# Patient Record
Sex: MALE | Race: BLACK OR AFRICAN AMERICAN | NOT HISPANIC OR LATINO | Employment: OTHER | URBAN - METROPOLITAN AREA
[De-identification: names, ages, dates, MRNs, and addresses within clinical notes are randomized per-mention and may not be internally consistent; named-entity substitution may affect disease eponyms.]

---

## 2017-02-09 DIAGNOSIS — E11.9 TYPE 2 DIABETES MELLITUS WITHOUT COMPLICATION: ICD-10-CM

## 2017-04-20 ENCOUNTER — HOSPITAL ENCOUNTER (INPATIENT)
Facility: HOSPITAL | Age: 82
LOS: 3 days | Discharge: HOME | DRG: 395 | End: 2017-04-23
Attending: EMERGENCY MEDICINE | Admitting: SURGERY
Payer: MEDICARE

## 2017-04-20 DIAGNOSIS — E86.0 DEHYDRATION: ICD-10-CM

## 2017-04-20 DIAGNOSIS — K56.609 SMALL BOWEL OBSTRUCTION: Primary | ICD-10-CM

## 2017-04-20 DIAGNOSIS — K42.0 UMBILICAL HERNIA WITH OBSTRUCTION: ICD-10-CM

## 2017-04-20 LAB
ABO + RH BLD: NORMAL
ALBUMIN SERPL BCP-MCNC: 4.4 G/DL
ALP SERPL-CCNC: 112 U/L
ALT SERPL W/O P-5'-P-CCNC: 23 U/L
ANION GAP SERPL CALC-SCNC: 15 MMOL/L
AST SERPL-CCNC: 21 U/L
BASOPHILS # BLD AUTO: 0.02 K/UL
BASOPHILS NFR BLD: 0.2 %
BILIRUB SERPL-MCNC: 1.3 MG/DL
BLD GP AB SCN CELLS X3 SERPL QL: NORMAL
BUN SERPL-MCNC: 31 MG/DL
CALCIUM SERPL-MCNC: 10.9 MG/DL
CHLORIDE SERPL-SCNC: 103 MMOL/L
CO2 SERPL-SCNC: 21 MMOL/L
CREAT SERPL-MCNC: 1.9 MG/DL
DIFFERENTIAL METHOD: ABNORMAL
EOSINOPHIL # BLD AUTO: 0 K/UL
EOSINOPHIL NFR BLD: 0 %
ERYTHROCYTE [DISTWIDTH] IN BLOOD BY AUTOMATED COUNT: 14.9 %
EST. GFR  (AFRICAN AMERICAN): 37 ML/MIN/1.73 M^2
EST. GFR  (NON AFRICAN AMERICAN): 32 ML/MIN/1.73 M^2
GLUCOSE SERPL-MCNC: 301 MG/DL
HCT VFR BLD AUTO: 44.4 %
HGB BLD-MCNC: 14.9 G/DL
INR PPP: 1
LIPASE SERPL-CCNC: 25 U/L
LYMPHOCYTES # BLD AUTO: 0.6 K/UL
LYMPHOCYTES NFR BLD: 6.1 %
MCH RBC QN AUTO: 28.4 PG
MCHC RBC AUTO-ENTMCNC: 33.6 %
MCV RBC AUTO: 85 FL
MONOCYTES # BLD AUTO: 0.9 K/UL
MONOCYTES NFR BLD: 8.5 %
NEUTROPHILS # BLD AUTO: 8.9 K/UL
NEUTROPHILS NFR BLD: 84.8 %
PLATELET # BLD AUTO: 232 K/UL
PMV BLD AUTO: 11.2 FL
POTASSIUM SERPL-SCNC: 5.2 MMOL/L
PROT SERPL-MCNC: 9.4 G/DL
PROTHROMBIN TIME: 10.7 SEC
RBC # BLD AUTO: 5.25 M/UL
SODIUM SERPL-SCNC: 139 MMOL/L
WBC # BLD AUTO: 10.44 K/UL

## 2017-04-20 PROCEDURE — 96374 THER/PROPH/DIAG INJ IV PUSH: CPT

## 2017-04-20 PROCEDURE — 12000002 HC ACUTE/MED SURGE SEMI-PRIVATE ROOM

## 2017-04-20 PROCEDURE — 85025 COMPLETE CBC W/AUTO DIFF WBC: CPT

## 2017-04-20 PROCEDURE — 36410 VNPNXR 3YR/> PHY/QHP DX/THER: CPT

## 2017-04-20 PROCEDURE — 99285 EMERGENCY DEPT VISIT HI MDM: CPT | Mod: 25

## 2017-04-20 PROCEDURE — 83690 ASSAY OF LIPASE: CPT

## 2017-04-20 PROCEDURE — 85610 PROTHROMBIN TIME: CPT

## 2017-04-20 PROCEDURE — 86900 BLOOD TYPING SEROLOGIC ABO: CPT

## 2017-04-20 PROCEDURE — 25000003 PHARM REV CODE 250: Performed by: EMERGENCY MEDICINE

## 2017-04-20 PROCEDURE — 96361 HYDRATE IV INFUSION ADD-ON: CPT

## 2017-04-20 PROCEDURE — 63600175 PHARM REV CODE 636 W HCPCS: Performed by: EMERGENCY MEDICINE

## 2017-04-20 PROCEDURE — 86901 BLOOD TYPING SEROLOGIC RH(D): CPT

## 2017-04-20 PROCEDURE — 80053 COMPREHEN METABOLIC PANEL: CPT

## 2017-04-20 RX ORDER — ONDANSETRON 2 MG/ML
4 INJECTION INTRAMUSCULAR; INTRAVENOUS
Status: COMPLETED | OUTPATIENT
Start: 2017-04-20 | End: 2017-04-20

## 2017-04-20 RX ORDER — SODIUM CHLORIDE 9 MG/ML
500 INJECTION, SOLUTION INTRAVENOUS
Status: COMPLETED | OUTPATIENT
Start: 2017-04-20 | End: 2017-04-21

## 2017-04-20 RX ADMIN — SODIUM CHLORIDE, SODIUM LACTATE, POTASSIUM CHLORIDE, AND CALCIUM CHLORIDE 1000 ML: .6; .31; .03; .02 INJECTION, SOLUTION INTRAVENOUS at 06:04

## 2017-04-20 RX ADMIN — ONDANSETRON 4 MG: 2 INJECTION INTRAMUSCULAR; INTRAVENOUS at 06:04

## 2017-04-20 RX ADMIN — SODIUM CHLORIDE 500 ML: 0.9 INJECTION, SOLUTION INTRAVENOUS at 10:04

## 2017-04-20 NOTE — IP AVS SNAPSHOT
[place] [address] [place] [address]  Phone: [phone]           Patient Discharge Instructions   Our goal is to set you up for success. This packet includes information on your condition, medications, and your home care.  It will help you care for yourself to prevent having to return to the hospital.     Please ask your nurse if you have any questions.      There are many details to remember when preparing to leave the hospital. Here is what you will need to do:    1. Take your medicine. If you are prescribed medications, review your Medication List on the following pages. You may have new medications to  at the pharmacy and others that you'll need to stop taking. Review the instructions for how and when to take your medications. Talk with your doctor or nurses if you are unsure of what to do.     2. Go to your follow-up appointments. Specific follow-up information is listed in the following pages. Your may be contacted by a nurse or clinical provider about future appointments. Be sure we have all of the phone numbers to reach you. Please contact your provider's office if you are unable to make an appointment.     3. Watch for warning signs. Your doctor or nurse will give you detailed warning signs to watch for and when to call for assistance. These instructions may also include educational information about your condition. If you experience any of warning signs to your health, call your doctor.           [facility]  Unless otherwise directed by your provider, please   contact [facility], our nurse care line   that is available for 24/7 assistance.     [phone] (toll-free)     Registered nurses in the [facility]   provide: appointment scheduling, clinical advisement, health education, and other advisory services.                  ** Verify the list of medication(s) below is accurate and up to date. Carry this with you in case of emergency.  If your medications have changed, please notify your healthcare provider.             Medication List      CONTINUE taking these medications        Additional Info                      allopurinol 300 MG tablet   Commonly known as:  ZYLOPRIM   Quantity:  90 tablet   Refills:  3   Dose:  300 mg    Last time this was given:  300 mg on 4/23/2017  8:34 AM   Instructions:  Take 1 tablet (300 mg total) by mouth once daily.     Begin Date    AM    Noon    PM    Bedtime       benazepril 40 MG tablet   Commonly known as:  LOTENSIN   Quantity:  90 tablet   Refills:  3   Dose:  40 mg    Last time this was given:  40 mg on 4/23/2017  8:34 AM   Instructions:  Take 1 tablet (40 mg total) by mouth once daily.     Begin Date    AM    Noon    PM    Bedtime       blood sugar diagnostic Strp   Commonly known as:  ONETOUCH ULTRA TEST   Quantity:  200 each   Refills:  3   Dose:  1 each    Instructions:  1 each by Misc.(Non-Drug; Combo Route) route once daily.     Begin Date    AM    Noon    PM    Bedtime       blood-glucose meter kit   Commonly known as:  ONETOUCH ULTRAMINI   Quantity:  1 each   Refills:  0    Instructions:  To test twice daily. Use as instructed     Begin Date    AM    Noon    PM    Bedtime       cholestyramine 4 gram packet   Commonly known as:  QUESTRAN   Quantity:  270 packet   Refills:  3   Dose:  4 g    Instructions:  Take 1 packet (4 g total) by mouth 3 (three) times daily with meals.     Begin Date    AM    Noon    PM    Bedtime       colchicine 0.6 mg tablet   Commonly known as:  COLCRYS   Quantity:  30 tablet   Refills:  1   Dose:  0.6 mg    Instructions:  Take 1 tablet (0.6 mg total) by mouth daily as needed.     Begin Date    AM    Noon    PM    Bedtime       duloxetine 30 MG capsule   Commonly known as:  CYMBALTA   Quantity:  60 capsule   Refills:  2   Dose:  30 mg    Instructions:  Take 1 capsule (30 mg total) by mouth 2 (two) times daily.     Begin Date    AM    Noon    PM    Bedtime       ferrous  gluconate 324 MG tablet   Commonly known as:  FERGON   Refills:  0   Dose:  324 mg    Instructions:  Take 1 tablet (324 mg total) by mouth daily with breakfast.     Begin Date    AM    Noon    PM    Bedtime       gabapentin 300 MG capsule   Commonly known as:  NEURONTIN   Quantity:  90 capsule   Refills:  2   Dose:  300 mg    Instructions:  Take 1 capsule (300 mg total) by mouth 3 (three) times daily.     Begin Date    AM    Noon    PM    Bedtime       indomethacin 50 MG capsule   Commonly known as:  INDOCIN   Quantity:  90 capsule   Refills:  0   Dose:  50 mg    Instructions:  Take 1 capsule (50 mg total) by mouth 3 (three) times daily as needed. Take for Gout attacks     Begin Date    AM    Noon    PM    Bedtime       loratadine 10 mg tablet   Commonly known as:  CLARITIN   Refills:  0   Dose:  10 mg    Instructions:  Take 10 mg by mouth once daily.     Begin Date    AM    Noon    PM    Bedtime       mirtazapine 15 MG tablet   Commonly known as:  REMERON   Quantity:  90 tablet   Refills:  2   Dose:  15 mg    Instructions:  Take 1 tablet (15 mg total) by mouth nightly.     Begin Date    AM    Noon    PM    Bedtime       multivitamin capsule   Refills:  0   Dose:  1 capsule    Instructions:  Take 1 capsule by mouth once daily.     Begin Date    AM    Noon    PM    Bedtime       oxycodone-acetaminophen 5-325 mg per tablet   Commonly known as:  PERCOCET   Quantity:  90 tablet   Refills:  0   Dose:  1 tablet    Instructions:  Take 1 tablet by mouth 3 (three) times daily.     Begin Date    AM    Noon    PM    Bedtime       penicillin v potassium 500 MG tablet   Commonly known as:  VEETID   Quantity:  40 tablet   Refills:  0   Dose:  500 mg    Instructions:  Take 1 tablet (500 mg total) by mouth 4 (four) times daily.     Begin Date    AM    Noon    PM    Bedtime       potassium, sodium phosphates 280-160-250 mg Pwpk   Commonly known as:  PHOS-NAK   Quantity:  120 packet   Refills:  6   Dose:  1 packet    Instructions:   Take 1 packet by mouth 4 (four) times daily with meals and nightly.     Begin Date    AM    Noon    PM    Bedtime       prochlorperazine 10 MG tablet   Commonly known as:  COMPAZINE   Quantity:  30 tablet   Refills:  1   Dose:  10 mg    Instructions:  Take 1 tablet (10 mg total) by mouth every 6 (six) hours as needed.     Begin Date    AM    Noon    PM    Bedtime       sodium bicarbonate 650 MG tablet   Quantity:  100 tablet   Refills:  1   Dose:  650 mg    Instructions:  Take 1 tablet (650 mg total) by mouth 2 (two) times daily.     Begin Date    AM    Noon    PM    Bedtime       tadalafil 10 MG tablet   Commonly known as:  CIALIS   Quantity:  10 tablet   Refills:  3   Dose:  10 mg    Instructions:  Take 1 tablet (10 mg total) by mouth daily as needed for Erectile Dysfunction.     Begin Date    AM    Noon    PM    Bedtime                  Please bring to all follow up appointments:    1. A copy of your discharge instructions.  2. All medicines you are currently taking in their original bottles.  3. Identification and insurance card.    Please arrive 15 minutes ahead of scheduled appointment time.    Please call 24 hours in advance if you must reschedule your appointment and/or time.        Your Scheduled Appointments     May 04, 2017 10:20 AM CDT   Established Patient - Oncology with Vimal Pillai MD   [facility] ([facility])    [address] [place] [address], [address]  [facility] [address]   [phone]            May 05, 2017  9:00 AM CDT   Hospital Follow Up with Marcos Sellers MD   [facility] ([facility])    [address]  [facility] [address]   [phone]            Jun 16, 2017  9:30 AM CDT   Non-Fasting Lab with LAB, APPOINTMENT [place] [facility] ([facility])    [address] [facility] [address]   [phone]            Jun 16, 2017 10:00 AM CDT   Ct Abd W Contrast with [facility] CT2 LIMIT 500 LBS   [facility] ([facility])    [address] [place] [address]   [phone]            Jun 19, 2017 10:00 AM CDT   Established Patient Visit with MD Keegan Jose - [facility] [place] ([facility] )    [address]  [place] [address]   [phone]              Follow-up Information     Follow up with Marcos Sellers MD. Go on 5/5/2017.    Specialty:  Internal Medicine    Why:  For Appointment on Friday 5/5/2017 @ 9AM    Contact information:    [address]  [place] [address]  [phone]          Follow up with [place] Finch MD. Schedule an appointment as soon as possible for a visit in 3 weeks.    Specialty:  General Surgery    Why:  As needed    Contact information:    [address] [place] [id number] [phone]          Discharge References/Attachments     BOWEL OBSTRUCTION, SMALL (ENGLISH)        Primary Diagnosis     Your primary diagnosis was:  Small Bowel Obstruction      Admission Information     Date & Time Provider Department CSN    4/20/2017  5:35 PM Eugene Porras III, MD [facility] [id number]      Care Providers     Provider Role Specialty Primary office phone    Eugene Porras III, MD Attending Provider Surgery [phone]    Vimal Pillai MD Consulting Physician  Internal Medicine [phone]      Your Vitals Were     BP Pulse Temp Resp Height Weight    163/85 75 98 °F (36.7 °C) (Oral) 19 6' 2" (1.88 m) 91.2 kg (201 lb)    SpO2 BMI             96% 25.81 kg/m2         Recent Lab Values        5/2/2014 8/25/2014 11/26/2014 3/2/2015 2/16/2016 6/16/2016 7/6/2016 4/21/2017     10:53 AM  8:45 AM  9:50 AM  8:45 AM 11:44 AM 12:01 PM  4:07 PM  5:42 AM    A1C 7.5 (H) 7.6 (H) 6.6 (H) 6.2 4.8 5.3 5.4 6.4 (H)    Comment for A1C at  4:07 PM on 7/6/2016:  According to ADA guidelines, hemoglobin A1C <7.0% represents  optimal control in non-pregnant diabetic patients.  Different  metrics may apply to specific " populations.   Standards of Medical Care in Diabetes - 2016.  For the purpose of screening for the presence of diabetes:  <5.7%     Consistent with the absence of diabetes  5.7-6.4%  Consistent with increasing risk for diabetes   (prediabetes)  >or=6.5%  Consistent with diabetes  Currently no consensus exists for use of hemoglobin A1C  for diagnosis of diabetes for children.      Comment for A1C at  5:42 AM on 4/21/2017:  According to ADA guidelines, hemoglobin A1C <7.0% represents  optimal control in non-pregnant diabetic patients.  Different  metrics may apply to specific populations.   Standards of Medical Care in Diabetes - 2016.  For the purpose of screening for the presence of diabetes:  <5.7%     Consistent with the absence of diabetes  5.7-6.4%  Consistent with increasing risk for diabetes   (prediabetes)  >or=6.5%  Consistent with diabetes  Currently no consensus exists for use of hemoglobin A1C  for diagnosis of diabetes for children.        Allergies as of 4/23/2017        Reactions    Shellfish Containing Products     Causes gout to flare up      Advance Directives     An advance directive is a document which, in the event you are no longer able to make decisions for yourself, tells your healthcare team what kind of treatment you do or do not want to receive, or who you would like to make those decisions for you.  If you do not currently have an advance directive, [facility] encourages you to create one.  For more information call:  [phone]), [phone] ([phone]),  or log on to [email or web address].        Language Assistance Services     ATTENTION: Language assistance services are available, free of charge. Please call [phone].      ATENCIÓN: Si habla español, tiene a khan disposición servicios gratuitos de asistencia lingüística. Llame al [phone].     CHÚ Ý: N?u b?n nói Ti?ng Vi?t, có các d?ch v? h? tr? ngôn ng? mi?n phí dành cho b?n. G?i s? [phone].         Chronic Kindey Disease Education             Diabetes Discharge Instructions                                   [facility] Sign[facility]Up     Activating your [facility] account is as easy as 1-2-3!     1) Visit [email or web address], select Sign Up Now, enter this activation code and your date of birth, then select Next.  Activation code not generated  Current Patient Portal Status: Account disabled      2) Create a username and password to use when you visit [facility] in the future and select a security question in case you lose your password and select Next.    3) Enter your e-mail address and click Sign Up!    Additional Information  If you have questions, please e-mail [email or web address] or call [phone] to talk to our [facility] staff. Remember, [facility] is NOT to be used for urgent needs. For medical emergencies, dial 911.          [facility] complies with applicable Federal civil rights laws and does not discriminate on the basis of race, color, national origin, age, disability, or sex.

## 2017-04-20 NOTE — ED NOTES
Pt vomiting brown liquid vomit into emesis bag.  Pt is also releasing brown liquid into his colonoscopy bag.

## 2017-04-20 NOTE — ED TRIAGE NOTES
Pt presents to the ed with c/o abdominal pain and emesis that started this morning. Pt report mulitple episodes of emesis all day. Family describes the emesis as dark brown. Skin warm and dry, respirations even and unlabored. Pt has colostomy due to colon cancer. Report nothing unordinary about stool in bag. Family at bedside.

## 2017-04-21 LAB
ANION GAP SERPL CALC-SCNC: 7 MMOL/L
BACTERIA #/AREA URNS HPF: ABNORMAL /HPF
BASOPHILS # BLD AUTO: 0.01 K/UL
BASOPHILS NFR BLD: 0.1 %
BILIRUB UR QL STRIP: NEGATIVE
BUN SERPL-MCNC: 41 MG/DL
CALCIUM SERPL-MCNC: 9.5 MG/DL
CHLORIDE SERPL-SCNC: 111 MMOL/L
CLARITY UR: CLEAR
CO2 SERPL-SCNC: 24 MMOL/L
COLOR UR: YELLOW
CREAT SERPL-MCNC: 1.6 MG/DL
DIFFERENTIAL METHOD: ABNORMAL
EOSINOPHIL # BLD AUTO: 0 K/UL
EOSINOPHIL NFR BLD: 0.2 %
ERYTHROCYTE [DISTWIDTH] IN BLOOD BY AUTOMATED COUNT: 14.6 %
EST. GFR  (AFRICAN AMERICAN): 46 ML/MIN/1.73 M^2
EST. GFR  (NON AFRICAN AMERICAN): 40 ML/MIN/1.73 M^2
GLUCOSE SERPL-MCNC: 154 MG/DL
GLUCOSE UR QL STRIP: NEGATIVE
HCT VFR BLD AUTO: 35 %
HGB BLD-MCNC: 11.8 G/DL
HGB UR QL STRIP: NEGATIVE
HYALINE CASTS #/AREA URNS LPF: 2 /LPF
KETONES UR QL STRIP: NEGATIVE
LEUKOCYTE ESTERASE UR QL STRIP: NEGATIVE
LYMPHOCYTES # BLD AUTO: 1.2 K/UL
LYMPHOCYTES NFR BLD: 14.1 %
MCH RBC QN AUTO: 28.8 PG
MCHC RBC AUTO-ENTMCNC: 33.7 %
MCV RBC AUTO: 85 FL
MICROSCOPIC COMMENT: ABNORMAL
MONOCYTES # BLD AUTO: 1.4 K/UL
MONOCYTES NFR BLD: 16.2 %
NEUTROPHILS # BLD AUTO: 6.1 K/UL
NEUTROPHILS NFR BLD: 69.2 %
NITRITE UR QL STRIP: NEGATIVE
PH UR STRIP: 5 [PH] (ref 5–8)
PLATELET # BLD AUTO: 213 K/UL
PMV BLD AUTO: 11.9 FL
POCT GLUCOSE: 108 MG/DL (ref 70–110)
POCT GLUCOSE: 125 MG/DL (ref 70–110)
POCT GLUCOSE: 137 MG/DL (ref 70–110)
POCT GLUCOSE: 189 MG/DL (ref 70–110)
POTASSIUM SERPL-SCNC: 4.5 MMOL/L
PROT UR QL STRIP: ABNORMAL
RBC # BLD AUTO: 4.1 M/UL
RBC #/AREA URNS HPF: 1 /HPF (ref 0–4)
SODIUM SERPL-SCNC: 142 MMOL/L
SP GR UR STRIP: 1.02 (ref 1–1.03)
URN SPEC COLLECT METH UR: ABNORMAL
UROBILINOGEN UR STRIP-ACNC: NEGATIVE EU/DL
WBC # BLD AUTO: 8.78 K/UL
WBC #/AREA URNS HPF: 1 /HPF (ref 0–5)

## 2017-04-21 PROCEDURE — 81000 URINALYSIS NONAUTO W/SCOPE: CPT

## 2017-04-21 PROCEDURE — 80048 BASIC METABOLIC PNL TOTAL CA: CPT

## 2017-04-21 PROCEDURE — C9113 INJ PANTOPRAZOLE SODIUM, VIA: HCPCS | Performed by: EMERGENCY MEDICINE

## 2017-04-21 PROCEDURE — 82962 GLUCOSE BLOOD TEST: CPT

## 2017-04-21 PROCEDURE — 63600175 PHARM REV CODE 636 W HCPCS: Performed by: EMERGENCY MEDICINE

## 2017-04-21 PROCEDURE — 83036 HEMOGLOBIN GLYCOSYLATED A1C: CPT

## 2017-04-21 PROCEDURE — 85025 COMPLETE CBC W/AUTO DIFF WBC: CPT

## 2017-04-21 PROCEDURE — 21400001 HC TELEMETRY ROOM

## 2017-04-21 PROCEDURE — 25000003 PHARM REV CODE 250: Performed by: EMERGENCY MEDICINE

## 2017-04-21 RX ORDER — METOCLOPRAMIDE HYDROCHLORIDE 5 MG/ML
5 INJECTION INTRAMUSCULAR; INTRAVENOUS EVERY 6 HOURS PRN
Status: DISCONTINUED | OUTPATIENT
Start: 2017-04-21 | End: 2017-04-21

## 2017-04-21 RX ORDER — GLUCAGON 1 MG
1 KIT INJECTION
Status: DISCONTINUED | OUTPATIENT
Start: 2017-04-21 | End: 2017-04-23 | Stop reason: HOSPADM

## 2017-04-21 RX ORDER — ALLOPURINOL 100 MG/1
300 TABLET ORAL DAILY
Status: DISCONTINUED | OUTPATIENT
Start: 2017-04-21 | End: 2017-04-23 | Stop reason: HOSPADM

## 2017-04-21 RX ORDER — ENOXAPARIN SODIUM 100 MG/ML
40 INJECTION SUBCUTANEOUS EVERY 24 HOURS
Status: DISCONTINUED | OUTPATIENT
Start: 2017-04-21 | End: 2017-04-23 | Stop reason: HOSPADM

## 2017-04-21 RX ORDER — SODIUM CHLORIDE 9 MG/ML
INJECTION, SOLUTION INTRAVENOUS CONTINUOUS
Status: DISCONTINUED | OUTPATIENT
Start: 2017-04-21 | End: 2017-04-23 | Stop reason: HOSPADM

## 2017-04-21 RX ORDER — MORPHINE SULFATE 10 MG/ML
2 INJECTION INTRAMUSCULAR; INTRAVENOUS; SUBCUTANEOUS EVERY 4 HOURS PRN
Status: DISCONTINUED | OUTPATIENT
Start: 2017-04-21 | End: 2017-04-23 | Stop reason: HOSPADM

## 2017-04-21 RX ORDER — MORPHINE SULFATE 10 MG/ML
4 INJECTION INTRAMUSCULAR; INTRAVENOUS; SUBCUTANEOUS EVERY 4 HOURS PRN
Status: DISCONTINUED | OUTPATIENT
Start: 2017-04-21 | End: 2017-04-23 | Stop reason: HOSPADM

## 2017-04-21 RX ORDER — PANTOPRAZOLE SODIUM 40 MG/10ML
40 INJECTION, POWDER, LYOPHILIZED, FOR SOLUTION INTRAVENOUS DAILY
Status: DISCONTINUED | OUTPATIENT
Start: 2017-04-21 | End: 2017-04-23 | Stop reason: HOSPADM

## 2017-04-21 RX ORDER — BENAZEPRIL HYDROCHLORIDE 40 MG/1
40 TABLET ORAL DAILY
Status: DISCONTINUED | OUTPATIENT
Start: 2017-04-21 | End: 2017-04-23 | Stop reason: HOSPADM

## 2017-04-21 RX ORDER — ACETAMINOPHEN 325 MG/1
650 TABLET ORAL EVERY 6 HOURS PRN
Status: DISCONTINUED | OUTPATIENT
Start: 2017-04-21 | End: 2017-04-23 | Stop reason: HOSPADM

## 2017-04-21 RX ORDER — INSULIN ASPART 100 [IU]/ML
0-5 INJECTION, SOLUTION INTRAVENOUS; SUBCUTANEOUS EVERY 6 HOURS PRN
Status: DISCONTINUED | OUTPATIENT
Start: 2017-04-21 | End: 2017-04-23 | Stop reason: HOSPADM

## 2017-04-21 RX ORDER — ONDANSETRON 2 MG/ML
4 INJECTION INTRAMUSCULAR; INTRAVENOUS EVERY 12 HOURS PRN
Status: DISCONTINUED | OUTPATIENT
Start: 2017-04-21 | End: 2017-04-23 | Stop reason: HOSPADM

## 2017-04-21 RX ADMIN — SODIUM CHLORIDE: 0.9 INJECTION, SOLUTION INTRAVENOUS at 07:04

## 2017-04-21 RX ADMIN — BENAZEPRIL HYDROCHLORIDE 40 MG: 40 TABLET, COATED ORAL at 08:04

## 2017-04-21 RX ADMIN — ENOXAPARIN SODIUM 40 MG: 100 INJECTION SUBCUTANEOUS at 05:04

## 2017-04-21 RX ADMIN — PANTOPRAZOLE SODIUM 40 MG: 40 INJECTION, POWDER, FOR SOLUTION INTRAVENOUS at 08:04

## 2017-04-21 RX ADMIN — ALLOPURINOL 300 MG: 100 TABLET ORAL at 08:04

## 2017-04-21 RX ADMIN — SODIUM CHLORIDE: 0.9 INJECTION, SOLUTION INTRAVENOUS at 05:04

## 2017-04-21 NOTE — ED NOTES
Hourly rounding has been done on this patient. Fall precautions maintained. Side rails up x2, bed low and locked. Pt updated on plan of care and current status. Pt pain is assessed and is 0/10. Pt skin warm and dry. Respirations even and unlabored. Toileting has been offered. Call bell within reach for patient needs.

## 2017-04-21 NOTE — PLAN OF CARE
Problem: Patient Care Overview  Goal: Plan of Care Review  Outcome: Ongoing (interventions implemented as appropriate)  No falls this shift bed kept in low position call light and phone remain within reach bed alarm remain active. Patient able to reposition self in bed without assist. No evidence of skin breakdown noted.      4/20/17 CT of abdomen: Acute low-grade or early high-grade small bowel obstruction with incarceration and transition point in the ventral abdominal wall periumbilical hernia.  Patient admitted to general surgery   On admit 4/20/17  per patient he vomit a multiple large amounts   Today 4/21/17 no nausea or vomit noted this shift. Stomach soft and slightly distended. Patient states he feels much better today.   Plan to keep NPO today and allow bowel to rest.

## 2017-04-21 NOTE — PROGRESS NOTES
gen surg  H*P dictated [id number]  sbo prob from ventral hernia/incarcerated--easily reduced in ER last night--pt much better today- hold off on starting po intake until appetite better, consult his pcp/oncologist for med manegment

## 2017-04-21 NOTE — CONSULTS
[facility]  Adult Nutrition  Consult Note    SUMMARY     Recommendations    Recommendation/Intervention: 1) s/p bowel rest, advance diet to a 2000 calorie Diabetic, GI soft/light diet 2) Communicated with dietary staff on Colostomy Nutrition Therapy 3) Monitor po intake  Goals: 1) Patient will meet >=85% of EEN 2) Patient will tolerate oral diet  Nutrition Goal Status: new  Communication of RD Recs:  (physician's sticky note)    Continuum of Care Plan     D/C Planning:  Patient will discharge home on a 2000 calorie Diabetic Diet with oral supplements.     Reason for Assessment    Reason for Assessment: identified at risk by screening criteria, nurse/nurse practitioner consult  Diagnosis:  (SBO, umbilical hernia w/o obstruction, dehydration)  Relevent Medical History: DM, Cancer, HTN, Gout   General Information Comments: Patient lost weight secondary to radiation but gained it back after he stopped the treatment. Currently NPO due to altered GI system. He denies nausea, abdominal pain. States he eats 3 meals a day and drinks oral supplements at home.      Nutrition Prescription Ordered    Current Diet Order: NPO    Evaluation of Received Nutrients/Fluid Intake      Energy Calories Required: not meeting needs    Protein Required: not meeting needs    IV Fluid (mL): 2400    Fluid Required: not meeting needs (Net I/O -400)       Nutrition Risk Screen     Nutrition Risk Screen: unintentional loss of 10 lbs or more in the past 2 mos    Nutrition/Diet History    Patient Reported Diet/Restrictions/Preferences: general     Food Preferences: No cultural, Evangelical or ethnic food preferences.     Supplemental Drinks or Food Habits: Ensure  Factors Affecting Nutritional Intake: altered gastrointestinal function, NPO    Labs/Tests/Procedures/Meds       Pertinent Labs Reviewed: reviewed  BMP  Lab Results   Component Value Date     04/21/2017    K 4.5 04/21/2017     (H) 04/21/2017    CO2 24  04/21/2017    BUN 41 (H) 04/21/2017    CREATININE 1.6 (H) 04/21/2017    CALCIUM 9.5 04/21/2017    ANIONGAP 7 (L) 04/21/2017    ESTGFRAFRICA 46 (A) 04/21/2017    EGFRNONAA 40 (A) 04/21/2017     Lab Results   Component Value Date    CALCIUM 9.5 04/21/2017    PHOS 3.1 07/06/2016     Lab Results   Component Value Date    ALBUMIN 4.4 04/20/2017       Recent Labs  Lab 04/21/17  1135   POCTGLUCOSE 125*     Pertinent Medications Reviewed: reviewed  Scheduled Meds:   allopurinol  300 mg Oral Daily    benazepril  40 mg Oral Daily    enoxaparin  40 mg Subcutaneous Daily    pantoprazole  40 mg Intravenous Daily     Continuous Infusions:   sodium chloride 0.9% 100 mL/hr at 04/21/17 0700     PRN Meds:.acetaminophen, dextrose 50%, glucagon (human recombinant), insulin aspart, morphine, morphine, ondansetron    Physical Findings    Overall Physical Appearance: loss of muscle mass  Tubes:  (-)  Oral/Mouth Cavity: tooth/teeth missing  Skin: intact, other (see comments) (+ colostomy)    Anthropometrics    Height (inches): 74.02 in  Weight (kg): 99.8 kg  Ideal Body Weight (IBW), Male: 190.12 lb  % Ideal Body Weight, Male (lb): 115.73 lb  BMI (kg/m2): 28.24  BMI Grade: 25 - 29.9 - overweight    Weight Loss: unintentional      Estimated/Assessed Needs    Weight Used For Calorie Calculations: 99.8 kg (220 lb 0.3 oz)   Height (cm): 188 cm  Energy Need Method: [place]-St Jeor (1773 - 2127.6)  RMR ([place]-St. Jeor Equation): 1778.48  Weight Used For Protein Calculations: 99.8 kg (220 lb 0.3 oz)  Protein Requirements: 75 - 100 g  Fluid Need Method: RDA Method (or per MD)    Nutrition Diagnosis  Nutrition Problem:  Inadequate energy intake  Etiology:  Altered GI system  Signs/Symptoms:  NPO  Status:  new    Monitor and Evaluation    Food and Nutrient Intake: energy intake, food and beverage intake  Food and Nutrient Adminstration: diet order    Anthropometric Measurements: weight, weight change, body mass index  Biochemical Data, Medical  Tests and Procedures: electrolyte and renal panel, gastrointestinal profile, glucose/endocrine profile, inflammatory profile  Nutrition-Focused Physical Findings: overall appearance, head and eyes    Nutrition Risk    Level of Risk: other (see comments) (f/u 2x weekly)    Nutrition Follow-Up    RD Follow-up?: Yes

## 2017-04-21 NOTE — CONSULTS
[facility]  Hematology/Oncology  Consult Note    Patient Name: Cory Gamez  MRN: [id number]  Admission Date: 4/20/2017  Hospital Length of Stay: 1 days  Code Status: Full Code   Attending Provider: Cory Basilio III,*  Consulting Provider: Vimal Bean MD  Primary Care Physician: Marcos Sellers MD  Principal Problem:<principal problem not specified>    Inpatient consult to Hematology/Oncology - Community  Consult performed by: VIMAL BEAN  Consult ordered by: CORY BASILIO III        Subjective:     HPI:     Mr. Gamez is a pleasant 83 YO gentleman well known to me with Stage IV Adeno CA of colon, s/p colectomy with colostomy who underwent systemic chemotherapy (see bellow for regimen). Last PET CT in Aug 2017- GLADYS for  metastatic lesions except hypermetabolic lesions to kidnidiane. He was in his usual state of health until yesterday. He began to have periumbilical pain and protrusion through abdominal wall. He presented to [facility] ED and found to have have acute low-grade or early high-grade small bowel obstruction with incarceration and transition point in the ventral abdominal wall periumbilical hernia.    Denies n/v/diarrhea.       Oncology Treatment Plan:   S/p 1/12 FOLFOX + 11/12 of FOLFOX + Avastin       Medications:  Continuous Infusions:   sodium chloride 0.9%       Scheduled Meds:   allopurinol  300 mg Oral Daily    benazepril  40 mg Oral Daily    enoxaparin  40 mg Subcutaneous Daily    pantoprazole  40 mg Intravenous Daily     PRN Meds:acetaminophen, dextrose 50%, glucagon (human recombinant), insulin aspart, morphine, morphine, ondansetron     Review of patient's allergies indicates:   Allergen Reactions    Shellfish containing products      Causes gout to flare up        Past Medical History:   Diagnosis Date    Arthritis     Cancer     Diabetes mellitus     Elevated PSA     Gout, unspecified     Hypertension      Past Surgical History:   Procedure  Laterality Date    CATARACT EXTRACTION      ou    EYE SURGERY      bilateral cataracts    removal of small bowel  Apr. 2015    Colostomy     Family History     Problem Relation (Age of Onset)    Diabetes Mother    Hypertension Mother    No Known Problems Father, Sister, Brother, Maternal Aunt, Maternal Uncle, Paternal Aunt, Paternal Uncle, Maternal Grandmother, Maternal Grandfather, Paternal Grandmother, Paternal Grandfather        Social History Main Topics    Smoking status: Never Smoker    Smokeless tobacco: Never Used      Comment: smoked short while as a teen    Alcohol use No    Drug use: No    Sexual activity: Not on file       Review of Systems     Constitutional: Denies fever or chills  Eyes: no visual changes or eye pain   ENT: Denies ear pain, nasal congestion, tooth pain, sore throat, or odynophagia.  Respiratory: No cough, SOB, exertional dyspnea, or hemoptysis   Cardiovascular: no chest pain or palpitations   Gastrointestinal: see HPI  Hematologic/Lymphatic: No lymphadenopathy, significant fatigue, fever, or night sweats. No mucocutaneous bleeding   Musculoskeletal: Ankle edema  Neurological: Walks with straight cane assistance   Skin: No rashes or lesions  Neuro: chronic neuropathy    Psych: Denies any anxiety, depression, or insomnia        Objective:     Vital Signs (Most Recent):  Temp: 98 °F (36.7 °C) (04/21/17 0400)  Pulse: 72 (04/21/17 0400)  Resp: 18 (04/21/17 0400)  BP: (!) 164/81 (04/21/17 0400)  SpO2: 98 % (04/21/17 0040) Vital Signs (24h Range):  Temp:  [98 °F (36.7 °C)-98.2 °F (36.8 °C)] 98 °F (36.7 °C)  Pulse:  [] 72  Resp:  [18] 18  SpO2:  [96 %-98 %] 98 %  BP: (106-164)/(65-89) 164/81     Weight: 99.8 kg (220 lb)  Body mass index is 28.25 kg/(m^2).  Body surface area is 2.28 meters squared.    No intake or output data in the 24 hours ending 04/21/17 0702    Physical Exam     General: NAD, resting in bed   Head: normocephalic, atraumatic   Eyes: conjunctivae pale, anicteric  sclera.   Throat: No erythema or post nasal discharge   Neck: supple, no LAD   Lungs: decreased BS at the bases   Heart: S1, S2, RRR  Abdomen: Tenderness to palpation . Colostomy bag to LLQ-soft stool  Extremities: 1+ pitting edema of BLE  Skin: turgor normal, no erythema or rashes.   MS: move all extremities    Neuro: sensory neuropathy of hands and feet   Psy: pleasant, affect is congruent to mood     Significant Labs:   CBC:   Recent Labs  Lab 04/20/17 1813 04/21/17  0542   WBC 10.44 8.78   HGB 14.9 11.8*   HCT 44.4 35.0*    213   , CMP:   Recent Labs  Lab 04/20/17 1813 04/21/17  0542    142   K 5.2* 4.5    111*   CO2 21* 24   * 154*   BUN 31* 41*   CREATININE 1.9* 1.6*   CALCIUM 10.9* 9.5   PROT 9.4*  --    ALBUMIN 4.4  --    BILITOT 1.3*  --    ALKPHOS 112  --    AST 21  --    ALT 23  --    ANIONGAP 15 7*   EGFRNONAA 32* 40*   , Tumor Markers: No results for input(s): PSA, CEA, , AFPTM, AW1026,  in the last 48 hours.    Invalid input(s): ALGTM, Uric Acid No results for input(s): URICACID in the last 48 hours. and Urine Studies:   Recent Labs  Lab 04/21/17  0900   COLORU Yellow   APPEARANCEUA Clear   PHUR 5.0   SPECGRAV 1.020   PROTEINUA 1+*   GLUCUA Negative   KETONESU Negative   BILIRUBINUA Negative   OCCULTUA Negative   NITRITE Negative   UROBILINOGEN Negative   LEUKOCYTESUR Negative   RBCUA 1   WBCUA 1   BACTERIA Moderate*   HYALINECASTS 2*       Diagnostic Results:  CT reviewed     Current Facility-Administered Medications   Medication Dose Route Frequency Provider Last Rate Last Dose    0.9%  NaCl infusion   Intravenous Continuous Pradeep Sebastian III,  mL/hr at 04/21/17 1744      acetaminophen tablet 650 mg  650 mg Oral Q6H PRN Pradeep Sebastian III, MD        allopurinol tablet 300 mg  300 mg Oral Daily Pradeep Sebastian III, MD   300 mg at 04/21/17 0851    benazepril tablet 40 mg  40 mg Oral Daily Pradeep Sebastian III, MD   40 mg at 04/21/17 0869    dextrose 50%  injection 12.5 g  12.5 g Intravenous PRN Pradeep Sebastian III, MD        enoxaparin injection 40 mg  40 mg Subcutaneous Daily Pradeep Sebastian III, MD   40 mg at 04/21/17 1745    glucagon (human recombinant) injection 1 mg  1 mg Intramuscular PRN Pradeep Sebastian III, MD        insulin aspart pen 0-5 Units  0-5 Units Subcutaneous Q6H PRN Pradeep Sebastian III, MD        morphine injection 2 mg  2 mg Intravenous Q4H PRN Pradeep Sebastian III, MD        morphine injection 4 mg  4 mg Intravenous Q4H PRN Pradeep Sebastian III, MD        ondansetron injection 4 mg  4 mg Intravenous Q12H PRN Pradeep Sebastian III, MD        pantoprazole injection 40 mg  40 mg Intravenous Daily Pradeep Sebastian III, MD   40 mg at 04/21/17 0851     Facility-Administered Medications Ordered in Other Encounters   Medication Dose Route Frequency Provider Last Rate Last Dose    ondansetron HCl (PF) 4 mg/2 mL injection    PRN James Ryan CRNA   4 mg at 06/29/15 0856         Assessment/Plan:     Active Diagnoses:    Diagnosis Date Noted POA    Small bowel obstruction [K56.69] 04/20/2017 Yes      Problems Resolved During this Admission:    Diagnosis Date Noted Date Resolved POA     Mr. Gamez, 83 YO gentleman with hx of Stage IV Colon CA who completed above chemo > 1 yr ago with GLADYS on last PET CT except renal masses with SBO    1. Stage IV Colon CA: doing well so far   - last CEA 4.2   - clinically stable    2. SBO/encarceraton of hernia:  Doing better    - surgery team f/u    3. Renal masses: seen urologist in the past   - close surveillance at this point as pt may not be a candidate for any surgical intervention      4. DM II: continue insulin      Thank you for your consult. I will follow-up with patient. Please contact us if you have any additional questions.    Vimal Pillai MD  Hematology/Oncology  [facility]

## 2017-04-21 NOTE — PLAN OF CARE
Problem: Patient Care Overview  Goal: Plan of Care Review  Outcome: Ongoing (interventions implemented as appropriate)  Pt has been educated on fall risk and the importance of turning. Pt has not made big adjustments but has changed positions. Will continue to monitor.

## 2017-04-21 NOTE — PROGRESS NOTES
"TN scheduled follow up appt with Chayito at Dr. Sellers's office [phone] for Friday 5/5/2017 @ 9AM.    Information added to "follow up" tab  "

## 2017-04-21 NOTE — ED NOTES
Hourly rounding has been done on this patient. Fall precautions maintained. Side rails up x2, bed low and locked. Pt updated on plan of care and current status. Pt pain is assessed and is 0/10. Pt skin warm and dry. Respirations even and unlabored. Toileting has been offered. Call bell within reach for patient needs.   r

## 2017-04-21 NOTE — NURSING
Pt admitted for SBO, he denies pain or needs. He has remained free from falls. Will continue to monitor.

## 2017-04-21 NOTE — PLAN OF CARE
Problem: Patient Care Overview  Goal: Plan of Care Review  04/21/2017     Recommendations     Recommendation/Intervention: 1) s/p bowel rest, advance diet to a 2000 calorie Diabetic, GI soft/light diet 2) Communicated with dietary staff on Colostomy Nutrition Therapy 3) Monitor po intake  Goals: 1) Patient will meet >=85% of EEN 2) Patient will tolerate oral diet  Nutrition Goal Status: new  Communication of RD Recs:  (physician's sticky note)     Mable Wiley, MPH, RD, LDN

## 2017-04-21 NOTE — PLAN OF CARE
04/21/17 1335   Discharge Assessment   Assessment Type Discharge Planning Assessment   Confirmed/corrected address and phone number on facesheet? Yes   Assessment information obtained from? Patient   Expected Length of Stay (days) 2   Communicated expected length of stay with patient/caregiver yes   Type of Healthcare Directive Received (No healthcare directive or POA)   Prior to hospitilization cognitive status: Alert/Oriented   Prior to hospitalization functional status: Independent   Current cognitive status: Alert/Oriented   Current Functional Status: Independent   Arrived From admitted as an inpatient   Lives With child(amina), adult  (Nicol Gamez, Daughter)   Able to Return to Prior Arrangements yes   Is patient able to care for self after discharge? Yes   How many people do you have in your home that can help with your care after discharge? 1   Who are your caregiver(s) and their phone number(s)? Daughter, Nicol Gamez, [phone]   Patient's perception of discharge disposition admitted as an inpatient   Readmission Within The Last 30 Days no previous admission in last 30 days   Patient currently being followed by outpatient case management? No   Patient currently receives home health services? No   Does the patient currently use HME? No   Patient currently receives private duty nursing? No   Patient currently receives any other outside agency services? No   Equipment Currently Used at Home cane, straight;walker, rolling;wheelchair   Do you have any problems affording any of your prescribed medications? No   Is the patient taking medications as prescribed? yes   Do you have any financial concerns preventing you from receiving the healthcare you need? No   Does the patient have transportation to healthcare appointments? Yes   Transportation Available car;family or friend will provide   On Dialysis? No   Does the patient receive services at the [facility]? No   Are there any open cases? No   Discharge  Plan A Home with family   Discharge Plan B Home with family;Home Health   Patient/Family In Agreement With Plan yes   TN explained duties of Case Management to patient. Contact information added to white board, TN explained process to contact TN for any additional questions or concerns. Blue folder given to patient. She was informed to leave folder at bedside and we will add any needed paperwork to folder during hospital stay.       [id number] [place] - [address]  [address] [place] [address]  Phone: [phone] Fax: [phone]

## 2017-04-21 NOTE — H&P
"HISTORY OF PRESENT ILLNESS:  This is an 82-year-old male who in 2015 underwent   open sigmoid colectomy with colostomy for a perforated T4 N1 adenocarcinoma of   the colon.  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  Yesterday, he developed a tender bulge on his anterior abdominal   wall as well as the development of crampy abdominal pain, nausea and vomiting.    In the Emergency Room, he says the emergency room physician "pushed it back in."    Since then, his symptoms have dramatically improved, his nausea and vomiting   has resolved.  He does not have abdominal pain.  He still does not have much of   an appetite.  He has continued to have output from his colostomy.  He denies any   fever or chills.  He has never had symptoms like this in the past.    PAST MEDICAL HISTORY:  Diabetes, colon cancer, gout, hypertension.    PAST SURGICAL HISTORY:  As above.  Cataract surgery.    ALLERGIES:  SHELLFISH.    SOCIAL HISTORY:  He does not smoke or consume alcoholic beverages.    PHYSICAL EXAMINATION:  VITAL SIGNS:  Blood pressure 164/81, respiratory rate 18, pulse 72, temperature   98.  GENERAL:  Alert and oriented x4.  HEENT:  No cervical or supraclavicular masses.  LUNGS:  Clear to auscultation bilaterally.  CARDIOVASCULAR:  Regular rate and rhythm.  AXILLA:  No masses bilaterally.  GENITOURINARY:  No inguinal hernia bilaterally.  ABDOMEN:  Soft, positive bowel sounds, nontender, nondistended, easily reducible   ventral hernia, but does not re-prolapse when reduced, ostomy pink with brown   stool in the bag.    LABORATORY DATA:  White blood cell count 8, hemoglobin 11, hematocrit 35,   platelets count 213.  His creatinine from yesterday is 1.9.  A CT scan of the   abdomen and pelvis revealed a small bowel obstruction with incarceration and   transition point in the ventral abdominal wall hernia - I have reviewed the   films.    ASSESSMENT AND PLAN:  Ventral abdominal wall hernia, which " probably was   incarcerated and causing a small bowel obstruction.  This hernia has reduced and   the symptoms are resolving, would hold off on proceeding with repair at this   point, after presenting with a perforated cancer, the development of   carcinomatosis is likely at some point and this could make operation   dangerous/difficult and he may have limited life expectancy, also with his age   and comorbidities, he is at elevated risk of repair, so we will hold off on   proceeding with elective repair at this point, hold off on starting oral intake,   but if his appetite improved during the course of the day today, we will pursue   that, we will also consult his primary care physician/oncologist for medical   management.      MARK/  dd: 04/21/2017 06:58:27 (CDT)  td: 04/21/2017 08:18:47 (CDT)  Doc ID   #[id number]  Job ID #[id number]    CC:

## 2017-04-22 LAB
ANION GAP SERPL CALC-SCNC: 9 MMOL/L
BASOPHILS # BLD AUTO: 0.04 K/UL
BASOPHILS NFR BLD: 0.5 %
BUN SERPL-MCNC: 34 MG/DL
CALCIUM SERPL-MCNC: 9 MG/DL
CHLORIDE SERPL-SCNC: 117 MMOL/L
CO2 SERPL-SCNC: 20 MMOL/L
CREAT SERPL-MCNC: 1.3 MG/DL
DIFFERENTIAL METHOD: ABNORMAL
EOSINOPHIL # BLD AUTO: 0.2 K/UL
EOSINOPHIL NFR BLD: 2.2 %
ERYTHROCYTE [DISTWIDTH] IN BLOOD BY AUTOMATED COUNT: 15 %
EST. GFR  (AFRICAN AMERICAN): 59 ML/MIN/1.73 M^2
EST. GFR  (NON AFRICAN AMERICAN): 51 ML/MIN/1.73 M^2
ESTIMATED AVG GLUCOSE: 137 MG/DL
GIANT PLATELETS BLD QL SMEAR: PRESENT
GLUCOSE SERPL-MCNC: 93 MG/DL
HBA1C MFR BLD HPLC: 6.4 %
HCT VFR BLD AUTO: 36.6 %
HGB BLD-MCNC: 12 G/DL
LYMPHOCYTES # BLD AUTO: 1.8 K/UL
LYMPHOCYTES NFR BLD: 22.6 %
MCH RBC QN AUTO: 28.6 PG
MCHC RBC AUTO-ENTMCNC: 32.8 %
MCV RBC AUTO: 87 FL
MONOCYTES # BLD AUTO: 1 K/UL
MONOCYTES NFR BLD: 13.4 %
NEUTROPHILS # BLD AUTO: 4.8 K/UL
NEUTROPHILS NFR BLD: 62.7 %
PLATELET # BLD AUTO: 174 K/UL
PLATELET BLD QL SMEAR: ABNORMAL
PMV BLD AUTO: 12 FL
POCT GLUCOSE: 126 MG/DL (ref 70–110)
POCT GLUCOSE: 127 MG/DL (ref 70–110)
POCT GLUCOSE: 128 MG/DL (ref 70–110)
POCT GLUCOSE: 156 MG/DL (ref 70–110)
POCT GLUCOSE: 64 MG/DL (ref 70–110)
POCT GLUCOSE: 81 MG/DL (ref 70–110)
POCT GLUCOSE: 94 MG/DL (ref 70–110)
POTASSIUM SERPL-SCNC: 5.2 MMOL/L
RBC # BLD AUTO: 4.19 M/UL
SODIUM SERPL-SCNC: 146 MMOL/L
WBC # BLD AUTO: 7.79 K/UL

## 2017-04-22 PROCEDURE — 85025 COMPLETE CBC W/AUTO DIFF WBC: CPT

## 2017-04-22 PROCEDURE — 36415 COLL VENOUS BLD VENIPUNCTURE: CPT

## 2017-04-22 PROCEDURE — C9113 INJ PANTOPRAZOLE SODIUM, VIA: HCPCS | Performed by: EMERGENCY MEDICINE

## 2017-04-22 PROCEDURE — 80048 BASIC METABOLIC PNL TOTAL CA: CPT

## 2017-04-22 PROCEDURE — 25000003 PHARM REV CODE 250: Performed by: EMERGENCY MEDICINE

## 2017-04-22 PROCEDURE — 21400001 HC TELEMETRY ROOM

## 2017-04-22 PROCEDURE — 63600175 PHARM REV CODE 636 W HCPCS: Performed by: EMERGENCY MEDICINE

## 2017-04-22 RX ADMIN — PANTOPRAZOLE SODIUM 40 MG: 40 INJECTION, POWDER, FOR SOLUTION INTRAVENOUS at 12:04

## 2017-04-22 RX ADMIN — SODIUM CHLORIDE: 0.9 INJECTION, SOLUTION INTRAVENOUS at 04:04

## 2017-04-22 RX ADMIN — ALLOPURINOL 300 MG: 100 TABLET ORAL at 10:04

## 2017-04-22 RX ADMIN — DEXTROSE MONOHYDRATE 12.5 G: 25 INJECTION, SOLUTION INTRAVENOUS at 12:04

## 2017-04-22 RX ADMIN — ENOXAPARIN SODIUM 40 MG: 100 INJECTION SUBCUTANEOUS at 04:04

## 2017-04-22 RX ADMIN — BENAZEPRIL HYDROCHLORIDE 40 MG: 40 TABLET, COATED ORAL at 09:04

## 2017-04-22 NOTE — PLAN OF CARE
Problem: Patient Care Overview  Goal: Plan of Care Review  Outcome: Ongoing (interventions implemented as appropriate)    04/22/17 0610   Coping/Psychosocial   Plan Of Care Reviewed With patient         Problem: Fall Risk (Adult)  Goal: Absence of Falls  Patient will demonstrate the desired outcomes by discharge/transition of care.   Outcome: Ongoing (interventions implemented as appropriate)    04/22/17 0610   Fall Risk (Adult)   Absence of Falls making progress toward outcome         Problem: Pressure Ulcer Risk (Rui Scale) (Adult,Obstetrics,Pediatric)  Goal: Skin Integrity  Patient will demonstrate the desired outcomes by discharge/transition of care.   Outcome: Ongoing (interventions implemented as appropriate)    04/22/17 0610   Pressure Ulcer Risk (Rui Scale) (Adult,Obstetrics,Pediatric)   Skin Integrity making progress toward outcome

## 2017-04-22 NOTE — NURSING
Report given.  In bed watching TV.  IV site intact with IVF infusing without difficulty.   Colostomy bag remains intact. Call light wihtin reach.  Bed alarm on.  NAD noted.  Denies pain.

## 2017-04-22 NOTE — PROGRESS NOTES
Progress Note    Admit Date: 4/20/2017   LOS: 2 days     SUBJECTIVE:       No abd pain.  Hungry.  +gas/stool from colostomy     OBJECTIVE:       Vital Signs Range (Last 24H):  Temp:  [97.5 °F (36.4 °C)-98.5 °F (36.9 °C)]   Pulse:  [56-79]   Resp:  [16-19]   BP: (138-169)/(65-81)   SpO2:  [95 %-99 %]     I & O (Last 24H):  Intake/Output Summary (Last 24 hours) at 04/22/17 0814  Last data filed at 04/22/17 0600   Gross per 24 hour   Intake             2360 ml   Output             1425 ml   Net              935 ml         Physical Exam:  NAD  Abdomen: soft, non-tender, non-distended.  Ostomy w/ gas in appliance    Laboratory:  CBC:   Recent Labs  Lab 04/22/17  0551   WBC 7.79   RBC 4.19*   HGB 12.0*   HCT 36.6*      MCV 87   MCH 28.6   MCHC 32.8     BMP:   Recent Labs  Lab 04/22/17  0551   GLU 93   *   K 5.2*   *   CO2 20*   BUN 34*   CREATININE 1.3   CALCIUM 9.0           ASSESSMENT/PLAN:     pSBO vs reduced  Incisional hernia  - clinically much improved  - liquids then ADAT  - SLIV when lexi PO    Possible DC tomorrow if lexi diet w/out issues  Appreciate IM assistance

## 2017-04-22 NOTE — PLAN OF CARE
Problem: Patient Care Overview  Goal: Plan of Care Review  Outcome: Ongoing (interventions implemented as appropriate)    04/22/17 0605   Coping/Psychosocial   Plan Of Care Reviewed With patient         04/22/17 0605   Coping/Psychosocial   Plan Of Care Reviewed With patient         Problem: Fall Risk (Adult)  Goal: Absence of Falls  Patient will demonstrate the desired outcomes by discharge/transition of care.   Outcome: Ongoing (interventions implemented as appropriate)    04/22/17 0605   Fall Risk (Adult)   Absence of Falls making progress toward outcome         Problem: Pressure Ulcer Risk (Rui Scale) (Adult,Obstetrics,Pediatric)  Goal: Skin Integrity  Patient will demonstrate the desired outcomes by discharge/transition of care.   Outcome: Ongoing (interventions implemented as appropriate)    04/22/17 0605   Pressure Ulcer Risk (Rui Scale) (Adult,Obstetrics,Pediatric)   Skin Integrity making progress toward outcome         Problem: Bowel Obstruction (Adult)  Goal: Signs and Symptoms of Listed Potential Problems Will be Absent, Minimized or Managed (Bowel Obstruction)  Signs and symptoms of listed potential problems will be absent, minimized or managed by discharge/transition of care (reference Bowel Obstruction (Adult) CPG).   Outcome: Ongoing (interventions implemented as appropriate)    04/22/17 0605   Bowel Obstruction   Problems Assessed (Bowel Obstruction) electrolyte/acid-base imbalance   Problems Present (Bowel Obstruction) electrolyte/acid-base imbalance

## 2017-04-23 VITALS
HEART RATE: 75 BPM | OXYGEN SATURATION: 96 % | WEIGHT: 201 LBS | BODY MASS INDEX: 25.8 KG/M2 | HEIGHT: 74 IN | TEMPERATURE: 98 F | RESPIRATION RATE: 19 BRPM | DIASTOLIC BLOOD PRESSURE: 85 MMHG | SYSTOLIC BLOOD PRESSURE: 163 MMHG

## 2017-04-23 LAB
ANION GAP SERPL CALC-SCNC: 4 MMOL/L
BASOPHILS # BLD AUTO: 0.02 K/UL
BASOPHILS NFR BLD: 0.4 %
BUN SERPL-MCNC: 25 MG/DL
CALCIUM SERPL-MCNC: 8.8 MG/DL
CHLORIDE SERPL-SCNC: 116 MMOL/L
CO2 SERPL-SCNC: 23 MMOL/L
CREAT SERPL-MCNC: 1.1 MG/DL
DIFFERENTIAL METHOD: ABNORMAL
EOSINOPHIL # BLD AUTO: 0.1 K/UL
EOSINOPHIL NFR BLD: 2 %
ERYTHROCYTE [DISTWIDTH] IN BLOOD BY AUTOMATED COUNT: 14.6 %
EST. GFR  (AFRICAN AMERICAN): >60 ML/MIN/1.73 M^2
EST. GFR  (NON AFRICAN AMERICAN): >60 ML/MIN/1.73 M^2
GLUCOSE SERPL-MCNC: 110 MG/DL
HCT VFR BLD AUTO: 34 %
HGB BLD-MCNC: 10.9 G/DL
LYMPHOCYTES # BLD AUTO: 1.4 K/UL
LYMPHOCYTES NFR BLD: 27.6 %
MCH RBC QN AUTO: 28.3 PG
MCHC RBC AUTO-ENTMCNC: 32.1 %
MCV RBC AUTO: 88 FL
MONOCYTES # BLD AUTO: 0.5 K/UL
MONOCYTES NFR BLD: 10.1 %
NEUTROPHILS # BLD AUTO: 3 K/UL
NEUTROPHILS NFR BLD: 59.5 %
PLATELET # BLD AUTO: 182 K/UL
PMV BLD AUTO: 11.4 FL
POCT GLUCOSE: 118 MG/DL (ref 70–110)
POCT GLUCOSE: 123 MG/DL (ref 70–110)
POTASSIUM SERPL-SCNC: 4.2 MMOL/L
RBC # BLD AUTO: 3.85 M/UL
SODIUM SERPL-SCNC: 143 MMOL/L
WBC # BLD AUTO: 5.07 K/UL

## 2017-04-23 PROCEDURE — 25000003 PHARM REV CODE 250: Performed by: EMERGENCY MEDICINE

## 2017-04-23 PROCEDURE — 36415 COLL VENOUS BLD VENIPUNCTURE: CPT

## 2017-04-23 PROCEDURE — 85025 COMPLETE CBC W/AUTO DIFF WBC: CPT

## 2017-04-23 PROCEDURE — 80048 BASIC METABOLIC PNL TOTAL CA: CPT

## 2017-04-23 RX ADMIN — ALLOPURINOL 300 MG: 100 TABLET ORAL at 08:04

## 2017-04-23 RX ADMIN — SODIUM CHLORIDE: 0.9 INJECTION, SOLUTION INTRAVENOUS at 02:04

## 2017-04-23 RX ADMIN — BENAZEPRIL HYDROCHLORIDE 40 MG: 40 TABLET, COATED ORAL at 08:04

## 2017-04-23 NOTE — PLAN OF CARE
04/23/17 1419   Final Note   Assessment Type Final Discharge Note   Discharge Disposition Home   Discharge planning education complete? Yes   What phone number can be called within the next 1-3 days to see how you are doing after discharge? ([phone] )   Hospital Follow Up  Appt(s) scheduled? Yes   Discharge plans and expectations educations in teach back method with documentation complete? Yes   Offered [facility] Bedside Delivery? n/a   Discharge[facility]Hospital En[facility] n/a   Referral to Outpatient Case Management complete? n/a   Referral to / orders for Home Health Complete? n/a   30 day supply of medicines given at discharge, if documented non-compliance / non-adherence? n/a   Any social issues identified prior to discharge? No   Did you assess the readiness or willingness of the family or caregiver to support self management of care? Yes

## 2017-04-23 NOTE — PLAN OF CARE
Problem: Patient Care Overview  Goal: Plan of Care Review  Outcome: Ongoing (interventions implemented as appropriate)    04/23/17 0453   Coping/Psychosocial   Plan Of Care Reviewed With patient         Problem: Fall Risk (Adult)  Goal: Absence of Falls  Patient will demonstrate the desired outcomes by discharge/transition of care.   Outcome: Ongoing (interventions implemented as appropriate)    04/23/17 0453   Fall Risk (Adult)   Absence of Falls making progress toward outcome         Problem: Pressure Ulcer Risk (Riu Scale) (Adult,Obstetrics,Pediatric)  Goal: Skin Integrity  Patient will demonstrate the desired outcomes by discharge/transition of care.   Outcome: Ongoing (interventions implemented as appropriate)    04/23/17 0453   Pressure Ulcer Risk (Rui Scale) (Adult,Obstetrics,Pediatric)   Skin Integrity making progress toward outcome         Problem: Bowel Obstruction (Adult)  Goal: Signs and Symptoms of Listed Potential Problems Will be Absent, Minimized or Managed (Bowel Obstruction)  Signs and symptoms of listed potential problems will be absent, minimized or managed by discharge/transition of care (reference Bowel Obstruction (Adult) CPG).   Outcome: Ongoing (interventions implemented as appropriate)    04/23/17 0453   Bowel Obstruction   Problems Assessed (Bowel Obstruction) all   Problems Present (Bowel Obstruction) none

## 2017-04-23 NOTE — DISCHARGE SUMMARY
[facility]  Short Stay  Discharge Summary    Admit Date: 4/20/2017    Discharge Date and Time:  04/23/2017 1:40 PM      Discharge Attending Physician: Eugene Porras III,*     Hospital Course (synopsis of major diagnoses, care, treatment, and services provided during the course of the hospital stay): patient admitted w/ suspected SBO 2/2 incarcerated incisional hernia.  Patient had hernia reduced in ED w/ immediate resolution of his symptoms.  He was admitted and diet advanced with return of bowel function.  He continued to do well w/ regular diet and was having good colostomy function.    Final Diagnoses:    Principal Problem: Small bowel obstruction      Discharged Condition: good    Disposition: Home or Self Care    Follow up/Patient Instructions:  activity as tolerated.  Diet of choice.  Return to ED w/ any increasing pain, n/v or decreased ostomy output.    Medications:  Reconciled Home Medications:   Current Discharge Medication List      CONTINUE these medications which have NOT CHANGED    Details   allopurinol (ZYLOPRIM) 300 MG tablet Take 1 tablet (300 mg total) by mouth once daily.  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  Qty: 90 tablet, Refills: 3    Associated Diagnoses: HTN, goal below 140/80      blood sugar diagnostic (ONE TOUCH ULTRA TEST) Strp 1 each by Misc.(Non-Drug; Combo Route) route once daily.  Qty: 200 each, Refills: 3    Associated Diagnoses: Hypertension associated with diabetes      blood-glucose meter (ONE TOUCH ULTRAMINI) kit To test twice daily. Use as instructed  Qty: 1 each, Refills: 0    Associated Diagnoses: Hypertension associated with diabetes      cholestyramine (QUESTRAN) 4 gram packet Take 1 packet (4 g total) by mouth 3 (three) times daily with meals.  Qty: 270 packet, Refills: 3     Associated Diagnoses: Chemotherapy-induced diarrhea      colchicine (COLCRYS) 0.6 mg tablet Take 1 tablet (0.6 mg total) by mouth daily as needed.  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  Qty: 60 capsule, Refills: 2    Associated Diagnoses: Chemotherapy-induced neuropathy      ferrous gluconate (FERGON) 324 MG tablet Take 1 tablet (324 mg total) by mouth daily with breakfast.      gabapentin (NEURONTIN) 300 MG capsule Take 1 capsule (300 mg total) by mouth 3 (three) times daily.  Qty: 90 capsule, Refills: 2    Associated Diagnoses: Chemotherapy-induced neuropathy      indomethacin (INDOCIN) 50 MG capsule Take 1 capsule (50 mg total) by mouth 3 (three) times daily as needed. Take for Gout attacks  Qty: 90 capsule, Refills: 0      loratadine (CLARITIN) 10 mg tablet Take 10 mg by mouth once daily.      mirtazapine (REMERON) 15 MG tablet Take 1 tablet (15 mg total) by mouth nightly.  Qty: 90 tablet, Refills: 2    Associated Diagnoses: Insomnia      multivitamin capsule Take 1 capsule by mouth once daily.      oxycodone-acetaminophen (PERCOCET) 5-325 mg per tablet Take 1 tablet by mouth 3 (three) times daily.  Qty: 90 tablet, Refills: 0    Associated Diagnoses: Generalized abdominal pain      penicillin v potassium (VEETID) 500 MG tablet Take 1 tablet (500 mg total) by mouth 4 (four) times daily.  Qty: 40 tablet, Refills: 0    Associated Diagnoses: Pain, dental      potassium & sodium phosphates (PHOS-NAK) 280-160-250 mg PwPk Take 1 packet by mouth 4 (four) times daily with meals and nightly.  Qty: 120 packet, Refills: 6    Associated Diagnoses: Colon cancer; Dehydration; Diarrhea      prochlorperazine (COMPAZINE) 10 MG tablet Take 1 tablet (10 mg total) by mouth every 6 (six) hours as needed.  Qty: 30 tablet, Refills: 1    Associated Diagnoses:  Nausea and vomiting, intractability of vomiting not specified, unspecified vomiting type      sodium bicarbonate 650 MG tablet Take 1 tablet (650 mg total) by mouth 2 (two) times daily.  Qty: 100 tablet, Refills: 1    Associated Diagnoses: Carbon dioxide, decreased level      tadalafil (CIALIS) 10 MG tablet Take 1 tablet (10 mg total) by mouth daily as needed for Erectile Dysfunction.  Qty: 10 tablet, Refills: 3    Associated Diagnoses: ED (erectile dysfunction)           No discharge procedures on file.  Follow-up Information     Follow up with Marcos Sellers MD. Go on 5/5/2017.    Specialty:  Internal Medicine    Why:  For Appointment on Friday 5/5/2017 @ 9AM    Contact information:    [address] [facility]  [place] [address] [phone]          Follow up with Luis Finch MD. Schedule an appointment as soon as possible for a visit in 3 weeks.    Specialty:  General Surgery    Why:  As needed    Contact information:    [address] [place] [address]  [place] [address] [phone]

## 2017-04-23 NOTE — NURSING
Saline lock removed cath tip intact no redness or swelling to site observed. Dileep and pt verebalized good understanding of discharge instructions. Awaiting transportation to home.

## 2017-04-24 NOTE — PHYSICIAN QUERY
PT Name: Eugene Gamez  MR #: [id number]  Physician Query Form - Renal Clarification     CDS/: Natalie Cortez RN CDI           Contact information: [email or web address]    This form is a permanent document in the medical record.     QueryDate: April 24, 2017    By submitting this query, we are merely seeking further clarification of documentation. Please utilize your independent clinical judgment when addressing the question(s) below.    The Medical record contains the following:   Indicator Supporting Clinical Findings Location in Medical Record    Kidney (Renal) Insufficiency      Kidney (Renal) Failure / Injury      Nephrotoxic Agents     X BUN/Creatinine GFR BUN 31   ->    25  CR   1.9  ->    1.1  GFR  37  ->     >60 Labs 4/20, and 4/23    Urine: Casts         Eosinophils     X Dehydration Diagnosis of Umbilical hernia with obstruction and dehydration were also pertinent ED MD 4/20   X Nausea/Vomiting Nausea and Vomiting, intractability of vomiting Discharge Summary 4/23    Dialysis/CRRT     X Treatment: NS 100cc/hr continuous MAR 4/20-4/23    Other:          Provider, please specify the diagnosis or diagnoses associated with above clinical findings.    [ ] Acute Kidney Failure/Injury with Acute Cortical Necrosis  [ ] Acute Kidney Failure/Injury with Medullary Necrosis  [ ] Acute Kidney Failure/Injury with Tubular Necrosis  [ ] Other Acute Kidney Failure/Injury (please specify): ____________  [ ] Unspecified Acute Kidney Failure/Injury  [ ] Acute Renal Insufficiency  [ ] Other (please specify): _______________________________  [x ] Clinically Undetermined    Please document in your progress notes daily for the duration of treatment, until resolved, and include in your discharge summary.

## 2017-05-04 ENCOUNTER — TELEPHONE (OUTPATIENT)
Dept: FAMILY MEDICINE | Facility: CLINIC | Age: 82
End: 2017-05-04

## 2017-06-20 ENCOUNTER — TELEPHONE (OUTPATIENT)
Dept: UROLOGY | Facility: CLINIC | Age: 82
End: 2017-06-20

## 2017-06-20 NOTE — PROGRESS NOTES
Called pt's daughter and rescheduled his appts per daughter's request.  All questions answered.  Verbalized understanding .  Appointments mailed.

## 2017-06-20 NOTE — TELEPHONE ENCOUNTER
----- Message from Eduar Collier sent at 6/20/2017  3:06 PM CDT -----  Contact: pt daughter   Pt is requesting orders be reinstated back in system for blood work and CT Scan.     Pt can be reached at [phone].

## 2017-06-27 ENCOUNTER — HOSPITAL ENCOUNTER (OUTPATIENT)
Dept: RADIOLOGY | Facility: HOSPITAL | Age: 82
Discharge: HOME | End: 2017-06-27
Attending: UROLOGY
Payer: MEDICARE

## 2017-06-27 DIAGNOSIS — N28.89 BILATERAL RENAL MASSES: ICD-10-CM

## 2017-06-27 PROCEDURE — 25500020 PHARM REV CODE 255: Performed by: UROLOGY

## 2017-06-27 PROCEDURE — 74170 CT ABD WO CNTRST FLWD CNTRST: CPT | Mod: 26,,, | Performed by: RADIOLOGY

## 2017-06-27 PROCEDURE — 74170 CT ABD WO CNTRST FLWD CNTRST: CPT | Mod: TC

## 2017-06-27 RX ADMIN — IOHEXOL 100 ML: 350 INJECTION, SOLUTION INTRAVENOUS at 08:06

## 2017-07-10 ENCOUNTER — OFFICE VISIT (OUTPATIENT)
Dept: UROLOGY | Facility: CLINIC | Age: 82
End: 2017-07-10
Payer: MEDICARE

## 2017-07-10 VITALS
DIASTOLIC BLOOD PRESSURE: 61 MMHG | BODY MASS INDEX: 27.34 KG/M2 | RESPIRATION RATE: 15 BRPM | HEART RATE: 87 BPM | HEIGHT: 74 IN | WEIGHT: 213 LBS | SYSTOLIC BLOOD PRESSURE: 123 MMHG

## 2017-07-10 DIAGNOSIS — N28.89 BILATERAL RENAL MASSES: Primary | ICD-10-CM

## 2017-07-10 DIAGNOSIS — N18.30 CKD (CHRONIC KIDNEY DISEASE) STAGE 3, GFR 30-59 ML/MIN: ICD-10-CM

## 2017-07-10 PROCEDURE — 99214 OFFICE O/P EST MOD 30 MIN: CPT | Mod: S$PBB,,, | Performed by: UROLOGY

## 2017-07-10 PROCEDURE — 1159F MED LIST DOCD IN RCRD: CPT | Mod: ,,, | Performed by: UROLOGY

## 2017-07-10 PROCEDURE — 1126F AMNT PAIN NOTED NONE PRSNT: CPT | Mod: ,,, | Performed by: UROLOGY

## 2017-07-10 PROCEDURE — 99999 PR PBB SHADOW E&M-EST. PATIENT-LVL III: CPT | Mod: PBBFAC,,, | Performed by: UROLOGY

## 2017-07-10 PROCEDURE — 1157F ADVNC CARE PLAN IN RCRD: CPT | Mod: ,,, | Performed by: UROLOGY

## 2017-07-10 PROCEDURE — 99213 OFFICE O/P EST LOW 20 MIN: CPT | Mod: PBBFAC | Performed by: UROLOGY

## 2017-07-10 NOTE — PATIENT INSTRUCTIONS
What Is Kidney (Renal) Cancer?    Cancer occurs when cells in the body begin changing and multiplying out of control. These cells can form lumps of tissue called tumors. Cancer that starts in a kidney is called kidney or renal cancer.  Understanding the kidneys  The kidneys are bean-shaped organs about the size of a bar of soap. They are found in the low back area, one on each side of the spine. The kidneys help keep the body alive by filtering waste and excess fluid from the blood. The kidneys send this liquid and waste (urine) to the bladder through the ureters. Urine then leaves the body through the urethra.  When kidney cancer forms  Kidney cancer forms when cells in the kidney change and multiply abnormally. The cancer can interfere with the working of the kidneys. Kidney cancer may spread beyond the kidneys to other parts of the body. This spread is called metastasis. The more cancer spreads, the harder it is to treat.  Treatment choices for kidney cancer  You and your healthcare provider will discuss a treatment plan that's best for your needs. Treatment choices may include the following.  Surgery  Surgery is the most common treatment for kidney cancer. Your healthcare provider may advise surgery to treat your kidney cancer if any of these apply to you:  · You are healthy enough to have surgery. Your healthcare provider will only advise surgery if he or she expects you to be able to recover from it.  · The tumor is small. In this case, your healthcare provider may do a partial nephrectomy. This surgery allows you to keep some kidney function. Only the part of the kidney that contains the tumor is taken out. In some cases, your provider may advise this surgery if the tumor is larger but you have cancer in both kidneys or if you have only one kidney. The benefit is that you keep part of the kidney. The risk is that there is a chance some cancer cells will be left behind.  · The tumor is larger, but is only in  your kidney. Your healthcare provider will advise the type of surgery you need based on the size of the tumor and where it is. Your provider may advise a simple nephrectomy. This is surgery to take out the entire kidney. Or your provider may advise a radical nephrectomy. This is surgery to take out the whole kidney and the adrenal gland. The adrenal gland is attached to the top of the kidney. Much of the nearby fatty tissue is also taken out. Nearby lymph nodes will also likely be removed. That's because cancer may travel to the nodes first. Taking out the lymph nodes may help prevent the spread of cancer to other parts of your body. And looking at these lymph nodes helps your provider figure out the stage of the cancer. This is important in deciding whether you need other treatments after surgery.  · You have kidney cancer that has spread to only one other area. Your healthcare provider may suggest a radical nephrectomy and removal of nearby lymph nodes. The tumor or tumors in other parts of the body will also be removed. Even in cases where surgery wont cure the cancer, surgery can sometimes help ease symptoms such as pressure, pain, or bleeding.  · You have symptoms. You may have pain, pressure, or bleeding from tumors that have spread. Your healthcare provider may suggest surgery to remove the tumors. This is done to ease symptoms. Because it doesn't cure the cancer, it is called palliative therapy.  Biologic therapy (immunotherapy)  This treatment strengthens your immune system to help fight cancer. It uses medicines that work like the chemicals that your bodys immune system makes. They help your immune system fight the cancer.  Chemotherapy  Chemotherapy uses strong medicines to kill cancer cells. Regular chemotherapy medicines don't usually work well against kidney cancer. Chemotherapy has mostly been replaced by biologic therapy in kidney cancer treatment. But chemotherapy medicines are still sometimes  used in rare cases where biologic therapy has not worked.  Radiation therapy  Radiation therapy uses directed rays of energy to kill cancer cells. Radiation therapy doesn't work as well as other methods for treating kidney cancer. Still, it may be used to treat someone who's not healthy enough to have surgery, or a person with kidney cancer that's not responding to other treatments. It may also be used to treat kidney cancer that has spread to the brain or bones. It may be used to treat pain caused by cancer that has spread to the bone (bone metastasis). It can also help stabilize a bone that has been weakened by metastasis and may be at risk for breaking. In cases like this, you will get the radiation treatment, but also medicines to help increase bone density and further protect against fractures.  Targeted therapies  These therapies use medicines to focus on or prevent changes in the cell. The changes may be either in the cell's molecules or genes. These medicines keep the cells from growing out of control.  Date Last Reviewed: 3/28/2015  © 2054-4405 [employer]. [address] [place] [address], [place] [address]. All rights reserved. This information is not intended as a substitute for professional medical care. Always follow your healthcare professional's instructions.

## 2017-07-10 NOTE — PROGRESS NOTES
Subjective:       Patient ID: Eugene Gamez is a 82 y.o. male.    Chief Complaint: bilateral  renal masses (CT scan results)      HPI: Eugene Gamez is a 82 y.o. highly co-morbid Black or  male who returns today in follow-up for bilateral renal masses on active surveillance.    The patient has been being treated for stage IV colon cancer and is free of disease at this point. He has a colostomy. Per the patient, he is now cancer-free, and they are considering reversing his colostomy. He was recently admitted in April 2017 for a small bowel obstruction that was managed conservatively.    The patient's bilateral renal masses were discovered radiographically in 2015, and the patient underwent a percutaneous renal mass biopsy of the left-sided lesion. 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 The patient has not had these tumors treated, and multiple radiographic studies have since shown size stability of the masses.    The patient continues to deny gross hematuria and/or constitutional symptoms attributable to his renal masses. The patient denies a personal and family history of kidney cancer.    The patient returns today for a routine follow-up visit to review recent interval imaging.    Review of patient's allergies indicates:   Allergen Reactions    Shellfish containing products      Causes gout to flare up       Current Outpatient Prescriptions   Medication Sig Dispense Refill    allopurinol (ZYLOPRIM) 300 MG tablet Take 1 tablet (300 mg total) by mouth once daily. 90 tablet 3    benazepril (LOTENSIN) 40 MG tablet Take 1 tablet (40 mg total) by mouth once daily. 90 tablet 3    blood sugar diagnostic (ONE TOUCH ULTRA TEST) Strp 1 each by Misc.(Non-Drug; Combo Route) route once daily. 200 each 3    ferrous gluconate (FERGON) 324 MG tablet Take 1 tablet (324 mg total) by mouth daily with breakfast.      indomethacin (INDOCIN) 50 MG capsule Take 1 capsule  (50 mg total) by mouth 3 (three) times daily as needed. Take for Gout attacks 90 capsule 0    loratadine (CLARITIN) 10 mg tablet Take 10 mg by mouth once daily.      penicillin v potassium (VEETID) 500 MG tablet Take 1 tablet (500 mg total) by mouth 4 (four) times daily. 40 tablet 0    potassium & sodium phosphates (PHOS-NAK) 280-160-250 mg PwPk Take 1 packet by mouth 4 (four) times daily with meals and nightly. 120 packet 6    blood-glucose meter (ONE TOUCH ULTRAMINI) kit To test twice daily. Use as instructed 1 each 0    cholestyramine (QUESTRAN) 4 gram packet Take 1 packet (4 g total) by mouth 3 (three) times daily with meals. 270 packet 3    colchicine (COLCRYS) 0.6 mg tablet Take 1 tablet (0.6 mg total) by mouth daily as needed. 30 tablet 1    duloxetine (CYMBALTA) 30 MG capsule Take 1 capsule (30 mg total) by mouth 2 (two) times daily. 60 capsule 2    gabapentin (NEURONTIN) 300 MG capsule Take 1 capsule (300 mg total) by mouth 3 (three) times daily. 90 capsule 2    mirtazapine (REMERON) 15 MG tablet TAKE 1 TABLET (15 MG TOTAL) BY MOUTH NIGHTLY.  2    multivitamin capsule Take 1 capsule by mouth once daily.      prochlorperazine (COMPAZINE) 10 MG tablet Take 1 tablet (10 mg total) by mouth every 6 (six) hours as needed. 360 tablet 0    sodium bicarbonate 650 MG tablet Take 1 tablet (650 mg total) by mouth 2 (two) times daily. 100 tablet 1    tadalafil (CIALIS) 10 MG tablet Take 1 tablet (10 mg total) by mouth daily as needed for Erectile Dysfunction. 10 tablet 3     No current facility-administered medications for this visit.      Facility-Administered Medications Ordered in Other Visits   Medication Dose Route Frequency Provider Last Rate Last Dose    ondansetron HCl (PF) 4 mg/2 mL injection    PRN James Ryan CRNA   4 mg at 06/29/15 0856       Past Medical History:   Diagnosis Date    Arthritis     Cancer     Diabetes mellitus     Elevated PSA     Gout, unspecified     Hypertension         Past Surgical History:   Procedure Laterality Date    CATARACT EXTRACTION      ou    EYE SURGERY      bilateral cataracts    removal of small bowel  Apr. 2015    Colostomy       Family History   Problem Relation Age of Onset    Hypertension Mother     Diabetes Mother     No Known Problems Father     No Known Problems Sister     No Known Problems Brother     No Known Problems Maternal Aunt     No Known Problems Maternal Uncle     No Known Problems Paternal Aunt     No Known Problems Paternal Uncle     No Known Problems Maternal Grandmother     No Known Problems Maternal Grandfather     No Known Problems Paternal Grandmother     No Known Problems Paternal Grandfather     Amblyopia Neg Hx     Blindness Neg Hx     Cancer Neg Hx     Cataracts Neg Hx     Glaucoma Neg Hx     Macular degeneration Neg Hx     Retinal detachment Neg Hx     Strabismus Neg Hx     Stroke Neg Hx     Thyroid disease Neg Hx        Review of Systems    Review of Systems   Constitutional: Negative for fever, chills, activity change, appetite change and unexpected weight change.   HENT: Negative for congestion, nosebleeds, sneezing, sore throat and trouble swallowing.    Eyes: Negative for pain, discharge, redness and visual disturbance.   Respiratory: Negative for cough, choking, chest tightness and shortness of breath.    Cardiovascular: Negative for chest pain, palpitations and leg swelling.   Gastrointestinal: Negative for nausea, vomiting, abdominal pain, diarrhea, blood in stool, abdominal distention and anal bleeding.  Genitourinary: As documented per HPI   Endocrine: Negative for cold intolerance, heat intolerance, polydipsia, polyphagia and polyuria.   Musculoskeletal: Negative for myalgias, gait problem, neck pain and neck stiffness.   Skin: Negative for color change, pallor, rash and wound.   Allergic/Immunologic: Negative for immunocompromised state.   Neurological: Negative for seizures, facial asymmetry,  speech difficulty, weakness and light-headedness.   Hematological: Negative for adenopathy. Does not bruise/bleed easily.   Psychiatric/Behavioral: Negative for hallucinations, behavioral problems, self-injury and agitation. The patient is not hyperactive.      All other systems were reviewed and were negative.    Objective:     Vitals:    07/10/17 1508   BP: 123/61   Pulse: 87   Resp: 15     Physical Exam   Vitals reviewed.  Constitutional: He is oriented to person, place, and time. He appears well-developed and well-nourished. No distress.   HENT:   Head: Normocephalic and atraumatic.   Right Ear: External ear normal.   Left Ear: External ear normal.   Nose: Nose normal.   Eyes: EOM are normal. Pupils are equal, round, and reactive to light. Right eye exhibits no discharge. Left eye exhibits no discharge.   Neck: Normal range of motion. Neck supple. No tracheal deviation present. No thyroid enlargement or tenderness.  Cardiovascular: Regular rhythm and intact distal pulses. No signs of peripheral edema.   Pulmonary/Chest: Effort normal and breath sounds normal. No stridor. No respiratory distress. He has no wheezes.   Abdominal: Soft. Bowel sounds are normal. He exhibits no distension. There is no tenderness. Hernia confirmed negative in the right inguinal area and confirmed negative in the left inguinal area. No hepatic or splenic enlargement or tenderness. Colostomy in place.  Genitourinary: Penis normal. Right testis shows no mass, no swelling and no tenderness. Right testis is descended. Left testis shows no mass, no swelling and no tenderness. Left testis is descended. Circumcised. No phimosis or hypospadias.   BRANDON: deferred  Musculoskeletal: Normal range of motion. He exhibits no edema.   Neurological: He is alert and oriented to person, place, and time. He exhibits normal muscle tone. Coordination normal.   Skin: Skin is warm. No rash noted.   Lymphatic: No palpable lymph nodes.  Psychiatric: He has a  normal mood and affect. His behavior is normal. Judgment and thought content normal.     Lab Results   Component Value Date    PSA 17.9 (H) 11/26/2014    PSA 9.0 (H) 09/16/2011    PSA 8.3 (H) 04/28/2010     Lab Results   Component Value Date    CREATININE 1.9 (H) 06/27/2017     Lab Results   Component Value Date    EGFRNONAA 32 (A) 06/27/2017     Lab Results   Component Value Date    ESTGFRAFRICA 37 (A) 06/27/2017     I personally reviewed all the patient's imaging studies.    CT abdomen with contrast (4/2015): There is a 2.4-cm mass within the inferior pole of the right kidney which appears solid concerning for renal cell carcinoma.  There is a   2.2-cm mass within the superior pole of the left kidney concerning for renal cell carcinoma.    Renal ultrasound (7/2016): In the right kidney, there is a 2.8 cm solid mass seen within the inferior pole correlated with the mass seen on previous CT. In the left kidney, there is a 2.1 cm mass seen in the superior pole.    PET scan (8/2016): No hypermetabolic renal lesions above background are noted but renal carcinoma is commonly false negative on PET CT fusion.    CT scan abdomen without and with contrast (12/12/2016): There is no interval detrimental change in the appearance of the abdomen compared to 5/4/15. There are bilateral renal solid masses concerning for renal cell carcinoma, not significantly. Prior partial colectomy with left lower abdominal stoma. Hypoattenuating nonenhancing lesions of the liver, unchanged from the prior study, no new lesions identified.    CT scan without and with contrast (6/27/17): There is a 2.9 cm right renal mass in the anterior mid body of the right kidney abutting Gerota fascia. No renal vein involvement identified. Left renal mass measuring 1.8 cm. Renal masses, unchanged from the 12/12/16 exam.    Assessment:       1. Bilateral renal masses    2. CKD (chronic kidney disease) stage 3, GFR 30-59 ml/min        Plan:     Eugene was seen  today for bilateral  renal masses.    Diagnoses and all orders for this visit:    Bilateral renal masses  -     CT Abdomen With Without Contrast; Future  -     Creatinine, serum; Future    CKD (chronic kidney disease) stage 3, GFR 30-59 ml/min  -     CT Abdomen With Without Contrast; Future  -     Creatinine, serum; Future    The patient is doing well on active surveillance, and his repeat imaging shows stable masses. He has no symptoms related to his renal masses.    I spent a long time discussing again with the patient and his family the nature and natural history of renal masses. >80% of enhancing renal masses represent a renal cell carcinoma, and surgical excision of these masses is the primary and preferred treatment strategy. Fortunately, for small renal masses (< 4 cm), surgical excision is curative with no need for further treatments. When amenable, a nephron-sparing procedure is optimal followed by a minimally-invasive approach. These treatment principles result in outstanding oncologic outcomes for patients with T1 renal masses (> 90%). I did mention to the patient that although partial nephrectomies do maximize long-term renal function, they are associated with a slightly increased risk of parvez-procedural complications (bleeding and urinary fistulas). However, these short-term complications are far outweighed by the benefits of renal preservation. We also spent some time discussing the concept of active surveillance of renal masses, including its risks and benefits. In general, active surveillance is usually recommended for older and/or more co-morbid patients who have a higher surgical risk. Fortunately, there is no long-term data to suggest that the risk of metastasis with a mass less than 4 cm while on active surveillance approaches 0%.     At this point, given the patient's history of oncocytoma and size stability, I think continued active surveillance is warranted. As such, I will see him back in 9  months with a repeat CT scan.    I answered all his and his family's questions.    At the conclusion of our visit, the patient and his daughter were agreeable to continuing with active surveillance.     I encouraged him or any of his family members to call or email me with questions and/or concerns.    I spent 25 minutes with the patient of which more than half was spent in coordinating the patient's care as well as in direct consultation with the patient in regards to our treatment and plan.

## 2017-09-08 DIAGNOSIS — M1A.9XX0 CHRONIC GOUT WITHOUT TOPHUS, UNSPECIFIED CAUSE, UNSPECIFIED SITE: ICD-10-CM

## 2017-09-08 RX ORDER — ALLOPURINOL 300 MG/1
TABLET ORAL
Qty: 90 TABLET | Refills: 3 | Status: SHIPPED | OUTPATIENT
Start: 2017-09-08 | End: 2018-10-01 | Stop reason: SDUPTHER

## 2017-10-17 PROBLEM — Z23 FLU VACCINE NEED: Status: ACTIVE | Noted: 2017-10-17

## 2017-10-17 PROBLEM — Z93.3 COLOSTOMY IN PLACE: Status: ACTIVE | Noted: 2017-10-17

## 2017-10-17 PROBLEM — Z43.3 COLOSTOMY CARE: Status: ACTIVE | Noted: 2017-10-17

## 2017-10-17 PROBLEM — N28.89 BILATERAL RENAL MASSES: Status: ACTIVE | Noted: 2017-10-17

## 2017-11-30 PROBLEM — N28.89 RENAL MASS: Status: ACTIVE | Noted: 2017-11-30

## 2018-01-25 ENCOUNTER — OFFICE VISIT (OUTPATIENT)
Dept: FAMILY MEDICINE | Facility: CLINIC | Age: 83
End: 2018-01-25
Payer: MEDICARE

## 2018-01-25 VITALS
HEIGHT: 74 IN | TEMPERATURE: 98 F | OXYGEN SATURATION: 99 % | WEIGHT: 218.5 LBS | RESPIRATION RATE: 16 BRPM | SYSTOLIC BLOOD PRESSURE: 122 MMHG | BODY MASS INDEX: 28.04 KG/M2 | DIASTOLIC BLOOD PRESSURE: 64 MMHG | HEART RATE: 81 BPM

## 2018-01-25 DIAGNOSIS — Z93.3 COLOSTOMY IN PLACE: ICD-10-CM

## 2018-01-25 DIAGNOSIS — K52.1 CHEMOTHERAPY-INDUCED DIARRHEA: ICD-10-CM

## 2018-01-25 DIAGNOSIS — T45.1X5A CHEMOTHERAPY-INDUCED DIARRHEA: ICD-10-CM

## 2018-01-25 DIAGNOSIS — M1A.9XX0 CHRONIC GOUT WITHOUT TOPHUS, UNSPECIFIED CAUSE, UNSPECIFIED SITE: Chronic | ICD-10-CM

## 2018-01-25 DIAGNOSIS — E63.9 INADEQUATE ORAL NUTRITIONAL INTAKE: ICD-10-CM

## 2018-01-25 DIAGNOSIS — E11.9 CONTROLLED TYPE 2 DIABETES MELLITUS WITHOUT COMPLICATION, WITHOUT LONG-TERM CURRENT USE OF INSULIN: Primary | Chronic | ICD-10-CM

## 2018-01-25 DIAGNOSIS — Z43.3 COLOSTOMY CARE: ICD-10-CM

## 2018-01-25 DIAGNOSIS — C18.9 STAGE IV CARCINOMA OF COLON: ICD-10-CM

## 2018-01-25 PROBLEM — K56.609 SMALL BOWEL OBSTRUCTION: Status: RESOLVED | Noted: 2017-04-20 | Resolved: 2018-01-25

## 2018-01-25 PROCEDURE — 99215 OFFICE O/P EST HI 40 MIN: CPT | Mod: S$PBB,,, | Performed by: FAMILY MEDICINE

## 2018-01-25 PROCEDURE — 99999 PR PBB SHADOW E&M-EST. PATIENT-LVL IV: CPT | Mod: PBBFAC,,, | Performed by: FAMILY MEDICINE

## 2018-01-25 PROCEDURE — 99214 OFFICE O/P EST MOD 30 MIN: CPT | Mod: PBBFAC,PN | Performed by: FAMILY MEDICINE

## 2018-01-25 RX ORDER — FERROUS SULFATE 324(65)MG
325 TABLET, DELAYED RELEASE (ENTERIC COATED) ORAL DAILY
Status: ON HOLD | COMMUNITY
End: 2022-02-27 | Stop reason: HOSPADM

## 2018-01-25 NOTE — PROGRESS NOTES
"Routine Office Visit    Patient Name: Eugene Gamez    : 1935  MRN: [id number]    Subjective:  Eugene is a 83 y.o. male who presents today for:    1. Type 2 DM  Patient presenting today for follow up of his diabetes and to get diabetic shoes.  He has had type 2 DM for several years and was on oral medications until they started to effect his kidney and liver while undergoing treatment for colon cancer.  He has not had any hypoglycemic episodes.  He checks his blood sugars from time to time.  He does not stick with a diabetic diet "all the time'.  There has been no changes in urinary frequency.  He has no numbness or tingling.       2.  History of colon cancer requiring chemo  Patient has a colostomy not after having total colectomy for colon CA.  He is followed by Dr. Pillai for management.  He states that he has chronic diarrhea secondary to the chemo he had, but that it is better controlled now.  He changes colostomy bags as directed and denies seeing in blood in stool    3.  Poor nutrition  Patient has been diagnosed with inadequate protein intake several times.  He states that he eats what he wants.  He denies missing meals.  He denies any recent noticeable weight loss.      4.  Gout  Patient suffers with gout.  He has been taking allopurinol as directed.  He states that the medication does help, but he does still get occasional flares.  No current joint pain.      Past Medical History  Past Medical History:   Diagnosis Date    Arthritis     Cancer     Diabetes mellitus     Elevated PSA     Gout, unspecified     Hypertension        Past Surgical History  Past Surgical History:   Procedure Laterality Date    CATARACT EXTRACTION      ou    EYE SURGERY      bilateral cataracts    removal of small bowel  2015    Colostomy       Family History  Family History   Problem Relation Age of Onset    Hypertension Mother     Diabetes Mother     No Known Problems Father     No Known Problems Sister "     No Known Problems Brother     No Known Problems Maternal Aunt     No Known Problems Maternal Uncle     No Known Problems Paternal Aunt     No Known Problems Paternal Uncle     No Known Problems Maternal Grandmother     No Known Problems Maternal Grandfather     No Known Problems Paternal Grandmother     No Known Problems Paternal Grandfather     Amblyopia Neg Hx     Blindness Neg Hx     Cancer Neg Hx     Cataracts Neg Hx     Glaucoma Neg Hx     Macular degeneration Neg Hx     Retinal detachment Neg Hx     Strabismus Neg Hx     Stroke Neg Hx     Thyroid disease Neg Hx        Social History  Social History     Social History    Marital status:      Spouse name: N/A    Number of children: N/A    Years of education: N/A     Occupational History    Not on file.     Social History Main Topics    Smoking status: Never Smoker    Smokeless tobacco: Never Used      Comment: smoked short while as a teen    Alcohol use No    Drug use: No    Sexual activity: Not on file     Other Topics Concern    Not on file     Social History Narrative    No narrative on file       Current Medications  Current Outpatient Prescriptions on File Prior to Visit   Medication Sig Dispense Refill    allopurinol (ZYLOPRIM) 300 MG tablet TAKE 1 TABLET BY MOUTH ONCE DAILY 90 tablet 3    benazepril (LOTENSIN) 40 MG tablet Take 1 tablet (40 mg total) by mouth once daily. 90 tablet 3    blood sugar diagnostic (ONE TOUCH ULTRA TEST) Strp 1 each by Misc.(Non-Drug; Combo Route) route once daily. 200 each 3    blood-glucose meter (ONE TOUCH ULTRAMINI) kit To test twice daily. Use as instructed 1 each 0    mirtazapine (REMERON) 15 MG tablet TAKE 1 TABLET (15 MG TOTAL) BY MOUTH NIGHTLY. 90 tablet 1    multivitamin capsule Take 1 capsule by mouth once daily.      prochlorperazine (COMPAZINE) 10 MG tablet Take 1 tablet (10 mg total) by mouth every 6 (six) hours as needed. 360 tablet 0    cholestyramine (QUESTRAN) 4  gram packet Take 1 packet (4 g total) by mouth 3 (three) times daily with meals. 270 packet 3    colchicine (COLCRYS) 0.6 mg tablet Take 1 tablet (0.6 mg total) by mouth daily as needed. 30 tablet 1    duloxetine (CYMBALTA) 30 MG capsule Take 1 capsule (30 mg total) by mouth 2 (two) times daily. 60 capsule 2    ferrous gluconate (FERGON) 324 MG tablet Take 1 tablet (324 mg total) by mouth daily with breakfast.      gabapentin (NEURONTIN) 300 MG capsule Take 1 capsule (300 mg total) by mouth 3 (three) times daily. 90 capsule 2    indomethacin (INDOCIN) 50 MG capsule Take 1 capsule (50 mg total) by mouth 3 (three) times daily as needed. Take for Gout attacks 90 capsule 0    loratadine (CLARITIN) 10 mg tablet Take 10 mg by mouth once daily.      ondansetron (ZOFRAN) 8 MG tablet TAKE 1 TABLET (8 MG TOTAL) BY MOUTH EVERY 8 (EIGHT) HOURS AS NEEDED FOR NAUSEA. 90 tablet 0    penicillin v potassium (VEETID) 500 MG tablet Take 1 tablet (500 mg total) by mouth 4 (four) times daily. 40 tablet 0    potassium & sodium phosphates (PHOS-NAK) 280-160-250 mg PwPk Take 1 packet by mouth 4 (four) times daily with meals and nightly. 120 packet 6    sodium bicarbonate 650 MG tablet Take 1 tablet (650 mg total) by mouth 2 (two) times daily. 100 tablet 1    tadalafil (CIALIS) 10 MG tablet Take 1 tablet (10 mg total) by mouth daily as needed for Erectile Dysfunction. 10 tablet 3     Current Facility-Administered Medications on File Prior to Visit   Medication Dose Route Frequency Provider Last Rate Last Dose    ondansetron HCl (PF) 4 mg/2 mL injection    PRN James Ryan CRNA   4 mg at 06/29/15 0856       Allergies   Review of patient's allergies indicates:   Allergen Reactions    Shellfish containing products      Causes gout to flare up       Review of Systems (Pertinent positives)  Review of Systems   Constitutional: Negative.    HENT: Negative.    Eyes: Negative.    Respiratory: Negative.    Cardiovascular: Negative.   "  Gastrointestinal: Positive for diarrhea.   Genitourinary: Negative.    Musculoskeletal: Positive for joint pain.   Skin: Negative.    Neurological: Negative.          /64 (BP Location: Right arm, Patient Position: Sitting, BP Method: Medium (Manual))   Pulse 81   Temp 98.3 °F (36.8 °C) (Oral)   Resp 16   Ht 6' 2" (1.88 m)   Wt 99.1 kg (218 lb 7.6 oz)   SpO2 99%   BMI 28.05 kg/m²     GENERAL APPEARANCE: in no apparent distress and well developed and well nourished  HEENT: PERRL, EOMI, Sclera clear, anicteric, Oropharynx clear, no lesions, Neck supple with midline trachea  NECK: normal, supple, no adenopathy, thyroid normal in size  RESPIRATORY: appears well, vitals normal, no respiratory distress, acyanotic, normal RR, chest clear, no wheezing, crepitations, rhonchi, normal symmetric air entry  HEART: regular rate and rhythm, S1, S2 normal, no murmur, click, rub or gallop.    ABDOMEN: abdomen is soft without tenderness, no masses, no hernias, no organomegaly, no rebound, no guarding. Suprapubic tenderness absent. No CVA tenderness.  NEUROLOGIC: normal without focal findings, CN II-XII are intact.    Extremities: warm/well perfused.  No abnormal hair patterns.  No clubbing, cyanosis; +1edema bilaterally.  no muscular tenderness noted, full range of motion without pain.  no joint tenderness, deformity or swelling noted  SKIN: no rashes, no wounds, no other lesions  PSYCH: Alert, oriented x 3, thought content appropriate, speech normal, pleasant and cooperative, good eye contact, well groomed, recall good, concentration/judgement good and apparently average intelligence.    Assessment/Plan:  Eugene Gamez is a 83 y.o. male who presents today for :    Eugene was seen today for [facility] care.    Diagnoses and all orders for this visit:    Controlled type 2 diabetes mellitus without complication, without long-term current use of insulin  -     DIABETIC SHOES FOR HOME USE  -     Comprehensive metabolic " panel; Future  -     Lipid panel; Future  -     Hemoglobin A1c; Future  -     Ambulatory referral to Optometry  -     Microalbumin/creatinine urine ratio; Future    Inadequate oral nutritional intake  -     PREALBUMIN; Future    Chemotherapy-induced diarrhea    Colostomy care    Colostomy in place    Chronic gout without tophus, unspecified cause, unspecified site  -     Uric acid; Future    Stage IV carcinoma of colon      1.  Labs to be done when he is fasting  2.  Ordered diabetic shoes  3.  Need to check prealbumin to make sure he is increasing protein intake  4.  Follow up with Dr. Pillai for management of colon cancer  5.  Will check uric acid level.  Patient told to stop allopurinol when he has flares as it can worsen the flare  6.  Follow up 3 months or sooner if needed    Larry Monae MD     Patient/Chart(s)

## 2018-01-26 ENCOUNTER — TELEPHONE (OUTPATIENT)
Dept: OPTOMETRY | Facility: CLINIC | Age: 83
End: 2018-01-26

## 2018-01-30 ENCOUNTER — LAB VISIT (OUTPATIENT)
Dept: LAB | Facility: HOSPITAL | Age: 83
End: 2018-01-30
Attending: FAMILY MEDICINE
Payer: MEDICARE

## 2018-01-30 DIAGNOSIS — E11.9 CONTROLLED TYPE 2 DIABETES MELLITUS WITHOUT COMPLICATION, WITHOUT LONG-TERM CURRENT USE OF INSULIN: Chronic | ICD-10-CM

## 2018-01-30 DIAGNOSIS — M1A.9XX0 CHRONIC GOUT WITHOUT TOPHUS, UNSPECIFIED CAUSE, UNSPECIFIED SITE: Chronic | ICD-10-CM

## 2018-01-30 DIAGNOSIS — E63.9 INADEQUATE ORAL NUTRITIONAL INTAKE: ICD-10-CM

## 2018-01-30 PROBLEM — D49.0 COLORECTAL NEOPLASM: Status: ACTIVE | Noted: 2018-01-30

## 2018-01-30 LAB
ALBUMIN SERPL BCP-MCNC: 4.2 G/DL
ALP SERPL-CCNC: 140 U/L
ALT SERPL W/O P-5'-P-CCNC: 26 U/L
ANION GAP SERPL CALC-SCNC: 5 MMOL/L
AST SERPL-CCNC: 25 U/L
BILIRUB SERPL-MCNC: 0.5 MG/DL
BUN SERPL-MCNC: 32 MG/DL
CALCIUM SERPL-MCNC: 9.9 MG/DL
CHLORIDE SERPL-SCNC: 117 MMOL/L
CHOLEST SERPL-MCNC: 145 MG/DL
CHOLEST/HDLC SERPL: 3.4 {RATIO}
CO2 SERPL-SCNC: 16 MMOL/L
CREAT SERPL-MCNC: 1.9 MG/DL
EST. GFR  (AFRICAN AMERICAN): 37 ML/MIN/1.73 M^2
EST. GFR  (NON AFRICAN AMERICAN): 32 ML/MIN/1.73 M^2
GLUCOSE SERPL-MCNC: 148 MG/DL
HDLC SERPL-MCNC: 43 MG/DL
HDLC SERPL: 29.7 %
LDLC SERPL CALC-MCNC: 77 MG/DL
NONHDLC SERPL-MCNC: 102 MG/DL
POTASSIUM SERPL-SCNC: 6.2 MMOL/L
PREALB SERPL-MCNC: 36 MG/DL
PROT SERPL-MCNC: 8 G/DL
SODIUM SERPL-SCNC: 138 MMOL/L
TRIGL SERPL-MCNC: 125 MG/DL
URATE SERPL-MCNC: 3.6 MG/DL

## 2018-01-30 PROCEDURE — 80053 COMPREHEN METABOLIC PANEL: CPT

## 2018-01-30 PROCEDURE — 84550 ASSAY OF BLOOD/URIC ACID: CPT

## 2018-01-30 PROCEDURE — 83036 HEMOGLOBIN GLYCOSYLATED A1C: CPT

## 2018-01-30 PROCEDURE — 84134 ASSAY OF PREALBUMIN: CPT

## 2018-01-30 PROCEDURE — 36415 COLL VENOUS BLD VENIPUNCTURE: CPT | Mod: PN

## 2018-01-30 PROCEDURE — 80061 LIPID PANEL: CPT

## 2018-01-31 LAB
ESTIMATED AVG GLUCOSE: 189 MG/DL
HBA1C MFR BLD HPLC: 8.2 %

## 2018-02-21 ENCOUNTER — TELEPHONE (OUTPATIENT)
Dept: FAMILY MEDICINE | Facility: CLINIC | Age: 83
End: 2018-02-21

## 2018-02-21 NOTE — TELEPHONE ENCOUNTER
----- Message from Larry Monae MD sent at 2/13/2018  7:16 PM CST -----  Please have patient schedule an appointment to come see me to discuss his results.    Thanks,  Dr. Monae

## 2018-02-21 NOTE — TELEPHONE ENCOUNTER
LMOVM on cell phone  re: scheduling appt to discuss labs and unable to reach on home number. Phone rings for a long time then is busy.

## 2018-03-08 NOTE — TELEPHONE ENCOUNTER
Attempted to call pt. Re:needing to schedule appt with Dr. Monae to go over lab results. LM on his home number and on [employer]'s cell phone.

## 2018-03-19 ENCOUNTER — TELEPHONE (OUTPATIENT)
Dept: FAMILY MEDICINE | Facility: CLINIC | Age: 83
End: 2018-03-19

## 2018-03-19 NOTE — TELEPHONE ENCOUNTER
----- Message from Myrna Medina sent at 3/19/2018 11:03 AM CDT -----  Contact: Chel - Wife   Patient's wife says he received your call regarding his results. Please call at [phone].

## 2018-03-21 DIAGNOSIS — N18.30 CKD (CHRONIC KIDNEY DISEASE) STAGE 3, GFR 30-59 ML/MIN: ICD-10-CM

## 2018-03-21 DIAGNOSIS — N28.89 BILATERAL RENAL MASSES: ICD-10-CM

## 2018-03-26 ENCOUNTER — OFFICE VISIT (OUTPATIENT)
Dept: FAMILY MEDICINE | Facility: CLINIC | Age: 83
End: 2018-03-26
Payer: MEDICARE

## 2018-03-26 VITALS
OXYGEN SATURATION: 98 % | DIASTOLIC BLOOD PRESSURE: 66 MMHG | WEIGHT: 216.25 LBS | SYSTOLIC BLOOD PRESSURE: 140 MMHG | HEIGHT: 74 IN | BODY MASS INDEX: 27.75 KG/M2 | HEART RATE: 63 BPM | TEMPERATURE: 98 F

## 2018-03-26 DIAGNOSIS — R73.9 HYPERGLYCEMIA: ICD-10-CM

## 2018-03-26 DIAGNOSIS — E11.22 CONTROLLED TYPE 2 DIABETES MELLITUS WITH STAGE 3 CHRONIC KIDNEY DISEASE, WITHOUT LONG-TERM CURRENT USE OF INSULIN: Chronic | ICD-10-CM

## 2018-03-26 DIAGNOSIS — D50.9 MICROCYTIC ANEMIA: Chronic | ICD-10-CM

## 2018-03-26 DIAGNOSIS — R00.2 PALPITATIONS: Primary | ICD-10-CM

## 2018-03-26 DIAGNOSIS — N18.30 CONTROLLED TYPE 2 DIABETES MELLITUS WITH STAGE 3 CHRONIC KIDNEY DISEASE, WITHOUT LONG-TERM CURRENT USE OF INSULIN: Chronic | ICD-10-CM

## 2018-03-26 DIAGNOSIS — H61.23 BILATERAL HEARING LOSS DUE TO CERUMEN IMPACTION: ICD-10-CM

## 2018-03-26 DIAGNOSIS — C78.7 LIVER METASTASES: ICD-10-CM

## 2018-03-26 PROCEDURE — 99999 PR PBB SHADOW E&M-EST. PATIENT-LVL III: CPT | Mod: PBBFAC,,, | Performed by: FAMILY MEDICINE

## 2018-03-26 PROCEDURE — 99214 OFFICE O/P EST MOD 30 MIN: CPT | Mod: S$PBB,,, | Performed by: FAMILY MEDICINE

## 2018-03-26 PROCEDURE — 93010 ELECTROCARDIOGRAM REPORT: CPT | Mod: ,,, | Performed by: INTERNAL MEDICINE

## 2018-03-26 PROCEDURE — 93005 ELECTROCARDIOGRAM TRACING: CPT | Mod: PBBFAC,PN | Performed by: FAMILY MEDICINE

## 2018-03-26 PROCEDURE — 99213 OFFICE O/P EST LOW 20 MIN: CPT | Mod: PBBFAC,PN | Performed by: FAMILY MEDICINE

## 2018-03-26 NOTE — PROGRESS NOTES
Routine Office Visit    Patient Name: Eugene Gamez    : 1935  MRN: [id number]    Subjective:  Eugene is a 83 y.o. male who presents today for:    1. Follow up  Patient presenting today for follow up after having labs done in January showing elevated glucose and potassium. Patient has been doing well off medicaitons until recently for his diabetes.  He states that he has been eating more sweats lately as well.  He does not drink a lot of water.  He states otherwise he has been doing well.  He denies any paresthesias, chest pain, shortness of breath, dizziness, or weakness.  He is requesting an order for diabetic shoes stating he has never had them before.  He reports to have seen Dr. Jones in the past for podiatry.    Past Medical History  Past Medical History:   Diagnosis Date    Arthritis     Cancer     Diabetes mellitus     Elevated PSA     Gout, unspecified     Hypertension        Past Surgical History  Past Surgical History:   Procedure Laterality Date    CATARACT EXTRACTION      ou    EYE SURGERY      bilateral cataracts    removal of small bowel  2015    Colostomy       Family History  Family History   Problem Relation Age of Onset    Hypertension Mother     Diabetes Mother     No Known Problems Father     No Known Problems Sister     No Known Problems Brother     No Known Problems Maternal Aunt     No Known Problems Maternal Uncle     No Known Problems Paternal Aunt     No Known Problems Paternal Uncle     No Known Problems Maternal Grandmother     No Known Problems Maternal Grandfather     No Known Problems Paternal Grandmother     No Known Problems Paternal Grandfather     Amblyopia Neg Hx     Blindness Neg Hx     Cancer Neg Hx     Cataracts Neg Hx     Glaucoma Neg Hx     Macular degeneration Neg Hx     Retinal detachment Neg Hx     Strabismus Neg Hx     Stroke Neg Hx     Thyroid disease Neg Hx        Social History  Social History     Social History    Marital  status:      Spouse name: N/A    Number of children: N/A    Years of education: N/A     Occupational History    Not on file.     Social History Main Topics    Smoking status: Never Smoker    Smokeless tobacco: Never Used      Comment: smoked short while as a teen    Alcohol use No    Drug use: No    Sexual activity: Not on file     Other Topics Concern    Not on file     Social History Narrative    No narrative on file       Current Medications  Current Outpatient Prescriptions on File Prior to Visit   Medication Sig Dispense Refill    allopurinol (ZYLOPRIM) 300 MG tablet TAKE 1 TABLET BY MOUTH ONCE DAILY 90 tablet 3    benazepril (LOTENSIN) 40 MG tablet Take 1 tablet (40 mg total) by mouth once daily. 90 tablet 3    blood sugar diagnostic (ONE TOUCH ULTRA TEST) Strp 1 each by Misc.(Non-Drug; Combo Route) route once daily. 200 each 3    cholestyramine (QUESTRAN) 4 gram packet Take 1 packet (4 g total) by mouth 3 (three) times daily with meals. 270 packet 3    ferrous gluconate (FERGON) 324 MG tablet Take 1 tablet (324 mg total) by mouth daily with breakfast.      ferrous sulfate 324 mg (65 mg iron) TbEC Take 325 mg by mouth once daily.      mirtazapine (REMERON) 15 MG tablet TAKE 1 TABLET (15 MG TOTAL) BY MOUTH NIGHTLY. 90 tablet 1    prochlorperazine (COMPAZINE) 10 MG tablet Take 1 tablet (10 mg total) by mouth every 6 (six) hours as needed. 360 tablet 0    blood-glucose meter (ONE TOUCH ULTRAMINI) kit To test twice daily. Use as instructed 1 each 0    colchicine (COLCRYS) 0.6 mg tablet Take 1 tablet (0.6 mg total) by mouth daily as needed. 30 tablet 1    duloxetine (CYMBALTA) 30 MG capsule Take 1 capsule (30 mg total) by mouth 2 (two) times daily. 60 capsule 2    gabapentin (NEURONTIN) 300 MG capsule Take 1 capsule (300 mg total) by mouth 3 (three) times daily. 90 capsule 2    indomethacin (INDOCIN) 50 MG capsule Take 1 capsule (50 mg total) by mouth 3 (three) times daily as needed.  "Take for Gout attacks 90 capsule 0    loratadine (CLARITIN) 10 mg tablet Take 10 mg by mouth once daily.      multivitamin capsule Take 1 capsule by mouth once daily.      ondansetron (ZOFRAN) 8 MG tablet TAKE 1 TABLET (8 MG TOTAL) BY MOUTH EVERY 8 (EIGHT) HOURS AS NEEDED FOR NAUSEA. 90 tablet 0    penicillin v potassium (VEETID) 500 MG tablet Take 1 tablet (500 mg total) by mouth 4 (four) times daily. 40 tablet 0    potassium & sodium phosphates (PHOS-NAK) 280-160-250 mg PwPk Take 1 packet by mouth 4 (four) times daily with meals and nightly. 120 packet 6    sodium bicarbonate 650 MG tablet Take 1 tablet (650 mg total) by mouth 2 (two) times daily. 100 tablet 1    tadalafil (CIALIS) 10 MG tablet Take 1 tablet (10 mg total) by mouth daily as needed for Erectile Dysfunction. 10 tablet 3     Current Facility-Administered Medications on File Prior to Visit   Medication Dose Route Frequency Provider Last Rate Last Dose    ondansetron HCl (PF) 4 mg/2 mL injection    PRN James Ryan CRNA   4 mg at 06/29/15 0856       Allergies   Review of patient's allergies indicates:   Allergen Reactions    Shellfish containing products      Causes gout to flare up       Review of Systems (Pertinent positives)  Review of Systems   Constitutional: Negative.    HENT: Negative.    Eyes: Negative.    Respiratory: Negative.    Cardiovascular: Negative.    Gastrointestinal: Negative.    Genitourinary: Negative.    Musculoskeletal: Negative.    Skin: Negative.    Neurological: Negative.          BP (!) 140/66 (BP Location: Left arm, Patient Position: Sitting, BP Method: Medium (Manual))   Pulse 63   Temp 98.1 °F (36.7 °C) (Oral)   Ht 6' 2" (1.88 m)   Wt 98.1 kg (216 lb 4.3 oz)   SpO2 98%   BMI 27.77 kg/m²     GENERAL APPEARANCE: in no apparent distress and well developed and well nourished  HEENT: PERRL, EOMI, Sclera clear, anicteric, Oropharynx clear, no lesions, Neck supple with midline trachea; bilateral cerumen impaction " that could not all be retrieved otic curette   NECK: normal, supple, no adenopathy, thyroid normal in size  RESPIRATORY: appears well, vitals normal, no respiratory distress, acyanotic, normal RR, chest clear, no wheezing, crepitations, rhonchi, normal symmetric air entry  HEART: regular rate with regular irregluar rhythm, S1, S2 normal, no murmur, click, rub or gallop.    ABDOMEN: abdomen is soft without tenderness, no masses, no hernias, no organomegaly, no rebound, no guarding. Suprapubic tenderness absent. No CVA tenderness.  SKIN: no rashes, no wounds, no other lesions  PSYCH: Alert, oriented x 3, thought content appropriate, speech normal, pleasant and cooperative, good eye contact, well groomed    Assessment/Plan:  Eugene Gamez is a 83 y.o. male who presents today for :    Eugene was seen today for results.    Diagnoses and all orders for this visit:    Palpitations  -     EKG 12-lead    Hyperglycemia  -     Comprehensive metabolic panel; Future    Controlled type 2 diabetes mellitus with stage 3 chronic kidney disease, without long-term current use of insulin  -     DIABETIC SHOES FOR HOME USE    Bilateral hearing loss due to cerumen impaction  -     Ear wax removal    Microcytic anemia    Liver metastases      1.  EKG shows normal sinus rhythm with PVC's  2.  Order for diabetic shoes given  3.  Labs to be done today  4.  Follow up 3 months or sooner if needed  5.  Follow up with oncology for stage 4 colon cancer    Larry Monae MD

## 2018-03-27 ENCOUNTER — TELEPHONE (OUTPATIENT)
Dept: FAMILY MEDICINE | Facility: CLINIC | Age: 83
End: 2018-03-27

## 2018-03-27 NOTE — TELEPHONE ENCOUNTER
----- Message from Janel Allison sent at 3/27/2018  4:47 PM CDT -----  Contact: Kendra /[place] [phone]  Calling to get clinical notes for PT

## 2018-04-12 DIAGNOSIS — E11.9 DIABETES MELLITUS WITHOUT COMPLICATION: ICD-10-CM

## 2018-06-27 ENCOUNTER — OFFICE VISIT (OUTPATIENT)
Dept: FAMILY MEDICINE | Facility: CLINIC | Age: 83
End: 2018-06-27
Payer: MEDICARE

## 2018-06-27 ENCOUNTER — CLINICAL SUPPORT (OUTPATIENT)
Dept: OPHTHALMOLOGY | Facility: CLINIC | Age: 83
End: 2018-06-27
Attending: FAMILY MEDICINE
Payer: MEDICARE

## 2018-06-27 ENCOUNTER — LAB VISIT (OUTPATIENT)
Dept: LAB | Facility: HOSPITAL | Age: 83
End: 2018-06-27
Attending: FAMILY MEDICINE
Payer: MEDICARE

## 2018-06-27 VITALS
BODY MASS INDEX: 27.1 KG/M2 | SYSTOLIC BLOOD PRESSURE: 96 MMHG | TEMPERATURE: 99 F | RESPIRATION RATE: 12 BRPM | DIASTOLIC BLOOD PRESSURE: 50 MMHG | HEIGHT: 74 IN | HEART RATE: 62 BPM | OXYGEN SATURATION: 98 % | WEIGHT: 211.19 LBS

## 2018-06-27 DIAGNOSIS — I10 BENIGN ESSENTIAL HTN: ICD-10-CM

## 2018-06-27 DIAGNOSIS — H25.13 NUCLEAR SCLEROTIC CATARACT OF BOTH EYES: Primary | ICD-10-CM

## 2018-06-27 DIAGNOSIS — N18.30 CONTROLLED TYPE 2 DIABETES MELLITUS WITH STAGE 3 CHRONIC KIDNEY DISEASE, WITHOUT LONG-TERM CURRENT USE OF INSULIN: Chronic | ICD-10-CM

## 2018-06-27 DIAGNOSIS — N18.30 CONTROLLED TYPE 2 DIABETES MELLITUS WITH STAGE 3 CHRONIC KIDNEY DISEASE, WITHOUT LONG-TERM CURRENT USE OF INSULIN: Primary | Chronic | ICD-10-CM

## 2018-06-27 DIAGNOSIS — I95.9 HYPOTENSION, UNSPECIFIED HYPOTENSION TYPE: ICD-10-CM

## 2018-06-27 DIAGNOSIS — E11.22 CONTROLLED TYPE 2 DIABETES MELLITUS WITH STAGE 3 CHRONIC KIDNEY DISEASE, WITHOUT LONG-TERM CURRENT USE OF INSULIN: Primary | Chronic | ICD-10-CM

## 2018-06-27 DIAGNOSIS — E11.22 CONTROLLED TYPE 2 DIABETES MELLITUS WITH STAGE 3 CHRONIC KIDNEY DISEASE, WITHOUT LONG-TERM CURRENT USE OF INSULIN: Chronic | ICD-10-CM

## 2018-06-27 LAB
ALBUMIN SERPL BCP-MCNC: 3.9 G/DL
ALP SERPL-CCNC: 140 U/L
ALT SERPL W/O P-5'-P-CCNC: 21 U/L
ANION GAP SERPL CALC-SCNC: 7 MMOL/L
AST SERPL-CCNC: 25 U/L
BASOPHILS # BLD AUTO: 0.03 K/UL
BASOPHILS NFR BLD: 0.4 %
BILIRUB SERPL-MCNC: 0.5 MG/DL
BUN SERPL-MCNC: 37 MG/DL
CALCIUM SERPL-MCNC: 9.4 MG/DL
CHLORIDE SERPL-SCNC: 120 MMOL/L
CO2 SERPL-SCNC: 12 MMOL/L
CREAT SERPL-MCNC: 2.1 MG/DL
DIFFERENTIAL METHOD: ABNORMAL
EOSINOPHIL # BLD AUTO: 0.1 K/UL
EOSINOPHIL NFR BLD: 1.2 %
ERYTHROCYTE [DISTWIDTH] IN BLOOD BY AUTOMATED COUNT: 15.1 %
EST. GFR  (AFRICAN AMERICAN): 32.9 ML/MIN/1.73 M^2
EST. GFR  (NON AFRICAN AMERICAN): 28.5 ML/MIN/1.73 M^2
GLUCOSE SERPL-MCNC: 146 MG/DL
HCT VFR BLD AUTO: 35.9 %
HGB BLD-MCNC: 11.1 G/DL
IMM GRANULOCYTES # BLD AUTO: 0.04 K/UL
IMM GRANULOCYTES NFR BLD AUTO: 0.5 %
LYMPHOCYTES # BLD AUTO: 1.4 K/UL
LYMPHOCYTES NFR BLD: 18.6 %
MCH RBC QN AUTO: 29.1 PG
MCHC RBC AUTO-ENTMCNC: 30.9 G/DL
MCV RBC AUTO: 94 FL
MONOCYTES # BLD AUTO: 0.6 K/UL
MONOCYTES NFR BLD: 7.8 %
NEUTROPHILS # BLD AUTO: 5.5 K/UL
NEUTROPHILS NFR BLD: 71.5 %
NRBC BLD-RTO: 0 /100 WBC
PLATELET # BLD AUTO: 206 K/UL
PMV BLD AUTO: 11.7 FL
POTASSIUM SERPL-SCNC: 6 MMOL/L
PROT SERPL-MCNC: 7.6 G/DL
RBC # BLD AUTO: 3.81 M/UL
SODIUM SERPL-SCNC: 139 MMOL/L
WBC # BLD AUTO: 7.73 K/UL

## 2018-06-27 PROCEDURE — 92250 FUNDUS PHOTOGRAPHY W/I&R: CPT | Mod: 50,PBBFAC,PO

## 2018-06-27 PROCEDURE — 80053 COMPREHEN METABOLIC PANEL: CPT

## 2018-06-27 PROCEDURE — 36415 COLL VENOUS BLD VENIPUNCTURE: CPT | Mod: PO

## 2018-06-27 PROCEDURE — 99214 OFFICE O/P EST MOD 30 MIN: CPT | Mod: S$PBB,,, | Performed by: FAMILY MEDICINE

## 2018-06-27 PROCEDURE — 92250 FUNDUS PHOTOGRAPHY W/I&R: CPT | Mod: 26,S$PBB,, | Performed by: OPHTHALMOLOGY

## 2018-06-27 PROCEDURE — G0009 ADMIN PNEUMOCOCCAL VACCINE: HCPCS | Mod: PBBFAC,PN

## 2018-06-27 PROCEDURE — 85025 COMPLETE CBC W/AUTO DIFF WBC: CPT

## 2018-06-27 PROCEDURE — 99213 OFFICE O/P EST LOW 20 MIN: CPT | Mod: PBBFAC,PN,25 | Performed by: FAMILY MEDICINE

## 2018-06-27 PROCEDURE — 99999 PR PBB SHADOW E&M-EST. PATIENT-LVL III: CPT | Mod: PBBFAC,,, | Performed by: FAMILY MEDICINE

## 2018-06-27 NOTE — PROGRESS NOTES
HPI     Eugene Gamez here for diabetic eye exam with non-dilated fundus   photos.    Small pupils: yes  Pt cooperative: yes      Last edited by Desi Salvador on 6/27/2018 11:29 AM. (History)            Assessment /Plan     For exam results, see Encounter Report.    Controlled type 2 diabetes mellitus with stage 3 chronic kidney disease, without long-term current use of insulin  -     Diabetic Eye Screening Photo      Please see Dr. Blount's progress notes for interpretation.

## 2018-06-27 NOTE — PROGRESS NOTES
Routine Office Visit    Patient Name: Eugene Gamez    : 1935  MRN: [id number]    Subjective:  Eugene is a 83 y.o. male who presents today for:    1. Diabetes follow up  Patient presenting today for follow up of his diabetes.  He states that he has been doing well and taking all medications.  He states that he has not had any neuropathy. There has been no chest pain, shortness of breath, or dizziness.  Today.  His blood pressure is low, but he states he feels fine.  He denies any rectal bleeding as he has a history of colon cancer.  He denies taking more medication than prescribed    Past Medical History  Past Medical History:   Diagnosis Date    Arthritis     Cancer     Diabetes mellitus     Elevated PSA     Gout, unspecified     Hypertension        Past Surgical History  Past Surgical History:   Procedure Laterality Date    CATARACT EXTRACTION      ou    EYE SURGERY      bilateral cataracts    removal of small bowel  2015    Colostomy       Family History  Family History   Problem Relation Age of Onset    Hypertension Mother     Diabetes Mother     No Known Problems Father     No Known Problems Sister     No Known Problems Brother     No Known Problems Maternal Aunt     No Known Problems Maternal Uncle     No Known Problems Paternal Aunt     No Known Problems Paternal Uncle     No Known Problems Maternal Grandmother     No Known Problems Maternal Grandfather     No Known Problems Paternal Grandmother     No Known Problems Paternal Grandfather     Amblyopia Neg Hx     Blindness Neg Hx     Cancer Neg Hx     Cataracts Neg Hx     Glaucoma Neg Hx     Macular degeneration Neg Hx     Retinal detachment Neg Hx     Strabismus Neg Hx     Stroke Neg Hx     Thyroid disease Neg Hx        Social History  Social History     Social History    Marital status:      Spouse name: N/A    Number of children: N/A    Years of education: N/A     Occupational History    Not on file.      Social History Main Topics    Smoking status: Never Smoker    Smokeless tobacco: Never Used      Comment: smoked short while as a teen    Alcohol use No    Drug use: No    Sexual activity: Not on file     Other Topics Concern    Not on file     Social History Narrative    No narrative on file       Current Medications  Current Outpatient Prescriptions on File Prior to Visit   Medication Sig Dispense Refill    allopurinol (ZYLOPRIM) 300 MG tablet TAKE 1 TABLET BY MOUTH ONCE DAILY 90 tablet 3    benazepril (LOTENSIN) 40 MG tablet Take 1 tablet (40 mg total) by mouth once daily. 90 tablet 3    blood sugar diagnostic (ONE TOUCH ULTRA TEST) Strp 1 each by Misc.(Non-Drug; Combo Route) route once daily. 200 each 3    colchicine (COLCRYS) 0.6 mg tablet Take 1 tablet (0.6 mg total) by mouth daily as needed. 30 tablet 1    ferrous gluconate (FERGON) 324 MG tablet Take 1 tablet (324 mg total) by mouth daily with breakfast.      ferrous sulfate 324 mg (65 mg iron) TbEC Take 325 mg by mouth once daily.      indomethacin (INDOCIN) 50 MG capsule Take 1 capsule (50 mg total) by mouth 3 (three) times daily as needed. Take for Gout attacks 90 capsule 0    loratadine (CLARITIN) 10 mg tablet Take 10 mg by mouth once daily.      mirtazapine (REMERON) 15 MG tablet TAKE 1 TABLET (15 MG TOTAL) BY MOUTH NIGHTLY. 90 tablet 1    multivitamin capsule Take 1 capsule by mouth once daily.      blood-glucose meter (ONE TOUCH ULTRAMINI) kit To test twice daily. Use as instructed 1 each 0    cholestyramine (QUESTRAN) 4 gram packet Take 1 packet (4 g total) by mouth 3 (three) times daily with meals. 270 packet 3    duloxetine (CYMBALTA) 30 MG capsule Take 1 capsule (30 mg total) by mouth 2 (two) times daily. 60 capsule 2    gabapentin (NEURONTIN) 300 MG capsule Take 1 capsule (300 mg total) by mouth 3 (three) times daily. 90 capsule 2    ondansetron (ZOFRAN) 8 MG tablet TAKE 1 TABLET (8 MG TOTAL) BY MOUTH EVERY 8 (EIGHT)  "HOURS AS NEEDED FOR NAUSEA. 90 tablet 0    penicillin v potassium (VEETID) 500 MG tablet Take 1 tablet (500 mg total) by mouth 4 (four) times daily. 40 tablet 0    potassium & sodium phosphates (PHOS-NAK) 280-160-250 mg PwPk Take 1 packet by mouth 4 (four) times daily with meals and nightly. 120 packet 6    prochlorperazine (COMPAZINE) 10 MG tablet TAKE 1 TABLET (10 MG TOTAL) BY MOUTH EVERY 6 (SIX) HOURS AS NEEDED. 360 tablet 0    sodium bicarbonate 650 MG tablet Take 1 tablet (650 mg total) by mouth 2 (two) times daily. 100 tablet 1    tadalafil (CIALIS) 10 MG tablet Take 1 tablet (10 mg total) by mouth daily as needed for Erectile Dysfunction. 10 tablet 3     Current Facility-Administered Medications on File Prior to Visit   Medication Dose Route Frequency Provider Last Rate Last Dose    ondansetron HCl (PF) 4 mg/2 mL injection    PRN James Ryan CRNA   4 mg at 06/29/15 0856       Allergies   Review of patient's allergies indicates:   Allergen Reactions    Shellfish containing products      Causes gout to flare up       Review of Systems (Pertinent positives)  Review of Systems   Constitutional: Negative.    HENT: Negative.    Eyes: Negative.    Respiratory: Negative.    Cardiovascular: Negative.    Gastrointestinal: Negative.    Genitourinary: Negative.    Musculoskeletal: Negative.    Skin: Negative.    Neurological: Negative.          BP (!) 96/50 (BP Location: Right arm, Patient Position: Sitting, BP Method: Medium (Manual))   Pulse 62   Temp 98.5 °F (36.9 °C) (Oral)   Resp 12   Ht 6' 2" (1.88 m)   Wt 95.8 kg (211 lb 3.2 oz)   SpO2 98%   BMI 27.12 kg/m²     GENERAL APPEARANCE: in no apparent distress and well developed and well nourished  HEENT: PERRL, EOMI, Sclera clear, anicteric, Oropharynx clear, no lesions, Neck supple with midline trachea  NECK: normal, supple, no adenopathy, thyroid normal in size  RESPIRATORY: appears well, vitals normal, no respiratory distress, acyanotic, normal RR, " chest clear, no wheezing, crepitations, rhonchi, normal symmetric air entry  HEART: regular rate and rhythm, S1, S2 normal, no murmur, click, rub or gallop.    ABDOMEN: abdomen is soft without tenderness, no masses, no hernias, no organomegaly, no rebound, no guarding. Suprapubic tenderness absent. No CVA tenderness.  NEUROLOGIC: normal without focal findings, CN II-XII are intact.   SKIN: no rashes, no wounds, no other lesions  PSYCH: Alert, oriented x 3, thought content appropriate, speech normal, pleasant and cooperative, good eye contact, well groomed, recall good, concentration/judgement good and apparently average intelligence    Protective Sensation (w/ 10 gram monofilament):  Right: Intact  Left: Intact    Visual Inspection:  Normal -  Bilateral    Pedal Pulses:   Right: Diminshed  Left: Diminshed    Posterior tibialis:   Right:Diminshed  Left: Diminshed        Assessment/Plan:  Eugene Gamez is a 83 y.o. male who presents today for :    Eugene was seen today for follow-up.    Diagnoses and all orders for this visit:    Controlled type 2 diabetes mellitus with stage 3 chronic kidney disease, without long-term current use of insulin  -     (In Office Administered) Pneumococcal Polysaccharide Vaccine (23 Valent) (SQ/IM)  -     Foot Exam Performed  -     Diabetic Eye Screening Photo; Future    Benign essential HTN    Hypotension, unspecified hypotension type  -     CBC auto differential; Future  -     Comprehensive metabolic panel; Future      1.  Labs to be done today  2.  Eye cam exam today  3.  He is to go to the ed for any dizziness, chest pain, shortness of breath, or LOC  4.  Follow up 3 months or sooner if needed    Larry Monae MD

## 2018-06-27 NOTE — Clinical Note
HPI    Eugene Gamez here for diabetic eye exam with non-dilated fundus  photos.  Small pupils: yes Pt cooperative: yes    Last edited by Desi Salvador on 6/27/2018 11:29 AM. (History)

## 2018-06-29 ENCOUNTER — TELEPHONE (OUTPATIENT)
Dept: OPTOMETRY | Facility: CLINIC | Age: 83
End: 2018-06-29

## 2018-06-29 PROBLEM — H25.13 NUCLEAR SCLEROTIC CATARACT OF BOTH EYES: Status: ACTIVE | Noted: 2018-06-29

## 2018-06-29 NOTE — TELEPHONE ENCOUNTER
----- Message from Yulia Mello sent at 6/29/2018  1:23 PM CDT -----      ----- Message -----  From: Romaine Blount MD  Sent: 6/29/2018  12:17 PM  To: Wolfgang Cash Staff    Cataracts with blur

## 2018-07-01 NOTE — PROGRESS NOTES
Please let patient know that he is still anemic and his kidney function is still lower than it was 6 months ago.  It hasn't changed much in the past 6 months, so I want to see him back in 4 weeks to recheck.      Thanks,  Dr. Monae

## 2018-07-10 ENCOUNTER — TELEPHONE (OUTPATIENT)
Dept: FAMILY MEDICINE | Facility: CLINIC | Age: 83
End: 2018-07-10

## 2018-07-10 NOTE — TELEPHONE ENCOUNTER
Called both home and cell and left message on cell. Home phone number is disconnected. Called pt regarding labs results and to schedule f/u appt soon within the next 3 weeks.

## 2018-11-09 PROBLEM — R63.0 ANOREXIA: Status: ACTIVE | Noted: 2018-11-09

## 2018-11-09 PROBLEM — I10 HTN, GOAL BELOW 140/80: Status: ACTIVE | Noted: 2018-11-09

## 2018-11-09 PROBLEM — D50.0 ANEMIA, BLOOD LOSS: Status: ACTIVE | Noted: 2018-11-09

## 2019-02-12 PROBLEM — D63.8 ANEMIA OF CHRONIC DISEASE: Status: ACTIVE | Noted: 2019-02-12

## 2019-02-12 PROBLEM — R97.0 ELEVATED CEA: Status: ACTIVE | Noted: 2019-02-12

## 2019-03-14 ENCOUNTER — HOSPITAL ENCOUNTER (OUTPATIENT)
Dept: RADIOLOGY | Facility: HOSPITAL | Age: 84
Discharge: HOME | End: 2019-03-14
Attending: INTERNAL MEDICINE
Payer: MEDICARE

## 2019-03-14 DIAGNOSIS — C78.7 LIVER METASTASES: ICD-10-CM

## 2019-03-14 PROCEDURE — 78815 NM PET CT ROUTINE: ICD-10-PCS | Mod: 26,PS,, | Performed by: RADIOLOGY

## 2019-03-14 PROCEDURE — 78815 PET IMAGE W/CT SKULL-THIGH: CPT | Mod: TC,PS

## 2019-03-14 PROCEDURE — 78815 PET IMAGE W/CT SKULL-THIGH: CPT | Mod: 26,PS,, | Performed by: RADIOLOGY

## 2019-03-14 PROCEDURE — A9552 F18 FDG: HCPCS

## 2019-05-08 ENCOUNTER — OFFICE VISIT (OUTPATIENT)
Dept: FAMILY MEDICINE | Facility: CLINIC | Age: 84
End: 2019-05-08
Payer: MEDICARE

## 2019-05-08 VITALS
BODY MASS INDEX: 24.89 KG/M2 | TEMPERATURE: 98 F | DIASTOLIC BLOOD PRESSURE: 56 MMHG | OXYGEN SATURATION: 97 % | RESPIRATION RATE: 16 BRPM | SYSTOLIC BLOOD PRESSURE: 124 MMHG | WEIGHT: 200.19 LBS | HEIGHT: 75 IN | HEART RATE: 60 BPM

## 2019-05-08 DIAGNOSIS — R22.9 LOCALIZED SOFT TISSUE SWELLING: Primary | ICD-10-CM

## 2019-05-08 PROCEDURE — 99214 OFFICE O/P EST MOD 30 MIN: CPT | Mod: S$PBB,,, | Performed by: NURSE PRACTITIONER

## 2019-05-08 PROCEDURE — 99214 PR OFFICE/OUTPT VISIT, EST, LEVL IV, 30-39 MIN: ICD-10-PCS | Mod: S$PBB,,, | Performed by: NURSE PRACTITIONER

## 2019-05-08 PROCEDURE — 99214 OFFICE O/P EST MOD 30 MIN: CPT | Mod: PBBFAC,PN | Performed by: NURSE PRACTITIONER

## 2019-05-08 PROCEDURE — 99999 PR PBB SHADOW E&M-EST. PATIENT-LVL IV: ICD-10-PCS | Mod: PBBFAC,,, | Performed by: NURSE PRACTITIONER

## 2019-05-08 PROCEDURE — 99999 PR PBB SHADOW E&M-EST. PATIENT-LVL IV: CPT | Mod: PBBFAC,,, | Performed by: NURSE PRACTITIONER

## 2019-05-08 NOTE — PROGRESS NOTES
Routine Office Visit    Patient Name: Eugene Gamez    : 1935  MRN: [id number]    Chief Complaint:  Right ankle swelling    Subjective:  Eugene is a 84 y.o. male who presents today for:    1.  Right ankle swelling - patient reports to clinic today for right ankle swelling. He states that he bumped his right ankle some time ago and has always had a knot on his right ankle, but in the past 2 days the knot has become more swollen.  He states that the knot is sore but is not tender to palpation.  He denies any numbness or tingling to the ankle.  Denies any decreased range of motion to the ankle.  Denies any fevers or chills.  He states that he did wake up with some discharge on his sheets near his ankle this morning, but he denies seeing any overt discharge coming from the knot.  He has been using muscle salve cream to the swelling which has not helped very much.  He is still able to ambulate normally without any pain during ambulation.  Denies any new weakness to the ankle.    Past Medical History  Past Medical History:   Diagnosis Date    Arthritis     Cancer     Diabetes mellitus     Elevated PSA     Gout, unspecified     Hypertension     Nuclear sclerotic cataract of both eyes 2018       Past Surgical History  Past Surgical History:   Procedure Laterality Date    CATARACT EXTRACTION      ou    COLECTOMY-PARTIAL N/A 2015    [facility]    COLOSTOMY N/A 2015    [facility]    EXPLORATORY-LAPAROTOMY AND EXCISION RIGHT LOBE LIVER LESION N/A 2015    [facility]    EYE SURGERY      bilateral cataracts    [id number] Left 2015    [facility]    removal of small bowel  2015    Colostomy       Family History  Family History   Problem Relation Age of Onset    Hypertension Mother     Diabetes Mother     No Known Problems Father     No  Known Problems Sister     No Known Problems Brother     No Known Problems Maternal Aunt     No Known Problems Maternal Uncle     No Known Problems Paternal Aunt     No Known Problems Paternal Uncle     No Known Problems Maternal Grandmother     No Known Problems Maternal Grandfather     No Known Problems Paternal Grandmother     No Known Problems Paternal Grandfather     Amblyopia Neg Hx     Blindness Neg Hx     Cancer Neg Hx     Cataracts Neg Hx     Glaucoma Neg Hx     Macular degeneration Neg Hx     Retinal detachment Neg Hx     Strabismus Neg Hx     Stroke Neg Hx     Thyroid disease Neg Hx        Social History  Social History     Socioeconomic History    Marital status:      Spouse name: Not on file    Number of children: Not on file    Years of education: Not on file    Highest education level: Not on file   Occupational History    Not on file   Social Needs    Financial resource strain: Not on file    Food insecurity:     Worry: Not on file     Inability: Not on file    Transportation needs:     Medical: Not on file     Non-medical: Not on file   Tobacco Use    Smoking status: Never Smoker    Smokeless tobacco: Never Used    Tobacco comment: smoked short while as a teen   Substance and Sexual Activity    Alcohol use: No    Drug use: No    Sexual activity: Not on file   Lifestyle    Physical activity:     Days per week: Not on file     Minutes per session: Not on file    Stress: Not on file   Relationships    Social connections:     Talks on phone: Not on file     Gets together: Not on file     Attends [employer] service: Not on file     Active member of club or organization: Not on file     Attends meetings of clubs or organizations: Not on file     Relationship status: Not on file   Other Topics Concern    Not on file   Social History Narrative    Not on file       Current Medications  Current Outpatient Medications on File Prior to Visit   Medication Sig Dispense  Refill    allopurinol (ZYLOPRIM) 300 MG tablet Take 1 tablet (300 mg total) by mouth once daily. 90 tablet 3    benazepril (LOTENSIN) 40 MG tablet Take 1 tablet (40 mg total) by mouth once daily. 90 tablet 3    blood sugar diagnostic (ONE TOUCH ULTRA TEST) Strp 1 each by Misc.(Non-Drug; Combo Route) route once daily. 200 each 3    cholestyramine (QUESTRAN) 4 gram packet Take 1 packet (4 g total) by mouth 3 (three) times daily with meals. 270 packet 3    colchicine (COLCRYS) 0.6 mg tablet Take 1 tablet (0.6 mg total) by mouth daily as needed. 90 tablet 3    DULoxetine (CYMBALTA) 30 MG capsule Take 1 capsule (30 mg total) by mouth 2 (two) times daily. 180 capsule 3    ferrous gluconate (FERGON) 324 MG tablet Take 1 tablet (324 mg total) by mouth daily with breakfast.      ferrous sulfate 324 mg (65 mg iron) TbEC Take 325 mg by mouth once daily.      gabapentin (NEURONTIN) 300 MG capsule Take 1 capsule (300 mg total) by mouth 3 (three) times daily. 270 capsule 3    indomethacin (INDOCIN) 50 MG capsule Take 1 capsule (50 mg total) by mouth 3 (three) times daily as needed. Take for Gout attacks 90 capsule 0    mirtazapine (REMERON) 15 MG tablet TAKE 1 TABLET (15 MG TOTAL) BY MOUTH NIGHTLY. 90 tablet 1    prochlorperazine (COMPAZINE) 10 MG tablet TAKE 1 TABLET (10 MG TOTAL) BY MOUTH EVERY 6 (SIX) HOURS AS NEEDED. 360 tablet 0    sodium bicarbonate 650 MG tablet Take 1 tablet (650 mg total) by mouth 2 (two) times daily. 100 tablet 1    blood-glucose meter (ONE TOUCH ULTRAMINI) kit To test twice daily. Use as instructed 1 each 0    loratadine (CLARITIN) 10 mg tablet Take 10 mg by mouth once daily.      multivitamin capsule Take 1 capsule by mouth once daily.      ondansetron (ZOFRAN) 8 MG tablet Take 1 tablet (8 mg total) by mouth every 8 (eight) hours as needed for Nausea. 90 tablet 0    penicillin v potassium (VEETID) 500 MG tablet Take 1 tablet (500 mg total) by mouth 4 (four) times daily. 40 tablet 0  "   tadalafil (CIALIS) 10 MG tablet Take 1 tablet (10 mg total) by mouth daily as needed for Erectile Dysfunction. 10 tablet 3     Current Facility-Administered Medications on File Prior to Visit   Medication Dose Route Frequency Provider Last Rate Last Dose    ondansetron HCl (PF) 4 mg/2 mL injection    PRN James Ryan CRNA   4 mg at 06/29/15 0856       Allergies   Review of patient's allergies indicates:   Allergen Reactions    Shellfish containing products      Causes gout to flare up       Review of Systems (Pertinent positives)  Review of Systems   Constitutional: Negative for chills and fever.   HENT: Negative.    Eyes: Negative.    Respiratory: Negative.    Cardiovascular: Negative.    Gastrointestinal: Negative.    Genitourinary: Negative.    Musculoskeletal:        "knot on right ankle"   Skin: Negative.    Neurological: Negative.    Endo/Heme/Allergies: Negative.    Psychiatric/Behavioral: Negative.        BP (!) 124/56 (BP Location: Left arm, Patient Position: Sitting, BP Method: Small (Automatic))   Pulse 60   Temp 98.3 °F (36.8 °C) (Oral)   Resp 16   Ht 6' 3" (1.905 m)   Wt 90.8 kg (200 lb 2.8 oz)   SpO2 97%   BMI 25.02 kg/m²     Physical Exam   Constitutional: He is oriented to person, place, and time. He appears well-developed and well-nourished. No distress.   Eyes: Pupils are equal, round, and reactive to light. Conjunctivae and EOM are normal.   Neck: Normal range of motion. Neck supple.   Cardiovascular: Normal rate, regular rhythm, normal heart sounds and intact distal pulses. Exam reveals no gallop and no friction rub.   No murmur heard.  Pulmonary/Chest: Effort normal and breath sounds normal. No stridor. No respiratory distress. He has no wheezes. He has no rales. He exhibits no tenderness.   Abdominal: Soft. Bowel sounds are normal. He exhibits no distension and no mass. There is no tenderness. There is no guarding.   Musculoskeletal: Normal range of motion.        Right ankle: " Normal. He exhibits normal range of motion and no swelling.        Feet:    Neurological: He is alert and oriented to person, place, and time.   Skin: Skin is warm. Capillary refill takes less than 2 seconds. He is not diaphoretic.        Assessment/Plan:  Eugene Gamez is a 84 y.o. male who presents today for :    Eugene was seen today for ankle injury.    Diagnoses and all orders for this visit:    Localized soft tissue swelling  -     Cancel: Ambulatory referral to Orthopedics  -     Ambulatory referral to Orthopedics     Possible cyst or bursitis.  No evidence of infection on exam no tenderness to palpation no fluctuance no erythema.  Patient's ankle was wrapped with an Ace wrap in this clinic visit.  He was instructed to ice the swelling, and keep the swelling wrapped daily.  Will refer to Orthopedics for further evaluation.  Patient was instructed that in the meantime if he develops any fevers or chills, purulent discharge from the swelling, worsening redness, or overt pain from the swelling the needs to go to the emergency room immediately.  Avoid NSAIDs for swelling. Patient may take Tylenol for the symptoms if they become bothersome.  Patient verbalized understanding of these instructions.        This office note has been dictated.  This dictation has been generated using M-Modal Fluency Direct dictation; some phonetic errors may occur.   My collaborating physician is Dr. Lencho Stacy.

## 2019-06-05 ENCOUNTER — TELEPHONE (OUTPATIENT)
Dept: FAMILY MEDICINE | Facility: CLINIC | Age: 84
End: 2019-06-05

## 2019-06-05 NOTE — LETTER
June 5, 2019    Eugene Gamez  [address]  [place] [address]             [place] [id number] [facility]  [place] [address]  Phone: [phone]  Fax: [phone] Dear Mr. Gamez:    Aylin we were unable to contact you to schedule your Orthopedic appointment. Please give the referral department a call at [phone].        If you have any questions or concerns, please don't hesitate to call.    Sincerely,        Radha Pina MA

## 2019-10-06 ENCOUNTER — HOSPITAL ENCOUNTER (INPATIENT)
Facility: HOSPITAL | Age: 84
LOS: 4 days | Discharge: HOME HEALTH | DRG: 641 | End: 2019-10-10
Attending: EMERGENCY MEDICINE | Admitting: EMERGENCY MEDICINE
Payer: MEDICARE

## 2019-10-06 DIAGNOSIS — E87.20 METABOLIC ACIDOSIS: ICD-10-CM

## 2019-10-06 DIAGNOSIS — R26.81 UNSTEADY GAIT: Primary | ICD-10-CM

## 2019-10-06 DIAGNOSIS — E87.5 HYPERKALEMIA: ICD-10-CM

## 2019-10-06 DIAGNOSIS — R55 SYNCOPE, UNSPECIFIED SYNCOPE TYPE: ICD-10-CM

## 2019-10-06 DIAGNOSIS — R53.1 WEAKNESS: ICD-10-CM

## 2019-10-06 DIAGNOSIS — R11.2 NAUSEA AND VOMITING, INTRACTABILITY OF VOMITING NOT SPECIFIED, UNSPECIFIED VOMITING TYPE: ICD-10-CM

## 2019-10-06 LAB
ALBUMIN SERPL BCP-MCNC: 4.2 G/DL (ref 3.5–5.2)
ALP SERPL-CCNC: 84 U/L (ref 55–135)
ALT SERPL W/O P-5'-P-CCNC: 21 U/L (ref 10–44)
ANION GAP SERPL CALC-SCNC: 7 MMOL/L (ref 8–16)
ANION GAP SERPL CALC-SCNC: 8 MMOL/L (ref 8–16)
AST SERPL-CCNC: 20 U/L (ref 10–40)
B-OH-BUTYR BLD STRIP-SCNC: 0.1 MMOL/L (ref 0–0.5)
BACTERIA #/AREA URNS HPF: NORMAL /HPF
BASOPHILS # BLD AUTO: 0.01 K/UL (ref 0–0.2)
BASOPHILS NFR BLD: 0.1 % (ref 0–1.9)
BILIRUB SERPL-MCNC: 0.4 MG/DL (ref 0.1–1)
BILIRUB UR QL STRIP: NEGATIVE
BUN SERPL-MCNC: 33 MG/DL (ref 8–23)
BUN SERPL-MCNC: 33 MG/DL (ref 8–23)
CALCIUM SERPL-MCNC: 10 MG/DL (ref 8.7–10.5)
CALCIUM SERPL-MCNC: 9.7 MG/DL (ref 8.7–10.5)
CHLORIDE SERPL-SCNC: 116 MMOL/L (ref 95–110)
CHLORIDE SERPL-SCNC: 118 MMOL/L (ref 95–110)
CHLORIDE UR-SCNC: 51 MMOL/L (ref 25–200)
CLARITY UR: CLEAR
CO2 SERPL-SCNC: 14 MMOL/L (ref 23–29)
CO2 SERPL-SCNC: 17 MMOL/L (ref 23–29)
COLOR UR: YELLOW
CREAT SERPL-MCNC: 1.7 MG/DL (ref 0.5–1.4)
CREAT SERPL-MCNC: 1.9 MG/DL (ref 0.5–1.4)
DIFFERENTIAL METHOD: ABNORMAL
EOSINOPHIL # BLD AUTO: 0 K/UL (ref 0–0.5)
EOSINOPHIL NFR BLD: 0.4 % (ref 0–8)
ERYTHROCYTE [DISTWIDTH] IN BLOOD BY AUTOMATED COUNT: 14.9 % (ref 11.5–14.5)
EST. GFR  (AFRICAN AMERICAN): 37 ML/MIN/1.73 M^2
EST. GFR  (AFRICAN AMERICAN): 42 ML/MIN/1.73 M^2
EST. GFR  (NON AFRICAN AMERICAN): 32 ML/MIN/1.73 M^2
EST. GFR  (NON AFRICAN AMERICAN): 36 ML/MIN/1.73 M^2
GLUCOSE SERPL-MCNC: 104 MG/DL (ref 70–110)
GLUCOSE SERPL-MCNC: 146 MG/DL (ref 70–110)
GLUCOSE UR QL STRIP: NEGATIVE
HCT VFR BLD AUTO: 37.2 % (ref 40–54)
HGB BLD-MCNC: 11.6 G/DL (ref 14–18)
HGB UR QL STRIP: NEGATIVE
KETONES UR QL STRIP: NEGATIVE
LACTATE SERPL-SCNC: 2.4 MMOL/L (ref 0.5–2.2)
LDH SERPL L TO P-CCNC: 340 U/L (ref 110–260)
LEUKOCYTE ESTERASE UR QL STRIP: ABNORMAL
LYMPHOCYTES # BLD AUTO: 1 K/UL (ref 1–4.8)
LYMPHOCYTES NFR BLD: 12 % (ref 18–48)
MCH RBC QN AUTO: 28.9 PG (ref 27–31)
MCHC RBC AUTO-ENTMCNC: 31.2 G/DL (ref 32–36)
MCV RBC AUTO: 93 FL (ref 82–98)
MICROSCOPIC COMMENT: NORMAL
MONOCYTES # BLD AUTO: 0.6 K/UL (ref 0.3–1)
MONOCYTES NFR BLD: 6.8 % (ref 4–15)
NEUTROPHILS # BLD AUTO: 6.7 K/UL (ref 1.8–7.7)
NEUTROPHILS NFR BLD: 80.7 % (ref 38–73)
NITRITE UR QL STRIP: NEGATIVE
PH UR STRIP: 5 [PH] (ref 5–8)
PLATELET # BLD AUTO: 213 K/UL (ref 150–350)
PMV BLD AUTO: 10.1 FL (ref 9.2–12.9)
POCT GLUCOSE: 132 MG/DL (ref 70–110)
POCT GLUCOSE: 134 MG/DL (ref 70–110)
POCT GLUCOSE: 208 MG/DL (ref 70–110)
POTASSIUM SERPL-SCNC: 5.8 MMOL/L (ref 3.5–5.1)
POTASSIUM SERPL-SCNC: 6.6 MMOL/L (ref 3.5–5.1)
POTASSIUM UR-SCNC: 38 MMOL/L (ref 15–95)
PROT SERPL-MCNC: 7.6 G/DL (ref 6–8.4)
PROT UR QL STRIP: NEGATIVE
RBC # BLD AUTO: 4.01 M/UL (ref 4.6–6.2)
SODIUM SERPL-SCNC: 138 MMOL/L (ref 136–145)
SODIUM SERPL-SCNC: 142 MMOL/L (ref 136–145)
SODIUM UR-SCNC: 51 MMOL/L (ref 20–250)
SP GR UR STRIP: 1.01 (ref 1–1.03)
SQUAMOUS #/AREA URNS HPF: 2 /HPF
TROPONIN I SERPL DL<=0.01 NG/ML-MCNC: 0.01 NG/ML (ref 0–0.03)
TSH SERPL DL<=0.005 MIU/L-ACNC: 2.21 UIU/ML (ref 0.4–4)
URATE SERPL-MCNC: 3.1 MG/DL (ref 3.4–7)
URN SPEC COLLECT METH UR: ABNORMAL
UROBILINOGEN UR STRIP-ACNC: NEGATIVE EU/DL
WBC # BLD AUTO: 8.33 K/UL (ref 3.9–12.7)
WBC #/AREA URNS HPF: 5 /HPF (ref 0–5)

## 2019-10-06 PROCEDURE — 25000003 PHARM REV CODE 250: Performed by: EMERGENCY MEDICINE

## 2019-10-06 PROCEDURE — 82962 GLUCOSE BLOOD TEST: CPT

## 2019-10-06 PROCEDURE — 93010 EKG 12-LEAD: ICD-10-PCS | Mod: ,,, | Performed by: INTERNAL MEDICINE

## 2019-10-06 PROCEDURE — 63600175 PHARM REV CODE 636 W HCPCS: Performed by: EMERGENCY MEDICINE

## 2019-10-06 PROCEDURE — 84550 ASSAY OF BLOOD/URIC ACID: CPT

## 2019-10-06 PROCEDURE — 84300 ASSAY OF URINE SODIUM: CPT

## 2019-10-06 PROCEDURE — 93010 ELECTROCARDIOGRAM REPORT: CPT | Mod: ,,, | Performed by: INTERNAL MEDICINE

## 2019-10-06 PROCEDURE — 93005 ELECTROCARDIOGRAM TRACING: CPT

## 2019-10-06 PROCEDURE — 84153 ASSAY OF PSA TOTAL: CPT

## 2019-10-06 PROCEDURE — 80048 BASIC METABOLIC PNL TOTAL CA: CPT

## 2019-10-06 PROCEDURE — 21400001 HC TELEMETRY ROOM

## 2019-10-06 PROCEDURE — 82378 CARCINOEMBRYONIC ANTIGEN: CPT

## 2019-10-06 PROCEDURE — 84484 ASSAY OF TROPONIN QUANT: CPT

## 2019-10-06 PROCEDURE — 83605 ASSAY OF LACTIC ACID: CPT

## 2019-10-06 PROCEDURE — 82436 ASSAY OF URINE CHLORIDE: CPT

## 2019-10-06 PROCEDURE — 81000 URINALYSIS NONAUTO W/SCOPE: CPT

## 2019-10-06 PROCEDURE — 84133 ASSAY OF URINE POTASSIUM: CPT

## 2019-10-06 PROCEDURE — 84443 ASSAY THYROID STIM HORMONE: CPT

## 2019-10-06 PROCEDURE — 80053 COMPREHEN METABOLIC PANEL: CPT

## 2019-10-06 PROCEDURE — 85025 COMPLETE CBC W/AUTO DIFF WBC: CPT

## 2019-10-06 PROCEDURE — 96360 HYDRATION IV INFUSION INIT: CPT

## 2019-10-06 PROCEDURE — 36415 COLL VENOUS BLD VENIPUNCTURE: CPT

## 2019-10-06 PROCEDURE — 83036 HEMOGLOBIN GLYCOSYLATED A1C: CPT

## 2019-10-06 PROCEDURE — 82306 VITAMIN D 25 HYDROXY: CPT

## 2019-10-06 PROCEDURE — 94761 N-INVAS EAR/PLS OXIMETRY MLT: CPT

## 2019-10-06 PROCEDURE — 83615 LACTATE (LD) (LDH) ENZYME: CPT

## 2019-10-06 PROCEDURE — 63600175 PHARM REV CODE 636 W HCPCS: Performed by: INTERNAL MEDICINE

## 2019-10-06 PROCEDURE — 82010 KETONE BODYS QUAN: CPT

## 2019-10-06 PROCEDURE — 99291 CRITICAL CARE FIRST HOUR: CPT | Mod: 25

## 2019-10-06 PROCEDURE — 25000003 PHARM REV CODE 250: Performed by: INTERNAL MEDICINE

## 2019-10-06 RX ORDER — GLUCAGON 1 MG
1 KIT INJECTION
Status: DISCONTINUED | OUTPATIENT
Start: 2019-10-06 | End: 2019-10-10 | Stop reason: HOSPADM

## 2019-10-06 RX ORDER — ENOXAPARIN SODIUM 100 MG/ML
30 INJECTION SUBCUTANEOUS EVERY 24 HOURS
Status: DISCONTINUED | OUTPATIENT
Start: 2019-10-06 | End: 2019-10-10 | Stop reason: HOSPADM

## 2019-10-06 RX ORDER — IBUPROFEN 200 MG
24 TABLET ORAL
Status: DISCONTINUED | OUTPATIENT
Start: 2019-10-06 | End: 2019-10-10 | Stop reason: HOSPADM

## 2019-10-06 RX ORDER — GABAPENTIN 100 MG/1
100 CAPSULE ORAL 3 TIMES DAILY
Status: DISCONTINUED | OUTPATIENT
Start: 2019-10-06 | End: 2019-10-10 | Stop reason: HOSPADM

## 2019-10-06 RX ORDER — SODIUM BICARBONATE 325 MG/1
650 TABLET ORAL 2 TIMES DAILY
Status: DISCONTINUED | OUTPATIENT
Start: 2019-10-06 | End: 2019-10-07

## 2019-10-06 RX ORDER — IBUPROFEN 200 MG
16 TABLET ORAL
Status: DISCONTINUED | OUTPATIENT
Start: 2019-10-06 | End: 2019-10-10 | Stop reason: HOSPADM

## 2019-10-06 RX ORDER — SODIUM CHLORIDE, SODIUM LACTATE, POTASSIUM CHLORIDE, CALCIUM CHLORIDE 600; 310; 30; 20 MG/100ML; MG/100ML; MG/100ML; MG/100ML
1000 INJECTION, SOLUTION INTRAVENOUS CONTINUOUS
Status: DISCONTINUED | OUTPATIENT
Start: 2019-10-06 | End: 2019-10-06

## 2019-10-06 RX ORDER — MIRTAZAPINE 15 MG/1
15 TABLET, FILM COATED ORAL NIGHTLY
Status: DISCONTINUED | OUTPATIENT
Start: 2019-10-06 | End: 2019-10-10 | Stop reason: HOSPADM

## 2019-10-06 RX ORDER — ONDANSETRON 2 MG/ML
4 INJECTION INTRAMUSCULAR; INTRAVENOUS EVERY 6 HOURS PRN
Status: DISCONTINUED | OUTPATIENT
Start: 2019-10-06 | End: 2019-10-10 | Stop reason: HOSPADM

## 2019-10-06 RX ORDER — FERROUS GLUCONATE 324(37.5)
324 TABLET ORAL
Status: DISCONTINUED | OUTPATIENT
Start: 2019-10-07 | End: 2019-10-10 | Stop reason: HOSPADM

## 2019-10-06 RX ORDER — DEXTROSE 50 % IN WATER (D50W) INTRAVENOUS SYRINGE
25
Status: COMPLETED | OUTPATIENT
Start: 2019-10-06 | End: 2019-10-06

## 2019-10-06 RX ORDER — GABAPENTIN 300 MG/1
300 CAPSULE ORAL 3 TIMES DAILY
Status: DISCONTINUED | OUTPATIENT
Start: 2019-10-06 | End: 2019-10-06

## 2019-10-06 RX ORDER — SODIUM CHLORIDE 9 MG/ML
INJECTION, SOLUTION INTRAVENOUS CONTINUOUS
Status: DISCONTINUED | OUTPATIENT
Start: 2019-10-06 | End: 2019-10-09

## 2019-10-06 RX ORDER — AMOXICILLIN 250 MG
1 CAPSULE ORAL 2 TIMES DAILY
Status: DISCONTINUED | OUTPATIENT
Start: 2019-10-06 | End: 2019-10-10 | Stop reason: HOSPADM

## 2019-10-06 RX ORDER — INSULIN ASPART 100 [IU]/ML
0-5 INJECTION, SOLUTION INTRAVENOUS; SUBCUTANEOUS
Status: DISCONTINUED | OUTPATIENT
Start: 2019-10-06 | End: 2019-10-10 | Stop reason: HOSPADM

## 2019-10-06 RX ORDER — ACETAMINOPHEN 325 MG/1
650 TABLET ORAL EVERY 6 HOURS PRN
Status: DISCONTINUED | OUTPATIENT
Start: 2019-10-06 | End: 2019-10-10 | Stop reason: HOSPADM

## 2019-10-06 RX ORDER — INDOMETHACIN 25 MG/1
50 CAPSULE ORAL
Status: COMPLETED | OUTPATIENT
Start: 2019-10-06 | End: 2019-10-06

## 2019-10-06 RX ORDER — DULOXETIN HYDROCHLORIDE 30 MG/1
30 CAPSULE, DELAYED RELEASE ORAL 2 TIMES DAILY
Status: DISCONTINUED | OUTPATIENT
Start: 2019-10-06 | End: 2019-10-10 | Stop reason: HOSPADM

## 2019-10-06 RX ORDER — ACETAMINOPHEN 325 MG/1
650 TABLET ORAL EVERY 6 HOURS PRN
Status: DISCONTINUED | OUTPATIENT
Start: 2019-10-06 | End: 2019-10-06

## 2019-10-06 RX ORDER — SODIUM CHLORIDE 0.9 % (FLUSH) 0.9 %
10 SYRINGE (ML) INJECTION
Status: DISCONTINUED | OUTPATIENT
Start: 2019-10-06 | End: 2019-10-10 | Stop reason: HOSPADM

## 2019-10-06 RX ADMIN — SODIUM BICARBONATE 50 MEQ: 84 INJECTION, SOLUTION INTRAVENOUS at 04:10

## 2019-10-06 RX ADMIN — SODIUM CHLORIDE, SODIUM LACTATE, POTASSIUM CHLORIDE, AND CALCIUM CHLORIDE 1000 ML: .6; .31; .03; .02 INJECTION, SOLUTION INTRAVENOUS at 05:10

## 2019-10-06 RX ADMIN — ENOXAPARIN SODIUM 30 MG: 100 INJECTION SUBCUTANEOUS at 10:10

## 2019-10-06 RX ADMIN — SODIUM CHLORIDE, SODIUM LACTATE, POTASSIUM CHLORIDE, AND CALCIUM CHLORIDE 1000 ML: .6; .31; .03; .02 INJECTION, SOLUTION INTRAVENOUS at 06:10

## 2019-10-06 RX ADMIN — SODIUM CHLORIDE 1000 ML: 0.9 INJECTION, SOLUTION INTRAVENOUS at 03:10

## 2019-10-06 RX ADMIN — GABAPENTIN 100 MG: 100 CAPSULE ORAL at 10:10

## 2019-10-06 RX ADMIN — Medication 25 G: at 04:10

## 2019-10-06 RX ADMIN — MIRTAZAPINE 15 MG: 15 TABLET, FILM COATED ORAL at 10:10

## 2019-10-06 RX ADMIN — DULOXETINE 30 MG: 30 CAPSULE, DELAYED RELEASE ORAL at 10:10

## 2019-10-06 RX ADMIN — SODIUM POLYSTYRENE SULFONATE 30 G: 15 SUSPENSION ORAL; RECTAL at 04:10

## 2019-10-06 RX ADMIN — SODIUM CHLORIDE: 0.9 INJECTION, SOLUTION INTRAVENOUS at 10:10

## 2019-10-06 RX ADMIN — SODIUM BICARBONATE 650 MG: 325 TABLET ORAL at 10:10

## 2019-10-06 RX ADMIN — INSULIN HUMAN 6 UNITS: 100 INJECTION, SOLUTION PARENTERAL at 04:10

## 2019-10-06 NOTE — H&P
[facility] Hospital Medicine  History & Physical    Patient Name: Eugene Gamez  MRN: [id number]  Admission Date: 10/6/2019  Attending Physician: Ladarius Bey MD  Primary Care Provider: Larry Monae MD    Patient information was obtained from patient, relative(s), past medical records and ER records.     Subjective:     Principal Problem:Hyperkalemia    Chief Complaint:   Chief Complaint   Patient presents with    Fatigue     Pt reports feeling weak for the past three days.         HPI: 85 yo male with hx of stage IV colon cancer (s/p chemo and surgery now with colostomy bag, followed by Dr. Vimal Pillai), CKD stage 3, HTN, DM type 2,  presenting to ED with progressively worsening generalized weakness for the past several days with associated PEREZ and dizziness. Family noticed significant fatigue today prompted ED visit. Family or patient denies any fevers, chills, syncope, LOC, trauma, fall, cp, cough, abdominal pan, N/V, diarrhea, constipation, URI, dysuria, bleeds, rash etc. No recent travel or sick contacts. He is hard of hearing.    In the ED patient was HDS and afebrile. Complaining of generalized weakness and dyspnea. Saturating well on RA. Labs significant for K 6.6, bicab 14, AG 8, Albumin 4, LA 2.2, Cr 1.9 (baseline around 1.5). Trop wnl. EKG fairly unremarkable. CXR wnl. Nephrology and oncology consulted.  requested for admission for hyperkalemia.    Past Medical History:   Diagnosis Date    Arthritis     Cancer     Diabetes mellitus     Elevated PSA     Gout, unspecified     Hypertension     Nuclear sclerotic cataract of both eyes 6/29/2018       Past Surgical History:   Procedure Laterality Date    CATARACT EXTRACTION      ou    EYE SURGERY      bilateral cataracts    removal of small bowel  Apr. 2015    Colostomy       Review of patient's allergies indicates:   Allergen Reactions    Shellfish containing products      Causes gout to flare up       Current  Facility-Administered Medications on File Prior to Encounter   Medication    ondansetron HCl (PF) 4 mg/2 mL injection     Current Outpatient Medications on File Prior to Encounter   Medication Sig    allopurinol (ZYLOPRIM) 300 MG tablet Take 1 tablet (300 mg total) by mouth once daily.    benazepril (LOTENSIN) 40 MG tablet Take 1 tablet (40 mg total) by mouth once daily.    blood sugar diagnostic (ONE TOUCH ULTRA TEST) Strp 1 each by Misc.(Non-Drug; Combo Route) route once daily.    cholestyramine (QUESTRAN) 4 gram packet Take 1 packet (4 g total) by mouth 3 (three) times daily with meals.    colchicine (COLCRYS) 0.6 mg tablet Take 1 tablet (0.6 mg total) by mouth daily as needed.    DULoxetine (CYMBALTA) 30 MG capsule Take 1 capsule (30 mg total) by mouth 2 (two) times daily.    ferrous gluconate (FERGON) 324 MG tablet Take 1 tablet (324 mg total) by mouth daily with breakfast.    ferrous sulfate 324 mg (65 mg iron) TbEC Take 325 mg by mouth once daily.    gabapentin (NEURONTIN) 300 MG capsule Take 1 capsule (300 mg total) by mouth 3 (three) times daily.    indomethacin (INDOCIN) 50 MG capsule Take 1 capsule (50 mg total) by mouth 3 (three) times daily as needed. Take for Gout attacks    loratadine (CLARITIN) 10 mg tablet Take 10 mg by mouth once daily.    mirtazapine (REMERON) 15 MG tablet TAKE 1 TABLET (15 MG TOTAL) BY MOUTH NIGHTLY.    multivitamin capsule Take 1 capsule by mouth once daily.    penicillin v potassium (VEETID) 500 MG tablet Take 1 tablet (500 mg total) by mouth 4 (four) times daily.    prochlorperazine (COMPAZINE) 10 MG tablet TAKE 1 TABLET (10 MG TOTAL) BY MOUTH EVERY 6 (SIX) HOURS AS NEEDED.    blood-glucose meter (ONE TOUCH ULTRAMINI) kit To test twice daily. Use as instructed    sodium bicarbonate 650 MG tablet Take 1 tablet (650 mg total) by mouth 2 (two) times daily.    tadalafil (CIALIS) 10 MG tablet Take 1 tablet (10 mg total) by mouth daily as needed for Erectile  Dysfunction.     Family History     Problem Relation (Age of Onset)    Diabetes Mother    Hypertension Mother    No Known Problems Father, Sister, Brother, Maternal Aunt, Maternal Uncle, Paternal Aunt, Paternal Uncle, Maternal Grandmother, Maternal Grandfather, Paternal Grandmother, Paternal Grandfather        Tobacco Use    Smoking status: Never Smoker    Smokeless tobacco: Never Used    Tobacco comment: smoked short while as a teen   Substance and Sexual Activity    Alcohol use: No    Drug use: No    Sexual activity: Not on file     Review of Systems   Constitutional: Positive for activity change and fatigue. Negative for chills, diaphoresis and fever.   HENT: Negative.    Eyes: Negative.    Respiratory: Positive for shortness of breath.    Cardiovascular: Negative.    Gastrointestinal: Negative.    Endocrine: Negative.    Genitourinary: Negative.    Musculoskeletal: Negative.    Skin: Negative.    Neurological: Positive for dizziness, weakness (global) and light-headedness.   Hematological: Negative.    Psychiatric/Behavioral: Negative.      Objective:     Vital Signs (Most Recent):  Temp: 98 °F (36.7 °C) (10/06/19 1732)  Pulse: 84 (10/06/19 1732)  Resp: (!) 26 (10/06/19 1732)  BP: (!) 167/79 (10/06/19 1732)  SpO2: 100 % (10/06/19 1732) Vital Signs (24h Range):  Temp:  [98 °F (36.7 °C)-98.1 °F (36.7 °C)] 98 °F (36.7 °C)  Pulse:  [59-84] 84  Resp:  [15-26] 26  SpO2:  [97 %-100 %] 100 %  BP: (101-167)/(56-79) 167/79     Weight: 88.2 kg (194 lb 7.1 oz)  Body mass index is 24.97 kg/m².    Physical Exam   Constitutional: He is oriented to person, place, and time. He appears well-developed and well-nourished. No distress.   HENT:   Head: Normocephalic and atraumatic.   Mouth/Throat: No oropharyngeal exudate.   [place]   Eyes: Pupils are equal, round, and reactive to light. Conjunctivae and EOM are normal. No scleral icterus.   Neck: Normal range of motion. Neck supple. No JVD present.   Cardiovascular: Normal rate  and regular rhythm.   Pulmonary/Chest: Effort normal and breath sounds normal.   Abdominal: Soft. Bowel sounds are normal.   Colostomy bag with solid stool   Musculoskeletal: Normal range of motion. He exhibits no edema, tenderness or deformity.   Neurological: He is alert and oriented to person, place, and time.   Skin: Skin is warm and dry. Capillary refill takes less than 2 seconds. No rash noted. He is not diaphoretic. No erythema. No pallor.   Psychiatric: He has a normal mood and affect. His behavior is normal. Judgment and thought content normal.   Nursing note and vitals reviewed.        CRANIAL NERVES     CN III, IV, VI   Pupils are equal, round, and reactive to light.  Extraocular motions are normal.        Significant Labs: All pertinent labs within the past 24 hours have been reviewed.    Significant Imaging: I have reviewed and interpreted all pertinent imaging results/findings within the past 24 hours.    Assessment/Plan:     * Hyperkalemia  Presenting with generalized weakness found to have potassium of 6.6. EKG wo any ischemic changes or peaked T waves. Denies any potassium supplement use at home or trauma. Labs also noting NAGMA. ?RTA type 4. Nephrology consulted. Treated in the ED with insulin, dextrose, fluids, bicarb and kayexalate. Repeat BMP tn.    CKD (chronic kidney disease) stage 3, GFR 30-59 ml/min  Noted. Mild renal insufficieny noted with hyperkalemia. Consult nephrology. Fluids. Avoid nephrotoxic agents.    Metabolic acidosis, normal anion gap (NAG)  AG 8 with bicarb of 14. Albumin wnl. NAGMA. ?RTA. Will get urine gap. Bicarb tabs.    Stage IV carcinoma of colon  Hx of stage IV colon cancer s/p chemo surgery now with colostomy bag.  Consult oncology    Type 2 diabetes mellitus, controlled  Noted. Hold home meds. SSI and accuchecks with hypoglycemia protocol.      VTE Risk Mitigation (From admission, onward)         Ordered     enoxaparin injection 30 mg  Daily      10/06/19 1811     IP  VTE HIGH RISK PATIENT  Once      10/06/19 1811     Place ARI hose  Until discontinued      10/06/19 1811                   Ladarius Bey MD  Department of Hospital Medicine   [facility]

## 2019-10-06 NOTE — SUBJECTIVE & OBJECTIVE
Past Medical History:   Diagnosis Date    Arthritis     Cancer     Diabetes mellitus     Elevated PSA     Gout, unspecified     Hypertension     Nuclear sclerotic cataract of both eyes 6/29/2018       Past Surgical History:   Procedure Laterality Date    CATARACT EXTRACTION      ou    EYE SURGERY      bilateral cataracts    removal of small bowel  Apr. 2015    Colostomy       Review of patient's allergies indicates:   Allergen Reactions    Shellfish containing products      Causes gout to flare up       Current Facility-Administered Medications on File Prior to Encounter   Medication    ondansetron HCl (PF) 4 mg/2 mL injection     Current Outpatient Medications on File Prior to Encounter   Medication Sig    allopurinol (ZYLOPRIM) 300 MG tablet Take 1 tablet (300 mg total) by mouth once daily.    benazepril (LOTENSIN) 40 MG tablet Take 1 tablet (40 mg total) by mouth once daily.    blood sugar diagnostic (ONE TOUCH ULTRA TEST) Strp 1 each by Misc.(Non-Drug; Combo Route) route once daily.    cholestyramine (QUESTRAN) 4 gram packet Take 1 packet (4 g total) by mouth 3 (three) times daily with meals.    colchicine (COLCRYS) 0.6 mg tablet Take 1 tablet (0.6 mg total) by mouth daily as needed.    DULoxetine (CYMBALTA) 30 MG capsule Take 1 capsule (30 mg total) by mouth 2 (two) times daily.    ferrous gluconate (FERGON) 324 MG tablet Take 1 tablet (324 mg total) by mouth daily with breakfast.    ferrous sulfate 324 mg (65 mg iron) TbEC Take 325 mg by mouth once daily.    gabapentin (NEURONTIN) 300 MG capsule Take 1 capsule (300 mg total) by mouth 3 (three) times daily.    indomethacin (INDOCIN) 50 MG capsule Take 1 capsule (50 mg total) by mouth 3 (three) times daily as needed. Take for Gout attacks    loratadine (CLARITIN) 10 mg tablet Take 10 mg by mouth once daily.    mirtazapine (REMERON) 15 MG tablet TAKE 1 TABLET (15 MG TOTAL) BY MOUTH NIGHTLY.    multivitamin capsule Take 1 capsule by mouth  once daily.    penicillin v potassium (VEETID) 500 MG tablet Take 1 tablet (500 mg total) by mouth 4 (four) times daily.    prochlorperazine (COMPAZINE) 10 MG tablet TAKE 1 TABLET (10 MG TOTAL) BY MOUTH EVERY 6 (SIX) HOURS AS NEEDED.    blood-glucose meter (ONE TOUCH ULTRAMINI) kit To test twice daily. Use as instructed    sodium bicarbonate 650 MG tablet Take 1 tablet (650 mg total) by mouth 2 (two) times daily.    tadalafil (CIALIS) 10 MG tablet Take 1 tablet (10 mg total) by mouth daily as needed for Erectile Dysfunction.     Family History     Problem Relation (Age of Onset)    Diabetes Mother    Hypertension Mother    No Known Problems Father, Sister, Brother, Maternal Aunt, Maternal Uncle, Paternal Aunt, Paternal Uncle, Maternal Grandmother, Maternal Grandfather, Paternal Grandmother, Paternal Grandfather        Tobacco Use    Smoking status: Never Smoker    Smokeless tobacco: Never Used    Tobacco comment: smoked short while as a teen   Substance and Sexual Activity    Alcohol use: No    Drug use: No    Sexual activity: Not on file     Review of Systems   Constitutional: Positive for activity change and fatigue. Negative for chills, diaphoresis and fever.   HENT: Negative.    Eyes: Negative.    Respiratory: Positive for shortness of breath.    Cardiovascular: Negative.    Gastrointestinal: Negative.    Endocrine: Negative.    Genitourinary: Negative.    Musculoskeletal: Negative.    Skin: Negative.    Neurological: Positive for dizziness, weakness (global) and light-headedness.   Hematological: Negative.    Psychiatric/Behavioral: Negative.      Objective:     Vital Signs (Most Recent):  Temp: 98 °F (36.7 °C) (10/06/19 1732)  Pulse: 84 (10/06/19 1732)  Resp: (!) 26 (10/06/19 1732)  BP: (!) 167/79 (10/06/19 1732)  SpO2: 100 % (10/06/19 1732) Vital Signs (24h Range):  Temp:  [98 °F (36.7 °C)-98.1 °F (36.7 °C)] 98 °F (36.7 °C)  Pulse:  [59-84] 84  Resp:  [15-26] 26  SpO2:  [97 %-100 %] 100 %  BP:  (101-167)/(56-79) 167/79     Weight: 88.2 kg (194 lb 7.1 oz)  Body mass index is 24.97 kg/m².    Physical Exam   Constitutional: He is oriented to person, place, and time. He appears well-developed and well-nourished. No distress.   HENT:   Head: Normocephalic and atraumatic.   Mouth/Throat: No oropharyngeal exudate.   [place]   Eyes: Pupils are equal, round, and reactive to light. Conjunctivae and EOM are normal. No scleral icterus.   Neck: Normal range of motion. Neck supple. No JVD present.   Cardiovascular: Normal rate and regular rhythm.   Pulmonary/Chest: Effort normal and breath sounds normal.   Abdominal: Soft. Bowel sounds are normal.   Colostomy bag with solid stool   Musculoskeletal: Normal range of motion. He exhibits no edema, tenderness or deformity.   Neurological: He is alert and oriented to person, place, and time.   Skin: Skin is warm and dry. Capillary refill takes less than 2 seconds. No rash noted. He is not diaphoretic. No erythema. No pallor.   Psychiatric: He has a normal mood and affect. His behavior is normal. Judgment and thought content normal.   Nursing note and vitals reviewed.        CRANIAL NERVES     CN III, IV, VI   Pupils are equal, round, and reactive to light.  Extraocular motions are normal.        Significant Labs: All pertinent labs within the past 24 hours have been reviewed.    Significant Imaging: I have reviewed and interpreted all pertinent imaging results/findings within the past 24 hours.

## 2019-10-06 NOTE — NURSING
Received patient from ER to room via stretcher. Patient accompanied by transport and daughter Nicol. Pt stood and took a step to the hospital bed, standby assist. Evaluated general patient appearance and condition. Admit assessment initiated. Tele monitoring initiated. IVF initiated to R hand IV, intact. Pt. No c/o pain. Dinner tray delivered to room.  No apparent distress noted at this time.

## 2019-10-06 NOTE — ASSESSMENT & PLAN NOTE
Noted. Mild renal insufficieny noted with hyperkalemia. Consult nephrology. Fluids. Avoid nephrotoxic agents.

## 2019-10-06 NOTE — HPI
85 yo male with hx of stage IV colon cancer (s/p chemo and surgery now with colostomy bag, followed by Dr. Vimal Pillai), CKD stage 3, HTN, DM type 2,  presenting to ED with progressively worsening generalized weakness for the past several days with associated PEREZ and dizziness. Family noticed significant fatigue today prompted ED visit. Family or patient denies any fevers, chills, syncope, LOC, trauma, fall, cp, cough, abdominal pan, N/V, diarrhea, constipation, URI, dysuria, bleeds, rash etc. No recent travel or sick contacts. He is hard of hearing.    In the ED patient was HDS and afebrile. Complaining of generalized weakness and dyspnea. Saturating well on RA. Labs significant for K 6.6, bicab 14, AG 8, Albumin 4, LA 2.2, Cr 1.9 (baseline around 1.5). Trop wnl. EKG fairly unremarkable. CXR wnl. Nephrology and oncology consulted.  requested for admission for hyperkalemia.

## 2019-10-06 NOTE — ASSESSMENT & PLAN NOTE
Presenting with generalized weakness found to have potassium of 6.6. EKG wo any ischemic changes or peaked T waves. Denies any potassium supplement use at home or trauma. Labs also noting NAGMA. ?RTA type 4. Nephrology consulted. Treated in the ED with insulin, dextrose, fluids, bicarb and kayexalate. Repeat BMP tn.

## 2019-10-06 NOTE — ED PROVIDER NOTES
Encounter Date: 10/6/2019    SCRIBE #1 NOTE: I, Poornima Decker, am scribing for, and in the presence of,  Romaine Andersen Jr., MD. I have scribed the following portions of the note - Other sections scribed: HPI,ROS.       History     Chief Complaint   Patient presents with    Fatigue     Pt reports feeling weak for the past three days.      CC: Fatigue    HPI: This is a 84 y.o.male patient, with a PMHx of DM, HTN, and Arthritis, presenting to the ED, via EMS, with a complaint of fatigue, that began x4 days ago and worsened today. Patient also reports pain to his intrascapular muscles. Patient reports associated generalized body weakness, dizziness, and shortness of breath. Per family member, patient was unreponsive for a few minutes when his name was called. No recent fall or injury. Patient denies any fever, chest pain, abdominal pain, rash, headaches, congestion, nausea, vomiting, diarrhea, joint swelling, or any other associated symptoms. Denies simialar symptoms in the past. No prior Tx. Allergic to Shellfish. No new use of medications.      The history is provided by the patient, the EMS personnel and a relative.     Review of patient's allergies indicates:   Allergen Reactions    Shellfish containing products      Causes gout to flare up     Past Medical History:   Diagnosis Date    Arthritis     Cancer     Diabetes mellitus     Elevated PSA     Gout, unspecified     Hypertension     Nuclear sclerotic cataract of both eyes 6/29/2018     Past Surgical History:   Procedure Laterality Date    CATARACT EXTRACTION      ou    EYE SURGERY      bilateral cataracts    removal of small bowel  Apr. 2015    Colostomy     Family History   Problem Relation Age of Onset    Hypertension Mother     Diabetes Mother     No Known Problems Father     No Known Problems Sister     No Known Problems Brother     No Known Problems Maternal Aunt     No Known Problems Maternal Uncle     No Known Problems Paternal  Aunt     No Known Problems Paternal Uncle     No Known Problems Maternal Grandmother     No Known Problems Maternal Grandfather     No Known Problems Paternal Grandmother     No Known Problems Paternal Grandfather     Amblyopia Neg Hx     Blindness Neg Hx     Cancer Neg Hx     Cataracts Neg Hx     Glaucoma Neg Hx     Macular degeneration Neg Hx     Retinal detachment Neg Hx     Strabismus Neg Hx     Stroke Neg Hx     Thyroid disease Neg Hx      Social History     Tobacco Use    Smoking status: Never Smoker    Smokeless tobacco: Never Used    Tobacco comment: smoked short while as a teen   Substance Use Topics    Alcohol use: No    Drug use: No     Review of Systems   Constitutional: Positive for fatigue. Negative for chills and fever.   HENT: Negative for congestion, ear pain, rhinorrhea and sore throat.    Eyes: Negative for pain and visual disturbance.   Respiratory: Positive for shortness of breath. Negative for cough.    Cardiovascular: Negative for chest pain.   Gastrointestinal: Negative for abdominal pain, diarrhea, nausea and vomiting.   Genitourinary: Negative for dysuria.   Musculoskeletal: Positive for arthralgias and myalgias. Negative for back pain, joint swelling and neck pain.   Skin: Negative for rash.   Neurological: Positive for dizziness and weakness (generalized body weakness). Negative for headaches.       Physical Exam     Initial Vitals [10/06/19 1410]   BP Pulse Resp Temp SpO2   127/63 80 15 98.1 °F (36.7 °C) 97 %      MAP       --         Physical Exam    Nursing note and vitals reviewed.  Constitutional: He appears well-developed and well-nourished. He is not diaphoretic. No distress.   HENT:   Head: Normocephalic and atraumatic.   Right Ear: External ear normal.   Left Ear: External ear normal.   Nose: Nose normal.   Mouth/Throat: Oropharynx is clear and moist.   Eyes: Conjunctivae and EOM are normal. Pupils are equal, round, and reactive to light. Right eye exhibits no  discharge. Left eye exhibits no discharge. No scleral icterus.   Neck: Normal range of motion. Neck supple. No JVD present.   Cardiovascular: Normal rate, regular rhythm, normal heart sounds and intact distal pulses. Exam reveals no gallop and no friction rub.    No murmur heard.  Pulmonary/Chest: Breath sounds normal. No stridor. No respiratory distress. He has no wheezes. He has no rhonchi. He has no rales. He exhibits no tenderness.   Abdominal: Soft. Bowel sounds are normal. He exhibits no distension and no mass. There is no tenderness. There is no rebound and no guarding.   Colostomy bag in place.  Brown stool in the bag.   Musculoskeletal: Normal range of motion. He exhibits no edema or tenderness.   Neurological: He is alert and oriented to person, place, and time. He has normal strength. No cranial nerve deficit or sensory deficit.   Skin: Skin is warm and dry. No rash noted. No erythema. No pallor.   Psychiatric: He has a normal mood and affect. His behavior is normal. Judgment and thought content normal.         ED Course   Critical Care  Date/Time: 10/6/2019 4:57 PM  Performed by: Romaine Andersen Jr., MD  Authorized by: Romaine Andersen Jr., MD   Direct patient critical care time: 10 minutes  Additional history critical care time: 10 minutes  Ordering / reviewing critical care time: 5 minutes  Documentation critical care time: 5 minutes  Consulting other physicians critical care time: 5 minutes  Total critical care time (exclusive of procedural time) : 35 minutes  Critical care was necessary to treat or prevent imminent or life-threatening deterioration of the following conditions: metabolic crisis.  Critical care was time spent personally by me on the following activities: discussions with consultants, development of treatment plan with patient or surrogate, interpretation of cardiac output measurements, examination of patient, evaluation of patient's response to treatment, obtaining history from  patient or surrogate, ordering and performing treatments and interventions, ordering and review of radiographic studies, ordering and review of laboratory studies, pulse oximetry, re-evaluation of patient's condition and review of old charts.        Labs Reviewed   CBC W/ AUTO DIFFERENTIAL - Abnormal; Notable for the following components:       Result Value    RBC 4.01 (*)     Hemoglobin 11.6 (*)     Hematocrit 37.2 (*)     Mean Corpuscular Hemoglobin Conc 31.2 (*)     RDW 14.9 (*)     Gran% 80.7 (*)     Lymph% 12.0 (*)     All other components within normal limits   COMPREHENSIVE METABOLIC PANEL - Abnormal; Notable for the following components:    Potassium 6.6 (*)     Chloride 116 (*)     CO2 14 (*)     Glucose 146 (*)     BUN, Bld 33 (*)     Creatinine 1.9 (*)     eGFR if  37 (*)     eGFR if non  32 (*)     All other components within normal limits    Narrative:      Potassium  critical result(s) called and verbal readback obtained   from Marleny Arroyo. , 10/06/2019 16:11   URINALYSIS, REFLEX TO URINE CULTURE - Abnormal; Notable for the following components:    Leukocytes, UA Trace (*)     All other components within normal limits    Narrative:     Preferred Collection Type->Urine, Clean Catch   LACTIC ACID, PLASMA - Abnormal; Notable for the following components:    Lactate (Lactic Acid) 2.4 (*)     All other components within normal limits   POCT GLUCOSE - Abnormal; Notable for the following components:    POCT Glucose 134 (*)     All other components within normal limits   TROPONIN I   BETA - HYDROXYBUTYRATE, SERUM   URINALYSIS MICROSCOPIC    Narrative:     Preferred Collection Type->Urine, Clean Catch   POCT GLUCOSE MONITORING CONTINUOUS     EKG Readings: (Independently Interpreted)   Initial Reading: No STEMI. Ectopy: No Ectopy.   EKG reviewed and interpreted by me shows off sinus rhythm at a rate of 74.  P.r., QRS, QT intervals within normal limits. There is normal axis.   There is normal R-wave progression.  There are no ST segment or T-wave ischemic findings.         Imaging Results          X-Ray Chest AP Portable (Final result)  Result time 10/06/19 15:16:20    Final result by Juan Alberto Mack MD (10/06/19 15:16:20)                 Impression:      1. No acute cardiopulmonary process, hypoventilatory exam.      Electronically signed by: Juan Alberto Mack MD  Date:    10/06/2019  Time:    15:16             Narrative:    EXAMINATION:  XR CHEST AP PORTABLE    CLINICAL HISTORY:  Weakness    TECHNIQUE:  Single frontal view of the chest was performed.    COMPARISON:  06/29/2015    FINDINGS:  Left chest wall port catheter tip projects over the mid SVC.  There is stable elevation of the left hemidiaphragm the cardiomediastinal silhouette is within normal limits.  There is no pleural effusion.  The trachea is midline.  The lungs are symmetrically expanded bilaterally without evidence of acute parenchymal process. No large focal consolidation seen.  There is no pneumothorax.  The osseous structures are remarkable for degenerative changes..                                 Medical Decision Making:   ED Management:  This is the emergent evaluation of a 84-year-old male presents emergency department for evaluation of generalized fatigue for the past 4 days along with intermittent pain between his scapulae.  Differential diagnosis at the time of initial evaluation included, but was not limited to:  Dehydration, sepsis, pneumonia, urinary tract infection, metabolic derangement.  This patient was hypotensive upon arrival.  This is resolved with fluids.  The patient does not appear to have an infection.  He does however have metabolic acidosis with hyperkalemia.  He has had this before.  Unclear cause.  May be related to renal tubular acidosis given non-anion gap metabolic acidosis.   I will give this patient potassium shifting medications.  I discussed the case with the hospitalist also advised  maintenance fluids at 125 mL/hr in addition to 1 dose of 30 g of Kayexalate which I have ordered.  I have discussed this with the patient and family member at the bedside.  The patient will be admitted to the hospital.  He is stable at the time of admission.  I will consult Nephrology as part of the admission.  I have also placed a consult to Dr. Pillai who had been following this patient for stage IV colon cancer.  The patient states he is not getting chemotherapy at this time.  I have discontinued all medications that could cause hyperkalemia.                      Clinical Impression:       ICD-10-CM ICD-9-CM   1. Hyperkalemia E87.5 276.7   2. Weakness R53.1 780.79   3. Syncope, unspecified syncope type R55 780.2   4. Metabolic acidosis E87.2 276.2          Scribe attestation: I, Romaine Andersen MD, personally performed the services described in this documentation. All medical record entries made by the scribe were at my direction and in my presence.  I have reviewed the chart and agree that the record reflects my personal performance and is accurate and complete.                        Romaine Andersen Jr., MD  10/06/19 1657       Romaine Andersen Jr., MD  10/06/19 1736

## 2019-10-07 LAB
25(OH)D3+25(OH)D2 SERPL-MCNC: 11 NG/ML (ref 30–96)
ALBUMIN SERPL BCP-MCNC: 3.5 G/DL (ref 3.5–5.2)
ALP SERPL-CCNC: 69 U/L (ref 55–135)
ALT SERPL W/O P-5'-P-CCNC: 17 U/L (ref 10–44)
ANION GAP SERPL CALC-SCNC: 6 MMOL/L (ref 8–16)
AST SERPL-CCNC: 19 U/L (ref 10–40)
BASOPHILS # BLD AUTO: 0.02 K/UL (ref 0–0.2)
BASOPHILS NFR BLD: 0.3 % (ref 0–1.9)
BILIRUB SERPL-MCNC: 0.4 MG/DL (ref 0.1–1)
BUN SERPL-MCNC: 30 MG/DL (ref 8–23)
CALCIUM SERPL-MCNC: 9.5 MG/DL (ref 8.7–10.5)
CEA SERPL-MCNC: 6.9 NG/ML (ref 0–5)
CHLORIDE SERPL-SCNC: 119 MMOL/L (ref 95–110)
CO2 SERPL-SCNC: 19 MMOL/L (ref 23–29)
COMPLEXED PSA SERPL-MCNC: 35.2 NG/ML (ref 0–4)
CREAT SERPL-MCNC: 1.7 MG/DL (ref 0.5–1.4)
DIFFERENTIAL METHOD: ABNORMAL
EOSINOPHIL # BLD AUTO: 0.1 K/UL (ref 0–0.5)
EOSINOPHIL NFR BLD: 1.7 % (ref 0–8)
ERYTHROCYTE [DISTWIDTH] IN BLOOD BY AUTOMATED COUNT: 14.8 % (ref 11.5–14.5)
EST. GFR  (AFRICAN AMERICAN): 42 ML/MIN/1.73 M^2
EST. GFR  (NON AFRICAN AMERICAN): 36 ML/MIN/1.73 M^2
ESTIMATED AVG GLUCOSE: 134 MG/DL (ref 68–131)
GLUCOSE SERPL-MCNC: 109 MG/DL (ref 70–110)
HBA1C MFR BLD HPLC: 6.3 % (ref 4–5.6)
HCT VFR BLD AUTO: 31.2 % (ref 40–54)
HGB BLD-MCNC: 10.1 G/DL (ref 14–18)
LYMPHOCYTES # BLD AUTO: 1.9 K/UL (ref 1–4.8)
LYMPHOCYTES NFR BLD: 26.8 % (ref 18–48)
MAGNESIUM SERPL-MCNC: 1.7 MG/DL (ref 1.6–2.6)
MCH RBC QN AUTO: 29.4 PG (ref 27–31)
MCHC RBC AUTO-ENTMCNC: 32.4 G/DL (ref 32–36)
MCV RBC AUTO: 91 FL (ref 82–98)
MONOCYTES # BLD AUTO: 0.5 K/UL (ref 0.3–1)
MONOCYTES NFR BLD: 7.2 % (ref 4–15)
NEUTROPHILS # BLD AUTO: 4.6 K/UL (ref 1.8–7.7)
NEUTROPHILS NFR BLD: 64.3 % (ref 38–73)
PHOSPHATE SERPL-MCNC: 3.7 MG/DL (ref 2.7–4.5)
PLATELET # BLD AUTO: 177 K/UL (ref 150–350)
PMV BLD AUTO: 9.8 FL (ref 9.2–12.9)
POCT GLUCOSE: 121 MG/DL (ref 70–110)
POCT GLUCOSE: 83 MG/DL (ref 70–110)
POTASSIUM SERPL-SCNC: 5.2 MMOL/L (ref 3.5–5.1)
PROT SERPL-MCNC: 6.2 G/DL (ref 6–8.4)
RBC # BLD AUTO: 3.44 M/UL (ref 4.6–6.2)
SODIUM SERPL-SCNC: 144 MMOL/L (ref 136–145)
WBC # BLD AUTO: 7.19 K/UL (ref 3.9–12.7)

## 2019-10-07 PROCEDURE — 97165 OT EVAL LOW COMPLEX 30 MIN: CPT

## 2019-10-07 PROCEDURE — 97535 SELF CARE MNGMENT TRAINING: CPT

## 2019-10-07 PROCEDURE — 63600175 PHARM REV CODE 636 W HCPCS: Performed by: EMERGENCY MEDICINE

## 2019-10-07 PROCEDURE — 85025 COMPLETE CBC W/AUTO DIFF WBC: CPT

## 2019-10-07 PROCEDURE — 83735 ASSAY OF MAGNESIUM: CPT

## 2019-10-07 PROCEDURE — 21400001 HC TELEMETRY ROOM

## 2019-10-07 PROCEDURE — 36415 COLL VENOUS BLD VENIPUNCTURE: CPT

## 2019-10-07 PROCEDURE — 63600175 PHARM REV CODE 636 W HCPCS: Performed by: INTERNAL MEDICINE

## 2019-10-07 PROCEDURE — 84100 ASSAY OF PHOSPHORUS: CPT

## 2019-10-07 PROCEDURE — 25000003 PHARM REV CODE 250: Performed by: EMERGENCY MEDICINE

## 2019-10-07 PROCEDURE — 25000003 PHARM REV CODE 250: Performed by: INTERNAL MEDICINE

## 2019-10-07 PROCEDURE — 97161 PT EVAL LOW COMPLEX 20 MIN: CPT

## 2019-10-07 PROCEDURE — 80053 COMPREHEN METABOLIC PANEL: CPT

## 2019-10-07 RX ORDER — HYDRALAZINE HYDROCHLORIDE 20 MG/ML
10 INJECTION INTRAMUSCULAR; INTRAVENOUS EVERY 8 HOURS PRN
Status: DISCONTINUED | OUTPATIENT
Start: 2019-10-07 | End: 2019-10-10 | Stop reason: HOSPADM

## 2019-10-07 RX ORDER — SODIUM BICARBONATE 325 MG/1
650 TABLET ORAL 3 TIMES DAILY
Status: DISCONTINUED | OUTPATIENT
Start: 2019-10-07 | End: 2019-10-10 | Stop reason: HOSPADM

## 2019-10-07 RX ORDER — CLONIDINE HYDROCHLORIDE 0.1 MG/1
0.1 TABLET ORAL 3 TIMES DAILY PRN
Status: DISCONTINUED | OUTPATIENT
Start: 2019-10-07 | End: 2019-10-08

## 2019-10-07 RX ORDER — AMLODIPINE BESYLATE 5 MG/1
10 TABLET ORAL DAILY
Status: DISCONTINUED | OUTPATIENT
Start: 2019-10-07 | End: 2019-10-10 | Stop reason: HOSPADM

## 2019-10-07 RX ADMIN — SENNOSIDES, DOCUSATE SODIUM 1 TABLET: 50; 8.6 TABLET, FILM COATED ORAL at 08:10

## 2019-10-07 RX ADMIN — GABAPENTIN 100 MG: 100 CAPSULE ORAL at 08:10

## 2019-10-07 RX ADMIN — DULOXETINE 30 MG: 30 CAPSULE, DELAYED RELEASE ORAL at 08:10

## 2019-10-07 RX ADMIN — GABAPENTIN 100 MG: 100 CAPSULE ORAL at 04:10

## 2019-10-07 RX ADMIN — FERROUS GLUCONATE TAB 324 MG (37.5 MG ELEMENTAL IRON) 324 MG: 324 (37.5 FE) TAB at 08:10

## 2019-10-07 RX ADMIN — MIRTAZAPINE 15 MG: 15 TABLET, FILM COATED ORAL at 08:10

## 2019-10-07 RX ADMIN — AMLODIPINE BESYLATE 10 MG: 5 TABLET ORAL at 08:10

## 2019-10-07 RX ADMIN — SODIUM CHLORIDE: 0.9 INJECTION, SOLUTION INTRAVENOUS at 05:10

## 2019-10-07 RX ADMIN — THERA TABS 1 TABLET: TAB at 08:10

## 2019-10-07 RX ADMIN — ENOXAPARIN SODIUM 30 MG: 100 INJECTION SUBCUTANEOUS at 04:10

## 2019-10-07 RX ADMIN — SODIUM BICARBONATE 650 MG: 325 TABLET ORAL at 08:10

## 2019-10-07 RX ADMIN — SODIUM BICARBONATE 650 MG: 325 TABLET ORAL at 04:10

## 2019-10-07 NOTE — SUBJECTIVE & OBJECTIVE
Interval History: Feels better. No complaints.     Review of Systems   Constitutional: Positive for activity change and fatigue. Negative for chills, diaphoresis and fever.   HENT: Negative.    Eyes: Negative.    Respiratory: Positive for shortness of breath.    Cardiovascular: Negative.    Gastrointestinal: Negative.    Endocrine: Negative.    Genitourinary: Negative.    Musculoskeletal: Negative.    Skin: Negative.    Neurological: Positive for dizziness, weakness (global) and light-headedness.   Hematological: Negative.    Psychiatric/Behavioral: Negative.      Objective:     Vital Signs (Most Recent):  Temp: 97.7 °F (36.5 °C) (10/07/19 1142)  Pulse: 69 (10/07/19 1142)  Resp: 16 (10/07/19 1142)  BP: 138/70 (10/07/19 1142)  SpO2: 100 % (10/07/19 1142) Vital Signs (24h Range):  Temp:  [97.6 °F (36.4 °C)-98.9 °F (37.2 °C)] 97.7 °F (36.5 °C)  Pulse:  [59-84] 69  Resp:  [15-26] 16  SpO2:  [96 %-100 %] 100 %  BP: (101-174)/(56-85) 138/70     Weight: 88.2 kg (194 lb 7.1 oz)  Body mass index is 24.97 kg/m².    Physical Exam   Constitutional: He is oriented to person, place, and time. He appears well-developed and well-nourished. No distress.   HENT:   Head: Normocephalic and atraumatic.   Mouth/Throat: No oropharyngeal exudate.   Crow   Eyes: Pupils are equal, round, and reactive to light. Conjunctivae and EOM are normal. No scleral icterus.   Neck: Normal range of motion. Neck supple. No JVD present.   Cardiovascular: Normal rate and regular rhythm.   Pulmonary/Chest: Effort normal and breath sounds normal.   Abdominal: Soft. Bowel sounds are normal.   Colostomy bag with solid stool   Musculoskeletal: Normal range of motion. He exhibits no edema, tenderness or deformity.   Neurological: He is alert and oriented to person, place, and time.   Skin: Skin is warm and dry. Capillary refill takes less than 2 seconds. No rash noted. He is not diaphoretic. No erythema. No pallor.   Psychiatric: He has a normal mood and affect.  His behavior is normal. Judgment and thought content normal.   Nursing note and vitals reviewed.       Significant Labs: All pertinent labs within the past 24 hours have been reviewed.    Significant Imaging: I have reviewed and interpreted all pertinent imaging results/findings within the past 24 hours.

## 2019-10-07 NOTE — ASSESSMENT & PLAN NOTE
Hx of stage IV colon cancer s/p chemo surgery now with colostomy bag.  No recurrence as per recent PET, but will get CEA  OP oncology FU

## 2019-10-07 NOTE — PLAN OF CARE
Problem: Physical Therapy Goal  Goal: Physical Therapy Goal  Description  Goals to be met by: 10/21/19     Patient will increase functional independence with mobility by performin. Supine to sit with Modified [place]  2. Sit to supine with Modified [place]  3. Sit to stand transfer with Stand-by Assistance  4. Gait  x 300 feet with Modified [place] using Rolling Walker.   5. Lower extremity exercise program x15 reps per handout, with independence     Outcome: Ongoing, Progressing     PT eval completed today. Pt would benefit from skilled PT services to improve safety with transfers, increase ambulation tolerance and decrease fall risk

## 2019-10-07 NOTE — PLAN OF CARE
"   10/07/19 1552   Discharge Assessment   Assessment Type Discharge Planning Assessment   Confirmed/corrected address and phone number on facesheet? Yes   Assessment information obtained from? Patient   Expected Length of Stay (days) 3   Communicated expected length of stay with patient/caregiver yes   Prior to hospitilization cognitive status: Alert/Oriented   Prior to hospitalization functional status: Assistive Equipment;Needs Assistance   Current cognitive status: Alert/Oriented   Current Functional Status: Assistive Equipment;Needs Assistance   Lives With child(amina), adult   Able to Return to Prior Arrangements yes   Is patient able to care for self after discharge? No   Who are your caregiver(s) and their phone number(s)? Nicol Gamez (daughter) [phone]   Patient's perception of discharge disposition home or selfcare   Readmission Within the Last 30 Days no previous admission in last 30 days   Patient currently being followed by outpatient case management? No   Patient currently receives any other outside agency services? No   Equipment Currently Used at Home cane, straight   Do you have any problems affording any of your prescribed medications? No   Is the patient taking medications as prescribed? yes   Does the patient have transportation home? Yes   Transportation Anticipated family or friend will provide   Does the patient receive services at the [facility]? No   Discharge Plan A Home with family   Discharge Plan B Home Health   DME Needed Upon Discharge  none   Patient/Family in Agreement with Plan yes   To patient's room to discuss patient managing his  care at home.      TN Role Explained.  Patient identified by using 2 identifiers:  Name and date of birth    Patient stated that suzanne Camarena  WILL HELP AT HOME WITH his RECOVERY.       TN reviewed with patient contents of "[employer]".      TN name and contact info placed on the communication board    Preferred Pharmacy:  [id number] - [place] - [address]  [address] [place] [address]  Phone: [phone] Fax: [phone]      Preferred appointment time:mornings

## 2019-10-07 NOTE — PT/OT/SLP EVAL
Physical Therapy Evaluation    Patient Name:  Eugene Gamez   MRN:  [id number]    Recommendations:     Discharge Recommendations:  home health PT(w/ 24 hr supervision )   Discharge Equipment Recommendations: tub bench, walker, rolling   Barriers to discharge: None    Assessment:     Eugene Gamez is a 84 y.o. male admitted with a medical diagnosis of Hyperkalemia.  He presents with the following impairments/functional limitations:  weakness, impaired endurance, visual deficits, impaired balance, gait instability, impaired functional mobilty, impaired self care skills, decreased lower extremity function, decreased ROM, impaired joint extensibility. Due to impairments, at increased risk for falls with transfers and ambulation.    Rehab Prognosis: Good; patient would benefit from acute skilled PT services to address these deficits and reach maximum level of function.    Recent Surgery: * No surgery found *      Plan:     During this hospitalization, patient to be seen 5 x/week to address the identified rehab impairments via gait training, therapeutic activities, therapeutic exercises and progress toward the following goals:    · Plan of Care Expires:  10/21/19    Subjective     Chief Complaint: fatigue  Patient/Family Comments/goals: Pt would like to regain strength to improve safety w/ transfers and amb  Pain/Comfort:  · Pain Rating 1: 0/10    Patients cultural, spiritual, Advent conflicts given the current situation: no    Living Environment:  Pt lives w/ his daughter in [facility] w/ ramp entry, no HR's. Pt has a tub shower. Pt uses bedside commode to sit while washing up  Prior to admission, patients level of function was amb w/ straight cane, independent w/ ADLs. Pt uses w/c for community ambulation/ MD appts. Pt and family deny any falls within the past 6 months..  Equipment used at home: cane, straight, bedside commode.  DME owned (not currently used): none.  Upon discharge, patient will have assistance from  daughter, reports he will have supervision/support when she is at work during the day.    Objective:     Communicated with nsg prior to session.  Patient found up in chair with telemetry, peripheral IV, colostomy  upon PT entry to room.    General Precautions: Standard, fall, diabetic, hearing impaired   Orthopedic Precautions:N/A   Braces: N/A     Exams:  · Cognitive Exam:  Patient is oriented to Person, Place and Time  · Postural Exam:  Patient presented with the following abnormalities:    · -       Rounded shoulders  · -       Forward head  · RLE ROM: Deficits: knee flexion restricted to 70 deg  · RLE Strength: WFL  · LLE ROM: Deficits: knee flexion restricted to 70 deg  · LLE Strength: WFL    Functional Mobility:  · Transfers:     · Sit to Stand:  contact guard assistance with rolling walker, pt demonstrates difficulty initiating transfer d/t lack of knee flexion, however with UE use on arms of chair is able to push himself up high enough to perform transfer  · Gait: Pt amb 50 ft w/ RW and supervision. Pt demonstrates forward flexed gait, dec stride legnth and narrow ELO  · Balance: Good + in sitting, Fair in standing      Therapeutic Activities and Exercises:   Pt instructed on ankle pumps, SLR and LAQs to be performed while in chair    AM-PAC 6 CLICK MOBILITY  Total Score:21     Patient left up in chair with all lines intact, call button in reach, nsg notified and family present.    GOALS:   Multidisciplinary Problems     Physical Therapy Goals        Problem: Physical Therapy Goal    Goal Priority Disciplines Outcome Goal Variances Interventions   Physical Therapy Goal     PT, PT/OT Ongoing, Progressing     Description:  Goals to be met by: 10/21/19     Patient will increase functional independence with mobility by performin. Supine to sit with Modified [place]  2. Sit to supine with Modified [place]  3. Sit to stand transfer with Stand-by Assistance  4. Gait  x 300 feet with Modified  [place] using Rolling Walker.   5. Lower extremity exercise program x15 reps per handout, with independence                      History:     Past Medical History:   Diagnosis Date    Arthritis     Cancer     Diabetes mellitus     Elevated PSA     Gout, unspecified     Hypertension     Nuclear sclerotic cataract of both eyes 6/29/2018       Past Surgical History:   Procedure Laterality Date    CATARACT EXTRACTION      ou    EYE SURGERY      bilateral cataracts    removal of small bowel  Apr. 2015    Colostomy       Time Tracking:     PT Received On: 10/21/19  PT Start Time: 1030     PT Stop Time: 1045  PT Total Time (min): 15 min     Billable Minutes: Evaluation 15      Juan Alberto Cárdenas, PT  10/07/2019

## 2019-10-07 NOTE — ASSESSMENT & PLAN NOTE
Presenting with generalized weakness found to have potassium of 6.6. EKG wo any ischemic changes or peaked T waves. Denies any potassium supplement use at home or trauma. Labs also noting NAGMA. ?RTA type 4. Nephrology consulted. Treated in the ED with insulin, dextrose, fluids, bicarb and kayexalate. Repeat K 5.2. Continue fluids at lower rate for now.

## 2019-10-07 NOTE — HPI
Pt is an 85 yo M with gout, DMII, HTN, CKDIII metastatic colon cancer previously treated by Dr. Vimal Pillai one cycle of FOLFOX followed by 11 cycles of FOLFOX and Avastin completed in *** who presents to the hospital with complaint of weakness, PEREZ and dizziness over several days found to have VINNY and hyperkalemia.  Oncology consulted given prior history of colon cancer.  Currently ***.

## 2019-10-07 NOTE — ASSESSMENT & PLAN NOTE
Presenting with generalized weakness found to have potassium of 6.6. EKG wo any ischemic changes or peaked T waves. Denies any potassium supplement use at home or trauma. Labs also noting NAGMA. ?RTA type 4. Nephrology consulted. Treated in the ED with insulin, dextrose, fluids, bicarb and kayexalate. Repeat K 5.2. Continue fluids at lower rate for now.  Still has hyperkalemia,started on kayexalate.

## 2019-10-07 NOTE — CONSULTS
REASON FOR CONSULTATION:  Renal failure, hyperkalemia, metabolic acidosis.    HISTORY OF PRESENT ILLNESS:  The patient is an 84-year-old -American man   with past medical history significant for hypertension, diabetes type 2, gout,   colon cancer, status post chemotherapy followed with surgery with colostomy   tube, who presented to the hospital with complaint of having worsening   generalized weakness for the past several days and associated with dyspnea on   exertion and dizziness and the patient was brought into the Emergency Room and   the patient was admitted to the hospital for hyperkalemia and renal failure and   metabolic acidosis and we were consulted to see the patient for evaluation.  At   this time, the patient reported feeling better, still feeling weak.  Denies any   chest pain, shortness of breath, fever, chills, nausea or vomiting.    PAST MEDICAL HISTORY:  As above.    MEDICATIONS:  The patient is currently on amlodipine 10 mg p.o. daily,   duloxetine 30 mg p.o. b.i.d., Lovenox 30 mg subQ q. 24 hours, ferrous gluconate   324 mg p.o. daily, Neurontin 100 mg p.o. t.i.d., mirtazapine 50 mg p.o. at   bedtime, multiple vitamin 1 p.o. daily, and sodium bicarbonate 650 mg p.o.   b.i.d.    FAMILY HISTORY:  Noncontributory.    SOCIAL HISTORY:  The patient denied any recent history of tobacco use, alcohol   abuse or IV drug use.    PHYSICAL EXAMINATION:  GENERAL:  The patient is awake, alert and in no apparent distress.  VITAL SIGNS:  Temperature 98.2 with a blood pressure 174/83 with a pulse of 65,   respirations at 16.  HEENT:  Pupils equally round, reactive to light.  EOMI.  Oral mucosa is somewhat   dry.  HEART:  Regular rhythm and rate.  LUNGS:  Clear to auscultation and percussion bilaterally.  ABDOMEN:  Soft, positive bowel sounds, nondistended, nontender.  EXTREMITIES:  Without any edema.    LABORATORY DATA:  WBC is 7.19, hemoglobin 10.1, hematocrit 31.2 and platelet   count is 177.  Sodium  144, potassium 5.2, chloride 119, CO2 19, BUN 30,   creatinine 1.7 and glucose 109.    IMPRESSION:  1.  Acute kidney injury, most likely prerenal azotemia secondary to poor oral   intake and possibility of GI fluid loss.  2.  Hyperkalemia, the patient was on ACE inhibitor at home and may be also   related to renal failure.  3.  Metabolic acidosis, most likely secondary to GI loss.  4.  Hypertension.  5.  Diabetes type 2.  6.  Colon cancer, status post chemo and resection with colostomy.  7.  Anemia of chronic disease.    DISCUSSION AND RECOMMENDATION:  At this time, continue the patient with IV   fluids.  The patient's metabolic acidosis will better with bicarbonate   supplement.  We will increase sodium bicarbonate to 650 mg p.o. t.i.d., hyperkalemia is also improving with fluid and correction of metabolic acidosis.  Continue the   patient at current management and we will watch the patient's renal function   closely.  Thank you for courtesy of the consultation and allowing me to   participate in the patient's care.      YADIEL/IN  dd: 10/07/2019 09:31:32 (CDT)  td: 10/07/2019 16:46:02 (CDT)  Doc ID   #[id number]  Job ID #[id number]    CC:

## 2019-10-07 NOTE — CONSULTS
Dictation #1  MRN:[id number]  CSN:[id number]     Consult dictated # [id number]    Up Sodium bicarbonate to 650mg po tid  Continue IVF  Watch renal function and electrolytes closely  Thanks

## 2019-10-07 NOTE — PROGRESS NOTES
Helped patient change leaking colostomy bag after 2 large bowel movements. No other significant events this shift. Gave report to LAZ Chinchilla.

## 2019-10-07 NOTE — PROGRESS NOTES
[facility] Hospital Medicine  Progress Note    Patient Name: Eugene Gamez  MRN: [id number]  Patient Class: IP- Inpatient   Admission Date: 10/6/2019  Length of Stay: 1 days  Attending Physician: Ladarius Bey MD  Primary Care Provider: Larry Monae MD      Subjective:     Principal Problem:Hyperkalemia    HPI:  83 yo male with hx of stage IV colon cancer (s/p chemo and surgery now with colostomy bag, followed by Dr. Vimal Pillai), CKD stage 3, HTN, DM type 2,  presenting to ED with progressively worsening generalized weakness for the past several days with associated PEREZ and dizziness. Family noticed significant fatigue today prompted ED visit. Family or patient denies any fevers, chills, syncope, LOC, trauma, fall, cp, cough, abdominal pan, N/V, diarrhea, constipation, URI, dysuria, bleeds, rash etc. No recent travel or sick contacts. He is hard of hearing.    In the ED patient was HDS and afebrile. Complaining of generalized weakness and dyspnea. Saturating well on RA. Labs significant for K 6.6, bicab 14, AG 8, Albumin 4, LA 2.2, Cr 1.9 (baseline around 1.5). Trop wnl. EKG fairly unremarkable. CXR wnl. Nephrology and oncology consulted.  requested for admission for hyperkalemia.    Overview/Hospital Course:  Presenting with generalized weakness found to have potassium of 6.6. EKG wo any ischemic changes or peaked T waves. Denies any potassium supplement use at home or trauma. Labs also noting NAGMA and mild renal insufficiency. ?RTA type 4. Nephrology consulted. Treated in the ED with insulin, dextrose, fluids, bicarb and kayexalate. Urine studies consistent with RTA. Repeat K 5.2. Started sodium bicarb tabs. PT OT rec HH. Pending oncology. Possible d/c tomorrow if stable with HH with PT OT. Spoke to patient's oncologist, and patient doesn't seem to have any cancer recurrence on last scan. Patient to see his oncology OP.    Interval History: Feels better. No complaints.     Review of  Systems   Constitutional: Positive for activity change and fatigue. Negative for chills, diaphoresis and fever.   HENT: Negative.    Eyes: Negative.    Respiratory: Positive for shortness of breath.    Cardiovascular: Negative.    Gastrointestinal: Negative.    Endocrine: Negative.    Genitourinary: Negative.    Musculoskeletal: Negative.    Skin: Negative.    Neurological: Positive for dizziness, weakness (global) and light-headedness.   Hematological: Negative.    Psychiatric/Behavioral: Negative.      Objective:     Vital Signs (Most Recent):  Temp: 97.7 °F (36.5 °C) (10/07/19 1142)  Pulse: 69 (10/07/19 1142)  Resp: 16 (10/07/19 1142)  BP: 138/70 (10/07/19 1142)  SpO2: 100 % (10/07/19 1142) Vital Signs (24h Range):  Temp:  [97.6 °F (36.4 °C)-98.9 °F (37.2 °C)] 97.7 °F (36.5 °C)  Pulse:  [59-84] 69  Resp:  [15-26] 16  SpO2:  [96 %-100 %] 100 %  BP: (101-174)/(56-85) 138/70     Weight: 88.2 kg (194 lb 7.1 oz)  Body mass index is 24.97 kg/m².    Physical Exam   Constitutional: He is oriented to person, place, and time. He appears well-developed and well-nourished. No distress.   HENT:   Head: Normocephalic and atraumatic.   Mouth/Throat: No oropharyngeal exudate.   [place]   Eyes: Pupils are equal, round, and reactive to light. Conjunctivae and EOM are normal. No scleral icterus.   Neck: Normal range of motion. Neck supple. No JVD present.   Cardiovascular: Normal rate and regular rhythm.   Pulmonary/Chest: Effort normal and breath sounds normal.   Abdominal: Soft. Bowel sounds are normal.   Colostomy bag with solid stool   Musculoskeletal: Normal range of motion. He exhibits no edema, tenderness or deformity.   Neurological: He is alert and oriented to person, place, and time.   Skin: Skin is warm and dry. Capillary refill takes less than 2 seconds. No rash noted. He is not diaphoretic. No erythema. No pallor.   Psychiatric: He has a normal mood and affect. His behavior is normal. Judgment and thought content normal.    Nursing note and vitals reviewed.       Significant Labs: All pertinent labs within the past 24 hours have been reviewed.    Significant Imaging: I have reviewed and interpreted all pertinent imaging results/findings within the past 24 hours.      Assessment/Plan:      * Hyperkalemia  Presenting with generalized weakness found to have potassium of 6.6. EKG wo any ischemic changes or peaked T waves. Denies any potassium supplement use at home or trauma. Labs also noting NAGMA. ?RTA type 4. Nephrology consulted. Treated in the ED with insulin, dextrose, fluids, bicarb and kayexalate. Repeat K 5.2. Continue fluids at lower rate for now.    CKD (chronic kidney disease) stage 3, GFR 30-59 ml/min  Noted. Mild renal insufficieny noted with hyperkalemia. Consult nephrology. Fluids. Avoid nephrotoxic agents.    Metabolic acidosis, normal anion gap (NAG)  AG 8 with bicarb of 14. Albumin wnl. NAGMA. ?RTA. Urine gap + consistent with RTA. Bicarb tabs.    Stage IV carcinoma of colon  Hx of stage IV colon cancer s/p chemo surgery now with colostomy bag.  No recurrence as per recent PET, but will get CEA  OP oncology FU    Type 2 diabetes mellitus, controlled  Noted. Hold home meds. SSI and accuchecks with hypoglycemia protocol.      VTE Risk Mitigation (From admission, onward)         Ordered     enoxaparin injection 30 mg  Daily      10/06/19 1811     IP VTE HIGH RISK PATIENT  Once      10/06/19 1811     Place ARI hose  Until discontinued      10/06/19 1811                  Ladarius Bey MD  Department of Hospital Medicine   [facility]

## 2019-10-07 NOTE — PT/OT/SLP EVAL
Occupational Therapy   Evaluation and Treatment    Name: Eugene Gamez  MRN: [id number]  Admitting Diagnosis:  Hyperkalemia      Recommendations:     Discharge Recommendations: home health OT(with 24 hr sup/assist)  Discharge Equipment Recommendations:  tub bench, walker, rolling  Barriers to discharge:  None    Assessment:     Eugene Gamez is a 84 y.o. male with a medical diagnosis of Hyperkalemia.  He presents with decreased functional activity tolerance and safety awareness, impacting independence with ADLs and all aspects of functional mobility. Performance deficits affecting function: weakness, impaired functional mobilty, decreased safety awareness, gait instability, impaired endurance, impaired balance, impaired self care skills, decreased lower extremity function.      Rehab Prognosis: Good; patient would benefit from acute skilled OT services to address these deficits and reach maximum level of function.       Plan:     Patient to be seen 5 x/week to address the above listed problems via self-care/home management, therapeutic activities, therapeutic exercises  · Plan of Care Expires: 10/21/19  · Plan of Care Reviewed with: patient, daughter, other (see comments)(dakota)    Subjective     Chief Complaint: needing to use the restroom  Patient/Family Comments/goals: pt's daughter's goal is for pt to go home     Occupational Profile:  Living Environment: Pt lives with his daughter in a  home with a ramp at entry. Bathroom set-up: tub/shower combo.   Previous level of function: Pt was independent with ADLs and MOD I with ambulation, using a straight cane. Pt and family report 0 falls within the last 6 months. Pt will use a wheelchair for MD appts/long distances.   Roles and Routines: going to [facility], still driving   Equipment Used at Home:  cane, straight, bedside commode  Assistance upon Discharge: daughter (works), but she reports that other family is nearby to helpdakota     Pain/Comfort:  Pain Rating  1: 0/10    Patients cultural, spiritual, Gnosticism conflicts given the current situation: no    Objective:     Communicated with: nurseRenée, prior to session.  Patient found HOB elevated with bed alarm, telemetry, peripheral IV, colostomy upon OT entry to room.    General Precautions: Standard, fall, hearing impaired, diabetic   Orthopedic Precautions:N/A   Braces: N/A     Occupational Performance:    Bed Mobility:    · Patient completed Rolling/Turning to Right with stand by assistance  · Patient completed Scooting/Bridging with stand by assistance  · Patient completed Supine to Sit with stand by assistance and HOB elevated    Functional Mobility/Transfers:  · Patient completed Sit <> Stand Transfer with moderate assistance  with  rolling walker   · Patient completed Toilet Transfer Step Transfer technique (to  front of commode) with contact guard assistance with  rolling walker  · Functional Mobility: Pt ambulated from the bed>stand at the commode> recliner chair with forward flexed posture and CGA with cueing for safe use of RW.     Activities of Daily Living:  · Grooming: pt able to open toothpaste box and cap with set-up     · Upper Body Dressing: minimum assistance to help estevan back hospital gown (d/t IV)  · Lower Body Dressing: total assistance with socks (pt's dtr reports that he has a stool at home to help him complete this independently)  · Toileting: contact guard assistance standing at the commode to urinate (colostomy)     Cognitive/Visual Perceptual:  Cognitive/Psychosocial Skills:     -       Oriented to: Person, Place; prompting required, but able to report time correctly   -       Follows Commands/attention:Follows multistep  commands  -       Communication: clear/fluent  -       Memory: Poor immediate recall  -       Safety awareness/insight to disability: impaired   -       Mood/Affect/Coping skills/emotional control: Cooperative and Pleasant  Visual/Perceptual:      -Intact  acuity  and R/L discrimination      Physical Exam:  Balance:    -       seated: SUP; standing: CGA with RW  Postural examination/scapula alignment:    -       Rounded shoulders  -       Forward head  Skin integrity: Visible skin intact  Edema:  no BUE edema noted  Sensation:    -       Intact  light/touch BUE  Dominant hand:    -       Left  Upper Extremity Range of Motion:     -       Right Upper Extremity: WFL  -       Left Upper Extremity: WFL  Upper Extremity Strength:    -       Right Upper Extremity: WFL  -       Left Upper Extremity: WFL   Strength:    -       Right Upper Extremity: WFL  -       Left Upper Extremity: WFL  Fine Motor Coordination:    -       Intact  Left hand, manipulation of objects and Right hand, manipulation of objects  Gross motor coordination:   Slightly impaired 2* weakness     AMPAC 6 Click ADL:  AMPAC Total Score: 21    Treatment & Education:  Pt and family educated on OT role/POC.   Importance of OOB activity with staff assistance.  Importance of sitting up in the chair during the day, especially for meals   Safety during functional t/f and mobility while using RW   White board updated   Multiple self-care tasks/functional mobility completed- assistance level noted above   All questions/concerns answered within OT scope of practice     Education:    Patient left up in chair with all lines intact, call button in reach, nurseRenée, notified, PCTTina notified about changing of linens while pt is up in the chair, and daughter and fiancee present    GOALS:   Multidisciplinary Problems     Occupational Therapy Goals        Problem: Occupational Therapy Goal    Goal Priority Disciplines Outcome Interventions   Occupational Therapy Goal     OT, PT/OT Ongoing, Progressing    Description:  Goals to be met by: 10/21/19     Patient will increase functional independence with ADLs by performing:    LE Dressing with Minimal Assistance.  Grooming while standing at sink with Stand-by  Assistance.  Toileting from bedside commode with Stand-by Assistance for hygiene and clothing management.   Supine to sit with Supervision.  Step transfer with Stand-by Assistance  Toilet transfer to bedside commode with Stand-by Assistance.  Upper extremity exercise program x10 reps per handout, with independence.                      History:     Past Medical History:   Diagnosis Date    Arthritis     Cancer     Diabetes mellitus     Elevated PSA     Gout, unspecified     Hypertension     Nuclear sclerotic cataract of both eyes 6/29/2018         Past Surgical History:   Procedure Laterality Date    CATARACT EXTRACTION      ou    EYE SURGERY      bilateral cataracts    removal of small bowel  Apr. 2015    Colostomy       Time Tracking:     OT Date of Treatment: 10/07/19  OT Start Time: 0950  OT Stop Time: 1016  OT Total Time (min): 26 min    Billable Minutes:Evaluation 15 min  Self Care/Home Management 11 min   Total Time 26 min    Isaura Linder OT  10/7/2019

## 2019-10-07 NOTE — NURSING
Pt aaox4, able to move from recliner to bed with 1 person assist. Denies pain. Colostomy in place, small amount of stool emptied by patient. Voids per urinal. Has good appetite, no ssi given. Iv fluids infusing. Bed alarm on

## 2019-10-07 NOTE — HOSPITAL COURSE
Presenting with generalized weakness found to have potassium of 6.6. EKG wo any ischemic changes or peaked T waves. Denies any potassium supplement use at home or trauma. Labs also noting NAGMA and mild renal insufficiency. ?RTA type 4. Nephrology consulted. Treated in the ED with insulin, dextrose, fluids, bicarb and kayexalate. Urine studies consistent with RTA. Repeat K 5.2. Started sodium bicarb tabs. PT OT rec HH..has been  Spoken to patient's oncologist, and patient doesn't seem to have any cancer recurrence on last scan. Patient to see his oncology OP.  Still had hyperkalemia,started on kayexalate.his hyperkalemia and ARF  is resolved,patient has been discharged home with HH.and follow up with PCP as out patient.

## 2019-10-08 PROBLEM — N17.9 ACUTE RENAL FAILURE SUPERIMPOSED ON STAGE 3 CHRONIC KIDNEY DISEASE: Status: ACTIVE | Noted: 2019-10-08

## 2019-10-08 PROBLEM — N18.30 ACUTE RENAL FAILURE SUPERIMPOSED ON STAGE 3 CHRONIC KIDNEY DISEASE: Status: ACTIVE | Noted: 2019-10-08

## 2019-10-08 LAB
ALBUMIN SERPL BCP-MCNC: 3.3 G/DL (ref 3.5–5.2)
ALP SERPL-CCNC: 68 U/L (ref 55–135)
ALT SERPL W/O P-5'-P-CCNC: 22 U/L (ref 10–44)
ANION GAP SERPL CALC-SCNC: 6 MMOL/L (ref 8–16)
AST SERPL-CCNC: 20 U/L (ref 10–40)
BILIRUB SERPL-MCNC: 0.4 MG/DL (ref 0.1–1)
BUN SERPL-MCNC: 24 MG/DL (ref 8–23)
CALCIUM SERPL-MCNC: 9.2 MG/DL (ref 8.7–10.5)
CHLORIDE SERPL-SCNC: 118 MMOL/L (ref 95–110)
CO2 SERPL-SCNC: 19 MMOL/L (ref 23–29)
CREAT SERPL-MCNC: 1.2 MG/DL (ref 0.5–1.4)
EST. GFR  (AFRICAN AMERICAN): >60 ML/MIN/1.73 M^2
EST. GFR  (NON AFRICAN AMERICAN): 56 ML/MIN/1.73 M^2
GLUCOSE SERPL-MCNC: 90 MG/DL (ref 70–110)
POCT GLUCOSE: 125 MG/DL (ref 70–110)
POCT GLUCOSE: 140 MG/DL (ref 70–110)
POCT GLUCOSE: 155 MG/DL (ref 70–110)
POCT GLUCOSE: 80 MG/DL (ref 70–110)
POTASSIUM SERPL-SCNC: 5.3 MMOL/L (ref 3.5–5.1)
PROT SERPL-MCNC: 6.2 G/DL (ref 6–8.4)
SODIUM SERPL-SCNC: 143 MMOL/L (ref 136–145)

## 2019-10-08 PROCEDURE — 25000003 PHARM REV CODE 250: Performed by: HOSPITALIST

## 2019-10-08 PROCEDURE — 63600175 PHARM REV CODE 636 W HCPCS: Performed by: INTERNAL MEDICINE

## 2019-10-08 PROCEDURE — 25000003 PHARM REV CODE 250: Performed by: EMERGENCY MEDICINE

## 2019-10-08 PROCEDURE — 21400001 HC TELEMETRY ROOM

## 2019-10-08 PROCEDURE — 94761 N-INVAS EAR/PLS OXIMETRY MLT: CPT

## 2019-10-08 PROCEDURE — 25000003 PHARM REV CODE 250: Performed by: INTERNAL MEDICINE

## 2019-10-08 PROCEDURE — 36415 COLL VENOUS BLD VENIPUNCTURE: CPT

## 2019-10-08 PROCEDURE — 63600175 PHARM REV CODE 636 W HCPCS: Performed by: EMERGENCY MEDICINE

## 2019-10-08 PROCEDURE — 97530 THERAPEUTIC ACTIVITIES: CPT

## 2019-10-08 PROCEDURE — 80053 COMPREHEN METABOLIC PANEL: CPT

## 2019-10-08 PROCEDURE — 97535 SELF CARE MNGMENT TRAINING: CPT

## 2019-10-08 PROCEDURE — 97110 THERAPEUTIC EXERCISES: CPT

## 2019-10-08 PROCEDURE — 63600175 PHARM REV CODE 636 W HCPCS: Performed by: HOSPITALIST

## 2019-10-08 RX ORDER — CLONIDINE HYDROCHLORIDE 0.1 MG/1
0.1 TABLET ORAL EVERY 4 HOURS PRN
Status: DISCONTINUED | OUTPATIENT
Start: 2019-10-08 | End: 2019-10-10 | Stop reason: HOSPADM

## 2019-10-08 RX ORDER — HYDRALAZINE HYDROCHLORIDE 25 MG/1
25 TABLET, FILM COATED ORAL EVERY 8 HOURS
Status: DISCONTINUED | OUTPATIENT
Start: 2019-10-08 | End: 2019-10-09

## 2019-10-08 RX ADMIN — SODIUM CHLORIDE: 0.9 INJECTION, SOLUTION INTRAVENOUS at 04:10

## 2019-10-08 RX ADMIN — HYDRALAZINE HYDROCHLORIDE 25 MG: 25 TABLET ORAL at 09:10

## 2019-10-08 RX ADMIN — GABAPENTIN 100 MG: 100 CAPSULE ORAL at 04:10

## 2019-10-08 RX ADMIN — SODIUM BICARBONATE 650 MG: 325 TABLET ORAL at 09:10

## 2019-10-08 RX ADMIN — THERA TABS 1 TABLET: TAB at 09:10

## 2019-10-08 RX ADMIN — ENOXAPARIN SODIUM 30 MG: 100 INJECTION SUBCUTANEOUS at 05:10

## 2019-10-08 RX ADMIN — CLONIDINE HYDROCHLORIDE 0.1 MG: 0.1 TABLET ORAL at 06:10

## 2019-10-08 RX ADMIN — AMLODIPINE BESYLATE 10 MG: 5 TABLET ORAL at 09:10

## 2019-10-08 RX ADMIN — DULOXETINE 30 MG: 30 CAPSULE, DELAYED RELEASE ORAL at 09:10

## 2019-10-08 RX ADMIN — SODIUM CHLORIDE: 0.9 INJECTION, SOLUTION INTRAVENOUS at 05:10

## 2019-10-08 RX ADMIN — SENNOSIDES, DOCUSATE SODIUM 1 TABLET: 50; 8.6 TABLET, FILM COATED ORAL at 09:10

## 2019-10-08 RX ADMIN — SODIUM BICARBONATE 650 MG: 325 TABLET ORAL at 04:10

## 2019-10-08 RX ADMIN — MIRTAZAPINE 15 MG: 15 TABLET, FILM COATED ORAL at 09:10

## 2019-10-08 RX ADMIN — GABAPENTIN 100 MG: 100 CAPSULE ORAL at 09:10

## 2019-10-08 RX ADMIN — ACETAMINOPHEN 650 MG: 325 TABLET, FILM COATED ORAL at 09:10

## 2019-10-08 NOTE — PROGRESS NOTES
[facility] Hospital Medicine  Progress Note    Patient Name: Eugene Gamez  MRN: [id number]  Patient Class: IP- Inpatient   Admission Date: 10/6/2019  Length of Stay: 2 days  Attending Physician: Candelario Pedraza MD  Primary Care Provider: Larry Monae MD        Subjective:     Principal Problem:Hyperkalemia        HPI:  85 yo male with hx of stage IV colon cancer (s/p chemo and surgery now with colostomy bag, followed by Dr. Vimal Pillai), CKD stage 3, HTN, DM type 2,  presenting to ED with progressively worsening generalized weakness for the past several days with associated PEREZ and dizziness. Family noticed significant fatigue today prompted ED visit. Family or patient denies any fevers, chills, syncope, LOC, trauma, fall, cp, cough, abdominal pan, N/V, diarrhea, constipation, URI, dysuria, bleeds, rash etc. No recent travel or sick contacts. He is hard of hearing.    In the ED patient was HDS and afebrile. Complaining of generalized weakness and dyspnea. Saturating well on RA. Labs significant for K 6.6, bicab 14, AG 8, Albumin 4, LA 2.2, Cr 1.9 (baseline around 1.5). Trop wnl. EKG fairly unremarkable. CXR wnl. Nephrology and oncology consulted.  requested for admission for hyperkalemia.    Overview/Hospital Course:  Presenting with generalized weakness found to have potassium of 6.6. EKG wo any ischemic changes or peaked T waves. Denies any potassium supplement use at home or trauma. Labs also noting NAGMA and mild renal insufficiency. ?RTA type 4. Nephrology consulted. Treated in the ED with insulin, dextrose, fluids, bicarb and kayexalate. Urine studies consistent with RTA. Repeat K 5.2. Started sodium bicarb tabs. PT OT rec HH. Pending oncology. Possible d/c tomorrow if stable with HH with PT OT.has been  Spoken to patient's oncologist, and patient doesn't seem to have any cancer recurrence on last scan. Patient to see his oncology OP.  Still has hyperkalemia,started on  kayexalate.    Interval History: Feels better. No complaints.     Review of Systems   Constitutional: Positive for activity change and fatigue. Negative for chills, diaphoresis and fever.   HENT: Negative.    Eyes: Negative.    Respiratory: Negative for shortness of breath.    Cardiovascular: Negative.    Gastrointestinal: Negative.    Endocrine: Negative.    Genitourinary: Negative.    Musculoskeletal: Negative.    Skin: Negative.    Neurological: Positive for weakness (global). Negative for dizziness and light-headedness.   Hematological: Negative.    Psychiatric/Behavioral: Negative.      Objective:     Vital Signs (Most Recent):  Temp: 98 °F (36.7 °C) (10/08/19 0712)  Pulse: 65 (10/08/19 0712)  Resp: 16 (10/08/19 0712)  BP: (!) 165/90 (10/08/19 0712)  SpO2: 100 % (10/08/19 0712) Vital Signs (24h Range):  Temp:  [97.5 °F (36.4 °C)-98.2 °F (36.8 °C)] 98 °F (36.7 °C)  Pulse:  [65-88] 65  Resp:  [16-18] 16  SpO2:  [98 %-100 %] 100 %  BP: (138-174)/(68-90) 165/90     Weight: 88.2 kg (194 lb 7.1 oz)  Body mass index is 24.97 kg/m².    Physical Exam   Constitutional: He is oriented to person, place, and time. He appears well-developed and well-nourished. No distress.   HENT:   Head: Normocephalic and atraumatic.   Mouth/Throat: No oropharyngeal exudate.   [place]   Eyes: Pupils are equal, round, and reactive to light. Conjunctivae and EOM are normal. No scleral icterus.   Neck: Normal range of motion. Neck supple. No JVD present.   Cardiovascular: Normal rate and regular rhythm.   Pulmonary/Chest: Effort normal and breath sounds normal.   Abdominal: Soft. Bowel sounds are normal.   Colostomy bag with solid stool   Musculoskeletal: Normal range of motion. He exhibits no edema, tenderness or deformity.   Neurological: He is alert and oriented to person, place, and time.   Skin: Skin is warm and dry. Capillary refill takes less than 2 seconds. No rash noted. He is not diaphoretic. No erythema. No pallor.   Psychiatric: He has  a normal mood and affect. His behavior is normal. Judgment and thought content normal.   Nursing note and vitals reviewed.       Significant Labs: All pertinent labs within the past 24 hours have been reviewed.    Significant Imaging: I have reviewed and interpreted all pertinent imaging results/findings within the past 24 hours.      Assessment/Plan:      * Hyperkalemia  Presenting with generalized weakness found to have potassium of 6.6. EKG wo any ischemic changes or peaked T waves. Denies any potassium supplement use at home or trauma. Labs also noting NAGMA. ?RTA type 4. Nephrology consulted. Treated in the ED with insulin, dextrose, fluids, bicarb and kayexalate. Repeat K 5.2. Continue fluids at lower rate for now.  Still has hyperkalemia,started on kayexalate.    Acute renal failure superimposed on stage 3 chronic kidney disease  Improved with IVF,will check bladder scan,      Metabolic acidosis, normal anion gap (NAG)  AG 8 with bicarb of 14. Albumin wnl. NAGMA. ?RTA. Urine gap + consistent with RTA. Bicarb tabs.    CKD (chronic kidney disease) stage 3, GFR 30-59 ml/min  Noted. Mild renal insufficieny noted with hyperkalemia. Consult nephrology. Fluids. Avoid nephrotoxic agents.    Stage IV carcinoma of colon  Hx of stage IV colon cancer s/p chemo surgery now with colostomy bag.  No recurrence as per recent PET, but will get CEA  OP oncology FU    Type 2 diabetes mellitus, controlled  Noted. Hold home meds. SSI and accuchecks with hypoglycemia protocol.      VTE Risk Mitigation (From admission, onward)         Ordered     enoxaparin injection 30 mg  Daily      10/06/19 1811     IP VTE HIGH RISK PATIENT  Once      10/06/19 1811     Place ARI hose  Until discontinued      10/06/19 1811                      Candelario Pedraza MD  Department of Hospital Medicine   [facility]

## 2019-10-08 NOTE — PT/OT/SLP PROGRESS
Occupational Therapy   Treatment    Name: Eugene Gamez  MRN: [id number]  Admitting Diagnosis:  Hyperkalemia       Recommendations:     Discharge Recommendations: home health OT  Discharge Equipment Recommendations:  tub bench, walker, rolling  Barriers to discharge:  None    Assessment:     Eugene Gamez is a 84 y.o. male with a medical diagnosis of Hyperkalemia.  He presents with good motivation to participate with therapy session. Performance deficits affecting function are weakness, impaired functional mobilty, decreased safety awareness, gait instability, impaired endurance, impaired balance, decreased upper extremity function, impaired self care skills, decreased lower extremity function, decreased ROM.     Rehab Prognosis:  Good; patient would benefit from acute skilled OT services to address these deficits and reach maximum level of function.       Plan:     Patient to be seen 5 x/week to address the above listed problems via self-care/home management, therapeutic activities, therapeutic exercises  · Plan of Care Expires: 10/21/19  · Plan of Care Reviewed with: patient, daughter    Subjective     Chief complaint: ready to go home; wanted ice chips (refilled at end of session)   Patient's comments/goals: agreeable to participate/sit up in the chair after session     Pain/Comfort:  · Pain Rating 1: 0/10    Objective:     Communicated with: nurseRodri, prior to session.  Patient found HOB elevated with peripheral IV, telemetry, colostomy upon OT entry to room.    General Precautions: Standard, fall, diabetic, hearing impaired   Orthopedic Precautions:N/A   Braces: N/A     Occupational Performance:     Bed Mobility:    · Patient completed Scooting/Bridging with supervision  · Patient completed Supine to Sit with supervision and HOB elevated     Functional Mobility/Transfers:  · Patient completed Sit <> Stand Transfer with minimum assistance  with  rolling walker   · Patient completed Bed <> Chair Transfer  using Step Transfer technique with contact guard assistance with rolling walker  · Functional Mobility: Pt stood at the sink to complete ADLs then ambulated to the chair with CGA and verbal cueing to correct body mechanics within RW.     Activities of Daily Living:  · Grooming: contact guard assistance standing at the sink to brush teeth and wash face. Set-up while seated in the chair to put on deoderant  · Upper Body Dressing: set-up to [place] back hospital gown    · Lower Body Dressing: total assistance with socks (pt has a step stool and homemade AD at home to [place] socks independently)       [facility] 6 Click ADL: 21    Treatment & Education:  · Pt re-educated on OT role/POC.   · Importance of OOB activity with staff assistance.  · Importance of sitting up in the chair during the day and complete ankle pumps   · Safety during functional t/f and mobility   · Handout: energy conservation techniques/home safety. OT reviewed this with pt and his daughter with no questions  · Handout with red theraband: OT demo and pt then completed 1x10:   · Shoulder horizontal ab/dduction  · Elbow flexion  · Elbow extension  · Shoulder horizontal abduction with flexion   · Pt unable to complete external rotation d/t arthritic pain   · Pt edu to complete these exercises: 10-15 reps, 2x/day   · White board updated   · Multiple self-care tasks/functional mobility completed- assistance level noted above   · All questions/concerns answered within OT scope of practice       Patient left up in chair with all lines intact, call button in reach, nurse, Rodri, and PCT Tina, notified and daughter  presentEducation:      GOALS:   Multidisciplinary Problems     Occupational Therapy Goals        Problem: Occupational Therapy Goal    Goal Priority Disciplines Outcome Interventions   Occupational Therapy Goal     OT, PT/OT Ongoing, Progressing    Description:  Goals to be met by: 10/21/19     Patient will increase functional independence with ADLs by  performing:    LE Dressing with Minimal Assistance.  Grooming while standing at sink with Stand-by Assistance.  Toileting from bedside commode with Stand-by Assistance for hygiene and clothing management.   Supine to sit with Supervision.  Step transfer with Stand-by Assistance  Toilet transfer to bedside commode with Stand-by Assistance.  Upper extremity exercise program x10 reps per handout, with independence.                      Time Tracking:     OT Date of Treatment: 10/08/19  OT Start Time: 1004  OT Stop Time: 1033  OT Total Time (min): 29 min    Billable Minutes:Self Care/Home Management 15 min  Therapeutic Exercise 14 min  Total Time 29 min    Isaura Linder OT  10/8/2019

## 2019-10-08 NOTE — PLAN OF CARE
Problem: Occupational Therapy Goal  Goal: Occupational Therapy Goal  Description  Goals to be met by: 10/21/19     Patient will increase functional independence with ADLs by performing:    LE Dressing with Minimal Assistance.  Grooming while standing at sink with Stand-by Assistance.  Toileting from bedside commode with Stand-by Assistance for hygiene and clothing management.   Supine to sit with Supervision.  Step transfer with Stand-by Assistance  Toilet transfer to bedside commode with Stand-by Assistance.  Upper extremity exercise program x10 reps per handout, with independence.     Outcome: Ongoing, Progressing    Pt will continue to benefit from acute OT in order to increase safety awareness and independence with ADLs and all aspects of functional mobility. OT rec. HHOT with 24 hour family supervision at d/c to regain PLOF and reduce risk of falls.

## 2019-10-08 NOTE — PT/OT/SLP PROGRESS
Physical Therapy Treatment    Patient Name:  Eugene Gamez   MRN:  [id number]    Recommendations:     Discharge Recommendations:  home health PT   Discharge Equipment Recommendations: tub bench, walker, rolling   Barriers to discharge: None    Assessment:     Eugene Gamez is a 84 y.o. male admitted with a medical diagnosis of Hyperkalemia.  He presents with the following impairments/functional limitations:  weakness, impaired balance, impaired self care skills, decreased safety awareness, impaired endurance, gait instability, impaired functional mobilty, decreased lower extremity function .    Rehab Prognosis: Good; patient would benefit from acute skilled PT services to address these deficits and reach maximum level of function.    Recent Surgery: * No surgery found *      Plan:     During this hospitalization, patient to be seen 5 x/week to address the identified rehab impairments via gait training, therapeutic activities, therapeutic exercises and progress toward the following goals:    · Plan of Care Expires:  10/21/19    Subjective     Chief Complaint: stiffness  Patient/Family Comments/goals: to go home  Pain/Comfort:  · Pain Rating 1: 0/10      Objective:     Communicated with nsg prior to session.  Patient found up in chair with telemetry, peripheral IV, colostomy upon PT entry to room.     General Precautions: Standard, hearing impaired, diabetic, fall   Orthopedic Precautions:N/A   Braces: N/A     Functional Mobility:  · Transfers:     · Sit to Stand:  minimum assistance and 2/2 increased chair height with SPC  · Gait: 200' with SPC and supervision  · Balance: Fair+      AM-PAC 6 CLICK MOBILITY  Turning over in bed (including adjusting bedclothes, sheets and blankets)?: 4  Sitting down on and standing up from a chair with arms (e.g., wheelchair, bedside commode, etc.): 3  Moving from lying on back to sitting on the side of the bed?: 4  Moving to and from a bed to a chair (including a wheelchair)?:  3  Need to walk in hospital room?: 4  Climbing 3-5 steps with a railing?: 3  Basic Mobility Total Score: 21       Therapeutic Activities and Exercises:   Pt stood at commode to urinate with supervsion approx 4m.  Pt Mod I with toileting and hand hygiene at sink.  Pt performed B FAQ's and AP's while up in chair x 10 reps    Patient left up in chair with all lines intact, call button in reach, nsg notified and family present..    GOALS:   Multidisciplinary Problems     Physical Therapy Goals        Problem: Physical Therapy Goal    Goal Priority Disciplines Outcome Goal Variances Interventions   Physical Therapy Goal     PT, PT/OT Ongoing, Progressing     Description:  Goals to be met by: 10/21/19     Patient will increase functional independence with mobility by performin. Supine to sit with Modified [place]  2. Sit to supine with Modified [place]  3. Sit to stand transfer with Stand-by Assistance  4. Gait  x 300 feet with Modified [place] using Rolling Walker.   5. Lower extremity exercise program x15 reps per handout, with independence                      Time Tracking:     PT Received On: 10/08/19  PT Start Time: 1037     PT Stop Time: 1100  PT Total Time (min): 23 min     Billable Minutes: Therapeutic Activity 15 and Therapeutic Exercise 8    Treatment Type: Treatment  PT/PTA: PT     PTA Visit Number: 0     Lennie Meredith, CHERYL  10/08/2019

## 2019-10-08 NOTE — PLAN OF CARE
Problem: Fall Injury Risk  Goal: Absence of Fall and Fall-Related Injury  Outcome: Ongoing, Progressing     Problem: Adult Inpatient Plan of Care  Goal: Plan of Care Review  Outcome: Ongoing, Progressing  Goal: Patient-Specific Goal (Individualization)  Outcome: Ongoing, Progressing  Goal: Absence of Hospital-Acquired Illness or Injury  Outcome: Ongoing, Progressing  Goal: Optimal Comfort and Wellbeing  Outcome: Ongoing, Progressing  Goal: Readiness for Transition of Care  Outcome: Ongoing, Progressing  Goal: Rounds/Family Conference  Outcome: Ongoing, Progressing     Problem: Diabetes Comorbidity  Goal: Blood Glucose Level Within Desired Range  Outcome: Ongoing, Progressing

## 2019-10-08 NOTE — PROGRESS NOTES
Eugene Gamez is a 84 y.o. male patient.    Follow for VINNY, metabolic acidosis, hyperkalemia    Scheduled Meds:   amLODIPine  10 mg Oral Daily    DULoxetine  30 mg Oral BID    enoxaparin  30 mg Subcutaneous Daily    ferrous gluconate  324 mg Oral Daily with breakfast    gabapentin  100 mg Oral TID    hydrALAZINE  25 mg Oral Q8H    mirtazapine  15 mg Oral QHS    multivitamin  1 tablet Oral Daily    senna-docusate 8.6-50 mg  1 tablet Oral BID    sodium bicarbonate  650 mg Oral TID    sodium polystyrene  30 g Oral Q4H       Review of patient's allergies indicates:   Allergen Reactions    Shellfish containing products      Causes gout to flare up         Vital Signs Range (Last 24H):  Temp:  [97.5 °F (36.4 °C)-98.2 °F (36.8 °C)]   Pulse:  [65-88]   Resp:  [16-18]   BP: (138-174)/(68-90)   SpO2:  [98 %-100 %]     I & O (Last 24H):    Intake/Output Summary (Last 24 hours) at 10/8/2019 1035  Last data filed at 10/8/2019 0600  Gross per 24 hour   Intake 360 ml   Output 2750 ml   Net -2390 ml           Physical Exam:  General appearance: well developed, well nourished, no distress  Lungs:  clear to auscultation bilaterally and normal respiratory effort  Heart: regular rate and rhythm  Abdomen: soft, non-tender non-distented; bowel sounds normal; no masses,  no organomegaly  Extremities: no cyanosis or edema, or clubbing    Laboratory:  CBC:   Recent Labs   Lab 10/07/19  0451   WBC 7.19   RBC 3.44*   HGB 10.1*   HCT 31.2*      MCV 91   MCH 29.4   MCHC 32.4     CMP:   Recent Labs   Lab 10/08/19  0533   GLU 90   CALCIUM 9.2   ALBUMIN 3.3*   PROT 6.2      K 5.3*   CO2 19*   *   BUN 24*   CREATININE 1.2   ALKPHOS 68   ALT 22   AST 20   BILITOT 0.4     Imp/Plan    VINNY - improving  Hyperkalemia - improving  Metabolic acidosis - stable, continue supplement  HTN  DM type 2  Colon CA s/p chemo/surgery  Anemia of chronic disease    Continue present Rx  Renal status stable   We'll follow prn    Thanks        Yusef Keller  10/8/2019

## 2019-10-08 NOTE — PLAN OF CARE
Problem: Physical Therapy Goal  Goal: Physical Therapy Goal  Description  Goals to be met by: 10/21/19     Patient will increase functional independence with mobility by performin. Supine to sit with Modified [place]  2. Sit to supine with Modified [place]  3. Sit to stand transfer with Stand-by Assistance  4. Gait  x 300 feet with Modified [place] using Rolling Walker.   5. Lower extremity exercise program x15 reps per handout, with independence     Outcome: Ongoing, Progressing   Pt progressing, continue with POC, HHPT and RW recommended.

## 2019-10-08 NOTE — NURSING
Report given to Rodri NESBITT. Pt resting comfortably. No acute distress noted. Safety precautions maintained.     Chart check completed.

## 2019-10-08 NOTE — SUBJECTIVE & OBJECTIVE
Interval History: Feels better. No complaints.     Review of Systems   Constitutional: Positive for activity change and fatigue. Negative for chills, diaphoresis and fever.   HENT: Negative.    Eyes: Negative.    Respiratory: Negative for shortness of breath.    Cardiovascular: Negative.    Gastrointestinal: Negative.    Endocrine: Negative.    Genitourinary: Negative.    Musculoskeletal: Negative.    Skin: Negative.    Neurological: Positive for weakness (global). Negative for dizziness and light-headedness.   Hematological: Negative.    Psychiatric/Behavioral: Negative.      Objective:     Vital Signs (Most Recent):  Temp: 98 °F (36.7 °C) (10/08/19 0712)  Pulse: 65 (10/08/19 0712)  Resp: 16 (10/08/19 0712)  BP: (!) 165/90 (10/08/19 0712)  SpO2: 100 % (10/08/19 0712) Vital Signs (24h Range):  Temp:  [97.5 °F (36.4 °C)-98.2 °F (36.8 °C)] 98 °F (36.7 °C)  Pulse:  [65-88] 65  Resp:  [16-18] 16  SpO2:  [98 %-100 %] 100 %  BP: (138-174)/(68-90) 165/90     Weight: 88.2 kg (194 lb 7.1 oz)  Body mass index is 24.97 kg/m².    Physical Exam   Constitutional: He is oriented to person, place, and time. He appears well-developed and well-nourished. No distress.   HENT:   Head: Normocephalic and atraumatic.   Mouth/Throat: No oropharyngeal exudate.   [place]   Eyes: Pupils are equal, round, and reactive to light. Conjunctivae and EOM are normal. No scleral icterus.   Neck: Normal range of motion. Neck supple. No JVD present.   Cardiovascular: Normal rate and regular rhythm.   Pulmonary/Chest: Effort normal and breath sounds normal.   Abdominal: Soft. Bowel sounds are normal.   Colostomy bag with solid stool   Musculoskeletal: Normal range of motion. He exhibits no edema, tenderness or deformity.   Neurological: He is alert and oriented to person, place, and time.   Skin: Skin is warm and dry. Capillary refill takes less than 2 seconds. No rash noted. He is not diaphoretic. No erythema. No pallor.   Psychiatric: He has a normal mood  and affect. His behavior is normal. Judgment and thought content normal.   Nursing note and vitals reviewed.       Significant Labs: All pertinent labs within the past 24 hours have been reviewed.    Significant Imaging: I have reviewed and interpreted all pertinent imaging results/findings within the past 24 hours.

## 2019-10-09 LAB
ALBUMIN SERPL BCP-MCNC: 3.6 G/DL (ref 3.5–5.2)
ALP SERPL-CCNC: 84 U/L (ref 55–135)
ALT SERPL W/O P-5'-P-CCNC: 19 U/L (ref 10–44)
ANION GAP SERPL CALC-SCNC: 11 MMOL/L (ref 8–16)
ANION GAP SERPL CALC-SCNC: 7 MMOL/L (ref 8–16)
AST SERPL-CCNC: 22 U/L (ref 10–40)
BILIRUB SERPL-MCNC: 0.5 MG/DL (ref 0.1–1)
BUN SERPL-MCNC: 19 MG/DL (ref 8–23)
BUN SERPL-MCNC: 22 MG/DL (ref 8–23)
CALCIUM SERPL-MCNC: 9.6 MG/DL (ref 8.7–10.5)
CALCIUM SERPL-MCNC: 9.7 MG/DL (ref 8.7–10.5)
CHLORIDE SERPL-SCNC: 112 MMOL/L (ref 95–110)
CHLORIDE SERPL-SCNC: 115 MMOL/L (ref 95–110)
CO2 SERPL-SCNC: 17 MMOL/L (ref 23–29)
CO2 SERPL-SCNC: 17 MMOL/L (ref 23–29)
CREAT SERPL-MCNC: 1.3 MG/DL (ref 0.5–1.4)
CREAT SERPL-MCNC: 1.3 MG/DL (ref 0.5–1.4)
EST. GFR  (AFRICAN AMERICAN): 58 ML/MIN/1.73 M^2
EST. GFR  (AFRICAN AMERICAN): 58 ML/MIN/1.73 M^2
EST. GFR  (NON AFRICAN AMERICAN): 50 ML/MIN/1.73 M^2
EST. GFR  (NON AFRICAN AMERICAN): 50 ML/MIN/1.73 M^2
GLUCOSE SERPL-MCNC: 154 MG/DL (ref 70–110)
GLUCOSE SERPL-MCNC: 175 MG/DL (ref 70–110)
POCT GLUCOSE: 143 MG/DL (ref 70–110)
POCT GLUCOSE: 152 MG/DL (ref 70–110)
POCT GLUCOSE: 153 MG/DL (ref 70–110)
POCT GLUCOSE: 171 MG/DL (ref 70–110)
POCT GLUCOSE: 183 MG/DL (ref 70–110)
POTASSIUM SERPL-SCNC: 5.4 MMOL/L (ref 3.5–5.1)
POTASSIUM SERPL-SCNC: 5.6 MMOL/L (ref 3.5–5.1)
PROT SERPL-MCNC: 7 G/DL (ref 6–8.4)
SODIUM SERPL-SCNC: 139 MMOL/L (ref 136–145)
SODIUM SERPL-SCNC: 140 MMOL/L (ref 136–145)

## 2019-10-09 PROCEDURE — 94640 AIRWAY INHALATION TREATMENT: CPT

## 2019-10-09 PROCEDURE — 80048 BASIC METABOLIC PNL TOTAL CA: CPT

## 2019-10-09 PROCEDURE — 25000003 PHARM REV CODE 250: Performed by: INTERNAL MEDICINE

## 2019-10-09 PROCEDURE — 21400001 HC TELEMETRY ROOM

## 2019-10-09 PROCEDURE — 80053 COMPREHEN METABOLIC PANEL: CPT

## 2019-10-09 PROCEDURE — 94761 N-INVAS EAR/PLS OXIMETRY MLT: CPT

## 2019-10-09 PROCEDURE — 36415 COLL VENOUS BLD VENIPUNCTURE: CPT

## 2019-10-09 PROCEDURE — 63600175 PHARM REV CODE 636 W HCPCS: Performed by: HOSPITALIST

## 2019-10-09 PROCEDURE — 63600175 PHARM REV CODE 636 W HCPCS: Performed by: INTERNAL MEDICINE

## 2019-10-09 PROCEDURE — 25000003 PHARM REV CODE 250: Performed by: HOSPITALIST

## 2019-10-09 PROCEDURE — 25000003 PHARM REV CODE 250: Performed by: EMERGENCY MEDICINE

## 2019-10-09 PROCEDURE — 63600175 PHARM REV CODE 636 W HCPCS: Performed by: EMERGENCY MEDICINE

## 2019-10-09 PROCEDURE — 25000242 PHARM REV CODE 250 ALT 637 W/ HCPCS: Performed by: HOSPITALIST

## 2019-10-09 RX ORDER — ALBUTEROL SULFATE 2.5 MG/.5ML
10 SOLUTION RESPIRATORY (INHALATION) ONCE
Status: COMPLETED | OUTPATIENT
Start: 2019-10-09 | End: 2019-10-09

## 2019-10-09 RX ORDER — DEXTROSE MONOHYDRATE 50 MG/ML
INJECTION, SOLUTION INTRAVENOUS CONTINUOUS
Status: DISCONTINUED | OUTPATIENT
Start: 2019-10-09 | End: 2019-10-10 | Stop reason: HOSPADM

## 2019-10-09 RX ORDER — HYDRALAZINE HYDROCHLORIDE 25 MG/1
50 TABLET, FILM COATED ORAL EVERY 8 HOURS
Status: DISCONTINUED | OUTPATIENT
Start: 2019-10-09 | End: 2019-10-10 | Stop reason: HOSPADM

## 2019-10-09 RX ADMIN — ONDANSETRON 4 MG: 2 INJECTION INTRAMUSCULAR; INTRAVENOUS at 12:10

## 2019-10-09 RX ADMIN — SODIUM BICARBONATE 650 MG: 325 TABLET ORAL at 04:10

## 2019-10-09 RX ADMIN — SODIUM BICARBONATE 650 MG: 325 TABLET ORAL at 08:10

## 2019-10-09 RX ADMIN — GABAPENTIN 100 MG: 100 CAPSULE ORAL at 08:10

## 2019-10-09 RX ADMIN — DEXTROSE: 5 SOLUTION INTRAVENOUS at 09:10

## 2019-10-09 RX ADMIN — THERA TABS 1 TABLET: TAB at 08:10

## 2019-10-09 RX ADMIN — HYDRALAZINE HYDROCHLORIDE 25 MG: 25 TABLET ORAL at 05:10

## 2019-10-09 RX ADMIN — SODIUM POLYSTYRENE SULFONATE 30 G: 15 SUSPENSION ORAL; RECTAL at 09:10

## 2019-10-09 RX ADMIN — SODIUM BICARBONATE 650 MG: 325 TABLET ORAL at 09:10

## 2019-10-09 RX ADMIN — GABAPENTIN 100 MG: 100 CAPSULE ORAL at 09:10

## 2019-10-09 RX ADMIN — SENNOSIDES, DOCUSATE SODIUM 1 TABLET: 50; 8.6 TABLET, FILM COATED ORAL at 08:10

## 2019-10-09 RX ADMIN — GABAPENTIN 100 MG: 100 CAPSULE ORAL at 04:10

## 2019-10-09 RX ADMIN — HYDRALAZINE HYDROCHLORIDE 50 MG: 25 TABLET ORAL at 09:10

## 2019-10-09 RX ADMIN — ALBUTEROL SULFATE 10 MG: 2.5 SOLUTION RESPIRATORY (INHALATION) at 04:10

## 2019-10-09 RX ADMIN — MIRTAZAPINE 15 MG: 15 TABLET, FILM COATED ORAL at 09:10

## 2019-10-09 RX ADMIN — AMLODIPINE BESYLATE 10 MG: 5 TABLET ORAL at 08:10

## 2019-10-09 RX ADMIN — ALBUTEROL SULFATE 10 MG: 2.5 SOLUTION RESPIRATORY (INHALATION) at 06:10

## 2019-10-09 RX ADMIN — SODIUM CHLORIDE: 0.9 INJECTION, SOLUTION INTRAVENOUS at 04:10

## 2019-10-09 RX ADMIN — SODIUM POLYSTYRENE SULFONATE 30 G: 15 SUSPENSION ORAL; RECTAL at 01:10

## 2019-10-09 RX ADMIN — SENNOSIDES, DOCUSATE SODIUM 1 TABLET: 50; 8.6 TABLET, FILM COATED ORAL at 09:10

## 2019-10-09 RX ADMIN — HYDRALAZINE HYDROCHLORIDE 50 MG: 25 TABLET ORAL at 01:10

## 2019-10-09 RX ADMIN — DULOXETINE 30 MG: 30 CAPSULE, DELAYED RELEASE ORAL at 08:10

## 2019-10-09 RX ADMIN — DULOXETINE 30 MG: 30 CAPSULE, DELAYED RELEASE ORAL at 09:10

## 2019-10-09 RX ADMIN — SODIUM POLYSTYRENE SULFONATE 30 G: 15 SUSPENSION ORAL; RECTAL at 06:10

## 2019-10-09 RX ADMIN — ENOXAPARIN SODIUM 30 MG: 100 INJECTION SUBCUTANEOUS at 06:10

## 2019-10-09 NOTE — PT/OT/SLP PROGRESS
Occupational Therapy      Patient Name:  Eugene Gamez   MRN:  [id number]    Patient not seen today secondary to Patient ill (Pt with N/V today and not feeling well). Will follow-up as able.    CAROLYN Kwon  10/9/2019

## 2019-10-09 NOTE — PROGRESS NOTES
[facility] Hospital Medicine  Progress Note    Patient Name: Eugene Gamez  MRN: [id number]  Patient Class: IP- Inpatient   Admission Date: 10/6/2019  Length of Stay: 3 days  Attending Physician: Candelario Pedraza MD  Primary Care Provider: Larry Monae MD        Subjective:     Principal Problem:Hyperkalemia        HPI:  85 yo male with hx of stage IV colon cancer (s/p chemo and surgery now with colostomy bag, followed by Dr. Vimal Pillai), CKD stage 3, HTN, DM type 2,  presenting to ED with progressively worsening generalized weakness for the past several days with associated PEREZ and dizziness. Family noticed significant fatigue today prompted ED visit. Family or patient denies any fevers, chills, syncope, LOC, trauma, fall, cp, cough, abdominal pan, N/V, diarrhea, constipation, URI, dysuria, bleeds, rash etc. No recent travel or sick contacts. He is hard of hearing.    In the ED patient was HDS and afebrile. Complaining of generalized weakness and dyspnea. Saturating well on RA. Labs significant for K 6.6, bicab 14, AG 8, Albumin 4, LA 2.2, Cr 1.9 (baseline around 1.5). Trop wnl. EKG fairly unremarkable. CXR wnl. Nephrology and oncology consulted.  requested for admission for hyperkalemia.    Overview/Hospital Course:  Presenting with generalized weakness found to have potassium of 6.6. EKG wo any ischemic changes or peaked T waves. Denies any potassium supplement use at home or trauma. Labs also noting NAGMA and mild renal insufficiency. ?RTA type 4. Nephrology consulted. Treated in the ED with insulin, dextrose, fluids, bicarb and kayexalate. Urine studies consistent with RTA. Repeat K 5.2. Started sodium bicarb tabs. PT OT rec HH. Pending oncology. Possible d/c tomorrow if stable with HH with PT OT.has been  Spoken to patient's oncologist, and patient doesn't seem to have any cancer recurrence on last scan. Patient to see his oncology OP.  Still has hyperkalemia,started on  kayexalate.    Interval History: Feels better. No complaints.     Review of Systems   Constitutional: Positive for activity change and fatigue. Negative for chills, diaphoresis and fever.   HENT: Negative.    Eyes: Negative.    Respiratory: Negative for shortness of breath.    Cardiovascular: Negative.    Gastrointestinal: Negative.    Endocrine: Negative.    Genitourinary: Negative.    Musculoskeletal: Negative.    Skin: Negative.    Neurological: Positive for weakness (global). Negative for dizziness and light-headedness.   Hematological: Negative.    Psychiatric/Behavioral: Negative.      Objective:     Vital Signs (Most Recent):  Temp: 98.4 °F (36.9 °C) (10/09/19 0717)  Pulse: 101 (10/09/19 0717)  Resp: 18 (10/09/19 0717)  BP: (!) 178/99 (10/09/19 0717)  SpO2: 100 % (10/09/19 0717) Vital Signs (24h Range):  Temp:  [97.9 °F (36.6 °C)-98.4 °F (36.9 °C)] 98.4 °F (36.9 °C)  Pulse:  [] 101  Resp:  [16-18] 18  SpO2:  [95 %-100 %] 100 %  BP: (121-178)/(65-99) 178/99     Weight: 91.1 kg (200 lb 13.4 oz)  Body mass index is 25.79 kg/m².    Physical Exam   Constitutional: He is oriented to person, place, and time. He appears well-developed and well-nourished. No distress.   HENT:   Head: Normocephalic and atraumatic.   Mouth/Throat: No oropharyngeal exudate.   [place]   Eyes: Pupils are equal, round, and reactive to light. Conjunctivae and EOM are normal. No scleral icterus.   Neck: Normal range of motion. Neck supple. No JVD present.   Cardiovascular: Normal rate and regular rhythm.   Pulmonary/Chest: Effort normal and breath sounds normal.   Abdominal: Soft. Bowel sounds are normal.   Colostomy bag with solid stool   Musculoskeletal: Normal range of motion. He exhibits no edema, tenderness or deformity.   Neurological: He is alert and oriented to person, place, and time.   Skin: Skin is warm and dry. Capillary refill takes less than 2 seconds. No rash noted. He is not diaphoretic. No erythema. No pallor.    Psychiatric: He has a normal mood and affect. His behavior is normal. Judgment and thought content normal.   Nursing note and vitals reviewed.       Significant Labs: All pertinent labs within the past 24 hours have been reviewed.    Significant Imaging: I have reviewed and interpreted all pertinent imaging results/findings within the past 24 hours.      Assessment/Plan:      * Hyperkalemia  Presenting with generalized weakness found to have potassium of 6.6. EKG wo any ischemic changes or peaked T waves. Denies any potassium supplement use at home or trauma. Labs also noting NAGMA. ?RTA type 4. Nephrology consulted. Treated in the ED with insulin, dextrose, fluids, bicarb and kayexalate. Repeat K 5.2. Continue fluids at lower rate for now.  Still has hyperkalemia,started on kayexalate.    Acute renal failure superimposed on stage 3 chronic kidney disease  Improved with IVF,will check bladder scan,      Metabolic acidosis, normal anion gap (NAG)  AG 8 with bicarb of 14. Albumin wnl. NAGMA. ?RTA. Urine gap + consistent with RTA. Bicarb tabs.    CKD (chronic kidney disease) stage 3, GFR 30-59 ml/min  Noted. Mild renal insufficieny noted with hyperkalemia. Consult nephrology. Fluids. Avoid nephrotoxic agents.    Stage IV carcinoma of colon  Hx of stage IV colon cancer s/p chemo surgery now with colostomy bag.  No recurrence as per recent PET, but will get CEA  OP oncology FU    DM (diabetes mellitus) type II controlled with renal manifestation  Noted. Hold home meds. SSI and accuchecks with hypoglycemia protocol.      VTE Risk Mitigation (From admission, onward)         Ordered     enoxaparin injection 30 mg  Daily      10/06/19 1811     IP VTE HIGH RISK PATIENT  Once      10/06/19 1811     Place ARI hose  Until discontinued      10/06/19 1811                      Candelario Pedraza MD  Department of Hospital Medicine   [facility]

## 2019-10-09 NOTE — PT/OT/SLP PROGRESS
Physical Therapy      Patient Name:  Eugene Gamez   MRN:  [id number]    Patient not seen today secondary to Pt declined 2/2 nausea and vomiting. Will follow-up 10/10/2019.    Lennie Meredith, PT

## 2019-10-09 NOTE — NURSING
Patient didn't received kaexylate on 10/8 because the order stipulated not to give if K+ is <5.4. The patient's K+ was 5.3 therefore it was not given.

## 2019-10-09 NOTE — SUBJECTIVE & OBJECTIVE
Interval History: Feels better. No complaints.     Review of Systems   Constitutional: Positive for activity change and fatigue. Negative for chills, diaphoresis and fever.   HENT: Negative.    Eyes: Negative.    Respiratory: Negative for shortness of breath.    Cardiovascular: Negative.    Gastrointestinal: Negative.    Endocrine: Negative.    Genitourinary: Negative.    Musculoskeletal: Negative.    Skin: Negative.    Neurological: Positive for weakness (global). Negative for dizziness and light-headedness.   Hematological: Negative.    Psychiatric/Behavioral: Negative.      Objective:     Vital Signs (Most Recent):  Temp: 98.4 °F (36.9 °C) (10/09/19 0717)  Pulse: 101 (10/09/19 0717)  Resp: 18 (10/09/19 0717)  BP: (!) 178/99 (10/09/19 0717)  SpO2: 100 % (10/09/19 0717) Vital Signs (24h Range):  Temp:  [97.9 °F (36.6 °C)-98.4 °F (36.9 °C)] 98.4 °F (36.9 °C)  Pulse:  [] 101  Resp:  [16-18] 18  SpO2:  [95 %-100 %] 100 %  BP: (121-178)/(65-99) 178/99     Weight: 91.1 kg (200 lb 13.4 oz)  Body mass index is 25.79 kg/m².    Physical Exam   Constitutional: He is oriented to person, place, and time. He appears well-developed and well-nourished. No distress.   HENT:   Head: Normocephalic and atraumatic.   Mouth/Throat: No oropharyngeal exudate.   [place]   Eyes: Pupils are equal, round, and reactive to light. Conjunctivae and EOM are normal. No scleral icterus.   Neck: Normal range of motion. Neck supple. No JVD present.   Cardiovascular: Normal rate and regular rhythm.   Pulmonary/Chest: Effort normal and breath sounds normal.   Abdominal: Soft. Bowel sounds are normal.   Colostomy bag with solid stool   Musculoskeletal: Normal range of motion. He exhibits no edema, tenderness or deformity.   Neurological: He is alert and oriented to person, place, and time.   Skin: Skin is warm and dry. Capillary refill takes less than 2 seconds. No rash noted. He is not diaphoretic. No erythema. No pallor.   Psychiatric: He has a  normal mood and affect. His behavior is normal. Judgment and thought content normal.   Nursing note and vitals reviewed.       Significant Labs: All pertinent labs within the past 24 hours have been reviewed.    Significant Imaging: I have reviewed and interpreted all pertinent imaging results/findings within the past 24 hours.

## 2019-10-10 VITALS
SYSTOLIC BLOOD PRESSURE: 143 MMHG | WEIGHT: 188.06 LBS | BODY MASS INDEX: 24.13 KG/M2 | HEIGHT: 74 IN | OXYGEN SATURATION: 97 % | HEART RATE: 109 BPM | DIASTOLIC BLOOD PRESSURE: 85 MMHG | RESPIRATION RATE: 18 BRPM | TEMPERATURE: 98 F

## 2019-10-10 LAB
ALBUMIN SERPL BCP-MCNC: 3.8 G/DL (ref 3.5–5.2)
ALP SERPL-CCNC: 78 U/L (ref 55–135)
ALT SERPL W/O P-5'-P-CCNC: 22 U/L (ref 10–44)
ANION GAP SERPL CALC-SCNC: 10 MMOL/L (ref 8–16)
AST SERPL-CCNC: 22 U/L (ref 10–40)
BILIRUB SERPL-MCNC: 0.6 MG/DL (ref 0.1–1)
BUN SERPL-MCNC: 22 MG/DL (ref 8–23)
CALCIUM SERPL-MCNC: 10 MG/DL (ref 8.7–10.5)
CHLORIDE SERPL-SCNC: 108 MMOL/L (ref 95–110)
CO2 SERPL-SCNC: 23 MMOL/L (ref 23–29)
CREAT SERPL-MCNC: 1.4 MG/DL (ref 0.5–1.4)
EST. GFR  (AFRICAN AMERICAN): 53 ML/MIN/1.73 M^2
EST. GFR  (NON AFRICAN AMERICAN): 46 ML/MIN/1.73 M^2
GLUCOSE SERPL-MCNC: 194 MG/DL (ref 70–110)
POCT GLUCOSE: 202 MG/DL (ref 70–110)
POTASSIUM SERPL-SCNC: 4.9 MMOL/L (ref 3.5–5.1)
PROT SERPL-MCNC: 7.2 G/DL (ref 6–8.4)
SODIUM SERPL-SCNC: 141 MMOL/L (ref 136–145)

## 2019-10-10 PROCEDURE — 25000003 PHARM REV CODE 250: Performed by: EMERGENCY MEDICINE

## 2019-10-10 PROCEDURE — 36415 COLL VENOUS BLD VENIPUNCTURE: CPT

## 2019-10-10 PROCEDURE — 25000003 PHARM REV CODE 250: Performed by: HOSPITALIST

## 2019-10-10 PROCEDURE — 25000003 PHARM REV CODE 250: Performed by: INTERNAL MEDICINE

## 2019-10-10 PROCEDURE — 63600175 PHARM REV CODE 636 W HCPCS: Performed by: INTERNAL MEDICINE

## 2019-10-10 PROCEDURE — 80053 COMPREHEN METABOLIC PANEL: CPT

## 2019-10-10 PROCEDURE — 97535 SELF CARE MNGMENT TRAINING: CPT

## 2019-10-10 PROCEDURE — 97530 THERAPEUTIC ACTIVITIES: CPT

## 2019-10-10 RX ORDER — AMLODIPINE BESYLATE 10 MG/1
10 TABLET ORAL DAILY
Qty: 30 TABLET | Refills: 0 | Status: SHIPPED | OUTPATIENT
Start: 2019-10-10 | End: 2022-03-14 | Stop reason: SDUPTHER

## 2019-10-10 RX ORDER — METOPROLOL TARTRATE 50 MG/1
50 TABLET ORAL 2 TIMES DAILY
Status: DISCONTINUED | OUTPATIENT
Start: 2019-10-10 | End: 2019-10-10 | Stop reason: HOSPADM

## 2019-10-10 RX ORDER — HYDRALAZINE HYDROCHLORIDE 100 MG/1
100 TABLET, FILM COATED ORAL EVERY 8 HOURS
Qty: 90 TABLET | Refills: 0 | Status: ON HOLD | OUTPATIENT
Start: 2019-10-10 | End: 2022-02-27

## 2019-10-10 RX ORDER — METOPROLOL TARTRATE 50 MG/1
50 TABLET ORAL 2 TIMES DAILY
Qty: 60 TABLET | Refills: 0 | Status: SHIPPED | OUTPATIENT
Start: 2019-10-10 | End: 2022-02-27

## 2019-10-10 RX ORDER — PROCHLORPERAZINE MALEATE 10 MG
10 TABLET ORAL EVERY 6 HOURS PRN
Qty: 20 TABLET | Refills: 0 | Status: SHIPPED | OUTPATIENT
Start: 2019-10-10 | End: 2019-10-15

## 2019-10-10 RX ADMIN — AMLODIPINE BESYLATE 10 MG: 5 TABLET ORAL at 08:10

## 2019-10-10 RX ADMIN — INSULIN ASPART 2 UNITS: 100 INJECTION, SOLUTION INTRAVENOUS; SUBCUTANEOUS at 09:10

## 2019-10-10 RX ADMIN — FERROUS GLUCONATE TAB 324 MG (37.5 MG ELEMENTAL IRON) 324 MG: 324 (37.5 FE) TAB at 08:10

## 2019-10-10 RX ADMIN — HYDRALAZINE HYDROCHLORIDE 50 MG: 25 TABLET ORAL at 05:10

## 2019-10-10 RX ADMIN — GABAPENTIN 100 MG: 100 CAPSULE ORAL at 08:10

## 2019-10-10 RX ADMIN — METOPROLOL TARTRATE 50 MG: 50 TABLET ORAL at 11:10

## 2019-10-10 RX ADMIN — DULOXETINE 30 MG: 30 CAPSULE, DELAYED RELEASE ORAL at 08:10

## 2019-10-10 RX ADMIN — SENNOSIDES, DOCUSATE SODIUM 1 TABLET: 50; 8.6 TABLET, FILM COATED ORAL at 08:10

## 2019-10-10 RX ADMIN — THERA TABS 1 TABLET: TAB at 08:10

## 2019-10-10 RX ADMIN — SODIUM BICARBONATE 650 MG: 325 TABLET ORAL at 08:10

## 2019-10-10 NOTE — PLAN OF CARE
10/10/19 0921   Final Note   Assessment Type Final Discharge Note   Anticipated Discharge Disposition Home-Health   What phone number can be called within the next 1-3 days to see how you are doing after discharge? [phone]   Hospital Follow Up  Appt(s) scheduled? Yes   Discharge plans and expectations educations in teach back method with documentation complete? Yes   Right Care Referral Info   Post Acute Recommendation Home-care   Referral Type Home Health   Facility Name [facility]   Pt's  RW brought to his room with instructions.

## 2019-10-10 NOTE — PT/OT/SLP PROGRESS
"Physical Therapy Treatment    Patient Name:  Eugene Gamez   MRN:  [id number]    Recommendations:     Discharge Recommendations:  home health PT   Discharge Equipment Recommendations: tub bench, walker, rolling   Barriers to discharge: None    Assessment:     Eugene Gamez is a 84 y.o. male admitted with a medical diagnosis of Hyperkalemia.  He presents with the following impairments/functional limitations: weakness, impaired balance, impaired self care skills, decreased safety awareness, impaired endurance, gait instability, impaired functional mobilty, decreased lower extremity function .    Rehab Prognosis: Good; patient would benefit from acute skilled PT services to address these deficits and reach maximum level of function.    Recent Surgery: * No surgery found *      Plan:     During this hospitalization, patient to be seen 5 x/week to address the identified rehab impairments via gait training, therapeutic activities, therapeutic exercises and progress toward the following goals:    · Plan of Care Expires:  10/21/19    Subjective     Chief Complaint: none   Patient/Family Comments/goals: " I am going home today "   Pain/Comfort:  · Pain Rating Post-Intervention 1: 0/10      Objective:     Communicated with nurse prior to session.  Patient found up in chair well dressed with   telemetry upon PT entry to room.     General Precautions: Standard, hearing impaired, diabetic, fall   Orthopedic Precautions:N/A   Braces:   N/A       AM-PAC 6 CLICK MOBILITY  Turning over in bed (including adjusting bedclothes, sheets and blankets)?: 4  Sitting down on and standing up from a chair with arms (e.g., wheelchair, bedside commode, etc.): 3  Moving from lying on back to sitting on the side of the bed?: 4  Moving to and from a bed to a chair (including a wheelchair)?: 4  Need to walk in hospital room?: 3  Climbing 3-5 steps with a railing?: 3  Basic Mobility Total Score: 21       Therapeutic Activities and Exercises:   Pt " performed seated BLE 10 reps : AP, LAQ,   Hip abd/add with pillow , Hip flexion. V/T's cues for technique and sequence. Pt tolerated well.   Educated pt on safety awareness with all OOB mobility , transfer and gait training.   BLE HEP given with instructions and demonstrations (pt   verbalized and demonstrated good understanding). Encouraged pt to perform BLE ex's per handout throughout the day.     Patient left up in chair with all lines intact, call button in reach and nurse notified..    GOALS:   Multidisciplinary Problems     Physical Therapy Goals        Problem: Physical Therapy Goal    Goal Priority Disciplines Outcome Goal Variances Interventions   Physical Therapy Goal     PT, PT/OT Ongoing, Progressing     Description:  Goals to be met by: 10/21/19     Patient will increase functional independence with mobility by performin. Supine to sit with Modified [place]  2. Sit to supine with Modified [place]  3. Sit to stand transfer with Stand-by Assistance  4. Gait  x 300 feet with Modified [place] using Rolling Walker.   5. Lower extremity exercise program x15 reps per handout, with independence                      Time Tracking:     PT Received On: 10/10/19  PT Start Time: 1000     PT Stop Time: 1010  PT Total Time (min): 10 min     Billable Minutes: Therapeutic Activity 10             PTA Visit Number: 0     Tram T Olivia, PTA  10/10/2019

## 2019-10-10 NOTE — PLAN OF CARE
"   10/10/19 0918   Post-Acute Status   Post-Acute Authorization Home Health/Hospice   Home Health/Hospice Status Set-up Complete   EDUCATION: Pt. provided with educational information on HTN.  Information reviewed and placed in :My Healthcare Packet" to be brought home for him to use as resource after discharge.  Information included:  signs and symptoms to look for and call the doctor if experiencing, and symptoms that may indicate a medical emergency: CALL 911.      All questions answered.  Teach back method used.    Patient stated, " Bad headache, weakness in my leg arms on one side of my face call the Dr. ".  Pt's nurse Thao RANDOLPH needs are met and is awaiting walker to be delivered to bedside.        "

## 2019-10-10 NOTE — PLAN OF CARE
10/10/19 1100   Post-Acute Status   Post-Acute Authorization Other   Discharge Delays (!) Patient and Family Barriers  (Pt's awaiting family for transportation)

## 2019-10-10 NOTE — PROGRESS NOTES
Bedside report given to oncoming nurse CECI Osuna noted. Pt lying in bed, Respirations even and unlabored. Safety maintained, Call light within reach.     24 hr chart check completed.

## 2019-10-10 NOTE — PLAN OF CARE
Problem: Occupational Therapy Goal  Goal: Occupational Therapy Goal  Description  Goals to be met by: 10/21/19     Patient will increase functional independence with ADLs by performing:    LE Dressing with Minimal Assistance.  Grooming while standing at sink with Stand-by Assistance.  Toileting from bedside commode with Stand-by Assistance for hygiene and clothing management.   Supine to sit with Supervision. (goal MET 10/10/19)  Step transfer with Stand-by Assistance  Toilet transfer to bedside commode with Stand-by Assistance.  Upper extremity exercise program x10 reps per handout, with independence.      Outcome: Ongoing, Progressing  Pt will continue to benefit from acute OT in order to increase safety awareness and independence with ADLs and all aspects of functional mobility. OT rec. HHOT with 24 hour supervision at d/c to regain PLOF and reduce risk of falls.     At end of session, pt's HR increased to 130s-160s during lower body dressing. Session was ended and pt was reclined back. Nurse, Thao, present.

## 2019-10-10 NOTE — PLAN OF CARE
Problem: Adult Inpatient Plan of Care  Goal: Plan of Care Review  Outcome: Ongoing, Progressing  Flowsheets (Taken 10/10/2019 1027)  Plan of Care Reviewed With: patient; daughter     Problem: Adult Inpatient Plan of Care  Goal: Plan of Care Review  Outcome: Ongoing, Progressing  Flowsheets (Taken 10/10/2019 1027)  Plan of Care Reviewed With: patient; daughter     Problem: Fall Injury Risk  Goal: Absence of Fall and Fall-Related Injury  Outcome: Met  Intervention: Identify and Manage Contributors to Fall Injury Risk  Flowsheets (Taken 10/10/2019 1027)  Self-Care Promotion: independence encouraged; BADL personal objects within reach; meal setup provided; safe use of adaptive equipment encouraged  Medication Review/Management: medications reviewed; high risk medications identified     Problem: Fall Injury Risk  Goal: Absence of Fall and Fall-Related Injury  Outcome: Met  Intervention: Identify and Manage Contributors to Fall Injury Risk  Flowsheets (Taken 10/10/2019 1027)  Self-Care Promotion: independence encouraged; BADL personal objects within reach; meal setup provided; safe use of adaptive equipment encouraged  Medication Review/Management: medications reviewed; high risk medications identified     Problem: Fall Injury Risk  Goal: Absence of Fall and Fall-Related Injury  Intervention: Identify and Manage Contributors to Fall Injury Risk  Flowsheets (Taken 10/10/2019 1027)  Self-Care Promotion: independence encouraged; BADL personal objects within reach; meal setup provided; safe use of adaptive equipment encouraged  Medication Review/Management: medications reviewed; high risk medications identified

## 2019-10-10 NOTE — PROGRESS NOTES
[facility] CASE MANAGEMENT                  WRITTEN DISCHARGE INFORMATION      APPOINTMENTS AND RESOURCES TO HELP YOU MANAGE YOUR CARE AT HOME BASED ON YOUR PREFERENCES:  (If an appointment is not scheduled for you when you leave the hospital, call your doctor to schedule a follow up visit within a week)  Follow-up Information     Larry Monae MD On 10/21/2019.    Specialties:  Family Medicine, Wound Care  Why:  Hospital follow up  @ 100pm  Contact information:  [address] [place] [address]  [phone]             [facility].    Specialties:  DME Provider, Home Health Services  Why:  Nurse to call  Contact information:  [address]  [address]  [place] [address]  [phone]                       Healthy Living Instructions to HELP MANAGE YOUR CARE AT HOME:  Things You are responsible for:  1.    Getting your prescriptions filled   2.    Taking your medications as directed, DO NOT MISS ANY DOSES!  3.    Following the diet and exercise recommended by your doctor  4.    Going to your follow-up doctor appointment. This is important because it allows the doctor to monitor your progress and determine if any changes need to made to your treatment plan.  5. If you have any questions about MANAGING YOUR CARE AT HOME Call the Nurse Care Line for 24/7 Assistance [phone]       Please answer any calls you may receive from [facility]. We want to continue to support you as you manage your healthcare needs. [facility] is happy to have the opportunity to serve you.      Thank you for choosing [facility] for your healthcare needs!  Your [facility] Case Management Team,

## 2019-10-10 NOTE — NURSING
In preparation for discharge, removal of patient's saline lock and heart monitor per policy. Discharge instructions giving to patient. No distress noted.

## 2019-10-10 NOTE — PLAN OF CARE
[facility] HOME HEALTH ORDERS  FACE TO FACE ENCOUNTER    Patient Name: Eugene Gamez  YOB: 1935    PCP: Larry Monae MD   PCP Address: [address]  PCP Phone Number: [phone]  PCP Fax: [phone]    Encounter Date: 10/10/2019    Admit to Home Health    Diagnoses:  Active Hospital Problems    Diagnosis  POA    *Hyperkalemia [E87.5]  Yes    Acute renal failure superimposed on stage 3 chronic kidney disease [N17.9, N18.3]  Yes    Metabolic acidosis, normal anion gap (NAG) [E87.2]  Yes    CKD (chronic kidney disease) stage 3, GFR 30-59 ml/min [N18.3]  Yes    Stage IV carcinoma of colon [C18.9]  Yes    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       Future Appointments   Date Time Provider Department Center   10/21/2019  1:00 PM Larry Monae MD [facility]     Follow-up Information     Larry Monae MD On 10/21/2019.    Specialties:  Family Medicine, Wound Care  Why:  Hospital follow up  @ 100pm  Contact information:  [address] [phone]             Larry Monae MD In 1 week.    Specialties:  Family Medicine, Wound Care  Contact information:  [address] [id number] [phone]                     I have seen and examined this patient face to face today. My clinical findings that support the need for the home health skilled services and home bound status are the following:  Weakness/numbness causing balance and gait disturbance due to Weakness/Debility making it taxing to leave home.    Allergies:  Review of patient's allergies indicates:   Allergen Reactions    Shellfish containing products      Causes gout to flare up       Diet: diabetic diet: 2000 calorie and renal diet    Activities: activity as tolerated    Nursing:   SN to complete comprehensive assessment including  routine vital signs. Instruct on disease process and s/s of complications to report to MD. Review/verify medication list sent home with the patient at time of discharge  and instruct patient/caregiver as needed. Frequency may be adjusted depending on start of care date.    Notify MD if SBP > 160 or < 90; DBP > 90 or < 50; HR > 120 or < 50; Temp > 101; Other:         CONSULTS:    Physical Therapy to evaluate and treat. Evaluate for home safety and equipment needs; Establish/upgrade home exercise program. Perform / instruct on therapeutic exercises, gait training, transfer training, and Range of Motion.  Occupational Therapy to evaluate and treat. Evaluate home environment for safety and equipment needs. Perform/Instruct on transfers, ADL training, ROM, and therapeutic exercises.    MISCELLANEOUS CARE:  Routine Skin for Bedridden Patients: Instruct patient/caregiver to apply moisture barrier cream to all skin folds and wet areas in perineal area daily and after baths and all bowel movements.    WOUND CARE ORDERS  n/a    Medications: Review discharge medications with patient and family and provide education.      I certify that this patient is confined to his home and needs intermittent skilled nursing care, physical therapy and occupational therapy.

## 2019-10-10 NOTE — PT/OT/SLP PROGRESS
"Occupational Therapy   Treatment    Name: Eugene Gamez  MRN: [id number]  Admitting Diagnosis:  Hyperkalemia       Recommendations:     Discharge Recommendations: home health OT(with 24 hr sup/assist)  Discharge Equipment Recommendations:  tub bench, walker, rolling  Barriers to discharge:  None    Assessment:     Eugene Gamez is a 84 y.o. male with a medical diagnosis of Hyperkalemia.   Performance deficits affecting function are weakness, impaired functional mobilty, decreased safety awareness, impaired endurance, gait instability, impaired fine motor, impaired balance, decreased upper extremity function, impaired self care skills, decreased lower extremity function, impaired cardiopulmonary response to activity.     While donning pants seated and using str cane as a dressing stick, pt's HR increased to 130s bpm. Pt rested from activity and after time, pt stood to estevan pants with assistance and HR increased to 160 bpm. Pt then sat down and session ended. OT followed up with Thao and HR decreased back to baseline with rest. Pt did not report of any CP, SOB, or dizziness- just c/o stomach feeling a little "woozy." NurseThao, notified/present.     Rehab Prognosis:  Good; patient would benefit from acute skilled OT services to address these deficits and reach maximum level of function.       Plan:     Patient to be seen 5 x/week to address the above listed problems via self-care/home management, therapeutic activities, therapeutic exercises  · Plan of Care Expires: 10/21/19  · Plan of Care Reviewed with: patient, daughter    Subjective     Chief complaint: ready to go home   Patient's comments/goals: stomach felt "woozy," but not nauseous     Pain/Comfort:  · Pain Rating 1: 0/10    Objective:     Communicated with: nurseThao, prior to session.  Patient found up in chair with telemetry, peripheral IV, colostomy(Avasys) upon OT entry to room.    General Precautions: Standard, fall, diabetic, hearing impaired "   Orthopedic Precautions:N/A   Braces: N/A     Occupational Performance:     Bed Mobility:    · Patient completed Scooting/Bridging with supervision  · Patient completed Supine to Sit with supervision and HOB elevated     Functional Mobility/Transfers:  · Patient completed Sit <> Stand Transfer with contact guard assistance  with  rolling walker   · Patient completed Bed <> Chair Transfer using Step Transfer technique with contact guard assistance and minimum assistance with rolling walker  · Functional Mobility: Pt ambulated from bed>sink to complete grooming standing with CGA for balance/safety. Pt then stood 2 times from the chair to estevan briefs and pants, requiring MIN A for balance.     Activities of Daily Living:  · Feeding:  independence with pouring water into cup   · Grooming: contact guard assistance standing at the sink to brush teeth and wash face   · Upper Body Dressing: minimum assistance to help buttons on personal shirt (has assist with this at baseline); pt able to estevan shirt with set-up   · Lower Body Dressing: moderate assistance to help estevan pants (pt required increased assistance because his stomach hurt when bending down to thread B/L LE through pant legs);  pt able to estevan briefs using str cane as a dressing stick with MIN A to finish donning them while standing; total assist to estevan socks (pt has stool at home with homemade dressing device to increase independence)      [facility] 6 Click ADL: 19    Treatment & Education:  Pt re-educated on OT role/POC.   Importance of OOB activity with staff assistance.  Re-edu on in-room safety and use of call bell   Safety during functional t/f and mobility   Edu on safety with stand>sit transfer with safe positioning of RW and hand placement   Edu on safety/set-up for lower body dressing at home; importance of completing this functional activity seated   White board updated   Multiple self-care tasks/functional mobility completed- assistance level noted above    All questions/concerns answered within OT scope of practice       Patient left up in chair with all lines intact, call button in reach, nurse, Thao, notified and door open with lap tray positioned in front of pt Education:      GOALS:   Multidisciplinary Problems     Occupational Therapy Goals        Problem: Occupational Therapy Goal    Goal Priority Disciplines Outcome Interventions   Occupational Therapy Goal     OT, PT/OT Ongoing, Progressing    Description:  Goals to be met by: 10/21/19     Patient will increase functional independence with ADLs by performing:    LE Dressing with Minimal Assistance.  Grooming while standing at sink with Stand-by Assistance.  Toileting from bedside commode with Stand-by Assistance for hygiene and clothing management.   Supine to sit with Supervision. (goal MET 10/10/19)  Step transfer with Stand-by Assistance  Toilet transfer to bedside commode with Stand-by Assistance.  Upper extremity exercise program x10 reps per handout, with independence.                       Time Tracking:     OT Date of Treatment: 10/10/19  OT Start Time: 0842  OT Stop Time: 0930  OT Total Time (min): 48 min    Billable Minutes:Self Care/Home Management 40 min   Total Time 48 min (8 min taken out d/t case management with pt)     Isaura Linder OT  10/10/2019

## 2019-10-10 NOTE — PLAN OF CARE
Problem: Physical Therapy Goal  Goal: Physical Therapy Goal  Description  Goals to be met by: 10/21/19     Patient will increase functional independence with mobility by performin. Supine to sit with Modified [place]  2. Sit to supine with Modified [place]  3. Sit to stand transfer with Stand-by Assistance  4. Gait  x 300 feet with Modified [place] using Rolling Walker.   5. Lower extremity exercise program x15 reps per handout, with independence     Outcome: Ongoing, Progressing   BLE HEP given with instruction and demonstration. Pt demonstrated and verbalized understanding.

## 2019-10-10 NOTE — DISCHARGE SUMMARY
[facility] Hospital Medicine  Discharge Summary      Patient Name: Eugene Gamez  MRN: [id number]  Admission Date: 10/6/2019  Hospital Length of Stay: 4 days  Discharge Date and Time:  10/10/2019 11:19 AM  Attending Physician: Candelario Pedraza MD   Discharging Provider: Candelario Pedraza MD  Primary Care Provider: Larry Monae MD      HPI:   85 yo male with hx of stage IV colon cancer (s/p chemo and surgery now with colostomy bag, followed by Dr. Vimal Pillai), CKD stage 3, HTN, DM type 2,  presenting to ED with progressively worsening generalized weakness for the past several days with associated PEREZ and dizziness. Family noticed significant fatigue today prompted ED visit. Family or patient denies any fevers, chills, syncope, LOC, trauma, fall, cp, cough, abdominal pan, N/V, diarrhea, constipation, URI, dysuria, bleeds, rash etc. No recent travel or sick contacts. He is hard of hearing.    In the ED patient was HDS and afebrile. Complaining of generalized weakness and dyspnea. Saturating well on RA. Labs significant for K 6.6, bicab 14, AG 8, Albumin 4, LA 2.2, Cr 1.9 (baseline around 1.5). Trop wnl. EKG fairly unremarkable. CXR wnl. Nephrology and oncology consulted.  requested for admission for hyperkalemia.    * No surgery found *      Hospital Course:   Presenting with generalized weakness found to have potassium of 6.6. EKG wo any ischemic changes or peaked T waves. Denies any potassium supplement use at home or trauma. Labs also noting NAGMA and mild renal insufficiency. ?RTA type 4. Nephrology consulted. Treated in the ED with insulin, dextrose, fluids, bicarb and kayexalate. Urine studies consistent with RTA. Repeat K 5.2. Started sodium bicarb tabs. PT OT rec HH..has been  Spoken to patient's oncologist, and patient doesn't seem to have any cancer recurrence on last scan. Patient to see his oncology OP.  Still had hyperkalemia,started on kayexalate.his hyperkalemia  "and ARF  is resolved,patient has been discharged home with .and follow up with PCP as out patient.     Consults:   Consults (From admission, onward)        Status Ordering Provider     Inpatient consult to Nephrology  Once     Provider:  Hemalatha Hays MD    Completed BILL MARCELO JR          No new Assessment & Plan notes have been filed under this hospital service since the last note was generated.  Service: Hospital Medicine    Final Active Diagnoses:    Diagnosis Date Noted POA    PRINCIPAL PROBLEM:  Hyperkalemia [E87.5] 10/06/2019 Yes    Acute renal failure superimposed on stage 3 chronic kidney disease [N17.9, N18.3] 10/08/2019 Yes    Metabolic acidosis, normal anion gap (NAG) [E87.2] 10/06/2019 Yes    CKD (chronic kidney disease) stage 3, GFR 30-59 ml/min [N18.3] 07/06/2016 Yes    Stage IV carcinoma of colon [C18.9] 11/20/2015 Yes    DM (diabetes mellitus) type II controlled with renal manifestation [E11.29]  Yes      Problems Resolved During this Admission:       Discharged Condition: stable    Disposition: [place]-Health Care [facility]    Follow Up:  Follow-up Information     Larry Monae MD On 10/21/2019.    Specialties:  Family Medicine, Wound Care  Why:  Hospital follow up  @ 100pm  Contact information:  [address] [facility] [id number] [phone]             [facility].    Specialties:  DME Provider, Home Health Services  Why:  Nurse to call  Contact information:  [address] [facility] [address]  [facility] [id number] [phone]                 Patient Instructions:      WALKER FOR HOME USE     Order Specific Question Answer Comments   Type of Walker: Adult (5'4"-6'6")    With wheels? Yes    Height: 6' 2" (1.88 m)    Weight: 85.3 kg (188 lb 0.8 oz)    Length of need (1-99 months): 99    Does patient have medical equipment at home? cane, straight    Please check all that apply: Patient's condition impairs ambulation.    Please check all that apply: Patient is unable to safely " "ambulate without equipment.      TRANSFER TUB BENCH FOR HOME USE     Order Specific Question Answer Comments   Type of Transfer Tub Bench: Unpadded    Height: 6' 2" (1.88 m)    Weight: 85.3 kg (188 lb 0.8 oz)    Does patient have medical equipment at home? cane, straight    Length of need (1-99 months): 99      Activity as tolerated       Significant Diagnostic Studies: Labs:   BMP:   Recent Labs   Lab 10/09/19  0531 10/09/19  1717 10/10/19  0610   * 175* 194*    140 141   K 5.6* 5.4* 4.9   * 112* 108   CO2 17* 17* 23   BUN 22 19 22   CREATININE 1.3 1.3 1.4   CALCIUM 9.6 9.7 10.0   , CMP   Recent Labs   Lab 10/09/19  0531 10/09/19  1717 10/10/19  0610    140 141   K 5.6* 5.4* 4.9   * 112* 108   CO2 17* 17* 23   * 175* 194*   BUN 22 19 22   CREATININE 1.3 1.3 1.4   CALCIUM 9.6 9.7 10.0   PROT 7.0  --  7.2   ALBUMIN 3.6  --  3.8   BILITOT 0.5  --  0.6   ALKPHOS 84  --  78   AST 22  --  22   ALT 19  --  22   ANIONGAP 7* 11 10   ESTGFRAFRICA 58* 58* 53*   EGFRNONAA 50* 50* 46*    and CBC No results for input(s): WBC, HGB, HCT, PLT in the last 48 hours.  Radiology: X-Ray: CXR: X-Ray Chest 1 View (CXR): No results found for this visit on 10/06/19. and X-Ray Chest PA and Lateral (CXR): No results found for this visit on 10/06/19.    Pending Diagnostic Studies:     None         Medications:  Reconciled Home Medications:      Medication List      START taking these medications    amLODIPine 10 MG tablet  Commonly known as:  NORVASC  Take 1 tablet (10 mg total) by mouth once daily.     hydrALAZINE 100 MG tablet  Commonly known as:  APRESOLINE  Take 1 tablet (100 mg total) by mouth every 8 (eight) hours.     metoprolol tartrate 50 MG tablet  Commonly known as:  LOPRESSOR  Take 1 tablet (50 mg total) by mouth 2 (two) times daily.        CONTINUE taking these medications    allopurinol 300 MG tablet  Commonly known as:  ZYLOPRIM  Take 1 tablet (300 mg total) by mouth once daily.     blood " sugar diagnostic Strp  Commonly known as:  ONETOUCH ULTRA TEST  1 each by Misc.(Non-Drug; Combo Route) route once daily.     blood-glucose meter kit  Commonly known as:  ONETOUCH ULTRAMINI  To test twice daily. Use as instructed     cholestyramine 4 gram packet  Commonly known as:  QUESTRAN  Take 1 packet (4 g total) by mouth 3 (three) times daily with meals.     colchicine 0.6 mg tablet  Commonly known as:  COLCRYS  Take 1 tablet (0.6 mg total) by mouth daily as needed.     DULoxetine 30 MG capsule  Commonly known as:  CYMBALTA  Take 1 capsule (30 mg total) by mouth 2 (two) times daily.     ferrous sulfate 324 mg (65 mg iron) Tbec  Take 325 mg by mouth once daily.     gabapentin 300 MG capsule  Commonly known as:  NEURONTIN  Take 1 capsule (300 mg total) by mouth 3 (three) times daily.     loratadine 10 mg tablet  Commonly known as:  CLARITIN  Take 10 mg by mouth once daily.     mirtazapine 15 MG tablet  Commonly known as:  REMERON  TAKE 1 TABLET (15 MG TOTAL) BY MOUTH NIGHTLY.     multivitamin capsule  Take 1 capsule by mouth once daily.     prochlorperazine 10 MG tablet  Commonly known as:  COMPAZINE  Take 1 tablet (10 mg total) by mouth every 6 (six) hours as needed.     sodium bicarbonate 650 MG tablet  Take 1 tablet (650 mg total) by mouth 2 (two) times daily.     tadalafil 10 MG tablet  Commonly known as:  CIALIS  Take 1 tablet (10 mg total) by mouth daily as needed for Erectile Dysfunction.        STOP taking these medications    benazepril 40 MG tablet  Commonly known as:  LOTENSIN     ferrous gluconate 324 MG tablet  Commonly known as:  FERGON     indomethacin 50 MG capsule  Commonly known as:  INDOCIN     penicillin v potassium 500 MG tablet  Commonly known as:  VEETID            Indwelling Lines/Drains at time of discharge:   Lines/Drains/Airways     Drain                 Colostomy 04/23/15 LLQ 1631 days                Time spent on the discharge of patient: over 30  minutes  Patient was seen and  examined on the date of discharge and determined to be suitable for discharge.         Candelario Pedraza MD  Department of Hospital Medicine  [facility]

## 2019-10-11 NOTE — PT/OT/SLP DISCHARGE
Occupational Therapy Discharge Summary    Eugene Gamez  MRN: [id number]   Principal Problem: Hyperkalemia      Patient Discharged from acute Occupational Therapy on 10/10/19.  Please refer to prior OT notes for functional status.    Assessment:      Patient appropriate for care in another setting.    Objective:     GOALS:   Multidisciplinary Problems     Occupational Therapy Goals        Problem: Occupational Therapy Goal    Goal Priority Disciplines Outcome Interventions   Occupational Therapy Goal     OT, PT/OT Ongoing, Progressing    Description:  Goals to be met by: 10/21/19     Patient will increase functional independence with ADLs by performing:    LE Dressing with Minimal Assistance.  Grooming while standing at sink with Stand-by Assistance.  Toileting from bedside commode with Stand-by Assistance for hygiene and clothing management.   Supine to sit with Supervision. (goal MET 10/10/19)  Step transfer with Stand-by Assistance  Toilet transfer to bedside commode with Stand-by Assistance.  Upper extremity exercise program x10 reps per handout, with independence.                       Reasons for Discontinuation of Therapy Services  Transfer to alternate level of care.      Plan:     Patient Discharged to: Home with Home Health Service    Isaura Linder OT  10/11/2019

## 2019-10-14 ENCOUNTER — PATIENT OUTREACH (OUTPATIENT)
Dept: ADMINISTRATIVE | Facility: CLINIC | Age: 84
End: 2019-10-14

## 2019-10-14 DIAGNOSIS — E87.5 HYPERKALEMIA, DIMINISHED RENAL EXCRETION: Primary | ICD-10-CM

## 2019-10-14 DIAGNOSIS — E08.29 DIABETES DUE TO UNDRL CONDITION W OTH DIABETIC KIDNEY COMP: ICD-10-CM

## 2019-10-14 DIAGNOSIS — E11.22 CKD STAGE 1 DUE TO TYPE 2 DIABETES MELLITUS: ICD-10-CM

## 2019-10-14 DIAGNOSIS — N18.1 CKD STAGE 1 DUE TO TYPE 2 DIABETES MELLITUS: ICD-10-CM

## 2019-10-14 NOTE — PROGRESS NOTES
C3 nurse attempted to contact patient. No answer. The following message was left for the patient to return the call:  Good morning,  I am a nurse calling on behalf of [facility] from the Care Coordination Center.  This is a Transitional Care Call for Eugene Gamez. When you have a moment please contact us at [phone] or [phone] Monday through Friday, between the hours of 8 am to 4 pm. We look forward to speaking with you. On behalf of [facility] have a nice day.    The patient has a scheduled HOSFU appointment with Larry Monae MD on 10/21 @ 1300. Message sent to Physician staff.

## 2019-10-14 NOTE — PATIENT INSTRUCTIONS
"  Discharge Instructions for Hyperkalemia  You have been diagnosed with hyperkalemia (a high level of potassium in the blood). Potassium is important to the function of the nerve and muscle cells, including the cells of the heart. But a high level of potassium in the blood can cause  serious problems such as abnormal heart rhythms and even heart attack.  Diet changes  · Eat less of these potassium-rich foods:  ¨ Bananas (avoid bananas completely)  ¨ Apricots, fresh or dried  ¨ Oranges and orange juice  ¨ Grapefruit juice  ¨ Tomatoes, tomato sauce, and tomato juice  ¨ Spinach  ¨ Green, leafy vegetables, including salad greens, kale, broccoli, chard, and collards  ¨ Melons (all kinds)  ¨ Peas  ¨ Beans  ¨ Potatoes  ¨ Sweet potatoes  ¨ Avocados and guacamole  ¨ Vegetable juice (homemade or store-bought) and vegetable juice cocktail  ¨ Fruit juices  ¨ Nuts, including pistachios, almonds, peanuts, hazelnuts, [place], cashew, mixed  ¨ "Lite" or reduced sodium salt  Other home care  · Tell your healthcare provider about all prescription and over-the-counter medicines you are taking. Certain medicines can increase potassium levels.  · Take all medicines exactly as directed.  · Have your potassium levels checked regularly.  · Keep all follow-up appointments. Your healthcare provider needs to monitor your condition closely.  · Learn to take your own pulse. If your pulse is less than 60 beats per minute or irregular, call your provider.  Follow-up  Make a follow-up appointment as directed by our staff.     When to Call Your healthcare provider  Call your provider right away if you have any of the following:  · Chest pain (call 911)  · Fainting (call 911)  · Shortness of breath (call 911 if severe)  · Slow, irregular heartbeat  · Fatigue  · Dizziness  · Lightheadedness  · Confusion   Date Last Reviewed: 6/19/2015  © 2935-1042 [employer]. [address] [place] [address], [place] [address]. All rights reserved. This " information is not intended as a substitute for professional medical care. Always follow your healthcare professional's instructions.

## 2019-10-14 NOTE — PT/OT/SLP DISCHARGE
Physical Therapy Discharge Summary    Name: Eugene Gamez  MRN: [id number]   Principal Problem: Hyperkalemia     Patient Discharged from acute Physical Therapy on 10/10/2019.  Please refer to prior PT noted date on 10/10/2019 for functional status.     Assessment:     Patient appropriate for care in another setting.    Objective:     GOALS:   Multidisciplinary Problems     Physical Therapy Goals        Problem: Physical Therapy Goal    Goal Priority Disciplines Outcome Goal Variances Interventions   Physical Therapy Goal     PT, PT/OT Ongoing, Progressing     Description:  Goals to be met by: 10/21/19     Patient will increase functional independence with mobility by performin. Supine to sit with Modified [place]  2. Sit to supine with Modified [place]  3. Sit to stand transfer with Stand-by Assistance  4. Gait  x 300 feet with Modified [place] using Rolling Walker.   5. Lower extremity exercise program x15 reps per handout, with independence                      Reasons for Discontinuation of Therapy Services  Transfer to alternate level of care.      Plan:     Patient Discharged to: Home with Home Health Service.    Lennie Meredith, PT  10/14/2019

## 2019-10-14 NOTE — TELEPHONE ENCOUNTER
C3 nurse attempted to contact patient. No answer. The following message was left for the patient to return the call:  Good afternoon, my name is Debo and I am a registered nurse calling on behalf of [facility] [employer] from the Care Coordination Center.  This is a Transitional Care call for Eugene Gamez.  When you have a moment please contact us at [phone] between 8 and 4, Monday through Friday. If you have questions or issues, a nurse is available 24 hours every day at our ON CALL # thats [phone]. On behalf of [facility], thank you, and have a nice day.    The patient has a scheduled HOSFU appointment with Larry Monae MD on 10/21/2019 @ 1300 hrs.

## 2019-10-16 ENCOUNTER — CARE AT HOME (OUTPATIENT)
Dept: HOME HEALTH SERVICES | Facility: CLINIC | Age: 84
End: 2019-10-16
Payer: MEDICARE

## 2019-10-16 VITALS
RESPIRATION RATE: 18 BRPM | OXYGEN SATURATION: 93 % | WEIGHT: 188 LBS | HEART RATE: 88 BPM | TEMPERATURE: 98 F | SYSTOLIC BLOOD PRESSURE: 118 MMHG | DIASTOLIC BLOOD PRESSURE: 62 MMHG | BODY MASS INDEX: 24.14 KG/M2

## 2019-10-16 DIAGNOSIS — N18.30 CKD (CHRONIC KIDNEY DISEASE) STAGE 3, GFR 30-59 ML/MIN: ICD-10-CM

## 2019-10-16 DIAGNOSIS — E11.59 HYPERTENSION ASSOCIATED WITH DIABETES: Chronic | ICD-10-CM

## 2019-10-16 DIAGNOSIS — R53.81 DEBILITY: ICD-10-CM

## 2019-10-16 DIAGNOSIS — I15.2 HYPERTENSION ASSOCIATED WITH DIABETES: Chronic | ICD-10-CM

## 2019-10-16 DIAGNOSIS — E87.5 HYPERKALEMIA: Primary | ICD-10-CM

## 2019-10-16 PROCEDURE — 99495 TCM SERVICES (MODERATE COMPLEXITY): ICD-10-PCS | Mod: S$GLB,,, | Performed by: NURSE PRACTITIONER

## 2019-10-16 PROCEDURE — 99495 TRANSJ CARE MGMT MOD F2F 14D: CPT | Mod: S$GLB,,, | Performed by: NURSE PRACTITIONER

## 2019-10-16 NOTE — PATIENT INSTRUCTIONS
"  Discharge Instructions for Hyperkalemia  You have been diagnosed with hyperkalemia (a high level of potassium in the blood). Potassium is important to the function of the nerve and muscle cells, including the cells of the heart. But a high level of potassium in the blood can cause  serious problems such as abnormal heart rhythms and even heart attack.  Diet changes  · Eat less of these potassium-rich foods:  ¨ Bananas (avoid bananas completely)  ¨ Apricots, fresh or dried  ¨ Oranges and orange juice  ¨ Grapefruit juice  ¨ Tomatoes, tomato sauce, and tomato juice  ¨ Spinach  ¨ Green, leafy vegetables, including salad greens, kale, broccoli, chard, and collards  ¨ Melons (all kinds)  ¨ Peas  ¨ Beans  ¨ Potatoes  ¨ Sweet potatoes  ¨ Avocados and guacamole  ¨ Vegetable juice (homemade or store-bought) and vegetable juice cocktail  ¨ Fruit juices  ¨ Nuts, including pistachios, almonds, peanuts, hazelnuts, [place], cashew, mixed  ¨ "Lite" or reduced sodium salt  Other home care  · Tell your healthcare provider about all prescription and over-the-counter medicines you are taking. Certain medicines can increase potassium levels.  · Take all medicines exactly as directed.  · Have your potassium levels checked regularly.  · Keep all follow-up appointments. Your healthcare provider needs to monitor your condition closely.  · Learn to take your own pulse. If your pulse is less than 60 beats per minute or irregular, call your provider.  Follow-up  Make a follow-up appointment as directed by our staff.     When to Call Your healthcare provider  Call your provider right away if you have any of the following:  · Chest pain (call 911)  · Fainting (call 911)  · Shortness of breath (call 911 if severe)  · Slow, irregular heartbeat  · Fatigue  · Dizziness  · Lightheadedness  · Confusion   Date Last Reviewed: 6/19/2015  © 2820-5907 [employer]. [address] [place] [address], [place] [address]. All rights reserved. This " information is not intended as a substitute for professional medical care. Always follow your healthcare professional's instructions.

## 2019-10-16 NOTE — PROGRESS NOTES
[facility]  Transition of Care Home Visit    Visit Date: 10/16/2019  Encounter Provider: Ruba Fulton NP  PCP:  Larry Monae MD    PRESENTING HISTORY      Patient ID: Eugene Gamez is a 84 y.o. male.    Consult Requested By:  No ref. provider found  Reason for Consult:  Hospital Follow Up    Eugene is being seen at home due to transportation issues    Chief Complaint: No chief complaint on file.      History of Present Illness: Mr. Eugene Gamez is a 84 y.o. male who was recently admitted to the hospital.    Admit: 10/6/2019  Discharge: 10/10/2019  Hospital Course:   Presenting with generalized weakness found to have potassium of 6.6. EKG wo any ischemic changes or peaked T waves. Denies any potassium supplement use at home or trauma. Labs also noting NAGMA and mild renal insufficiency. ?RTA type 4. Nephrology consulted. Treated in the ED with insulin, dextrose, fluids, bicarb and kayexalate. Urine studies consistent with RTA. Repeat K 5.2. Started sodium bicarb tabs. PT OT rec HH..has been  Spoken to patient's oncologist, and patient doesn't seem to have any cancer recurrence on last scan. Patient to see his oncology OP.  Still had hyperkalemia,started on kayexalate.his hyperkalemia and ARF  is resolved,patient has been discharged home with HH.and follow up with PCP as out patient.   ___________________________________________________________________    Today:    HPI:  Today, he is found lying in his bed, he denies any SOB, chest pain or pain today. Reports therapy has already come today and he is resting now. He reports compliance with all his medications, report his daughter fills his pill box    Review of Systems   Constitutional: Negative.    HENT: Negative.    Eyes: Negative.    Respiratory: Negative for shortness of breath.    Cardiovascular: Negative.    Gastrointestinal: Negative.    Endocrine: Negative.    Genitourinary: Negative.    Musculoskeletal: Negative.    Skin: Negative.     Allergic/Immunologic: Negative.    Neurological: Positive for weakness.   Psychiatric/Behavioral: Negative.        Assessments:  · Environmental: cluttered, adequate lighting and temperature  · Functional Status: independent  · Safety: single story  · Nutritional: adequate  · Home Health/DME/Supplies: [facility]    PAST HISTORY:     Past Medical History:   Diagnosis Date    Arthritis     Cancer     Diabetes mellitus     Elevated PSA     Gout, unspecified     Hypertension     Nuclear sclerotic cataract of both eyes 6/29/2018       Past Surgical History:   Procedure Laterality Date    CATARACT EXTRACTION      ou    EYE SURGERY      bilateral cataracts    removal of small bowel  Apr. 2015    Colostomy       Family History   Problem Relation Age of Onset    Hypertension Mother     Diabetes Mother     No Known Problems Father     No Known Problems Sister     No Known Problems Brother     No Known Problems Maternal Aunt     No Known Problems Maternal Uncle     No Known Problems Paternal Aunt     No Known Problems Paternal Uncle     No Known Problems Maternal Grandmother     No Known Problems Maternal Grandfather     No Known Problems Paternal Grandmother     No Known Problems Paternal Grandfather     Amblyopia Neg Hx     Blindness Neg Hx     Cancer Neg Hx     Cataracts Neg Hx     Glaucoma Neg Hx     Macular degeneration Neg Hx     Retinal detachment Neg Hx     Strabismus Neg Hx     Stroke Neg Hx     Thyroid disease Neg Hx        Social History     Socioeconomic History    Marital status:      Spouse name: Not on file    Number of children: Not on file    Years of education: Not on file    Highest education level: Not on file   Occupational History    Not on file   Social Needs    Financial resource strain: Not on file    Food insecurity:     Worry: Not on file     Inability: Not on file    Transportation needs:     Medical: Not on file     Non-medical: Not on file    Tobacco Use    Smoking status: Never Smoker    Smokeless tobacco: Never Used    Tobacco comment: smoked short while as a teen   Substance and Sexual Activity    Alcohol use: No    Drug use: No    Sexual activity: Not on file   Lifestyle    Physical activity:     Days per week: Not on file     Minutes per session: Not on file    Stress: Not on file   Relationships    Social connections:     Talks on phone: Not on file     Gets together: Not on file     Attends Nondenominational service: Not on file     Active member of club or organization: Not on file     Attends meetings of clubs or organizations: Not on file     Relationship status: Not on file   Other Topics Concern    Not on file   Social History Narrative    Not on file       MEDICATIONS & ALLERGIES:     Current Outpatient Medications on File Prior to Visit   Medication Sig Dispense Refill    allopurinol (ZYLOPRIM) 300 MG tablet Take 1 tablet (300 mg total) by mouth once daily. 90 tablet 3    amLODIPine (NORVASC) 10 MG tablet Take 1 tablet (10 mg total) by mouth once daily. 30 tablet 0    cholestyramine (QUESTRAN) 4 gram packet Take 1 packet (4 g total) by mouth 3 (three) times daily with meals. 270 packet 3    DULoxetine (CYMBALTA) 30 MG capsule Take 1 capsule (30 mg total) by mouth 2 (two) times daily. 180 capsule 3    ferrous sulfate 324 mg (65 mg iron) TbEC Take 325 mg by mouth once daily.      gabapentin (NEURONTIN) 300 MG capsule Take 1 capsule (300 mg total) by mouth 3 (three) times daily. 270 capsule 3    hydrALAZINE (APRESOLINE) 100 MG tablet Take 1 tablet (100 mg total) by mouth every 8 (eight) hours. 90 tablet 0    loratadine (CLARITIN) 10 mg tablet Take 10 mg by mouth once daily.      metoprolol tartrate (LOPRESSOR) 50 MG tablet Take 1 tablet (50 mg total) by mouth 2 (two) times daily. 60 tablet 0    multivitamin capsule Take 1 capsule by mouth once daily.      blood sugar diagnostic (ONE TOUCH ULTRA TEST) Strp 1 each by  Misc.(Non-Drug; Combo Route) route once daily. 200 each 3    blood-glucose meter (ONE TOUCH ULTRAMINI) kit To test twice daily. Use as instructed 1 each 0    colchicine (COLCRYS) 0.6 mg tablet Take 1 tablet (0.6 mg total) by mouth daily as needed. 90 tablet 3    mirtazapine (REMERON) 15 MG tablet TAKE 1 TABLET (15 MG TOTAL) BY MOUTH NIGHTLY. 90 tablet 1    [] prochlorperazine (COMPAZINE) 10 MG tablet Take 1 tablet (10 mg total) by mouth every 6 (six) hours as needed. 20 tablet 0    sodium bicarbonate 650 MG tablet Take 1 tablet (650 mg total) by mouth 2 (two) times daily. 100 tablet 1    tadalafil (CIALIS) 10 MG tablet Take 1 tablet (10 mg total) by mouth daily as needed for Erectile Dysfunction. 10 tablet 3     Current Facility-Administered Medications on File Prior to Visit   Medication Dose Route Frequency Provider Last Rate Last Dose    ondansetron HCl (PF) 4 mg/2 mL injection    PRN James Ryan CRNA   4 mg at 06/29/15 0856        Review of patient's allergies indicates:   Allergen Reactions    Shellfish containing products      Causes gout to flare up       OBJECTIVE:     Vital Signs:  Vitals:    10/16/19 1450   BP: 118/62   Pulse: 88   Resp: 18   Temp: 97.8 °F (36.6 °C)     Body mass index is 24.14 kg/m².     Physical Exam:  Physical Exam   Constitutional: He appears well-developed and well-nourished. No distress.   HENT:   Head: Normocephalic and atraumatic.   Eyes: Pupils are equal, round, and reactive to light. EOM are normal.   Neck: Normal range of motion. Neck supple.   Cardiovascular: Normal rate, regular rhythm and normal heart sounds.   Pulmonary/Chest: Effort normal and breath sounds normal.   Abdominal: Soft. Bowel sounds are normal.   Musculoskeletal: Normal range of motion. He exhibits no edema.   Skin: Skin is warm and dry.   Psychiatric: He has a normal mood and affect. His behavior is normal. Judgment and thought content normal.       Laboratory  Lab Results   Component Value  Date    WBC 7.19 10/07/2019    HGB 10.1 (L) 10/07/2019    HCT 31.2 (L) 10/07/2019    MCV 91 10/07/2019     10/07/2019     Lab Results   Component Value Date    INR 1.0 04/20/2017    INR 1.1 06/26/2015    INR 1.2 04/13/2015     Lab Results   Component Value Date    HGBA1C 6.3 (H) 10/06/2019     No results for input(s): POCTGLUCOSE in the last 72 hours.        TRANSITION OF CARE:     [facility] Contact Note: 10/14/2019    Family and/or Caretaker present at visit?  No  Diagnostic tests reviewed/disposition: No diagnosic tests pending after this hospitalization.  Disease/illness education: Hyperkalemia,   Home health/community services discussion/referrals: Patient has home health established at [facility].   Establishment or re-establishment of referral orders for community resources: No other necessary community resources.   Discussion with other health care providers: No discussion with other health care providers necessary.     Transition of Care Visit:     I have reviewed and updated the history and problem list.  I have reconciled the medication list.  I have discussed the hospitalization and current medical issues, prognosis and plans with the patient/family.  I  spent more than 50% of time discussing the care with the patient/family.  Total Face-to-Face Encounter: 60 minutes.    Medications Reconciliation:   I have reconciled the patient's home medications and discharge medications with the patient/family. I have updated all changes.  Refer to After-Visit Medication List.    ASSESSMENT & PLAN:     HIGH RISK CONDITION(S):      Diagnoses and all orders for this visit:    Hyperkalemia  K was 6.6 on admit, improved to 4.9 by discharge  Related to VINNY  Improved  Continue to monitor, repeat BMP in 1 month    CKD (chronic kidney disease) stage 3, GFR 30-59 ml/min  Stable, GFR of 50 at discharge  Continue to monitor    Hypertension associated with diabetes  Stable, B/P WNL today  Pt has complied with medications  changes at discharge  Continue Norvasc, Hydralazine and Metoprolol  Continue to monitor    Debility  Related to multiple comorbities and illnesses  PT/OT in place for strengthening  Activity as tolerated  Continue to monitor      Were controlled substances prescribed?  No    Instructions for the patient:    Scheduled Follow-up :  Future Appointments   Date Time Provider Department Center   10/16/2019  3:30 PM Ruba Fulton NP [id number] [place]   10/21/2019  1:00 PM Larry Monae MD [facility]       After Visit Medication List :     Medication List           Accurate as of October 16, 2019  3:01 PM. If you have any questions, ask your nurse or doctor.               CONTINUE taking these medications    allopurinol 300 MG tablet  Commonly known as:  ZYLOPRIM  Take 1 tablet (300 mg total) by mouth once daily.     amLODIPine 10 MG tablet  Commonly known as:  NORVASC  Take 1 tablet (10 mg total) by mouth once daily.     blood sugar diagnostic Strp  Commonly known as:  ONETOUCH ULTRA TEST  1 each by Misc.(Non-Drug; Combo Route) route once daily.     blood-glucose meter kit  Commonly known as:  ONETOUCH ULTRAMINI  To test twice daily. Use as instructed     cholestyramine 4 gram packet  Commonly known as:  QUESTRAN  Take 1 packet (4 g total) by mouth 3 (three) times daily with meals.     colchicine 0.6 mg tablet  Commonly known as:  COLCRYS  Take 1 tablet (0.6 mg total) by mouth daily as needed.     DULoxetine 30 MG capsule  Commonly known as:  CYMBALTA  Take 1 capsule (30 mg total) by mouth 2 (two) times daily.     ferrous sulfate 324 mg (65 mg iron) Tbec     gabapentin 300 MG capsule  Commonly known as:  NEURONTIN  Take 1 capsule (300 mg total) by mouth 3 (three) times daily.     hydrALAZINE 100 MG tablet  Commonly known as:  APRESOLINE  Take 1 tablet (100 mg total) by mouth every 8 (eight) hours.     loratadine 10 mg tablet  Commonly known as:  CLARITIN     metoprolol tartrate 50 MG tablet  Commonly  known as:  LOPRESSOR  Take 1 tablet (50 mg total) by mouth 2 (two) times daily.     mirtazapine 15 MG tablet  Commonly known as:  REMERON  TAKE 1 TABLET (15 MG TOTAL) BY MOUTH NIGHTLY.     multivitamin capsule     sodium bicarbonate 650 MG tablet  Take 1 tablet (650 mg total) by mouth 2 (two) times daily.     tadalafil 10 MG tablet  Commonly known as:  CIALIS  Take 1 tablet (10 mg total) by mouth daily as needed for Erectile Dysfunction.            Signature:  Ruba Fulton NP    Patient consent obtained prior to treatment

## 2019-10-21 ENCOUNTER — OFFICE VISIT (OUTPATIENT)
Dept: FAMILY MEDICINE | Facility: CLINIC | Age: 84
End: 2019-10-21
Payer: MEDICARE

## 2019-10-21 ENCOUNTER — TELEPHONE (OUTPATIENT)
Dept: HOME HEALTH SERVICES | Facility: HOSPITAL | Age: 84
End: 2019-10-21

## 2019-10-21 VITALS
SYSTOLIC BLOOD PRESSURE: 110 MMHG | DIASTOLIC BLOOD PRESSURE: 69 MMHG | RESPIRATION RATE: 17 BRPM | WEIGHT: 189.81 LBS | BODY MASS INDEX: 24.36 KG/M2 | HEIGHT: 74 IN | TEMPERATURE: 99 F | OXYGEN SATURATION: 98 % | HEART RATE: 108 BPM

## 2019-10-21 DIAGNOSIS — F51.01 PRIMARY INSOMNIA: ICD-10-CM

## 2019-10-21 DIAGNOSIS — E87.5 HYPERKALEMIA: Primary | ICD-10-CM

## 2019-10-21 PROCEDURE — 99495 TRANSJ CARE MGMT MOD F2F 14D: CPT | Mod: S$PBB,,, | Performed by: FAMILY MEDICINE

## 2019-10-21 PROCEDURE — 99495 TRANSJ CARE MGMT MOD F2F 14D: CPT | Mod: PBBFAC,PN | Performed by: FAMILY MEDICINE

## 2019-10-21 PROCEDURE — 99999 PR PBB SHADOW E&M-EST. PATIENT-LVL III: CPT | Mod: PBBFAC,,, | Performed by: FAMILY MEDICINE

## 2019-10-21 PROCEDURE — 99495 TCM SERVICES (MODERATE COMPLEXITY): ICD-10-PCS | Mod: S$PBB,,, | Performed by: FAMILY MEDICINE

## 2019-10-21 PROCEDURE — 99999 PR PBB SHADOW E&M-EST. PATIENT-LVL III: ICD-10-PCS | Mod: PBBFAC,,, | Performed by: FAMILY MEDICINE

## 2019-10-21 PROCEDURE — 99213 OFFICE O/P EST LOW 20 MIN: CPT | Mod: PBBFAC,PN | Performed by: FAMILY MEDICINE

## 2019-10-21 RX ORDER — FERROUS GLUCONATE 324(38)MG
324 TABLET ORAL
COMMUNITY
Start: 2018-11-09 | End: 2024-02-21 | Stop reason: SDUPTHER

## 2019-10-21 RX ORDER — QUETIAPINE FUMARATE 50 MG/1
50 TABLET, FILM COATED ORAL NIGHTLY
Qty: 30 TABLET | Refills: 0 | Status: SHIPPED | OUTPATIENT
Start: 2019-10-21 | End: 2022-02-27

## 2019-10-21 RX ORDER — INDOMETHACIN 50 MG/1
50 CAPSULE ORAL
COMMUNITY
Start: 2018-10-02 | End: 2019-10-21

## 2019-10-21 RX ORDER — BENAZEPRIL HYDROCHLORIDE 40 MG/1
40 TABLET ORAL
COMMUNITY
Start: 2018-11-09 | End: 2019-10-21

## 2019-10-21 NOTE — PROGRESS NOTES
Transitional Care Note  Subjective:       Patient ID: Eugene Gamez is a 84 y.o. male.  Chief Complaint: Hospital Follow Up    Family and/or Caretaker present at visit?  Yes.  Diagnostic tests reviewed/disposition: No diagnosic tests pending after this hospitalization.  Disease/illness education: medication education  Home health/community services discussion/referrals: Patient does not have home health established from hospital visit.  They do not need home health.  If needed, we will set up home health for the patient.   Establishment or re-establishment of referral orders for community resources: No other necessary community resources.   Discussion with other health care providers: No discussion with other health care providers necessary.   Patient presenting after admission for hyperkalemia and fatigue.  His wife, daughter, and granddaughter are with him today and states that he has not been sleeping well since getting home and has hallucinations.  He has just now started getting his appetite back.  Patient has not been drinking much water.  He has been complaining of dizziness when he stands up and gets fatigued easily.  He has a history of stage 4 colon cancer that is now in remission.  Last PET scan done in March 2019 with no acute findings.  He has not been taking his blood pressure medications for 2 days and his blood pressure is normal today.  There is no active chest pain or shortness of breath.  He is alert and oriented today.      Review of Systems   Constitutional: Positive for activity change, appetite change and fatigue. Negative for chills, diaphoresis and fever.   HENT: Negative for congestion, ear discharge and sore throat.    Eyes: Negative.    Respiratory: Negative.    Cardiovascular: Negative.    Gastrointestinal: Negative for abdominal pain, constipation and diarrhea.   Genitourinary: Negative.    Neurological: Positive for dizziness. Negative for seizures, facial asymmetry,  light-headedness, numbness and headaches.   Psychiatric/Behavioral: Positive for confusion and hallucinations.       Objective:      Physical Exam   Constitutional: He is oriented to person, place, and time. He appears well-developed and well-nourished.   HENT:   Head: Normocephalic and atraumatic.   Eyes: Pupils are equal, round, and reactive to light. Conjunctivae and EOM are normal.   Neck: Normal range of motion. Neck supple.   Cardiovascular: Normal rate, regular rhythm and normal heart sounds.   Pulmonary/Chest: Effort normal and breath sounds normal.   Abdominal: Soft. He exhibits no distension. There is no tenderness.   Musculoskeletal: Normal range of motion.   Neurological: He is alert and oriented to person, place, and time.   Skin: Skin is dry.   Psychiatric: He has a normal mood and affect. His behavior is normal.       Assessment:       1. Hyperkalemia    2. Primary insomnia        Plan:           1.  CMP to be done today  2.  Filled out [facility] paperwork for his daughter to have time to take him to appointments  3.  Stop Mirtazapine and stat Seroquel for insomnia  4.  Hold blood pressure medications as he is likely getting too many and his blood pressure is normal after not taking anything for 48 hours  5.  Patients family to get blood pressure monitor for home use and notify me if blood pressure goes up  6.  Follow up with hem/onc given patients hallucinations although this could be from lack of sleeep

## 2019-10-23 ENCOUNTER — TELEPHONE (OUTPATIENT)
Dept: FAMILY MEDICINE | Facility: CLINIC | Age: 84
End: 2019-10-23

## 2019-10-25 NOTE — TELEPHONE ENCOUNTER
I spoke to the pt daughter and she reports that they have tried to get the pt to go to the ER for IV fluids and he is refusing and states he will drink water at home. Family advised that it is important that the pt go. They verbalize understanding.

## 2019-10-26 ENCOUNTER — HOSPITAL ENCOUNTER (EMERGENCY)
Facility: HOSPITAL | Age: 84
Discharge: HOME | End: 2019-10-26
Attending: EMERGENCY MEDICINE
Payer: MEDICARE

## 2019-10-26 VITALS
HEART RATE: 77 BPM | BODY MASS INDEX: 27.98 KG/M2 | SYSTOLIC BLOOD PRESSURE: 130 MMHG | WEIGHT: 218 LBS | OXYGEN SATURATION: 100 % | HEIGHT: 74 IN | RESPIRATION RATE: 16 BRPM | DIASTOLIC BLOOD PRESSURE: 66 MMHG | TEMPERATURE: 99 F

## 2019-10-26 DIAGNOSIS — E86.0 DEHYDRATION: Primary | ICD-10-CM

## 2019-10-26 DIAGNOSIS — N17.9 AKI (ACUTE KIDNEY INJURY): ICD-10-CM

## 2019-10-26 LAB
ALBUMIN SERPL BCP-MCNC: 3.7 G/DL (ref 3.5–5.2)
ALP SERPL-CCNC: 76 U/L (ref 55–135)
ALT SERPL W/O P-5'-P-CCNC: 18 U/L (ref 10–44)
ANION GAP SERPL CALC-SCNC: 9 MMOL/L (ref 8–16)
AST SERPL-CCNC: 17 U/L (ref 10–40)
BILIRUB SERPL-MCNC: 0.5 MG/DL (ref 0.1–1)
BUN SERPL-MCNC: 18 MG/DL (ref 8–23)
CALCIUM SERPL-MCNC: 9.6 MG/DL (ref 8.7–10.5)
CHLORIDE SERPL-SCNC: 114 MMOL/L (ref 95–110)
CO2 SERPL-SCNC: 15 MMOL/L (ref 23–29)
CREAT SERPL-MCNC: 1.8 MG/DL (ref 0.5–1.4)
EST. GFR  (AFRICAN AMERICAN): 39 ML/MIN/1.73 M^2
EST. GFR  (NON AFRICAN AMERICAN): 34 ML/MIN/1.73 M^2
GLUCOSE SERPL-MCNC: 176 MG/DL (ref 70–110)
POTASSIUM SERPL-SCNC: 4 MMOL/L (ref 3.5–5.1)
PROT SERPL-MCNC: 7 G/DL (ref 6–8.4)
SODIUM SERPL-SCNC: 138 MMOL/L (ref 136–145)

## 2019-10-26 PROCEDURE — 63600175 PHARM REV CODE 636 W HCPCS: Performed by: EMERGENCY MEDICINE

## 2019-10-26 PROCEDURE — 80053 COMPREHEN METABOLIC PANEL: CPT

## 2019-10-26 PROCEDURE — 96360 HYDRATION IV INFUSION INIT: CPT

## 2019-10-26 PROCEDURE — 99284 EMERGENCY DEPT VISIT MOD MDM: CPT | Mod: 25

## 2019-10-26 RX ORDER — SODIUM BICARBONATE 650 MG/1
650 TABLET ORAL 3 TIMES DAILY
Qty: 21 TABLET | Refills: 0 | Status: SHIPPED | OUTPATIENT
Start: 2019-10-26 | End: 2022-02-27

## 2019-10-26 RX ADMIN — SODIUM CHLORIDE 1000 ML: 0.9 INJECTION, SOLUTION INTRAVENOUS at 12:10

## 2019-10-26 NOTE — ED NOTES
85y/o M to ED for abnormal labs. PCP told pt to go to the ed for abnormal labs and get IV fluids. Pt report increasing off and on dizziness ceferino with movement.   Patient identifiers verified and correct for Eugene BEAR Franco.    LOC: The patient is awake, alert and aware of environment with an appropriate affect, the patient is oriented x 3 and speaking appropriately.  APPEARANCE: Patient resting comfortably and in no acute distress, patient is clean and well groomed, patient's clothing is properly fastened.  SKIN: The skin is warm and dry, color consistent with ethnicity, patient has normal skin turgor and moist mucus membranes, skin intact, no breakdown or bruising noted.  MUSCULOSKELETAL: Patient moving all extremities spontaneously, no obvious swelling or deformities noted.  RESPIRATORY: Airway is open and patent, respirations are spontaneous, patient has a normal effort and rate, no accessory muscle use noted, bilateral breath sounds clear.  CARDIAC: Patient has a normal rate and regular rhythm, no periphreal edema noted, capillary refill < 3 seconds.  ABDOMEN: Soft and non tender to palpation, no distention noted, normoactive bowel sounds present in all four quadrants.  NEUROLOGIC:  eyes open spontaneously, behavior appropriate to situation, follows commands, facial expression symmetrical, bilateral hand grasp equal and even, purposeful motor response noted  Safety measures in place. Plan of care discussed.

## 2019-10-26 NOTE — ED TRIAGE NOTES
"Pt arrived to the ED via POV from home with c/o abnormal lab. Pt states "my doctor told me to come to the hospital because I'm dehydrated". On admit to ED bed, pt AAOx3, NAD noted, VSS. Pt placed on cardiac monitor, pulse ox and BP cuff.  "

## 2019-10-26 NOTE — ED PROVIDER NOTES
Encounter Date: 10/26/2019    SCRIBE #1 NOTE: I, Tito Rodriguez, am scribing for, and in the presence of,  Getachew Welsh MD. I have scribed the following portions of the note - Other sections scribed: HPI, ROS, PE.       History     Chief Complaint   Patient presents with    Abnormal Lab     Pt sent to ED for IV fluids from PCP. pt had CMP drawn and was told lab was abnormal and to report to ED for IV fluids. Pt reports dizziness. Denies CP and SOB      84 y.o M with a hx of Cancer, DM, CKD and HTN presents to the ED c/o abnormal labs. The pt did have a CMP drawn 10/21 which showed decreased renal function The pt was called by his PCP 10/23 and instructed to come to the ED for IV fluids. Additionally, the pt c/o associated light-headedness when standing and dark urine. He has attempted to increase his fluid intake since 10/23. He denies fever, chest pain, SOB, abdominal pain, nausea, emesis, diarrhea and dysuria. No prior tx.    The history is provided by the patient. No  was used.     Review of patient's allergies indicates:   Allergen Reactions    Shellfish containing products      Causes gout to flare up     Past Medical History:   Diagnosis Date    Arthritis     Cancer     Diabetes mellitus     Elevated PSA     Gout, unspecified     Hypertension     Nuclear sclerotic cataract of both eyes 6/29/2018     Past Surgical History:   Procedure Laterality Date    CATARACT EXTRACTION      ou    EYE SURGERY      bilateral cataracts    removal of small bowel  Apr. 2015    Colostomy     Family History   Problem Relation Age of Onset    Hypertension Mother     Diabetes Mother     No Known Problems Father     No Known Problems Sister     No Known Problems Brother     No Known Problems Maternal Aunt     No Known Problems Maternal Uncle     No Known Problems Paternal Aunt     No Known Problems Paternal Uncle     No Known Problems Maternal Grandmother     No Known Problems Maternal  Grandfather     No Known Problems Paternal Grandmother     No Known Problems Paternal Grandfather     Amblyopia Neg Hx     Blindness Neg Hx     Cancer Neg Hx     Cataracts Neg Hx     Glaucoma Neg Hx     Macular degeneration Neg Hx     Retinal detachment Neg Hx     Strabismus Neg Hx     Stroke Neg Hx     Thyroid disease Neg Hx      Social History     Tobacco Use    Smoking status: Never Smoker    Smokeless tobacco: Never Used    Tobacco comment: smoked short while as a teen   Substance Use Topics    Alcohol use: No    Drug use: No     Review of Systems   Constitutional: Negative for chills and fever.   HENT: Negative for congestion and sore throat.    Eyes: Negative for visual disturbance.   Respiratory: Negative for cough and shortness of breath.    Cardiovascular: Negative for chest pain.   Gastrointestinal: Negative for abdominal pain, diarrhea, nausea and vomiting.   Genitourinary: Negative for dysuria.        (+) dark urine   Skin: Negative for rash.   Allergic/Immunologic: Negative for immunocompromised state.   Neurological: Positive for light-headedness. Negative for headaches.       Physical Exam     Initial Vitals [10/26/19 1215]   BP Pulse Resp Temp SpO2   120/82 94 18 98.5 °F (36.9 °C) 99 %      MAP       --         Physical Exam    Nursing note and vitals reviewed.  Constitutional: He is not diaphoretic. No distress.   HENT:   Head: Normocephalic and atraumatic.   Mouth/Throat: Mucous membranes are dry. No oropharyngeal exudate.   Eyes: Conjunctivae and EOM are normal. Pupils are equal, round, and reactive to light. No scleral icterus.   Neck: Normal range of motion. Neck supple. No JVD present.   Cardiovascular: Normal rate, regular rhythm and intact distal pulses.   Pulmonary/Chest: Breath sounds normal. No stridor. No respiratory distress.   Abdominal: Soft. Bowel sounds are normal. He exhibits no distension. There is no tenderness.   Ostomy is viable with stool and air in the bag    Musculoskeletal: Normal range of motion. He exhibits edema. He exhibits no tenderness.   Symmetrical lower extremity edema   Neurological: He is alert. He has normal strength. No cranial nerve deficit or sensory deficit. GCS score is 15. GCS eye subscore is 4. GCS verbal subscore is 5. GCS motor subscore is 6.   Skin: Skin is warm and dry. No rash noted.   Psychiatric: He has a normal mood and affect.         ED Course   Procedures  Labs Reviewed   COMPREHENSIVE METABOLIC PANEL - Abnormal; Notable for the following components:       Result Value    Chloride 114 (*)     CO2 15 (*)     Glucose 176 (*)     Creatinine 1.8 (*)     eGFR if  39 (*)     eGFR if non  34 (*)     All other components within normal limits          Imaging Results    None          Medical Decision Making:   History:   Old Medical Records: I decided to obtain old medical records.  Old Records Summarized: records from previous admission(s).       <> Summary of Records: Recent admission for hyperkalemia.  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  Outpatient labs showed decreased renal function and CO2 patient is afebrile and in no acute distress.  He is not toxic appearing.  He is not hypotensive, tachypneic, tachycardic or hypoxic.  He has no complaints consistent with an infectious source.  His abdominal exam is benign any has no abdominal complaint.  Will obtain CMP, orthostatics and give IV hydration.  Differential Diagnosis:   My differential diagnosis includes, but is not limited to:  Dehydration, chronic renal failure, electrolyte abnormality, decreased p.o intake and medication side effect, low clinical suspicion of infectious process given lack of fever or or findings consistent with infectious source, low clinical suspicion of ischemic  colitis given lack of abdominal symptoms, benign abdominal exam.    Clinical Tests:   Lab Tests: Ordered and Reviewed  ED Management:  Patient has orthostatic changes on vital signs.  Labs with some improvement in renal function and CO2 compared to prior.    Consult: I have spoken with Dr. Keller from the Nephrology service regarding the patient's presentation and study results.  At this time, the recommendation is that patient's acidosis is likely related to GI losses from his ostomy.  Patient should be taking sodium bicarbonate tablets 650 t.i.d..  Patient does not require inpatient admission for management.  Can follow up with primary physician and nephrologist.    On reassessment patient reports feeling well. He has remained hemodynamically stable emergency department.  He is fit for discharge to follow up with his primary physician as well as his nephrologist.  Patient and daughter and girlfriend at the bedside counseled on supportive care, appropriate medication usage, concerning symptoms for which to return to ER and the importance of follow up. Understanding and agreement with treatment plan was expressed.   This chart was completed using dictation software, as a result there may be some transcription errors.             Scribe Attestation:   Scribe #1: I performed the above scribed service and the documentation accurately describes the services I performed. I attest to the accuracy of the note.    I, Getachew Welsh , personally performed the services described in this documentation. All medical record entries made by the scribe were at my direction and in my presence. I have reviewed the chart and agree that the record reflects my personal performance and is accurate and complete.            Clinical Impression:       ICD-10-CM ICD-9-CM   1. Dehydration E86.0 276.51   2. VINNY (acute kidney injury) N17.9 584.9         Disposition:   Disposition: Discharged  Condition: Stable                        Getachew COELLO  MD Blaise  10/26/19 9191

## 2019-10-29 ENCOUNTER — OFFICE VISIT (OUTPATIENT)
Dept: FAMILY MEDICINE | Facility: CLINIC | Age: 84
End: 2019-10-29
Payer: MEDICARE

## 2019-10-29 VITALS
HEART RATE: 78 BPM | WEIGHT: 196 LBS | TEMPERATURE: 99 F | DIASTOLIC BLOOD PRESSURE: 74 MMHG | BODY MASS INDEX: 25.15 KG/M2 | SYSTOLIC BLOOD PRESSURE: 112 MMHG | OXYGEN SATURATION: 97 % | HEIGHT: 74 IN | RESPIRATION RATE: 16 BRPM

## 2019-10-29 DIAGNOSIS — I10 HTN, GOAL BELOW 140/80: ICD-10-CM

## 2019-10-29 DIAGNOSIS — N18.30 STAGE 3 CHRONIC KIDNEY DISEASE: ICD-10-CM

## 2019-10-29 DIAGNOSIS — Z09 HOSPITAL DISCHARGE FOLLOW-UP: Primary | ICD-10-CM

## 2019-10-29 PROCEDURE — 99214 OFFICE O/P EST MOD 30 MIN: CPT | Mod: PBBFAC,PN | Performed by: NURSE PRACTITIONER

## 2019-10-29 PROCEDURE — 99999 PR PBB SHADOW E&M-EST. PATIENT-LVL IV: CPT | Mod: PBBFAC,,, | Performed by: NURSE PRACTITIONER

## 2019-10-29 PROCEDURE — 99999 PR PBB SHADOW E&M-EST. PATIENT-LVL IV: ICD-10-PCS | Mod: PBBFAC,,, | Performed by: NURSE PRACTITIONER

## 2019-10-29 PROCEDURE — 99214 OFFICE O/P EST MOD 30 MIN: CPT | Mod: S$PBB,,, | Performed by: NURSE PRACTITIONER

## 2019-10-29 PROCEDURE — 99214 PR OFFICE/OUTPT VISIT, EST, LEVL IV, 30-39 MIN: ICD-10-PCS | Mod: S$PBB,,, | Performed by: NURSE PRACTITIONER

## 2019-10-29 NOTE — PROGRESS NOTES
Routine Office Visit    Patient Name: Eugene Gamez    : 1935  MRN: [id number]    Chief Complaint:  ER follow-up    Subjective:  Eugene is a 84 y.o. male who presents today for:    1. ER follow-up - patient presents today for follow-up from the emergency room.  He feels well since being seen last week.  Denies any continuing dizziness, fatigue, lightheadedness, or other neurological symptoms.  Denies any blurry vision, chest pain, wheezing, palpitations, or shortness of breath.  He has been taking his blood pressure medicines for the last week including amlodipine, hydralazine, and metoprolol and he reports no problems with these medications in the last 2-3 days.  He states that he has also been drinking plenty of water.  Denies any continued urinary complaints denies dysuria, dark colored urine, hematuria, flank pain, nausea, vomiting, or diarrhea.  Denies any abdominal pain.  He has not been checking his blood pressures at home.  He has no acute symptoms today.  He has also been taking the sodium bicarbonate as prescribed by Nephrology while he was in the hospital.    Past Medical History  Past Medical History:   Diagnosis Date    Arthritis     Cancer     Diabetes mellitus     Elevated PSA     Gout, unspecified     Hypertension     Nuclear sclerotic cataract of both eyes 2018       Past Surgical History  Past Surgical History:   Procedure Laterality Date    CATARACT EXTRACTION      ou    EYE SURGERY      bilateral cataracts    removal of small bowel  2015    Colostomy       Family History  Family History   Problem Relation Age of Onset    Hypertension Mother     Diabetes Mother     No Known Problems Father     No Known Problems Sister     No Known Problems Brother     No Known Problems Maternal Aunt     No Known Problems Maternal Uncle     No Known Problems Paternal Aunt     No Known Problems Paternal Uncle     No Known Problems Maternal Grandmother     No Known Problems  Maternal Grandfather     No Known Problems Paternal Grandmother     No Known Problems Paternal Grandfather     Amblyopia Neg Hx     Blindness Neg Hx     Cancer Neg Hx     Cataracts Neg Hx     Glaucoma Neg Hx     Macular degeneration Neg Hx     Retinal detachment Neg Hx     Strabismus Neg Hx     Stroke Neg Hx     Thyroid disease Neg Hx        Social History  Social History     Socioeconomic History    Marital status:      Spouse name: Not on file    Number of children: Not on file    Years of education: Not on file    Highest education level: Not on file   Occupational History    Not on file   Social Needs    Financial resource strain: Not on file    Food insecurity:     Worry: Not on file     Inability: Not on file    Transportation needs:     Medical: Not on file     Non-medical: Not on file   Tobacco Use    Smoking status: Never Smoker    Smokeless tobacco: Never Used    Tobacco comment: smoked short while as a teen   Substance and Sexual Activity    Alcohol use: No    Drug use: No    Sexual activity: Not on file   Lifestyle    Physical activity:     Days per week: Not on file     Minutes per session: Not on file    Stress: Not on file   Relationships    Social connections:     Talks on phone: Not on file     Gets together: Not on file     Attends [employer] service: Not on file     Active member of club or organization: Not on file     Attends meetings of clubs or organizations: Not on file     Relationship status: Not on file   Other Topics Concern    Not on file   Social History Narrative    Not on file       Current Medications  Current Outpatient Medications on File Prior to Visit   Medication Sig Dispense Refill    amLODIPine (NORVASC) 10 MG tablet Take 1 tablet (10 mg total) by mouth once daily. 30 tablet 0    ferrous sulfate 324 mg (65 mg iron) TbEC Take 325 mg by mouth once daily.      gabapentin (NEURONTIN) 300 MG capsule Take 1 capsule (300 mg total) by mouth 3  (three) times daily. 270 capsule 3    hydrALAZINE (APRESOLINE) 100 MG tablet Take 1 tablet (100 mg total) by mouth every 8 (eight) hours. 90 tablet 0    metoprolol tartrate (LOPRESSOR) 50 MG tablet Take 1 tablet (50 mg total) by mouth 2 (two) times daily. 60 tablet 0    sodium bicarbonate 650 MG tablet Take 1 tablet (650 mg total) by mouth 3 (three) times daily. 21 tablet 0    allopurinol (ZYLOPRIM) 300 MG tablet Take 1 tablet (300 mg total) by mouth once daily. 90 tablet 3    blood sugar diagnostic (ONE TOUCH ULTRA TEST) Strp 1 each by Misc.(Non-Drug; Combo Route) route once daily. 200 each 3    blood-glucose meter (ONE TOUCH ULTRAMINI) kit To test twice daily. Use as instructed 1 each 0    cholestyramine (QUESTRAN) 4 gram packet Take 1 packet (4 g total) by mouth 3 (three) times daily with meals. 270 packet 3    colchicine (COLCRYS) 0.6 mg tablet Take 1 tablet (0.6 mg total) by mouth daily as needed. 90 tablet 3    DULoxetine (CYMBALTA) 30 MG capsule Take 1 capsule (30 mg total) by mouth 2 (two) times daily. 180 capsule 3    ferrous gluconate (FERGON) 324 MG tablet Take 324 mg by mouth.      FLUZONE HIGH-DOSE 2019-20, PF, 180 mcg/0.5 mL Syrg       loratadine (CLARITIN) 10 mg tablet Take 10 mg by mouth once daily.      multivitamin capsule Take 1 capsule by mouth once daily.      QUEtiapine (SEROQUEL) 50 MG tablet Take 1 tablet (50 mg total) by mouth every evening. 30 tablet 0     Current Facility-Administered Medications on File Prior to Visit   Medication Dose Route Frequency Provider Last Rate Last Dose    ondansetron HCl (PF) 4 mg/2 mL injection    PRN James Ryan CRNA   4 mg at 06/29/15 0856       Allergies   Review of patient's allergies indicates:   Allergen Reactions    Shellfish containing products      Causes gout to flare up       Review of Systems (Pertinent positives)  Review of Systems   Constitutional: Negative for chills, diaphoresis, fever, malaise/fatigue and weight loss.  "  HENT: Negative.    Eyes: Negative for blurred vision, double vision, photophobia, pain, discharge and redness.   Respiratory: Negative for cough, hemoptysis, sputum production, shortness of breath and wheezing.    Cardiovascular: Negative for chest pain, palpitations, orthopnea, claudication, leg swelling and PND.   Gastrointestinal: Negative.  Negative for abdominal pain, blood in stool, constipation, diarrhea, heartburn, melena, nausea and vomiting.   Genitourinary: Negative for dysuria, flank pain, frequency, hematuria and urgency.   Musculoskeletal: Negative for back pain, falls, joint pain, myalgias and neck pain.   Skin: Negative.    Neurological: Negative for dizziness, tingling, tremors, sensory change, speech change, focal weakness, seizures, loss of consciousness, weakness and headaches.   Endo/Heme/Allergies: Negative.    Psychiatric/Behavioral: Negative.        /74 (BP Location: Right arm, Patient Position: Sitting, BP Method: Small (Manual))   Pulse 78   Temp 98.6 °F (37 °C) (Oral)   Resp 16   Ht 6' 2" (1.88 m)   Wt 88.9 kg (195 lb 15.8 oz)   SpO2 97%   BMI 25.16 kg/m²     Physical Exam   Constitutional: He is oriented to person, place, and time. He appears well-developed and well-nourished. He is cooperative.  Non-toxic appearance. He does not have a sickly appearance. He does not appear ill. No distress.   Patient resting comfortably on exam table; conversing appropriately no acute distress   Eyes: Pupils are equal, round, and reactive to light. Conjunctivae and EOM are normal.   Neck: Normal range of motion. Neck supple. No JVD present.   Cardiovascular: Normal rate, regular rhythm, normal heart sounds and intact distal pulses. Exam reveals no gallop and no friction rub.   No murmur heard.  Clinically euvolemic no JVD no lower extremity swelling   Pulmonary/Chest: Effort normal and breath sounds normal. No stridor. No respiratory distress. He has no wheezes. He has no rales. He " exhibits no tenderness.   Abdominal: Soft. Bowel sounds are normal. He exhibits no shifting dullness, no distension, no pulsatile liver, no fluid wave, no abdominal bruit, no ascites, no pulsatile midline mass and no mass. There is no hepatosplenomegaly. There is no tenderness. There is no rigidity, no rebound, no guarding, no CVA tenderness, no tenderness at McBurney's point and negative Jacobo's sign. No hernia.   Visible ostomy with stool and air   Musculoskeletal: Normal range of motion.   Lymphadenopathy:     He has no cervical adenopathy.   Neurological: He is alert and oriented to person, place, and time. No sensory deficit. He exhibits normal muscle tone.   Skin: Skin is warm and dry. Capillary refill takes less than 2 seconds. No rash noted. He is not diaphoretic. No erythema. No pallor.   Psychiatric: He has a normal mood and affect. His behavior is normal. Judgment and thought content normal.   Vitals reviewed.       Assessment/Plan:  Eugene Gamez is a 84 y.o. male who presents today for :    Eugene was seen today for hospital follow up.    Diagnoses and all orders for this visit:    Hospital discharge follow-up    Stage 3 chronic kidney disease  -     Ambulatory referral to Nephrology  -     Comprehensive metabolic panel; Future    HTN, goal below 140/80     Blood pressure is within normal limits today.  Other vital signs within normal limits as well.  Patient denies any acute complaints today.  He is eating and hydrating appropriately.  No need to hold blood pressure medication any further as he has been taking his blood pressure medication and his blood pressure is within normal limits today.  Denies any orthostatic dizziness or orthostatic symptoms.  I encouraged the patient to start taking his blood pressures at home to monitor.  If he begins to get below 100 or become dizzy or fatigued or lightheaded then he needs to stop his blood pressure medications and call the clinic.  Encouraged patient to  continue drinking plenty of water throughout the day and staying hydrated.  Given that patient is taking sodium bicarbonate, will refer him to Nephrology for management of his stage III CKD.  Will recheck CMP in 1 week to evaluate kidney function.  Patient is to call the clinic or follow up in the meantime for any acute concerns, signs, or symptoms.  Patient verbalized understanding of instructions.        This office note has been dictated.  This dictation has been generated using M-Modal Fluency Direct dictation; some phonetic errors may occur.   My collaborating physician is Dr. Lencho Stacy.

## 2019-11-04 ENCOUNTER — TELEPHONE (OUTPATIENT)
Dept: FAMILY MEDICINE | Facility: CLINIC | Age: 84
End: 2019-11-04

## 2019-11-04 ENCOUNTER — LAB VISIT (OUTPATIENT)
Dept: LAB | Facility: HOSPITAL | Age: 84
End: 2019-11-04
Payer: MEDICARE

## 2019-11-04 DIAGNOSIS — C18.9 MALIGNANT NEOPLASM OF COLON, UNSPECIFIED PART OF COLON: ICD-10-CM

## 2019-11-04 DIAGNOSIS — R53.81 DEBILITY: Primary | ICD-10-CM

## 2019-11-04 DIAGNOSIS — N18.30 STAGE 3 CHRONIC KIDNEY DISEASE: ICD-10-CM

## 2019-11-04 LAB
ALBUMIN SERPL BCP-MCNC: 3.5 G/DL (ref 3.5–5.2)
ALP SERPL-CCNC: 88 U/L (ref 55–135)
ALT SERPL W/O P-5'-P-CCNC: 10 U/L (ref 10–44)
ANION GAP SERPL CALC-SCNC: 5 MMOL/L (ref 8–16)
AST SERPL-CCNC: 12 U/L (ref 10–40)
BILIRUB SERPL-MCNC: 0.5 MG/DL (ref 0.1–1)
BUN SERPL-MCNC: 16 MG/DL (ref 8–23)
CALCIUM SERPL-MCNC: 9.3 MG/DL (ref 8.7–10.5)
CHLORIDE SERPL-SCNC: 115 MMOL/L (ref 95–110)
CO2 SERPL-SCNC: 22 MMOL/L (ref 23–29)
CREAT SERPL-MCNC: 1.6 MG/DL (ref 0.5–1.4)
EST. GFR  (AFRICAN AMERICAN): 45 ML/MIN/1.73 M^2
EST. GFR  (NON AFRICAN AMERICAN): 39 ML/MIN/1.73 M^2
GLUCOSE SERPL-MCNC: 133 MG/DL (ref 70–110)
POTASSIUM SERPL-SCNC: 4.5 MMOL/L (ref 3.5–5.1)
PROT SERPL-MCNC: 6.7 G/DL (ref 6–8.4)
SODIUM SERPL-SCNC: 142 MMOL/L (ref 136–145)

## 2019-11-04 PROCEDURE — 36415 COLL VENOUS BLD VENIPUNCTURE: CPT | Mod: PN

## 2019-11-04 PROCEDURE — 80053 COMPREHEN METABOLIC PANEL: CPT

## 2019-11-06 ENCOUNTER — TELEPHONE (OUTPATIENT)
Dept: FAMILY MEDICINE | Facility: CLINIC | Age: 84
End: 2019-11-06

## 2019-11-06 NOTE — TELEPHONE ENCOUNTER
----- Message from Elvin Lopez NP sent at 11/4/2019  1:03 PM CST -----  Patient is a kidney function is stable please notify.  Thank you

## 2019-11-07 ENCOUNTER — EXTERNAL HOME HEALTH (OUTPATIENT)
Dept: HOME HEALTH SERVICES | Facility: HOSPITAL | Age: 84
End: 2019-11-07
Payer: MEDICARE

## 2019-11-07 PROCEDURE — G0180 PR HOME HEALTH MD CERTIFICATION: ICD-10-PCS | Mod: ,,, | Performed by: HOSPITALIST

## 2019-11-07 PROCEDURE — G0180 MD CERTIFICATION HHA PATIENT: HCPCS | Mod: ,,, | Performed by: HOSPITALIST

## 2019-11-22 ENCOUNTER — TELEPHONE (OUTPATIENT)
Dept: FAMILY MEDICINE | Facility: CLINIC | Age: 84
End: 2019-11-22

## 2019-11-22 NOTE — LETTER
November 22, 2019    Eugene Gamez  [address]  [place] [address]             [facility]  [address]  [place] [address]  Phone: [phone]  Fax: [phone] Dear Eduarda Franco:    Aylin we were unable to contact you to schedule your Nephrology appointment. Please give the referral department a call at [phone].        If you have any questions or concerns, please don't hesitate to call.    Sincerely,        Radha Pina MA

## 2020-01-02 DIAGNOSIS — Z85.038 HISTORY OF COLON CANCER: Primary | ICD-10-CM

## 2021-09-13 ENCOUNTER — TELEPHONE (OUTPATIENT)
Dept: FAMILY MEDICINE | Facility: CLINIC | Age: 86
End: 2021-09-13

## 2021-09-13 ENCOUNTER — PATIENT OUTREACH (OUTPATIENT)
Dept: ADMINISTRATIVE | Facility: HOSPITAL | Age: 86
End: 2021-09-13

## 2021-09-20 DIAGNOSIS — Z71.89 COMPLEX CARE COORDINATION: ICD-10-CM

## 2022-01-13 ENCOUNTER — OFFICE VISIT (OUTPATIENT)
Dept: FAMILY MEDICINE | Facility: CLINIC | Age: 87
End: 2022-01-13
Payer: MEDICARE

## 2022-01-13 VITALS
TEMPERATURE: 99 F | HEART RATE: 72 BPM | WEIGHT: 202.19 LBS | SYSTOLIC BLOOD PRESSURE: 112 MMHG | BODY MASS INDEX: 25.95 KG/M2 | RESPIRATION RATE: 19 BRPM | OXYGEN SATURATION: 98 % | DIASTOLIC BLOOD PRESSURE: 64 MMHG | HEIGHT: 74 IN

## 2022-01-13 DIAGNOSIS — H91.93 BILATERAL HEARING LOSS, UNSPECIFIED HEARING LOSS TYPE: ICD-10-CM

## 2022-01-13 DIAGNOSIS — H61.23 BILATERAL IMPACTED CERUMEN: Primary | ICD-10-CM

## 2022-01-13 DIAGNOSIS — E11.22 TYPE 2 DIABETES MELLITUS WITH CHRONIC KIDNEY DISEASE, WITHOUT LONG-TERM CURRENT USE OF INSULIN, UNSPECIFIED CKD STAGE: ICD-10-CM

## 2022-01-13 PROCEDURE — 99213 OFFICE O/P EST LOW 20 MIN: CPT | Mod: S$PBB,,, | Performed by: NURSE PRACTITIONER

## 2022-01-13 PROCEDURE — 99999 PR PBB SHADOW E&M-EST. PATIENT-LVL V: CPT | Mod: PBBFAC,,, | Performed by: NURSE PRACTITIONER

## 2022-01-13 PROCEDURE — 99213 PR OFFICE/OUTPT VISIT, EST, LEVL III, 20-29 MIN: ICD-10-PCS | Mod: S$PBB,,, | Performed by: NURSE PRACTITIONER

## 2022-01-13 PROCEDURE — 99999 PR PBB SHADOW E&M-EST. PATIENT-LVL V: ICD-10-PCS | Mod: PBBFAC,,, | Performed by: NURSE PRACTITIONER

## 2022-01-13 PROCEDURE — 99215 OFFICE O/P EST HI 40 MIN: CPT | Mod: PBBFAC,PN | Performed by: NURSE PRACTITIONER

## 2022-01-13 NOTE — PROGRESS NOTES
Routine Office Visit    Patient Name: Eugene Gamez    : 1935  MRN: [id number]    Chief Complaint:  Ear flush    Subjective:  Eugene is a 86 y.o. male who presents today for:    1. Ear flush - patient who is known to me reports today for evaluation.  He would like to get his ears flushed because he has been dealing with ear fullness and hearing loss for some time now.  He would also like to be sent to an ear doctor to evaluated for hearing loss.  He has been using an over-the-counter earwax removal solution (the name of which he does not know) to help with his ears with this is not helped.  Denies any other ENT symptoms.  No shortness of breath, chest pain, or other concerns.  A history of diabetes, hypertension, and CK D. he did see his kidney doctor recently.  He reports that he takes 6 medications but is unsure of the names of his medications.    Past Medical History  Past Medical History:   Diagnosis Date    Arthritis     Cancer     Diabetes mellitus     Elevated PSA     Gout, unspecified     Hypertension     Nuclear sclerotic cataract of both eyes 2018       Past Surgical History  Past Surgical History:   Procedure Laterality Date    CATARACT EXTRACTION      ou    EYE SURGERY      bilateral cataracts    removal of small bowel  Apr.     Colostomy       Family History  Family History   Problem Relation Age of Onset    Hypertension Mother     Diabetes Mother     No Known Problems Father     No Known Problems Sister     No Known Problems Brother     No Known Problems Maternal Aunt     No Known Problems Maternal Uncle     No Known Problems Paternal Aunt     No Known Problems Paternal Uncle     No Known Problems Maternal Grandmother     No Known Problems Maternal Grandfather     No Known Problems Paternal Grandmother     No Known Problems Paternal Grandfather     Amblyopia Neg Hx     Blindness Neg Hx     Cancer Neg Hx     Cataracts Neg Hx     Glaucoma Neg Hx     Macular  degeneration Neg Hx     Retinal detachment Neg Hx     Strabismus Neg Hx     Stroke Neg Hx     Thyroid disease Neg Hx        Social History  Social History     Socioeconomic History    Marital status:    Tobacco Use    Smoking status: Never Smoker    Smokeless tobacco: Never Used    Tobacco comment: smoked short while as a teen   Substance and Sexual Activity    Alcohol use: No    Drug use: No       Current Medications  Current Outpatient Medications on File Prior to Visit   Medication Sig Dispense Refill    allopurinol (ZYLOPRIM) 300 MG tablet Take 1 tablet (300 mg total) by mouth once daily. 90 tablet 3    amLODIPine (NORVASC) 10 MG tablet Take 1 tablet (10 mg total) by mouth once daily. 30 tablet 0    blood sugar diagnostic (ONE TOUCH ULTRA TEST) Strp 1 each by Misc.(Non-Drug; Combo Route) route once daily. 200 each 3    blood-glucose meter (ONE TOUCH ULTRAMINI) kit To test twice daily. Use as instructed 1 each 0    cholestyramine (QUESTRAN) 4 gram packet Take 1 packet (4 g total) by mouth 3 (three) times daily with meals. 270 packet 3    colchicine (COLCRYS) 0.6 mg tablet Take 1 tablet (0.6 mg total) by mouth daily as needed. 90 tablet 3    DULoxetine (CYMBALTA) 30 MG capsule Take 1 capsule (30 mg total) by mouth 2 (two) times daily. 180 capsule 3    ferrous gluconate (FERGON) 324 MG tablet Take 324 mg by mouth.      ferrous sulfate 324 mg (65 mg iron) TbEC Take 325 mg by mouth once daily.      FLUZONE HIGH-DOSE 2019-20, PF, 180 mcg/0.5 mL Syrg       gabapentin (NEURONTIN) 300 MG capsule Take 1 capsule (300 mg total) by mouth 3 (three) times daily. 270 capsule 3    hydrALAZINE (APRESOLINE) 100 MG tablet Take 1 tablet (100 mg total) by mouth every 8 (eight) hours. 90 tablet 0    loratadine (CLARITIN) 10 mg tablet Take 10 mg by mouth once daily.      metoprolol tartrate (LOPRESSOR) 50 MG tablet Take 1 tablet (50 mg total) by mouth 2 (two) times daily. 60 tablet 0    multivitamin  "capsule Take 1 capsule by mouth once daily.      QUEtiapine (SEROQUEL) 50 MG tablet Take 1 tablet (50 mg total) by mouth every evening. 30 tablet 0    sodium bicarbonate 650 MG tablet Take 1 tablet (650 mg total) by mouth 3 (three) times daily. 21 tablet 0     Current Facility-Administered Medications on File Prior to Visit   Medication Dose Route Frequency Provider Last Rate Last Admin    ondansetron HCl (PF) 4 mg/2 mL injection    PRN James yRan CRNA   4 mg at 06/29/15 0856       Allergies   Review of patient's allergies indicates:   Allergen Reactions    Shellfish containing products      Causes gout to flare up       Review of Systems (Pertinent positives)  Review of Systems   Constitutional: Negative for chills, diaphoresis, fever, malaise/fatigue and weight loss.   HENT: Positive for hearing loss. Negative for congestion, ear discharge, ear pain, nosebleeds, sinus pain, sore throat and tinnitus.    Eyes: Negative.    Respiratory: Negative.  Negative for stridor.    Cardiovascular: Negative.    Gastrointestinal: Negative.    Genitourinary: Negative.    Musculoskeletal: Negative.    Skin: Negative.    Neurological: Negative.    Endo/Heme/Allergies: Negative.    Psychiatric/Behavioral: Negative.        /64 (BP Location: Right arm, Patient Position: Sitting, BP Method: Medium (Manual))   Pulse 72   Temp 99.1 °F (37.3 °C) (Oral)   Resp 19   Ht 6' 2" (1.88 m)   Wt 91.7 kg (202 lb 2.6 oz)   SpO2 98%   BMI 25.96 kg/m²     Physical Exam  Vitals reviewed.   Constitutional:       General: He is not in acute distress.     Appearance: Normal appearance. He is not ill-appearing, toxic-appearing or diaphoretic.   HENT:      Head: Normocephalic and atraumatic.      Right Ear: Ear canal and external ear normal. There is impacted cerumen.      Left Ear: Ear canal and external ear normal. There is impacted cerumen.      Ears:      Comments: Impacted cerumen bilaterally obstructing view of tympanic " membranes     Nose: Nose normal.      Right Sinus: No maxillary sinus tenderness or frontal sinus tenderness.      Left Sinus: No maxillary sinus tenderness or frontal sinus tenderness.      Mouth/Throat:      Lips: Pink.      Mouth: Mucous membranes are moist.      Pharynx: Oropharynx is clear. Uvula midline. No oropharyngeal exudate or posterior oropharyngeal erythema.      Tonsils: No tonsillar exudate or tonsillar abscesses. 0 on the right. 0 on the left.   Eyes:      Extraocular Movements: Extraocular movements intact.      Conjunctiva/sclera: Conjunctivae normal.      Pupils: Pupils are equal, round, and reactive to light.   Cardiovascular:      Rate and Rhythm: Normal rate and regular rhythm.      Pulses: Normal pulses.      Heart sounds: Normal heart sounds.   Pulmonary:      Effort: Pulmonary effort is normal.      Breath sounds: Normal breath sounds.   Skin:     General: Skin is warm and dry.      Capillary Refill: Capillary refill takes less than 2 seconds.   Neurological:      General: No focal deficit present.      Mental Status: He is alert and oriented to person, place, and time.          Assessment/Plan:  Eugene Gamez is a 86 y.o. male who presents today for :    Diagnoses and all orders for this visit:    Bilateral impacted cerumen  -     Ambulatory referral/consult to ENT; Future    Bilateral hearing loss, unspecified hearing loss type  -     Ambulatory referral/consult to ENT; Future    Type 2 diabetes mellitus with chronic kidney disease, without long-term current use of insulin, unspecified CKD stage    Attempted to flush patient's years with mixture of warm water and peroxide.  After a few flushes of the left ear patient could not tolerate procedure due to pain.  Examination after flush shows bilateral impacted cerumen without bleeding.  Unable to visualize tympanic membranes.  Will refer to ENT for further evaluation.  Patient has not been seen in this clinic in some time.  Will have  patient return in 1 month for annual visit.  I recommended patient to bring a list of all his medications that he is taking at this time is it next time so we can go over them.  All questions answered.  Patient verbalized understanding of instructions.        This office note has been dictated.  This dictation has been generated using M-Modal Fluency Direct dictation; some phonetic errors may occur.   My collaborating physician is Dr. Lencho Stacy.

## 2022-01-18 ENCOUNTER — HOSPITAL ENCOUNTER (OUTPATIENT)
Facility: HOSPITAL | Age: 87
LOS: 1 days | Discharge: HOME | End: 2022-01-19
Attending: EMERGENCY MEDICINE | Admitting: NEUROLOGICAL SURGERY
Payer: MEDICARE

## 2022-01-18 DIAGNOSIS — S06.5XAA SUBDURAL HEMATOMA: ICD-10-CM

## 2022-01-18 DIAGNOSIS — U07.1 LAB TEST POSITIVE FOR DETECTION OF COVID-19 VIRUS: ICD-10-CM
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
## 2022-01-18 DIAGNOSIS — R07.9 CHEST PAIN: ICD-10-CM

## 2022-01-18 DIAGNOSIS — S06.5XAA SDH (SUBDURAL HEMATOMA): Primary | ICD-10-CM

## 2022-01-18 LAB
ALBUMIN SERPL-MCNC: 3.7 G/DL (ref 3.3–5.5)
ALP SERPL-CCNC: 60 U/L (ref 42–141)
BILIRUB SERPL-MCNC: 0.6 MG/DL (ref 0.2–1.6)
BUN SERPL-MCNC: 23 MG/DL (ref 7–22)
CALCIUM SERPL-MCNC: 9.5 MG/DL (ref 8–10.3)
CHLORIDE SERPL-SCNC: 108 MMOL/L (ref 98–108)
CREAT SERPL-MCNC: 1.7 MG/DL (ref 0.6–1.2)
CTP QC/QA: YES
GLUCOSE SERPL-MCNC: 204 MG/DL (ref 73–118)
POC ALT (SGPT): 22 U/L (ref 10–47)
POC AST (SGOT): 25 U/L (ref 11–38)
POC CARDIAC TROPONIN I: 0 NG/ML
POC PTINR: 1.1 (ref 0.9–1.2)
POC PTWBT: 13.6 SEC (ref 9.7–14.3)
POC TCO2: 25 MMOL/L (ref 18–33)
POTASSIUM BLD-SCNC: 5 MMOL/L (ref 3.6–5.1)
PROTEIN, POC: 7.2 G/DL (ref 6.4–8.1)
SAMPLE: NORMAL
SAMPLE: NORMAL
SARS-COV-2 RDRP RESP QL NAA+PROBE: POSITIVE
SODIUM BLD-SCNC: 141 MMOL/L (ref 128–145)

## 2022-01-18 PROCEDURE — 85610 PROTHROMBIN TIME: CPT | Mod: ER

## 2022-01-18 PROCEDURE — 84484 ASSAY OF TROPONIN QUANT: CPT | Mod: ER

## 2022-01-18 PROCEDURE — 90715 TDAP VACCINE 7 YRS/> IM: CPT | Mod: ER | Performed by: NURSE PRACTITIONER

## 2022-01-18 PROCEDURE — G0378 HOSPITAL OBSERVATION PER HR: HCPCS

## 2022-01-18 PROCEDURE — 25000003 PHARM REV CODE 250: Mod: ER | Performed by: EMERGENCY MEDICINE

## 2022-01-18 PROCEDURE — U0002 COVID-19 LAB TEST NON-CDC: HCPCS | Mod: ER | Performed by: EMERGENCY MEDICINE

## 2022-01-18 PROCEDURE — 25000003 PHARM REV CODE 250: Mod: ER | Performed by: PHYSICIAN ASSISTANT

## 2022-01-18 PROCEDURE — 99291 CRITICAL CARE FIRST HOUR: CPT | Mod: 25

## 2022-01-18 PROCEDURE — 25000003 PHARM REV CODE 250: Performed by: PHYSICIAN ASSISTANT

## 2022-01-18 PROCEDURE — 93005 ELECTROCARDIOGRAM TRACING: CPT | Mod: ER,59

## 2022-01-18 PROCEDURE — 85025 COMPLETE CBC W/AUTO DIFF WBC: CPT | Mod: ER

## 2022-01-18 PROCEDURE — 93010 EKG 12-LEAD: ICD-10-PCS | Mod: ,,, | Performed by: INTERNAL MEDICINE

## 2022-01-18 PROCEDURE — 63600175 PHARM REV CODE 636 W HCPCS: Mod: ER | Performed by: NURSE PRACTITIONER

## 2022-01-18 PROCEDURE — 90471 IMMUNIZATION ADMIN: CPT | Mod: ER | Performed by: NURSE PRACTITIONER

## 2022-01-18 PROCEDURE — 93010 ELECTROCARDIOGRAM REPORT: CPT | Mod: ,,, | Performed by: INTERNAL MEDICINE

## 2022-01-18 PROCEDURE — 80053 COMPREHEN METABOLIC PANEL: CPT | Mod: ER

## 2022-01-18 PROCEDURE — 12013 RPR F/E/E/N/L/M 2.6-5.0 CM: CPT

## 2022-01-18 PROCEDURE — 96365 THER/PROPH/DIAG IV INF INIT: CPT | Mod: 59

## 2022-01-18 PROCEDURE — 96366 THER/PROPH/DIAG IV INF ADDON: CPT | Mod: 59

## 2022-01-18 RX ORDER — IBUPROFEN 200 MG
16 TABLET ORAL
Status: DISCONTINUED | OUTPATIENT
Start: 2022-01-18 | End: 2022-01-19 | Stop reason: HOSPADM

## 2022-01-18 RX ORDER — NICARDIPINE HYDROCHLORIDE 0.2 MG/ML
0-15 INJECTION INTRAVENOUS CONTINUOUS
Status: DISCONTINUED | OUTPATIENT
Start: 2022-01-18 | End: 2022-01-19 | Stop reason: HOSPADM

## 2022-01-18 RX ORDER — LIDOCAINE HYDROCHLORIDE 10 MG/ML
10 INJECTION INFILTRATION; PERINEURAL
Status: COMPLETED | OUTPATIENT
Start: 2022-01-18 | End: 2022-01-18

## 2022-01-18 RX ORDER — ACETAMINOPHEN 325 MG/1
650 TABLET ORAL EVERY 4 HOURS PRN
Status: DISCONTINUED | OUTPATIENT
Start: 2022-01-18 | End: 2022-01-19 | Stop reason: HOSPADM

## 2022-01-18 RX ORDER — GLUCAGON 1 MG
1 KIT INJECTION
Status: DISCONTINUED | OUTPATIENT
Start: 2022-01-18 | End: 2022-01-19 | Stop reason: HOSPADM

## 2022-01-18 RX ORDER — IBUPROFEN 200 MG
24 TABLET ORAL
Status: DISCONTINUED | OUTPATIENT
Start: 2022-01-18 | End: 2022-01-19 | Stop reason: HOSPADM

## 2022-01-18 RX ORDER — ONDANSETRON 2 MG/ML
4 INJECTION INTRAMUSCULAR; INTRAVENOUS EVERY 6 HOURS PRN
Status: DISCONTINUED | OUTPATIENT
Start: 2022-01-18 | End: 2022-01-19 | Stop reason: HOSPADM

## 2022-01-18 RX ADMIN — LIDOCAINE-EPINEPHRINE-TETRACAINE GEL 4-0.05-0.5%: 4-0.05-0.5 GEL at 05:01

## 2022-01-18 RX ADMIN — LIDOCAINE HYDROCHLORIDE 10 ML: 10 INJECTION, SOLUTION INFILTRATION; PERINEURAL at 04:01

## 2022-01-18 RX ADMIN — NICARDIPINE HYDROCHLORIDE 5 MG/HR: 0.2 INJECTION INTRAVENOUS at 07:01

## 2022-01-18 RX ADMIN — TETANUS TOXOID, REDUCED DIPHTHERIA TOXOID AND ACELLULAR PERTUSSIS VACCINE, ADSORBED 0.5 ML: 5; 2.5; 8; 8; 2.5 SUSPENSION INTRAMUSCULAR at 05:01

## 2022-01-18 NOTE — Clinical Note
PEPE GRIMALDO accompanied their father to the emergency department on 1/18/2022. They may return to work on 01/22/2022.  PLEASE ALLOW PEPE GRIMALDO TO QUARANTINE FOR 5 DAYS FOR SYMPTOM MONITORING DUE TO CLOSE COVID + CONTACT.   PEPE GRIMALDO MAY RETURN TO WORK ONCE ASYMPTOMATIC FOR 5 DAYS WITHOUT THE USE OF FEVER REDUCING MEDICATIONS.     If you have any questions or concerns, please don't hesitate to call.      BECKI EATON RN

## 2022-01-18 NOTE — ED PROVIDER NOTES
Encounter Date: 1/18/2022    SCRIBE #1 NOTE: I, Earnest Seth, am scribing for, and in the presence of,  Latoya Hinton DO. I have scribed the following portions of the note - Other sections scribed: HPI, ROS, PE.       History     Chief Complaint   Patient presents with    Fall     Pt reports trip and fall this AM, reports head injury with lac to left eyebrow-denies LOC, pain to left hip and left knee, no anticoag use      Eugene Gamez is a 86 y.o. male with Hx of DM and HTN who presents to the ED for chief complaint of laceration to left eyebrow, left hip pain and left knee pain onset today 9:30 AM s/p trip and fall. Per daughter, patient was going to 's while trying to step on a curb when he fell. He denies syncope. Patient is not on any blood thinners. He does not endorse tobacco, EtOH, or any drug use. No further complaints at this present time.     The history is provided by the patient and a relative. No  was used.     Review of patient's allergies indicates:   Allergen Reactions    Iodine Other (See Comments)     Causes gout to flare up    Shellfish containing products      Causes gout to flare up     Past Medical History:   Diagnosis Date    Arthritis     Cancer     Diabetes mellitus     Elevated PSA     Gout, unspecified     Hypertension     Nuclear sclerotic cataract of both eyes 6/29/2018     Past Surgical History:   Procedure Laterality Date    CATARACT EXTRACTION      ou    EYE SURGERY      bilateral cataracts    removal of small bowel  Apr. 2015    Colostomy     Family History   Problem Relation Age of Onset    Hypertension Mother     Diabetes Mother     No Known Problems Father     No Known Problems Sister     No Known Problems Brother     No Known Problems Maternal Aunt     No Known Problems Maternal Uncle     No Known Problems Paternal Aunt     No Known Problems Paternal Uncle     No Known Problems Maternal Grandmother     No Known Problems  Maternal Grandfather     No Known Problems Paternal Grandmother     No Known Problems Paternal Grandfather     Amblyopia Neg Hx     Blindness Neg Hx     Cancer Neg Hx     Cataracts Neg Hx     Glaucoma Neg Hx     Macular degeneration Neg Hx     Retinal detachment Neg Hx     Strabismus Neg Hx     Stroke Neg Hx     Thyroid disease Neg Hx      Social History     Tobacco Use    Smoking status: Never Smoker    Smokeless tobacco: Never Used    Tobacco comment: smoked short while as a teen   Substance Use Topics    Alcohol use: No    Drug use: No     Review of Systems   Constitutional: Negative for fever.   HENT: Negative for sore throat.    Eyes: Negative for pain.   Respiratory: Negative for shortness of breath.    Cardiovascular: Negative for chest pain.   Gastrointestinal: Negative for abdominal pain.   Genitourinary: Negative for dysuria.   Musculoskeletal: Positive for arthralgias. Negative for back pain.   Skin: Positive for wound. Negative for rash.   Neurological: Negative for headaches.   All other systems reviewed and are negative.      Physical Exam     Initial Vitals [01/18/22 1442]   BP Pulse Resp Temp SpO2   118/67 78 20 98.8 °F (37.1 °C) 98 %      MAP       --         Patient gave consent to have physical exam performed.  Physical Exam    Nursing note and vitals reviewed.  Constitutional: He appears well-developed and well-nourished.   HENT:   Head: Normocephalic and atraumatic.       Right Ear: External ear normal.   Left Ear: External ear normal.   Nose: Nose normal.   Mouth/Throat: Oropharynx is clear and moist.   Eyes: Conjunctivae and EOM are normal. Pupils are equal, round, and reactive to light.   Neck: Neck supple.   Normal range of motion.  Cardiovascular: Normal rate, regular rhythm and normal heart sounds. Exam reveals no gallop and no friction rub.    No murmur heard.  Pulmonary/Chest: Breath sounds normal. No respiratory distress. He has no wheezes. He has no rhonchi. He has no  rales. He exhibits no tenderness.   Abdominal: Abdomen is soft. Bowel sounds are normal. There is no abdominal tenderness.   Colostomy bag in place. There is no rebound and no guarding.   Musculoskeletal:         General: No edema. Normal range of motion.      Cervical back: Normal range of motion and neck supple.      Left hip: Tenderness present.      Left knee: Tenderness present.     Neurological: He is alert and oriented to person, place, and time. No cranial nerve deficit.   Skin: Skin is warm and dry. Capillary refill takes less than 2 seconds. Laceration (4cm) noted. No rash noted.   Star-shaped laceration to left eyebrow.  4 cm laceration   Psychiatric: He has a normal mood and affect. His behavior is normal.         ED Course   Critical Care    Date/Time: 1/18/2022 4:43 PM  Performed by: Latoya Hinton DO  Authorized by: Latoya Hinton DO   Direct patient critical care time: 8 minutes  Additional history critical care time: 8 minutes  Ordering / reviewing critical care time: 8 minutes  Documentation critical care time: 8 minutes  Consulting other physicians critical care time: 8 minutes  Consult with family critical care time: 8 minutes  Total critical care time (exclusive of procedural time) : 48 minutes  Critical care was necessary to treat or prevent imminent or life-threatening deterioration of the following conditions: CNS failure or compromise.  Critical care was time spent personally by me on the following activities: evaluation of patient's response to treatment, examination of patient, obtaining history from patient or surrogate, ordering and performing treatments and interventions, ordering and review of laboratory studies, ordering and review of radiographic studies, pulse oximetry, re-evaluation of patient's condition and review of old charts.    Lac Repair    Date/Time: 1/18/2022 5:14 PM  Performed by: LEONEL Lopez  Authorized by: Latoya Hinton DO     Consent:     Consent obtained:   Verbal    Consent given by:  Patient (and family)    Risks discussed:  Pain, need for additional repair and infection    Alternatives discussed:  No treatment  Universal protocol:     Procedure explained and questions answered to patient or proxy's satisfaction: yes      Patient identity confirmed:  Verbally with patient  Anesthesia:     Anesthesia method:  Local infiltration    Local anesthetic:  Lidocaine 1% w/o epi  Laceration details:     Location:  Face    Face location:  L eyebrow    Length (cm):  4  Pre-procedure details:     Preparation:  Patient was prepped and draped in usual sterile fashion  Exploration:     Wound exploration: wound explored through full range of motion    Treatment:     Area cleansed with:  Betadine and saline    Amount of cleaning:  Standard    Irrigation solution:  Sterile saline    Irrigation method:  Syringe  Skin repair:     Repair method:  Sutures    Suture size:  6-0    Suture material:  Nylon    Number of sutures:  5  Approximation:     Approximation:  Close  Repair type:     Repair type:  Simple  Post-procedure details:     Procedure completion:  Tolerated well, no immediate complications  Comments:      3cm linear laceration with a 1cm connecting flap laceration.  Sutures completed by: JASON Lewis FNP-C      Labs Reviewed   SARS-COV-2 RDRP GENE - Abnormal; Notable for the following components:       Result Value    POC Rapid COVID Positive (*)     All other components within normal limits    Narrative:     This test utilizes isothermal nucleic acid amplification   technology to detect the SARS-CoV-2 RdRp nucleic acid segment.   The analytical sensitivity (limit of detection) is 125 genome   equivalents/mL.   A POSITIVE result implies infection with the SARS-CoV-2 virus;   the patient is presumed to be contagious.     A NEGATIVE result means that SARS-CoV-2 nucleic acids are not   present above the limit of detection. A NEGATIVE result should be   treated as presumptive.  It does not rule out the possibility of   COVID-19 and should not be the sole basis for treatment decisions.   If COVID-19 is strongly suspected based on clinical and exposure   history, re-testing using an alternate molecular assay should be   considered.   This test is only for use under the Food and Drug   Administration s Emergency Use Authorization (EUA).   Commercial kits are provided by [employer].   Performance characteristics of the EUA have been independently   verified by [facility] Department of   Pathology and Laboratory Medicine.   _________________________________________________________________   The authorized Fact Sheet for Healthcare Providers and the authorized Fact   Sheet for Patients of the ID NOW COVID-19 are available on the FDA   website:     https://www.fda.gov/media/648398/download  https://www.fda.gov/media/125459/download       POCT CMP - Abnormal; Notable for the following components:    POC BUN 23 (*)     POC Creatinine 1.7 (*)     POC Glucose 204 (*)     All other components within normal limits   TROPONIN ISTAT   POCT CBC   POCT CMP   POCT PROTIME-INR   POCT TROPONIN   ISTAT PROCEDURE               EKG Readings: (Independently Interpreted)   No STEMI. Rate of 64. Normal Sinus Rhythm. Normal Axis. Normal EKG. QTc normal at 400. When compared to prior EKG dated 10/06/19 rate decreased by 10 bpm.          Imaging Results          X-Ray Pelvis Routine AP (Final result)  Result time 01/18/22 17:38:20   Procedure changed from X-Ray Hips Bilateral 2 View Incl AP Pelvis     Final result by Arina Salinas MD (01/18/22 17:38:20)                 Impression:      As above described.      Electronically signed by: Arina Salinas  Date:    01/18/2022  Time:    17:38             Narrative:    EXAMINATION:  PELVIS ROUTINE AP    CLINICAL HISTORY:  Unspecified fall, initial encounterfall;    TECHNIQUE:  AP view of the pelvi.  No two view hips was submitted  s    COMPARISON:  None.    FINDINGS:  AP view of the pelvis demonstrate no definite acute fracture or dislocation.  There is advanced degenerative change of the right hip including joint space narrowing, sclerosing, subchondral cysts, and osteophyte formation.  If pain is out of proportion to the radiological findings, consider MRI or CT of the pelvis.                               X-Ray Femur Ap/Lat Left (Final result)  Result time 01/18/22 17:21:13    Final result by Arina Salinas MD (01/18/22 17:21:13)                 Impression:      As above described.  Severe degenerative change of the hip.  Consider orthopedic consult when clinically appropriate for possible replacement.      Electronically signed by: Arina Salinas  Date:    01/18/2022  Time:    17:21             Narrative:    EXAMINATION:  TWO VIEWS OF THE LEFT FEMUR    CLINICAL HISTORY:  Unspecified fall, initial encounter    TECHNIQUE:  AP and lateral view of the left femur    COMPARISON:  None    FINDINGS:  Two views of the left femur demonstrate no acute fracture or dislocation.  There is significant narrowing and sclerosis, as well as subchondral cysts and osteophyte formation at the hip joint.  If pain is out of proportion to the radiological findings, consider CT.  The bones are diffusely osteopenic.  Is                               X-Ray Knee 1 or 2 View Left (Final result)  Result time 01/18/22 17:11:01   Procedure changed from X-Ray Knee 3 View Left     Final result by Arina Salinas MD (01/18/22 17:11:01)                 Impression:      No definite acute fracture.  Severe tricompartmental degenerative change.  Trace knee joint effusion.  Consider orthopedic consult for replacement, when clinically appropriate.      Electronically signed by: Arina Salinas  Date:    01/18/2022  Time:    17:11             Narrative:    EXAMINATION:  TWO VIEWS OF THE LEFT KNEE    CLINICAL HISTORY:  Unspecified fall, initial  encounterfall;    TECHNIQUE:  AP and lateral view of the left knee    COMPARISON:  05/06/2009    FINDINGS:  Two views of the left knee demonstrate no acute fracture or dislocation.  Severe tricompartmental degenerative change is seen.  There is significant osteophyte spurring along the distal aspect of the distal foot femoral condyle, as well as tricompartmental degenerative change narrowing.  The bones are osteopenic.  When compared to the previous exam, the patella appears to be higher in position.  This may be due to lack of flexion of the joint on this exam as opposed to the prior exam.  There is a trace knee joint effusion.                               CT Cervical Spine Without Contrast (Final result)  Result time 01/18/22 17:12:21    Final result by Scar Hernandez MD (01/18/22 17:12:21)                 Impression:      No evidence of acute fracture or listhesis of the cervical spine.    Marked osteopenia with diffuse lucencies involving the vertebral bodies.  Follow-up with MRI of the cervical spine be obtained, as clinically warranted.    Additional findings as above.      Electronically signed by: Scar Hernandez MD  Date:    01/18/2022  Time:    17:12             Narrative:    EXAMINATION:  CT CERVICAL SPINE WITHOUT CONTRAST    CLINICAL HISTORY:  Neck trauma (Age >= 65y);    TECHNIQUE:  Low dose axial images, sagittal and coronal reformations were performed though the cervical spine.  Contrast was not administered.    COMPARISON:  PET-CT scan dated 03/14/2019.    FINDINGS:  The visualized portions of the posterior fossa is unremarkable.  There is arthropathy of the craniocervical junction.  The predental space is maintained.  No prevertebral soft tissue swelling is identified.    The vertebral body heights are maintained.  There is marked diffuse osteopenia.  There are significant lucencies involving the vertebral bodies.  There is hypertrophy of the posterior elements.  The C1 ring is intact.    There is  multilevel disc desiccation.  There is variable spinal canal and neural foraminal narrowing.    There is no evidence of lymphadenopathy in the neck.  There are emphysematous changes in the lung apices.  There is no evidence of a pneumothorax or pulmonary contusion.                               X-Ray Chest AP Portable (Final result)  Result time 01/18/22 16:46:57    Final result by Juan Alberto Mack MD (01/18/22 16:46:57)                 Impression:      1. No acute cardiopulmonary process.      Electronically signed by: Juan Alberto Mack MD  Date:    01/18/2022  Time:    16:46             Narrative:    EXAMINATION:  XR CHEST AP PORTABLE    CLINICAL HISTORY:  Chest Pain;    TECHNIQUE:  Single frontal view of the chest was performed.    COMPARISON:  10/06/2019    FINDINGS:  The cardiomediastinal silhouette is not enlarged.  Left chest wall port catheter tip projects over the mid to distal SVC.  There is stable elevation of the left hemidiaphragm..  There is no pleural effusion.  The trachea is midline.  The lungs are symmetrically expanded bilaterally without evidence of acute parenchymal process. No large focal consolidation seen.  There is no pneumothorax.  The osseous structures are remarkable for degenerative changes..                                CT Head Without Contrast (Final result)  Result time 01/18/22 16:01:06    Final result by Scar Hernandez MD (01/18/22 16:01:06)                 Impression:      Small 5 mm extra-axial collection overlying the left temporal convexity, suggestive of a small left-sided acute subdural hematoma.  Short-term follow-up is suggested.    Changes of chronic small vessel ischemic disease and cerebral volume loss.    This report was flagged in Epic as abnormal.      Electronically signed by: Scar Hernandez MD  Date:    01/18/2022  Time:    16:01             Narrative:    EXAMINATION:  CT HEAD WITHOUT CONTRAST    CLINICAL HISTORY:  Facial trauma, blunt;    TECHNIQUE:  Low dose axial  images were obtained through the head.  Coronal and sagittal reformations were also performed. Contrast was not administered.    COMPARISON:  None.    FINDINGS:  The subcutaneous tissues are unremarkable.  The bony calvarium is intact.  The paranasal sinuses unremarkable.  The mastoid air cells are clear.  There are postoperative changes in the globes.    The craniocervical junction is intact.  There is empty appearance of the sella.  There is a trace 5 mm extra-axial collection overlying the left temporal convexity.  No evidence of mass effect is identified.  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  The ventricles and sulci are prominent, consistent with cerebral volume loss.  There are scattered hypodensities within the periventricular and subcortical white matter.  The gray-white differentiation is maintained.  There is no dense vessel sign.                                     Medications   LIDOcaine HCL 10 mg/ml (1%) injection 10 mL (10 mLs Infiltration Given by Other 1/18/22 1636)   Tdap (BOOSTRIX) vaccine injection 0.5 mL (0.5 mLs Intramuscular Given 1/18/22 1703)   LETS (LIDOcaine-TETRAcaine-EPINEPHrine) gel solution ( Topical (Top) Given 1/18/22 1703)     Medical Decision Making:   Independently Interpreted Test(s):   I have ordered and independently interpreted X-rays - see prior notes.  I have ordered and independently interpreted EKG Reading(s) - see prior notes  Clinical Tests:   Lab Tests: Ordered and Reviewed  Radiological Study: Ordered and Reviewed  Medical Tests: Ordered and Reviewed  Chief complaint: laceration to left eyebrow, left hip pain and left knee pain onset today 9:30 AM s/p trip and fall.  Differential diagnosis: intercranial hemorrhage, cardiac arhythmia, hyperglycemia, hip fracture, femur fracture, laceration, abrasion, renal failure    Treatment in the ED: PE, LIDOcaine HCL 10 mg/ml (1%) injection 10 mL   Tdap (BOOSTRIX) vaccine injection 0.5 mL     LETS  (LIDOcaine-TETRAcaine-EPINEPHrine) gel solution     Patient reports feeling better after treatment in the ER.      Discussed treatment, prescriptions, labs, and imaging results.    Patient and family are agreeable to transfer & admission if necessary at [facility].    I contacted Gerald with the [facility] at 1615.  Requesting consultation with neurosurgery for services not available at this facility. Specialist declined to speak to me. Discussed patient's presentation, past medical history, physical exam, labs, radiology results, vital signs, and ED course.  Consultation with DR Sidhu for transfer and admission if necessary.  At this time patient will be transferred via EMS to accepting facility.   Notified transfer center of patient's COVID positive test prior to EMS arrival.  At time of transfer patient is awake alert oriented x4 speaking clearly in full sentences and moving all 4 extremities.          Scribe Attestation:   Scribe #1: I performed the above scribed service and the documentation accurately describes the services I performed. I attest to the accuracy of the note.                I, Dr. Latoya Hinton, personally performed the services described in this documentation. This document was produced by a scribe under my direction and in my presence. All medical record entries made by the scribe were at my direction and in my presence.  I have reviewed the chart and agree that the record reflects my personal performance and is accurate and complete. Latoya Hinton DO.     01/18/2022 5:06 PM    Clinical Impression:   Final diagnoses:  [W19.XXXA] Fall  [S06.5X9A] Subdural hematoma - small left-sided acute subdural hematoma (Primary)  [U07.1] Lab test positive for detection of COVID-19 virus          ED Disposition Condition    Transfer to Another Facility Stable              Latoya Hinton DO  01/18/22 [id number]

## 2022-01-18 NOTE — ED NOTES
Patient still in CT at this time. Report called to LAZ Israel at this time for a stat ED to ED transfer; LAZ Israel notified of pending labs, radiology, and medications. Will call back with any changes to patient status prior to departure.

## 2022-01-18 NOTE — Clinical Note
Is this patient a high probability for COVID-19?: No   Diagnosis: Subdural hematoma [[id number]]   Future Attending Provider: DHRUV HUANG [[id number]]   Admitting Provider:: DHRUV HUANG [[id number]]   Special Needs:: No Special Needs [1]

## 2022-01-18 NOTE — FIRST PROVIDER EVALUATION
Emergency Department TeleTriage Encounter Note      CHIEF COMPLAINT    Chief Complaint   Patient presents with    Fall     Pt reports trip and fall this AM, reports head injury with lac to left eyebrow-denies LOC, pain to left hip and left knee, no anticoag use        VITAL SIGNS   Initial Vitals [01/18/22 1442]   BP Pulse Resp Temp SpO2   118/67 78 20 98.8 °F (37.1 °C) 98 %      MAP       --            ALLERGIES    Review of patient's allergies indicates:   Allergen Reactions    Iodine Other (See Comments)     Causes gout to flare up    Shellfish containing products      Causes gout to flare up       PROVIDER TRIAGE NOTE      86-year-old male presenting with trip and fall today.  Reports hitting his head with laceration to the forehead.  Reports hip and knee pain.    Initial orders will be placed and care will be transferred to an alternate provider when patient is roomed for a full evaluation. Any additional orders and the final disposition will be determined by that provider.      ORDERS  Labs Reviewed - No data to display    ED Orders (720h ago, onward)    Start Ordered     Status Ordering Provider    01/19/22 0600 01/18/22 1532  Wound care routine - Clean Wound  Daily        Comments: Clean Wound    Ordered CORWIN URIOSTEGUI    01/19/22 0600 01/18/22 1532  Wound care routine - Irrigate Wound  Daily        Comments: Irrigate Wound    Ordered CORWIN URIOSTEGUI    01/19/22 0600 01/18/22 1532  Wound care routine - Sterile Gloves to Bedside  Daily        Comments: Provide Sterile Gloves to Bedside    Ordered CORWIN URIOSTEGUI    01/18/22 1545 01/18/22 1532  LIDOcaine HCL 10 mg/ml (1%) injection 10 mL  ED 1 Time         Ordered CORWIN URIOSTEGUI    01/18/22 1533 01/18/22 1532  Provide suture tray to patient bedside  Once         Ordered CORWIN URIOSTEGUI    01/18/22 1533 01/18/22 1532  Change dressing - apply dry sterile dressing  Once        Comments: Apply dry sterile dressing.    Ordered CORWIN URIOSTEGUI     01/18/22 1533 01/18/22 1532  X-Ray Hips Bilateral 2 View Incl AP Pelvis  1 time imaging         Ordered CORWIN URIOSTEGUI    01/18/22 1532 01/18/22 1532  X-Ray Knee 3 View Left  1 time imaging         Ordered CORWIN URIOSTEGUI    01/18/22 1531 01/18/22 1532  CT Head Without Contrast  1 time imaging         Ordered CORWIN URIOSTEGUI            Virtual Visit Note: The provider triage portion of this emergency department evaluation and documentation was performed via [employer], a HIPAA-compliant telemedicine application, in concert with a tele-presenter in the room. A face to face patient evaluation with one of my colleagues will occur once the patient is placed in an emergency department room.      DISCLAIMER: This note was prepared with [employer] voice recognition transcription software. Garbled syntax, mangled pronouns, and other bizarre constructions may be attributed to that software system.

## 2022-01-18 NOTE — ED NOTES
Second iv attempt 4 times by two different nurses; ambulance at bay; patient to be transported with one IV access at this time.

## 2022-01-19 ENCOUNTER — TELEPHONE (OUTPATIENT)
Dept: NEUROSURGERY | Facility: CLINIC | Age: 87
End: 2022-01-19
Payer: MEDICARE

## 2022-01-19 VITALS
RESPIRATION RATE: 20 BRPM | OXYGEN SATURATION: 100 % | HEART RATE: 74 BPM | WEIGHT: 200 LBS | DIASTOLIC BLOOD PRESSURE: 70 MMHG | TEMPERATURE: 99 F | SYSTOLIC BLOOD PRESSURE: 145 MMHG | BODY MASS INDEX: 25.67 KG/M2 | HEIGHT: 74 IN

## 2022-01-19 DIAGNOSIS — S06.5XAA SUBDURAL HEMATOMA: Primary | ICD-10-CM

## 2022-01-19 DIAGNOSIS — U07.1 COVID-19 VIRUS DETECTED: ICD-10-CM

## 2022-01-19 LAB
ANION GAP SERPL CALC-SCNC: 7 MMOL/L (ref 8–16)
APTT BLDCRRT: 25.6 SEC (ref 21–32)
BASOPHILS # BLD AUTO: 0.02 K/UL (ref 0–0.2)
BASOPHILS NFR BLD: 0.3 % (ref 0–1.9)
BUN SERPL-MCNC: 17 MG/DL (ref 8–23)
CALCIUM SERPL-MCNC: 9.5 MG/DL (ref 8.7–10.5)
CHLORIDE SERPL-SCNC: 113 MMOL/L (ref 95–110)
CO2 SERPL-SCNC: 17 MMOL/L (ref 23–29)
CREAT SERPL-MCNC: 1.3 MG/DL (ref 0.5–1.4)
DIFFERENTIAL METHOD: ABNORMAL
EOSINOPHIL # BLD AUTO: 0.1 K/UL (ref 0–0.5)
EOSINOPHIL NFR BLD: 1.3 % (ref 0–8)
ERYTHROCYTE [DISTWIDTH] IN BLOOD BY AUTOMATED COUNT: 12.7 % (ref 11.5–14.5)
EST. GFR  (AFRICAN AMERICAN): 57.5 ML/MIN/1.73 M^2
EST. GFR  (NON AFRICAN AMERICAN): 49.8 ML/MIN/1.73 M^2
GLUCOSE SERPL-MCNC: 174 MG/DL (ref 70–110)
HCT VFR BLD AUTO: 34.7 % (ref 40–54)
HGB BLD-MCNC: 11 G/DL (ref 14–18)
IMM GRANULOCYTES # BLD AUTO: 0.02 K/UL (ref 0–0.04)
IMM GRANULOCYTES NFR BLD AUTO: 0.3 % (ref 0–0.5)
INR PPP: 1 (ref 0.8–1.2)
LYMPHOCYTES # BLD AUTO: 1.7 K/UL (ref 1–4.8)
LYMPHOCYTES NFR BLD: 21.4 % (ref 18–48)
MCH RBC QN AUTO: 28.4 PG (ref 27–31)
MCHC RBC AUTO-ENTMCNC: 31.7 G/DL (ref 32–36)
MCV RBC AUTO: 90 FL (ref 82–98)
MONOCYTES # BLD AUTO: 0.7 K/UL (ref 0.3–1)
MONOCYTES NFR BLD: 9.3 % (ref 4–15)
NEUTROPHILS # BLD AUTO: 5.2 K/UL (ref 1.8–7.7)
NEUTROPHILS NFR BLD: 67.4 % (ref 38–73)
NRBC BLD-RTO: 0 /100 WBC
PLATELET # BLD AUTO: 200 K/UL (ref 150–450)
PMV BLD AUTO: 10.2 FL (ref 9.2–12.9)
POTASSIUM SERPL-SCNC: 4.5 MMOL/L (ref 3.5–5.1)
PROTHROMBIN TIME: 10.7 SEC (ref 9–12.5)
RBC # BLD AUTO: 3.87 M/UL (ref 4.6–6.2)
SODIUM SERPL-SCNC: 137 MMOL/L (ref 136–145)
WBC # BLD AUTO: 7.71 K/UL (ref 3.9–12.7)

## 2022-01-19 PROCEDURE — 25000003 PHARM REV CODE 250: Performed by: PHYSICIAN ASSISTANT

## 2022-01-19 PROCEDURE — G0378 HOSPITAL OBSERVATION PER HR: HCPCS

## 2022-01-19 PROCEDURE — 85730 THROMBOPLASTIN TIME PARTIAL: CPT | Performed by: STUDENT IN AN ORGANIZED HEALTH CARE EDUCATION/TRAINING PROGRAM

## 2022-01-19 PROCEDURE — 85025 COMPLETE CBC W/AUTO DIFF WBC: CPT | Performed by: STUDENT IN AN ORGANIZED HEALTH CARE EDUCATION/TRAINING PROGRAM

## 2022-01-19 PROCEDURE — 99284 PR EMERGENCY DEPT VISIT,LEVEL IV: ICD-10-PCS | Mod: GC,,, | Performed by: NEUROLOGICAL SURGERY

## 2022-01-19 PROCEDURE — 85610 PROTHROMBIN TIME: CPT | Performed by: STUDENT IN AN ORGANIZED HEALTH CARE EDUCATION/TRAINING PROGRAM

## 2022-01-19 PROCEDURE — 80048 BASIC METABOLIC PNL TOTAL CA: CPT | Performed by: STUDENT IN AN ORGANIZED HEALTH CARE EDUCATION/TRAINING PROGRAM

## 2022-01-19 PROCEDURE — 96366 THER/PROPH/DIAG IV INF ADDON: CPT

## 2022-01-19 PROCEDURE — 99284 EMERGENCY DEPT VISIT MOD MDM: CPT | Mod: GC,,, | Performed by: NEUROLOGICAL SURGERY

## 2022-01-19 RX ORDER — HYDRALAZINE HYDROCHLORIDE 20 MG/ML
10 INJECTION INTRAMUSCULAR; INTRAVENOUS EVERY 6 HOURS PRN
Status: DISCONTINUED | OUTPATIENT
Start: 2022-01-19 | End: 2022-01-19

## 2022-01-19 RX ORDER — HYDRALAZINE HYDROCHLORIDE 20 MG/ML
10 INJECTION INTRAMUSCULAR; INTRAVENOUS EVERY 6 HOURS PRN
Status: DISCONTINUED | OUTPATIENT
Start: 2022-01-19 | End: 2022-01-19 | Stop reason: HOSPADM

## 2022-01-19 RX ORDER — HYDRALAZINE HYDROCHLORIDE 25 MG/1
100 TABLET, FILM COATED ORAL EVERY 8 HOURS
Status: DISCONTINUED | OUTPATIENT
Start: 2022-01-19 | End: 2022-01-19 | Stop reason: HOSPADM

## 2022-01-19 RX ORDER — LABETALOL HCL 20 MG/4 ML
10 SYRINGE (ML) INTRAVENOUS EVERY 4 HOURS PRN
Status: DISCONTINUED | OUTPATIENT
Start: 2022-01-19 | End: 2022-01-19 | Stop reason: HOSPADM

## 2022-01-19 RX ORDER — AMLODIPINE BESYLATE 10 MG/1
10 TABLET ORAL DAILY
Status: DISCONTINUED | OUTPATIENT
Start: 2022-01-19 | End: 2022-01-19 | Stop reason: HOSPADM

## 2022-01-19 RX ORDER — LABETALOL HCL 20 MG/4 ML
10 SYRINGE (ML) INTRAVENOUS EVERY 4 HOURS PRN
Status: DISCONTINUED | OUTPATIENT
Start: 2022-01-19 | End: 2022-01-19

## 2022-01-19 RX ADMIN — HYDRALAZINE HYDROCHLORIDE 100 MG: 25 TABLET, FILM COATED ORAL at 01:01

## 2022-01-19 RX ADMIN — AMLODIPINE BESYLATE 10 MG: 10 TABLET ORAL at 08:01

## 2022-01-19 NOTE — SUBJECTIVE & OBJECTIVE
Past Medical History:   Diagnosis Date    Arthritis     Cancer     Diabetes mellitus     Elevated PSA     Gout, unspecified     Hypertension     Nuclear sclerotic cataract of both eyes 6/29/2018       Past Surgical History:   Procedure Laterality Date    CATARACT EXTRACTION      ou    EYE SURGERY      bilateral cataracts    removal of small bowel  Apr. 2015    Colostomy       Social History     Socioeconomic History    Marital status:    Tobacco Use    Smoking status: Never Smoker    Smokeless tobacco: Never Used    Tobacco comment: smoked short while as a teen   Substance and Sexual Activity    Alcohol use: No    Drug use: No       Family History   Problem Relation Age of Onset    Hypertension Mother     Diabetes Mother     No Known Problems Father     No Known Problems Sister     No Known Problems Brother     No Known Problems Maternal Aunt     No Known Problems Maternal Uncle     No Known Problems Paternal Aunt     No Known Problems Paternal Uncle     No Known Problems Maternal Grandmother     No Known Problems Maternal Grandfather     No Known Problems Paternal Grandmother     No Known Problems Paternal Grandfather     Amblyopia Neg Hx     Blindness Neg Hx     Cancer Neg Hx     Cataracts Neg Hx     Glaucoma Neg Hx     Macular degeneration Neg Hx     Retinal detachment Neg Hx     Strabismus Neg Hx     Stroke Neg Hx     Thyroid disease Neg Hx        Review of patient's allergies indicates:   Allergen Reactions    Iodine Other (See Comments)     Causes gout to flare up    Shellfish containing products      Causes gout to flare up         Medications:  Continuous Infusions:   niCARdipine 5 mg/hr (01/18/22 1940)     Scheduled Meds:  PRN Meds:     Review of Systems   All other systems reviewed and are negative.    Objective:     Weight: 90.7 kg (200 lb)  Body mass index is 25.68 kg/m².  Vital Signs (Most Recent):  Temp: 98.2 °F (36.8 °C) (01/18/22 1836)  Pulse: 66  (01/18/22 1912)  Resp: (!) 22 (01/18/22 1836)  BP: (!) 173/79 (01/18/22 1940)  SpO2: 100 % (01/18/22 1912) Vital Signs (24h Range):  Temp:  [98.2 °F (36.8 °C)-98.8 °F (37.1 °C)] 98.2 °F (36.8 °C)  Pulse:  [62-86] 66  Resp:  [19-22] 22  SpO2:  [98 %-100 %] 100 %  BP: (118-174)/(67-79) 173/79                          Colostomy 04/23/15 LLQ (Active)       Physical Exam:    Constitutional: No distress.     Eyes: Pupils are equal, round, and reactive to light. EOM are normal.     Cardiovascular: Normal rate and regular rhythm.     Abdominal: Soft.     Psych/Behavior: He is alert.     E4V5M6  AOx3  PERRL  EOMI  Face Symmetric  Tongue midline  BUE 5/5  BLE 5/5  No drift      Significant Labs:  No results for input(s): GLU, NA, K, CL, CO2, BUN, CREATININE, CALCIUM, MG in the last 48 hours.  No results for input(s): WBC, HGB, HCT, PLT in the last 48 hours.  No results for input(s): LABPT, INR, APTT in the last 48 hours.  Microbiology Results (last 7 days)     ** No results found for the last 168 hours. **        All pertinent labs from the last 24 hours have been reviewed.    Significant Diagnostics:  I have reviewed and interpreted all pertinent imaging results/findings within the past 24 hours.

## 2022-01-19 NOTE — ASSESSMENT & PLAN NOTE
86 M PMHx DM, HTN, presenting after mechanical fall with small, focal 5 mm L temporal SDH. No red flag symptoms and intact on exam.    -- Admit to obs  -- q4 hour vitals/neurochecks  -- SBP < 160  -- Na > 135  -- HOB > 30  -- PRN pain meds  -- PRN zofran  -- rpt CTH 6 hours  -- PPx: SCDs, hold SQH in setting of acute bleed    Discussed with attending Dr. Briones

## 2022-01-19 NOTE — ED NOTES
Patient identifiers verified and correct for Eugene Gamez   C/C: COVID positive, Transferred from [facility] s/p fall, repaired laceration left eyebrow, presenting for evaluation of subdural hematoma   APPEARANCE: awake and alert in NAD.  SKIN: repaired laceration left eyebrow, warm, dry and intact.    MUSCULOSKELETAL: Patient moving all extremities spontaneously, no obvious swelling or deformities noted. Ambulates with assistance.   RESPIRATORY: Denies shortness of breath.Respirations unlabored.   CARDIAC: Denies CP, 2+ distal pulses; no peripheral edema  ABDOMEN: Colostomy bag  : voids spontaneously, denies difficulty  Neurologic: AAO x 4; follows commands equal strength in all extremities; denies numbness/tingling. Denies dizziness

## 2022-01-19 NOTE — TELEPHONE ENCOUNTER
----- Message from Christin Swain MD sent at 1/19/2022  8:53 AM CST -----  Follow up in 2 weeks with MRI brain

## 2022-01-19 NOTE — HOSPITAL COURSE
Patient was admitted for SDH on 1/19/2022 and was monitored closely in the floor setting but did not require operative managment. The patient remained on appropriate DVT prophylaxis during the course of admission. Of note, small dural based lesion on the anterior falx, likely meningioma was found on imaging incidentally. This will be evaluated with imaging on an outpatient basis. At the time of discharge, the patient was tolerating PO intake without N/V, dysphagia, denied bowel or bladder dysfunction, denied new neurological symptoms, and reported pain controlled with current regimen. The patient will follow up in clinic as indicated in discharge instructions. All questions were answered and continued treatment instructions were discussed in detail prior to discharge.

## 2022-01-19 NOTE — ED NOTES
Pt provided with breakfast meal tray. Pt positioned upright in stretcher and assisted with meal tray set up.

## 2022-01-19 NOTE — ED NOTES
Pt presents to the ED via EMS from [place] for a SDH. Per EMS, trip and fall--denies LOC, blood thinners. +COVID.

## 2022-01-19 NOTE — DISCHARGE INSTRUCTIONS
--Patient stable for discharge to home    --Please take prescriptions as detailed in medication list    --All questions/concerns addressed and answered    --Please followup with neurosurgery clinic in 2 weeks with brain MRI; to be arranged by Neurosurgery Clinic     --Please call immediately for any new onset nausea/vomiting/fever/chills, wound breakdown, numbness/tingling/weakness

## 2022-01-19 NOTE — DISCHARGE SUMMARY
[facility] - Emergency Dept  Neurosurgery  Discharge Summary      Patient Name: Eugene Gamez  MRN: [id number]  Admission Date: 1/18/2022  Hospital Length of Stay: 1 days  Discharge Date and Time:  01/19/2022 8:52 AM  Attending Physician: No att. providers found   Discharging Provider: Christin Swain MD  Primary Care Provider: Larry Monae MD    HPI:   86F PMHx DM, HTN, presenting with tiny left SDH after fall. Pt had mechanical fall when stepping onto curb, hit head, no LOC, no weakness, no drowsiness, no nausea/vomiting. CTH with small focal 5 mm SDH over left temporal lobe.     Pt does not take Antiplatelets/anticoagulants      * No surgery found *     Hospital Course: Patient was admitted for SDH on 1/19/2022 and was monitored closely in the floor setting but did not require operative managment. The patient remained on appropriate DVT prophylaxis during the course of admission. Of note, small dural based lesion on the anterior falx, likely meningioma was found on imaging incidentally. This will be evaluated with imaging on an outpatient basis. At the time of discharge, the patient was tolerating PO intake without N/V, dysphagia, denied bowel or bladder dysfunction, denied new neurological symptoms, and reported pain controlled with current regimen. The patient will follow up in clinic as indicated in discharge instructions. All questions were answered and continued treatment instructions were discussed in detail prior to discharge.      Goals of Care Treatment Preferences:  Code Status: Full Code    Living Will: Yes              Consults:   Consults (From admission, onward)        Status Ordering Provider     Inpatient consult to Neurosurgery  Once        Provider:  (Not yet assigned)    Acknowledged JATINDER HECTOR            Pending Diagnostic Studies:     None        Final Active Diagnoses:    Diagnosis Date Noted POA    PRINCIPAL PROBLEM:  SDH (subdural hematoma) [S06.5X9A] 01/18/2022 Unknown      Problems  Resolved During this Admission:      Discharged Condition: good     Disposition: Home or Self Care    Follow Up:   Follow-up Information     Kyle Briones MD In 2 weeks.    Specialty: Neurosurgery  Why: Repeat imaging   Contact information:  [address]  [address]  [place] [address] [phone]                       Patient Instructions:      Diet Adult Regular     Notify your health care provider if you experience any of the following:  temperature >100.4     Notify your health care provider if you experience any of the following:  persistent nausea and vomiting or diarrhea     Notify your health care provider if you experience any of the following:  severe uncontrolled pain     Notify your health care provider if you experience any of the following:  redness, tenderness, or signs of infection (pain, swelling, redness, odor or green/yellow discharge around incision site)     Notify your health care provider if you experience any of the following:  difficulty breathing or increased cough     Notify your health care provider if you experience any of the following:  severe persistent headache     Notify your health care provider if you experience any of the following:  worsening rash     Notify your health care provider if you experience any of the following:  persistent dizziness, light-headedness, or visual disturbances     Notify your health care provider if you experience any of the following:  increased confusion or weakness     Activity as tolerated     Medications:  Reconciled Home Medications:      Medication List      CONTINUE taking these medications    allopurinoL 300 MG tablet  Commonly known as: ZYLOPRIM  Take 1 tablet (300 mg total) by mouth once daily.     amLODIPine 10 MG tablet  Commonly known as: NORVASC  Take 1 tablet (10 mg total) by mouth once daily.     blood sugar diagnostic Strp  Commonly known as: ONETOUCH ULTRA TEST  1 each by Misc.(Non-Drug; Combo Route) route once daily.      blood-glucose meter kit  Commonly known as: ONETOUCH ULTRAMINI  To test twice daily. Use as instructed     cholestyramine 4 gram packet  Commonly known as: QUESTRAN  Take 1 packet (4 g total) by mouth 3 (three) times daily with meals.     colchicine 0.6 mg tablet  Commonly known as: COLCRYS  Take 1 tablet (0.6 mg total) by mouth daily as needed.     DULoxetine 30 MG capsule  Commonly known as: CYMBALTA  Take 1 capsule (30 mg total) by mouth 2 (two) times daily.     ferrous gluconate 324 MG tablet  Commonly known as: FERGON  Take 324 mg by mouth.     ferrous sulfate 324 mg (65 mg iron) Tbec  Take 325 mg by mouth once daily.     FLUZONE HIGH-DOSE 2019-20 (PF) 180 mcg/0.5 mL Syrg  Generic drug: flu vacc [id number](65yr up)PF     gabapentin 300 MG capsule  Commonly known as: NEURONTIN  Take 1 capsule (300 mg total) by mouth 3 (three) times daily.     hydrALAZINE 100 MG tablet  Commonly known as: APRESOLINE  Take 1 tablet (100 mg total) by mouth every 8 (eight) hours.     loratadine 10 mg tablet  Commonly known as: CLARITIN  Take 10 mg by mouth once daily.     metoprolol tartrate 50 MG tablet  Commonly known as: LOPRESSOR  Take 1 tablet (50 mg total) by mouth 2 (two) times daily.     multivitamin capsule  Take 1 capsule by mouth once daily.     QUEtiapine 50 MG tablet  Commonly known as: SEROQUEL  Take 1 tablet (50 mg total) by mouth every evening.     sodium bicarbonate 650 MG tablet  Take 1 tablet (650 mg total) by mouth 3 (three) times daily.            Christin Swain MD  Neurosurgery  [facility] - Emergency Dept

## 2022-01-19 NOTE — ED NOTES
Pressure is now elevated to 188/93. Increased cardene to 7.5 mg/hr. Patient leaving for timed head CT. Will recycle pressure when he returns.

## 2022-01-19 NOTE — ED PROVIDER NOTES
Encounter Date: 1/18/2022       History     Chief Complaint   Patient presents with    Fall     Pt reports trip and fall this AM, reports head injury with lac to left eyebrow-denies LOC, pain to left hip and left knee, no anticoag use      86-year-old male presents to the ED as a transfer for higher level of care and further management of acute subdural hematoma after a mechanical fall with head injury today.  Patient denies headache, neck pain, nausea, vomiting, weakness or any other acute medical complaints.  Not currently on anticoagulation.  Patient was incidentally found to be COVID positive today.        Review of patient's allergies indicates:   Allergen Reactions    Iodine Other (See Comments)     Causes gout to flare up    Shellfish containing products      Causes gout to flare up     Past Medical History:   Diagnosis Date    Arthritis     Cancer     Diabetes mellitus     Elevated PSA     Gout, unspecified     Hypertension     Nuclear sclerotic cataract of both eyes 6/29/2018     Past Surgical History:   Procedure Laterality Date    CATARACT EXTRACTION      ou    EYE SURGERY      bilateral cataracts    removal of small bowel  Apr. 2015    Colostomy     Family History   Problem Relation Age of Onset    Hypertension Mother     Diabetes Mother     No Known Problems Father     No Known Problems Sister     No Known Problems Brother     No Known Problems Maternal Aunt     No Known Problems Maternal Uncle     No Known Problems Paternal Aunt     No Known Problems Paternal Uncle     No Known Problems Maternal Grandmother     No Known Problems Maternal Grandfather     No Known Problems Paternal Grandmother     No Known Problems Paternal Grandfather     Amblyopia Neg Hx     Blindness Neg Hx     Cancer Neg Hx     Cataracts Neg Hx     Glaucoma Neg Hx     Macular degeneration Neg Hx     Retinal detachment Neg Hx     Strabismus Neg Hx     Stroke Neg Hx     Thyroid disease Neg Hx       Social History     Tobacco Use    Smoking status: Never Smoker    Smokeless tobacco: Never Used    Tobacco comment: smoked short while as a teen   Substance Use Topics    Alcohol use: No    Drug use: No     Review of Systems   Constitutional: Negative for fever.   HENT: Negative for sore throat.    Respiratory: Negative for shortness of breath.    Cardiovascular: Negative for chest pain.   Gastrointestinal: Negative for nausea.   Genitourinary: Negative for dysuria.   Musculoskeletal: Negative for back pain.   Skin: Positive for wound. Negative for rash.   Neurological: Negative for weakness and headaches.   Hematological: Does not bruise/bleed easily.       Physical Exam     Initial Vitals [01/18/22 1442]   BP Pulse Resp Temp SpO2   118/67 78 20 98.8 °F (37.1 °C) 98 %      MAP       --         Physical Exam    Nursing note and vitals reviewed.  Constitutional: He appears well-developed and well-nourished.  Non-toxic appearance. He does not appear ill. No distress.   HENT:   Head: Normocephalic. Head is with laceration.   Sutured laceration over left eyebrow   Eyes: Conjunctivae and EOM are normal.   Pupils are round and equal in size.  Minimal pupillary reaction to light.   Neck: Neck supple.   Normal range of motion.  Cardiovascular: Normal rate and regular rhythm. Exam reveals no gallop, no distant heart sounds and no friction rub.    No murmur heard.  Pulmonary/Chest: Effort normal and breath sounds normal. No accessory muscle usage. No tachypnea. No respiratory distress. He has no decreased breath sounds. He has no wheezes. He has no rhonchi. He has no rales.   Abdominal: He exhibits no distension.   Musculoskeletal:      Cervical back: Normal range of motion and neck supple. No spinous process tenderness or muscular tenderness.     Neurological: He is alert. He has normal strength. No sensory deficit.   Speech is clear and fluent.  No facial droop or asymmetry.   Skin: No rash noted.         ED Course    Procedures  Labs Reviewed   SARS-COV-2 RDRP GENE - Abnormal; Notable for the following components:       Result Value    POC Rapid COVID Positive (*)     All other components within normal limits    Narrative:     This test utilizes isothermal nucleic acid amplification   technology to detect the SARS-CoV-2 RdRp nucleic acid segment.   The analytical sensitivity (limit of detection) is 125 genome   equivalents/mL.   A POSITIVE result implies infection with the SARS-CoV-2 virus;   the patient is presumed to be contagious.     A NEGATIVE result means that SARS-CoV-2 nucleic acids are not   present above the limit of detection. A NEGATIVE result should be   treated as presumptive. It does not rule out the possibility of   COVID-19 and should not be the sole basis for treatment decisions.   If COVID-19 is strongly suspected based on clinical and exposure   history, re-testing using an alternate molecular assay should be   considered.   This test is only for use under the Food and Drug   Administration s Emergency Use Authorization (EUA).   Commercial kits are provided by [employer].   Performance characteristics of the EUA have been independently   verified by [facility] Department of   Pathology and Laboratory Medicine.   _________________________________________________________________   The authorized Fact Sheet for Healthcare Providers and the authorized Fact   Sheet for Patients of the ID NOW COVID-19 are available on the FDA   website:     https://www.fda.gov/media/644724/download  https://www.fda.gov/media/993953/download       POCT CMP - Abnormal; Notable for the following components:    POC BUN 23 (*)     POC Creatinine 1.7 (*)     POC Glucose 204 (*)     All other components within normal limits   TROPONIN ISTAT   POCT CBC   POCT CMP   POCT PROTIME-INR   POCT TROPONIN   ISTAT PROCEDURE          Imaging Results          CT Head Without Contrast (Final result)  Result time 01/18/22 22:41:05     Final result by Scar Hernandez MD (01/18/22 22:41:05)                 Impression:      1. Stable small left acute subdural hematoma overlying the left temporal convexity.  2. 1.0 cm extra-axial hyperdensity along the left anterior falx favored to represent meningioma.  3. Generalized cerebral volume loss and chronic microvascular ischemic change.    Electronically signed by resident: Zeus Davis  Date:    01/18/2022  Time:    22:25    Electronically signed by: Scar Hernandez MD  Date:    01/18/2022  Time:    22:41             Narrative:    EXAMINATION:  CT HEAD WITHOUT CONTRAST    CLINICAL HISTORY:  Subdural hemorrhage;    TECHNIQUE:  Low dose axial CT images obtained throughout the head without the use of intravenous contrast.  Axial, sagittal and coronal reconstructions were performed.    COMPARISON:  Same day CT head 15:39    FINDINGS:  Ventricles stable in size without evidence of hydrocephalus.    The brain parenchyma appears unchanged.  Generalized cerebral volume loss.  Patchy areas of hypoattenuation in the supratentorial white matter, nonspecific but likely chronic microvascular ischemic change.  No new parenchymal mass, new hemorrhage, edema or major vascular distribution infarct.    Stable 4 mm trace extra-axial collection overlying the left temporal convexity without significant mass effect.  1.0 cm extra-axial hyperdensity along the left anterior falx favored to represent meningioma (axial series 2, image 18).    No acute displaced calvarial fracture.  Patchy mucosal thickening throughout the paranasal sinuses.                               X-Ray Pelvis Routine AP (Final result)  Result time 01/18/22 17:38:20   Procedure changed from X-Ray Hips Bilateral 2 View Incl AP Pelvis     Final result by Arina Salinas MD (01/18/22 17:38:20)                 Impression:      As above described.      Electronically signed by: Arina Salinas  Date:    01/18/2022  Time:    17:38             Narrative:     EXAMINATION:  PELVIS ROUTINE AP    CLINICAL HISTORY:  Unspecified fall, initial encounterfall;    TECHNIQUE:  AP view of the pelvi.  No two view hips was submitted s    COMPARISON:  None.    FINDINGS:  AP view of the pelvis demonstrate no definite acute fracture or dislocation.  There is advanced degenerative change of the right hip including joint space narrowing, sclerosing, subchondral cysts, and osteophyte formation.  If pain is out of proportion to the radiological findings, consider MRI or CT of the pelvis.                               X-Ray Femur Ap/Lat Left (Final result)  Result time 01/18/22 17:21:13    Final result by Arina Salinas MD (01/18/22 17:21:13)                 Impression:      As above described.  Severe degenerative change of the hip.  Consider orthopedic consult when clinically appropriate for possible replacement.      Electronically signed by: Arina Salinas  Date:    01/18/2022  Time:    17:21             Narrative:    EXAMINATION:  TWO VIEWS OF THE LEFT FEMUR    CLINICAL HISTORY:  Unspecified fall, initial encounter    TECHNIQUE:  AP and lateral view of the left femur    COMPARISON:  None    FINDINGS:  Two views of the left femur demonstrate no acute fracture or dislocation.  There is significant narrowing and sclerosis, as well as subchondral cysts and osteophyte formation at the hip joint.  If pain is out of proportion to the radiological findings, consider CT.  The bones are diffusely osteopenic.  Is                               X-Ray Knee 1 or 2 View Left (Final result)  Result time 01/18/22 17:11:01   Procedure changed from X-Ray Knee 3 View Left     Final result by Arina Salinas MD (01/18/22 17:11:01)                 Impression:      No definite acute fracture.  Severe tricompartmental degenerative change.  Trace knee joint effusion.  Consider orthopedic consult for replacement, when clinically appropriate.      Electronically signed by: Arina  Brad  Date:    01/18/2022  Time:    17:11             Narrative:    EXAMINATION:  TWO VIEWS OF THE LEFT KNEE    CLINICAL HISTORY:  Unspecified fall, initial encounterfall;    TECHNIQUE:  AP and lateral view of the left knee    COMPARISON:  05/06/2009    FINDINGS:  Two views of the left knee demonstrate no acute fracture or dislocation.  Severe tricompartmental degenerative change is seen.  There is significant osteophyte spurring along the distal aspect of the distal foot femoral condyle, as well as tricompartmental degenerative change narrowing.  The bones are osteopenic.  When compared to the previous exam, the patella appears to be higher in position.  This may be due to lack of flexion of the joint on this exam as opposed to the prior exam.  There is a trace knee joint effusion.                               CT Cervical Spine Without Contrast (Final result)  Result time 01/18/22 17:12:21    Final result by Scar Hernandez MD (01/18/22 17:12:21)                 Impression:      No evidence of acute fracture or listhesis of the cervical spine.    Marked osteopenia with diffuse lucencies involving the vertebral bodies.  Follow-up with MRI of the cervical spine be obtained, as clinically warranted.    Additional findings as above.      Electronically signed by: Scar Hernandez MD  Date:    01/18/2022  Time:    17:12             Narrative:    EXAMINATION:  CT CERVICAL SPINE WITHOUT CONTRAST    CLINICAL HISTORY:  Neck trauma (Age >= 65y);    TECHNIQUE:  Low dose axial images, sagittal and coronal reformations were performed though the cervical spine.  Contrast was not administered.    COMPARISON:  PET-CT scan dated 03/14/2019.    FINDINGS:  The visualized portions of the posterior fossa is unremarkable.  There is arthropathy of the craniocervical junction.  The predental space is maintained.  No prevertebral soft tissue swelling is identified.    The vertebral body heights are maintained.  There is marked diffuse  osteopenia.  There are significant lucencies involving the vertebral bodies.  There is hypertrophy of the posterior elements.  The C1 ring is intact.    There is multilevel disc desiccation.  There is variable spinal canal and neural foraminal narrowing.    There is no evidence of lymphadenopathy in the neck.  There are emphysematous changes in the lung apices.  There is no evidence of a pneumothorax or pulmonary contusion.                               X-Ray Chest AP Portable (Final result)  Result time 01/18/22 16:46:57    Final result by Juan Alberto Mack MD (01/18/22 16:46:57)                 Impression:      1. No acute cardiopulmonary process.      Electronically signed by: Juan Alberto Mack MD  Date:    01/18/2022  Time:    16:46             Narrative:    EXAMINATION:  XR CHEST AP PORTABLE    CLINICAL HISTORY:  Chest Pain;    TECHNIQUE:  Single frontal view of the chest was performed.    COMPARISON:  10/06/2019    FINDINGS:  The cardiomediastinal silhouette is not enlarged.  Left chest wall port catheter tip projects over the mid to distal SVC.  There is stable elevation of the left hemidiaphragm..  There is no pleural effusion.  The trachea is midline.  The lungs are symmetrically expanded bilaterally without evidence of acute parenchymal process. No large focal consolidation seen.  There is no pneumothorax.  The osseous structures are remarkable for degenerative changes..                                CT Head Without Contrast (Final result)  Result time 01/18/22 16:01:06    Final result by Scar Hernandez MD (01/18/22 16:01:06)                 Impression:      Small 5 mm extra-axial collection overlying the left temporal convexity, suggestive of a small left-sided acute subdural hematoma.  Short-term follow-up is suggested.    Changes of chronic small vessel ischemic disease and cerebral volume loss.    This report was flagged in Epic as abnormal.      Electronically signed by: Scar Hernandez  MD  Date:    01/18/2022  Time:    16:01             Narrative:    EXAMINATION:  CT HEAD WITHOUT CONTRAST    CLINICAL HISTORY:  Facial trauma, blunt;    TECHNIQUE:  Low dose axial images were obtained through the head.  Coronal and sagittal reformations were also performed. Contrast was not administered.    COMPARISON:  None.    FINDINGS:  The subcutaneous tissues are unremarkable.  The bony calvarium is intact.  The paranasal sinuses unremarkable.  The mastoid air cells are clear.  There are postoperative changes in the globes.    The craniocervical junction is intact.  There is empty appearance of the sella.  There is a trace 5 mm extra-axial collection overlying the left temporal convexity.  No evidence of mass effect is identified.  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  The ventricles and sulci are prominent, consistent with cerebral volume loss.  There are scattered hypodensities within the periventricular and subcortical white matter.  The gray-white differentiation is maintained.  There is no dense vessel sign.                                 Medications   niCARdipine 40 mg/200 mL (0.2 mg/mL) infusion (7.5 mg/hr Intravenous Rate/Dose Change 1/18/22 2200)   glucose chewable tablet 16 g (has no administration in time range)   glucose chewable tablet 16 g (has no administration in time range)   glucose chewable tablet 24 g (has no administration in time range)   dextrose 50% injection 12.5 g (has no administration in time range)   dextrose 50% injection 25 g (has no administration in time range)   glucagon (human recombinant) injection 1 mg (has no administration in time range)   acetaminophen tablet 650 mg (has no administration in time range)   ondansetron injection 4 mg (has no administration in time range)   LIDOcaine HCL 10 mg/ml (1%) injection 10 mL (10 mLs Infiltration Given by Other 1/18/22 1636)   Tdap (BOOSTRIX) vaccine injection 0.5 mL (0.5 mLs Intramuscular Given 1/18/22 1703)   LETS  (LIDOcaine-TETRAcaine-EPINEPHrine) gel solution ( Topical (Top) Given 1/18/22 1703)     Medical Decision Making:   History:   Old Medical Records: I decided to obtain old medical records.  Initial Assessment:   Patient transferred to [facility] for neurosurgery evaluation of acute subdural hematoma.  Patient is neurologically intact.  Awake and responding appropriately.  Differential Diagnosis:   My differential diagnosis includes but is not limited to:  SDH, skull fracture, contusion, concussion  Clinical Tests:   Lab Tests: Ordered  ED Management:  Discussed with neurosurgery.  They recommend serial CT head in the next few hours.  Strict blood pressure control.  Patient will be placed in observation on neurosurgery service.  Patient agreeable to plan.  Other:   I have discussed this case with another health care provider.       <> Summary of the Discussion: Neurosurgery                      Clinical Impression:   Final diagnoses:  [W19.XXXA] Fall  [S06.5X9A] Subdural hematoma - small left-sided acute subdural hematoma (Primary)  [U07.1] Lab test positive for detection of COVID-19 virus          ED Disposition Condition    Observation               Letitia Cao PA-C  01/18/22 4929

## 2022-01-19 NOTE — HPI
86F PMHx DM, HTN, presenting with tiny left SDH after fall. Pt had mechanical fall when stepping onto curb, hit head, no LOC, no weakness, no drowsiness, no nausea/vomiting. CTH with small focal 5 mm SDH over left temporal lobe.     Pt does not take Antiplatelets/anticoagulants

## 2022-01-19 NOTE — ED NOTES
Cardene still running at 5 mL/hr with pressure just under 160 systolic. Will keep at same infusion rate.

## 2022-01-19 NOTE — H&P
[facility] - Emergency Dept  Neurosurgery  History and physical    Subjective:     History of Present Illness: 86F PMHx DM, HTN, presenting with tiny left SDH after fall. Pt had mechanical fall when stepping onto curb, hit head, no LOC, no weakness, no drowsiness, no nausea/vomiting. CTH with small focal 5 mm SDH over left temporal lobe.     Pt does not take Antiplatelets/anticoagulants      Post-Op Info:  * No surgery found *         Past Medical History:   Diagnosis Date    Arthritis     Cancer     Diabetes mellitus     Elevated PSA     Gout, unspecified     Hypertension     Nuclear sclerotic cataract of both eyes 6/29/2018       Past Surgical History:   Procedure Laterality Date    CATARACT EXTRACTION      ou    EYE SURGERY      bilateral cataracts    removal of small bowel  Apr. 2015    Colostomy       Social History     Socioeconomic History    Marital status:    Tobacco Use    Smoking status: Never Smoker    Smokeless tobacco: Never Used    Tobacco comment: smoked short while as a teen   Substance and Sexual Activity    Alcohol use: No    Drug use: No       Family History   Problem Relation Age of Onset    Hypertension Mother     Diabetes Mother     No Known Problems Father     No Known Problems Sister     No Known Problems Brother     No Known Problems Maternal Aunt     No Known Problems Maternal Uncle     No Known Problems Paternal Aunt     No Known Problems Paternal Uncle     No Known Problems Maternal Grandmother     No Known Problems Maternal Grandfather     No Known Problems Paternal Grandmother     No Known Problems Paternal Grandfather     Amblyopia Neg Hx     Blindness Neg Hx     Cancer Neg Hx     Cataracts Neg Hx     Glaucoma Neg Hx     Macular degeneration Neg Hx     Retinal detachment Neg Hx     Strabismus Neg Hx     Stroke Neg Hx     Thyroid disease Neg Hx        Review of patient's allergies indicates:   Allergen Reactions    Iodine Other (See  Comments)     Causes gout to flare up    Shellfish containing products      Causes gout to flare up         Medications:  Continuous Infusions:   niCARdipine 5 mg/hr (01/18/22 1940)     Scheduled Meds:  PRN Meds:     Review of Systems   All other systems reviewed and are negative.    Objective:     Weight: 90.7 kg (200 lb)  Body mass index is 25.68 kg/m².  Vital Signs (Most Recent):  Temp: 98.2 °F (36.8 °C) (01/18/22 1836)  Pulse: 66 (01/18/22 1912)  Resp: (!) 22 (01/18/22 1836)  BP: (!) 173/79 (01/18/22 1940)  SpO2: 100 % (01/18/22 1912) Vital Signs (24h Range):  Temp:  [98.2 °F (36.8 °C)-98.8 °F (37.1 °C)] 98.2 °F (36.8 °C)  Pulse:  [62-86] 66  Resp:  [19-22] 22  SpO2:  [98 %-100 %] 100 %  BP: (118-174)/(67-79) 173/79                          Colostomy 04/23/15 LLQ (Active)       Physical Exam:    Constitutional: No distress.     Eyes: Pupils are equal, round, and reactive to light. EOM are normal.     Cardiovascular: Normal rate and regular rhythm.     Abdominal: Soft.     Psych/Behavior: He is alert.     E4V5M6  AOx3  PERRL  EOMI  Face Symmetric  Tongue midline  BUE 5/5  BLE 5/5  No drift      Significant Labs:  No results for input(s): GLU, NA, K, CL, CO2, BUN, CREATININE, CALCIUM, MG in the last 48 hours.  No results for input(s): WBC, HGB, HCT, PLT in the last 48 hours.  No results for input(s): LABPT, INR, APTT in the last 48 hours.  Microbiology Results (last 7 days)     ** No results found for the last 168 hours. **        All pertinent labs from the last 24 hours have been reviewed.    Significant Diagnostics:  I have reviewed and interpreted all pertinent imaging results/findings within the past 24 hours.    Assessment/Plan:     SDH (subdural hematoma)  86 M PMHx DM, HTN, presenting after mechanical fall with small, focal 5 mm L temporal SDH. No red flag symptoms and intact on exam.    -- Admit to obs  -- q4 hour vitals/neurochecks  -- SBP < 160  -- Na > 135  -- HOB > 30  -- PRN pain meds  -- PRN  zofran  -- rpt CTH 6 hours  -- PPx: SCDs, hold SQH in setting of acute bleed    Discussed with attending Dr. Anselmo Swain MD  Neurosurgery  [facility] - Emergency Dept

## 2022-01-19 NOTE — ED NOTES
Received patient in my room. Cardene running at 5 mg/hr with current BP at 168/77 (parameter is under 160 systolic). Will continue at 5 mg/hr until next BP check, as previous pressure was 140 systolic. Patient tolerating well. All vitals stable. Alert and oriented x4.

## 2022-01-19 NOTE — ED NOTES
Attempted to call pt daughter x2. Unable to get through. Will continue to try. Spoke with neurosugery resident. States he is coming to bedside to speak with pt about discharge.

## 2022-01-19 NOTE — ED NOTES
Bed: 27  Expected date:   Expected time:   Means of arrival:   Comments:  21   complains of pain/discomfort

## 2022-01-20 NOTE — TELEPHONE ENCOUNTER
Dr. Briones,    Do you want MRI brain with and without contrast for this \[facility] [place] [facility]\""?    Thanks,  Esther Blas, [facility], PA-C  Neurosurgery  [facility]  01/19/2022

## 2022-01-22 NOTE — TELEPHONE ENCOUNTER
Please schedule MRI brain with and without contrast in 3-4 weeks with a fu with me on a day Dr. Briones is in clinic.    Thanks,  Esther Blas, [facility], PA-C  Neurosurgery  [facility]  01/21/2022

## 2022-02-08 ENCOUNTER — HOSPITAL ENCOUNTER (EMERGENCY)
Facility: HOSPITAL | Age: 87
Discharge: HOME | End: 2022-02-08
Attending: EMERGENCY MEDICINE
Payer: MEDICARE

## 2022-02-08 VITALS
OXYGEN SATURATION: 99 % | RESPIRATION RATE: 18 BRPM | TEMPERATURE: 99 F | BODY MASS INDEX: 22.72 KG/M2 | HEART RATE: 69 BPM | WEIGHT: 177 LBS | DIASTOLIC BLOOD PRESSURE: 71 MMHG | SYSTOLIC BLOOD PRESSURE: 121 MMHG | HEIGHT: 74 IN

## 2022-02-08 DIAGNOSIS — Z48.02 VISIT FOR SUTURE REMOVAL: Primary | ICD-10-CM

## 2022-02-08 PROCEDURE — 99281 EMR DPT VST MAYX REQ PHY/QHP: CPT | Mod: ER

## 2022-02-08 NOTE — ED PROVIDER NOTES
Encounter Date: 2/8/2022    SCRIBE #1 NOTE: I, Chiara Cannon, am scribing for, and in the presence of,  Airam Ayala NP. I have scribed the following portions of the note - Other sections scribed: HPI; ROS; PE.       History     Chief Complaint   Patient presents with    Suture / Staple Removal     Pt needs stitches removed above left eye.     Eugene Gamez is a 87 y.o. male with Hx of DM and HTN who presents to the ED for chief complaint of suture removal onset today. Patient was seen here on 1/18/22 and had sutures placed to the left eye brow for a laceration. He states that there has been no pain, redness, or drainage to the laceration. Patient denies fever or chills. No further complaints at this present time.    The history is provided by the patient. No  was used.     Review of patient's allergies indicates:   Allergen Reactions    Iodine Other (See Comments)     Causes gout to flare up    Shellfish containing products      Causes gout to flare up     Past Medical History:   Diagnosis Date    Arthritis     Cancer     Diabetes mellitus     Elevated PSA     Gout, unspecified     Hypertension     Nuclear sclerotic cataract of both eyes 6/29/2018     Past Surgical History:   Procedure Laterality Date    CATARACT EXTRACTION      ou    EYE SURGERY      bilateral cataracts    removal of small bowel  Apr. 2015    Colostomy     Family History   Problem Relation Age of Onset    Hypertension Mother     Diabetes Mother     No Known Problems Father     No Known Problems Sister     No Known Problems Brother     No Known Problems Maternal Aunt     No Known Problems Maternal Uncle     No Known Problems Paternal Aunt     No Known Problems Paternal Uncle     No Known Problems Maternal Grandmother     No Known Problems Maternal Grandfather     No Known Problems Paternal Grandmother     No Known Problems Paternal Grandfather     Amblyopia Neg Hx     Blindness Neg Hx     Cancer  Neg Hx     Cataracts Neg Hx     Glaucoma Neg Hx     Macular degeneration Neg Hx     Retinal detachment Neg Hx     Strabismus Neg Hx     Stroke Neg Hx     Thyroid disease Neg Hx      Social History     Tobacco Use    Smoking status: Never Smoker    Smokeless tobacco: Never Used    Tobacco comment: smoked short while as a teen   Substance Use Topics    Alcohol use: No    Drug use: No     Review of Systems   Constitutional: Negative for chills and fever.   HENT: Negative for congestion.    Eyes: Negative for visual disturbance.   Respiratory: Negative for shortness of breath.    Cardiovascular: Negative for chest pain.   Gastrointestinal: Negative for abdominal pain, diarrhea, nausea and vomiting.   Endocrine: Negative for polyuria.   Genitourinary: Negative for dysuria.   Musculoskeletal: Negative for back pain.   Skin: Positive for wound (healing with sutures in place).   Allergic/Immunologic: Negative for immunocompromised state.   Neurological: Negative for headaches.   Hematological: Does not bruise/bleed easily.   Psychiatric/Behavioral: Negative for confusion.   All other systems reviewed and are negative.      Physical Exam     Initial Vitals [02/08/22 1155]   BP Pulse Resp Temp SpO2   121/71 69 18 98.5 °F (36.9 °C) 99 %      MAP       --         Physical Exam    Vitals reviewed.  Constitutional: He appears well-developed and well-nourished. He is not diaphoretic.  Non-toxic appearance. He does not have a sickly appearance. He does not appear ill. No distress.   HENT:   Head: Normocephalic and atraumatic.   Right Ear: External ear normal.   Left Ear: External ear normal.   Well-healing laceration to left eyebrow with sutures intact.  No surrounding erythema or warmth.  No drainage or discharge.   Neck:   Normal range of motion.  Pulmonary/Chest: No respiratory distress.   Musculoskeletal:         General: Normal range of motion.      Cervical back: Normal range of motion.     Neurological: He is  alert and oriented to person, place, and time. GCS eye subscore is 4. GCS verbal subscore is 5. GCS motor subscore is 6.   Skin: Skin is warm, dry and intact. No rash noted. No erythema.   Psychiatric: He has a normal mood and affect. Thought content normal.         ED Course   Suture Removal    Date/Time: 2/8/2022 12:40 PM  Location procedure was performed: [facility] EMERGENCY DEPARTMENT  Performed by: Airam Ayala NP  Authorized by: Pradeep Sweeney MD   Body area: head/neck  Location details: left eyebrow  Wound Appearance: clean and well healed  Sutures Removed: 5  Post-removal: no dressing applied  Patient tolerance: Patient tolerated the procedure well with no immediate complications        Labs Reviewed - No data to display       Imaging Results    None          Medications - No data to display  Medical Decision Making:   History:   Old Medical Records: I decided to obtain old medical records.  ED Management:  87-year-old male presenting to the ED for suture removal.  He has been feeling well.  Wound appears to be well healing.  Sutures removed without complication.  No findings to suggest infection.  Follow-up with PCP.          Scribe Attestation:   Scribe #1: I performed the above scribed service and the documentation accurately describes the services I performed. I attest to the accuracy of the note.               I, ELIECER Ayala, personally performed the services described in this documentation.  All medical record entries made by the scribe were at my direction and in my presence.  I have reviewed the chart and agree that the record reflects my personal performance and is accurate and complete.  Clinical Impression:   Final diagnoses:  [Z48.02] Visit for suture removal (Primary)          ED Disposition Condition    Discharge Stable        ED Prescriptions     None        Follow-up Information     Follow up With Specialties Details Why Contact Info    Larry Monae MD Family Medicine, Wound Care  Schedule an appointment as soon as possible for a visit  For follow-up [address] [place] [address]  [phone]      [facility] ED Emergency Medicine Go to  If symptoms worsen [address]  [facility] [address] [phone]           Airam Ayala NP  02/08/22 7151

## 2022-02-18 ENCOUNTER — OFFICE VISIT (OUTPATIENT)
Dept: FAMILY MEDICINE | Facility: CLINIC | Age: 87
End: 2022-02-18
Payer: MEDICARE

## 2022-02-18 VITALS
HEART RATE: 68 BPM | SYSTOLIC BLOOD PRESSURE: 112 MMHG | BODY MASS INDEX: 25.49 KG/M2 | OXYGEN SATURATION: 99 % | RESPIRATION RATE: 18 BRPM | WEIGHT: 198.63 LBS | HEIGHT: 74 IN | DIASTOLIC BLOOD PRESSURE: 52 MMHG | TEMPERATURE: 98 F

## 2022-02-18 DIAGNOSIS — D32.9 MENINGIOMA: ICD-10-CM

## 2022-02-18 DIAGNOSIS — I15.2 HYPERTENSION ASSOCIATED WITH DIABETES: Primary | Chronic | ICD-10-CM

## 2022-02-18 DIAGNOSIS — Z85.038 HISTORY OF COLON CANCER: ICD-10-CM

## 2022-02-18 DIAGNOSIS — E11.59 HYPERTENSION ASSOCIATED WITH DIABETES: Primary | Chronic | ICD-10-CM

## 2022-02-18 DIAGNOSIS — M1A.9XX0 CHRONIC GOUT WITHOUT TOPHUS, UNSPECIFIED CAUSE, UNSPECIFIED SITE: ICD-10-CM

## 2022-02-18 DIAGNOSIS — S06.5XAA SDH (SUBDURAL HEMATOMA): ICD-10-CM

## 2022-02-18 PROCEDURE — 99214 OFFICE O/P EST MOD 30 MIN: CPT | Mod: S$PBB,,, | Performed by: NURSE PRACTITIONER

## 2022-02-18 PROCEDURE — 99215 OFFICE O/P EST HI 40 MIN: CPT | Mod: PBBFAC,PN | Performed by: NURSE PRACTITIONER

## 2022-02-18 PROCEDURE — 99999 PR PBB SHADOW E&M-EST. PATIENT-LVL V: ICD-10-PCS | Mod: PBBFAC,,, | Performed by: NURSE PRACTITIONER

## 2022-02-18 PROCEDURE — 99999 PR PBB SHADOW E&M-EST. PATIENT-LVL V: CPT | Mod: PBBFAC,,, | Performed by: NURSE PRACTITIONER

## 2022-02-18 PROCEDURE — 99214 PR OFFICE/OUTPT VISIT, EST, LEVL IV, 30-39 MIN: ICD-10-PCS | Mod: S$PBB,,, | Performed by: NURSE PRACTITIONER

## 2022-02-18 RX ORDER — MIRTAZAPINE 15 MG/1
1 TABLET, FILM COATED ORAL
Status: ON HOLD | COMMUNITY
End: 2022-02-27 | Stop reason: HOSPADM

## 2022-02-18 RX ORDER — PROCHLORPERAZINE MALEATE 10 MG
1 TABLET ORAL EVERY 6 HOURS PRN
Status: ON HOLD | COMMUNITY
End: 2022-02-27 | Stop reason: HOSPADM

## 2022-02-18 RX ORDER — TADALAFIL 10 MG/1
1 TABLET ORAL
Status: ON HOLD | COMMUNITY
End: 2022-02-27 | Stop reason: HOSPADM

## 2022-02-18 NOTE — PROGRESS NOTES
02/18/22 1306   Depression Patient Health Questionnaire (PHQ-2)   Over the last two weeks how often have you been bothered by little interest or pleasure in doing things 0   Over the last two weeks how often have you been bothered by feeling down, depressed or hopeless 0   PHQ-2 Total Score 0

## 2022-02-18 NOTE — PROGRESS NOTES
Routine Office Visit    Patient Name: Eugene Gamez    : 1935  MRN: [id number]    Chief Complaint:  Hospital follow-up    Subjective:  Eugene is a 87 y.o. male who presents today for a hospital follow up:    [facility] - Emergency Dept  Neurosurgery  Discharge Summary        Patient Name: Eugene Gamez  MRN: [id number]  Admission Date: 2022  Hospital Length of Stay: 1 days  Discharge Date and Time:  2022 8:52 AM  Attending Physician: No att. providers found   Discharging Provider: Christin Swain MD  Primary Care Provider: Larry Monae MD     HPI:   86F PMHx DM, HTN, presenting with tiny left SDH after fall. Pt had mechanical fall when stepping onto curb, hit head, no LOC, no weakness, no drowsiness, no nausea/vomiting. CTH with small focal 5 mm SDH over left temporal lobe.      Pt does not take Antiplatelets/anticoagulants        * No surgery found *      Hospital Course: Patient was admitted for SDH on 2022 and was monitored closely in the floor setting but did not require operative managment. The patient remained on appropriate DVT prophylaxis during the course of admission. Of note, small dural based lesion on the anterior falx, likely meningioma was found on imaging incidentally. This will be evaluated with imaging on an outpatient basis. At the time of discharge, the patient was tolerating PO intake without N/V, dysphagia, denied bowel or bladder dysfunction, denied new neurological symptoms, and reported pain controlled with current regimen. The patient will follow up in clinic as indicated in discharge instructions. All questions were answered and continued treatment instructions were discussed in detail prior to discharge.     ---------------------------------  Today's visit (2022) - patient reports today for hospital follow-up alone.  Reports he is doing well clinically without dizziness, blurry vision, or lightheadedness.  He still has occasional head pain which she describes  as sharp and happening only for short period since the fall; he states it is not really headache but only pain.  Otherwise he feels good without acute concerns.  History of diabetes, hypertension, renal masses, and colon cancer.  He is unsure which meds he is taking stating that he only takes 6 pills.  He has many more in his chart.    Past Medical History  Past Medical History:   Diagnosis Date    Arthritis     Cancer     Diabetes mellitus     Elevated PSA     Gout, unspecified     Hypertension     Nuclear sclerotic cataract of both eyes 6/29/2018       Past Surgical History  Past Surgical History:   Procedure Laterality Date    CATARACT EXTRACTION      ou    EYE SURGERY      bilateral cataracts    removal of small bowel  Apr. 2015    Colostomy       Family History  Family History   Problem Relation Age of Onset    Hypertension Mother     Diabetes Mother     No Known Problems Father     No Known Problems Sister     No Known Problems Brother     No Known Problems Maternal Aunt     No Known Problems Maternal Uncle     No Known Problems Paternal Aunt     No Known Problems Paternal Uncle     No Known Problems Maternal Grandmother     No Known Problems Maternal Grandfather     No Known Problems Paternal Grandmother     No Known Problems Paternal Grandfather     Amblyopia Neg Hx     Blindness Neg Hx     Cancer Neg Hx     Cataracts Neg Hx     Glaucoma Neg Hx     Macular degeneration Neg Hx     Retinal detachment Neg Hx     Strabismus Neg Hx     Stroke Neg Hx     Thyroid disease Neg Hx        Social History  Social History     Socioeconomic History    Marital status:    Tobacco Use    Smoking status: Never Smoker    Smokeless tobacco: Never Used    Tobacco comment: smoked short while as a teen   Substance and Sexual Activity    Alcohol use: No    Drug use: No       Current Medications  Current Outpatient Medications on File Prior to Visit   Medication Sig Dispense Refill     allopurinol (ZYLOPRIM) 300 MG tablet Take 1 tablet (300 mg total) by mouth once daily. 90 tablet 3    amLODIPine (NORVASC) 10 MG tablet Take 1 tablet (10 mg total) by mouth once daily. 30 tablet 0    blood sugar diagnostic (ONE TOUCH ULTRA TEST) Strp 1 each by Misc.(Non-Drug; Combo Route) route once daily. 200 each 3    blood-glucose meter (ONE TOUCH ULTRAMINI) kit To test twice daily. Use as instructed 1 each 0    cholestyramine (QUESTRAN) 4 gram packet Take 1 packet (4 g total) by mouth 3 (three) times daily with meals. 270 packet 3    colchicine (COLCRYS) 0.6 mg tablet Take 1 tablet (0.6 mg total) by mouth daily as needed. 90 tablet 3    DULoxetine (CYMBALTA) 30 MG capsule Take 1 capsule (30 mg total) by mouth 2 (two) times daily. 180 capsule 3    ferrous gluconate (FERGON) 324 MG tablet Take 324 mg by mouth.      ferrous sulfate 324 mg (65 mg iron) TbEC Take 325 mg by mouth once daily.      FLUZONE HIGH-DOSE 2019-20, PF, 180 mcg/0.5 mL Syrg       gabapentin (NEURONTIN) 300 MG capsule Take 1 capsule (300 mg total) by mouth 3 (three) times daily. 270 capsule 3    hydrALAZINE (APRESOLINE) 100 MG tablet Take 1 tablet (100 mg total) by mouth every 8 (eight) hours. 90 tablet 0    loratadine (CLARITIN) 10 mg tablet Take 10 mg by mouth once daily.      metoprolol tartrate (LOPRESSOR) 50 MG tablet Take 1 tablet (50 mg total) by mouth 2 (two) times daily. 60 tablet 0    mirtazapine (REMERON) 15 MG tablet Take 1 tablet by mouth.      multivitamin capsule Take 1 capsule by mouth once daily.      multivitamin-iron-folic acid Tab Take 1 tablet by mouth.      prochlorperazine (COMPAZINE) 10 MG tablet Take 1 tablet by mouth every 6 (six) hours as needed.      QUEtiapine (SEROQUEL) 50 MG tablet Take 1 tablet (50 mg total) by mouth every evening. 30 tablet 0    sodium bicarbonate 650 MG tablet Take 1 tablet (650 mg total) by mouth 3 (three) times daily. 21 tablet 0    tadalafiL (CIALIS) 10 MG tablet Take 1  "tablet by mouth.       Current Facility-Administered Medications on File Prior to Visit   Medication Dose Route Frequency Provider Last Rate Last Admin    ondansetron HCl (PF) 4 mg/2 mL injection    PRN James Ryan, KELSEY   4 mg at 06/29/15 0856       Allergies   Review of patient's allergies indicates:   Allergen Reactions    Iodine Other (See Comments)     Causes gout to flare up    Shellfish containing products      Causes gout to flare up       Review of Systems (Pertinent positives)  Review of Systems   Constitutional: Negative.  Negative for chills, fever and weight loss.   HENT: Negative.    Eyes: Negative.    Respiratory: Negative.    Cardiovascular: Negative.  Negative for chest pain, palpitations and orthopnea.   Gastrointestinal: Negative.  Negative for abdominal pain, heartburn, nausea and vomiting.   Genitourinary: Negative.    Musculoskeletal: Negative.    Skin: Negative.    Neurological: Positive for headaches. Negative for dizziness, tingling, tremors, sensory change, speech change, focal weakness, seizures, loss of consciousness and weakness.   Endo/Heme/Allergies: Negative.    Psychiatric/Behavioral: Negative.        BP (!) 112/52 (BP Location: Left arm, Patient Position: Sitting, BP Method: Medium (Manual))   Pulse 68   Temp 98.2 °F (36.8 °C) (Oral)   Resp 18   Ht 6' 2" (1.88 m)   Wt 90.1 kg (198 lb 10.2 oz)   SpO2 99%   BMI 25.50 kg/m²     Physical Exam  Vitals reviewed.   Constitutional:       General: He is not in acute distress.     Appearance: Normal appearance. He is not ill-appearing, toxic-appearing or diaphoretic.   HENT:      Head: Normocephalic and atraumatic.   Eyes:      Extraocular Movements: Extraocular movements intact.      Conjunctiva/sclera: Conjunctivae normal.      Pupils: Pupils are equal, round, and reactive to light.   Cardiovascular:      Rate and Rhythm: Normal rate and regular rhythm.      Pulses: Normal pulses.      Heart sounds: Normal heart sounds. "   Pulmonary:      Effort: Pulmonary effort is normal.      Breath sounds: Normal breath sounds.   Musculoskeletal:         General: No swelling or tenderness. Normal range of motion.   Skin:     General: Skin is warm and dry.      Capillary Refill: Capillary refill takes less than 2 seconds.   Neurological:      General: No focal deficit present.      Mental Status: He is alert and oriented to person, place, and time.   Psychiatric:         Mood and Affect: Mood normal.         Behavior: Behavior normal.          Assessment/Plan:  Eugene Gamez is a 87 y.o. male who presents today for :    Diagnoses and all orders for this visit:    Hypertension associated with diabetes  -     Comprehensive Metabolic Panel; Future  -     Hemoglobin A1C; Future  -     Lipid Panel; Future  -     Ambulatory referral/consult to Podiatry; Future  -     MICROALBUMIN / CREATININE RATIO URINE; Future    Will check fasting labs neck week.  Short follow-up with patient discussed meds.  I recommended patient to bring all of his medications at her next appointment so we can go over these.  BP controlled today.  Will check urine to screen for proteinuria    Meningioma  -     Ambulatory referral/consult to Neurosurgery; Future    Doing well clinically status post hospitalization for SDH will get Neurosurgery consult for meningioma    Chronic gout without tophus, unspecified cause, unspecified site  -     Uric Acid; Future    Check uric acid level.  He is unsure if he is taking his allopurinol    SDH (subdural hematoma)    As above.    History of colon cancer  -     Ambulatory referral/consult to Hematology / Oncology; Future    Recommended evaluation with heme/Onc        This office note has been dictated.  This dictation has been generated using M-Modal Fluency Direct dictation; some phonetic errors may occur.   My collaborating physician is Dr. Lencho Stacy.

## 2022-02-21 ENCOUNTER — LAB VISIT (OUTPATIENT)
Dept: LAB | Facility: HOSPITAL | Age: 87
End: 2022-02-21
Attending: NURSE PRACTITIONER
Payer: MEDICARE

## 2022-02-21 DIAGNOSIS — E11.59 HYPERTENSION ASSOCIATED WITH DIABETES: Chronic | ICD-10-CM

## 2022-02-21 DIAGNOSIS — I15.2 HYPERTENSION ASSOCIATED WITH DIABETES: Chronic | ICD-10-CM

## 2022-02-21 DIAGNOSIS — M1A.9XX0 CHRONIC GOUT WITHOUT TOPHUS, UNSPECIFIED CAUSE, UNSPECIFIED SITE: ICD-10-CM

## 2022-02-21 LAB
ALBUMIN SERPL BCP-MCNC: 4.1 G/DL (ref 3.5–5.2)
ALP SERPL-CCNC: 56 U/L (ref 55–135)
ALT SERPL W/O P-5'-P-CCNC: 19 U/L (ref 10–44)
ANION GAP SERPL CALC-SCNC: 7 MMOL/L (ref 8–16)
AST SERPL-CCNC: 17 U/L (ref 10–40)
BILIRUB SERPL-MCNC: 0.7 MG/DL (ref 0.1–1)
BUN SERPL-MCNC: 30 MG/DL (ref 8–23)
CALCIUM SERPL-MCNC: 9.9 MG/DL (ref 8.7–10.5)
CHLORIDE SERPL-SCNC: 113 MMOL/L (ref 95–110)
CHOLEST SERPL-MCNC: 170 MG/DL (ref 120–199)
CHOLEST/HDLC SERPL: 4 {RATIO} (ref 2–5)
CO2 SERPL-SCNC: 18 MMOL/L (ref 23–29)
CREAT SERPL-MCNC: 1.8 MG/DL (ref 0.5–1.4)
EST. GFR  (AFRICAN AMERICAN): 39 ML/MIN/1.73 M^2
EST. GFR  (NON AFRICAN AMERICAN): 33 ML/MIN/1.73 M^2
GLUCOSE SERPL-MCNC: 185 MG/DL (ref 70–110)
HDLC SERPL-MCNC: 43 MG/DL (ref 40–75)
HDLC SERPL: 25.3 % (ref 20–50)
LDLC SERPL CALC-MCNC: 103.6 MG/DL (ref 63–159)
NONHDLC SERPL-MCNC: 127 MG/DL
POTASSIUM SERPL-SCNC: 5.9 MMOL/L (ref 3.5–5.1)
PROT SERPL-MCNC: 7.5 G/DL (ref 6–8.4)
SODIUM SERPL-SCNC: 138 MMOL/L (ref 136–145)
TRIGL SERPL-MCNC: 117 MG/DL (ref 30–150)
URATE SERPL-MCNC: 11.4 MG/DL (ref 3.4–7)

## 2022-02-21 PROCEDURE — 80053 COMPREHEN METABOLIC PANEL: CPT | Performed by: NURSE PRACTITIONER

## 2022-02-21 PROCEDURE — 80061 LIPID PANEL: CPT | Performed by: NURSE PRACTITIONER

## 2022-02-21 PROCEDURE — 83036 HEMOGLOBIN GLYCOSYLATED A1C: CPT | Performed by: NURSE PRACTITIONER

## 2022-02-21 PROCEDURE — 84550 ASSAY OF BLOOD/URIC ACID: CPT | Performed by: NURSE PRACTITIONER

## 2022-02-21 PROCEDURE — 36415 COLL VENOUS BLD VENIPUNCTURE: CPT | Mod: PN | Performed by: NURSE PRACTITIONER

## 2022-02-22 LAB
ESTIMATED AVG GLUCOSE: 177 MG/DL (ref 68–131)
HBA1C MFR BLD: 7.8 % (ref 4–5.6)

## 2022-02-23 ENCOUNTER — TELEPHONE (OUTPATIENT)
Dept: FAMILY MEDICINE | Facility: CLINIC | Age: 87
End: 2022-02-23
Payer: MEDICARE

## 2022-02-23 NOTE — TELEPHONE ENCOUNTER
Patient notified of lab results, verbalized understanding. Instructed to contact office with any questions or concerns.   ----- Message from Elvin Lopez NP sent at 2/23/2022 10:38 AM CST -----  Please call patient about his labs.  His potassium is elevated.  I need him to drink plenty of water to stay hydrated and we will need to recheck his labs when he gets here on Friday.  Please remind him to bring all of his medications with him to the appointment.  We will go over his other labs at the appointment.  Thank you

## 2022-02-25 ENCOUNTER — TELEPHONE (OUTPATIENT)
Dept: FAMILY MEDICINE | Facility: CLINIC | Age: 87
End: 2022-02-25
Payer: MEDICARE

## 2022-02-25 ENCOUNTER — HOSPITAL ENCOUNTER (OUTPATIENT)
Facility: HOSPITAL | Age: 87
Discharge: HOME | End: 2022-02-27
Attending: EMERGENCY MEDICINE | Admitting: HOSPITALIST
Payer: MEDICARE

## 2022-02-25 DIAGNOSIS — R07.9 CHEST PAIN: ICD-10-CM

## 2022-02-25 DIAGNOSIS — E87.5 HYPERKALEMIA: Primary | ICD-10-CM

## 2022-02-25 LAB
ALBUMIN SERPL BCP-MCNC: 4.3 G/DL (ref 3.5–5.2)
ALP SERPL-CCNC: 62 U/L (ref 55–135)
ALT SERPL W/O P-5'-P-CCNC: 19 U/L (ref 10–44)
ANION GAP SERPL CALC-SCNC: 6 MMOL/L (ref 8–16)
ANION GAP SERPL CALC-SCNC: 7 MMOL/L (ref 8–16)
APTT BLDCRRT: 24.6 SEC (ref 21–32)
AST SERPL-CCNC: 24 U/L (ref 10–40)
BASOPHILS # BLD AUTO: 0.04 K/UL (ref 0–0.2)
BASOPHILS NFR BLD: 0.5 % (ref 0–1.9)
BILIRUB SERPL-MCNC: 0.4 MG/DL (ref 0.1–1)
BUN SERPL-MCNC: 22 MG/DL (ref 8–23)
BUN SERPL-MCNC: 25 MG/DL (ref 8–23)
CALCIUM SERPL-MCNC: 10 MG/DL (ref 8.7–10.5)
CALCIUM SERPL-MCNC: 9.2 MG/DL (ref 8.7–10.5)
CHLORIDE SERPL-SCNC: 111 MMOL/L (ref 95–110)
CHLORIDE SERPL-SCNC: 113 MMOL/L (ref 95–110)
CK SERPL-CCNC: 112 U/L (ref 20–200)
CO2 SERPL-SCNC: 16 MMOL/L (ref 23–29)
CO2 SERPL-SCNC: 17 MMOL/L (ref 23–29)
CREAT SERPL-MCNC: 1.5 MG/DL (ref 0.5–1.4)
CREAT SERPL-MCNC: 1.8 MG/DL (ref 0.5–1.4)
CTP QC/QA: YES
DIFFERENTIAL METHOD: ABNORMAL
EOSINOPHIL # BLD AUTO: 0.1 K/UL (ref 0–0.5)
EOSINOPHIL NFR BLD: 1.3 % (ref 0–8)
ERYTHROCYTE [DISTWIDTH] IN BLOOD BY AUTOMATED COUNT: 13.2 % (ref 11.5–14.5)
EST. GFR  (AFRICAN AMERICAN): 39 ML/MIN/1.73 M^2
EST. GFR  (AFRICAN AMERICAN): 48 ML/MIN/1.73 M^2
EST. GFR  (NON AFRICAN AMERICAN): 33 ML/MIN/1.73 M^2
EST. GFR  (NON AFRICAN AMERICAN): 42 ML/MIN/1.73 M^2
GLUCOSE SERPL-MCNC: 150 MG/DL (ref 70–110)
GLUCOSE SERPL-MCNC: 171 MG/DL (ref 70–110)
HCT VFR BLD AUTO: 39.7 % (ref 40–54)
HGB BLD-MCNC: 12.6 G/DL (ref 14–18)
IMM GRANULOCYTES # BLD AUTO: 0.04 K/UL (ref 0–0.04)
IMM GRANULOCYTES NFR BLD AUTO: 0.5 % (ref 0–0.5)
LYMPHOCYTES # BLD AUTO: 1.7 K/UL (ref 1–4.8)
LYMPHOCYTES NFR BLD: 22.2 % (ref 18–48)
MAGNESIUM SERPL-MCNC: 1.8 MG/DL (ref 1.6–2.6)
MCH RBC QN AUTO: 28.4 PG (ref 27–31)
MCHC RBC AUTO-ENTMCNC: 31.7 G/DL (ref 32–36)
MCV RBC AUTO: 89 FL (ref 82–98)
MONOCYTES # BLD AUTO: 0.6 K/UL (ref 0.3–1)
MONOCYTES NFR BLD: 8.2 % (ref 4–15)
NEUTROPHILS # BLD AUTO: 5.2 K/UL (ref 1.8–7.7)
NEUTROPHILS NFR BLD: 67.3 % (ref 38–73)
NRBC BLD-RTO: 0 /100 WBC
PLATELET # BLD AUTO: 233 K/UL (ref 150–450)
PMV BLD AUTO: 10.9 FL (ref 9.2–12.9)
POCT GLUCOSE: 121 MG/DL (ref 70–110)
POCT GLUCOSE: 133 MG/DL (ref 70–110)
POCT GLUCOSE: 183 MG/DL (ref 70–110)
POTASSIUM SERPL-SCNC: 6.4 MMOL/L (ref 3.5–5.1)
POTASSIUM SERPL-SCNC: ABNORMAL MMOL/L (ref 3.5–5.1)
PROT SERPL-MCNC: 8 G/DL (ref 6–8.4)
RBC # BLD AUTO: 4.44 M/UL (ref 4.6–6.2)
SARS-COV-2 RDRP RESP QL NAA+PROBE: NEGATIVE
SODIUM SERPL-SCNC: 133 MMOL/L (ref 136–145)
SODIUM SERPL-SCNC: 137 MMOL/L (ref 136–145)
WBC # BLD AUTO: 7.76 K/UL (ref 3.9–12.7)

## 2022-02-25 PROCEDURE — 93005 ELECTROCARDIOGRAM TRACING: CPT

## 2022-02-25 PROCEDURE — 25000003 PHARM REV CODE 250: Performed by: STUDENT IN AN ORGANIZED HEALTH CARE EDUCATION/TRAINING PROGRAM

## 2022-02-25 PROCEDURE — 80048 BASIC METABOLIC PNL TOTAL CA: CPT | Performed by: PHYSICIAN ASSISTANT

## 2022-02-25 PROCEDURE — 63600175 PHARM REV CODE 636 W HCPCS: Performed by: EMERGENCY MEDICINE

## 2022-02-25 PROCEDURE — 25000003 PHARM REV CODE 250: Performed by: EMERGENCY MEDICINE

## 2022-02-25 PROCEDURE — 93010 ELECTROCARDIOGRAM REPORT: CPT | Mod: ,,, | Performed by: INTERNAL MEDICINE

## 2022-02-25 PROCEDURE — 96375 TX/PRO/DX INJ NEW DRUG ADDON: CPT

## 2022-02-25 PROCEDURE — 93010 EKG 12-LEAD: ICD-10-PCS | Mod: ,,, | Performed by: INTERNAL MEDICINE

## 2022-02-25 PROCEDURE — G0378 HOSPITAL OBSERVATION PER HR: HCPCS

## 2022-02-25 PROCEDURE — 25000242 PHARM REV CODE 250 ALT 637 W/ HCPCS: Performed by: EMERGENCY MEDICINE

## 2022-02-25 PROCEDURE — U0002 COVID-19 LAB TEST NON-CDC: HCPCS | Performed by: EMERGENCY MEDICINE

## 2022-02-25 PROCEDURE — 85730 THROMBOPLASTIN TIME PARTIAL: CPT | Performed by: EMERGENCY MEDICINE

## 2022-02-25 PROCEDURE — 94761 N-INVAS EAR/PLS OXIMETRY MLT: CPT

## 2022-02-25 PROCEDURE — 83735 ASSAY OF MAGNESIUM: CPT | Performed by: EMERGENCY MEDICINE

## 2022-02-25 PROCEDURE — 99285 EMERGENCY DEPT VISIT HI MDM: CPT | Mod: 25

## 2022-02-25 PROCEDURE — 85025 COMPLETE CBC W/AUTO DIFF WBC: CPT | Performed by: EMERGENCY MEDICINE

## 2022-02-25 PROCEDURE — 82550 ASSAY OF CK (CPK): CPT | Performed by: EMERGENCY MEDICINE

## 2022-02-25 PROCEDURE — 96361 HYDRATE IV INFUSION ADD-ON: CPT

## 2022-02-25 PROCEDURE — 94640 AIRWAY INHALATION TREATMENT: CPT

## 2022-02-25 PROCEDURE — 80053 COMPREHEN METABOLIC PANEL: CPT | Performed by: EMERGENCY MEDICINE

## 2022-02-25 PROCEDURE — 96376 TX/PRO/DX INJ SAME DRUG ADON: CPT

## 2022-02-25 PROCEDURE — 82962 GLUCOSE BLOOD TEST: CPT

## 2022-02-25 PROCEDURE — 96365 THER/PROPH/DIAG IV INF INIT: CPT

## 2022-02-25 RX ORDER — INSULIN ASPART 100 [IU]/ML
0-5 INJECTION, SOLUTION INTRAVENOUS; SUBCUTANEOUS EVERY 6 HOURS PRN
Status: DISCONTINUED | OUTPATIENT
Start: 2022-02-26 | End: 2022-02-27 | Stop reason: HOSPADM

## 2022-02-25 RX ORDER — LANOLIN ALCOHOL/MO/W.PET/CERES
800 CREAM (GRAM) TOPICAL
Status: DISCONTINUED | OUTPATIENT
Start: 2022-02-25 | End: 2022-02-26

## 2022-02-25 RX ORDER — INDOMETHACIN 25 MG/1
50 CAPSULE ORAL
Status: COMPLETED | OUTPATIENT
Start: 2022-02-25 | End: 2022-02-25

## 2022-02-25 RX ORDER — AMOXICILLIN 250 MG
1 CAPSULE ORAL DAILY PRN
Status: DISCONTINUED | OUTPATIENT
Start: 2022-02-25 | End: 2022-02-27 | Stop reason: HOSPADM

## 2022-02-25 RX ORDER — SODIUM CHLORIDE 0.9 % (FLUSH) 0.9 %
10 SYRINGE (ML) INJECTION
Status: DISCONTINUED | OUTPATIENT
Start: 2022-02-25 | End: 2022-02-27 | Stop reason: HOSPADM

## 2022-02-25 RX ORDER — SODIUM CHLORIDE 9 MG/ML
INJECTION, SOLUTION INTRAVENOUS CONTINUOUS
Status: DISCONTINUED | OUTPATIENT
Start: 2022-02-26 | End: 2022-02-26

## 2022-02-25 RX ORDER — SODIUM CHLORIDE 0.9 % (FLUSH) 0.9 %
10 SYRINGE (ML) INJECTION EVERY 8 HOURS
Status: DISCONTINUED | OUTPATIENT
Start: 2022-02-25 | End: 2022-02-25

## 2022-02-25 RX ORDER — ALLOPURINOL 100 MG/1
300 TABLET ORAL DAILY
Status: DISCONTINUED | OUTPATIENT
Start: 2022-02-26 | End: 2022-02-27 | Stop reason: HOSPADM

## 2022-02-25 RX ORDER — FERROUS GLUCONATE 324(38)MG
324 TABLET ORAL
Status: DISCONTINUED | OUTPATIENT
Start: 2022-02-26 | End: 2022-02-27 | Stop reason: HOSPADM

## 2022-02-25 RX ORDER — GLUCAGON 1 MG
1 KIT INJECTION
Status: DISCONTINUED | OUTPATIENT
Start: 2022-02-25 | End: 2022-02-27 | Stop reason: HOSPADM

## 2022-02-25 RX ORDER — METOPROLOL TARTRATE 50 MG/1
50 TABLET ORAL 2 TIMES DAILY
Status: DISCONTINUED | OUTPATIENT
Start: 2022-02-26 | End: 2022-02-27 | Stop reason: HOSPADM

## 2022-02-25 RX ORDER — AMLODIPINE BESYLATE 5 MG/1
10 TABLET ORAL DAILY
Status: DISCONTINUED | OUTPATIENT
Start: 2022-02-26 | End: 2022-02-27 | Stop reason: HOSPADM

## 2022-02-25 RX ORDER — IBUPROFEN 200 MG
24 TABLET ORAL
Status: DISCONTINUED | OUTPATIENT
Start: 2022-02-25 | End: 2022-02-27 | Stop reason: HOSPADM

## 2022-02-25 RX ORDER — IBUPROFEN 200 MG
16 TABLET ORAL
Status: DISCONTINUED | OUTPATIENT
Start: 2022-02-25 | End: 2022-02-27 | Stop reason: HOSPADM

## 2022-02-25 RX ORDER — DULOXETIN HYDROCHLORIDE 30 MG/1
30 CAPSULE, DELAYED RELEASE ORAL 2 TIMES DAILY
Status: DISCONTINUED | OUTPATIENT
Start: 2022-02-26 | End: 2022-02-27 | Stop reason: HOSPADM

## 2022-02-25 RX ORDER — TALC
6 POWDER (GRAM) TOPICAL NIGHTLY PRN
Status: DISCONTINUED | OUTPATIENT
Start: 2022-02-25 | End: 2022-02-27 | Stop reason: HOSPADM

## 2022-02-25 RX ORDER — ACETAMINOPHEN 325 MG/1
650 TABLET ORAL EVERY 4 HOURS PRN
Status: DISCONTINUED | OUTPATIENT
Start: 2022-02-25 | End: 2022-02-27 | Stop reason: HOSPADM

## 2022-02-25 RX ORDER — AMOXICILLIN 250 MG
1 CAPSULE ORAL 2 TIMES DAILY
Status: DISCONTINUED | OUTPATIENT
Start: 2022-02-25 | End: 2022-02-25

## 2022-02-25 RX ORDER — NALOXONE HCL 0.4 MG/ML
0.02 VIAL (ML) INJECTION
Status: DISCONTINUED | OUTPATIENT
Start: 2022-02-25 | End: 2022-02-25

## 2022-02-25 RX ORDER — ALBUTEROL SULFATE 2.5 MG/.5ML
15 SOLUTION RESPIRATORY (INHALATION)
Status: COMPLETED | OUTPATIENT
Start: 2022-02-25 | End: 2022-02-25

## 2022-02-25 RX ADMIN — SODIUM CHLORIDE 1000 ML: 0.9 INJECTION, SOLUTION INTRAVENOUS at 06:02

## 2022-02-25 RX ADMIN — INSULIN HUMAN 5 UNITS: 100 INJECTION, SOLUTION PARENTERAL at 07:02

## 2022-02-25 RX ADMIN — SODIUM POLYSTYRENE SULFONATE 15 G: 15 SUSPENSION ORAL; RECTAL at 07:02

## 2022-02-25 RX ADMIN — SODIUM BICARBONATE 50 MEQ: 84 INJECTION, SOLUTION INTRAVENOUS at 07:02

## 2022-02-25 RX ADMIN — CALCIUM GLUCONATE 1 G: 98 INJECTION, SOLUTION INTRAVENOUS at 07:02

## 2022-02-25 RX ADMIN — ALBUTEROL SULFATE 15 MG: 2.5 SOLUTION RESPIRATORY (INHALATION) at 06:02

## 2022-02-25 NOTE — TELEPHONE ENCOUNTER
Patient notified of lab results and advised to report to ER per ANY Lopez NP, states that once some one comes home to bring him he will go.

## 2022-02-25 NOTE — TELEPHONE ENCOUNTER
----- Message from Elvin Lopez NP sent at 2/25/2022  3:13 PM CST -----  Please call patient and let him know his potassium is higher.  This does put him at risk for a rhythm issue in his heart.  He should go to the emergency room for this right away.  Thank you

## 2022-02-25 NOTE — ED PROVIDER NOTES
Encounter Date: 2/25/2022       History     Chief Complaint   Patient presents with    Abnormal Labs     Pt seen out patient and had routine blood work taken. Pt received a call to come to ER due to elevated K+ levels. K+6.0. Pt denies CP, SOB     87-year-old male presenting secondary to abnormal blood work.  Was sent in secondary to hyperkalemia.  Patient states he has no complaints at this time.  No chest pain shortness breath nausea vomiting weakness numbness tingling or any other complaints.  No fevers or chills.  No urinary complaints.  No change in bowel movements.        Review of patient's allergies indicates:   Allergen Reactions    Iodine Other (See Comments)     Causes gout to flare up    Shellfish containing products      Causes gout to flare up     Past Medical History:   Diagnosis Date    Arthritis     Cancer     Diabetes mellitus     Elevated PSA     Gout, unspecified     Hypertension     Nuclear sclerotic cataract of both eyes 6/29/2018     Past Surgical History:   Procedure Laterality Date    CATARACT EXTRACTION      ou    EYE SURGERY      bilateral cataracts    removal of small bowel  Apr. 2015    Colostomy     Family History   Problem Relation Age of Onset    Hypertension Mother     Diabetes Mother     No Known Problems Father     No Known Problems Sister     No Known Problems Brother     No Known Problems Maternal Aunt     No Known Problems Maternal Uncle     No Known Problems Paternal Aunt     No Known Problems Paternal Uncle     No Known Problems Maternal Grandmother     No Known Problems Maternal Grandfather     No Known Problems Paternal Grandmother     No Known Problems Paternal Grandfather     Amblyopia Neg Hx     Blindness Neg Hx     Cancer Neg Hx     Cataracts Neg Hx     Glaucoma Neg Hx     Macular degeneration Neg Hx     Retinal detachment Neg Hx     Strabismus Neg Hx     Stroke Neg Hx     Thyroid disease Neg Hx      Social History     Tobacco Use     Smoking status: Never Smoker    Smokeless tobacco: Never Used    Tobacco comment: smoked short while as a teen   Substance Use Topics    Alcohol use: No    Drug use: No     Review of Systems   Constitutional: Negative for fever.   HENT: Negative for sore throat.    Respiratory: Negative for shortness of breath.    Cardiovascular: Negative for chest pain.   Gastrointestinal: Negative for nausea.   Genitourinary: Negative for dysuria.   Musculoskeletal: Negative for back pain.   Skin: Negative for rash.   Neurological: Negative for weakness.   Hematological: Does not bruise/bleed easily.   All other systems reviewed and are negative.      Physical Exam     Initial Vitals [02/25/22 1658]   BP Pulse Resp Temp SpO2   (!) 142/71 73 18 98.6 °F (37 °C) 99 %      MAP       --         Physical Exam    Nursing note and vitals reviewed.  Constitutional: He appears well-developed and well-nourished.   HENT:   Head: Normocephalic and atraumatic.   Mouth/Throat: Oropharynx is clear and moist.   Eyes: EOM are normal. Pupils are equal, round, and reactive to light.   Neck:   Normal range of motion.  Cardiovascular: Normal rate and regular rhythm.   Pulmonary/Chest: Breath sounds normal. No stridor. No respiratory distress. He has no wheezes.   Abdominal: Abdomen is soft. Bowel sounds are normal. He exhibits no distension. There is no abdominal tenderness.   Musculoskeletal:         General: No tenderness or edema. Normal range of motion.      Cervical back: Normal range of motion.     Neurological: He is alert and oriented to person, place, and time. He has normal strength. GCS score is 15. GCS eye subscore is 4. GCS verbal subscore is 5. GCS motor subscore is 6.   Skin: Skin is warm and dry. Capillary refill takes less than 2 seconds.   Psychiatric: He has a normal mood and affect. Thought content normal.         ED Course   Procedures  Labs Reviewed   COMPREHENSIVE METABOLIC PANEL - Abnormal; Notable for the following  components:       Result Value    Sodium 133 (*)     Potassium 6.4 (*)     Chloride 111 (*)     CO2 16 (*)     Glucose 171 (*)     BUN 25 (*)     Creatinine 1.8 (*)     Anion Gap 6 (*)     eGFR if  39 (*)     eGFR if non  33 (*)     All other components within normal limits    Narrative:        potassium critical result(s) called and verbal readback obtained   from JEREMY SOLIMAN  by RM6 02/25/2022 19:05   CBC W/ AUTO DIFFERENTIAL - Abnormal; Notable for the following components:    RBC 4.44 (*)     Hemoglobin 12.6 (*)     Hematocrit 39.7 (*)     MCHC 31.7 (*)     All other components within normal limits   POCT GLUCOSE - Abnormal; Notable for the following components:    POCT Glucose 183 (*)     All other components within normal limits   POCT GLUCOSE - Abnormal; Notable for the following components:    POCT Glucose 121 (*)     All other components within normal limits   MAGNESIUM   CK   APTT   CBC W/ AUTO DIFFERENTIAL   BASIC METABOLIC PANEL   CK   SARS-COV-2 RDRP GENE   SARS-COV-2 RDRP GENE   POCT GLUCOSE MONITORING CONTINUOUS     EKG Readings: (Independently Interpreted)   EKG done 17 10 showed normal sinus rhythm rate of 70. No ST elevation or major T-wave abnormality.  Normal axis QRS.  Nonspecific EKG.        Imaging Results          X-Ray Chest AP Portable (Final result)  Result time 02/25/22 17:18:08    Final result by Mateusz Hutchinson MD (02/25/22 17:18:08)                 Impression:      No acute abnormality.      Electronically signed by: Mateusz Hutchinson  Date:    02/25/2022  Time:    17:18             Narrative:    EXAMINATION:  XR CHEST AP PORTABLE    CLINICAL HISTORY:  hyperkalemia;    TECHNIQUE:  Single frontal view of the chest was performed.    COMPARISON:  01/18/2022    FINDINGS:  Mild elevation left hemidiaphragm.The lungs are clear, with normal appearance of pulmonary vasculature and no pleural effusion or pneumothorax.    The cardiac silhouette is normal in size. The  hilar and mediastinal contours are unremarkable.    Bones are intact.                                 Medications   dextrose 50% injection 25 g (has no administration in time range)   sodium chloride 0.9% bolus 1,000 mL (1,000 mLs Intravenous New Bag 2/25/22 1857)   CALCIUM GLUCONATE/D5W 1G/100ML (READY TO MIX SYSTEM) 1 g in dextrose 5 % 100 mL IVPB (1 g Intravenous New Bag 2/25/22 1942)   sodium chloride 0.9% flush 10 mL (has no administration in time range)   melatonin tablet 6 mg (has no administration in time range)   senna-docusate 8.6-50 mg per tablet 1 tablet (has no administration in time range)   acetaminophen tablet 650 mg (has no administration in time range)   naloxone 0.4 mg/mL injection 0.02 mg (has no administration in time range)   magnesium oxide tablet 800 mg (has no administration in time range)   magnesium oxide tablet 800 mg (has no administration in time range)   glucose chewable tablet 16 g (has no administration in time range)   glucose chewable tablet 24 g (has no administration in time range)   dextrose 50% injection 12.5 g (has no administration in time range)   dextrose 50% injection 25 g (has no administration in time range)   glucagon (human recombinant) injection 1 mg (has no administration in time range)   albuterol sulfate nebulizer solution 15 mg (15 mg Nebulization Given 2/25/22 1843)   insulin regular injection 5 Units (5 Units Intravenous Given 2/25/22 1902)   sodium bicarbonate solution 50 mEq (50 mEq Intravenous Given 2/25/22 1905)   sodium polystyrene 15 gram/60 mL suspension 15 g (15 g Oral Given 2/25/22 1905)     Medical Decision Making:   Initial Assessment:   87-year-old male presenting today secondary to elevated potassium on outpatient labs.  Getting basic labs on the patient.  No fevers.  No difficulty breathing.  No complaints.  EKG is nonspecific.  Pending labs.  Patient was signed out to Dr. Zacarias maurer pending labs and reassessment disposition.  Ordered insulin,  bicarb IV fluids, sodium polystyrene, and albuterol.  Ulices Hanna    Clinical Tests:   Lab Tests: Ordered and Reviewed  Radiological Study: Reviewed and Ordered  Medical Tests: Ordered and Reviewed                      UPDATE  Patient signed out to me at the change of shift by Dr. Hanna, pending labs. Labs reveal a hyperkalemia with a potassium of 6.4, bicarb 16 Cr 1.8, BUN 25. On reassessment, patient remains asymptomatic. Calcium gluconate added to treatment previously ordered by Dr. Hanna. [facility] and patient placed on Dr. Mace service.     Katherine Ly D.O  EMERGENCY MEDICINE   7:46 PM 02/25/2022      Clinical Impression:   Final diagnoses:  [E87.5] Hyperkalemia  [R07.9] Chest pain          ED Disposition Condition    Observation               Katherine Ly, DO  02/25/22 1950

## 2022-02-26 LAB
ANION GAP SERPL CALC-SCNC: 10 MMOL/L (ref 8–16)
ANION GAP SERPL CALC-SCNC: 5 MMOL/L (ref 8–16)
BUN SERPL-MCNC: 17 MG/DL (ref 8–23)
BUN SERPL-MCNC: 19 MG/DL (ref 8–23)
CALCIUM SERPL-MCNC: 8.8 MG/DL (ref 8.7–10.5)
CALCIUM SERPL-MCNC: 9.3 MG/DL (ref 8.7–10.5)
CHLORIDE SERPL-SCNC: 109 MMOL/L (ref 95–110)
CHLORIDE SERPL-SCNC: 113 MMOL/L (ref 95–110)
CO2 SERPL-SCNC: 18 MMOL/L (ref 23–29)
CO2 SERPL-SCNC: 26 MMOL/L (ref 23–29)
CREAT SERPL-MCNC: 1.3 MG/DL (ref 0.5–1.4)
CREAT SERPL-MCNC: 1.4 MG/DL (ref 0.5–1.4)
EST. GFR  (AFRICAN AMERICAN): 52 ML/MIN/1.73 M^2
EST. GFR  (AFRICAN AMERICAN): 57 ML/MIN/1.73 M^2
EST. GFR  (NON AFRICAN AMERICAN): 45 ML/MIN/1.73 M^2
EST. GFR  (NON AFRICAN AMERICAN): 49 ML/MIN/1.73 M^2
GLUCOSE SERPL-MCNC: 154 MG/DL (ref 70–110)
GLUCOSE SERPL-MCNC: 169 MG/DL (ref 70–110)
PHOSPHATE SERPL-MCNC: 3.4 MG/DL (ref 2.7–4.5)
POCT GLUCOSE: 129 MG/DL (ref 70–110)
POCT GLUCOSE: 152 MG/DL (ref 70–110)
POCT GLUCOSE: 170 MG/DL (ref 70–110)
POCT GLUCOSE: 211 MG/DL (ref 70–110)
POTASSIUM SERPL-SCNC: 5 MMOL/L (ref 3.5–5.1)
POTASSIUM SERPL-SCNC: 5.2 MMOL/L (ref 3.5–5.1)
SODIUM SERPL-SCNC: 140 MMOL/L (ref 136–145)
SODIUM SERPL-SCNC: 141 MMOL/L (ref 136–145)

## 2022-02-26 PROCEDURE — G0378 HOSPITAL OBSERVATION PER HR: HCPCS

## 2022-02-26 PROCEDURE — 36415 COLL VENOUS BLD VENIPUNCTURE: CPT | Performed by: PHYSICIAN ASSISTANT

## 2022-02-26 PROCEDURE — 84100 ASSAY OF PHOSPHORUS: CPT | Performed by: NURSE PRACTITIONER

## 2022-02-26 PROCEDURE — 25000003 PHARM REV CODE 250: Performed by: INTERNAL MEDICINE

## 2022-02-26 PROCEDURE — 25000003 PHARM REV CODE 250: Performed by: NURSE PRACTITIONER

## 2022-02-26 PROCEDURE — 96361 HYDRATE IV INFUSION ADD-ON: CPT | Performed by: STUDENT IN AN ORGANIZED HEALTH CARE EDUCATION/TRAINING PROGRAM

## 2022-02-26 PROCEDURE — 25000003 PHARM REV CODE 250: Performed by: HOSPITALIST

## 2022-02-26 PROCEDURE — 96366 THER/PROPH/DIAG IV INF ADDON: CPT | Performed by: STUDENT IN AN ORGANIZED HEALTH CARE EDUCATION/TRAINING PROGRAM

## 2022-02-26 PROCEDURE — 80048 BASIC METABOLIC PNL TOTAL CA: CPT | Mod: 91 | Performed by: HOSPITALIST

## 2022-02-26 PROCEDURE — 96367 TX/PROPH/DG ADDL SEQ IV INF: CPT | Performed by: STUDENT IN AN ORGANIZED HEALTH CARE EDUCATION/TRAINING PROGRAM

## 2022-02-26 PROCEDURE — 36415 COLL VENOUS BLD VENIPUNCTURE: CPT | Performed by: HOSPITALIST

## 2022-02-26 PROCEDURE — 96372 THER/PROPH/DIAG INJ SC/IM: CPT | Performed by: HOSPITALIST

## 2022-02-26 PROCEDURE — 63600175 PHARM REV CODE 636 W HCPCS: Performed by: HOSPITALIST

## 2022-02-26 PROCEDURE — 80048 BASIC METABOLIC PNL TOTAL CA: CPT | Performed by: PHYSICIAN ASSISTANT

## 2022-02-26 RX ORDER — HEPARIN SODIUM 5000 [USP'U]/ML
5000 INJECTION, SOLUTION INTRAVENOUS; SUBCUTANEOUS EVERY 8 HOURS
Status: DISCONTINUED | OUTPATIENT
Start: 2022-02-26 | End: 2022-02-27 | Stop reason: HOSPADM

## 2022-02-26 RX ORDER — HEPARIN SODIUM 5000 [USP'U]/ML
5000 INJECTION, SOLUTION INTRAVENOUS; SUBCUTANEOUS EVERY 12 HOURS
Status: DISCONTINUED | OUTPATIENT
Start: 2022-02-26 | End: 2022-02-26

## 2022-02-26 RX ADMIN — SODIUM BICARBONATE: 84 INJECTION, SOLUTION INTRAVENOUS at 08:02

## 2022-02-26 RX ADMIN — AMLODIPINE BESYLATE 10 MG: 5 TABLET ORAL at 09:02

## 2022-02-26 RX ADMIN — HEPARIN SODIUM 5000 UNITS: 5000 INJECTION INTRAVENOUS; SUBCUTANEOUS at 04:02

## 2022-02-26 RX ADMIN — SODIUM ZIRCONIUM CYCLOSILICATE 10 G: 10 POWDER, FOR SUSPENSION ORAL at 01:02

## 2022-02-26 RX ADMIN — SODIUM ZIRCONIUM CYCLOSILICATE 10 G: 10 POWDER, FOR SUSPENSION ORAL at 09:02

## 2022-02-26 RX ADMIN — METOPROLOL TARTRATE 50 MG: 50 TABLET, FILM COATED ORAL at 08:02

## 2022-02-26 RX ADMIN — HEPARIN SODIUM 5000 UNITS: 5000 INJECTION INTRAVENOUS; SUBCUTANEOUS at 09:02

## 2022-02-26 RX ADMIN — Medication 324 MG: at 09:02

## 2022-02-26 RX ADMIN — ALLOPURINOL 300 MG: 100 TABLET ORAL at 09:02

## 2022-02-26 RX ADMIN — METOPROLOL TARTRATE 50 MG: 50 TABLET, FILM COATED ORAL at 09:02

## 2022-02-26 RX ADMIN — DULOXETINE 30 MG: 30 CAPSULE, DELAYED RELEASE ORAL at 08:02

## 2022-02-26 RX ADMIN — DULOXETINE 30 MG: 30 CAPSULE, DELAYED RELEASE ORAL at 09:02

## 2022-02-26 RX ADMIN — SODIUM BICARBONATE: 84 INJECTION, SOLUTION INTRAVENOUS at 09:02

## 2022-02-26 RX ADMIN — SODIUM CHLORIDE: 0.9 INJECTION, SOLUTION INTRAVENOUS at 01:02

## 2022-02-26 RX ADMIN — SODIUM ZIRCONIUM CYCLOSILICATE 10 G: 10 POWDER, FOR SUSPENSION ORAL at 04:02

## 2022-02-26 NOTE — ASSESSMENT & PLAN NOTE
Consult nephrology  On admission K+ level 6.0 repeat 6.4  Patient given calicum gluconate, dextrose in the ED   Ordered lokelma Q4H for 3 doses  BMP Q4H   NPO except for meds   IVF

## 2022-02-26 NOTE — ASSESSMENT & PLAN NOTE
Acute on chronic kidney disease  On admission creatinine 1.8    Labs reviewed- Renal function/electrolytes with Estimated Creatinine Clearance: 41.1 mL/min (A) (based on SCr of 1.5 mg/dL (H)). according to latest data. Monitor urine output and serial BMP and adjust therapy as needed. Avoid nephrotoxins and renally dose meds for GFR listed above.   Gentle IVF hydration  Monitor

## 2022-02-26 NOTE — HPI
87-year-old male presenting secondary to abnormal blood work at PCP office for hyperkalemia.  Patient states he has no complaints at this time.  No chest pain shortness breath nausea vomiting weakness numbness tingling or any other complaints.  No fevers or chills.  No urinary complaints.  No change in bowel movements. PMHx of arthritis, cancer, DM, HTN, elevated PSA, gout.

## 2022-02-26 NOTE — PLAN OF CARE
[facility] - [facility]  Initial Discharge Assessment       Primary Care Provider: Larry Monae MD Prefers morning appointments    Admission Diagnosis: Hyperkalemia [E87.5]  Chest pain [R07.9]    Admission Date: 2/25/2022  Expected Discharge Date:     Discharge Barriers Identified: None    Payor: MEDICARE / Plan: MEDICARE PART A & B / Product Type: Government /     Extended Emergency Contact Information  Primary Emergency Contact: Nicol Gamez  Address: [address] [place] [address]           [place] [address] [facility]  Home Phone: [phone]  Mobile Phone: [phone]  Relation: Daughter    Discharge Plan A: Home with family  Discharge Plan B: Other (TBD)      [id number] [place] - [address]  [address]  [place] [address]  Phone: [phone] Fax: [phone]      Initial Assessment (most recent)     Adult Discharge Assessment - 02/26/22 0916        Discharge Assessment    Assessment Type Discharge Planning Assessment     Confirmed/corrected address, phone number and insurance Yes     Confirmed Demographics Correct on Facesheet     Source of Information patient;health record     Does patient/caregiver understand observation status Yes     Reason For Admission Hyperkalemia     Lives With child(amina), adult     Facility Arrived From: Home     Do you expect to return to your current living situation? Yes     Do you have help at home or someone to help you manage your care at home? Yes     Who are your caregiver(s) and their phone number(s)? Nicol-daughter: [phone]     Prior to hospitilization cognitive status: Alert/Oriented     Current cognitive status: Alert/Oriented     Walking or Climbing Stairs Difficulty ambulation difficulty, requires equipment     Mobility Management Cn     Dressing/Bathing Difficulty none     Home Layout Able to live on 1st floor     Equipment Currently Used at Home cane, straight     Readmission within 30 days? No     Patient currently being  followed by outpatient case management? No     Do you currently have service(s) that help you manage your care at home? No     Do you take prescription medications? Yes     Do you have prescription coverage? Yes     Coverage Medicare A,B; [employer]     Do you have any problems affording any of your prescribed medications? No     Is the patient taking medications as prescribed? yes     Who is going to help you get home at discharge? Mauricio     How do you get to doctors appointments? car, drives self;family or friend will provide     Are you on dialysis? No     Do you take coumadin? No     Discharge Plan A Home with family     Discharge Plan B Other   TBD    DME Needed Upon Discharge  other (see comments)   TBD    Discharge Plan discussed with: Patient     Discharge Barriers Identified None        Relationship/Environment    Name(s) of Who Lives With Patient Mauricio             SW Role explained to patient; two patient identifiers recognized; SW contact information placed on Communication board. Discussed patient managing health care at home; determined who would be helping patient at home with recovery: suzanne Camarena will help with recovery at home   .  PCP: Larry Monae MD Prefers morning appointments    Extended Emergency Contact Information  Primary Emergency Contact: Nicol Gamez  Address: [address] [place] [address] [id number] [place] [address]  Home Phone: [phone]  Mobile Phone: [phone]  Relation: Daughter      [id number] [place] - [address] [place] [address]  Phone: [phone] Fax: [phone]    Payor: MEDICARE / Plan: MEDICARE PART A & B / Product Type: Government /

## 2022-02-26 NOTE — CONSULTS
Renal Consult    Date of Admission:  2/25/2022  4:59 PM        Chief Complaint:   Chief Complaint   Patient presents with    Abnormal Labs     Pt seen out patient and had routine blood work taken. Pt received a call to come to ER due to elevated K+ levels. K+6.0. Pt denies CP, SOB       HPI: 88 y/o male with a PMHx. Significant for: arthritis, colon cancer s/p colostomy -chemo, Metabolic acidosis, DM2, HTN, elevated PSA, anemia and gout who presents to [facility] secondary to abnormal blood work (high K+) at PCP office.  Initial Labs.  K+ 6.4, creat. 1.8 and CO2- 17    PMH:  Past Medical History:   Diagnosis Date    Arthritis     Cancer     Diabetes mellitus     Elevated PSA     Gout, unspecified     Hypertension     Nuclear sclerotic cataract of both eyes 6/29/2018       PSH:  Past Surgical History:   Procedure Laterality Date    CATARACT EXTRACTION      ou    EYE SURGERY      bilateral cataracts    removal of small bowel  Apr. 2015    Colostomy       Allergies:  Review of patient's allergies indicates:   Allergen Reactions    Iodine Other (See Comments)     Causes gout to flare up    Shellfish containing products      Causes gout to flare up       Current Facility-Administered Medications on File Prior to Encounter   Medication Dose Route Frequency Provider Last Rate Last Admin    ondansetron HCl (PF) 4 mg/2 mL injection    PRN James Ryan CRNA   4 mg at 06/29/15 0856     Current Outpatient Medications on File Prior to Encounter   Medication Sig Dispense Refill    allopurinol (ZYLOPRIM) 300 MG tablet Take 1 tablet (300 mg total) by mouth once daily. 90 tablet 3    amLODIPine (NORVASC) 10 MG tablet Take 1 tablet (10 mg total) by mouth once daily. 30 tablet 0    blood sugar diagnostic (ONE TOUCH ULTRA TEST) Strp 1 each by Misc.(Non-Drug; Combo Route) route once daily. 200 each 3    blood-glucose meter (ONE TOUCH ULTRAMINI) kit To test twice daily. Use as  instructed 1 each 0    cholestyramine (QUESTRAN) 4 gram packet Take 1 packet (4 g total) by mouth 3 (three) times daily with meals. 270 packet 3    colchicine (COLCRYS) 0.6 mg tablet Take 1 tablet (0.6 mg total) by mouth daily as needed. 90 tablet 3    DULoxetine (CYMBALTA) 30 MG capsule Take 1 capsule (30 mg total) by mouth 2 (two) times daily. 180 capsule 3    ferrous gluconate (FERGON) 324 MG tablet Take 324 mg by mouth.      ferrous sulfate 324 mg (65 mg iron) TbEC Take 325 mg by mouth once daily.      FLUZONE HIGH-DOSE 2019-20, PF, 180 mcg/0.5 mL Syrg       gabapentin (NEURONTIN) 300 MG capsule Take 1 capsule (300 mg total) by mouth 3 (three) times daily. 270 capsule 3    hydrALAZINE (APRESOLINE) 100 MG tablet Take 1 tablet (100 mg total) by mouth every 8 (eight) hours. 90 tablet 0    loratadine (CLARITIN) 10 mg tablet Take 10 mg by mouth once daily.      metoprolol tartrate (LOPRESSOR) 50 MG tablet Take 1 tablet (50 mg total) by mouth 2 (two) times daily. 60 tablet 0    mirtazapine (REMERON) 15 MG tablet Take 1 tablet by mouth.      multivitamin capsule Take 1 capsule by mouth once daily.      multivitamin-iron-folic acid Tab Take 1 tablet by mouth.      prochlorperazine (COMPAZINE) 10 MG tablet Take 1 tablet by mouth every 6 (six) hours as needed.      QUEtiapine (SEROQUEL) 50 MG tablet Take 1 tablet (50 mg total) by mouth every evening. 30 tablet 0    sodium bicarbonate 650 MG tablet Take 1 tablet (650 mg total) by mouth 3 (three) times daily. 21 tablet 0    tadalafiL (CIALIS) 10 MG tablet Take 1 tablet by mouth.         Medications:  Current Facility-Administered Medications   Medication Dose Route Frequency Provider Last Rate Last Admin    0.9%  NaCl infusion   Intravenous Continuous Elena Delarosa  mL/hr at 02/26/22 0112 New Bag at 02/26/22 0112    acetaminophen tablet 650 mg  650 mg Oral Q4H PRN Kyle Patton PA-C        allopurinoL tablet 300 mg  300 mg Oral Daily  Elena Delarosa NP        amLODIPine tablet 10 mg  10 mg Oral Daily Elena Delarosa NP        dextrose 10% bolus 125 mL  12.5 g Intravenous PRN Kyle Showers, RIRI        dextrose 10% bolus 125 mL  12.5 g Intravenous PRN Elena Delarosa NP        dextrose 10% bolus 250 mL  25 g Intravenous PRN Kyle Showers, RIRI        DULoxetine DR capsule 30 mg  30 mg Oral BID Elena Delarosa NP        ferrous gluconate tablet 324 mg  324 mg Oral Daily with breakfast Elena Delarosa NP        glucagon (human recombinant) injection 1 mg  1 mg Intramuscular PRN Kyle Showlorene, RIRI        glucose chewable tablet 16 g  16 g Oral PRN Kyle Showers, RIRI        glucose chewable tablet 24 g  24 g Oral PRN Kyle Showers, RIRI        heparin (porcine) injection 5,000 Units  5,000 Units Subcutaneous Q12H Elena Delarosa NP        insulin aspart U-100 pen 0-5 Units  0-5 Units Subcutaneous Q6H PRN Elena Delarosa NP        magnesium oxide tablet 800 mg  800 mg Oral PRN Kyle Showers, PA-C        magnesium oxide tablet 800 mg  800 mg Oral PRN Kyle Showers, PA-C        melatonin tablet 6 mg  6 mg Oral Nightly PRN Kyle Showers, RIRI        metoprolol tartrate (LOPRESSOR) tablet 50 mg  50 mg Oral BID Elena Delarosa NP        senna-docusate 8.6-50 mg per tablet 1 tablet  1 tablet Oral Daily PRN Kyle Showlorene, PA-C        sodium chloride 0.9% flush 10 mL  10 mL Intravenous PRN Kyle Showers, PA-C        sodium zirconium cyclosilicate packet 10 g  10 g Oral Q4H Elena Delarosa NP   10 g at 02/26/22 0430     Facility-Administered Medications Ordered in Other Encounters   Medication Dose Route Frequency Provider Last Rate Last Admin    ondansetron HCl (PF) 4 mg/2 mL injection    PRN James Ryan CRNA   4 mg at 06/29/15 0856       FamHx:  Family History   Problem Relation Age of Onset    Hypertension Mother     Diabetes Mother     No Known Problems Father     No Known Problems  Sister     No Known Problems Brother     No Known Problems Maternal Aunt     No Known Problems Maternal Uncle     No Known Problems Paternal Aunt     No Known Problems Paternal Uncle     No Known Problems Maternal Grandmother     No Known Problems Maternal Grandfather     No Known Problems Paternal Grandmother     No Known Problems Paternal Grandfather     Amblyopia Neg Hx     Blindness Neg Hx     Cancer Neg Hx     Cataracts Neg Hx     Glaucoma Neg Hx     Macular degeneration Neg Hx     Retinal detachment Neg Hx     Strabismus Neg Hx     Stroke Neg Hx     Thyroid disease Neg Hx        SocHx:  Social History     Socioeconomic History    Marital status:    Tobacco Use    Smoking status: Never Smoker    Smokeless tobacco: Never Used    Tobacco comment: smoked short while as a teen   Substance and Sexual Activity    Alcohol use: No    Drug use: No           Review of Systems: see H&P       Physical Exam:  Vitals:   Vitals:    02/26/22 0448   BP: (!) 159/77   Pulse: (!) 55   Resp: 18   Temp: 97.8 °F (36.6 °C)       No intake/output data recorded.  I/O this shift:  In: -   Out: 950 [Urine:950]    General: Elderly male in no apparent distress.   Neck: supple   Lungs: Unlabored breathing  Heart: RRR  Abdomen: n/a  Ext: n/a  Neurologic: asleep      Laboratories:    Recent Labs   Lab 02/25/22  1903   WBC 7.76   RBC 4.44*   HGB 12.6*   HCT 39.7*      MCV 89   MCH 28.4   MCHC 31.7*       Recent Labs   Lab 02/25/22  1751 02/25/22  2243 02/26/22  0433   CALCIUM 10.0   < > 9.3   PROT 8.0  --   --    *   < > 141   K 6.4*   < > 5.2*   CO2 16*   < > 18*   *   < > 113*   BUN 25*   < > 19   CREATININE 1.8*   < > 1.4   ALKPHOS 62  --   --    ALT 19  --   --    AST 24  --   --    BILITOT 0.4  --   --     < > = values in this interval not displayed.       No results for input(s): COLORU, CLARITYU, SPECGRAV, PHUR, PROTEINUA, GLUCOSEU, BLOODU, WBCU, RBCU, BACTERIA, MUCUS in the last 24  hours.    Invalid input(s):  BILIRUBINCON    Microbiology Results (last 7 days)     ** No results found for the last 168 hours. **            Diagnostic Tests:    CXR:    FINDINGS:   Mild elevation left hemidiaphragm.The lungs are clear, with normal appearance of pulmonary vasculature and no pleural effusion or pneumothorax.     The cardiac silhouette is normal in size. The hilar and mediastinal contours are unremarkable.     Bones are intact.    Impression:       No acute abnormality.       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  - f/u BMP  - Check bladder scan  - Renal US  - Glycemic control and other problems per admitting

## 2022-02-26 NOTE — ED NOTES
Large amount of liquid stool passed through colostomy.  Colostomy bag emptied and clamped back in place.

## 2022-02-26 NOTE — PROGRESS NOTES
Patient seen and evaluated. Placed in observation for hyperkalemia with no EKG changes. He was drinking orange juice at home because he was previously told his K was low. Shifted with improvement in K but still hyperkalemic. Started bicarb gtt and continued sodium zirc. BMP Q12 ordered. Discussed plan of care with patient.      Lashonda Barrera MD  02/26/2022  1:25 PM

## 2022-02-26 NOTE — H&P
[facility] Medicine  History & Physical    Patient Name: Eugene Gamez  MRN: [id number]  Patient Class: OP- Observation  Admission Date: 2/25/2022  Attending Physician: Darion Mace MD   Primary Care Provider: Larry Monae MD         Patient information was obtained from patient and ER records.     Subjective:     Principal Problem:Hyperkalemia    Chief Complaint:   Chief Complaint   Patient presents with    Abnormal Labs     Pt seen out patient and had routine blood work taken. Pt received a call to come to ER due to elevated K+ levels. K+6.0. Pt denies CP, SOB        HPI: 87-year-old male presenting secondary to abnormal blood work at PCP office for hyperkalemia.  Patient states he has no complaints at this time.  No chest pain shortness breath nausea vomiting weakness numbness tingling or any other complaints.  No fevers or chills.  No urinary complaints.  No change in bowel movements. PMHx of arthritis, cancer, DM, HTN, elevated PSA, gout.          Past Medical History:   Diagnosis Date    Arthritis     Cancer     Diabetes mellitus     Elevated PSA     Gout, unspecified     Hypertension     Nuclear sclerotic cataract of both eyes 6/29/2018       Past Surgical History:   Procedure Laterality Date    CATARACT EXTRACTION      ou    EYE SURGERY      bilateral cataracts    removal of small bowel  Apr. 2015    Colostomy       Review of patient's allergies indicates:   Allergen Reactions    Iodine Other (See Comments)     Causes gout to flare up    Shellfish containing products      Causes gout to flare up       Current Facility-Administered Medications on File Prior to Encounter   Medication    ondansetron HCl (PF) 4 mg/2 mL injection     Current Outpatient Medications on File Prior to Encounter   Medication Sig    allopurinol (ZYLOPRIM) 300 MG tablet Take 1 tablet (300 mg total) by mouth once daily.    amLODIPine (NORVASC) 10 MG tablet Take 1 tablet (10 mg total) by mouth  once daily.    blood sugar diagnostic (ONE TOUCH ULTRA TEST) Strp 1 each by Misc.(Non-Drug; Combo Route) route once daily.    blood-glucose meter (ONE TOUCH ULTRAMINI) kit To test twice daily. Use as instructed    cholestyramine (QUESTRAN) 4 gram packet Take 1 packet (4 g total) by mouth 3 (three) times daily with meals.    colchicine (COLCRYS) 0.6 mg tablet Take 1 tablet (0.6 mg total) by mouth daily as needed.    DULoxetine (CYMBALTA) 30 MG capsule Take 1 capsule (30 mg total) by mouth 2 (two) times daily.    ferrous gluconate (FERGON) 324 MG tablet Take 324 mg by mouth.    ferrous sulfate 324 mg (65 mg iron) TbEC Take 325 mg by mouth once daily.    FLUZONE HIGH-DOSE 2019-20, PF, 180 mcg/0.5 mL Syrg     gabapentin (NEURONTIN) 300 MG capsule Take 1 capsule (300 mg total) by mouth 3 (three) times daily.    hydrALAZINE (APRESOLINE) 100 MG tablet Take 1 tablet (100 mg total) by mouth every 8 (eight) hours.    loratadine (CLARITIN) 10 mg tablet Take 10 mg by mouth once daily.    metoprolol tartrate (LOPRESSOR) 50 MG tablet Take 1 tablet (50 mg total) by mouth 2 (two) times daily.    mirtazapine (REMERON) 15 MG tablet Take 1 tablet by mouth.    multivitamin capsule Take 1 capsule by mouth once daily.    multivitamin-iron-folic acid Tab Take 1 tablet by mouth.    prochlorperazine (COMPAZINE) 10 MG tablet Take 1 tablet by mouth every 6 (six) hours as needed.    QUEtiapine (SEROQUEL) 50 MG tablet Take 1 tablet (50 mg total) by mouth every evening.    sodium bicarbonate 650 MG tablet Take 1 tablet (650 mg total) by mouth 3 (three) times daily.    tadalafiL (CIALIS) 10 MG tablet Take 1 tablet by mouth.     Family History       Problem Relation (Age of Onset)    Diabetes Mother    Hypertension Mother    No Known Problems Father, Sister, Brother, Maternal Aunt, Maternal Uncle, Paternal Aunt, Paternal Uncle, Maternal Grandmother, Maternal Grandfather, Paternal Grandmother, Paternal Grandfather           Tobacco Use    Smoking status: Never Smoker    Smokeless tobacco: Never Used    Tobacco comment: smoked short while as a teen   Substance and Sexual Activity    Alcohol use: No    Drug use: No    Sexual activity: Not on file     Review of Systems  Constitutional: Negative for fever.   HENT: Negative for sore throat.    Respiratory: Negative for shortness of breath.    Cardiovascular: Negative for chest pain.   Gastrointestinal: Negative for nausea.   Genitourinary: Negative for dysuria.   Musculoskeletal: Negative for back pain.   Skin: Negative for rash.   Neurological: Negative for weakness.   Hematological: Does not bruise/bleed easily.   All other systems reviewed and are negative.  Objective:     Vital Signs (Most Recent):  Temp: 98.6 °F (37 °C) (02/25/22 2248)  Pulse: 81 (02/25/22 2248)  Resp: 19 (02/25/22 2248)  BP: (!) 146/76 (02/25/22 2248)  SpO2: 100 % (02/25/22 2248)   Vital Signs (24h Range):  Temp:  [97.7 °F (36.5 °C)-98.6 °F (37 °C)] 98.6 °F (37 °C)  Pulse:  [] 81  Resp:  [17-20] 19  SpO2:  [92 %-100 %] 100 %  BP: (139-146)/(71-76) 146/76     Weight: 90.7 kg (200 lb)  Body mass index is 25.68 kg/m².    Physical Exam    Nursing note and vitals reviewed.  Constitutional: He appears well-developed and well-nourished.   HENT:   Head: Normocephalic and atraumatic.   Mouth/Throat: Oropharynx is clear and moist.   Eyes: EOM are normal. Pupils are equal, round, and reactive to light.   Neck:   Normal range of motion.  Cardiovascular: Normal rate and regular rhythm.   Pulmonary/Chest: Breath sounds normal. No stridor. No respiratory distress. He has no wheezes.   Abdominal: Abdomen is soft. Bowel sounds are normal. He exhibits no distension. There is no abdominal tenderness.   Musculoskeletal:         General: No tenderness or edema. Normal range of motion.      Cervical back: Normal range of motion.     Neurological: He is alert and oriented to person, place, and time. He has normal strength.  GCS score is 15. GCS eye subscore is 4. GCS verbal subscore is 5. GCS motor subscore is 6.   Skin: Skin is warm and dry. Capillary refill takes less than 2 seconds.   Psychiatric: He has a normal mood and affect. Thought content normal.            Significant Labs: All pertinent labs within the past 24 hours have been reviewed.    Significant Imaging: I have reviewed all pertinent imaging results/findings within the past 24 hours.    Assessment/Plan:     * Hyperkalemia  Consult nephrology  On admission K+ level 6.0 repeat 6.4  Patient given calicum gluconate, dextrose in the ED   Ordered lokelma Q4H for 3 doses  BMP Q4H   NPO except for meds   IVF    VINNY (acute kidney injury)  Acute on chronic kidney disease  On admission creatinine 1.8    Labs reviewed- Renal function/electrolytes with Estimated Creatinine Clearance: 41.1 mL/min (A) (based on SCr of 1.5 mg/dL (H)). according to latest data. Monitor urine output and serial BMP and adjust therapy as needed. Avoid nephrotoxins and renally dose meds for GFR listed above.   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  Accuchecks Q6H with ss insulin  hypoglycemic protocol  Currently NPO         VTE Risk Mitigation (From admission, onward)         Ordered     heparin (porcine) injection 5,000 Units  Every 12 hours         02/26/22 0003     IP VTE HIGH RISK PATIENT  Once         02/25/22 1950     Place sequential compression device  Until discontinued         02/25/22 1950     Place ARI hose  Until discontinued         02/25/22 1950               As  clarification, on 2/25/26 @2300, patient should be placed in hospital observation services under my care in collaboration with MD Elena Farias NP  Department of Hospital Medicine   [facility] [place] [facility]

## 2022-02-26 NOTE — SUBJECTIVE & OBJECTIVE
Past Medical History:   Diagnosis Date    Arthritis     Cancer     Diabetes mellitus     Elevated PSA     Gout, unspecified     Hypertension     Nuclear sclerotic cataract of both eyes 6/29/2018       Past Surgical History:   Procedure Laterality Date    CATARACT EXTRACTION      ou    EYE SURGERY      bilateral cataracts    removal of small bowel  Apr. 2015    Colostomy       Review of patient's allergies indicates:   Allergen Reactions    Iodine Other (See Comments)     Causes gout to flare up    Shellfish containing products      Causes gout to flare up       Current Facility-Administered Medications on File Prior to Encounter   Medication    ondansetron HCl (PF) 4 mg/2 mL injection     Current Outpatient Medications on File Prior to Encounter   Medication Sig    allopurinol (ZYLOPRIM) 300 MG tablet Take 1 tablet (300 mg total) by mouth once daily.    amLODIPine (NORVASC) 10 MG tablet Take 1 tablet (10 mg total) by mouth once daily.    blood sugar diagnostic (ONE TOUCH ULTRA TEST) Strp 1 each by Misc.(Non-Drug; Combo Route) route once daily.    blood-glucose meter (ONE TOUCH ULTRAMINI) kit To test twice daily. Use as instructed    cholestyramine (QUESTRAN) 4 gram packet Take 1 packet (4 g total) by mouth 3 (three) times daily with meals.    colchicine (COLCRYS) 0.6 mg tablet Take 1 tablet (0.6 mg total) by mouth daily as needed.    DULoxetine (CYMBALTA) 30 MG capsule Take 1 capsule (30 mg total) by mouth 2 (two) times daily.    ferrous gluconate (FERGON) 324 MG tablet Take 324 mg by mouth.    ferrous sulfate 324 mg (65 mg iron) TbEC Take 325 mg by mouth once daily.    FLUZONE HIGH-DOSE 2019-20, PF, 180 mcg/0.5 mL Syrg     gabapentin (NEURONTIN) 300 MG capsule Take 1 capsule (300 mg total) by mouth 3 (three) times daily.    hydrALAZINE (APRESOLINE) 100 MG tablet Take 1 tablet (100 mg total) by mouth every 8 (eight) hours.    loratadine (CLARITIN) 10 mg tablet Take 10 mg by mouth once daily.    metoprolol tartrate  (LOPRESSOR) 50 MG tablet Take 1 tablet (50 mg total) by mouth 2 (two) times daily.    mirtazapine (REMERON) 15 MG tablet Take 1 tablet by mouth.    multivitamin capsule Take 1 capsule by mouth once daily.    multivitamin-iron-folic acid Tab Take 1 tablet by mouth.    prochlorperazine (COMPAZINE) 10 MG tablet Take 1 tablet by mouth every 6 (six) hours as needed.    QUEtiapine (SEROQUEL) 50 MG tablet Take 1 tablet (50 mg total) by mouth every evening.    sodium bicarbonate 650 MG tablet Take 1 tablet (650 mg total) by mouth 3 (three) times daily.    tadalafiL (CIALIS) 10 MG tablet Take 1 tablet by mouth.     Family History       Problem Relation (Age of Onset)    Diabetes Mother    Hypertension Mother    No Known Problems Father, Sister, Brother, Maternal Aunt, Maternal Uncle, Paternal Aunt, Paternal Uncle, Maternal Grandmother, Maternal Grandfather, Paternal Grandmother, Paternal Grandfather          Tobacco Use    Smoking status: Never Smoker    Smokeless tobacco: Never Used    Tobacco comment: smoked short while as a teen   Substance and Sexual Activity    Alcohol use: No    Drug use: No    Sexual activity: Not on file     Review of Systems  Constitutional: Negative for fever.   HENT: Negative for sore throat.    Respiratory: Negative for shortness of breath.    Cardiovascular: Negative for chest pain.   Gastrointestinal: Negative for nausea.   Genitourinary: Negative for dysuria.   Musculoskeletal: Negative for back pain.   Skin: Negative for rash.   Neurological: Negative for weakness.   Hematological: Does not bruise/bleed easily.   All other systems reviewed and are negative.  Objective:     Vital Signs (Most Recent):  Temp: 98.6 °F (37 °C) (02/25/22 2248)  Pulse: 81 (02/25/22 2248)  Resp: 19 (02/25/22 2248)  BP: (!) 146/76 (02/25/22 2248)  SpO2: 100 % (02/25/22 2248)   Vital Signs (24h Range):  Temp:  [97.7 °F (36.5 °C)-98.6 °F (37 °C)] 98.6 °F (37 °C)  Pulse:  [] 81  Resp:  [17-20] 19  SpO2:  [92 %-100  %] 100 %  BP: (139-146)/(71-76) 146/76     Weight: 90.7 kg (200 lb)  Body mass index is 25.68 kg/m².    Physical Exam    Nursing note and vitals reviewed.  Constitutional: He appears well-developed and well-nourished.   HENT:   Head: Normocephalic and atraumatic.   Mouth/Throat: Oropharynx is clear and moist.   Eyes: EOM are normal. Pupils are equal, round, and reactive to light.   Neck:   Normal range of motion.  Cardiovascular: Normal rate and regular rhythm.   Pulmonary/Chest: Breath sounds normal. No stridor. No respiratory distress. He has no wheezes.   Abdominal: Abdomen is soft. Bowel sounds are normal. He exhibits no distension. There is no abdominal tenderness.   Musculoskeletal:         General: No tenderness or edema. Normal range of motion.      Cervical back: Normal range of motion.     Neurological: He is alert and oriented to person, place, and time. He has normal strength. GCS score is 15. GCS eye subscore is 4. GCS verbal subscore is 5. GCS motor subscore is 6.   Skin: Skin is warm and dry. Capillary refill takes less than 2 seconds.   Psychiatric: He has a normal mood and affect. Thought content normal.            Significant Labs: All pertinent labs within the past 24 hours have been reviewed.    Significant Imaging: I have reviewed all pertinent imaging results/findings within the past 24 hours.

## 2022-02-27 VITALS
DIASTOLIC BLOOD PRESSURE: 67 MMHG | HEIGHT: 74 IN | RESPIRATION RATE: 16 BRPM | TEMPERATURE: 99 F | HEART RATE: 71 BPM | OXYGEN SATURATION: 99 % | BODY MASS INDEX: 25.75 KG/M2 | SYSTOLIC BLOOD PRESSURE: 140 MMHG | WEIGHT: 200.63 LBS

## 2022-02-27 LAB
ANION GAP SERPL CALC-SCNC: 8 MMOL/L (ref 8–16)
BUN SERPL-MCNC: 14 MG/DL (ref 8–23)
CALCIUM SERPL-MCNC: 9.2 MG/DL (ref 8.7–10.5)
CHLORIDE SERPL-SCNC: 109 MMOL/L (ref 95–110)
CO2 SERPL-SCNC: 25 MMOL/L (ref 23–29)
CREAT SERPL-MCNC: 1.2 MG/DL (ref 0.5–1.4)
EST. GFR  (AFRICAN AMERICAN): >60 ML/MIN/1.73 M^2
EST. GFR  (NON AFRICAN AMERICAN): 54 ML/MIN/1.73 M^2
GLUCOSE SERPL-MCNC: 137 MG/DL (ref 70–110)
POCT GLUCOSE: 127 MG/DL (ref 70–110)
POCT GLUCOSE: 138 MG/DL (ref 70–110)
POCT GLUCOSE: 149 MG/DL (ref 70–110)
POCT GLUCOSE: 153 MG/DL (ref 70–110)
POTASSIUM SERPL-SCNC: 4.8 MMOL/L (ref 3.5–5.1)
SODIUM SERPL-SCNC: 142 MMOL/L (ref 136–145)

## 2022-02-27 PROCEDURE — 25000003 PHARM REV CODE 250: Performed by: HOSPITALIST

## 2022-02-27 PROCEDURE — 63600175 PHARM REV CODE 636 W HCPCS: Performed by: HOSPITALIST

## 2022-02-27 PROCEDURE — 80048 BASIC METABOLIC PNL TOTAL CA: CPT | Performed by: HOSPITALIST

## 2022-02-27 PROCEDURE — 36415 COLL VENOUS BLD VENIPUNCTURE: CPT | Performed by: HOSPITALIST

## 2022-02-27 PROCEDURE — 96372 THER/PROPH/DIAG INJ SC/IM: CPT | Performed by: HOSPITALIST

## 2022-02-27 PROCEDURE — 25000003 PHARM REV CODE 250: Performed by: NURSE PRACTITIONER

## 2022-02-27 PROCEDURE — 96366 THER/PROPH/DIAG IV INF ADDON: CPT | Performed by: STUDENT IN AN ORGANIZED HEALTH CARE EDUCATION/TRAINING PROGRAM

## 2022-02-27 PROCEDURE — 25000003 PHARM REV CODE 250: Performed by: INTERNAL MEDICINE

## 2022-02-27 PROCEDURE — G0378 HOSPITAL OBSERVATION PER HR: HCPCS

## 2022-02-27 RX ORDER — HYDRALAZINE HYDROCHLORIDE 100 MG/1
100 TABLET, FILM COATED ORAL DAILY
Qty: 30 TABLET | Refills: 0 | Status: SHIPPED | OUTPATIENT
Start: 2022-02-27 | End: 2022-03-14 | Stop reason: SDUPTHER

## 2022-02-27 RX ADMIN — Medication 324 MG: at 09:02

## 2022-02-27 RX ADMIN — SODIUM BICARBONATE: 84 INJECTION, SOLUTION INTRAVENOUS at 05:02

## 2022-02-27 RX ADMIN — METOPROLOL TARTRATE 50 MG: 50 TABLET, FILM COATED ORAL at 08:02

## 2022-02-27 RX ADMIN — AMLODIPINE BESYLATE 10 MG: 5 TABLET ORAL at 08:02

## 2022-02-27 RX ADMIN — ALLOPURINOL 300 MG: 100 TABLET ORAL at 08:02

## 2022-02-27 RX ADMIN — DULOXETINE 30 MG: 30 CAPSULE, DELAYED RELEASE ORAL at 08:02

## 2022-02-27 RX ADMIN — HEPARIN SODIUM 5000 UNITS: 5000 INJECTION INTRAVENOUS; SUBCUTANEOUS at 05:02

## 2022-02-27 NOTE — PROGRESS NOTES
Renal Progress Note    Date of Admission:  2/25/2022  4:59 PM    Length of Stay: 0  Days    Subjective: n/a    Objective:    Current Facility-Administered Medications   Medication    acetaminophen tablet 650 mg    allopurinoL tablet 300 mg    amLODIPine tablet 10 mg    dextrose 10% bolus 125 mL    dextrose 10% bolus 125 mL    dextrose 10% bolus 250 mL    DULoxetine DR capsule 30 mg    ferrous gluconate tablet 324 mg    glucagon (human recombinant) injection 1 mg    glucose chewable tablet 16 g    glucose chewable tablet 24 g    heparin (porcine) injection 5,000 Units    insulin aspart U-100 pen 0-5 Units    melatonin tablet 6 mg    metoprolol tartrate (LOPRESSOR) tablet 50 mg    senna-docusate 8.6-50 mg per tablet 1 tablet    sodium chloride 0.9% flush 10 mL    sodium zirconium cyclosilicate packet 10 g     Facility-Administered Medications Ordered in Other Encounters   Medication    ondansetron HCl (PF) 4 mg/2 mL injection       Vitals:    02/26/22 2052 02/27/22 0001 02/27/22 0551 02/27/22 0746   BP: (!) 148/70 (!) 160/76 (!) 152/74 (!) 152/82   BP Location: Left arm Left arm Left arm Left arm   Patient Position: Lying Lying Lying Lying   Pulse: 64 (!) 58 62 97   Resp: 17 17 17 16   Temp: 98.3 °F (36.8 °C) 98.4 °F (36.9 °C) 98.3 °F (36.8 °C) 98.1 °F (36.7 °C)   TempSrc: Oral Oral Oral Oral   SpO2: 99% 97% 99% 100%   Weight:       Height:           I/O last 3 completed shifts:  In: 360 [P.O.:360]  Out: 4200 [Urine:4000; Stool:200]  I/O this shift:  In: -   Out: 700 [Urine:500; Stool:200]      Physical Exam: n/a      Laboratories:    No results for input(s): WBC, RBC, HGB, HCT, PLT, MCV, MCH, MCHC in the last 24 hours.    Recent Labs   Lab 02/27/22  0759   CALCIUM 9.2      K 4.8   CO2 25      BUN 14   CREATININE 1.2       No results for input(s): COLORU, CLARITYU, SPECGRAV, PHUR, PROTEINUA, GLUCOSEU, BLOODU, WBCU, RBCU, BACTERIA, MUCUS in the last 24  hours.    Invalid input(s):  BILIRUBINCON    Microbiology Results (last 7 days)     ** No results found for the last 168 hours. **            Diagnostic Tests: n/a        Assessment:       88 y/o male admitted with:     - HyperK+ RESOLVED  - NAGMA (chronic) RESOLVED  - VINNY RESOLVED  - CKD likely  - HTN  - DM2  - Gout           Plan:       - STOP IVFs to 0.45 saline + HCO3-  - Stop Na+ zirconium  - f/u BMP  - Discharge planning glycemic control and other problems per admitting      Will sign off the case.

## 2022-02-27 NOTE — PROGRESS NOTES
Patient is ready and clear for discharge from Case Management perspective; informed patients nurse,   and discussed discharge. Reviewed with and provided patient with Written Discharge Instructions.

## 2022-02-27 NOTE — HOSPITAL COURSE
Mr Eugene Gamez was placed in observation for hyperkalemia with no EKG changes. Also with VINNY. Started sodium zirc and bicarb gtt with normalization of K and improvement in Cr. Patient says he was drinking a lot of orange juice at home. Reconciled meds with patient and his daughter. He is not on ACEi, ARB, or spironolactone at home. He is stable for discharge. Follow up with Nephrology.

## 2022-02-27 NOTE — PLAN OF CARE
[facility] - Telemetry  Discharge Final Note    Primary Care Provider: Larry Monae MD    Expected Discharge Date: 2/27/2022    Final Discharge Note (most recent)     Final Note - 02/27/22 1024        Final Note    Assessment Type Final Discharge Note     Anticipated Discharge Disposition Home or Self Care     What phone number can be called within the next 1-3 days to see how you are doing after discharge? --   [phone]    Hospital Resources/Appts/Education Provided Provided education on problems/symptoms using teachback;Post-Acute resouces added to AVS;Appointments scheduled by Navigator/Coordinator;Provided patient/caregiver with written discharge plan information;Appointments scheduled and added to AVS        Post-Acute Status    Post-Acute Authorization Other   Home with family; follow-up    Coverage Medicare     Other Status No Post-Acute Service Needs     Discharge Delays None known at this time                 Important Message from Medicare             Contact Info     Larry Monae MD   Specialty: Family Medicine, Wound Care   Relationship: PCP - General    [place] [id number]   Phone: [phone]       Next Steps: Schedule an appointment as soon as possible for a visit in 1 week(s)    Instructions: Call Dr. Monae's office and schedule a Primary Care Hospital Follow-Up in 1 week.

## 2022-02-27 NOTE — PLAN OF CARE
02/27/22 1023   Post-Acute Status   Post-Acute Authorization Other  (Home with family; follow-up)   Coverage Medicare   Other Status No Post-Acute Service Needs   Hospital Resources/Appts/Education Provided Provided patient/caregiver with written discharge plan information;Appointments scheduled and added to AVS;Appointments scheduled by Navigator/Coordinator;Provided education on problems/symptoms using teachback   Discharge Delays None known at this time   Discharge Plan   Discharge Plan A Home with family   Discharge Plan B Home with family

## 2022-02-27 NOTE — DISCHARGE SUMMARY
[facility] Medicine  Discharge Summary      Patient Name: Eugene Gamez  MRN: [id number]  Patient Class: OP- Observation  Admission Date: 2/25/2022  Hospital Length of Stay: 0 days  Discharge Date and Time:  02/27/2022 10:51 AM  Attending Physician: Lashonda Barrera MD   Discharging Provider: Lashonda Barrera MD  Primary Care Provider: Larry Monae MD      HPI:   87-year-old male presenting secondary to abnormal blood work at PCP office for hyperkalemia.  Patient states he has no complaints at this time.  No chest pain shortness breath nausea vomiting weakness numbness tingling or any other complaints.  No fevers or chills.  No urinary complaints.  No change in bowel movements. PMHx of arthritis, cancer, DM, HTN, elevated PSA, gout.          * No surgery found *      Hospital Course:   Mr Eugene Gamez was placed in observation for hyperkalemia with no EKG changes. Also with VINNY. Started sodium zirc and bicarb gtt with normalization of K and improvement in Cr. Patient says he was drinking a lot of orange juice at home. Reconciled meds with patient and his daughter. He is not on ACEi, ARB, or spironolactone at home. He is stable for discharge. Follow up with Nephrology.        Goals of Care Treatment Preferences:  Code Status: Full Code    Living Will: Yes              Consults:   Consults (From admission, onward)        Status Ordering Provider     Case Management/  Once        Provider:  (Not yet assigned)    Completed DHRUV LIZAMA     Inpatient consult to Nephrology  Once        Provider:  Yusef Keller MD    Acknowledged DHRUV LIZAMA          No new Assessment & Plan notes have been filed under this hospital service since the last note was generated.  Service: Hospital Medicine    Final Active Diagnoses:    Diagnosis Date Noted POA    PRINCIPAL PROBLEM:  Hyperkalemia [E87.5] 10/06/2019 Yes    VINNY (acute kidney injury) [N17.9] 10/08/2019 Yes    Anemia of chronic  disease [D63.8] 02/12/2019 Yes    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] 10/17/2017 Not Applicable    CKD (chronic kidney disease) stage 3, GFR 30-59 ml/min [N18.30] 07/06/2016 Yes    Colon cancer [C18.9] 04/30/2015 Yes    Gout [M10.9] 04/06/2015 Yes     Chronic    Essential hypertension [I10] 02/26/2015 Yes    Elevated PSA [R97.20] 02/26/2015 Yes    DM (diabetes mellitus) type II controlled with renal manifestation [E11.29]  Yes      Problems Resolved During this Admission:       Discharged Condition: good    Disposition: Home or Self Care    Follow Up:   Follow-up Information     Larry Monae MD. Schedule an appointment as soon as possible for a visit in 1 week(s).    Specialties: Family Medicine, Wound Care  Why: Call Dr. Monae's office and schedule a Primary [facility] Follow-Up in 1 week.  Contact information:  [address] [place] [address]  [place] [id number] [phone]                       Patient Instructions:      Diet renal     Notify your health care provider if you experience any of the following:  temperature >100.4     Notify your health care provider if you experience any of the following:  persistent nausea and vomiting or diarrhea     Notify your health care provider if you experience any of the following:  severe uncontrolled pain     Notify your health care provider if you experience any of the following:  redness, tenderness, or signs of infection (pain, swelling, redness, odor or green/yellow discharge around incision site)     Notify your health care provider if you experience any of the following:  difficulty breathing or increased cough     Notify your health care provider if you experience any of the following:  severe persistent headache     Notify your health care provider if you experience any of the following:  worsening rash     Notify your health care provider if you experience any of the following:  persistent dizziness, light-headedness, or visual disturbances     Notify your health care  provider if you experience any of the following:  increased confusion or weakness     Activity as tolerated       Significant Diagnostic Studies: Labs: All labs within the past 24 hours have been reviewed    Pending Diagnostic Studies:     Procedure Component Value Units Date/Time    EKG 12-lead [[id number]]     Order Status: Sent Lab Status: No result          Medications:  Reconciled Home Medications:      Medication List      CHANGE how you take these medications    hydrALAZINE 100 MG tablet  Commonly known as: APRESOLINE  Take 1 tablet (100 mg total) by mouth once daily.  What changed: when to take this        CONTINUE taking these medications    allopurinoL 300 MG tablet  Commonly known as: ZYLOPRIM  Take 1 tablet (300 mg total) by mouth once daily.     amLODIPine 10 MG tablet  Commonly known as: NORVASC  Take 1 tablet (10 mg total) by mouth once daily.     DULoxetine 30 MG capsule  Commonly known as: CYMBALTA  Take 1 capsule (30 mg total) by mouth 2 (two) times daily.     ferrous gluconate 324 MG tablet  Commonly known as: FERGON  Take 324 mg by mouth.     gabapentin 300 MG capsule  Commonly known as: NEURONTIN  Take 1 capsule (300 mg total) by mouth 3 (three) times daily.        STOP taking these medications    blood sugar diagnostic Strp  Commonly known as: ONETOUCH ULTRA TEST     blood-glucose meter kit  Commonly known as: ONETOUCH ULTRAMINI     cholestyramine 4 gram packet  Commonly known as: QUESTRAN     colchicine 0.6 mg tablet  Commonly known as: COLCRYS     ferrous sulfate 324 mg (65 mg iron) Tbec     FLUZONE HIGH-DOSE 2019-20 (PF) 180 mcg/0.5 mL Syrg  Generic drug: flu vacc [id number](65yr up)PF     loratadine 10 mg tablet  Commonly known as: CLARITIN     metoprolol tartrate 50 MG tablet  Commonly known as: LOPRESSOR     mirtazapine 15 MG tablet  Commonly known as: REMERON     multivitamin capsule     multivitamin-iron-folic acid Tab     prochlorperazine 10 MG tablet  Commonly known as: COMPAZINE      QUEtiapine 50 MG tablet  Commonly known as: SEROQUEL     sodium bicarbonate 650 MG tablet     tadalafiL 10 MG tablet  Commonly known as: CIALIS            Indwelling Lines/Drains at time of discharge:   None    Time spent on the discharge of patient: 35 minutes         Lahsonda Barrera MD  Department of Hospital Medicine  [facility]

## 2022-02-27 NOTE — PROGRESS NOTES
[facility]: [facility]  Case Management Services    WRITTEN HEALTHCARE DISCHARGE INFORMATION      Things that YOU are RESPONSIBLE for to Manage Your Care At Home:    WRITTEN DISCHARGE INSTURCTIONS  These are your WRITTEN DISCHARGE INSTRUCTIONS from your hospital stay. Please be sure to know and understand that you are responsible for the following, so that you will be able to better manage your care at home:   1.  all medications that have been called in for you or those that were written on prescription paper and provided to you to get filled.  2. BE SURE TO TAKE YOUR MEDICATION AS PRESCRIBED.  3. Be sure to attend your follow-up appointments that were scheduled for you or to schedule any appointments that you will be scheduling.     If you are unable to make your follow up appointments, please call the number listed and reschedule this appointment.      ________[HELP AT HOME]________    Be sure that you will have someone to help you at home during your recovery.     Experiencing any SIGNS or SYMPTOMS: YOU CAN     Schedule a same day appointment with your Primary Care Doctor or  you can call [facility] On Call Nurse Care Line for 24/7 assistance at [phone]     If you are experience any signs or symptoms that have become severe, Call 911 and come to your nearest Emergency Room.     Thank you for choosing [facility] and allowing us to care for you.     From your care management team:   You should receive a call from [facility] Discharge Department within 48-72 hours to help manage your care after discharge. Please try to make sure that you answer your phone for this important phone call.    Your follow-up appointments have been made according to your preferences. The following are your scheduled appointments or those that you will need to schedule for yourself.    FOLLOW-UPS   Follow-up Information     Larry Monae MD. Schedule an appointment as soon as possible for a visit in 1 week(s).     Specialties: Family Medicine, Wound Care  Why: Call Dr. Monae's office and schedule a Primary Care Hospital Follow-Up in 1 week.  Contact information:  [address] [facility]  [place] [address] [phone]             Yusef Keller MD. Schedule an appointment as soon as possible for a visit.    Specialty: Nephrology  Why: Call and schedule an out-patiet nephrology hospital follow-up in 1 week.  Contact information:  [address] [place] [address] [address]  [place] [address] [phone]

## 2022-02-27 NOTE — PLAN OF CARE
Problem: Adult Inpatient Plan of Care  Goal: Plan of Care Review  Outcome: Ongoing, Progressing  Goal: Patient-Specific Goal (Individualized)  Outcome: Ongoing, Progressing  Goal: Absence of Hospital-Acquired Illness or Injury  Outcome: Ongoing, Progressing  Goal: Optimal Comfort and Wellbeing  Outcome: Ongoing, Progressing  Goal: Readiness for Transition of Care  Outcome: Ongoing, Progressing     Problem: Diabetes Comorbidity  Goal: Blood Glucose Level Within Targeted Range  Outcome: Ongoing, Progressing     Problem: Fluid and Electrolyte Imbalance (Acute Kidney Injury/Impairment)  Goal: Fluid and Electrolyte Balance  Outcome: Ongoing, Progressing     Problem: Oral Intake Inadequate (Acute Kidney Injury/Impairment)  Goal: Optimal Nutrition Intake  Outcome: Ongoing, Progressing     Problem: Renal Function Impairment (Acute Kidney Injury/Impairment)  Goal: Effective Renal Function  Outcome: Ongoing, Progressing     Problem: Impaired Wound Healing  Goal: Optimal Wound Healing  Outcome: Ongoing, Progressing     Problem: Fall Injury Risk  Goal: Absence of Fall and Fall-Related Injury  Outcome: Ongoing, Progressing

## 2022-02-28 ENCOUNTER — TELEPHONE (OUTPATIENT)
Dept: FAMILY MEDICINE | Facility: CLINIC | Age: 87
End: 2022-02-28
Payer: MEDICARE

## 2022-03-11 ENCOUNTER — TELEPHONE (OUTPATIENT)
Dept: FAMILY MEDICINE | Facility: CLINIC | Age: 87
End: 2022-03-11
Payer: MEDICARE

## 2022-03-14 ENCOUNTER — OFFICE VISIT (OUTPATIENT)
Dept: FAMILY MEDICINE | Facility: CLINIC | Age: 87
End: 2022-03-14
Payer: MEDICARE

## 2022-03-14 VITALS
OXYGEN SATURATION: 97 % | SYSTOLIC BLOOD PRESSURE: 138 MMHG | TEMPERATURE: 98 F | DIASTOLIC BLOOD PRESSURE: 70 MMHG | RESPIRATION RATE: 18 BRPM | HEART RATE: 71 BPM | BODY MASS INDEX: 25.88 KG/M2 | WEIGHT: 201.63 LBS | HEIGHT: 74 IN

## 2022-03-14 DIAGNOSIS — I87.8 VENOUS STASIS: ICD-10-CM

## 2022-03-14 DIAGNOSIS — D49.0 COLORECTAL NEOPLASM: ICD-10-CM

## 2022-03-14 DIAGNOSIS — Z23 NEED FOR SHINGLES VACCINE: ICD-10-CM

## 2022-03-14 DIAGNOSIS — C78.7 LIVER METASTASES: ICD-10-CM

## 2022-03-14 DIAGNOSIS — M1A.9XX0 CHRONIC GOUT WITHOUT TOPHUS, UNSPECIFIED CAUSE, UNSPECIFIED SITE: ICD-10-CM

## 2022-03-14 DIAGNOSIS — N18.31 STAGE 3A CHRONIC KIDNEY DISEASE: ICD-10-CM

## 2022-03-14 DIAGNOSIS — N18.30 CONTROLLED TYPE 2 DIABETES MELLITUS WITH STAGE 3 CHRONIC KIDNEY DISEASE, WITHOUT LONG-TERM CURRENT USE OF INSULIN: ICD-10-CM

## 2022-03-14 DIAGNOSIS — T45.1X5A CHEMOTHERAPY-INDUCED NEUROPATHY: ICD-10-CM

## 2022-03-14 DIAGNOSIS — E11.22 CONTROLLED TYPE 2 DIABETES MELLITUS WITH STAGE 3 CHRONIC KIDNEY DISEASE, WITHOUT LONG-TERM CURRENT USE OF INSULIN: ICD-10-CM

## 2022-03-14 DIAGNOSIS — E87.5 HYPERKALEMIA: Primary | ICD-10-CM

## 2022-03-14 DIAGNOSIS — G62.0 CHEMOTHERAPY-INDUCED NEUROPATHY: ICD-10-CM

## 2022-03-14 DIAGNOSIS — I10 ESSENTIAL HYPERTENSION: ICD-10-CM

## 2022-03-14 DIAGNOSIS — C18.9 STAGE IV CARCINOMA OF COLON: ICD-10-CM

## 2022-03-14 PROBLEM — E87.20 METABOLIC ACIDOSIS, NORMAL ANION GAP (NAG): Status: RESOLVED | Noted: 2019-10-06 | Resolved: 2022-03-14

## 2022-03-14 PROBLEM — N17.9 AKI (ACUTE KIDNEY INJURY): Status: RESOLVED | Noted: 2019-10-08 | Resolved: 2022-03-14

## 2022-03-14 PROCEDURE — 99214 OFFICE O/P EST MOD 30 MIN: CPT | Mod: PBBFAC,PO | Performed by: FAMILY MEDICINE

## 2022-03-14 PROCEDURE — 99214 OFFICE O/P EST MOD 30 MIN: CPT | Mod: S$PBB,,, | Performed by: FAMILY MEDICINE

## 2022-03-14 PROCEDURE — 99214 PR OFFICE/OUTPT VISIT, EST, LEVL IV, 30-39 MIN: ICD-10-PCS | Mod: S$PBB,,, | Performed by: FAMILY MEDICINE

## 2022-03-14 PROCEDURE — 99999 PR PBB SHADOW E&M-EST. PATIENT-LVL IV: ICD-10-PCS | Mod: PBBFAC,,, | Performed by: FAMILY MEDICINE

## 2022-03-14 PROCEDURE — 99999 PR PBB SHADOW E&M-EST. PATIENT-LVL IV: CPT | Mod: PBBFAC,,, | Performed by: FAMILY MEDICINE

## 2022-03-14 RX ORDER — DULOXETIN HYDROCHLORIDE 30 MG/1
30 CAPSULE, DELAYED RELEASE ORAL 2 TIMES DAILY
Qty: 180 CAPSULE | Refills: 3 | Status: SHIPPED | OUTPATIENT
Start: 2022-03-14 | End: 2023-03-17

## 2022-03-14 RX ORDER — SITAGLIPTIN 50 MG/1
50 TABLET, FILM COATED ORAL DAILY
COMMUNITY
Start: 2022-02-23 | End: 2022-03-14 | Stop reason: SDUPTHER

## 2022-03-14 RX ORDER — HYDRALAZINE HYDROCHLORIDE 100 MG/1
100 TABLET, FILM COATED ORAL DAILY
Qty: 90 TABLET | Refills: 1 | Status: SHIPPED | OUTPATIENT
Start: 2022-03-14 | End: 2022-09-18

## 2022-03-14 RX ORDER — ALLOPURINOL 300 MG/1
300 TABLET ORAL DAILY
Qty: 90 TABLET | Refills: 3 | Status: SHIPPED | OUTPATIENT
Start: 2022-03-14 | End: 2023-12-15 | Stop reason: SDUPTHER

## 2022-03-14 RX ORDER — GABAPENTIN 300 MG/1
300 CAPSULE ORAL 3 TIMES DAILY
Qty: 270 CAPSULE | Refills: 3 | Status: SHIPPED | OUTPATIENT
Start: 2022-03-14 | End: 2023-05-04

## 2022-03-14 RX ORDER — AMLODIPINE BESYLATE 10 MG/1
10 TABLET ORAL DAILY
Qty: 90 TABLET | Refills: 1 | Status: SHIPPED | OUTPATIENT
Start: 2022-03-14 | End: 2022-09-17

## 2022-03-14 RX ORDER — SITAGLIPTIN 50 MG/1
50 TABLET, FILM COATED ORAL DAILY
Qty: 90 TABLET | Refills: 1 | Status: SHIPPED | OUTPATIENT
Start: 2022-03-14 | End: 2022-09-18

## 2022-03-14 NOTE — PROGRESS NOTES
Routine Office Visit    Patient Name: Eugene Gamez    : 1935  MRN: [id number]    Subjective:  Eugene is a 87 y.o. male who presents today for:    1. Follow up  Patient presenting today for follow up after going tot [facility] for a fall.  He was found to have elevated potassium level.  Patient suffers with HTN, type 2 DM, CKD stage 3, and has a personal history of stage 4 colon cancer.  He states he has been taking all mediations as prescribed.  There has been no chest pain, muscle cramping, shortness of breath, weakness, or numbness.      Past Medical History  Past Medical History:   Diagnosis Date    Arthritis     Cancer     Diabetes mellitus     Elevated PSA     Gout, unspecified     Hypertension     Nuclear sclerotic cataract of both eyes 2018       Past Surgical History  Past Surgical History:   Procedure Laterality Date    CATARACT EXTRACTION      ou    EYE SURGERY      bilateral cataracts    removal of small bowel  2015    Colostomy       Family History  Family History   Problem Relation Age of Onset    Hypertension Mother     Diabetes Mother     No Known Problems Father     No Known Problems Sister     No Known Problems Brother     No Known Problems Maternal Aunt     No Known Problems Maternal Uncle     No Known Problems Paternal Aunt     No Known Problems Paternal Uncle     No Known Problems Maternal Grandmother     No Known Problems Maternal Grandfather     No Known Problems Paternal Grandmother     No Known Problems Paternal Grandfather     Amblyopia Neg Hx     Blindness Neg Hx     Cancer Neg Hx     Cataracts Neg Hx     Glaucoma Neg Hx     Macular degeneration Neg Hx     Retinal detachment Neg Hx     Strabismus Neg Hx     Stroke Neg Hx     Thyroid disease Neg Hx        Social History  Social History     Socioeconomic History    Marital status:    Tobacco Use    Smoking status: Never Smoker    Smokeless tobacco: Never Used    Tobacco comment:  smoked short while as a teen   Substance and Sexual Activity    Alcohol use: No    Drug use: No       Current Medications  Current Outpatient Medications on File Prior to Visit   Medication Sig Dispense Refill    ferrous gluconate (FERGON) 324 MG tablet Take 324 mg by mouth.      [DISCONTINUED] allopurinol (ZYLOPRIM) 300 MG tablet Take 1 tablet (300 mg total) by mouth once daily. 90 tablet 3    [DISCONTINUED] hydrALAZINE (APRESOLINE) 100 MG tablet Take 1 tablet (100 mg total) by mouth once daily. 30 tablet 0    [DISCONTINUED] amLODIPine (NORVASC) 10 MG tablet Take 1 tablet (10 mg total) by mouth once daily. 30 tablet 0    [DISCONTINUED] blood-glucose meter (ONE TOUCH ULTRAMINI) kit To test twice daily. Use as instructed 1 each 0    [DISCONTINUED] cholestyramine (QUESTRAN) 4 gram packet Take 1 packet (4 g total) by mouth 3 (three) times daily with meals. 270 packet 3    [DISCONTINUED] colchicine (COLCRYS) 0.6 mg tablet Take 1 tablet (0.6 mg total) by mouth daily as needed. 90 tablet 3    [DISCONTINUED] DULoxetine (CYMBALTA) 30 MG capsule Take 1 capsule (30 mg total) by mouth 2 (two) times daily. 180 capsule 3    [DISCONTINUED] gabapentin (NEURONTIN) 300 MG capsule Take 1 capsule (300 mg total) by mouth 3 (three) times daily. 270 capsule 3    [DISCONTINUED] JANUVIA 50 mg Tab Take 50 mg by mouth once daily.      [DISCONTINUED] loratadine (CLARITIN) 10 mg tablet Take 10 mg by mouth once daily.      [DISCONTINUED] metoprolol tartrate (LOPRESSOR) 50 MG tablet Take 1 tablet (50 mg total) by mouth 2 (two) times daily. 60 tablet 0    [DISCONTINUED] QUEtiapine (SEROQUEL) 50 MG tablet Take 1 tablet (50 mg total) by mouth every evening. 30 tablet 0    [DISCONTINUED] sodium bicarbonate 650 MG tablet Take 1 tablet (650 mg total) by mouth 3 (three) times daily. 21 tablet 0     Current Facility-Administered Medications on File Prior to Visit   Medication Dose Route Frequency Provider Last Rate Last Admin     "ondansetron HCl (PF) 4 mg/2 mL injection    PRN James Ryan, CRNA   4 mg at 06/29/15 9906       Allergies   Review of patient's allergies indicates:   Allergen Reactions    Iodine Other (See Comments)     Causes gout to flare up    Shellfish containing products      Causes gout to flare up       Review of Systems (Pertinent positives)  Review of Systems   Constitutional: Negative.    HENT: Negative.    Eyes: Negative.    Respiratory: Negative.    Cardiovascular: Negative.    Musculoskeletal: Negative.    Skin: Negative.    Psychiatric/Behavioral: Negative.          /70   Pulse 71   Temp 98.4 °F (36.9 °C) (Oral)   Resp 18   Ht 6' 2" (1.88 m)   Wt 91.4 kg (201 lb 9.8 oz)   SpO2 97%   BMI 25.89 kg/m²     GENERAL APPEARANCE: in no apparent distress and well developed and well nourished  HEENT: PERRL, EOMI, Sclera clear, anicteric, Oropharynx clear, no lesions, Neck supple with midline trachea  NECK: normal, supple, no adenopathy, thyroid normal in size  RESPIRATORY: appears well, vitals normal, no respiratory distress, acyanotic, normal RR, chest clear, no wheezing, crepitations, rhonchi, normal symmetric air entry  HEART: regular rate and rhythm, S1, S2 normal, no murmur, click, rub or gallop.    ABDOMEN: abdomen is soft without tenderness, no masses, no hernias, no organomegaly, no rebound, no guarding. Suprapubic tenderness absent. No CVA tenderness.  Extremities: warm/well perfused.  No abnormal hair patterns.  No clubbing, cyanosis or edema.  no muscular tenderness noted  SKIN: no rashes, no wounds, no other lesions  PSYCH: Alert, oriented x 3, thought content appropriate, speech normal, pleasant and cooperative, good eye contact, well groomed    Assessment/Plan:  Eugene Gamez is a 87 y.o. male who presents today for :    Eugene was seen today for hospital follow up.    Diagnoses and all orders for this visit:    Hyperkalemia  -     Basic Metabolic Panel; Future    Chronic gout without tophus, " unspecified cause, unspecified site  -     allopurinoL (ZYLOPRIM) 300 MG tablet; Take 1 tablet (300 mg total) by mouth once daily.    Chemotherapy-induced neuropathy  -     DULoxetine (CYMBALTA) 30 MG capsule; Take 1 capsule (30 mg total) by mouth 2 (two) times daily.  -     gabapentin (NEURONTIN) 300 MG capsule; Take 1 capsule (300 mg total) by mouth 3 (three) times daily.    Need for shingles vaccine    Essential hypertension  -     hydrALAZINE (APRESOLINE) 100 MG tablet; Take 1 tablet (100 mg total) by mouth once daily.  -     amLODIPine (NORVASC) 10 MG tablet; Take 1 tablet (10 mg total) by mouth once daily.    Venous stasis    Stage 3a chronic kidney disease  -     Basic Metabolic Panel; Future    Controlled type 2 diabetes mellitus with stage 3 chronic kidney disease, without long-term current use of insulin  -     JANUVIA 50 mg Tab; Take 1 tablet (50 mg total) by mouth once daily.    Stage IV carcinoma of colon    Liver metastases    Colorectal neoplasm    Other orders  The following orders have not been finalized:  -     Cancel: varicella-zoster gE-AS01B (PF)(SHINGRIX) 50 mcg/0.5 mL injection      1.  Labs to be done today  2.  Mediations refilled  3.  Follow up with oncology as directed  4.  Go to the ED for any chest pain, weakness, numbness, slurred speech , or severe shortness of breath  5.  Will get shingles vaccine at pharmacy  6.  Call with any concners      Larry Monae MD

## 2022-03-21 ENCOUNTER — LAB VISIT (OUTPATIENT)
Dept: LAB | Facility: HOSPITAL | Age: 87
End: 2022-03-21
Attending: FAMILY MEDICINE
Payer: MEDICARE

## 2022-03-21 DIAGNOSIS — N18.31 STAGE 3A CHRONIC KIDNEY DISEASE: ICD-10-CM

## 2022-03-21 DIAGNOSIS — E87.5 HYPERKALEMIA: ICD-10-CM

## 2022-03-21 LAB
ANION GAP SERPL CALC-SCNC: 9 MMOL/L (ref 8–16)
BUN SERPL-MCNC: 24 MG/DL (ref 8–23)
CALCIUM SERPL-MCNC: 10.1 MG/DL (ref 8.7–10.5)
CHLORIDE SERPL-SCNC: 113 MMOL/L (ref 95–110)
CO2 SERPL-SCNC: 18 MMOL/L (ref 23–29)
CREAT SERPL-MCNC: 1.8 MG/DL (ref 0.5–1.4)
EST. GFR  (AFRICAN AMERICAN): 38.5 ML/MIN/1.73 M^2
EST. GFR  (NON AFRICAN AMERICAN): 33.3 ML/MIN/1.73 M^2
GLUCOSE SERPL-MCNC: 229 MG/DL (ref 70–110)
POTASSIUM SERPL-SCNC: 5.4 MMOL/L (ref 3.5–5.1)
SODIUM SERPL-SCNC: 140 MMOL/L (ref 136–145)

## 2022-03-21 PROCEDURE — 36415 COLL VENOUS BLD VENIPUNCTURE: CPT | Mod: PO | Performed by: FAMILY MEDICINE

## 2022-03-21 PROCEDURE — 80048 BASIC METABOLIC PNL TOTAL CA: CPT | Performed by: FAMILY MEDICINE

## 2022-03-27 DIAGNOSIS — N17.9 AKI (ACUTE KIDNEY INJURY): Primary | ICD-10-CM

## 2022-03-27 NOTE — PROGRESS NOTES
Please verify patient is drinking water.  His potassium was mildly elevated, but is kidney function was down.  I need him to hydrate well and then recheck levels on Wednesday    Dr. Monae

## 2022-03-28 ENCOUNTER — TELEPHONE (OUTPATIENT)
Dept: FAMILY MEDICINE | Facility: CLINIC | Age: 87
End: 2022-03-28
Payer: MEDICARE

## 2022-03-28 NOTE — TELEPHONE ENCOUNTER
----- Message from Larry Monae MD sent at 3/27/2022  4:32 PM CDT -----  Please verify patient is drinking water.  His potassium was mildly elevated, but is kidney function was down.  I need him to hydrate well and then recheck levels on Wednesday    Dr. Monae

## 2022-03-28 NOTE — TELEPHONE ENCOUNTER
Patient notified of lab results and doctors recommendation, lab scheduled. Patient verbalized understanding but also call patient daughter number and left a voicemail to discuss patient results also.

## 2022-03-30 ENCOUNTER — LAB VISIT (OUTPATIENT)
Dept: LAB | Facility: HOSPITAL | Age: 87
End: 2022-03-30
Attending: FAMILY MEDICINE
Payer: MEDICARE

## 2022-03-30 DIAGNOSIS — N17.9 AKI (ACUTE KIDNEY INJURY): ICD-10-CM

## 2022-03-30 LAB
ALBUMIN SERPL BCP-MCNC: 4 G/DL (ref 3.5–5.2)
ANION GAP SERPL CALC-SCNC: 12 MMOL/L (ref 8–16)
BUN SERPL-MCNC: 27 MG/DL (ref 8–23)
CALCIUM SERPL-MCNC: 9.9 MG/DL (ref 8.7–10.5)
CHLORIDE SERPL-SCNC: 111 MMOL/L (ref 95–110)
CO2 SERPL-SCNC: 15 MMOL/L (ref 23–29)
CREAT SERPL-MCNC: 1.9 MG/DL (ref 0.5–1.4)
EST. GFR  (AFRICAN AMERICAN): 36.1 ML/MIN/1.73 M^2
EST. GFR  (NON AFRICAN AMERICAN): 31.2 ML/MIN/1.73 M^2
GLUCOSE SERPL-MCNC: 213 MG/DL (ref 70–110)
PHOSPHATE SERPL-MCNC: 3.5 MG/DL (ref 2.7–4.5)
POTASSIUM SERPL-SCNC: 5.6 MMOL/L (ref 3.5–5.1)
SODIUM SERPL-SCNC: 138 MMOL/L (ref 136–145)

## 2022-03-30 PROCEDURE — 36415 COLL VENOUS BLD VENIPUNCTURE: CPT | Mod: PO | Performed by: FAMILY MEDICINE

## 2022-03-30 PROCEDURE — 80069 RENAL FUNCTION PANEL: CPT | Performed by: FAMILY MEDICINE

## 2022-04-11 ENCOUNTER — TELEPHONE (OUTPATIENT)
Dept: FAMILY MEDICINE | Facility: CLINIC | Age: 87
End: 2022-04-11
Payer: MEDICARE

## 2022-04-11 NOTE — TELEPHONE ENCOUNTER
----- Message from Larry Monae MD sent at 4/10/2022  7:46 PM CDT -----  Please check to see if patient is drinking water again.  His blood work still showed him to be dehydrated again.    Dr. Monae

## 2022-04-11 NOTE — PROGRESS NOTES
Please check to see if patient is drinking water again.  His blood work still showed him to be dehydrated again.    Dr. Monae

## 2022-04-11 NOTE — TELEPHONE ENCOUNTER
Phone pt per doctor advice was given understood vertebrally. vs    Pt is also requesting an order for diab.Shoe

## 2022-05-13 ENCOUNTER — TELEPHONE (OUTPATIENT)
Dept: HEMATOLOGY/ONCOLOGY | Facility: CLINIC | Age: 87
End: 2022-05-13
Payer: MEDICARE

## 2022-05-13 NOTE — TELEPHONE ENCOUNTER
c to pt to inquire as to why he did not show for appt today  He said he had noone to come with him or bring him  Offered to reschedule him he wants to speak with his dgt to see what days she is available  And request nurse call back  Nurse agreed to do as requested

## 2022-05-13 NOTE — TELEPHONE ENCOUNTER
Contacted pt regarding appointment for today, pt did not know of appointment and needs to reschedule.

## 2022-05-16 ENCOUNTER — TELEPHONE (OUTPATIENT)
Dept: HEMATOLOGY/ONCOLOGY | Facility: CLINIC | Age: 87
End: 2022-05-16
Payer: MEDICARE

## 2022-06-10 ENCOUNTER — LAB VISIT (OUTPATIENT)
Dept: LAB | Facility: HOSPITAL | Age: 87
End: 2022-06-10
Attending: STUDENT IN AN ORGANIZED HEALTH CARE EDUCATION/TRAINING PROGRAM
Payer: MEDICARE

## 2022-06-10 ENCOUNTER — OFFICE VISIT (OUTPATIENT)
Dept: HEMATOLOGY/ONCOLOGY | Facility: CLINIC | Age: 87
End: 2022-06-10
Payer: MEDICARE

## 2022-06-10 VITALS
TEMPERATURE: 99 F | SYSTOLIC BLOOD PRESSURE: 102 MMHG | WEIGHT: 196.88 LBS | BODY MASS INDEX: 25.27 KG/M2 | HEIGHT: 74 IN | OXYGEN SATURATION: 99 % | HEART RATE: 74 BPM | DIASTOLIC BLOOD PRESSURE: 55 MMHG

## 2022-06-10 DIAGNOSIS — C18.8 MALIGNANT NEOPLASM OF OVERLAPPING SITES OF COLON: ICD-10-CM

## 2022-06-10 DIAGNOSIS — D49.0 COLORECTAL NEOPLASM: ICD-10-CM

## 2022-06-10 DIAGNOSIS — Z93.3 COLOSTOMY IN PLACE: ICD-10-CM

## 2022-06-10 DIAGNOSIS — C18.7 MALIGNANT NEOPLASM OF SIGMOID COLON: ICD-10-CM

## 2022-06-10 DIAGNOSIS — R97.20 ELEVATED PSA: ICD-10-CM

## 2022-06-10 DIAGNOSIS — N28.89 BILATERAL RENAL MASSES: ICD-10-CM

## 2022-06-10 DIAGNOSIS — R97.0 ELEVATED CEA: ICD-10-CM

## 2022-06-10 DIAGNOSIS — R53.81 DEBILITY: ICD-10-CM

## 2022-06-10 DIAGNOSIS — D49.0 COLORECTAL NEOPLASM: Primary | ICD-10-CM

## 2022-06-10 PROBLEM — R63.0 ANOREXIA: Status: RESOLVED | Noted: 2018-11-09 | Resolved: 2022-06-10

## 2022-06-10 LAB
ALBUMIN SERPL BCP-MCNC: 3.9 G/DL (ref 3.5–5.2)
ALP SERPL-CCNC: 68 U/L (ref 55–135)
ALT SERPL W/O P-5'-P-CCNC: 12 U/L (ref 10–44)
ANION GAP SERPL CALC-SCNC: 9 MMOL/L (ref 8–16)
AST SERPL-CCNC: 15 U/L (ref 10–40)
BASOPHILS # BLD AUTO: 0.04 K/UL (ref 0–0.2)
BASOPHILS NFR BLD: 0.5 % (ref 0–1.9)
BILIRUB SERPL-MCNC: 0.5 MG/DL (ref 0.1–1)
BUN SERPL-MCNC: 33 MG/DL (ref 8–23)
CALCIUM SERPL-MCNC: 10 MG/DL (ref 8.7–10.5)
CEA SERPL-MCNC: 6.3 NG/ML (ref 0–5)
CHLORIDE SERPL-SCNC: 109 MMOL/L (ref 95–110)
CO2 SERPL-SCNC: 21 MMOL/L (ref 23–29)
CREAT SERPL-MCNC: 2.2 MG/DL (ref 0.5–1.4)
DIFFERENTIAL METHOD: ABNORMAL
EOSINOPHIL # BLD AUTO: 0.1 K/UL (ref 0–0.5)
EOSINOPHIL NFR BLD: 1.3 % (ref 0–8)
ERYTHROCYTE [DISTWIDTH] IN BLOOD BY AUTOMATED COUNT: 13.4 % (ref 11.5–14.5)
EST. GFR  (AFRICAN AMERICAN): 30 ML/MIN/1.73 M^2
EST. GFR  (NON AFRICAN AMERICAN): 26 ML/MIN/1.73 M^2
GLUCOSE SERPL-MCNC: 145 MG/DL (ref 70–110)
HCT VFR BLD AUTO: 38.2 % (ref 40–54)
HGB BLD-MCNC: 12.4 G/DL (ref 14–18)
IMM GRANULOCYTES # BLD AUTO: 0.07 K/UL (ref 0–0.04)
IMM GRANULOCYTES NFR BLD AUTO: 0.9 % (ref 0–0.5)
LYMPHOCYTES # BLD AUTO: 1.7 K/UL (ref 1–4.8)
LYMPHOCYTES NFR BLD: 22.2 % (ref 18–48)
MCH RBC QN AUTO: 29 PG (ref 27–31)
MCHC RBC AUTO-ENTMCNC: 32.5 G/DL (ref 32–36)
MCV RBC AUTO: 89 FL (ref 82–98)
MONOCYTES # BLD AUTO: 0.7 K/UL (ref 0.3–1)
MONOCYTES NFR BLD: 9.9 % (ref 4–15)
NEUTROPHILS # BLD AUTO: 4.9 K/UL (ref 1.8–7.7)
NEUTROPHILS NFR BLD: 65.2 % (ref 38–73)
NRBC BLD-RTO: 0 /100 WBC
PLATELET # BLD AUTO: 255 K/UL (ref 150–450)
PMV BLD AUTO: 10.2 FL (ref 9.2–12.9)
POTASSIUM SERPL-SCNC: 4.6 MMOL/L (ref 3.5–5.1)
PROT SERPL-MCNC: 7.5 G/DL (ref 6–8.4)
RBC # BLD AUTO: 4.28 M/UL (ref 4.6–6.2)
SODIUM SERPL-SCNC: 139 MMOL/L (ref 136–145)
WBC # BLD AUTO: 7.49 K/UL (ref 3.9–12.7)

## 2022-06-10 PROCEDURE — 99205 OFFICE O/P NEW HI 60 MIN: CPT | Mod: S$PBB,,, | Performed by: STUDENT IN AN ORGANIZED HEALTH CARE EDUCATION/TRAINING PROGRAM

## 2022-06-10 PROCEDURE — 99497 PR ADVNCD CARE PLAN 30 MIN: ICD-10-PCS | Mod: S$PBB,,, | Performed by: STUDENT IN AN ORGANIZED HEALTH CARE EDUCATION/TRAINING PROGRAM

## 2022-06-10 PROCEDURE — 99214 OFFICE O/P EST MOD 30 MIN: CPT | Mod: PBBFAC | Performed by: STUDENT IN AN ORGANIZED HEALTH CARE EDUCATION/TRAINING PROGRAM

## 2022-06-10 PROCEDURE — 85025 COMPLETE CBC W/AUTO DIFF WBC: CPT | Performed by: STUDENT IN AN ORGANIZED HEALTH CARE EDUCATION/TRAINING PROGRAM

## 2022-06-10 PROCEDURE — 99999 PR PBB SHADOW E&M-EST. PATIENT-LVL IV: ICD-10-PCS | Mod: PBBFAC,,, | Performed by: STUDENT IN AN ORGANIZED HEALTH CARE EDUCATION/TRAINING PROGRAM

## 2022-06-10 PROCEDURE — 99205 PR OFFICE/OUTPT VISIT, NEW, LEVL V, 60-74 MIN: ICD-10-PCS | Mod: S$PBB,,, | Performed by: STUDENT IN AN ORGANIZED HEALTH CARE EDUCATION/TRAINING PROGRAM

## 2022-06-10 PROCEDURE — 99999 PR PBB SHADOW E&M-EST. PATIENT-LVL IV: CPT | Mod: PBBFAC,,, | Performed by: STUDENT IN AN ORGANIZED HEALTH CARE EDUCATION/TRAINING PROGRAM

## 2022-06-10 PROCEDURE — 99497 ADVNCD CARE PLAN 30 MIN: CPT | Mod: PBBFAC | Performed by: STUDENT IN AN ORGANIZED HEALTH CARE EDUCATION/TRAINING PROGRAM

## 2022-06-10 PROCEDURE — 80053 COMPREHEN METABOLIC PANEL: CPT | Performed by: STUDENT IN AN ORGANIZED HEALTH CARE EDUCATION/TRAINING PROGRAM

## 2022-06-10 PROCEDURE — 36415 COLL VENOUS BLD VENIPUNCTURE: CPT | Performed by: STUDENT IN AN ORGANIZED HEALTH CARE EDUCATION/TRAINING PROGRAM

## 2022-06-10 PROCEDURE — 82378 CARCINOEMBRYONIC ANTIGEN: CPT | Performed by: STUDENT IN AN ORGANIZED HEALTH CARE EDUCATION/TRAINING PROGRAM

## 2022-06-10 PROCEDURE — 99497 ADVNCD CARE PLAN 30 MIN: CPT | Mod: S$PBB,,, | Performed by: STUDENT IN AN ORGANIZED HEALTH CARE EDUCATION/TRAINING PROGRAM

## 2022-06-10 NOTE — ASSESSMENT & PLAN NOTE
History of elevated PSA without uptake on prior PET CTs and is not having any urinary symptoms at this time  At his age do not recommend checking PSA

## 2022-06-10 NOTE — ASSESSMENT & PLAN NOTE
Last PET CT 3/14/19 without evidence of recurrent colon cancer.  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  Last CEA 10/6/19 = 6.9.  No new symptoms today concerning for metastatic recurrence.  -will re-check CEA and obtain baseline labs  -discussed advanced care planning and recommended DNR/DNI status for someone his age.  -unless CEA highly concerning for large tumor burden that might have impending consequences on his current QOL, will likely not scan as long as CEA is relatively stable (ok to be mildly elevated).  --in this scenario, no further CEA monitoring or oncology specific follow up needed unless he develops new symptoms.  -follow up as needed.  Certainly if he develops symptomatic metastatic disease at a later date, recommend palliative care consult immediately.  -will order rollator since he did have a mechanical fall with his cane a few months ago and daughter reports him needing to take breaks when she walks with him.

## 2022-06-10 NOTE — PROGRESS NOTES
PATIENT: Eugene Gamez  MRN: [id number]  DATE: 6/10/2022      Diagnosis:   1. Colorectal neoplasm    2. Malignant neoplasm of overlapping sites of colon     3. Malignant neoplasm of sigmoid colon    4. Bilateral renal masses    5. Elevated CEA    6. Colostomy in place    7. Debility    8. Elevated PSA        Chief Complaint: Colon Cancer      Oncologic History:    Oncologic History 1. Colon cancer    Oncologic Treatment 1. Hemicolectomy and end colostomy.    Pathology         Subjective:    Interval History: Mr. Gamez is here for new patient consultation. Former patient of Dr. Pillai.    Overall he is doing well.  Denies fevers, chills, chest pain, shortness of breath, abdominal pain, nausea, vomiting, diarrhea, or constipation.  No new urinary symptoms or new pain.  Had a mechanical fall a few months ago when he tripped while walking with his cane.  No other falls to report.  Appetite is normal. Independent with all ADLs and ambulates with a cane at baseline.  His daughter accompanies him at this visit.    Past Medical History:   Past Medical History:   Diagnosis Date    Arthritis     Cancer     Diabetes mellitus     Elevated PSA     Gout, unspecified     Hypertension     Nuclear sclerotic cataract of both eyes 6/29/2018       Past Surgical HIstory:   Past Surgical History:   Procedure Laterality Date    CATARACT EXTRACTION      ou    EYE SURGERY      bilateral cataracts    removal of small bowel  Apr. 2015    Colostomy       Family History:   Family History   Problem Relation Age of Onset    Hypertension Mother     Diabetes Mother     No Known Problems Father     No Known Problems Sister     No Known Problems Brother     No Known Problems Maternal Aunt     No Known Problems Maternal Uncle     No Known Problems Paternal Aunt     No Known Problems Paternal Uncle     No Known Problems Maternal Grandmother     No Known Problems Maternal Grandfather     No Known Problems Paternal  Grandmother     No Known Problems Paternal Grandfather     Amblyopia Neg Hx     Blindness Neg Hx     Cancer Neg Hx     Cataracts Neg Hx     Glaucoma Neg Hx     Macular degeneration Neg Hx     Retinal detachment Neg Hx     Strabismus Neg Hx     Stroke Neg Hx     Thyroid disease Neg Hx        Social History:  reports that he has never smoked. He has never used smokeless tobacco. He reports that he does not drink alcohol and does not use drugs.    Allergies:  Review of patient's allergies indicates:   Allergen Reactions    Iodine Other (See Comments)     Causes gout to flare up    Shellfish containing products      Causes gout to flare up       Medications:  Current Outpatient Medications   Medication Sig Dispense Refill    allopurinoL (ZYLOPRIM) 300 MG tablet Take 1 tablet (300 mg total) by mouth once daily. 90 tablet 3    amLODIPine (NORVASC) 10 MG tablet Take 1 tablet (10 mg total) by mouth once daily. 90 tablet 1    DULoxetine (CYMBALTA) 30 MG capsule Take 1 capsule (30 mg total) by mouth 2 (two) times daily. 180 capsule 3    ferrous gluconate (FERGON) 324 MG tablet Take 324 mg by mouth.      gabapentin (NEURONTIN) 300 MG capsule Take 1 capsule (300 mg total) by mouth 3 (three) times daily. 270 capsule 3    JANUVIA 50 mg Tab Take 1 tablet (50 mg total) by mouth once daily. 90 tablet 1    hydrALAZINE (APRESOLINE) 100 MG tablet Take 1 tablet (100 mg total) by mouth once daily. 90 tablet 1     No current facility-administered medications for this visit.     Facility-Administered Medications Ordered in Other Visits   Medication Dose Route Frequency Provider Last Rate Last Admin    ondansetron HCl (PF) 4 mg/2 mL injection    PRN James Ryan CRNA   4 mg at 06/29/15 0856       Review of Systems   Constitutional: Negative for activity change, appetite change, chills, diaphoresis, fever and unexpected weight change.   HENT: Negative for nosebleeds and trouble swallowing.    Eyes: Negative for visual  "disturbance.   Respiratory: Positive for shortness of breath. Negative for cough, chest tightness and wheezing.    Cardiovascular: Negative for chest pain and leg swelling.   Gastrointestinal: Negative for abdominal distention, abdominal pain, blood in stool, constipation, diarrhea, nausea and vomiting.   Endocrine: Negative for cold intolerance and heat intolerance.   Genitourinary: Negative for difficulty urinating and dysuria.   Musculoskeletal: Negative for arthralgias and back pain.   Skin: Negative for color change.   Neurological: Positive for weakness. Negative for dizziness, light-headedness, numbness and headaches.   Hematological: Negative for adenopathy. Does not bruise/bleed easily.   Psychiatric/Behavioral: Negative for confusion.       ECOG Performance Status:     ECOG SCORE    2 - Capable of all selfcare but unable to carry out any work activities, active > 50% of hours         Objective:      Vitals:   Vitals:    06/10/22 0950   BP: (!) 102/55   BP Location: Left arm   Patient Position: Sitting   Pulse: 74   Temp: 98.5 °F (36.9 °C)   TempSrc: Oral   SpO2: 99%   Weight: 89.3 kg (196 lb 13.9 oz)   Height: 6' 2" (1.88 m)     BMI: Body mass index is 25.28 kg/m².    Physical Exam  Constitutional:       General: He is not in acute distress.     Appearance: Normal appearance. He is not ill-appearing.   HENT:      Head: Normocephalic and atraumatic.      Mouth/Throat:      Pharynx: No oropharyngeal exudate or posterior oropharyngeal erythema.   Eyes:      General: No scleral icterus.     Extraocular Movements: Extraocular movements intact.      Conjunctiva/sclera: Conjunctivae normal.      Pupils: Pupils are equal, round, and reactive to light.   Cardiovascular:      Rate and Rhythm: Normal rate and regular rhythm.      Heart sounds: No murmur heard.    No friction rub. No gallop.   Pulmonary:      Effort: Pulmonary effort is normal. No respiratory distress.      Breath sounds: No stridor. No wheezing, " rhonchi or rales.   Abdominal:      General: Bowel sounds are normal. There is no distension.      Palpations: Abdomen is soft. There is no mass.      Tenderness: There is no abdominal tenderness. There is no guarding or rebound.      Comments: +colostomy site c/d/i   Musculoskeletal:         General: Normal range of motion.      Cervical back: Normal range of motion and neck supple.      Right lower leg: No edema.      Left lower leg: No edema.   Skin:     General: Skin is warm and dry.   Neurological:      Mental Status: He is alert.      Motor: Weakness present.      Coordination: Coordination abnormal.      Gait: Gait abnormal.         Laboratory Data:   BMP  Lab Results   Component Value Date     03/30/2022    K 5.6 (H) 03/30/2022     (H) 03/30/2022    CO2 15 (L) 03/30/2022    BUN 27 (H) 03/30/2022    CREATININE 1.9 (H) 03/30/2022    CALCIUM 9.9 03/30/2022    ANIONGAP 12 03/30/2022    ESTGFRAFRICA 36.1 (A) 03/30/2022    EGFRNONAA 31.2 (A) 03/30/2022     Lab Results   Component Value Date    WBC 7.76 02/25/2022    HGB 12.6 (L) 02/25/2022    HCT 39.7 (L) 02/25/2022    MCV 89 02/25/2022     02/25/2022           Imaging:     Prior imaging [id number] personally reviewed  Assessment:       1. Colorectal neoplasm    2. Malignant neoplasm of overlapping sites of colon     3. Malignant neoplasm of sigmoid colon    4. Bilateral renal masses    5. Elevated CEA    6. Colostomy in place    7. Debility    8. 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 Personally reviewed scans between 2015 and 2019.  Overall stable  in size.  -do not recommend further imaging unless develops new symptoms  -at his age, less is more.                Oncology    Malignant neoplasm of sigmoid colon    Overview     4/23/15 Perforated sigmoid colon with intra-abdominal abscess and SBO1) Ex lap w/ extensive lysis of adhesions 2) Left hemicolectomy with end colostomy 3) Small bowel resection 4) Wedge biopsy right lobe liver lesion. Final path [id number].  Right liver wedge resection finalized as hemangioma.  Completed 12 cycles of FOLFOX+bevacizumab           Current Assessment & Plan     Last PET CT 3/14/19 without evidence of recurrent colon cancer.  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  Last CEA 10/6/19 = 6.9.  No new symptoms today concerning for metastatic recurrence.  -will re-check CEA and obtain baseline labs  -discussed advanced care planning and recommended DNR/DNI status for someone his age.  -unless CEA highly concerning for large tumor burden that might have impending consequences on his current QOL, will likely not scan as long as CEA is relatively stable (ok to be mildly elevated).  --in this scenario, no further CEA monitoring or oncology specific follow up needed unless he develops new symptoms.  -follow up as needed.  Certainly if he develops symptomatic metastatic disease at a later date, recommend palliative care consult immediately.  -will order rollator since he did have a mechanical fall with his cane a few months ago and daughter reports him needing to take breaks when she walks with him.           Colorectal neoplasm - Primary    Relevant Orders    Comprehensive Metabolic Panel    CBC Auto Differential    CEA    WALKER FOR HOME USE    Elevated CEA       GI    Colostomy in place       Other    Debility      Other Visit Diagnoses     Malignant neoplasm of overlapping sites of colon         Relevant Orders    CEA          Orders Placed This Encounter   Procedures    WALKER FOR HOME USE     Comprehensive Metabolic Panel    CBC Auto Differential    CEA             Tay Vidal MD  Hematology Oncology    Advance Care Planning I initiated the process of advance care planning today and explained the importance of this process to the patient.  Then the patient received detailed information about the importance of designating a Health Care Power of  (HCPOA). he was instructed to communicate with this person about their wishes for future healthcare, should he become sick and lose decision-making capacity. The patient has not previously appointed a HCPOA. After our discussion, the patient has decided to complete a HCPOA and has appointed his daughter and NAME:Nicol  I spent a total time of 10 minutes discussing this issue with the patient.

## 2022-06-10 NOTE — ASSESSMENT & PLAN NOTE
Aysmptomatic. Personally reviewed scans between 2015 and 2019.  Overall stable in size.  -do not recommend further imaging unless develops new symptoms  -at his age, less is more.

## 2022-06-13 ENCOUNTER — DOCUMENTATION ONLY (OUTPATIENT)
Dept: HEMATOLOGY/ONCOLOGY | Facility: CLINIC | Age: 87
End: 2022-06-13
Payer: MEDICARE

## 2022-06-13 NOTE — PROGRESS NOTES
Received referral from the clinic that patient was in need of a walker. Referral sent to [facility].

## 2023-03-12 DIAGNOSIS — M1A.9XX0 CHRONIC GOUT WITHOUT TOPHUS, UNSPECIFIED CAUSE, UNSPECIFIED SITE: ICD-10-CM

## 2023-03-12 DIAGNOSIS — N18.30 CONTROLLED TYPE 2 DIABETES MELLITUS WITH STAGE 3 CHRONIC KIDNEY DISEASE, WITHOUT LONG-TERM CURRENT USE OF INSULIN: ICD-10-CM

## 2023-03-12 DIAGNOSIS — I10 ESSENTIAL HYPERTENSION: ICD-10-CM

## 2023-03-12 DIAGNOSIS — E11.22 CONTROLLED TYPE 2 DIABETES MELLITUS WITH STAGE 3 CHRONIC KIDNEY DISEASE, WITHOUT LONG-TERM CURRENT USE OF INSULIN: ICD-10-CM

## 2023-03-12 RX ORDER — AMLODIPINE BESYLATE 10 MG/1
TABLET ORAL
Qty: 90 TABLET | Refills: 0 | Status: SHIPPED | OUTPATIENT
Start: 2023-03-12 | End: 2023-06-20

## 2023-03-12 NOTE — TELEPHONE ENCOUNTER
Care Due:                  Date            Visit Type   Department     Provider  --------------------------------------------------------------------------------                                             [facility]     MED/ INTERNAL  Last Visit: 03-      FOLLOW UP    MED/ FERMIN Monae  Next Visit: None Scheduled  None         None Found                                                            Last  Test          Frequency    Reason                     Performed    Due Date  --------------------------------------------------------------------------------    Office Visit  12 months..  DULMOO carranza,       03- 03-                             allopurinoL..............    CBC.........  12 months..  allopurinoL..............  06-   06-    CMP.........  12 months..  DULMOO carranza,       06-   06-                             allopurinoL..............    HBA1C.......  6 months...  JANUVIA..................  02- 08-    Uric Acid...  12 months..  allopurinoL..............  02- 02-    [facility] Embedded Care Gaps. Reference number: [id number]. 3/12/2023   3:52:59 AM CDT

## 2023-03-12 NOTE — TELEPHONE ENCOUNTER
Refill Routing Note   Medication(s) are not appropriate for processing by [facility] for the following reason(s):         Medication outside of protocol  Required labs outdated  Required vitals outdated    [facility] action(s):  Defer  Route  Approve   Requires appointment : Yes   Requires labs : Yes         Appointments  past 12m or future 3m with PCP    Date Provider   Last Visit   3/14/2022 Larry Monae MD   Next Visit   Visit date not found Larry Monae MD   ED visits in past 90 days: 0        Note composed:4:20 PM 03/12/2023

## 2023-03-13 RX ORDER — SITAGLIPTIN 50 MG/1
TABLET, FILM COATED ORAL
Qty: 90 TABLET | Refills: 1 | OUTPATIENT
Start: 2023-03-13

## 2023-03-13 RX ORDER — HYDRALAZINE HYDROCHLORIDE 100 MG/1
TABLET, FILM COATED ORAL
Qty: 90 TABLET | Refills: 1 | OUTPATIENT
Start: 2023-03-13

## 2023-03-13 RX ORDER — ALLOPURINOL 300 MG/1
TABLET ORAL
Qty: 90 TABLET | Refills: 3 | OUTPATIENT
Start: 2023-03-13

## 2023-05-04 DIAGNOSIS — T45.1X5A CHEMOTHERAPY-INDUCED NEUROPATHY: ICD-10-CM

## 2023-05-04 DIAGNOSIS — G62.0 CHEMOTHERAPY-INDUCED NEUROPATHY: ICD-10-CM

## 2023-05-04 RX ORDER — GABAPENTIN 300 MG/1
CAPSULE ORAL
Qty: 270 CAPSULE | Refills: 3 | Status: ON HOLD | OUTPATIENT
Start: 2023-05-04 | End: 2023-12-02 | Stop reason: HOSPADM

## 2023-05-04 NOTE — TELEPHONE ENCOUNTER
Attempted to contact pt due to pt is past due for an annual physical. Pt's daughter took the call and scheduled pt and OV for next week.

## 2023-05-04 NOTE — TELEPHONE ENCOUNTER
No care due was identified.  [facility] Embedded Care Due Messages. Reference number: [id number].   5/04/2023 3:24:28 PM CDT

## 2023-05-09 ENCOUNTER — HOSPITAL ENCOUNTER (OUTPATIENT)
Facility: HOSPITAL | Age: 88
Discharge: HOME HEALTH | End: 2023-05-10
Attending: EMERGENCY MEDICINE | Admitting: EMERGENCY MEDICINE
Payer: MEDICARE

## 2023-05-09 DIAGNOSIS — M19.90 ARTHRITIS: ICD-10-CM

## 2023-05-09 DIAGNOSIS — N17.9 AKI (ACUTE KIDNEY INJURY): ICD-10-CM

## 2023-05-09 DIAGNOSIS — R07.9 CHEST PAIN: ICD-10-CM

## 2023-05-09 DIAGNOSIS — R53.81 DEBILITY: ICD-10-CM

## 2023-05-09 DIAGNOSIS — E86.0 DEHYDRATION: ICD-10-CM

## 2023-05-09 DIAGNOSIS — R55 SYNCOPE: Primary | ICD-10-CM

## 2023-05-09 LAB
ALBUMIN SERPL BCP-MCNC: 4.1 G/DL (ref 3.5–5.2)
ALP SERPL-CCNC: 69 U/L (ref 55–135)
ALT SERPL W/O P-5'-P-CCNC: 18 U/L (ref 10–44)
ANION GAP SERPL CALC-SCNC: 11 MMOL/L (ref 8–16)
ANION GAP SERPL CALC-SCNC: 12 MMOL/L (ref 8–16)
AST SERPL-CCNC: 19 U/L (ref 10–40)
BASOPHILS # BLD AUTO: 0.03 K/UL (ref 0–0.2)
BASOPHILS NFR BLD: 0.2 % (ref 0–1.9)
BILIRUB SERPL-MCNC: 0.6 MG/DL (ref 0.1–1)
BILIRUB UR QL STRIP: NEGATIVE
BNP SERPL-MCNC: 10 PG/ML (ref 0–99)
BUN SERPL-MCNC: 38 MG/DL (ref 6–30)
BUN SERPL-MCNC: 39 MG/DL (ref 8–23)
CALCIUM SERPL-MCNC: 10.5 MG/DL (ref 8.7–10.5)
CHLORIDE SERPL-SCNC: 109 MMOL/L (ref 95–110)
CHLORIDE SERPL-SCNC: 112 MMOL/L (ref 95–110)
CLARITY UR: CLEAR
CO2 SERPL-SCNC: 15 MMOL/L (ref 23–29)
COLOR UR: YELLOW
CREAT SERPL-MCNC: 2.7 MG/DL (ref 0.5–1.4)
CREAT SERPL-MCNC: 2.7 MG/DL (ref 0.5–1.4)
DIFFERENTIAL METHOD: ABNORMAL
EOSINOPHIL # BLD AUTO: 0 K/UL (ref 0–0.5)
EOSINOPHIL NFR BLD: 0.1 % (ref 0–8)
ERYTHROCYTE [DISTWIDTH] IN BLOOD BY AUTOMATED COUNT: 14.4 % (ref 11.5–14.5)
EST. GFR  (NO RACE VARIABLE): 22 ML/MIN/1.73 M^2
GLUCOSE SERPL-MCNC: 209 MG/DL (ref 70–110)
GLUCOSE SERPL-MCNC: 211 MG/DL (ref 70–110)
GLUCOSE UR QL STRIP: NEGATIVE
HCT VFR BLD AUTO: 38.9 % (ref 40–54)
HCT VFR BLD CALC: 41 %PCV (ref 36–54)
HGB BLD-MCNC: 12.7 G/DL (ref 14–18)
HGB UR QL STRIP: NEGATIVE
IMM GRANULOCYTES # BLD AUTO: 0.18 K/UL (ref 0–0.04)
IMM GRANULOCYTES NFR BLD AUTO: 1.3 % (ref 0–0.5)
KETONES UR QL STRIP: NEGATIVE
LEUKOCYTE ESTERASE UR QL STRIP: NEGATIVE
LIPASE SERPL-CCNC: 71 U/L (ref 4–60)
LYMPHOCYTES # BLD AUTO: 1.2 K/UL (ref 1–4.8)
LYMPHOCYTES NFR BLD: 9 % (ref 18–48)
MCH RBC QN AUTO: 29.3 PG (ref 27–31)
MCHC RBC AUTO-ENTMCNC: 32.6 G/DL (ref 32–36)
MCV RBC AUTO: 90 FL (ref 82–98)
MONOCYTES # BLD AUTO: 1 K/UL (ref 0.3–1)
MONOCYTES NFR BLD: 7.1 % (ref 4–15)
NEUTROPHILS # BLD AUTO: 11.2 K/UL (ref 1.8–7.7)
NEUTROPHILS NFR BLD: 82.3 % (ref 38–73)
NITRITE UR QL STRIP: NEGATIVE
NRBC BLD-RTO: 0 /100 WBC
PH UR STRIP: 5 [PH] (ref 5–8)
PLATELET # BLD AUTO: 328 K/UL (ref 150–450)
PMV BLD AUTO: 10.4 FL (ref 9.2–12.9)
POC IONIZED CALCIUM: 1.25 MMOL/L (ref 1.06–1.42)
POC TCO2 (MEASURED): 16 MMOL/L (ref 23–29)
POCT GLUCOSE: 142 MG/DL (ref 70–110)
POTASSIUM BLD-SCNC: 5.4 MMOL/L (ref 3.5–5.1)
POTASSIUM SERPL-SCNC: 5.5 MMOL/L (ref 3.5–5.1)
PROT SERPL-MCNC: 7.7 G/DL (ref 6–8.4)
PROT UR QL STRIP: ABNORMAL
RBC # BLD AUTO: 4.33 M/UL (ref 4.6–6.2)
SAMPLE: ABNORMAL
SODIUM BLD-SCNC: 134 MMOL/L (ref 136–145)
SODIUM SERPL-SCNC: 135 MMOL/L (ref 136–145)
SP GR UR STRIP: 1.02 (ref 1–1.03)
TROPONIN I SERPL DL<=0.01 NG/ML-MCNC: 0.01 NG/ML (ref 0–0.03)
TROPONIN I SERPL DL<=0.01 NG/ML-MCNC: 0.03 NG/ML (ref 0–0.03)
TSH SERPL DL<=0.005 MIU/L-ACNC: 2.47 UIU/ML (ref 0.4–4)
URN SPEC COLLECT METH UR: ABNORMAL
UROBILINOGEN UR STRIP-ACNC: NEGATIVE EU/DL
WBC # BLD AUTO: 13.6 K/UL (ref 3.9–12.7)

## 2023-05-09 PROCEDURE — G0378 HOSPITAL OBSERVATION PER HR: HCPCS

## 2023-05-09 PROCEDURE — 85025 COMPLETE CBC W/AUTO DIFF WBC: CPT | Performed by: EMERGENCY MEDICINE

## 2023-05-09 PROCEDURE — 80047 BASIC METABLC PNL IONIZED CA: CPT

## 2023-05-09 PROCEDURE — 82330 ASSAY OF CALCIUM: CPT

## 2023-05-09 PROCEDURE — 84295 ASSAY OF SERUM SODIUM: CPT

## 2023-05-09 PROCEDURE — 93010 ELECTROCARDIOGRAM REPORT: CPT | Mod: ,,, | Performed by: INTERNAL MEDICINE

## 2023-05-09 PROCEDURE — 99900035 HC TECH TIME PER 15 MIN (STAT)

## 2023-05-09 PROCEDURE — 63600175 PHARM REV CODE 636 W HCPCS: Performed by: EMERGENCY MEDICINE

## 2023-05-09 PROCEDURE — 93010 EKG 12-LEAD: ICD-10-PCS | Mod: ,,, | Performed by: INTERNAL MEDICINE

## 2023-05-09 PROCEDURE — 81003 URINALYSIS AUTO W/O SCOPE: CPT | Performed by: EMERGENCY MEDICINE

## 2023-05-09 PROCEDURE — 84484 ASSAY OF TROPONIN QUANT: CPT | Mod: 91 | Performed by: PHYSICIAN ASSISTANT

## 2023-05-09 PROCEDURE — 96361 HYDRATE IV INFUSION ADD-ON: CPT

## 2023-05-09 PROCEDURE — 80053 COMPREHEN METABOLIC PANEL: CPT | Performed by: EMERGENCY MEDICINE

## 2023-05-09 PROCEDURE — 83690 ASSAY OF LIPASE: CPT | Performed by: EMERGENCY MEDICINE

## 2023-05-09 PROCEDURE — 96360 HYDRATION IV INFUSION INIT: CPT

## 2023-05-09 PROCEDURE — 83880 ASSAY OF NATRIURETIC PEPTIDE: CPT | Performed by: EMERGENCY MEDICINE

## 2023-05-09 PROCEDURE — 82565 ASSAY OF CREATININE: CPT | Mod: 91

## 2023-05-09 PROCEDURE — 83036 HEMOGLOBIN GLYCOSYLATED A1C: CPT | Performed by: PHYSICIAN ASSISTANT

## 2023-05-09 PROCEDURE — 85014 HEMATOCRIT: CPT | Mod: 91

## 2023-05-09 PROCEDURE — 99285 EMERGENCY DEPT VISIT HI MDM: CPT | Mod: 25

## 2023-05-09 PROCEDURE — 84443 ASSAY THYROID STIM HORMONE: CPT | Performed by: EMERGENCY MEDICINE

## 2023-05-09 PROCEDURE — 93005 ELECTROCARDIOGRAM TRACING: CPT

## 2023-05-09 PROCEDURE — 84132 ASSAY OF SERUM POTASSIUM: CPT

## 2023-05-09 PROCEDURE — 84484 ASSAY OF TROPONIN QUANT: CPT | Performed by: EMERGENCY MEDICINE

## 2023-05-09 RX ORDER — AMLODIPINE BESYLATE 5 MG/1
10 TABLET ORAL DAILY
Status: DISCONTINUED | OUTPATIENT
Start: 2023-05-10 | End: 2023-05-10 | Stop reason: HOSPADM

## 2023-05-09 RX ORDER — SODIUM CHLORIDE, SODIUM LACTATE, POTASSIUM CHLORIDE, CALCIUM CHLORIDE 600; 310; 30; 20 MG/100ML; MG/100ML; MG/100ML; MG/100ML
INJECTION, SOLUTION INTRAVENOUS CONTINUOUS
Status: DISCONTINUED | OUTPATIENT
Start: 2023-05-10 | End: 2023-05-10

## 2023-05-09 RX ORDER — IBUPROFEN 200 MG
24 TABLET ORAL
Status: DISCONTINUED | OUTPATIENT
Start: 2023-05-09 | End: 2023-05-10 | Stop reason: HOSPADM

## 2023-05-09 RX ORDER — TALC
6 POWDER (GRAM) TOPICAL NIGHTLY PRN
Status: DISCONTINUED | OUTPATIENT
Start: 2023-05-09 | End: 2023-05-10 | Stop reason: HOSPADM

## 2023-05-09 RX ORDER — AMOXICILLIN 250 MG
1 CAPSULE ORAL DAILY PRN
Status: DISCONTINUED | OUTPATIENT
Start: 2023-05-09 | End: 2023-05-10 | Stop reason: HOSPADM

## 2023-05-09 RX ORDER — HYDRALAZINE HYDROCHLORIDE 25 MG/1
100 TABLET, FILM COATED ORAL DAILY
Status: DISCONTINUED | OUTPATIENT
Start: 2023-05-10 | End: 2023-05-10 | Stop reason: HOSPADM

## 2023-05-09 RX ORDER — GABAPENTIN 300 MG/1
300 CAPSULE ORAL 2 TIMES DAILY
Status: DISCONTINUED | OUTPATIENT
Start: 2023-05-10 | End: 2023-05-10 | Stop reason: HOSPADM

## 2023-05-09 RX ORDER — FERROUS GLUCONATE 324(37.5)
324 TABLET ORAL
Status: DISCONTINUED | OUTPATIENT
Start: 2023-05-10 | End: 2023-05-10 | Stop reason: HOSPADM

## 2023-05-09 RX ORDER — IBUPROFEN 200 MG
16 TABLET ORAL
Status: DISCONTINUED | OUTPATIENT
Start: 2023-05-09 | End: 2023-05-10 | Stop reason: HOSPADM

## 2023-05-09 RX ORDER — GLUCAGON 1 MG
1 KIT INJECTION
Status: DISCONTINUED | OUTPATIENT
Start: 2023-05-09 | End: 2023-05-10 | Stop reason: HOSPADM

## 2023-05-09 RX ORDER — ACETAMINOPHEN 325 MG/1
650 TABLET ORAL EVERY 4 HOURS PRN
Status: DISCONTINUED | OUTPATIENT
Start: 2023-05-09 | End: 2023-05-10 | Stop reason: HOSPADM

## 2023-05-09 RX ORDER — INSULIN ASPART 100 [IU]/ML
0-5 INJECTION, SOLUTION INTRAVENOUS; SUBCUTANEOUS
Status: DISCONTINUED | OUTPATIENT
Start: 2023-05-09 | End: 2023-05-10 | Stop reason: HOSPADM

## 2023-05-09 RX ORDER — SODIUM CHLORIDE 0.9 % (FLUSH) 0.9 %
10 SYRINGE (ML) INJECTION EVERY 8 HOURS
Status: DISCONTINUED | OUTPATIENT
Start: 2023-05-09 | End: 2023-05-09

## 2023-05-09 RX ORDER — LANOLIN ALCOHOL/MO/W.PET/CERES
800 CREAM (GRAM) TOPICAL
Status: DISCONTINUED | OUTPATIENT
Start: 2023-05-09 | End: 2023-05-10 | Stop reason: HOSPADM

## 2023-05-09 RX ORDER — NALOXONE HCL 0.4 MG/ML
0.02 VIAL (ML) INJECTION
Status: DISCONTINUED | OUTPATIENT
Start: 2023-05-09 | End: 2023-05-10 | Stop reason: HOSPADM

## 2023-05-09 RX ORDER — SODIUM CHLORIDE 0.9 % (FLUSH) 0.9 %
10 SYRINGE (ML) INJECTION
Status: DISCONTINUED | OUTPATIENT
Start: 2023-05-09 | End: 2023-05-10 | Stop reason: HOSPADM

## 2023-05-09 RX ADMIN — SODIUM CHLORIDE, POTASSIUM CHLORIDE, SODIUM LACTATE AND CALCIUM CHLORIDE 1000 ML: 600; 310; 30; 20 INJECTION, SOLUTION INTRAVENOUS at 04:05

## 2023-05-09 NOTE — ED PROVIDER NOTES
Encounter Date: 5/9/2023       History     Chief Complaint   Patient presents with    Loss of Consciousness     EMS called to 87yo male that was seen at his PCP clinic today and given about 800ml NS for some abnormal labs he received last week. Unknown what those values were. Daughter stated on the way home had a syncope in the car that lasted 20-25 seconds. No trauma. Daughter also reported some diarrhea this past week and a decrease in his physical activity.      HPI    88-year-old male with past medical history diabetes, gout, hypertension presents with syncopal event prior to arrival.  Patient reports last week and have an episode of diarrhea that continues throughout the weekend and states that everyone in his Faith as well as his family members all had a diarrheal illness as well.  He reports doing well as he has a colostomy and states that he had another episode that started again today.  He reports feeling weak earlier which happens from time to time and reportedly passed out around 20-25 seconds while in the car sitting down.  He states he was seen by his doctor earlier today given some IV fluids as he had abnormal labs drawn recently.  He is unsure what these were.  He currently reports feeling well, otherwise denies any additional complaints.    Review of patient's allergies indicates:   Allergen Reactions    Iodine Other (See Comments)     Causes gout to flare up    Shellfish containing products      Causes gout to flare up     Past Medical History:   Diagnosis Date    Arthritis     Cancer     Diabetes mellitus     Elevated PSA     Gout, unspecified     Hypertension     Nuclear sclerotic cataract of both eyes 6/29/2018     Past Surgical History:   Procedure Laterality Date    CATARACT EXTRACTION      ou    EYE SURGERY      bilateral cataracts    removal of small bowel  Apr. 2015    Colostomy     Family History   Problem Relation Age of Onset    Hypertension Mother     Diabetes Mother     No Known  Problems Father     No Known Problems Sister     No Known Problems Brother     No Known Problems Maternal Aunt     No Known Problems Maternal Uncle     No Known Problems Paternal Aunt     No Known Problems Paternal Uncle     No Known Problems Maternal Grandmother     No Known Problems Maternal Grandfather     No Known Problems Paternal Grandmother     No Known Problems Paternal Grandfather     Amblyopia Neg Hx     Blindness Neg Hx     Cancer Neg Hx     Cataracts Neg Hx     Glaucoma Neg Hx     Macular degeneration Neg Hx     Retinal detachment Neg Hx     Strabismus Neg Hx     Stroke Neg Hx     Thyroid disease Neg Hx      Social History     Tobacco Use    Smoking status: Never    Smokeless tobacco: Never    Tobacco comments:     smoked short while as a teen   Substance Use Topics    Alcohol use: No    Drug use: No     Review of Systems   Constitutional: Negative.    HENT: Negative.     Eyes: Negative.    Respiratory: Negative.     Cardiovascular: Negative.    Gastrointestinal:  Positive for diarrhea.   Genitourinary: Negative.    Musculoskeletal: Negative.    Skin: Negative.    Neurological:  Positive for syncope and weakness.     Physical Exam     Initial Vitals [05/09/23 1411]   BP Pulse Resp Temp SpO2   114/67 90 18 97.5 °F (36.4 °C) 100 %      MAP       --         Physical Exam    Nursing note and vitals reviewed.  Constitutional: He appears well-developed and well-nourished. He is not diaphoretic. No distress.   HENT:   Head: Normocephalic and atraumatic.   Nose: Nose normal.   Eyes: EOM are normal. Pupils are equal, round, and reactive to light.   Neck: Neck supple. No tracheal deviation present. No JVD present.   Normal range of motion.  Cardiovascular:  Normal rate, regular rhythm, normal heart sounds and intact distal pulses.           Pulmonary/Chest: Breath sounds normal. No respiratory distress. He has no wheezes. He has no rhonchi. He has no rales.   Abdominal: Abdomen is soft. Bowel sounds are normal. He  exhibits no distension. There is no abdominal tenderness.   Ostomy over left lower quadrant, soft liquid stool present in his ostomy pouch as well as over the rest of his abdomen and pants.  No evidence of blood noted. There is no rebound and no guarding.   Musculoskeletal:         General: No tenderness or edema. Normal range of motion.      Cervical back: Normal range of motion and neck supple.     Neurological: He is alert and oriented to person, place, and time. He has normal strength.   Skin: Skin is warm and dry. Capillary refill takes less than 2 seconds. No rash noted. No erythema.       ED Course   Procedures  Labs Reviewed   CBC W/ AUTO DIFFERENTIAL - Abnormal; Notable for the following components:       Result Value    WBC 13.60 (*)     RBC 4.33 (*)     Hemoglobin 12.7 (*)     Hematocrit 38.9 (*)     Immature Granulocytes 1.3 (*)     Gran # (ANC) 11.2 (*)     Immature Grans (Abs) 0.18 (*)     Gran % 82.3 (*)     Lymph % 9.0 (*)     All other components within normal limits   COMPREHENSIVE METABOLIC PANEL - Abnormal; Notable for the following components:    Sodium 135 (*)     Potassium 5.5 (*)     CO2 15 (*)     Glucose 209 (*)     BUN 39 (*)     Creatinine 2.7 (*)     eGFR 22 (*)     All other components within normal limits   LIPASE - Abnormal; Notable for the following components:    Lipase 71 (*)     All other components within normal limits   URINALYSIS, REFLEX TO URINE CULTURE - Abnormal; Notable for the following components:    Protein, UA Trace (*)     All other components within normal limits    Narrative:     Specimen Source->Urine   ISTAT PROCEDURE - Abnormal; Notable for the following components:    POC Glucose 211 (*)     POC BUN 38 (*)     POC Creatinine 2.7 (*)     POC Sodium 134 (*)     POC Potassium 5.4 (*)     POC Chloride 112 (*)     POC TCO2 (MEASURED) 16 (*)     All other components within normal limits   TSH   TROPONIN I   B-TYPE NATRIURETIC PEPTIDE   ISTAT CHEM8          Imaging  Results               CT Abdomen Pelvis  Without Contrast (Final result)  Result time 05/09/23 18:25:15      Final result by Arina Salinas MD (05/09/23 18:25:15)                   Impression:      Multiple bilateral renal lesions.  Most concerning lesion is seen at the lower pole of the right kidney.  Recommend additional follow-up when clinically appropriate to evaluate for neoplasia.    Two midline ventral abdominopelvic hernias.  Small fat containing umbilical hernia.  No evidence of bowel ischemia.  Left colostomy.    Prostatomegaly.    This report was flagged in Epic as abnormal.      Electronically signed by: Arina Salinas  Date:    05/09/2023  Time:    18:25               Narrative:    EXAMINATION:  CT ABDOMEN PELVIS WITHOUT    CLINICAL HISTORY:  Given 100 mL normal saline for some abnormal lab study received last week.  Syncopal episode in the car that lasted 20-25 seconds.  No trauma.  Some diarrhea and decrease in physical activity    TECHNIQUE:  5 mm unenhanced axial images from the lung bases through the greater trochanters were performed.  Coronal and sagittal reformatted images were provided.    COMPARISON:  01/18/2022    FINDINGS:  Within the limits of a noncontrast examination, the kidneys are small and lobular in appearance.  There are multiple low-attenuation lesions in both kidneys.  The most concerning of these lesions of the kidneys is seen arising from the lower pole of the right kidney measuring approximately 3.3 x 3 x 3.8 cm (series 2 axial image 69 and series 601 sagittal image 97).    On the prior examination, there are multiple indeterminate liver hypodensities described.  These are not appreciated on the current examination possibly due to the degree of artifact.  Spleen, pancreas, and adrenal glands are unremarkable.  The gallbladder contains no calcified gallstones.    There is diastasis of the abdominal rectus musculature.  There are 2 midline ventral abdominopelvic hernias.   The most cephalad contains a knuckle of gas-filled bowel.  Suture material is seen at this level.  The more caudal is seen about the level of the umbilicus containing a loop of nondilated gas-filled bowel.  There is a small fat containing umbilical hernia.  An ostomy is present.    There is no gross abdominal adenopathy or ascites.  Mild atherosclerotic changes are present.    There are no pelvic masses or adenopathy.  There is no free pelvic fluid.  The prostate gland measures up to 5.2 cm in maximal diameter.  Consider correlation with PSA.  There is a small left fat containing inguinal hernia.    At the lung bases, there is mild bibasilar atelectatic change.  There is a thickened sclerotic appearance of the right posterior 9th rib, which is not new.  There is degenerative change of the spine and hips.  There is grade 1 retrolisthesis of L5 on S1 and significant degenerative change at this level.  Bridging osteophytes are seen at the visualized lower thoracic spine.                                       X-Ray Chest AP Portable (Final result)  Result time 05/09/23 16:31:25      Final result by Scar Hernandez MD (05/09/23 16:31:25)                   Impression:      Stable examination.  No acute process.      Electronically signed by: Scar Hernandez MD  Date:    05/09/2023  Time:    16:31               Narrative:    EXAMINATION:  XR CHEST AP PORTABLE    CLINICAL HISTORY:  syncope;    TECHNIQUE:  Single frontal view of the chest was performed.    COMPARISON:  02/25/2022.    FINDINGS:  There is a left-sided subclavian chest port with its tip within the proximal SVC.  The trachea is unremarkable.  The cardiomediastinal silhouette is unchanged.  There is no evidence of free air beneath the hemidiaphragms.  There is elevation of the left hemidiaphragm.  There are no pleural effusions.  There is no evidence of a pneumothorax.  There is no evidence of pneumomediastinum.  No airspace opacity is present.  There are degenerative  changes in the osseous structures.                                       Medications   lactated ringers bolus 1,000 mL (1,000 mLs Intravenous New Bag 5/9/23 1645)     Medical Decision Making:   Clinical Tests:   Lab Tests: Ordered and Reviewed  Radiological Study: Reviewed and Ordered  Medical Tests: Ordered and Reviewed                MDM:    88-year-old male with past medical history diabetes, gout, hypertension presents with syncopal event prior to arrival.  Physical exam is the above.  ED workup notable for  CBC with hemoglobin 12.7, WBC-13.60, creatinine 2.7, BUN 39, potassium 5.5, glucose 209, lipase 71, troponin 0.014, pending TSH/BNP, chest x-ray, CT, EKG pending.  Patient presentation concerning for syncopal event earlier today with mildly elevated leukocytosis and ongoing diarrhea with some evidence of dehydration the lab work as his subtle lab abnormalities could be reactive.  Pending additional evaluation as noted above will disposition appropriately.      Patient signed out to me (Jacques) pending labs, imaging, reassessment, disposition. Patient has been weak and in bed with lots of output from his ostomy site. No bleeding. Ostomy looks good. No fever. Creatinine uptrending. IV fluids. Bp ok and pulse good. No pna or uti. Patient placed in obs for syncope and further workup and management.             Clinical Impression:   Final diagnoses:  [R55] Syncope (Primary)  [E86.0] Dehydration  [N17.9] VINNY (acute kidney injury)        ED Disposition Condition    Observation Stable                Ulices Hanna MD  05/09/23 2021

## 2023-05-09 NOTE — ED TRIAGE NOTES
Patient requested increasing the amount of enzymes he gets with each fill. He notices that he is eating more since being on trikafta and has been using more enzymes and insurance doesn't allow him to fill early.    Reviewed with Zeina (Ivory, pt's primary dietitian is off today) and she ok'd the increase in enzyme prescription.     Sent to local [facility].    Alley Hendricks RN     Pt presents to ED via EMS after syncopal episode. Per EMS, pt was seen today by PCP and had elevated BUN so was given 800mL NS. On the way home, he had syncopal episode with daughter lasting appx 25 minutes. Upon arrival. Pt AAOx4. States he did not pass out, he just was not talking. Pt reports having diarrhea x several days. Has colostomy bag. Upon arrival to ED, pt covered in feces. Denies fever, chills, cough, SOB, NV, or weakness.

## 2023-05-10 ENCOUNTER — TELEPHONE (OUTPATIENT)
Dept: HOME HEALTH SERVICES | Facility: CLINIC | Age: 88
End: 2023-05-10
Payer: MEDICARE

## 2023-05-10 VITALS
BODY MASS INDEX: 22.58 KG/M2 | DIASTOLIC BLOOD PRESSURE: 63 MMHG | WEIGHT: 175.94 LBS | HEART RATE: 80 BPM | HEIGHT: 74 IN | TEMPERATURE: 98 F | SYSTOLIC BLOOD PRESSURE: 104 MMHG | OXYGEN SATURATION: 100 % | RESPIRATION RATE: 24 BRPM

## 2023-05-10 PROBLEM — R55 SYNCOPE: Status: RESOLVED | Noted: 2023-05-09 | Resolved: 2023-05-10

## 2023-05-10 LAB
ALBUMIN SERPL BCP-MCNC: 3.7 G/DL (ref 3.5–5.2)
ALP SERPL-CCNC: 66 U/L (ref 55–135)
ALT SERPL W/O P-5'-P-CCNC: 15 U/L (ref 10–44)
ANION GAP SERPL CALC-SCNC: 6 MMOL/L (ref 8–16)
AST SERPL-CCNC: 17 U/L (ref 10–40)
AV INDEX (PROSTH): 0.83
AV MEAN GRADIENT: 3 MMHG
AV PEAK GRADIENT: 5 MMHG
AV VALVE AREA: 2.99 CM2
AV VELOCITY RATIO: 0.71
BASOPHILS # BLD AUTO: 0.03 K/UL (ref 0–0.2)
BASOPHILS NFR BLD: 0.2 % (ref 0–1.9)
BILIRUB SERPL-MCNC: 0.6 MG/DL (ref 0.1–1)
BSA FOR ECHO PROCEDURE: 2.04 M2
BUN SERPL-MCNC: 33 MG/DL (ref 8–23)
CALCIUM SERPL-MCNC: 10.4 MG/DL (ref 8.7–10.5)
CHLORIDE SERPL-SCNC: 112 MMOL/L (ref 95–110)
CO2 SERPL-SCNC: 18 MMOL/L (ref 23–29)
CREAT SERPL-MCNC: 1.9 MG/DL (ref 0.5–1.4)
CV ECHO LV RWT: 0.74 CM
DIFFERENTIAL METHOD: ABNORMAL
DOP CALC AO PEAK VEL: 1.17 M/S
DOP CALC AO VTI: 18.3 CM
DOP CALC LVOT AREA: 3.6 CM2
DOP CALC LVOT DIAMETER: 2.14 CM
DOP CALC LVOT PEAK VEL: 0.83 M/S
DOP CALC LVOT STROKE VOLUME: 54.64 CM3
DOP CALCLVOT PEAK VEL VTI: 15.2 CM
E WAVE DECELERATION TIME: 164.19 MSEC
E/A RATIO: 0.74
ECHO LV POSTERIOR WALL: 1.21 CM (ref 0.6–1.1)
EJECTION FRACTION: 60 %
EOSINOPHIL # BLD AUTO: 0.1 K/UL (ref 0–0.5)
EOSINOPHIL NFR BLD: 0.4 % (ref 0–8)
ERYTHROCYTE [DISTWIDTH] IN BLOOD BY AUTOMATED COUNT: 14.2 % (ref 11.5–14.5)
EST. GFR  (NO RACE VARIABLE): 34 ML/MIN/1.73 M^2
ESTIMATED AVG GLUCOSE: 146 MG/DL (ref 68–131)
FRACTIONAL SHORTENING: 27 % (ref 28–44)
GLUCOSE SERPL-MCNC: 131 MG/DL (ref 70–110)
HBA1C MFR BLD: 6.7 % (ref 4–5.6)
HCT VFR BLD AUTO: 38.1 % (ref 40–54)
HGB BLD-MCNC: 12.5 G/DL (ref 14–18)
IMM GRANULOCYTES # BLD AUTO: 0.14 K/UL (ref 0–0.04)
IMM GRANULOCYTES NFR BLD AUTO: 1 % (ref 0–0.5)
INTERVENTRICULAR SEPTUM: 1.23 CM (ref 0.6–1.1)
IVRT: 65.65 MSEC
LA MAJOR: 4.72 CM
LEFT ATRIUM SIZE: 4.21 CM
LEFT INTERNAL DIMENSION IN SYSTOLE: 2.38 CM (ref 2.1–4)
LEFT VENTRICLE DIASTOLIC VOLUME INDEX: 20.95 ML/M2
LEFT VENTRICLE DIASTOLIC VOLUME: 43.15 ML
LEFT VENTRICLE MASS INDEX: 61 G/M2
LEFT VENTRICLE SYSTOLIC VOLUME INDEX: 9.6 ML/M2
LEFT VENTRICLE SYSTOLIC VOLUME: 19.73 ML
LEFT VENTRICULAR INTERNAL DIMENSION IN DIASTOLE: 3.27 CM (ref 3.5–6)
LEFT VENTRICULAR MASS: 126.4 G
LVOT MG: 1.31 MMHG
LVOT MV: 0.53 CM/S
LYMPHOCYTES # BLD AUTO: 1.4 K/UL (ref 1–4.8)
LYMPHOCYTES NFR BLD: 10 % (ref 18–48)
MCH RBC QN AUTO: 29.3 PG (ref 27–31)
MCHC RBC AUTO-ENTMCNC: 32.8 G/DL (ref 32–36)
MCV RBC AUTO: 89 FL (ref 82–98)
MONOCYTES # BLD AUTO: 1.2 K/UL (ref 0.3–1)
MONOCYTES NFR BLD: 8.6 % (ref 4–15)
MV PEAK A VEL: 0.85 M/S
MV PEAK E VEL: 0.63 M/S
MV STENOSIS PRESSURE HALF TIME: 47.61 MS
MV VALVE AREA P 1/2 METHOD: 4.62 CM2
NEUTROPHILS # BLD AUTO: 10.8 K/UL (ref 1.8–7.7)
NEUTROPHILS NFR BLD: 79.8 % (ref 38–73)
NRBC BLD-RTO: 0 /100 WBC
PISA TR MAX VEL: 2.55 M/S
PLATELET # BLD AUTO: 282 K/UL (ref 150–450)
PMV BLD AUTO: 10.4 FL (ref 9.2–12.9)
POCT GLUCOSE: 116 MG/DL (ref 70–110)
POCT GLUCOSE: 147 MG/DL (ref 70–110)
POCT GLUCOSE: 166 MG/DL (ref 70–110)
POCT GLUCOSE: 241 MG/DL (ref 70–110)
POTASSIUM SERPL-SCNC: 5.1 MMOL/L (ref 3.5–5.1)
PROT SERPL-MCNC: 7 G/DL (ref 6–8.4)
RA MAJOR: 4.59 CM
RA WIDTH: 3.8 CM
RBC # BLD AUTO: 4.27 M/UL (ref 4.6–6.2)
RIGHT VENTRICULAR END-DIASTOLIC DIMENSION: 3.94 CM
SINUS: 3.73 CM
SODIUM SERPL-SCNC: 136 MMOL/L (ref 136–145)
TR MAX PG: 26 MMHG
TRICUSPID ANNULAR PLANE SYSTOLIC EXCURSION: 1.95 CM
TROPONIN I SERPL DL<=0.01 NG/ML-MCNC: 0.03 NG/ML (ref 0–0.03)
TV PEAK GRADIENT: 0.78 MMHG
WBC # BLD AUTO: 13.56 K/UL (ref 3.9–12.7)

## 2023-05-10 PROCEDURE — 25000003 PHARM REV CODE 250: Performed by: PHYSICIAN ASSISTANT

## 2023-05-10 PROCEDURE — 85025 COMPLETE CBC W/AUTO DIFF WBC: CPT | Performed by: PHYSICIAN ASSISTANT

## 2023-05-10 PROCEDURE — 97161 PT EVAL LOW COMPLEX 20 MIN: CPT

## 2023-05-10 PROCEDURE — 80053 COMPREHEN METABOLIC PANEL: CPT | Performed by: PHYSICIAN ASSISTANT

## 2023-05-10 PROCEDURE — 97165 OT EVAL LOW COMPLEX 30 MIN: CPT

## 2023-05-10 PROCEDURE — 36415 COLL VENOUS BLD VENIPUNCTURE: CPT | Performed by: PHYSICIAN ASSISTANT

## 2023-05-10 PROCEDURE — 96361 HYDRATE IV INFUSION ADD-ON: CPT

## 2023-05-10 PROCEDURE — G0378 HOSPITAL OBSERVATION PER HR: HCPCS

## 2023-05-10 PROCEDURE — 84484 ASSAY OF TROPONIN QUANT: CPT | Performed by: PHYSICIAN ASSISTANT

## 2023-05-10 PROCEDURE — 63600175 PHARM REV CODE 636 W HCPCS: Performed by: PHYSICIAN ASSISTANT

## 2023-05-10 PROCEDURE — 96372 THER/PROPH/DIAG INJ SC/IM: CPT | Performed by: PHYSICIAN ASSISTANT

## 2023-05-10 RX ADMIN — GABAPENTIN 300 MG: 300 CAPSULE ORAL at 08:05

## 2023-05-10 RX ADMIN — Medication 324 MG: at 08:05

## 2023-05-10 RX ADMIN — INSULIN ASPART 2 UNITS: 100 INJECTION, SOLUTION INTRAVENOUS; SUBCUTANEOUS at 12:05

## 2023-05-10 RX ADMIN — SODIUM CHLORIDE, POTASSIUM CHLORIDE, SODIUM LACTATE AND CALCIUM CHLORIDE: 600; 310; 30; 20 INJECTION, SOLUTION INTRAVENOUS at 02:05

## 2023-05-10 NOTE — ASSESSMENT & PLAN NOTE
Cr today of 2.7. baseline Cr 1.8-2.2 potassium of 5.5 today. Suspect caused by volume depletion related to diarrhea which has now resolved  Renal dose medications, avoid nephrotoxic drugs, monitor intake and output  Will continue gentle IVF overnight

## 2023-05-10 NOTE — NURSING
Assisted pt with standing on side of bed to use urinal.   Consult    Patient: Kenneth Bell MRN: [id number]  SSN: [id number]    YOB: 1970  Age: 49 y.o.   Sex: male      Subjective:      Kenneth Bell is a 49 y.o. male who is being seen for anemia, melena  The patient has a history of potential COPD with here before, emergency room due to the anemia, patient had not on chemo for months, had noted the monitor, with dizziness, while he was in emergency room, blood test showed very low hemoglobin, blood transfusion was given,  No blood per rectum, no abdominal pain, no hematemesis,  Had an  EGD last month which showed esophagitis and gastritis,  Past Medical History:   Diagnosis Date    Chronic pain     THROAT, EARS, NECK R/T CANCER    COPD (chronic obstructive pulmonary disease) ([facility] [address]     [facility]    Psychiatric disorder     ANXIETY R/T CANCER    Throat cancer ([facility] [address]     Tracheostomy present ([facility] [address]      Past Surgical History:   Procedure Laterality Date    HX ORTHOPAEDIC      LEFT ARM- \" PUT PLATE IN\"    HX TRACHEOSTOMY  2021    IR INSERT TUNL CVC W PORT OVER 5 YEARS  2021    IR PLACE CVAD FLUORO GUIDE  2021      Family History   Problem Relation Age of Onset    Diabetes Mother     Diabetes Father     Kidney Disease Father     Diabetes Sister     Heart Disease Daughter     Anesth Problems Neg Hx      Social History     Tobacco Use    Smoking status: Former Smoker     Packs/day: 1.50     Years: 30.00     Pack years: 45.00     Quit date: 10/28/2020     Years since quittin.6    Smokeless tobacco: Never Used   Substance Use Topics    Alcohol use: Not Currently      Current Facility-Administered Medications   Medication Dose Route Frequency Provider Last Rate Last Admin    0.9% sodium chloride infusion 250 mL  250 mL IntraVENous PRN Naomi Yoon MD        0.9% sodium chloride infusion 250 mL  250 mL IntraVENous PRN Naomi Yoon MD        [START ON 2022] levothyroxine (SYNTHROID) tablet 75 mcg  75 mcg Oral ACB Allison Elaine MD        pantoprazole (PROTONIX) tablet 40 mg  40 mg Oral BID Allison Elaine MD        ferrous sulfate tablet 325 mg  325 mg Oral BID WITH MEALS Allison Elaine MD         Current Outpatient Medications   Medication Sig Dispense Refill    ferrous sulfate 325 mg (65 mg iron) tablet Take 1 Tablet by mouth two (2) times daily (with meals). 60 Tablet 0    pantoprazole (PROTONIX) 40 mg tablet Take 1 Tablet by mouth two (2) times a day. Indications: bleeding from stomach, esophagus or duodenum 60 Tablet 0    polyethylene glycol (MIRALAX) 17 gram packet Take 1 Packet by mouth daily. (Patient not taking: Reported on 5/20/2022) 30 Packet 0    levothyroxine (SYNTHROID) 75 mcg tablet Take 1 Tablet by mouth Daily (before breakfast). 30 Tablet 0        No Known Allergies    Review of Systems:  Review of Systems   Unable to perform ROS: Medical condition      Had a tracheostomy  Objective:     Vitals:    06/04/22 0747 06/04/22 1000 06/04/22 1025 06/04/22 1055   BP: 119/83 116/81 123/77 121/86   Pulse: 99 (!) 102 (!) 118 (!) 108   Resp: 18 16 18 16   Temp: 98.9 °F (37.2 °C) 98.8 °F (37.1 °C) 98.7 °F (37.1 °C) 98.8 °F (37.1 °C)   SpO2: 100% 98% 97% 98%   Weight: 72.6 kg (160 lb)      Height: 6' (1.829 m)           Physical Exam:  Physical Exam  Constitutional:       Appearance: He is ill-appearing. HENT:      Head: Atraumatic. Mouth/Throat:      Mouth: Mucous membranes are dry. Eyes:      General: No scleral icterus. Neck:      Comments: Tracheostomy  Cardiovascular:      Rate and Rhythm: Tachycardia present. Pulses: Normal pulses. Abdominal:      General: Bowel sounds are normal.   Musculoskeletal:         General: Tenderness present. Skin:     General: Skin is warm and dry. Coloration: Skin is pale.    Psychiatric:         Mood and Affect: Mood normal.          Recent Results (from the past 24 hour(s))   CBC WITH AUTOMATED DIFF    Collection Time: 06/04/22  7:52 AM   Result Value Ref Range    WBC 8.4 4.1 - 11.1 K/uL    RBC 2.58 (L) 4.10 - 5.70 M/uL    HGB 7.7 (L) 12.1 - 17.0 g/dL    HCT 23.6 (L) 36.6 - 50.3 %    MCV 91.5 80.0 - 99.0 FL    MCH 29.8 26.0 - 34.0 PG    MCHC 32.6 30.0 - 36.5 g/dL    RDW 14.8 (H) 11.5 - 14.5 %    PLATELET 187 (H) 234 - 400 K/uL    MPV 9.4 8.9 - 12.9 FL    NRBC 0.0 0.0  WBC    ABSOLUTE NRBC 0.00 0.00 - 0.01 K/uL    NEUTROPHILS 85 (H) 32 - 75 %    LYMPHOCYTES 4 (L) 12 - 49 %    MONOCYTES 10 5 - 13 %    EOSINOPHILS 1 0 - 7 %    BASOPHILS 0 0 - 1 %    IMMATURE GRANULOCYTES 0 0 - 0.5 %    ABS. NEUTROPHILS 7.1 1.8 - 8.0 K/UL    ABS. LYMPHOCYTES 0.3 (L) 0.8 - 3.5 K/UL    ABS. MONOCYTES 0.8 0.0 - 1.0 K/UL    ABS. EOSINOPHILS 0.1 0.0 - 0.4 K/UL    ABS. BASOPHILS 0.0 0.0 - 0.1 K/UL    ABS. IMM. GRANS. 0.0 0.00 - 0.04 K/UL    DF AUTOMATED     METABOLIC PANEL, COMPREHENSIVE    Collection Time: 06/04/22  7:52 AM   Result Value Ref Range    Sodium 135 (L) 136 - 145 mmol/L    Potassium 4.4 3.5 - 5.1 mmol/L    Chloride 101 97 - 108 mmol/L    CO2 27 21 - 32 mmol/L    Anion gap 7 5 - 15 mmol/L    Glucose 100 65 - 100 mg/dL    BUN 25 (H) 6 - 20 mg/dL    Creatinine 1.50 (H) 0.70 - 1.30 mg/dL    BUN/Creatinine ratio 17 12 - 20      GFR est AA >60 >60 ml/min/1.73m2    GFR est non-AA 50 (L) >60 ml/min/1.73m2    Calcium 9.1 8.5 - 10.1 mg/dL    Bilirubin, total 0.2 0.2 - 1.0 mg/dL    AST (SGOT) 16 15 - 37 U/L    ALT (SGPT) 8 (L) 12 - 78 U/L    Alk.  phosphatase 78 45 - 117 U/L    Protein, total 7.0 6.4 - 8.2 g/dL    Albumin 3.0 (L) 3.5 - 5.0 g/dL    Globulin 4.0 2.0 - 4.0 g/dL    A-G Ratio 0.8 (L) 1.1 - 2.2     TROPONIN-HIGH SENSITIVITY    Collection Time: 06/04/22  8:01 AM   Result Value Ref Range    Troponin-High Sensitivity <3 0 - 76 ng/L   TYPE & SCREEN    Collection Time: 06/04/22  8:30 AM   Result Value Ref Range    Crossmatch Expiration 06/07/2022,2359     ABO/Rh(D) O Positive     Antibody screen Negative     Unit number [id number]     Blood component type RC  Unit division 00     Status of unit [address] to transfuse     Crossmatch result Compatible     Unit number [id number]     Blood component type RC LR     Unit division 00     Status of unit Allocated     TRANSFUSION STATUS Ok to transfuse     Crossmatch result Compatible    MAGNESIUM    Collection Time: 06/04/22  9:00 AM   Result Value Ref Range    Magnesium 2.2 1.6 - 2.4 mg/dL   PHOSPHORUS    Collection Time: 06/04/22  9:00 AM   Result Value Ref Range    Phosphorus 3.8 2.6 - 4.7 mg/dL   RETICULOCYTE COUNT    Collection Time: 06/04/22  9:13 AM   Result Value Ref Range    Reticulocyte count 2.2 (H) 0.7 - 2.1 %    Absolute Retic Cnt. 0.0557 0.0260 - 0.0950 M/ul   TSH 3RD GENERATION    Collection Time: 06/04/22  9:13 AM   Result Value Ref Range    TSH 24.60 (H) 0.36 - 3.74 uIU/mL   URINALYSIS W/ RFLX MICROSCOPIC    Collection Time: 06/04/22  9:44 AM   Result Value Ref Range    Color Yellow/Straw      Appearance Clear Clear      Specific gravity 1.014 1.003 - 1.030      pH (UA) 5.0 5.0 - 8.0      Protein Negative Negative mg/dL    Glucose Negative Negative mg/dL    Ketone 5 (A) Negative mg/dL    Bilirubin Negative Negative      Blood Negative Negative      Urobilinogen 0.1 0.1 - 1.0 EU/dL    Nitrites Negative Negative      Leukocyte Esterase Negative Negative      WBC 0-4 0 - 4 /hpf    RBC 0-5 0 - 5 /hpf    Bacteria Negative Negative /hpf        XR CHEST PORT   Final Result   Stable chest.         Assessment:     Hospital Problems  Date Reviewed: 3/9/2022          Codes Class Noted POA    UGIB (upper gastrointestinal bleed) ICD-10-CM: K92.2  ICD-9-CM: 578.9  6/4/2022 Unknown          GI bleeding, melena, nausea, no hematemesis,   anemia,  Had multiple upper endoscopy, no significant etiology finding, last 1 just 1 month ago    Plan:   Blood transfusion as ordered  Iron studies,  Continue PPIs    Monitor hemoglobin  We will repeat EGD next week Monday    Patient may need a small bowel capsule study if egd unremarkable   Signed By: Jadiel Dominguez MD     June 4, 2022         Thank you for allowing me to participate in this patients care  Cc Referring Physician   Tulio Manuel NP

## 2023-05-10 NOTE — SUBJECTIVE & OBJECTIVE
Past Medical History:   Diagnosis Date    Arthritis     Cancer     Diabetes mellitus     Elevated PSA     Gout, unspecified     Hypertension     Nuclear sclerotic cataract of both eyes 6/29/2018       Past Surgical History:   Procedure Laterality Date    CATARACT EXTRACTION      ou    EYE SURGERY      bilateral cataracts    removal of small bowel  Apr. 2015    Colostomy       Review of patient's allergies indicates:   Allergen Reactions    Iodine Other (See Comments)     Causes gout to flare up    Shellfish containing products      Causes gout to flare up       Current Facility-Administered Medications on File Prior to Encounter   Medication    ondansetron HCl (PF) 4 mg/2 mL injection     Current Outpatient Medications on File Prior to Encounter   Medication Sig    allopurinoL (ZYLOPRIM) 300 MG tablet Take 1 tablet (300 mg total) by mouth once daily.    amLODIPine (NORVASC) 10 MG tablet TAKE 1 TABLET BY MOUTH EVERY DAY    DULoxetine (CYMBALTA) 30 MG capsule TAKE 1 CAPSULE BY MOUTH 2 TIMES DAILY.    ferrous gluconate (FERGON) 324 MG tablet Take 324 mg by mouth.    gabapentin (NEURONTIN) 300 MG capsule TAKE 1 CAPSULE BY MOUTH THREE TIMES A DAY    hydrALAZINE (APRESOLINE) 100 MG tablet TAKE 1 TABLET BY MOUTH EVERY DAY    JANUVIA 50 mg Tab TAKE 1 TABLET BY MOUTH EVERY DAY    [DISCONTINUED] blood-glucose meter (ONE TOUCH ULTRAMINI) kit To test twice daily. Use as instructed    [DISCONTINUED] cholestyramine (QUESTRAN) 4 gram packet Take 1 packet (4 g total) by mouth 3 (three) times daily with meals.    [DISCONTINUED] colchicine (COLCRYS) 0.6 mg tablet Take 1 tablet (0.6 mg total) by mouth daily as needed.    [DISCONTINUED] loratadine (CLARITIN) 10 mg tablet Take 10 mg by mouth once daily.    [DISCONTINUED] metoprolol tartrate (LOPRESSOR) 50 MG tablet Take 1 tablet (50 mg total) by mouth 2 (two) times daily.    [DISCONTINUED] QUEtiapine (SEROQUEL) 50 MG tablet Take 1 tablet (50 mg total) by mouth every evening.     [DISCONTINUED] sodium bicarbonate 650 MG tablet Take 1 tablet (650 mg total) by mouth 3 (three) times daily.     Family History       Problem Relation (Age of Onset)    Diabetes Mother    Hypertension Mother    No Known Problems Father, Sister, Brother, Maternal Aunt, Maternal Uncle, Paternal Aunt, Paternal Uncle, Maternal Grandmother, Maternal Grandfather, Paternal Grandmother, Paternal Grandfather          Tobacco Use    Smoking status: Never    Smokeless tobacco: Never    Tobacco comments:     smoked short while as a teen   Substance and Sexual Activity    Alcohol use: No    Drug use: No    Sexual activity: Not on file     Review of Systems   Constitutional:  Negative for chills and fever.   HENT:  Negative for nosebleeds and tinnitus.    Eyes:  Negative for photophobia and visual disturbance.   Respiratory:  Negative for shortness of breath and wheezing.    Cardiovascular:  Negative for chest pain, palpitations and leg swelling.   Gastrointestinal:  Positive for diarrhea. Negative for abdominal distention, nausea and vomiting.   Genitourinary:  Negative for dysuria, flank pain and hematuria.   Musculoskeletal:  Negative for gait problem and joint swelling.   Skin:  Negative for rash and wound.   Neurological:  Positive for syncope. Negative for seizures.   Objective:     Vital Signs (Most Recent):  Temp: 97.5 °F (36.4 °C) (05/09/23 1411)  Pulse: 80 (05/09/23 2003)  Resp: 20 (05/09/23 2003)  BP: (!) 150/73 (05/09/23 2003)  SpO2: 100 % (05/09/23 2003) Vital Signs (24h Range):  Temp:  [97.5 °F (36.4 °C)] 97.5 °F (36.4 °C)  Pulse:  [74-90] 80  Resp:  [18-29] 20  SpO2:  [98 %-100 %] 100 %  BP: (114-168)/(67-87) 150/73     Weight: 72.6 kg (160 lb)  Body mass index is 20.54 kg/m².     Physical Exam  Vitals and nursing note reviewed.   Constitutional:       General: He is not in acute distress.     Appearance: He is well-developed. He is not diaphoretic.   HENT:      Head: Normocephalic and atraumatic.      Right Ear:  External ear normal.      Left Ear: External ear normal.   Eyes:      General:         Right eye: No discharge.         Left eye: No discharge.      Conjunctiva/sclera: Conjunctivae normal.   Neck:      Thyroid: No thyromegaly.   Cardiovascular:      Rate and Rhythm: Normal rate and regular rhythm.      Heart sounds: No murmur heard.  Pulmonary:      Effort: Pulmonary effort is normal. No respiratory distress.      Breath sounds: Normal breath sounds.   Abdominal:      General: Bowel sounds are normal. There is no distension.      Palpations: Abdomen is soft. There is no mass.      Tenderness: There is no abdominal tenderness.      Comments: Ostomy in place without stool output. No surrounding erythema   Musculoskeletal:         General: No deformity.      Cervical back: Normal range of motion and neck supple.      Right lower leg: No edema.      Left lower leg: No edema.   Skin:     General: Skin is warm and dry.   Neurological:      Mental Status: He is alert and oriented to person, place, and time.   Psychiatric:         Mood and Affect: Mood normal.         Behavior: Behavior normal.              Significant Labs: CBC:   Recent Labs   Lab 05/09/23  1441 05/09/23  1444   WBC 13.60*  --    HGB 12.7*  --    HCT 38.9* 41     --      CMP:   Recent Labs   Lab 05/09/23  1441   *   K 5.5*      CO2 15*   *   BUN 39*   CREATININE 2.7*   CALCIUM 10.5   PROT 7.7   ALBUMIN 4.1   BILITOT 0.6   ALKPHOS 69   AST 19   ALT 18   ANIONGAP 11     Cardiac Markers:   Recent Labs   Lab 05/09/23  1441   BNP 10     Troponin:   Recent Labs   Lab 05/09/23  1441   TROPONINI 0.014     TSH:   Recent Labs   Lab 05/09/23  1441   TSH 2.468     Urine Studies:   Recent Labs   Lab 05/09/23  1902   COLORU Yellow   APPEARANCEUA Clear   PHUR 5.0   SPECGRAV 1.020   PROTEINUA Trace*   GLUCUA Negative   KETONESU Negative   BILIRUBINUA Negative   OCCULTUA Negative   NITRITE Negative   UROBILINOGEN Negative   LEUKOCYTESUR Negative        Significant Imaging:   Imaging Results               CT Abdomen Pelvis  Without Contrast (Final result)  Result time 05/09/23 18:25:15      Final result by Arina Salinas MD (05/09/23 18:25:15)                   Impression:      Multiple bilateral renal lesions.  Most concerning lesion is seen at the lower pole of the right kidney.  Recommend additional follow-up when clinically appropriate to evaluate for neoplasia.    Two midline ventral abdominopelvic hernias.  Small fat containing umbilical hernia.  No evidence of bowel ischemia.  Left colostomy.    Prostatomegaly.    This report was flagged in Epic as abnormal.      Electronically signed by: Arina Salinas  Date:    05/09/2023  Time:    18:25               Narrative:    EXAMINATION:  CT ABDOMEN PELVIS WITHOUT    CLINICAL HISTORY:  Given 100 mL normal saline for some abnormal lab study received last week.  Syncopal episode in the car that lasted 20-25 seconds.  No trauma.  Some diarrhea and decrease in physical activity    TECHNIQUE:  5 mm unenhanced axial images from the lung bases through the greater trochanters were performed.  Coronal and sagittal reformatted images were provided.    COMPARISON:  01/18/2022    FINDINGS:  Within the limits of a noncontrast examination, the kidneys are small and lobular in appearance.  There are multiple low-attenuation lesions in both kidneys.  The most concerning of these lesions of the kidneys is seen arising from the lower pole of the right kidney measuring approximately 3.3 x 3 x 3.8 cm (series 2 axial image 69 and series 601 sagittal image 97).    On the prior examination, there are multiple indeterminate liver hypodensities described.  These are not appreciated on the current examination possibly due to the degree of artifact.  Spleen, pancreas, and adrenal glands are unremarkable.  The gallbladder contains no calcified gallstones.    There is diastasis of the abdominal rectus musculature.  There are 2  midline ventral abdominopelvic hernias.  The most cephalad contains a knuckle of gas-filled bowel.  Suture material is seen at this level.  The more caudal is seen about the level of the umbilicus containing a loop of nondilated gas-filled bowel.  There is a small fat containing umbilical hernia.  An ostomy is present.    There is no gross abdominal adenopathy or ascites.  Mild atherosclerotic changes are present.    There are no pelvic masses or adenopathy.  There is no free pelvic fluid.  The prostate gland measures up to 5.2 cm in maximal diameter.  Consider correlation with PSA.  There is a small left fat containing inguinal hernia.    At the lung bases, there is mild bibasilar atelectatic change.  There is a thickened sclerotic appearance of the right posterior 9th rib, which is not new.  There is degenerative change of the spine and hips.  There is grade 1 retrolisthesis of L5 on S1 and significant degenerative change at this level.  Bridging osteophytes are seen at the visualized lower thoracic spine.                                       X-Ray Chest AP Portable (Final result)  Result time 05/09/23 16:31:25      Final result by Scar Hernandez MD (05/09/23 16:31:25)                   Impression:      Stable examination.  No acute process.      Electronically signed by: Scar Hernandez MD  Date:    05/09/2023  Time:    16:31               Narrative:    EXAMINATION:  XR CHEST AP PORTABLE    CLINICAL HISTORY:  syncope;    TECHNIQUE:  Single frontal view of the chest was performed.    COMPARISON:  02/25/2022.    FINDINGS:  There is a left-sided subclavian chest port with its tip within the proximal SVC.  The trachea is unremarkable.  The cardiomediastinal silhouette is unchanged.  There is no evidence of free air beneath the hemidiaphragms.  There is elevation of the left hemidiaphragm.  There are no pleural effusions.  There is no evidence of a pneumothorax.  There is no evidence of pneumomediastinum.  No airspace  opacity is present.  There are degenerative changes in the osseous structures.

## 2023-05-10 NOTE — ASSESSMENT & PLAN NOTE
Continue home iron supplementation. H&H stable and at baseline. No complaints of active bleeding or indication for transfusion at this time.

## 2023-05-10 NOTE — PROGRESS NOTES
Pt sitting up on side of bed working with physical therapy. No distress noted. Will continue to monitor.

## 2023-05-10 NOTE — DISCHARGE INSTRUCTIONS

## 2023-05-10 NOTE — ASSESSMENT & PLAN NOTE
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 Seen today again on CT. Continue outpatient oncology follow up

## 2023-05-10 NOTE — PT/OT/SLP EVAL
"Occupational Therapy   Evaluation    Name: Eugene Gamez  MRN: [id number]  Admitting Diagnosis: Syncope  Recent Surgery: * No surgery found *      Recommendations:     Discharge Recommendations: home health OT (w/ family support/supervision)  Discharge Equipment Recommendations:  walker, rolling, bath bench  Barriers to discharge:  None    Assessment:     Eugene Gamez is a 88 y.o. male with a medical diagnosis of Syncope.   Performance deficits affecting function: weakness, impaired endurance, impaired self care skills, impaired functional mobility, gait instability, impaired balance, decreased upper extremity function, decreased lower extremity function, decreased safety awareness.      Pt very pleasant and willing to participate in tx session this date; pt was able to perform bed mobility w/ SBA to stand and ambulate functional distances in room and unit w/ CGA-SBA and use of RW. Pt w/ mild weakness but tolerated OOB activities well. Pt will continue to benefit from skilled acute OT services to maximize functional capacity for safe performance w/ ADLs and functional mobility.     Rehab Prognosis: Good; patient would benefit from acute skilled OT services to address these deficits and reach maximum level of function.       Plan:     Patient to be seen  (3-5x/wk) to address the above listed problems via self-care/home management, therapeutic activities, therapeutic exercises  Plan of Care Expires: 05/24/23  Plan of Care Reviewed with: patient    Subjective     Chief Complaint: "I am a little weak but way better than I was."   Patient/Family Comments/goals: None stated     Occupational Profile:  Living Environment: Pt lives w/ dtr and grand-dtr in [facility] w/ 0 RICARDO; pt uses a t/s combo without DME. Pt reports he uses RW in the tub for safe tranfers but because of this, it has rusted and can no longer ambulate w/ it.   Previous level of function: Pt was mod I w/ ADLs and functional mobility, using his SPC; driving. "   Roles and Routines: Enjoys going to [employer].   Equipment Used at Home: cane, straight (has an old RW that rusted)  Assistance upon Discharge: Family     Pain/Comfort:  Pain Rating 1: 0/10  Pain Rating Post-Intervention 1: 0/10    Patients cultural, spiritual, Spiritism conflicts given the current situation: no    Objective:     Communicated with: Nurse prior to session.  Patient found HOB elevated with telemetry, bed alarm ([employer]) upon OT entry to room.    General Precautions: Standard, fall  Orthopedic Precautions: N/A  Braces: N/A  Respiratory Status: Room air    Occupational Performance:    Bed Mobility:    Patient completed Scooting hips to EOB with stand by assistance  Patient completed Supine to Sit with stand by assistance    Functional Mobility/Transfers:  Patient completed Sit <> Stand Transfer with stand by assistance and contact guard assistance  with  rolling walker   Patient completed Bed <> Chair Transfer using Step Transfer technique with stand by assistance and contact guard assistance with rolling walker  Functional Mobility: Pt was able to ambulate functional distances in room and unit w/ CGA-SBA and use of RW; pt w/o LOB; pt w/ decreased speed.     Activities of Daily Living:  Upper Body Dressing: minimum assistance for donning gown over back     Cognitive/Visual Perceptual:  Cognitive/Psychosocial Skills:     -       Oriented to: Person, Place, Time (except month), and Situation   -       Follows Commands/attention:Follows two-step commands  -       Communication: clear/fluent  -       Memory: Decreased immediate recall  -       Safety awareness/insight to disability: mildly impaired     Physical Exam:  Balance:    -       good sitting balance; fair + standing balance  Postural examination/scapula alignment:    -       Rounded shoulders  -       Forward head  Skin integrity: Visible skin intact  Edema:  None noted  Sensation:    -       Intact  Upper Extremity Range of Motion:     -        Right Upper Extremity: WFL  -       Left Upper Extremity: WFL  Upper Extremity Strength:    -       Right Upper Extremity: WFL  -       Left Upper Extremity: WFL   Strength:    -       Right Upper Extremity: WFL  -       Left Upper Extremity: WFL    AMPAC 6 Click ADL:  AMPAC Total Score: 19    Treatment & Education:  -Initial eval complete.   -Pt educated on role of OT and POC.   -Pt educated on safe functional mobility and ADL performance.   -Questions and concerns addressed within scope.     Patient left up in chair with all lines intact and call button in reach    GOALS:   Multidisciplinary Problems       Occupational Therapy Goals          Problem: Occupational Therapy    Goal Priority Disciplines Outcome Interventions   Occupational Therapy Goal     OT, PT/OT Ongoing, Progressing    Description: Goals to be met by: 5/24/23     Patient will increase functional independence with ADLs by performing:    LE Dressing with Modified [place].  Grooming while standing at sink with Modified [place].  Toileting from toilet with Modified [place] for hygiene and clothing management.   Step transfer with Modified [place]  Toilet transfer to toilet with Modified [place].  Upper extremity exercise program x15 reps per handout, with independence.                         History:     Past Medical History:   Diagnosis Date    Arthritis     Cancer     Diabetes mellitus     Elevated PSA     Gout, unspecified     Hypertension     Nuclear sclerotic cataract of both eyes 6/29/2018         Past Surgical History:   Procedure Laterality Date    CATARACT EXTRACTION      ou    EYE SURGERY      bilateral cataracts    removal of small bowel  Apr. 2015    Colostomy       Time Tracking:     OT Date of Treatment: 05/10/23  OT Start Time: 1010  OT Stop Time: 1028  OT Total Time (min): 18 min    Billable Minutes:Evaluation 18  Total Time 18    5/10/2023

## 2023-05-10 NOTE — PLAN OF CARE
BRIAN notified by Liyah at [facility] that patient has [facility]. Liyah stated that patient's daughter informed her that patient likes agency. BRIAN informed Liyah that daughter didn't mention it when discussing home health and agreed to [facility]. BRIAN sent referral to [facility].

## 2023-05-10 NOTE — PROGRESS NOTES
Pts daughter Clemente states she just got off of work, is going to get her dads car as he can not get is hers. She will be here about 8pm to pick pt up.

## 2023-05-10 NOTE — PLAN OF CARE
SW attempted to call patient's daughter to discuss the need for home health and get preference. There was no answer. BRIAN sent referral to [facility] for continuity of care. Waiting on acceptance.        05/10/23 1226   Post-Acute Status   Post-Acute Authorization Home Health   Home Health Status Pending post-acute provider review/more information requested   Coverage Medicare   Discharge Delays (!) Post-Acute Set-up   Discharge Plan   Discharge Plan A Home Health   Discharge Plan B Home with family

## 2023-05-10 NOTE — PLAN OF CARE
Problem: Occupational Therapy  Goal: Occupational Therapy Goal  Description: Goals to be met by: 5/24/23     Patient will increase functional independence with ADLs by performing:    LE Dressing with Modified [place].  Grooming while standing at sink with Modified [place].  Toileting from toilet with Modified [place] for hygiene and clothing management.   Step transfer with Modified [place]  Toilet transfer to toilet with Modified [place].  Upper extremity exercise program x15 reps per handout, with independence.    Outcome: Ongoing, Progressing

## 2023-05-10 NOTE — PLAN OF CARE
Received  call from daughter. Patient will need home health. Agreeable to [facility]. Daughter stated that patient does not want a wheelchair. Daughter stated that patient would benefit from rollator. SW informed that patient not eligible because he received RW in 2019. Daughter stated that wheels are rusted. SW stated that she will reach out to [facility] about wheels. SW suggested that daughter purchase rollator. Daughter agreed. SW informed that patient was approved for [facility].

## 2023-05-10 NOTE — PLAN OF CARE
[place] [facility]    HOME HEALTH ORDERS  FACE TO FACE ENCOUNTER    Patient Name: Eugene Gamez  YOB: 1935    PCP: Larry Monae MD   PCP Address: [address] / [place] [address]  PCP Phone Number: [phone]  PCP Fax: [phone]       Encounter Date: 05/10/2023    Admit to Home Health    Diagnoses:  Active Hospital Problems    Diagnosis  POA    Anemia of chronic disease [D63.8]  Yes    Benign essential HTN [I10]  Yes    Bilateral renal masses [N28.89]  Yes     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        Stage 3 chronic kidney disease [N18.30]  Yes     Chronic    Carcinoma in situ of colon [D01.0]  Yes    DM (diabetes mellitus) type II controlled with renal manifestation [E11.29]  Yes      Resolved Hospital Problems    Diagnosis Date Resolved POA    *Syncope [R55] 05/10/2023 Yes       Future Appointments   Date Time Provider Department Center   5/11/2023 10:30 AM Alyson Bose NP [facility]      Follow-up Information       Larry Monae MD. Schedule an appointment as soon as possible for a visit in 2 days.    Specialties: Family Medicine, Wound Care  Contact information:  [address] [place] [id number] [phone]                               I have seen and examined this patient face to face today. My clinical findings that support the need for the home health skilled services and home bound status are the following:  Weakness/numbness causing balance and gait disturbance due to Weakness/Debility and Dehydration making it taxing to leave home.    Allergies:  Review of patient's allergies indicates:   Allergen Reactions    Iodine Other (See Comments)     Causes gout to flare up    Shellfish containing products      Causes gout to flare up       Diet: cardiac diet and diabetic diet: 2000 calorie    Activities: activity as tolerated    Nursing:   SN to complete comprehensive assessment including routine vital signs. Instruct on disease process and s/s of  complications to report to MD. Review/verify medication list sent home with the patient at time of discharge  and instruct patient/caregiver as needed. Frequency may be adjusted depending on start of care date.    Notify MD if SBP > 160 or < 90; DBP > 90 or < 50; HR > 120 or < 50; Temp > 101      CONSULTS:    Physical Therapy to evaluate and treat. Evaluate for home safety and equipment needs; Establish/upgrade home exercise program. Perform / instruct on therapeutic exercises, gait training, transfer training, and Range of Motion.  Occupational Therapy to evaluate and treat. Evaluate home environment for safety and equipment needs. Perform/Instruct on transfers, ADL training, ROM, and therapeutic exercises.      Medications: Review discharge medications with patient and family and provide education.      Current Discharge Medication List        CONTINUE these medications which have NOT CHANGED    Details   allopurinoL (ZYLOPRIM) 300 MG tablet Take 1 tablet (300 mg total) by mouth once daily.  Qty: 90 tablet, Refills: 3    Associated Diagnoses: Chronic gout without tophus, unspecified cause, unspecified site      amLODIPine (NORVASC) 10 MG tablet TAKE 1 TABLET BY MOUTH EVERY DAY  Qty: 90 tablet, Refills: 0    Associated Diagnoses: Essential hypertension      DULoxetine (CYMBALTA) 30 MG capsule TAKE 1 CAPSULE BY MOUTH 2 TIMES DAILY.  Qty: 180 capsule, Refills: 3    Associated Diagnoses: Chemotherapy-induced neuropathy      ferrous gluconate (FERGON) 324 MG tablet Take 324 mg by mouth.      gabapentin (NEURONTIN) 300 MG capsule TAKE 1 CAPSULE BY MOUTH THREE TIMES A DAY  Qty: 270 capsule, Refills: 3    Comments: DX Code Needed  .  Associated Diagnoses: Chemotherapy-induced neuropathy      hydrALAZINE (APRESOLINE) 100 MG tablet TAKE 1 TABLET BY MOUTH EVERY DAY  Qty: 90 tablet, Refills: 1    Associated Diagnoses: Essential hypertension      JANUVIA 50 mg Tab TAKE 1 TABLET BY MOUTH EVERY DAY  Qty: 90 tablet, Refills: 1     Associated Diagnoses: Controlled type 2 diabetes mellitus with stage 3 chronic kidney disease, without long-term current use of insulin             I certify that this patient is confined to his home and needs intermittent skilled nursing care, physical therapy, and occupational therapy.

## 2023-05-10 NOTE — PROGRESS NOTES
[facility] [place]  Nurses Note -- 4 Eyes      5/10/2023       Skin assessed on: Q Shift      [x] No Pressure Injuries Present    [x]Prevention Measures Documented    [] Yes LDA  for Pressure Injury Previously documented     [] Yes New Pressure Injury Discovered   [] LDA for New Pressure Injury Added      Attending RN:  Malathi Trevino RN     Second RN:  Aparna AGUIRRE RN

## 2023-05-10 NOTE — PLAN OF CARE
Problem: Physical Therapy  Goal: Physical Therapy Goal  Description: Goals to be met by: 23     Patient will increase functional independence with mobility by performin. Pt to be supervision with bed mobility.  2. Pt to transfer with min A.  3. Pt to ambulate 50' w/RW CGA/SBA.  4. Pt to be (S) with HEP.    Outcome: Ongoing, Progressing   Initial eval completed, see in chart for details.

## 2023-05-10 NOTE — DISCHARGE SUMMARY
[facility]  Discharge Summary      Patient Name: Eugene Gamez  MRN: [id number]  [facility]: [id number]  Patient Class: OP- Observation  Admission Date: 5/9/2023  Hospital Length of Stay: 0 days  Discharge Date and Time:  05/10/2023 11:37 AM  Attending Physician: Darion Mace MD   Discharging Provider: Darion Mace MD  Primary Care Provider: Larry Monae MD    Primary Care Team: Networked reference to record PCT     HPI:   Eugene Gamez 88 y.o. male with CKD, HTN, DM, history of colon cancer with ostomy in place presents to the hospital with a chief complaint of syncope.  He does not remember the syncopal episode.  He reports he had been feeling poorly for the last couple of days and has had ongoing diarrhea from his ostomy.  His diarrhea is now resolved, but he was seen today by his PCP for dehydration.  He was given IV fluids.  He reports multiple members of his Islam had similar episodes of diarrhea.  He has no complaints currently and feels well.  He denies fever chest pain shortness of breath nausea vomiting abdominal pain melena hematuria hematemesis leg swelling slurred speech numbness or weakness of an extremity.    Discussed with patient daughter who is an indepdent historian.  She reports today after his PCP appointment upon returning home he had a syncopal episode which was witnessed by his family.  He had loss of consciousness for 30 seconds and then awoke.  He did not fall or experienced head trauma.  He says baseline now.    In the ED, potassium of 5.5, Cr of 2.7, hemoglobin of 12.7, troponin negative, BNP negative, TSH within normal limits, chest x-ray without acute process, CT abdomen with multiple bilateral renal lesions and two midline abdominopelvic hernia, no evidence of bowel ischemia.       * No surgery found *      Hospital Course:   88 y.o. male with CKD, HTN, DM, history of colon cancer with ostomy in place presents to the hospital with a chief complaint  "of syncope.  Patient has recently been having significant output per ostomy, that has now resolved.  Patient noted to be dehydrated with ARF and slight orthostatic hypotension.  Syncope secondary to dehydration.  Patient started on IVF's with improvement of symptoms and renal function.  Creat now at baseline.  He has remained afebrile and hemodynamically stable.  PT/OT consulted and recommending HH.  Patient will be discharged home with HH to follow up with PCP.       Goals of Care Treatment Preferences:  Code Status: Full Code    Living Will: Yes              Consults:   Consults (From admission, onward)        Status Ordering Provider     Case Management/  Once        Provider:  (Not yet assigned)    Completed DHRUV LIZAMA new Assessment & Plan notes have been filed under this hospital service since the last note was generated.  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    Follow Up:   Follow-up Information     Larry Monae MD. Schedule an appointment as soon as possible for a visit in 2 days.    Specialties: Family Medicine, Wound Care  Contact information:  [address] [place] [address]  [place] [address] [phone]                       Patient Instructions:      WALKER FOR HOME USE     Order Specific Question Answer Comments   Type of Walker: Adult (5'4"-6'6")    With wheels? " "Yes    Height: 6' 2" (1.88 m)    Weight: 79.8 kg (175 lb 14.8 oz)    Length of need (1-99 months): 99    Does patient have medical equipment at home? cane, straight has an old RW that rusted   Please check all that apply: Patient is unable to safely ambulate without equipment.      TRANSFER TUB BENCH FOR HOME USE     Order Specific Question Answer Comments   Type of Transfer Tub Bench: Unpadded    Height: 6' 2" (1.88 m)    Weight: 79.8 kg (175 lb 14.8 oz)    Does patient have medical equipment at home? cane, straight has an old RW that rusted   Length of need (1-99 months): 99    Patient notified - Not covered by insurance considered a convenience item No    Discussed financial responsibility with responsible party No      Ambulatory referral/consult to [facility]   Standing Status: Future   Referral Priority: Routine Referral Type: Consultation   Referral Reason: Specialty Services Required   Number of Visits Requested: 1     Diet Cardiac     Notify your health care provider if you experience any of the following:  temperature >100.4     Notify your health care provider if you experience any of the following:  persistent nausea and vomiting or diarrhea     Notify your health care provider if you experience any of the following:  severe uncontrolled pain     Notify your health care provider if you experience any of the following:  difficulty breathing or increased cough     Notify your health care provider if you experience any of the following:  persistent dizziness, light-headedness, or visual disturbances     Notify your health care provider if you experience any of the following:  increased confusion or weakness     Activity as tolerated           Pending Diagnostic Studies:     Procedure Component Value Units Date/Time    Echo [[id number]]     Order Status: Sent Lab Status: No result     Hemoglobin A1c if not done in past 3 months [[id number]] Collected: 05/09/23 2105    Order Status: Sent Lab " Status: In process Updated: 05/10/23 1122    Specimen: Blood          Medications:  Reconciled Home Medications:      Medication List      CONTINUE taking these medications    allopurinoL 300 MG tablet  Commonly known as: ZYLOPRIM  Take 1 tablet (300 mg total) by mouth once daily.     amLODIPine 10 MG tablet  Commonly known as: NORVASC  TAKE 1 TABLET BY MOUTH EVERY DAY     DULoxetine 30 MG capsule  Commonly known as: CYMBALTA  TAKE 1 CAPSULE BY MOUTH 2 TIMES DAILY.     ferrous gluconate 324 MG tablet  Commonly known as: FERGON  Take 324 mg by mouth.     gabapentin 300 MG capsule  Commonly known as: NEURONTIN  TAKE 1 CAPSULE BY MOUTH THREE TIMES A DAY     hydrALAZINE 100 MG tablet  Commonly known as: APRESOLINE  TAKE 1 TABLET BY MOUTH EVERY DAY     JANUVIA 50 MG Tab  Generic drug: SITagliptin phosphate  TAKE 1 TABLET BY MOUTH EVERY DAY            Indwelling Lines/Drains at time of discharge:   Lines/Drains/Airways     Drain  Duration                Colostomy 04/23/15 LLQ 2939 days                Time spent on the discharge of patient: >30 minutes         Darion Mace MD  Department of Hospital Medicine  [facility]

## 2023-05-10 NOTE — H&P
[facility]  [facility]  History & Physical    Patient Name: Eugene Gamez  MRN: [id number]  Patient Class: OP- Observation  Admission Date: 5/9/2023  Attending Physician: Lashonda Barrera MD   Primary Care Provider: Larry Monae MD         Patient information was obtained from patient, past medical records and ER records.     Subjective:     Principal Problem:Syncope    Chief Complaint:   Chief Complaint   Patient presents with    Loss of Consciousness     EMS called to 87yo male that was seen at his PCP clinic today and given about 800ml NS for some abnormal labs he received last week. Unknown what those values were. Daughter stated on the way home had a syncope in the car that lasted 20-25 seconds. No trauma. Daughter also reported some diarrhea this past week and a decrease in his physical activity.         HPI: Eugene Gamez 88 y.o. male with CKD, HTN, DM, history of colon cancer with ostomy in place presents to the hospital with a chief complaint of syncope.  He does not remember the syncopal episode.  He reports he had been feeling poorly for the last couple of days and has had ongoing diarrhea from his ostomy.  His diarrhea is now resolved, but he was seen today by his PCP for dehydration.  He was given IV fluids.  He reports multiple members of his Denominational had similar episodes of diarrhea.  He has no complaints currently and feels well.  He denies fever chest pain shortness of breath nausea vomiting abdominal pain melena hematuria hematemesis leg swelling slurred speech numbness or weakness of an extremity.    Discussed with patient daughter who is an indepdent historian.  She reports today after his PCP appointment upon returning home he had a syncopal episode which was witnessed by his family.  He had loss of consciousness for 30 seconds and then awoke.  He did not fall or experienced head trauma.  He says baseline now.    In the ED, potassium of 5.5, Cr of 2.7, hemoglobin of  12.7, troponin negative, BNP negative, TSH within normal limits, chest x-ray without acute process, CT abdomen with multiple bilateral renal lesions and two midline abdominopelvic hernia, no evidence of bowel ischemia.       Past Medical History:   Diagnosis Date    Arthritis     Cancer     Diabetes mellitus     Elevated PSA     Gout, unspecified     Hypertension     Nuclear sclerotic cataract of both eyes 6/29/2018       Past Surgical History:   Procedure Laterality Date    CATARACT EXTRACTION      ou    EYE SURGERY      bilateral cataracts    removal of small bowel  Apr. 2015    Colostomy       Review of patient's allergies indicates:   Allergen Reactions    Iodine Other (See Comments)     Causes gout to flare up    Shellfish containing products      Causes gout to flare up       Current Facility-Administered Medications on File Prior to Encounter   Medication    ondansetron HCl (PF) 4 mg/2 mL injection     Current Outpatient Medications on File Prior to Encounter   Medication Sig    allopurinoL (ZYLOPRIM) 300 MG tablet Take 1 tablet (300 mg total) by mouth once daily.    amLODIPine (NORVASC) 10 MG tablet TAKE 1 TABLET BY MOUTH EVERY DAY    DULoxetine (CYMBALTA) 30 MG capsule TAKE 1 CAPSULE BY MOUTH 2 TIMES DAILY.    ferrous gluconate (FERGON) 324 MG tablet Take 324 mg by mouth.    gabapentin (NEURONTIN) 300 MG capsule TAKE 1 CAPSULE BY MOUTH THREE TIMES A DAY    hydrALAZINE (APRESOLINE) 100 MG tablet TAKE 1 TABLET BY MOUTH EVERY DAY    JANUVIA 50 mg Tab TAKE 1 TABLET BY MOUTH EVERY DAY    [DISCONTINUED] blood-glucose meter (ONE TOUCH ULTRAMINI) kit To test twice daily. Use as instructed    [DISCONTINUED] cholestyramine (QUESTRAN) 4 gram packet Take 1 packet (4 g total) by mouth 3 (three) times daily with meals.    [DISCONTINUED] colchicine (COLCRYS) 0.6 mg tablet Take 1 tablet (0.6 mg total) by mouth daily as needed.    [DISCONTINUED] loratadine (CLARITIN) 10 mg tablet Take 10 mg by mouth  once daily.    [DISCONTINUED] metoprolol tartrate (LOPRESSOR) 50 MG tablet Take 1 tablet (50 mg total) by mouth 2 (two) times daily.    [DISCONTINUED] QUEtiapine (SEROQUEL) 50 MG tablet Take 1 tablet (50 mg total) by mouth every evening.    [DISCONTINUED] sodium bicarbonate 650 MG tablet Take 1 tablet (650 mg total) by mouth 3 (three) times daily.     Family History       Problem Relation (Age of Onset)    Diabetes Mother    Hypertension Mother    No Known Problems Father, Sister, Brother, Maternal Aunt, Maternal Uncle, Paternal Aunt, Paternal Uncle, Maternal Grandmother, Maternal Grandfather, Paternal Grandmother, Paternal Grandfather          Tobacco Use    Smoking status: Never    Smokeless tobacco: Never    Tobacco comments:     smoked short while as a teen   Substance and Sexual Activity    Alcohol use: No    Drug use: No    Sexual activity: Not on file     Review of Systems   Constitutional:  Negative for chills and fever.   HENT:  Negative for nosebleeds and tinnitus.    Eyes:  Negative for photophobia and visual disturbance.   Respiratory:  Negative for shortness of breath and wheezing.    Cardiovascular:  Negative for chest pain, palpitations and leg swelling.   Gastrointestinal:  Positive for diarrhea. Negative for abdominal distention, nausea and vomiting.   Genitourinary:  Negative for dysuria, flank pain and hematuria.   Musculoskeletal:  Negative for gait problem and joint swelling.   Skin:  Negative for rash and wound.   Neurological:  Positive for syncope. Negative for seizures.   Objective:     Vital Signs (Most Recent):  Temp: 97.5 °F (36.4 °C) (05/09/23 1411)  Pulse: 80 (05/09/23 2003)  Resp: 20 (05/09/23 2003)  BP: (!) 150/73 (05/09/23 2003)  SpO2: 100 % (05/09/23 2003) Vital Signs (24h Range):  Temp:  [97.5 °F (36.4 °C)] 97.5 °F (36.4 °C)  Pulse:  [74-90] 80  Resp:  [18-29] 20  SpO2:  [98 %-100 %] 100 %  BP: (114-168)/(67-87) 150/73     Weight: 72.6 kg (160 lb)  Body mass index is 20.54  kg/m².     Physical Exam  Vitals and nursing note reviewed.   Constitutional:       General: He is not in acute distress.     Appearance: He is well-developed. He is not diaphoretic.   HENT:      Head: Normocephalic and atraumatic.      Right Ear: External ear normal.      Left Ear: External ear normal.   Eyes:      General:         Right eye: No discharge.         Left eye: No discharge.      Conjunctiva/sclera: Conjunctivae normal.   Neck:      Thyroid: No thyromegaly.   Cardiovascular:      Rate and Rhythm: Normal rate and regular rhythm.      Heart sounds: No murmur heard.  Pulmonary:      Effort: Pulmonary effort is normal. No respiratory distress.      Breath sounds: Normal breath sounds.   Abdominal:      General: Bowel sounds are normal. There is no distension.      Palpations: Abdomen is soft. There is no mass.      Tenderness: There is no abdominal tenderness.      Comments: Ostomy in place without stool output. No surrounding erythema   Musculoskeletal:         General: No deformity.      Cervical back: Normal range of motion and neck supple.      Right lower leg: No edema.      Left lower leg: No edema.   Skin:     General: Skin is warm and dry.   Neurological:      Mental Status: He is alert and oriented to person, place, and time.   Psychiatric:         Mood and Affect: Mood normal.         Behavior: Behavior normal.              Significant Labs: CBC:   Recent Labs   Lab 05/09/23  1441 05/09/23  1444   WBC 13.60*  --    HGB 12.7*  --    HCT 38.9* 41     --      CMP:   Recent Labs   Lab 05/09/23  1441   *   K 5.5*      CO2 15*   *   BUN 39*   CREATININE 2.7*   CALCIUM 10.5   PROT 7.7   ALBUMIN 4.1   BILITOT 0.6   ALKPHOS 69   AST 19   ALT 18   ANIONGAP 11     Cardiac Markers:   Recent Labs   Lab 05/09/23  1441   BNP 10     Troponin:   Recent Labs   Lab 05/09/23  1441   TROPONINI 0.014     TSH:   Recent Labs   Lab 05/09/23  1441   TSH 2.468     Urine Studies:   Recent Labs    Lab 05/09/23  1902   COLORU Yellow   APPEARANCEUA Clear   PHUR 5.0   SPECGRAV 1.020   PROTEINUA Trace*   GLUCUA Negative   KETONESU Negative   BILIRUBINUA Negative   OCCULTUA Negative   NITRITE Negative   UROBILINOGEN Negative   LEUKOCYTESUR Negative       Significant Imaging:   Imaging Results               CT Abdomen Pelvis  Without Contrast (Final result)  Result time 05/09/23 18:25:15      Final result by Arina Salinas MD (05/09/23 18:25:15)                   Impression:      Multiple bilateral renal lesions.  Most concerning lesion is seen at the lower pole of the right kidney.  Recommend additional follow-up when clinically appropriate to evaluate for neoplasia.    Two midline ventral abdominopelvic hernias.  Small fat containing umbilical hernia.  No evidence of bowel ischemia.  Left colostomy.    Prostatomegaly.    This report was flagged in Epic as abnormal.      Electronically signed by: Arina Salinas  Date:    05/09/2023  Time:    18:25               Narrative:    EXAMINATION:  CT ABDOMEN PELVIS WITHOUT    CLINICAL HISTORY:  Given 100 mL normal saline for some abnormal lab study received last week.  Syncopal episode in the car that lasted 20-25 seconds.  No trauma.  Some diarrhea and decrease in physical activity    TECHNIQUE:  5 mm unenhanced axial images from the lung bases through the greater trochanters were performed.  Coronal and sagittal reformatted images were provided.    COMPARISON:  01/18/2022    FINDINGS:  Within the limits of a noncontrast examination, the kidneys are small and lobular in appearance.  There are multiple low-attenuation lesions in both kidneys.  The most concerning of these lesions of the kidneys is seen arising from the lower pole of the right kidney measuring approximately 3.3 x 3 x 3.8 cm (series 2 axial image 69 and series 601 sagittal image 97).    On the prior examination, there are multiple indeterminate liver hypodensities described.  These are not  appreciated on the current examination possibly due to the degree of artifact.  Spleen, pancreas, and adrenal glands are unremarkable.  The gallbladder contains no calcified gallstones.    There is diastasis of the abdominal rectus musculature.  There are 2 midline ventral abdominopelvic hernias.  The most cephalad contains a knuckle of gas-filled bowel.  Suture material is seen at this level.  The more caudal is seen about the level of the umbilicus containing a loop of nondilated gas-filled bowel.  There is a small fat containing umbilical hernia.  An ostomy is present.    There is no gross abdominal adenopathy or ascites.  Mild atherosclerotic changes are present.    There are no pelvic masses or adenopathy.  There is no free pelvic fluid.  The prostate gland measures up to 5.2 cm in maximal diameter.  Consider correlation with PSA.  There is a small left fat containing inguinal hernia.    At the lung bases, there is mild bibasilar atelectatic change.  There is a thickened sclerotic appearance of the right posterior 9th rib, which is not new.  There is degenerative change of the spine and hips.  There is grade 1 retrolisthesis of L5 on S1 and significant degenerative change at this level.  Bridging osteophytes are seen at the visualized lower thoracic spine.                                       X-Ray Chest AP Portable (Final result)  Result time 05/09/23 16:31:25      Final result by Scar Hernandez MD (05/09/23 16:31:25)                   Impression:      Stable examination.  No acute process.      Electronically signed by: Scar Hernandez MD  Date:    05/09/2023  Time:    16:31               Narrative:    EXAMINATION:  XR CHEST AP PORTABLE    CLINICAL HISTORY:  syncope;    TECHNIQUE:  Single frontal view of the chest was performed.    COMPARISON:  02/25/2022.    FINDINGS:  There is a left-sided subclavian chest port with its tip within the proximal SVC.  The trachea is unremarkable.  The cardiomediastinal silhouette  is unchanged.  There is no evidence of free air beneath the hemidiaphragms.  There is elevation of the left hemidiaphragm.  There are no pleural effusions.  There is no evidence of a pneumothorax.  There is no evidence of pneumomediastinum.  No airspace opacity is present.  There are degenerative changes in the osseous structures.                                        Assessment/Plan:     * Syncope  Presents with a syncopal episode after returning to PCP per patient and family dehydrated during the week due to diarrhea which multiple members of Congregation have had. Diarrhea resolved. Troponin negative EKG without ST elevation, Cr elevated from baseline.   Troponin trend  Telemetry  Echo  Orthostatic BP  Will continue gentle IVF overnight    Anemia of chronic disease  Continue home iron supplementation. H&H stable and at baseline. No complaints of active bleeding or indication for transfusion at this time.     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 Seen today again on CT. Continue outpatient oncology follow up    Acute renal failure superimposed on stage 3 chronic kidney disease  Cr today of 2.7. baseline Cr 1.8-2.2 potassium of 5.5 today. 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  Last A1c reviewed-   Lab Results   Component Value Date    HGBA1C 7.8 (H) 02/21/2022     Most recent fingerstick glucose reviewed- No results for input(s): POCTGLUCOSE in the last 24 hours.  Current correctional scale  Low  Maintain anti-hyperglycemic dose as follows-    Antihyperglycemics (From admission, onward)    Start     Stop Route Frequency Ordered    05/09/23 2130  insulin aspart U-100 pen 0-5 Units         -- SubQ Before meals & nightly PRN 05/09/23 2030        Hold Oral hypoglycemics while patient is in the hospital.      VTE Risk Mitigation (From admission, onward)         Ordered     IP VTE HIGH RISK PATIENT  Once         05/09/23 2035     Place sequential compression device  Until discontinued         05/09/23 2035     Place ARI hose  Until discontinued         05/09/23 2035                   Discussed with ED physician.    VTE: ARI/SCD  Code: Full  Diet: renal/diabetic  Dispo: pending repeat CMP and echo  On 05/09/2023, patient should be placed in hospital observation services under my care in collaboration with Lashonda Barrera MD.      Kyle Patton PA-C  Department of Hospital Medicine  [facility] [place] - Emergency Dept

## 2023-05-10 NOTE — HPI
Eugene Gamez 88 y.o. male with CKD, HTN, DM, history of colon cancer with ostomy in place presents to the hospital with a chief complaint of syncope.  He does not remember the syncopal episode.  He reports he had been feeling poorly for the last couple of days and has had ongoing diarrhea from his ostomy.  His diarrhea is now resolved, but he was seen today by his PCP for dehydration.  He was given IV fluids.  He reports multiple members of his Worship had similar episodes of diarrhea.  He has no complaints currently and feels well.  He denies fever chest pain shortness of breath nausea vomiting abdominal pain melena hematuria hematemesis leg swelling slurred speech numbness or weakness of an extremity.    Discussed with patient daughter who is an indepdent historian.  She reports today after his PCP appointment upon returning home he had a syncopal episode which was witnessed by his family.  He had loss of consciousness for 30 seconds and then awoke.  He did not fall or experienced head trauma.  He says baseline now.    In the ED, potassium of 5.5, Cr of 2.7, hemoglobin of 12.7, troponin negative, BNP negative, TSH within normal limits, chest x-ray without acute process, CT abdomen with multiple bilateral renal lesions and two midline abdominopelvic hernia, no evidence of bowel ischemia.

## 2023-05-10 NOTE — HOSPITAL COURSE
88 y.o. male with CKD, HTN, DM, history of colon cancer with ostomy in place presents to the hospital with a chief complaint of syncope.  Patient has recently been having significant output per ostomy, that has now resolved.  Patient noted to be dehydrated with ARF and slight orthostatic hypotension.  Syncope secondary to dehydration.  Patient started on IVF's with improvement of symptoms and renal function.  Creat now at baseline.  He has remained afebrile and hemodynamically stable.  PT/OT consulted and recommending HH.  Patient will be discharged home with HH to follow up with PCP.

## 2023-05-10 NOTE — PLAN OF CARE
BRIAN notified nurse Malathi that patient is ready for discharge from case management standpoint.        05/10/23 1255   Final Note   Assessment Type Final Discharge Note   Anticipated Discharge Disposition Home-Health   What phone number can be called within the next 1-3 days to see how you are doing after discharge? [phone]   Hospital Resources/Appts/Education Provided Appointments scheduled and added to AVS;Appointments scheduled by Navigator/Coordinator   Post-Acute Status   Post-Acute Authorization Home Health;HME   HME Status Set-up Complete/Auth obtained   Home Health Status Set-up Complete/Auth obtained   Coverage Medicare   Discharge Delays None known at this time

## 2023-05-10 NOTE — PT/OT/SLP EVAL
Physical Therapy Evaluation    Patient Name:  Eugene Gamez   MRN:  [id number]    Recommendations:     Discharge Recommendations: home health PT   Discharge Equipment Recommendations: walker, rolling       Assessment:     Eugene Gamez is a 88 y.o. male admitted with a medical diagnosis of Syncope.  He presents with the following impairments/functional limitations: weakness, impaired endurance, impaired functional mobility, gait instability, pain.    Rehab Prognosis: Good; patient would benefit from acute skilled PT services to address these deficits and reach maximum level of function.    Recent Surgery: * No surgery found *      Plan:     During this hospitalization, patient to be seen 3 x/week to address the identified rehab impairments via gait training, therapeutic activities, therapeutic exercises and progress toward the following goals:    Plan of Care Expires:  05/12/23    Subjective     Chief Complaint: Neck hurts  Patient/Family Comments/goals: To go home  Pain/Comfort:  Pain Rating 1: 4/10  Location 1: neck (posterior)    Patients cultural, spiritual, Hinduism conflicts given the current situation: no    Living Environment:  PTA pt lived with his daughter and granddaughter in a 1 story house with no steps to enter.  Prior to admission, patients level of function was mod I; drives.  Equipment used at home: cane, straight.  DME owned (not currently used): none.  Upon discharge, patient will have assistance from family.    Objective:     Patient found up in chair with  (no attachments)  upon PT entry to room.    General Precautions: Standard, fall  Orthopedic Precautions:N/A   Braces: N/A  Respiratory Status: Room air    Exams:  Cognitive Exam:  Patient is oriented to Person, Place, and Situation  RLE ROM: WFL  RLE Strength: grossly 3+/5  LLE ROM: WFL  LLE Strength: grossly 3+/5    Functional Mobility:  Transfers:     Sit to Stand:  moderate assistance with rolling walker  Gait: 25' w/RW min A  Balance:  Fair static/dynamic standing      AM-PAC 6 CLICK MOBILITY  Total Score:16       Treatment & Education:  Educated on role of PT and POC, ambulated a short distance with RW and min A.  Pt may benefit from wheelchair for community; HHPT for strengthening and progressive gait training with RW.    Patient left  reclined in chair  with call button in reach and nurse notified.    GOALS:   Multidisciplinary Problems       Physical Therapy Goals          Problem: Physical Therapy    Goal Priority Disciplines Outcome Goal Variances Interventions   Physical Therapy Goal     PT, PT/OT Ongoing, Progressing     Description: Goals to be met by: 23     Patient will increase functional independence with mobility by performin. Pt to be supervision with bed mobility.  2. Pt to transfer with min A.  3. Pt to ambulate 50' w/RW CGA/SBA.  4. Pt to be (S) with HEP.                         History:     Past Medical History:   Diagnosis Date    Arthritis     Cancer     Diabetes mellitus     Elevated PSA     Gout, unspecified     Hypertension     Nuclear sclerotic cataract of both eyes 2018       Past Surgical History:   Procedure Laterality Date    CATARACT EXTRACTION      ou    EYE SURGERY      bilateral cataracts    removal of small bowel  Apr.     Colostomy       Time Tracking:     PT Received On: 05/10/23  PT Start Time: 1503     PT Stop Time: 1517  PT Total Time (min): 14 min     Billable Minutes: Evaluation 14      05/10/2023

## 2023-05-10 NOTE — ASSESSMENT & PLAN NOTE
Presents with a syncopal episode after returning to PCP per patient and family dehydrated during the week due to diarrhea which multiple members of Orthodox have had. Diarrhea resolved. Troponin negative EKG without ST elevation, Cr elevated from baseline.   Troponin trend  Telemetry  Echo  Orthostatic BP  Will continue gentle IVF overnight

## 2023-05-10 NOTE — PROGRESS NOTES
Pt sitting up in bed talking on phone. No distress noted. Family will be here around 1830 to pick pt up for d/c.

## 2023-05-10 NOTE — PLAN OF CARE
Case Management Assessment     PCP: Larry Monae  Pharmacy: [facility]    Patient Arrived From: home  Existing Help at Home: Nicol Gamez    Barriers to Discharge: None    Discharge Plan:    A. Home with family   B. Home with family      SW completed initial assessment and discussed discharge planning  with patient at her bedside. Patient's stated that he lives with his daughter Nicol who is his main support. Patient shared that he is in need of a wheelchair, bedside Commode and Rollator. Patient's daughter will provide transportation for him to get hoe when discharge from the hospital.      05/09/23 2152   Discharge Assessment   Assessment Type Discharge Planning Assessment   Confirmed/corrected address, phone number and insurance Yes   Confirmed Demographics Correct on Facesheet   Source of Information patient   When was your last doctors appointment? 05/09/23   Communicated WIL with patient/caregiver Date not available/Unable to determine   Reason For Admission Syncope   People in Home child(amina), adult   Do you expect to return to your current living situation? Yes   Do you have help at home or someone to help you manage your care at home? Yes   Who are your caregiver(s) and their phone number(s)? Nicol Gamez (Daughter)   [phone] (Mobile)   Prior to hospitilization cognitive status: Alert/Oriented   Current cognitive status: Alert/Oriented   Equipment Currently Used at Home cane, straight   Readmission within 30 days? No   Patient currently being followed by outpatient case management? No   Do you currently have service(s) that help you manage your care at home? No   Do you take prescription medications? Yes   Do you have prescription coverage? Yes   Coverage Medicare   Do you have any problems affording any of your prescribed medications? No   Is the patient taking medications as prescribed? yes   Who is going to help you get home at discharge? Nicol Gamez (Daughter)   [phone]  (Mobile)   How do you get to doctors appointments? family or friend will provide   Are you on dialysis? No   Do you take coumadin? No   Discharge Plan A Home with family   Discharge Plan B Home with family   DME Needed Upon Discharge  bedside commode;wheelchair;rollator   Discharge Plan discussed with: Patient   Discharge Barriers Identified None

## 2023-05-10 NOTE — PLAN OF CARE
Patient approved for BSC. [facility] will provide patient with new wheels for RW.        05/10/23 125   Post-Acute Status   Post-Acute Authorization HME   HME Status Set-up Complete/Auth obtained   Discharge Delays None known at this time   Discharge Plan   Discharge Plan A Home Health   Discharge Plan B Home with family

## 2023-05-10 NOTE — ASSESSMENT & PLAN NOTE
Patient's FSGs are controlled on current medication regimen.  Last A1c reviewed-   Lab Results   Component Value Date    HGBA1C 7.8 (H) 02/21/2022     Most recent fingerstick glucose reviewed- No results for input(s): POCTGLUCOSE in the last 24 hours.  Current correctional scale  Low  Maintain anti-hyperglycemic dose as follows-   Antihyperglycemics (From admission, onward)    Start     Stop Route Frequency Ordered    05/09/23 2130  insulin aspart U-100 pen 0-5 Units         -- SubQ Before meals & nightly PRN 05/09/23 2030        Hold Oral hypoglycemics while patient is in the hospital.

## 2023-05-10 NOTE — PT/OT/SLP PROGRESS
Physical Therapy      Patient Name:  Eugene Gamez   MRN:  [id number]    Patient not seen today secondary to Testing/imaging (xray/CT/MRI), Other (Comment) (in cardiology). Will follow-up when available.

## 2023-05-17 ENCOUNTER — PES CALL (OUTPATIENT)
Dept: ADMINISTRATIVE | Facility: CLINIC | Age: 88
End: 2023-05-17
Payer: MEDICARE

## 2023-06-19 DIAGNOSIS — I10 ESSENTIAL HYPERTENSION: ICD-10-CM

## 2023-06-19 NOTE — TELEPHONE ENCOUNTER
Care Due:                  Date            Visit Type   Department     Provider  --------------------------------------------------------------------------------                                             [facility]     MED/ INTERNAL  Last Visit: 03-      FOLLOW UP    MED/ FERMIN Juarez A  Page  Next Visit: None Scheduled  None         None Found                                                            Last  Test          Frequency    Reason                     Performed    Due Date  --------------------------------------------------------------------------------    Office Visit  12 months..  MOO allopurinoL.....  03- 03-    Uric Acid...  12 months..  allopurinoL..............  02- 02-    Health [facility] Embedded Care Due Messages. Reference number: [id number].   6/19/2023 12:40:41 AM CDT

## 2023-06-20 RX ORDER — AMLODIPINE BESYLATE 10 MG/1
TABLET ORAL
Qty: 90 TABLET | Refills: 0 | Status: SHIPPED | OUTPATIENT
Start: 2023-06-20 | End: 2023-12-15 | Stop reason: SDUPTHER

## 2023-07-24 ENCOUNTER — TELEPHONE (OUTPATIENT)
Dept: FAMILY MEDICINE | Facility: CLINIC | Age: 88
End: 2023-07-24
Payer: MEDICARE

## 2023-07-24 NOTE — TELEPHONE ENCOUNTER
----- Message from Stacy Bernal sent at 7/24/2023 11:04 AM CDT -----  Name of Who is Calling: Clemente calling on behalf of CORY GRIMALDO [[id number]]                What is the request in detail: Clemente want to schedule pt an annual visit for this year, next available apt is not until feb.Please call back to further assist.                 Can the clinic reply by [facility]: No                What Number to Call Back if not [facility]: [phone]

## 2023-07-27 ENCOUNTER — TELEPHONE (OUTPATIENT)
Dept: FAMILY MEDICINE | Facility: CLINIC | Age: 88
End: 2023-07-27
Payer: MEDICARE

## 2023-07-27 NOTE — TELEPHONE ENCOUNTER
Spoke with Clemente about [facility] follow up and annual appt with  gave her  02/21/2024 @ 9:40 am and with Gianna Zee on 08/11/23  @ 10:00 am will mail out new appt letter vs

## 2023-08-11 ENCOUNTER — OFFICE VISIT (OUTPATIENT)
Dept: FAMILY MEDICINE | Facility: CLINIC | Age: 88
End: 2023-08-11
Payer: MEDICARE

## 2023-08-11 VITALS
TEMPERATURE: 98 F | WEIGHT: 178.56 LBS | BODY MASS INDEX: 22.91 KG/M2 | DIASTOLIC BLOOD PRESSURE: 70 MMHG | HEART RATE: 99 BPM | OXYGEN SATURATION: 99 % | SYSTOLIC BLOOD PRESSURE: 120 MMHG | HEIGHT: 74 IN

## 2023-08-11 DIAGNOSIS — M54.6 CHRONIC RIGHT-SIDED THORACIC BACK PAIN: ICD-10-CM

## 2023-08-11 DIAGNOSIS — E11.22 CONTROLLED TYPE 2 DIABETES MELLITUS WITH STAGE 3 CHRONIC KIDNEY DISEASE, WITHOUT LONG-TERM CURRENT USE OF INSULIN: ICD-10-CM

## 2023-08-11 DIAGNOSIS — Z93.3 COLOSTOMY IN PLACE: ICD-10-CM

## 2023-08-11 DIAGNOSIS — T45.1X5A CHEMOTHERAPY INDUCED NEUTROPENIA: ICD-10-CM

## 2023-08-11 DIAGNOSIS — G89.29 CHRONIC RIGHT-SIDED THORACIC BACK PAIN: ICD-10-CM

## 2023-08-11 DIAGNOSIS — N18.30 CONTROLLED TYPE 2 DIABETES MELLITUS WITH STAGE 3 CHRONIC KIDNEY DISEASE, WITHOUT LONG-TERM CURRENT USE OF INSULIN: ICD-10-CM

## 2023-08-11 DIAGNOSIS — D70.1 CHEMOTHERAPY INDUCED NEUTROPENIA: ICD-10-CM

## 2023-08-11 DIAGNOSIS — C18.7 MALIGNANT NEOPLASM OF SIGMOID COLON: ICD-10-CM

## 2023-08-11 DIAGNOSIS — T45.1X5A CHEMOTHERAPY-INDUCED NEUROPATHY: ICD-10-CM

## 2023-08-11 DIAGNOSIS — N18.31 STAGE 3A CHRONIC KIDNEY DISEASE: Chronic | ICD-10-CM

## 2023-08-11 DIAGNOSIS — G62.0 CHEMOTHERAPY-INDUCED NEUROPATHY: ICD-10-CM

## 2023-08-11 DIAGNOSIS — I10 ESSENTIAL HYPERTENSION: Primary | ICD-10-CM

## 2023-08-11 DIAGNOSIS — J43.9 PULMONARY EMPHYSEMA, UNSPECIFIED EMPHYSEMA TYPE: ICD-10-CM

## 2023-08-11 PROCEDURE — 99999 PR PBB SHADOW E&M-EST. PATIENT-LVL V: ICD-10-PCS | Mod: PBBFAC,,,

## 2023-08-11 PROCEDURE — 99999 PR PBB SHADOW E&M-EST. PATIENT-LVL V: CPT | Mod: PBBFAC,,,

## 2023-08-11 PROCEDURE — 99214 PR OFFICE/OUTPT VISIT, EST, LEVL IV, 30-39 MIN: ICD-10-PCS | Mod: S$PBB,,,

## 2023-08-11 PROCEDURE — 99215 OFFICE O/P EST HI 40 MIN: CPT | Mod: PBBFAC,PO

## 2023-08-11 PROCEDURE — 99214 OFFICE O/P EST MOD 30 MIN: CPT | Mod: S$PBB,,,

## 2023-08-11 NOTE — PATIENT INSTRUCTIONS
Labs today.     Please contact hem/onc to schedule appt. You last saw Dr. Vidal. Call [phone]    Call to schedule Physical Therapy---[phone]    Referral to optometry placed. They will call to schedule.     For now, stay well hydrated. Keep annual appt with Dr. Monae in Feb.
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
## 2023-08-11 NOTE — ASSESSMENT & PLAN NOTE
Lab Results   Component Value Date    HGBA1C 6.7 (H) 05/09/2023       Januvia 50mg daily    The current medical regimen is effective;  continue present plan and medications.    Labs today

## 2023-08-11 NOTE — ASSESSMENT & PLAN NOTE
Amlodipine 10mg  Hydralazine 100mg daily    The current medical regimen is effective;  continue present plan and medications.

## 2023-08-12 NOTE — ASSESSMENT & PLAN NOTE
No acute concerns. Unable to visualize stoma. Loose, brown stool noted. The current medical regimen is effective;  continue present plan and medications.

## 2023-08-16 ENCOUNTER — TELEPHONE (OUTPATIENT)
Dept: FAMILY MEDICINE | Facility: CLINIC | Age: 88
End: 2023-08-16
Payer: MEDICARE

## 2023-08-16 NOTE — TELEPHONE ENCOUNTER
----- Message from Gianna Zee NP sent at 8/15/2023  5:08 PM CDT -----  Please contact the patient and let them know that their urine results were fine and do not require any change in treatment.

## 2023-08-16 NOTE — TELEPHONE ENCOUNTER
Attempted to contact pt twice. He answered the phone but did not say anything then proceeded to hang up.

## 2023-08-17 NOTE — LETTER
June 29, 2018    Eugene Gamez  [address]  [place] [address]             [facility]  [address]  [place] [address]  Phone: [phone]  Fax: [phone] Dear Mr. Eugene Gamez:    Below are the results from your recent visit:      Your results are abnormal.  Please don't hesitate to call our office if you have any questions or concerns and follow-up in 1-month with the optometrist, Dr. Shreya Goss.      Sincerely,        Desi Salvador MA     
normal/well-groomed/no distress

## 2023-09-02 NOTE — ED PROVIDER NOTES
Encounter Date: 4/20/2017    SCRIBE #1 NOTE: I, Diallo Khan , am scribing for, and in the presence of,  Pradeep Sebastian III, MD . I have scribed the following portions of the note - Other sections scribed: HPI, ROS .       History     Chief Complaint   Patient presents with    Abdominal Pain     States he has abd pain, has a colostomy, and is vomiting that looks black     Review of patient's allergies indicates:   Allergen Reactions    Shellfish containing products      Causes gout to flare up     HPI Comments: CC: Abdominal Pain     HPI: This 82 y.o. Male former smoker with a medical history of Arthritis; Cancer; Diabetes mellitus; Elevated PSA; Gout, unspecified; and Hypertension presents to the ED accompanied by daughters c/o vomiting (black/green/yellow liquid) followed up by abdominal pain around colostomy bag in lower left quadrant. Symptoms are acute, onset, and moderate 5/10. Pt states vomiting aggravates colostomy bag and causes abdominal pain. Pt reports history of colostomy bag for 2 years. Pt reports normal stool and normal amount in colostomy bag. Pt denies any history of colostomy bag related abdominal pain. Pt denies fever, shortness of breath, dysuria, or any other associated symptoms. Pt has no modifying factors. Pt has no prior treatment.         The history is provided by the patient. No  was used.     Past Medical History:   Diagnosis Date    Arthritis     Cancer     Diabetes mellitus     Elevated PSA     Gout, unspecified     Hypertension      Past Surgical History:   Procedure Laterality Date    CATARACT EXTRACTION      ou    EYE SURGERY      bilateral cataracts    removal of small bowel  Apr. 2015    Colostomy     Family History   Problem Relation Age of Onset    Hypertension Mother     Diabetes Mother     No Known Problems Father     No Known Problems Sister     No Known Problems Brother     No Known Problems Maternal Aunt     No Known Problems Maternal  Uncle     No Known Problems Paternal Aunt     No Known Problems Paternal Uncle     No Known Problems Maternal Grandmother     No Known Problems Maternal Grandfather     No Known Problems Paternal Grandmother     No Known Problems Paternal Grandfather     Amblyopia Neg Hx     Blindness Neg Hx     Cancer Neg Hx     Cataracts Neg Hx     Glaucoma Neg Hx     Macular degeneration Neg Hx     Retinal detachment Neg Hx     Strabismus Neg Hx     Stroke Neg Hx     Thyroid disease Neg Hx      Social History   Substance Use Topics    Smoking status: Never Smoker    Smokeless tobacco: Never Used      Comment: smoked short while as a teen    Alcohol use No     Review of Systems   Constitutional: Negative for fever.   HENT: Negative for sore throat.    Eyes: Negative for pain.   Respiratory: Negative for shortness of breath.    Cardiovascular: Negative for chest pain.   Gastrointestinal: Positive for abdominal pain (lower left quadrant), nausea and vomiting.   Genitourinary: Negative for dysuria.   Musculoskeletal: Negative for back pain.   Skin: Negative for rash.   Neurological: Negative for headaches.       Physical Exam   Initial Vitals   BP Pulse Resp Temp SpO2   04/20/17 1702 04/20/17 1702 04/20/17 1702 04/20/17 1702 04/20/17 1702   136/77 120 18 98.2 °F (36.8 °C) 96 %     Physical Exam    Constitutional: He appears well-developed and well-nourished. He is not diaphoretic. He appears distressed (Actively vomiting on exam).   HENT:   Head: Normocephalic and atraumatic.   Nose: Nose normal.   Mouth/Throat: Oropharynx is clear and moist. No oropharyngeal exudate.   Eyes: Conjunctivae and EOM are normal. Pupils are equal, round, and reactive to light. No scleral icterus.   Neck: Normal range of motion. Neck supple. No thyromegaly present. No tracheal deviation present.   Cardiovascular: Normal rate, regular rhythm and normal heart sounds. Exam reveals no gallop and no friction rub.    No murmur  heard.  Pulmonary/Chest: Breath sounds normal. No respiratory distress. He has no wheezes. He has no rhonchi. He has no rales.   Abdominal: Soft. Bowel sounds are normal. He exhibits no distension and no mass. There is tenderness (diffuse, mild). There is no rebound and no guarding.   Colostomy site well-appearing   Musculoskeletal: Normal range of motion. He exhibits no edema or tenderness.   Lymphadenopathy:     He has no cervical adenopathy.   Neurological: He is alert and oriented to person, place, and time. He has normal strength. No cranial nerve deficit or sensory deficit.   Skin: Skin is warm and dry. No rash noted. No erythema. No pallor.   Psychiatric: He has a normal mood and affect. His behavior is normal. Thought content normal.         ED Course   External Jugular IV  Date/Time: 4/20/2017 7:57 PM  Performed by: COLLEEN WOLF III  Authorized by: COLLEEN WOLF III   Location (Ext Jugular): Right.  Area Prepped With: Chlorohexidine.  Catheter Size: 20 ga.  Catheter Type: Jelco.  Number of attempts: 1  Fixation/Dressing: Taped in place and Tegaderm.  Patient tolerance: Patient tolerated the procedure well with no immediate complications        Labs Reviewed   CBC W/ AUTO DIFFERENTIAL - Abnormal; Notable for the following:        Result Value    RDW 14.9 (*)     Gran # 8.9 (*)     Lymph # 0.6 (*)     Gran% 84.8 (*)     Lymph% 6.1 (*)     All other components within normal limits   COMPREHENSIVE METABOLIC PANEL - Abnormal; Notable for the following:     Potassium 5.2 (*)     CO2 21 (*)     Glucose 301 (*)     BUN, Bld 31 (*)     Creatinine 1.9 (*)     Calcium 10.9 (*)     Total Protein 9.4 (*)     Total Bilirubin 1.3 (*)     eGFR if  37 (*)     eGFR if non  32 (*)     All other components within normal limits   LIPASE   PROTIME-INR   URINALYSIS   URINALYSIS   TYPE & SCREEN             Medical Decision Making:   Initial Assessment:   82-year-old male presents for evaluation  of abdominal distention and vomiting that began earlier today.  He reports that vomiting began before abdominal pain.  Patient does have a history of colonic perforation with resulting hemicolectomy and colostomy placement.  Past history of colon cancer that patient reports is now in remission.  On exam the patient is tachycardic, clinically dehydrated, and actively vomiting.  Differential Diagnosis:   Small bowel obstruction, cholecystitis, perforated viscus, pancreatitis, dehydration, electrolyte abnormality, worsening renal function  Independently Interpreted Test(s):   I have ordered and independently interpreted X-rays - see summary below.       <> Summary of X-Ray Reading(s): CT abdomen shows what appears to be mild or early small bowel obstruction arising from an incarcerated umbilical hernia  ED Management:  On exam currently the patient is well-appearing, still mildly tachycardic.  He reports complete resolution of his symptoms.  Repeat abdominal exam performed after CT does reveal an apparent umbilical hernia defect, although without palpable hernia at this time.  It is unclear if this small hernia is merely difficult to palpate, or if it has already reduced spontaneously.  I have discussed this case with Dr. Porras place admission orders for NPO, serial exam, IVF.             Scribe Attestation:   Scribe #1: I performed the above scribed service and the documentation accurately describes the services I performed. I attest to the accuracy of the note.    Attending Attestation:           Physician Attestation for Scribe:  Physician Attestation Statement for Scribe #1: I, Pradeep Sebastian III, MD , reviewed documentation, as scribed by Diallo Khan  in my presence, and it is both accurate and complete.                 ED Course     Clinical Impression:   The primary encounter diagnosis was Small bowel obstruction. Diagnoses of Umbilical hernia with obstruction and Dehydration were also pertinent to this  visit.          Pradeep Sebastian III, MD  04/21/17 4078     negative...

## 2023-10-12 NOTE — DISCHARGE INSTRUCTIONS
Lab tests show some improvement in your kidney function. It is important that you take sodium bicarbonate tablets to compensate from losses from your GI tract.  Drink plenty of fluids to remain hydrated. It is very important that you follow up with your primary physician within 2-3 days for repeat lab testing to monitor your kidney function. You should also schedule follow-up within nephrologist within the next week.  Return to the emergency department for fever, chest pain, new numbness or weakness, uncontrollable nausea or vomiting, palpitations, decreased urination or any new, worsening or concerning symptoms.   Yes - the patient is able to be screened

## 2023-12-01 ENCOUNTER — HOSPITAL ENCOUNTER (OUTPATIENT)
Facility: HOSPITAL | Age: 88
Discharge: HOME | End: 2023-12-02
Attending: EMERGENCY MEDICINE | Admitting: HOSPITALIST
Payer: MEDICARE

## 2023-12-01 DIAGNOSIS — R07.9 CHEST PAIN: ICD-10-CM

## 2023-12-01 DIAGNOSIS — E87.5 HYPERKALEMIA: Primary | ICD-10-CM

## 2023-12-01 LAB
ALBUMIN SERPL BCP-MCNC: 4.1 G/DL (ref 3.5–5.2)
ALP SERPL-CCNC: 66 U/L (ref 55–135)
ALT SERPL W/O P-5'-P-CCNC: 17 U/L (ref 10–44)
ANION GAP SERPL CALC-SCNC: 10 MMOL/L (ref 8–16)
ANION GAP SERPL CALC-SCNC: 11 MMOL/L (ref 8–16)
AST SERPL-CCNC: 18 U/L (ref 10–40)
BASOPHILS # BLD AUTO: 0.05 K/UL (ref 0–0.2)
BASOPHILS NFR BLD: 0.6 % (ref 0–1.9)
BILIRUB SERPL-MCNC: 0.7 MG/DL (ref 0.1–1)
BNP SERPL-MCNC: 29 PG/ML (ref 0–99)
BUN SERPL-MCNC: 21 MG/DL (ref 8–23)
BUN SERPL-MCNC: 21 MG/DL (ref 8–23)
CALCIUM SERPL-MCNC: 9.6 MG/DL (ref 8.7–10.5)
CALCIUM SERPL-MCNC: 9.8 MG/DL (ref 8.7–10.5)
CHLORIDE SERPL-SCNC: 107 MMOL/L (ref 95–110)
CHLORIDE SERPL-SCNC: 109 MMOL/L (ref 95–110)
CO2 SERPL-SCNC: 17 MMOL/L (ref 23–29)
CO2 SERPL-SCNC: 17 MMOL/L (ref 23–29)
CREAT SERPL-MCNC: 1.7 MG/DL (ref 0.5–1.4)
CREAT SERPL-MCNC: 2 MG/DL (ref 0.5–1.4)
DIFFERENTIAL METHOD: ABNORMAL
EOSINOPHIL # BLD AUTO: 0 K/UL (ref 0–0.5)
EOSINOPHIL NFR BLD: 0.2 % (ref 0–8)
ERYTHROCYTE [DISTWIDTH] IN BLOOD BY AUTOMATED COUNT: 13.5 % (ref 11.5–14.5)
EST. GFR  (NO RACE VARIABLE): 32 ML/MIN/1.73 M^2
EST. GFR  (NO RACE VARIABLE): 39 ML/MIN/1.73 M^2
GLUCOSE SERPL-MCNC: 173 MG/DL (ref 70–110)
GLUCOSE SERPL-MCNC: 174 MG/DL (ref 70–110)
HCT VFR BLD AUTO: 39.8 % (ref 40–54)
HGB BLD-MCNC: 12.1 G/DL (ref 14–18)
IMM GRANULOCYTES # BLD AUTO: 0.03 K/UL (ref 0–0.04)
IMM GRANULOCYTES NFR BLD AUTO: 0.4 % (ref 0–0.5)
LYMPHOCYTES # BLD AUTO: 1 K/UL (ref 1–4.8)
LYMPHOCYTES NFR BLD: 12.5 % (ref 18–48)
MCH RBC QN AUTO: 28.5 PG (ref 27–31)
MCHC RBC AUTO-ENTMCNC: 30.4 G/DL (ref 32–36)
MCV RBC AUTO: 94 FL (ref 82–98)
MONOCYTES # BLD AUTO: 0.6 K/UL (ref 0.3–1)
MONOCYTES NFR BLD: 6.8 % (ref 4–15)
NEUTROPHILS # BLD AUTO: 6.4 K/UL (ref 1.8–7.7)
NEUTROPHILS NFR BLD: 79.5 % (ref 38–73)
NRBC BLD-RTO: 0 /100 WBC
PLATELET # BLD AUTO: 253 K/UL (ref 150–450)
PMV BLD AUTO: 10.6 FL (ref 9.2–12.9)
POCT GLUCOSE: 139 MG/DL (ref 70–110)
POCT GLUCOSE: 158 MG/DL (ref 70–110)
POTASSIUM SERPL-SCNC: 5.8 MMOL/L (ref 3.5–5.1)
POTASSIUM SERPL-SCNC: 6.7 MMOL/L (ref 3.5–5.1)
PROT SERPL-MCNC: 7.6 G/DL (ref 6–8.4)
RBC # BLD AUTO: 4.25 M/UL (ref 4.6–6.2)
SODIUM SERPL-SCNC: 134 MMOL/L (ref 136–145)
SODIUM SERPL-SCNC: 137 MMOL/L (ref 136–145)
TROPONIN I SERPL DL<=0.01 NG/ML-MCNC: 0.01 NG/ML (ref 0–0.03)
TROPONIN I SERPL DL<=0.01 NG/ML-MCNC: <0.006 NG/ML (ref 0–0.03)
WBC # BLD AUTO: 8.06 K/UL (ref 3.9–12.7)

## 2023-12-01 PROCEDURE — 25000242 PHARM REV CODE 250 ALT 637 W/ HCPCS: Performed by: EMERGENCY MEDICINE

## 2023-12-01 PROCEDURE — 83036 HEMOGLOBIN GLYCOSYLATED A1C: CPT | Performed by: HOSPITALIST

## 2023-12-01 PROCEDURE — 80048 BASIC METABOLIC PNL TOTAL CA: CPT | Mod: XB | Performed by: HOSPITALIST

## 2023-12-01 PROCEDURE — 82962 GLUCOSE BLOOD TEST: CPT

## 2023-12-01 PROCEDURE — 93010 EKG 12-LEAD: ICD-10-PCS | Mod: ,,, | Performed by: INTERNAL MEDICINE

## 2023-12-01 PROCEDURE — G0378 HOSPITAL OBSERVATION PER HR: HCPCS

## 2023-12-01 PROCEDURE — 96374 THER/PROPH/DIAG INJ IV PUSH: CPT

## 2023-12-01 PROCEDURE — 93005 ELECTROCARDIOGRAM TRACING: CPT

## 2023-12-01 PROCEDURE — 84484 ASSAY OF TROPONIN QUANT: CPT | Performed by: EMERGENCY MEDICINE

## 2023-12-01 PROCEDURE — 85025 COMPLETE CBC W/AUTO DIFF WBC: CPT | Performed by: EMERGENCY MEDICINE

## 2023-12-01 PROCEDURE — 94760 N-INVAS EAR/PLS OXIMETRY 1: CPT

## 2023-12-01 PROCEDURE — 25000003 PHARM REV CODE 250: Performed by: HOSPITALIST

## 2023-12-01 PROCEDURE — 96365 THER/PROPH/DIAG IV INF INIT: CPT

## 2023-12-01 PROCEDURE — 25000003 PHARM REV CODE 250: Performed by: EMERGENCY MEDICINE

## 2023-12-01 PROCEDURE — 80053 COMPREHEN METABOLIC PANEL: CPT | Performed by: EMERGENCY MEDICINE

## 2023-12-01 PROCEDURE — 99285 EMERGENCY DEPT VISIT HI MDM: CPT | Mod: 25

## 2023-12-01 PROCEDURE — 94640 AIRWAY INHALATION TREATMENT: CPT

## 2023-12-01 PROCEDURE — 96375 TX/PRO/DX INJ NEW DRUG ADDON: CPT

## 2023-12-01 PROCEDURE — 96361 HYDRATE IV INFUSION ADD-ON: CPT

## 2023-12-01 PROCEDURE — 36415 COLL VENOUS BLD VENIPUNCTURE: CPT | Performed by: HOSPITALIST

## 2023-12-01 PROCEDURE — 25000003 PHARM REV CODE 250: Performed by: STUDENT IN AN ORGANIZED HEALTH CARE EDUCATION/TRAINING PROGRAM

## 2023-12-01 PROCEDURE — 83880 ASSAY OF NATRIURETIC PEPTIDE: CPT | Performed by: EMERGENCY MEDICINE

## 2023-12-01 PROCEDURE — 63600175 PHARM REV CODE 636 W HCPCS: Performed by: EMERGENCY MEDICINE

## 2023-12-01 PROCEDURE — 93010 ELECTROCARDIOGRAM REPORT: CPT | Mod: ,,, | Performed by: INTERNAL MEDICINE

## 2023-12-01 RX ORDER — FUROSEMIDE 10 MG/ML
80 INJECTION INTRAMUSCULAR; INTRAVENOUS
Status: DISCONTINUED | OUTPATIENT
Start: 2023-12-01 | End: 2023-12-01

## 2023-12-01 RX ORDER — DULOXETIN HYDROCHLORIDE 30 MG/1
30 CAPSULE, DELAYED RELEASE ORAL DAILY
Status: DISCONTINUED | OUTPATIENT
Start: 2023-12-01 | End: 2023-12-02 | Stop reason: HOSPADM

## 2023-12-01 RX ORDER — FAMOTIDINE 10 MG/ML
40 INJECTION INTRAVENOUS
Status: COMPLETED | OUTPATIENT
Start: 2023-12-01 | End: 2023-12-01

## 2023-12-01 RX ORDER — GLUCAGON 1 MG
1 KIT INJECTION
Status: DISCONTINUED | OUTPATIENT
Start: 2023-12-01 | End: 2023-12-02 | Stop reason: HOSPADM

## 2023-12-01 RX ORDER — MAG HYDROX/ALUMINUM HYD/SIMETH 200-200-20
30 SUSPENSION, ORAL (FINAL DOSE FORM) ORAL ONCE
Status: COMPLETED | OUTPATIENT
Start: 2023-12-01 | End: 2023-12-01

## 2023-12-01 RX ORDER — SODIUM BICARBONATE 325 MG/1
650 TABLET ORAL 3 TIMES DAILY
Status: DISCONTINUED | OUTPATIENT
Start: 2023-12-01 | End: 2023-12-02 | Stop reason: HOSPADM

## 2023-12-01 RX ORDER — CALCIUM GLUCONATE 20 MG/ML
1 INJECTION, SOLUTION INTRAVENOUS EVERY 10 MIN PRN
Status: DISCONTINUED | OUTPATIENT
Start: 2023-12-01 | End: 2023-12-02 | Stop reason: HOSPADM

## 2023-12-01 RX ORDER — INSULIN ASPART 100 [IU]/ML
0-5 INJECTION, SOLUTION INTRAVENOUS; SUBCUTANEOUS
Status: DISCONTINUED | OUTPATIENT
Start: 2023-12-01 | End: 2023-12-02 | Stop reason: HOSPADM

## 2023-12-01 RX ORDER — QUETIAPINE FUMARATE 25 MG/1
50 TABLET, FILM COATED ORAL NIGHTLY
Status: DISCONTINUED | OUTPATIENT
Start: 2023-12-01 | End: 2023-12-02 | Stop reason: HOSPADM

## 2023-12-01 RX ORDER — CALCIUM GLUCONATE 20 MG/ML
1 INJECTION, SOLUTION INTRAVENOUS
Status: COMPLETED | OUTPATIENT
Start: 2023-12-01 | End: 2023-12-01

## 2023-12-01 RX ORDER — IBUPROFEN 200 MG
24 TABLET ORAL
Status: DISCONTINUED | OUTPATIENT
Start: 2023-12-01 | End: 2023-12-02 | Stop reason: HOSPADM

## 2023-12-01 RX ORDER — INDOMETHACIN 25 MG/1
50 CAPSULE ORAL
Status: COMPLETED | OUTPATIENT
Start: 2023-12-01 | End: 2023-12-01

## 2023-12-01 RX ORDER — IBUPROFEN 200 MG
16 TABLET ORAL
Status: DISCONTINUED | OUTPATIENT
Start: 2023-12-01 | End: 2023-12-02 | Stop reason: HOSPADM

## 2023-12-01 RX ORDER — AMLODIPINE BESYLATE 5 MG/1
10 TABLET ORAL DAILY
Status: DISCONTINUED | OUTPATIENT
Start: 2023-12-01 | End: 2023-12-02 | Stop reason: HOSPADM

## 2023-12-01 RX ORDER — ALBUTEROL SULFATE 0.83 MG/ML
10 SOLUTION RESPIRATORY (INHALATION)
Status: COMPLETED | OUTPATIENT
Start: 2023-12-01 | End: 2023-12-01

## 2023-12-01 RX ORDER — GABAPENTIN 100 MG/1
100 CAPSULE ORAL 3 TIMES DAILY
Status: DISCONTINUED | OUTPATIENT
Start: 2023-12-01 | End: 2023-12-02 | Stop reason: HOSPADM

## 2023-12-01 RX ADMIN — SODIUM BICARBONATE 650 MG: 325 TABLET ORAL at 03:12

## 2023-12-01 RX ADMIN — GABAPENTIN 100 MG: 100 CAPSULE ORAL at 09:12

## 2023-12-01 RX ADMIN — SODIUM ZIRCONIUM CYCLOSILICATE 10 G: 10 POWDER, FOR SUSPENSION ORAL at 01:12

## 2023-12-01 RX ADMIN — FAMOTIDINE 40 MG: 10 INJECTION INTRAVENOUS at 12:12

## 2023-12-01 RX ADMIN — ALUMINUM HYDROXIDE, MAGNESIUM HYDROXIDE, AND SIMETHICONE 30 ML: 1200; 120; 1200 SUSPENSION ORAL at 12:12

## 2023-12-01 RX ADMIN — SODIUM BICARBONATE 650 MG: 325 TABLET ORAL at 09:12

## 2023-12-01 RX ADMIN — ALBUTEROL SULFATE 10 MG: 2.5 SOLUTION RESPIRATORY (INHALATION) at 01:12

## 2023-12-01 RX ADMIN — QUETIAPINE 50 MG: 25 TABLET ORAL at 09:12

## 2023-12-01 RX ADMIN — SODIUM ZIRCONIUM CYCLOSILICATE 10 G: 10 POWDER, FOR SUSPENSION ORAL at 07:12

## 2023-12-01 RX ADMIN — Medication 25 G: at 01:12

## 2023-12-01 RX ADMIN — CALCIUM GLUCONATE 1 G: 20 INJECTION, SOLUTION INTRAVENOUS at 01:12

## 2023-12-01 RX ADMIN — DULOXETINE 30 MG: 30 CAPSULE, DELAYED RELEASE ORAL at 02:12

## 2023-12-01 RX ADMIN — GABAPENTIN 100 MG: 100 CAPSULE ORAL at 02:12

## 2023-12-01 RX ADMIN — AMLODIPINE BESYLATE 10 MG: 5 TABLET ORAL at 02:12

## 2023-12-01 RX ADMIN — INSULIN HUMAN 7.71 UNITS: 100 INJECTION, SOLUTION PARENTERAL at 01:12

## 2023-12-01 RX ADMIN — SODIUM CHLORIDE 1000 ML: 9 INJECTION, SOLUTION INTRAVENOUS at 01:12

## 2023-12-01 RX ADMIN — SODIUM BICARBONATE 50 MEQ: 84 INJECTION, SOLUTION INTRAVENOUS at 01:12

## 2023-12-01 NOTE — ASSESSMENT & PLAN NOTE
"Patient's FSGs are controlled on current medication regimen.  Last A1c reviewed-   Lab Results   Component Value Date    HGBA1C 6.7 (H) 05/09/2023     Most recent fingerstick glucose reviewed- No results for input(s): "POCTGLUCOSE" in the last 24 hours.  Current correctional scale  Medium  Maintain anti-hyperglycemic dose as follows-   Antihyperglycemics (From admission, onward)      Start     Stop Route Frequency Ordered    12/01/23 1504  insulin aspart U-100 pen 0-5 Units         -- SubQ Before meals & nightly PRN 12/01/23 1404          Hold Oral hypoglycemics while patient is in the hospital.  "

## 2023-12-01 NOTE — H&P
[facility] Emergency [facility] Medicine  History & Physical    Patient Name: Eugene Gamez  MRN: [id number]  Patient Class: OP- Observation  Admission Date: 12/1/2023  Attending Physician: Candelario Pedraza, *   Primary Care Provider: Larry Monae MD         Patient information was obtained from patient and ER records.     Subjective:     Principal Problem:Hyperkalemia    Chief Complaint:   Chief Complaint   Patient presents with    Chest Pain     Ems called to 87yo male that began having intermittent substernal chest tightness that started yesterday. Non-radiating and denied any other symptoms. Took 2 antonio aspirin earlier today.         HPI:   Eugene Gamez is a 88 y.o. male with a PMHx of Elevated PSA, Cancer, HTN, CKD 3,and DM presents to the ED due to chest pain.  EMS reports patient began experiencing a substernal chest pain yesterday and has a leaking ostomy bag. Patient states that his current chest pain episode began this morning has been constant. The pain is described as an indigestion, gas/pressure-like pain. His chest pain is relieved with movement of his arms inwards and belching. He took Aspirin x2 this morning without relief.patient has potassium level over 6.7,  Some EKG changes,patient has been started on IV calcium,Lokelma,insulin , and albuterol treatments,he denies chest pain at this time,he has normal troponin.    Past Medical History:   Diagnosis Date    Arthritis     Cancer     Diabetes mellitus     Elevated PSA     Gout, unspecified     Hypertension     Nuclear sclerotic cataract of both eyes 6/29/2018       Past Surgical History:   Procedure Laterality Date    CATARACT EXTRACTION      ou    EYE SURGERY      bilateral cataracts    removal of small bowel  Apr. 2015    Colostomy       Review of patient's allergies indicates:   Allergen Reactions    Iodine Other (See Comments)     Causes gout to flare up    Shellfish containing products      Causes gout to flare up        Current Facility-Administered Medications on File Prior to Encounter   Medication    ondansetron HCl (PF) 4 mg/2 mL injection     Current Outpatient Medications on File Prior to Encounter   Medication Sig    allopurinoL (ZYLOPRIM) 300 MG tablet Take 1 tablet (300 mg total) by mouth once daily.    amLODIPine (NORVASC) 10 MG tablet TAKE 1 TABLET BY MOUTH EVERY DAY    DULoxetine (CYMBALTA) 30 MG capsule TAKE 1 CAPSULE BY MOUTH 2 TIMES DAILY.    ferrous gluconate (FERGON) 324 MG tablet Take 324 mg by mouth.    gabapentin (NEURONTIN) 300 MG capsule TAKE 1 CAPSULE BY MOUTH THREE TIMES A DAY    hydrALAZINE (APRESOLINE) 100 MG tablet TAKE 1 TABLET BY MOUTH EVERY DAY    JANUVIA 50 mg Tab TAKE 1 TABLET BY MOUTH EVERY DAY    [DISCONTINUED] blood-glucose meter (ONE TOUCH ULTRAMINI) kit To test twice daily. Use as instructed    [DISCONTINUED] cholestyramine (QUESTRAN) 4 gram packet Take 1 packet (4 g total) by mouth 3 (three) times daily with meals.    [DISCONTINUED] colchicine (COLCRYS) 0.6 mg tablet Take 1 tablet (0.6 mg total) by mouth daily as needed.    [DISCONTINUED] loratadine (CLARITIN) 10 mg tablet Take 10 mg by mouth once daily.    [DISCONTINUED] metoprolol tartrate (LOPRESSOR) 50 MG tablet Take 1 tablet (50 mg total) by mouth 2 (two) times daily.    [DISCONTINUED] QUEtiapine (SEROQUEL) 50 MG tablet Take 1 tablet (50 mg total) by mouth every evening.    [DISCONTINUED] sodium bicarbonate 650 MG tablet Take 1 tablet (650 mg total) by mouth 3 (three) times daily.     Family History       Problem Relation (Age of Onset)    Diabetes Mother    Hypertension Mother    No Known Problems Father, Sister, Brother, Maternal Aunt, Maternal Uncle, Paternal Aunt, Paternal Uncle, Maternal Grandmother, Maternal Grandfather, Paternal Grandmother, Paternal Grandfather          Tobacco Use    Smoking status: Never    Smokeless tobacco: Never    Tobacco comments:     smoked short while as a teen   Substance and Sexual Activity     Alcohol use: No    Drug use: No    Sexual activity: Not on file     Review of Systems   Constitutional:  Negative for activity change and appetite change.   HENT:  Negative for congestion.    Eyes:  Negative for discharge.   Respiratory:  Negative for apnea and chest tightness.    Cardiovascular:  Positive for chest pain.   Gastrointestinal:  Negative for abdominal distention and abdominal pain.   Endocrine: Negative for cold intolerance and heat intolerance.   Genitourinary:  Negative for difficulty urinating.   Musculoskeletal:  Negative for arthralgias and back pain.   Skin:  Negative for color change and pallor.   Allergic/Immunologic: Negative for environmental allergies and food allergies.   Hematological:  Negative for adenopathy.   Psychiatric/Behavioral:  Negative for agitation and behavioral problems.      Objective:     Vital Signs (Most Recent):  Temp: 98.3 °F (36.8 °C) (12/01/23 1140)  Pulse: 66 (12/01/23 1350)  Resp: (!) 28 (12/01/23 1350)  BP: (!) 148/75 (12/01/23 1330)  SpO2: 100 % (12/01/23 1350) Vital Signs (24h Range):  Temp:  [98.3 °F (36.8 °C)] 98.3 °F (36.8 °C)  Pulse:  [59-92] 66  Resp:  [18-31] 28  SpO2:  [100 %] 100 %  BP: (120-148)/(71-75) 148/75     Weight: 77.1 kg (170 lb)  Body mass index is 23.06 kg/m².     Physical Exam  HENT:      Head: Normocephalic.      Left Ear: Tympanic membrane normal.      Nose: Nose normal.      Mouth/Throat:      Mouth: Mucous membranes are dry.   Eyes:      Extraocular Movements: Extraocular movements intact.      Pupils: Pupils are equal, round, and reactive to light.   Cardiovascular:      Rate and Rhythm: Normal rate and regular rhythm.      Heart sounds: No murmur heard.     No gallop.   Pulmonary:      Effort: No respiratory distress.      Breath sounds: No wheezing.   Abdominal:      General: There is no distension.      Tenderness: There is no abdominal tenderness.   Musculoskeletal:         General: No swelling or deformity.   Skin:     Coloration:  Skin is not jaundiced.      Findings: No bruising.   Neurological:      Mental Status: He is alert and oriented to person, place, and time.      Cranial Nerves: No cranial nerve deficit.      Motor: No weakness.   Psychiatric:         Mood and Affect: Mood normal.         Behavior: Behavior normal.              CRANIAL NERVES     CN III, IV, VI   Pupils are equal, round, and reactive to light.       Significant Labs: All pertinent labs within the past 24 hours have been reviewed.  BMP:   Recent Labs   Lab 12/01/23  1215   *   *   K 6.7*      CO2 17*   BUN 21   CREATININE 2.0*   CALCIUM 9.8     CBC:   Recent Labs   Lab 12/01/23  1215   WBC 8.06   HGB 12.1*   HCT 39.8*        CMP:   Recent Labs   Lab 12/01/23  1215   *   K 6.7*      CO2 17*   *   BUN 21   CREATININE 2.0*   CALCIUM 9.8   PROT 7.6   ALBUMIN 4.1   BILITOT 0.7   ALKPHOS 66   AST 18   ALT 17   ANIONGAP 10     Troponin:   Recent Labs   Lab 12/01/23  1215   TROPONINI <0.006       Significant Imaging: I have reviewed all pertinent imaging results/findings within the past 24 hours.  Assessment/Plan:     * Hyperkalemia  This patient has hyperkalemia which is uncontrolled. We will monitor for arrhythmias with EKG or continuous telemetry. We will treat the hyperkalemia with Potassium Binders, Calcium gluconate, IV insulin and dextrose, and Nebulized albuterol sulfate. The likely etiology of the hyperkalemia is VINNY.  The patients latest potassium has been reviewed and the results are listed below  Recent Labs   Lab 12/01/23  1215   K 6.7*     patient has potassium level over 6.7,  Some EKG changes,patient has been started on IV calcium,Lokelma,insulin , and albuterol treatments,he denies chest pain at this time,he has normal troponin.        Pulmonary emphysema, unspecified emphysema type  Patient's COPD is controlled currently.  Patient is currently off COPD Pathway. Continue scheduled inhalers  nebuzlier   and monitor  "respiratory status closely.     Anemia of chronic disease  Patient's anemia is currently controlled. Has not received any PRBCs to date. Etiology likely d/t chronic disease due to Chronic Kidney Disease/ESRD  Current CBC reviewed-   Lab Results   Component Value Date    HGB 12.1 (L) 12/01/2023    HCT 39.8 (L) 12/01/2023     Monitor serial CBC and transfuse if patient becomes hemodynamically unstable, symptomatic or H/H drops below 7/21.    Benign essential HTN  Chronic, controlled. Latest blood pressure and vitals reviewed-     Temp:  [98.3 °F (36.8 °C)]   Pulse:  [59-92]   Resp:  [18-31]   BP: (120-148)/(71-75)   SpO2:  [100 %] .   Home meds for hypertension were reviewed and noted below.   Hypertension Medications               amLODIPine (NORVASC) 10 MG tablet TAKE 1 TABLET BY MOUTH EVERY DAY    hydrALAZINE (APRESOLINE) 100 MG tablet TAKE 1 TABLET BY MOUTH EVERY DAY            While in the hospital, will manage blood pressure as follows; Continue home antihypertensive regimen    Will utilize p.r.n. blood pressure medication only if patient's blood pressure greater than 160/100 and he develops symptoms such as worsening chest pain or shortness of breath.    Stage 3 chronic kidney disease  Creatine stable for now. BMP reviewed- noted Estimated Creatinine Clearance: 28.4 mL/min (A) (based on SCr of 2 mg/dL (H)). according to latest data. Based on current GFR, CKD stage is stage 3 - GFR 30-59.  Monitor UOP and serial BMP and adjust therapy as needed. Renally dose meds. Avoid nephrotoxic medications and procedures.    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  Last A1c reviewed-   Lab Results   Component Value Date    HGBA1C 6.7 (H) 05/09/2023     Most recent fingerstick glucose reviewed- No results for input(s): "POCTGLUCOSE" in the last 24 hours.  Current correctional scale  Medium  Maintain anti-hyperglycemic dose as follows- "   Antihyperglycemics (From admission, onward)      Start     Stop Route Frequency Ordered    12/01/23 1504  insulin aspart U-100 pen 0-5 Units         -- SubQ Before meals & nightly PRN 12/01/23 1404          Hold Oral hypoglycemics while patient is in the hospital.      VTE Risk Mitigation (From admission, onward)      None               On 12/01/2023, patient should be placed in hospital observation services under my care.        AdmissionCare    Guideline: General Observation, Observation    Based on the indications selected for the patient, the bed status of Admit to Observation was determined to be MET    The following indications were selected as present at the time of evaluation of the patient:      - Clinical care (eg, testing, monitoring, or treatment) needed beyond the usual emergency department time frame (eg, 3 to 4 hours)   - Clinical care needed is not appropriate for a lower level of care (ie, discharge to outpatient setting not appropriate).   Patient has clinical condition for which observation care is needed, as indicated by 1 or more of the following:   -     Electrolyte or metabolic condition or finding (eg, hypernatremia, hyponatremia, hyperkalemia, hypokalemia, hypercalcemia, metabolic acidosis, malnutrition, Anasarca)    -     AdmissionCare documentation entered by: Fiordaliza Cristina    [facility] [employer], 27th edition, Copyright © 2023 [employer] All Rights Reserved.  [id number]:00     Candelario Pedraza MD  Department of Hospital Medicine  [facility] [place] - Emergency Dept

## 2023-12-01 NOTE — PLAN OF CARE
Received pt from ED for hyperkalemia. Shifted in ED (see EMAR), repeat BMP at 1800. No EKG changes noted. patient NSR and chest pain free.    [facility]  Nurses Note -- 4 Eyes      12/1/2023       Skin assessed on: Admit      [x] No Pressure Injuries Present    [x]Prevention Measures Documented    [] Yes LDA  for Pressure Injury Previously documented     [] Yes New Pressure Injury Discovered   [] LDA for New Pressure Injury Added      Attending RN:  Demi Mendoza RN     Second RN:  LAZ Villagran       Problem: Adult Inpatient Plan of Care  Goal: Plan of Care Review  Outcome: Ongoing, Progressing  Goal: Patient-Specific Goal (Individualized)  Outcome: Ongoing, Progressing  Goal: Absence of Hospital-Acquired Illness or Injury  Outcome: Ongoing, Progressing  Goal: Optimal Comfort and Wellbeing  Outcome: Ongoing, Progressing  Goal: Readiness for Transition of Care  Outcome: Ongoing, Progressing     Problem: Diabetes Comorbidity  Goal: Blood Glucose Level Within Targeted Range  Outcome: Ongoing, Progressing     Problem: Fall Injury Risk  Goal: Absence of Fall and Fall-Related Injury  Outcome: Ongoing, Progressing

## 2023-12-01 NOTE — SUBJECTIVE & OBJECTIVE
Past Medical History:   Diagnosis Date    Arthritis     Cancer     Diabetes mellitus     Elevated PSA     Gout, unspecified     Hypertension     Nuclear sclerotic cataract of both eyes 6/29/2018       Past Surgical History:   Procedure Laterality Date    CATARACT EXTRACTION      ou    EYE SURGERY      bilateral cataracts    removal of small bowel  Apr. 2015    Colostomy       Review of patient's allergies indicates:   Allergen Reactions    Iodine Other (See Comments)     Causes gout to flare up    Shellfish containing products      Causes gout to flare up       Current Facility-Administered Medications on File Prior to Encounter   Medication    ondansetron HCl (PF) 4 mg/2 mL injection     Current Outpatient Medications on File Prior to Encounter   Medication Sig    allopurinoL (ZYLOPRIM) 300 MG tablet Take 1 tablet (300 mg total) by mouth once daily.    amLODIPine (NORVASC) 10 MG tablet TAKE 1 TABLET BY MOUTH EVERY DAY    DULoxetine (CYMBALTA) 30 MG capsule TAKE 1 CAPSULE BY MOUTH 2 TIMES DAILY.    ferrous gluconate (FERGON) 324 MG tablet Take 324 mg by mouth.    gabapentin (NEURONTIN) 300 MG capsule TAKE 1 CAPSULE BY MOUTH THREE TIMES A DAY    hydrALAZINE (APRESOLINE) 100 MG tablet TAKE 1 TABLET BY MOUTH EVERY DAY    JANUVIA 50 mg Tab TAKE 1 TABLET BY MOUTH EVERY DAY    [DISCONTINUED] blood-glucose meter (ONE TOUCH ULTRAMINI) kit To test twice daily. Use as instructed    [DISCONTINUED] cholestyramine (QUESTRAN) 4 gram packet Take 1 packet (4 g total) by mouth 3 (three) times daily with meals.    [DISCONTINUED] colchicine (COLCRYS) 0.6 mg tablet Take 1 tablet (0.6 mg total) by mouth daily as needed.    [DISCONTINUED] loratadine (CLARITIN) 10 mg tablet Take 10 mg by mouth once daily.    [DISCONTINUED] metoprolol tartrate (LOPRESSOR) 50 MG tablet Take 1 tablet (50 mg total) by mouth 2 (two) times daily.    [DISCONTINUED] QUEtiapine (SEROQUEL) 50 MG tablet Take 1 tablet (50 mg total) by mouth every evening.     [DISCONTINUED] sodium bicarbonate 650 MG tablet Take 1 tablet (650 mg total) by mouth 3 (three) times daily.     Family History       Problem Relation (Age of Onset)    Diabetes Mother    Hypertension Mother    No Known Problems Father, Sister, Brother, Maternal Aunt, Maternal Uncle, Paternal Aunt, Paternal Uncle, Maternal Grandmother, Maternal Grandfather, Paternal Grandmother, Paternal Grandfather          Tobacco Use    Smoking status: Never    Smokeless tobacco: Never    Tobacco comments:     smoked short while as a teen   Substance and Sexual Activity    Alcohol use: No    Drug use: No    Sexual activity: Not on file     Review of Systems   Constitutional:  Negative for activity change and appetite change.   HENT:  Negative for congestion.    Eyes:  Negative for discharge.   Respiratory:  Negative for apnea and chest tightness.    Cardiovascular:  Positive for chest pain.   Gastrointestinal:  Negative for abdominal distention and abdominal pain.   Endocrine: Negative for cold intolerance and heat intolerance.   Genitourinary:  Negative for difficulty urinating.   Musculoskeletal:  Negative for arthralgias and back pain.   Skin:  Negative for color change and pallor.   Allergic/Immunologic: Negative for environmental allergies and food allergies.   Hematological:  Negative for adenopathy.   Psychiatric/Behavioral:  Negative for agitation and behavioral problems.      Objective:     Vital Signs (Most Recent):  Temp: 98.3 °F (36.8 °C) (12/01/23 1140)  Pulse: 66 (12/01/23 1350)  Resp: (!) 28 (12/01/23 1350)  BP: (!) 148/75 (12/01/23 1330)  SpO2: 100 % (12/01/23 1350) Vital Signs (24h Range):  Temp:  [98.3 °F (36.8 °C)] 98.3 °F (36.8 °C)  Pulse:  [59-92] 66  Resp:  [18-31] 28  SpO2:  [100 %] 100 %  BP: (120-148)/(71-75) 148/75     Weight: 77.1 kg (170 lb)  Body mass index is 23.06 kg/m².     Physical Exam  HENT:      Head: Normocephalic.      Left Ear: Tympanic membrane normal.      Nose: Nose normal.       Mouth/Throat:      Mouth: Mucous membranes are dry.   Eyes:      Extraocular Movements: Extraocular movements intact.      Pupils: Pupils are equal, round, and reactive to light.   Cardiovascular:      Rate and Rhythm: Normal rate and regular rhythm.      Heart sounds: No murmur heard.     No gallop.   Pulmonary:      Effort: No respiratory distress.      Breath sounds: No wheezing.   Abdominal:      General: There is no distension.      Tenderness: There is no abdominal tenderness.   Musculoskeletal:         General: No swelling or deformity.   Skin:     Coloration: Skin is not jaundiced.      Findings: No bruising.   Neurological:      Mental Status: He is alert and oriented to person, place, and time.      Cranial Nerves: No cranial nerve deficit.      Motor: No weakness.   Psychiatric:         Mood and Affect: Mood normal.         Behavior: Behavior normal.              CRANIAL NERVES     CN III, IV, VI   Pupils are equal, round, and reactive to light.       Significant Labs: All pertinent labs within the past 24 hours have been reviewed.  BMP:   Recent Labs   Lab 12/01/23  1215   *   *   K 6.7*      CO2 17*   BUN 21   CREATININE 2.0*   CALCIUM 9.8     CBC:   Recent Labs   Lab 12/01/23  1215   WBC 8.06   HGB 12.1*   HCT 39.8*        CMP:   Recent Labs   Lab 12/01/23  1215   *   K 6.7*      CO2 17*   *   BUN 21   CREATININE 2.0*   CALCIUM 9.8   PROT 7.6   ALBUMIN 4.1   BILITOT 0.7   ALKPHOS 66   AST 18   ALT 17   ANIONGAP 10     Troponin:   Recent Labs   Lab 12/01/23  1215   TROPONINI <0.006       Significant Imaging: I have reviewed all pertinent imaging results/findings within the past 24 hours.

## 2023-12-01 NOTE — ED PROVIDER NOTES
Encounter Date: 12/1/2023    SCRIBE #1 NOTE: I, Champ Ramos, am scribing for, and in the presence of,  Terrence Smith MD.       History     Chief Complaint   Patient presents with    Chest Pain     Ems called to 89yo male that began having intermittent substernal chest tightness that started yesterday. Non-radiating and denied any other symptoms. Took 2 antonio aspirin earlier today.      Eugene Gamez is a 88 y.o. male with a PMHx of Elevated PSA, Cancer, HTN, and DM presents to the ED due to chest pain.  EMS reports patient began experiencing a substernal chest pain yesterday and has a leaking ostomy bag. Patient states that his current chest pain episode began this morning has been constant. The pain is described as an indigestion, gas/pressure-like pain. His chest pain is relieved with movement of his arms inwards and belching. He took Aspirin x2 this morning without relief.    The history is provided by the patient and the EMS personnel.     Review of patient's allergies indicates:   Allergen Reactions    Iodine Other (See Comments)     Causes gout to flare up    Shellfish containing products      Causes gout to flare up     Past Medical History:   Diagnosis Date    Arthritis     Cancer     Diabetes mellitus     Elevated PSA     Gout, unspecified     Hypertension     Nuclear sclerotic cataract of both eyes 6/29/2018     Past Surgical History:   Procedure Laterality Date    CATARACT EXTRACTION      ou    EYE SURGERY      bilateral cataracts    removal of small bowel  Apr. 2015    Colostomy     Family History   Problem Relation Age of Onset    Hypertension Mother     Diabetes Mother     No Known Problems Father     No Known Problems Sister     No Known Problems Brother     No Known Problems Maternal Aunt     No Known Problems Maternal Uncle     No Known Problems Paternal Aunt     No Known Problems Paternal Uncle     No Known Problems Maternal Grandmother     No Known Problems Maternal Grandfather     No Known  Problems Paternal Grandmother     No Known Problems Paternal Grandfather     Amblyopia Neg Hx     Blindness Neg Hx     Cancer Neg Hx     Cataracts Neg Hx     Glaucoma Neg Hx     Macular degeneration Neg Hx     Retinal detachment Neg Hx     Strabismus Neg Hx     Stroke Neg Hx     Thyroid disease Neg Hx      Social History     Tobacco Use    Smoking status: Never    Smokeless tobacco: Never    Tobacco comments:     smoked short while as a teen   Substance Use Topics    Alcohol use: No    Drug use: No     Review of Systems   Constitutional: Negative.    HENT: Negative.     Respiratory: Negative.     Cardiovascular:  Positive for chest pain.   Gastrointestinal:         Positive for leaking ostomy bag.   Genitourinary: Negative.    Musculoskeletal: Negative.    Skin: Negative.    Neurological: Negative.        Physical Exam     Initial Vitals [12/01/23 1140]   BP Pulse Resp Temp SpO2   120/71 92 18 98.3 °F (36.8 °C) 100 %      MAP       --         Physical Exam    Nursing note and vitals reviewed.  Constitutional: He appears well-developed and well-nourished. He is not diaphoretic. No distress.   HENT:   Head: Normocephalic and atraumatic.   Nose: Nose normal.   Eyes: EOM are normal. Pupils are equal, round, and reactive to light.   Neck: Neck supple. No tracheal deviation present. No JVD present.   Normal range of motion.  Cardiovascular:  Normal rate, regular rhythm, normal heart sounds and intact distal pulses.           Pulmonary/Chest: Breath sounds normal. No respiratory distress. He has no wheezes. He has no rhonchi. He has no rales. He exhibits no tenderness.   Abdominal: Abdomen is soft. Bowel sounds are normal. He exhibits no distension. There is no abdominal tenderness.   Leaking ostomy appears intact to left lower quadrant There is no rebound and no guarding.   Musculoskeletal:         General: No tenderness or edema. Normal range of motion.      Cervical back: Normal range of motion and neck supple.      Neurological: He is alert and oriented to person, place, and time. He has normal strength.   Skin: Skin is warm and dry. Capillary refill takes less than 2 seconds. No rash noted. No erythema.         ED Course   Critical Care    Date/Time: 12/1/2023 12:17 PM    Performed by: Terrence Smith MD  Authorized by: Terrence Smith MD  Direct patient critical care time: 15 minutes  Additional history critical care time: 10 minutes  Ordering / reviewing critical care time: 5 minutes  Documentation critical care time: 5 minutes  Total critical care time (exclusive of procedural time) : 35 minutes  Critical care time was exclusive of separately billable procedures and treating other patients and teaching time.  Critical care was necessary to treat or prevent imminent or life-threatening deterioration of the following conditions: circulatory failure, metabolic crisis and renal failure.  Critical care was time spent personally by me on the following activities: development of treatment plan with patient or surrogate, discussions with consultants, evaluation of patient's response to treatment, examination of patient, obtaining history from patient or surrogate, ordering and performing treatments and interventions, ordering and review of laboratory studies, ordering and review of radiographic studies, re-evaluation of patient's condition and review of old charts.        Labs Reviewed   CBC W/ AUTO DIFFERENTIAL - Abnormal; Notable for the following components:       Result Value    RBC 4.25 (*)     Hemoglobin 12.1 (*)     Hematocrit 39.8 (*)     MCHC 30.4 (*)     Gran % 79.5 (*)     Lymph % 12.5 (*)     All other components within normal limits   COMPREHENSIVE METABOLIC PANEL - Abnormal; Notable for the following components:    Sodium 134 (*)     Potassium 6.7 (*)     CO2 17 (*)     Glucose 173 (*)     Creatinine 2.0 (*)     eGFR 32 (*)     All other components within normal limits    Narrative:        potassium  critical result(s) called and verbal readback obtained   from Paul Manzo  by JCT3 12/01/2023 12:49   POCT GLUCOSE - Abnormal; Notable for the following components:    POCT Glucose 158 (*)     All other components within normal limits   TROPONIN I   B-TYPE NATRIURETIC PEPTIDE   HEMOGLOBIN A1C   POCT GLUCOSE MONITORING CONTINUOUS          Imaging Results              X-Ray Chest AP Portable (Final result)  Result time 12/01/23 13:03:11      Final result by Bryan Neumann MD (12/01/23 13:03:11)                   Impression:      No detrimental change or radiographic acute intrathoracic process seen on this single view.      Electronically signed by: Bryan Neumann MD  Date:    12/01/2023  Time:    13:03               Narrative:    EXAMINATION:  XR CHEST AP PORTABLE    CLINICAL HISTORY:  Chest Pain;    TECHNIQUE:  Single frontal view of the chest was performed.    COMPARISON:  Chest radiograph and CT abdomen and pelvis 05/09/2023, cervical spine CT 01/18/2022, PET CT 08/11/2016    FINDINGS:  Monitoring leads overlie the chest.  Patient is slightly rotated.    Left chest vascular port stable.  Cardiomediastinal silhouette is midline with similar tortuosity of the aorta.  Heart is not enlarged.  Pulmonary vasculature and hilar contours are within normal limits.  Similar slight nonspecific elevation of the left hemidiaphragm.  Few scattered linear opacities throughout the lungs consistent with minimal platelike scarring versus atelectasis.  The lungs are otherwise well expanded without consolidation, pleural effusion or pneumothorax.  No acute osseous process seen.  PA and lateral views can be obtained.                                       Medications   calcium gluconate 1 g in NS IVPB (premixed) (0 g Intravenous Stopped 12/1/23 1331)     And   calcium gluconate 1 g in NS IVPB (premixed) (has no administration in time range)   dextrose 50% injection 25 g (25 g Intravenous Given 12/1/23 1327)     And   dextrose 50%  injection 25 g (has no administration in time range)     And   insulin regular injection 7.71 Units 0.0771 mL (7.71 Units Intravenous Given 12/1/23 1327)   amLODIPine tablet 10 mg (10 mg Oral Given 12/2/23 0852)   DULoxetine DR capsule 30 mg (30 mg Oral Given 12/2/23 0853)   gabapentin capsule 100 mg (100 mg Oral Given 12/2/23 0853)   QUEtiapine tablet 50 mg (50 mg Oral Given 12/1/23 2159)   sodium bicarbonate tablet 650 mg (650 mg Oral Given 12/2/23 0853)   glucose chewable tablet 16 g (has no administration in time range)   glucose chewable tablet 24 g (has no administration in time range)   glucagon (human recombinant) injection 1 mg (has no administration in time range)   insulin aspart U-100 pen 0-5 Units (has no administration in time range)   dextrose 10% bolus 125 mL 125 mL (has no administration in time range)   dextrose 10% bolus 250 mL 250 mL (has no administration in time range)   sodium zirconium cyclosilicate packet 10 g (10 g Oral Given 12/2/23 0854)   famotidine (PF) injection 40 mg (40 mg Intravenous Given 12/1/23 1240)   aluminum-magnesium hydroxide-simethicone 200-200-20 mg/5 mL suspension 30 mL (30 mLs Oral Given 12/1/23 1240)   sodium chloride 0.9% bolus 1,000 mL 1,000 mL (0 mLs Intravenous Stopped 12/1/23 1427)   sodium zirconium cyclosilicate packet 10 g (10 g Oral Given 12/1/23 1326)   albuterol nebulizer solution 10 mg (10 mg Nebulization Given 12/1/23 1350)   sodium bicarbonate solution 50 mEq (50 mEq Intravenous Given 12/1/23 1327)     Medical Decision Making  Amount and/or Complexity of Data Reviewed  Labs: ordered.  Radiology: ordered.    Risk  OTC drugs.  Prescription drug management.      MDM:    88-year-old male with past medical history as noted above presenting with chest pain.  Physical exam as noted above.  ED workup notable for CBC with hemoglobin 12.1, white blood cell count 8.06, CMP with potassium 6.7, BUN 21, creatinine 2.0, troponin negative, BNP 29, chest x-rays  unremarkable.  Patient presentation concerning for hyperkalemia, EKG reviewed shows evidence of peaked T-waves when compared to prior a significant notable change, chest pain appears to be more GI related however given his CKD, hyperkalemia here and some EKG changes will shift patient and admit to the ICU.  After further discussion patient's wife and family members have been cooking with a salt substitute which is likely the cause for his hyperkalemia today.  He currently appears well otherwise with stable vitals and will be stable for continued evaluation and management in the ICU of his hyperkalemia.    Note was created using voice recognition software. Note may have occasional typographical or grammatical errors, garbled syntax, and other bizarre constructions that may not have been identified and edited despite good rell initial review prior to signing.                                         Clinical Impression:  Final diagnoses:  [R07.9] Chest pain          ED Disposition Condition    Observation                I, Terrence Smith M.D., personally performed the services described in this documentation. All medical record entries made by the scribe were at my direction and in my presence. I have reviewed the chart and agree that the record reflects my personal performance and is accurate and complete.     Terrence Smith MD  12/02/23 [id number]

## 2023-12-01 NOTE — ASSESSMENT & PLAN NOTE
Patient's anemia is currently controlled. Has not received any PRBCs to date. Etiology likely d/t chronic disease due to Chronic Kidney Disease/ESRD  Current CBC reviewed-   Lab Results   Component Value Date    HGB 12.1 (L) 12/01/2023    HCT 39.8 (L) 12/01/2023     Monitor serial CBC and transfuse if patient becomes hemodynamically unstable, symptomatic or H/H drops below 7/21.   CHEST PAIN

## 2023-12-01 NOTE — ASSESSMENT & PLAN NOTE
Patient's COPD is controlled currently.  Patient is currently off COPD Pathway. Continue scheduled inhalers  nebuzlier   and monitor respiratory status closely.

## 2023-12-01 NOTE — HPI
Eugene Gamez is a 88 y.o. male with a PMHx of Elevated PSA, Cancer, HTN, CKD 3,and DM presents to the ED due to chest pain.  EMS reports patient began experiencing a substernal chest pain yesterday and has a leaking ostomy bag. Patient states that his current chest pain episode began this morning has been constant. The pain is described as an indigestion, gas/pressure-like pain. His chest pain is relieved with movement of his arms inwards and belching. He took Aspirin x2 this morning without relief.patient has potassium level over 6.7,  Some EKG changes,patient has been started on IV calcium,Lokelma,insulin , and albuterol treatments,he denies chest pain at this time,he has normal troponin.

## 2023-12-01 NOTE — ASSESSMENT & PLAN NOTE
Chronic, controlled. Latest blood pressure and vitals reviewed-     Temp:  [98.3 °F (36.8 °C)]   Pulse:  [59-92]   Resp:  [18-31]   BP: (120-148)/(71-75)   SpO2:  [100 %] .   Home meds for hypertension were reviewed and noted below.   Hypertension Medications               amLODIPine (NORVASC) 10 MG tablet TAKE 1 TABLET BY MOUTH EVERY DAY    hydrALAZINE (APRESOLINE) 100 MG tablet TAKE 1 TABLET BY MOUTH EVERY DAY            While in the hospital, will manage blood pressure as follows; Continue home antihypertensive regimen    Will utilize p.r.n. blood pressure medication only if patient's blood pressure greater than 160/100 and he develops symptoms such as worsening chest pain or shortness of breath.

## 2023-12-01 NOTE — ADMISSIONCARE
AdmissionCare    Guideline: General Observation, Observation    Based on the indications selected for the patient, the bed status of Admit to Observation was determined to be MET    The following indications were selected as present at the time of evaluation of the patient:      - Clinical care (eg, testing, monitoring, or treatment) needed beyond the usual emergency department time frame (eg, 3 to 4 hours)   - Clinical care needed is not appropriate for a lower level of care (ie, discharge to outpatient setting not appropriate).   Patient has clinical condition for which observation care is needed, as indicated by 1 or more of the following:   -     Electrolyte or metabolic condition or finding (eg, hypernatremia, hyponatremia, hyperkalemia, hypokalemia, hypercalcemia, metabolic acidosis, malnutrition, Anasarca)    -     AdmissionCare documentation entered by: Fiordaliza Cristina    [facility], 27th edition, Copyright © 2023 [facility], [facility] All Rights Reserved.  [id number]:18:20-06:00

## 2023-12-01 NOTE — ASSESSMENT & PLAN NOTE
Creatine stable for now. BMP reviewed- noted Estimated Creatinine Clearance: 28.4 mL/min (A) (based on SCr of 2 mg/dL (H)). according to latest data. Based on current GFR, CKD stage is stage 3 - GFR 30-59.  Monitor UOP and serial BMP and adjust therapy as needed. Renally dose meds. Avoid nephrotoxic medications and procedures.

## 2023-12-01 NOTE — ADMISSIONCARE
AdmissionCare    Guideline: General Observation, Observation    Based on the indications selected for the patient, the bed status of Admit to Observation was determined to be MET    The following indications were selected as present at the time of evaluation of the patient:      - Clinical care (eg, testing, monitoring, or treatment) needed beyond the usual emergency department time frame (eg, 3 to 4 hours)   - Clinical care needed is not appropriate for a lower level of care (ie, discharge to outpatient setting not appropriate).   Patient has clinical condition for which observation care is needed, as indicated by 1 or more of the following:   -     Electrolyte or metabolic condition or finding (eg, hypernatremia, hyponatremia, hyperkalemia, hypokalemia, hypercalcemia, metabolic acidosis, malnutrition, Anasarca)    -     AdmissionCare documentation entered by: Fiordaliza Cristina    [facility], 27th edition, Copyright © 2023 [facility], [facility] All Rights Reserved.  [id number]:09-06:00

## 2023-12-01 NOTE — ASSESSMENT & PLAN NOTE
This patient has hyperkalemia which is uncontrolled. We will monitor for arrhythmias with EKG or continuous telemetry. We will treat the hyperkalemia with Potassium Binders, Calcium gluconate, IV insulin and dextrose, and Nebulized albuterol sulfate. The likely etiology of the hyperkalemia is VINNY.  The patients latest potassium has been reviewed and the results are listed below  Recent Labs   Lab 12/01/23  1215   K 6.7*     patient has potassium level over 6.7,  Some EKG changes,patient has been started on IV calcium,Lokelma,insulin , and albuterol treatments,he denies chest pain at this time,he has normal troponin.

## 2023-12-02 VITALS
RESPIRATION RATE: 17 BRPM | HEART RATE: 78 BPM | OXYGEN SATURATION: 100 % | HEIGHT: 74 IN | WEIGHT: 170.88 LBS | TEMPERATURE: 97 F | BODY MASS INDEX: 21.93 KG/M2 | DIASTOLIC BLOOD PRESSURE: 68 MMHG | SYSTOLIC BLOOD PRESSURE: 141 MMHG

## 2023-12-02 LAB
ANION GAP SERPL CALC-SCNC: 11 MMOL/L (ref 8–16)
BUN SERPL-MCNC: 21 MG/DL (ref 8–23)
CALCIUM SERPL-MCNC: 9.8 MG/DL (ref 8.7–10.5)
CHLORIDE SERPL-SCNC: 109 MMOL/L (ref 95–110)
CO2 SERPL-SCNC: 19 MMOL/L (ref 23–29)
CREAT SERPL-MCNC: 1.6 MG/DL (ref 0.5–1.4)
EST. GFR  (NO RACE VARIABLE): 41 ML/MIN/1.73 M^2
ESTIMATED AVG GLUCOSE: 131 MG/DL (ref 68–131)
GLUCOSE SERPL-MCNC: 123 MG/DL (ref 70–110)
HBA1C MFR BLD: 6.2 % (ref 4–5.6)
POCT GLUCOSE: 200 MG/DL (ref 70–110)
POTASSIUM SERPL-SCNC: 5 MMOL/L (ref 3.5–5.1)
SODIUM SERPL-SCNC: 139 MMOL/L (ref 136–145)

## 2023-12-02 PROCEDURE — 36415 COLL VENOUS BLD VENIPUNCTURE: CPT | Performed by: HOSPITALIST

## 2023-12-02 PROCEDURE — 80048 BASIC METABOLIC PNL TOTAL CA: CPT | Performed by: HOSPITALIST

## 2023-12-02 PROCEDURE — 97161 PT EVAL LOW COMPLEX 20 MIN: CPT

## 2023-12-02 PROCEDURE — 25000003 PHARM REV CODE 250: Performed by: HOSPITALIST

## 2023-12-02 PROCEDURE — G0378 HOSPITAL OBSERVATION PER HR: HCPCS

## 2023-12-02 PROCEDURE — 97165 OT EVAL LOW COMPLEX 30 MIN: CPT

## 2023-12-02 PROCEDURE — 97110 THERAPEUTIC EXERCISES: CPT

## 2023-12-02 PROCEDURE — 25000003 PHARM REV CODE 250: Performed by: STUDENT IN AN ORGANIZED HEALTH CARE EDUCATION/TRAINING PROGRAM

## 2023-12-02 RX ORDER — GABAPENTIN 100 MG/1
100 CAPSULE ORAL 3 TIMES DAILY
Qty: 30 CAPSULE | Refills: 0 | Status: SHIPPED | OUTPATIENT
Start: 2023-12-02 | End: 2024-02-21 | Stop reason: SDUPTHER

## 2023-12-02 RX ADMIN — SODIUM BICARBONATE 650 MG: 325 TABLET ORAL at 02:12

## 2023-12-02 RX ADMIN — SODIUM BICARBONATE 650 MG: 325 TABLET ORAL at 08:12

## 2023-12-02 RX ADMIN — DULOXETINE 30 MG: 30 CAPSULE, DELAYED RELEASE ORAL at 08:12

## 2023-12-02 RX ADMIN — SODIUM ZIRCONIUM CYCLOSILICATE 10 G: 10 POWDER, FOR SUSPENSION ORAL at 02:12

## 2023-12-02 RX ADMIN — AMLODIPINE BESYLATE 10 MG: 5 TABLET ORAL at 08:12

## 2023-12-02 RX ADMIN — GABAPENTIN 100 MG: 100 CAPSULE ORAL at 02:12

## 2023-12-02 RX ADMIN — GABAPENTIN 100 MG: 100 CAPSULE ORAL at 08:12

## 2023-12-02 RX ADMIN — SODIUM ZIRCONIUM CYCLOSILICATE 10 G: 10 POWDER, FOR SUSPENSION ORAL at 08:12

## 2023-12-02 NOTE — PLAN OF CARE
Problem: Physical Therapy  Goal: Physical Therapy Goal  Description: Goals to be met by: 23     Patient will increase functional independence with mobility by performin. Supine to sit with Stand-by Assistance  2. Rolling to Left and Right with Modified [place].  3. Sit to stand transfer with Supervision  4. Gait  x 200 feet with Supervision using Rolling Walker.     Outcome: Ongoing, Progressing     PT eval completed today. Pt presents asleep in chair. Pt transfers sit<>stand w/ RW and supervision on R LE maintains L LE in extension. Pt performs step transfer to bed w/ RW and supervision. Pt modified independent with sit>supine and scooting up in bed. Pt would benefit from skilled therapy to maximize functional mobility and dec risk for falls   Previously Declined (within the last year)

## 2023-12-02 NOTE — PLAN OF CARE
[facility] Care      HOME HEALTH ORDERS  FACE TO FACE ENCOUNTER    Patient Name: Eugene Gamez  YOB: 1935    PCP: Larry Mnoae MD   PCP Address: [address] / [place] [address]  PCP Phone Number: [phone]  PCP Fax: [phone]    Encounter Date: 12/1/23    Admit to Home Health    Diagnoses:  Active Hospital Problems    Diagnosis  POA    *Hyperkalemia [E87.5]  Yes     Priority: 1 - High    Pulmonary emphysema, unspecified emphysema type [J43.9]  Yes    Anemia of chronic disease [D63.8]  Yes    Benign essential HTN [I10]  Yes    Stage 3 chronic kidney disease [N18.30]  Yes     Chronic    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       Follow Up Appointments:  Future Appointments   Date Time Provider Department Center   12/21/2023 10:30 AM Shreya Goss OD [facility] [place]   2/21/2024  9:40 AM Larry Monae MD [facility] MED [place]       Allergies:  Review of patient's allergies indicates:   Allergen Reactions    Iodine Other (See Comments)     Causes gout to flare up    Shellfish containing products      Causes gout to flare up       Medications: Review discharge medications with patient and family and provide education.    Current Facility-Administered Medications   Medication Dose Route Frequency Provider Last Rate Last Admin    amLODIPine tablet 10 mg  10 mg Oral Daily Candelario Pedraza MD   10 mg at 12/02/23 0852    calcium gluconate 1 g in NS IVPB (premixed)  1 g Intravenous Q10 Min PRN Terrence Smith MD        dextrose 10% bolus 125 mL 125 mL  12.5 g Intravenous PRN Candelario Pedraza MD        dextrose 10% bolus 250 mL 250 mL  25 g Intravenous PRN Candelario Pedraza MD        dextrose 50% injection 25 g  25 g Intravenous PRN Terrence Smith MD        DULoxetine DR capsule 30 mg  30 mg Oral Daily Candelario Pedraza MD   30 mg  at 12/02/23 0853    gabapentin capsule 100 mg  100 mg Oral TID Candelario Pedraza MD   100 mg at 12/02/23 0853    glucagon (human recombinant) injection 1 mg  1 mg Intramuscular PRN Candelario Pedraza MD        glucose chewable tablet 16 g  16 g Oral PRN Candelario Pedraza MD        glucose chewable tablet 24 g  24 g Oral PRN Candelario Pedraza MD        insulin aspart U-100 pen 0-5 Units  0-5 Units Subcutaneous QID (AC + HS) PRN Candelario Pedraza MD        QUEtiapine tablet 50 mg  50 mg Oral QHS Candelario Pedraza MD   50 mg at 12/01/23 2159    sodium bicarbonate tablet 650 mg  650 mg Oral TID Candelario Pedraza MD   650 mg at 12/02/23 0853    sodium zirconium cyclosilicate packet 10 g  10 g Oral TID Saurabh Simpson MD   10 g at 12/02/23 0854     Facility-Administered Medications Ordered in Other Encounters   Medication Dose Route Frequency Provider Last Rate Last Admin    ondansetron HCl (PF) 4 mg/2 mL injection    PRN James Ryan CRNA   4 mg at 06/29/15 0856     Current Discharge Medication List        CONTINUE these medications which have CHANGED    Details   gabapentin (NEURONTIN) 100 MG capsule Take 1 capsule (100 mg total) by mouth 3 (three) times daily. for 10 days  Qty: 30 capsule, Refills: 0           CONTINUE these medications which have NOT CHANGED    Details   allopurinoL (ZYLOPRIM) 300 MG tablet Take 1 tablet (300 mg total) by mouth once daily.  Qty: 90 tablet, Refills: 3    Associated Diagnoses: Chronic gout without tophus, unspecified cause, unspecified site      amLODIPine (NORVASC) 10 MG tablet TAKE 1 TABLET BY MOUTH EVERY DAY  Qty: 90 tablet, Refills: 0    Associated Diagnoses: Essential hypertension      DULoxetine (CYMBALTA) 30 MG capsule TAKE 1 CAPSULE BY MOUTH 2 TIMES DAILY.  Qty: 180 capsule, Refills: 3    Associated Diagnoses: Chemotherapy-induced neuropathy      hydrALAZINE (APRESOLINE) 100 MG tablet TAKE 1 TABLET BY MOUTH EVERY DAY  Qty: 90 tablet,  Refills: 1    Associated Diagnoses: Essential hypertension      ferrous gluconate (FERGON) 324 MG tablet Take 324 mg by mouth.      JANUVIA 50 mg Tab TAKE 1 TABLET BY MOUTH EVERY DAY  Qty: 90 tablet, Refills: 1    Associated Diagnoses: Controlled type 2 diabetes mellitus with stage 3 chronic kidney disease, without long-term current use of insulin               I have seen and examined this patient within the last 30 days. My clinical findings that support the need for the home health skilled services and home bound status are the following:no   Weakness/numbness causing balance and gait disturbance due to Weakness/Debility making it taxing to leave home.     Diet:   renal diet        Referrals/ Consults  Physical Therapy to evaluate and treat. Evaluate for home safety and equipment needs; Establish/upgrade home exercise program. Perform / instruct on therapeutic exercises, gait training, transfer training, and Range of Motion.  Occupational Therapy to evaluate and treat. Evaluate home environment for safety and equipment needs. Perform/Instruct on transfers, ADL training, ROM, and therapeutic exercises.    Activities:   activity as tolerated    Nursing:   Agency to admit patient within 24 hours of hospital discharge unless specified on physician order or at patient request    SN to complete comprehensive assessment including routine vital signs. Instruct on disease process and s/s of complications to report to MD. Review/verify medication list sent home with the patient at time of discharge  and instruct patient/caregiver as needed. Frequency may be adjusted depending on start of care date.     Skilled nurse to perform up to 3 visits PRN for symptoms related to diagnosis    Notify MD if SBP > 160 or < 90; DBP > 90 or < 50; HR > 120 or < 50; Temp > 101; O2 < 88%; Other:       Ok to schedule additional visits based on staff availability and patient request on consecutive days within the home health episode.    When  multiple disciplines ordered:    Start of Care occurs on Sunday - Wednesday schedule remaining discipline evaluations as ordered on separate consecutive days following the start of care.    Thursday SOC -schedule subsequent evaluations Friday and Monday the following week.     Friday - Saturday SOC - schedule subsequent discipline evaluations on consecutive days starting Monday of the following week.    For all post-discharge communication and subsequent orders please contact patient's primary care physician. If unable to reach primary care physician or do not receive response within 30 minutes, please contact  [facility]  for clinical staff order clarification    Miscellaneous   Routine Skin for Bedridden Patients: Instruct patient/caregiver to apply moisture barrier cream to all skin folds and wet areas in perineal area daily and after baths and all bowel movements.    Home Health Aide:  Nursing Twice weekly, Physical Therapy Twice weekly, and Occupational Therapy Twice weekly    Wound Care Orders  no    I certify that this patient is confined to his home and needs intermittent skilled nursing care, physical therapy, and occupational therapy.

## 2023-12-02 NOTE — DISCHARGE SUMMARY
[facility]  Discharge Summary      Patient Name: Eugene Gamez  MRN: [id number]  [facility]: [id number]  Patient Class: OP- Observation  Admission Date: 12/1/2023  Hospital Length of Stay: 0 days  Discharge Date and Time:  12/02/2023 10:48 AM  Attending Physician: Candelario Pedraza, *   Discharging Provider: Candelario Pedraza MD  Primary Care Provider: Larry Monae MD    Primary Care Team: Networked reference to record PCT     HPI:     Eugene Gamez is a 88 y.o. male with a PMHx of Elevated PSA, Cancer, HTN, CKD 3,and DM presents to the ED due to chest pain.  EMS reports patient began experiencing a substernal chest pain yesterday and has a leaking ostomy bag. Patient states that his current chest pain episode began this morning has been constant. The pain is described as an indigestion, gas/pressure-like pain. His chest pain is relieved with movement of his arms inwards and belching. He took Aspirin x2 this morning without relief.patient has potassium level over 6.7,  Some EKG changes,patient has been started on IV calcium,Lokelma,insulin , and albuterol treatments,he denies chest pain at this time,he has normal troponin.    * No surgery found *      Hospital Course:   Eugene Gamez is a 88 y.o. male with a PMHx of Elevated PSA, Cancer, HTN, CKD 3,and DM presents to the ED due to chest pain.  EMS reports patient began experiencing a substernal chest pain yesterday and has a leaking ostomy bag. Patient states that his current chest pain episode began this morning has been constant. The pain is described as an indigestion, gas/pressure-like pain. His chest pain is relieved with movement of his arms inwards and belching. He took Aspirin x2 this morning without relief.patient has potassium level over 6.7,  Some EKG changes,patient has been started on IV calcium,Lokelma,insulin , and albuterol treatments,he denies chest pain at this time,he has normal  troponin.  Hyperkalemia with albuterol,Lokelma,insulin is resolved,EKG change is resolved.patient was discharged home with HH.   A commode is covered when the beneficiary is physically incapable of utilizing regular toilet facilities for a 30 to 90 day period.    This would occur in the following situations:     1. The beneficiary is confined to a single room, or  2. The beneficiary is confined to one level of the home environment and there is no toilet on that level, or  3. The beneficiary is confined to the home and there are no toilet facilities in the home.     If any of the above apply to the patient, then we would need it documented in progress notes stating that the patient is bedroom confined for an extended time and non-ambulatory.   If the physical therapist states, the patient is ambulatory then this would disqualify the patient.      Also, a commode chair that is used as a raised toilet seat by positioning it over the toilet is non-covered/no exceptions.  Goals of Care Treatment Preferences:  Code Status: Full Code    Living Will: Yes              Consults:   Consults (From admission, onward)          Status Ordering Provider     IP consult to case management  Once        Provider:  (Not yet assigned)    Acknowledged DAVID SYLVESTER            No new Assessment & Plan notes have been filed under this hospital service since the last note was generated.  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    Specialties: Family Medicine, Wound Care  Contact information:  [place] [address] [phone]                           Patient Instructions:      COMMODE FOR HOME USE     Order Specific Question Answer Comments   Type: Standard    Height: 6' 2" (1.88 m)    Weight: 77.5 kg (170 lb 13.7 oz)    Does patient have medical equipment at home? cane, quad    Does patient have medical equipment at home? walker, rolling    Does patient have medical equipment at home? bedside commode    Does patient have medical equipment at home? bath bench    Length of need (1-99 months): 3      Activity as tolerated       Significant Diagnostic Studies: Labs: BMP:   Recent Labs   Lab 12/01/23  1215 12/01/23  1813 12/02/23  0506   * 174* 123*   * 137 139   K 6.7* 5.8* 5.0    109 109   CO2 17* 17* 19*   BUN 21 21 21   CREATININE 2.0* 1.7* 1.6*   CALCIUM 9.8 9.6 9.8   , CMP   Recent Labs   Lab 12/01/23  1215 12/01/23  1813 12/02/23  0506   * 137 139   K 6.7* 5.8* 5.0    109 109   CO2 17* 17* 19*   * 174* 123*   BUN 21 21 21   CREATININE 2.0* 1.7* 1.6*   CALCIUM 9.8 9.6 9.8   PROT 7.6  --   --    ALBUMIN 4.1  --   --    BILITOT 0.7  --   --    ALKPHOS 66  --   --    AST 18  --   --    ALT 17  --   --    ANIONGAP 10 11 11   , and CBC   Recent Labs   Lab 12/01/23  1215   WBC 8.06   HGB 12.1*   HCT 39.8*        Radiology: X-Ray: CXR: X-Ray Chest 1 View (CXR): No results found for this visit on 12/01/23. and X-Ray Chest PA and Lateral (CXR): No results found for this visit on 12/01/23.    Pending Diagnostic Studies:       Procedure Component Value Units Date/Time    Hemoglobin A1c if not done in past 3 months [[id number]] Collected: 12/01/23 1512    Order Status: Sent Lab Status: In process Updated: 12/01/23 1512    Specimen: Blood          "   Medications:  Reconciled Home Medications:      Medication List        CHANGE how you take these medications      gabapentin 100 MG capsule  Commonly known as: NEURONTIN  Take 1 capsule (100 mg total) by mouth 3 (three) times daily. for 10 days  What changed:   medication strength  how much to take            CONTINUE taking these medications      allopurinoL 300 MG tablet  Commonly known as: ZYLOPRIM  Take 1 tablet (300 mg total) by mouth once daily.     amLODIPine 10 MG tablet  Commonly known as: NORVASC  TAKE 1 TABLET BY MOUTH EVERY DAY     DULoxetine 30 MG capsule  Commonly known as: CYMBALTA  TAKE 1 CAPSULE BY MOUTH 2 TIMES DAILY.     ferrous gluconate 324 MG tablet  Commonly known as: FERGON  Take 324 mg by mouth.     hydrALAZINE 100 MG tablet  Commonly known as: APRESOLINE  TAKE 1 TABLET BY MOUTH EVERY DAY     JANUVIA 50 MG Tab  Generic drug: sitaGLIPtin phosphate  TAKE 1 TABLET BY MOUTH EVERY DAY              Indwelling Lines/Drains at time of discharge:   Lines/Drains/Airways       Central Venous Catheter Line  Duration             Port A Cath Single Lumen Subclavian Left -- days              Drain  Duration                  Colostomy 04/23/15 LLQ 3145 days                    Time spent on the discharge of patient:  over 45  over 45  minutes    Critical care time spent on the evaluation and treatment of severe organ dysfunction, review of pertinent labs and imaging studies, discussions with consulting providers and discussions with patient/family: over 45  minutes.     Candelario Pedraza MD  Department of Hospital Medicine  [facility] - Intensive Care

## 2023-12-02 NOTE — PT/OT/SLP EVAL
Physical Therapy Evaluation    Patient Name:  Eugene Gamez   MRN:  [id number]    Recommendations:     Discharge Recommendations: Low Intensity Therapy (w/ caregiver assistance/supervision)   Discharge Equipment Recommendations: none   Barriers to discharge: None    Assessment:     Eugene Gamez is a 88 y.o. male admitted with a medical diagnosis of Hyperkalemia.  He presents with the following impairments/functional limitations: weakness, impaired endurance, impaired self care skills, impaired functional mobility, gait instability, impaired balance, decreased lower extremity function, decreased ROM Pt presents asleep in chair. Pt transfers sit<>stand w/ RW and supervision on R LE maintains L LE in extension. Pt performs step transfer to bed w/ RW and supervision. Pt modified independent with sit>supine and scooting up in bed. .    Rehab Prognosis: Good; patient would benefit from acute skilled PT services to address these deficits and reach maximum level of function.    Recent Surgery: * No surgery found *      Plan:     During this hospitalization, patient to be seen 3 x/week to address the identified rehab impairments via gait training, therapeutic activities, therapeutic exercises, neuromuscular re-education and progress toward the following goals:    Plan of Care Expires:  12/16/23    Subjective     Chief Complaint: fatigued  Patient/Family Comments/goals: return to bed  Pain/Comfort:  Pain Rating 1: 0/10  Pain Rating Post-Intervention 1: 0/10    Patients cultural, spiritual, Latter-day conflicts given the current situation: no    Living Environment:  Pt lives with daughter and granddaughter in [facility] w/ 0 RICARDO, pt has tub/shower combo  Prior to admission, patients level of function was amb w/ quad cane within the house and RW in community. Pt endorses driving at baseline.  Equipment used at home: walker, rolling, cane, quad, bedside commode, bath bench.  DME owned (not currently used): none.  Upon discharge,  patient will have assistance from 24 hr supervision from children at home.    Objective:     Communicated with Ada johnson prior to session.  Patient found up in chair with blood pressure cuff, colostomy, pulse ox (continuous), telemetry  upon PT entry to room.    General Precautions: Standard, diabetic, fall, hearing impaired  Orthopedic Precautions:N/A   Braces: N/A  Respiratory Status: Room air    Exams:  Cognitive Exam:  Patient is oriented to Person, Place, Time, and Situation  RLE ROM: WFL  RLE Strength: WFL  LLE ROM: WFL except dec knee flexion  LLE Strength: WFL except 3+/5 quad strength    Functional Mobility:  Bed Mobility:     Rolling Left:  stand by assistance  Rolling Right: stand by assistance  Scooting: stand by assistance  Bridging: stand by assistance  Sit to Supine: contact guard assistance  Transfers:     Sit to Stand:  contact guard assistance with rolling walker  Gait: Pt takes 4 steps forward/backwards at EOB to perform step transfer to bed  Balance: good in sitting, fair in standing      AM-PAC 6 CLICK MOBILITY  Total Score:21       Treatment & Education:  Pt educated on activity modification and fall prevention strategies    Patient left HOB elevated with all lines intact, call button in reach, and nsg notified.    GOALS:   Multidisciplinary Problems       Physical Therapy Goals          Problem: Physical Therapy    Goal Priority Disciplines Outcome Goal Variances Interventions   Physical Therapy Goal     PT, PT/OT Ongoing, Progressing     Description: Goals to be met by: 23     Patient will increase functional independence with mobility by performin. Supine to sit with Stand-by Assistance  2. Rolling to Left and Right with Modified [place].  3. Sit to stand transfer with Supervision  4. Gait  x 200 feet with Supervision using Rolling Walker.                          History:     Past Medical History:   Diagnosis Date    Arthritis     Cancer     Diabetes mellitus      Elevated PSA     Gout, unspecified     Hypertension     Nuclear sclerotic cataract of both eyes 6/29/2018       Past Surgical History:   Procedure Laterality Date    CATARACT EXTRACTION      ou    EYE SURGERY      bilateral cataracts    removal of small bowel  Apr. 2015    Colostomy       Time Tracking:     PT Received On: 12/02/23  PT Start Time: 1010     PT Stop Time: 1025  PT Total Time (min): 15 min     Billable Minutes: Evaluation 15      12/02/2023

## 2023-12-02 NOTE — NURSING
"[facility] - Intensive Care  ICU Shift Summary  Date: 12/2/2023      Prehospitalization: Home  Admit Date / LOS : 12/1/2023/ 0 days    Diagnosis: Hyperkalemia    Consults:        Active: N/A       Needed: N/A     Code Status: Prior   Advanced Directive: Received    LDA:  Lines/Drains/Airways       Central Venous Catheter Line  Duration             Port A Cath Single Lumen Subclavian Left -- days              Drain  Duration                  Colostomy 04/23/15 LLQ 3145 days              Peripheral Intravenous Line  Duration                  Peripheral IV - Single Lumen 12/01/23 1518 20 G Anterior;Right Wrist <1 day         Peripheral IV - Single Lumen 12/01/23 1519 22 G Anterior;Left Hand <1 day                  Central Lines/Site/Justification:Patient Does Not Have Central Line  Urinary Cath/Order/Justification:Patient Does Not Have Urinary Catheter    Vasopressors/Infusions:        GOALS: Volume/ Hemodynamic: SBP < 160                     RASS: N/A    Pain Management: PO       Pain Controlled: yes     Rhythm: NSR and SB    Respiratory Device: Room Air                      Most Recent SBT/ SAT: N/A       MOVE Screen: PASS  ICU Liberation: yes    VTE Prophylaxis: Mechanical  Mobility: Bedrest  Stress Ulcer Prophylaxis: No    Isolation: No active isolations    Dietary:   Current Diet Order   Procedures    Diet renal      Tolerance: yes  Advancement: @ goal    I & O (24h):    Intake/Output Summary (Last 24 hours) at 12/2/2023 0509  Last data filed at 12/2/2023 0301  Gross per 24 hour   Intake --   Output 1200 ml   Net -1200 ml        Restraints: No    Significant Dates:  Post Op Date: N/A  Rescue Date: N/A  Imaging/ Diagnostics: N/A    Noteworthy Labs:  see chart below    COVID Test: (--)  CBC/Anemia Labs: Coags:    Recent Labs   Lab 12/01/23  1215   WBC 8.06   HGB 12.1*   HCT 39.8*      MCV 94   RDW 13.5    No results for input(s): "PT", "INR", "APTT" in the last 168 hours.     Chemistries:   Recent Labs   Lab " 12/01/23  1215 12/01/23  1813   * 137   K 6.7* 5.8*    109   CO2 17* 17*   BUN 21 21   CREATININE 2.0* 1.7*   CALCIUM 9.8 9.6   PROT 7.6  --    BILITOT 0.7  --    ALKPHOS 66  --    ALT 17  --    AST 18  --         Cardiac Enzymes: Ejection Fractions:    Recent Labs     12/01/23  1215 12/01/23  1512   TROPONINI <0.006 0.006    EF   Date Value Ref Range Status   05/10/2023 60 % Final        POCT Glucose: HbA1c:    Recent Labs   Lab 12/01/23  1511 12/01/23  2204   POCTGLUCOSE 158* 139*    Hemoglobin A1C   Date Value Ref Range Status   05/09/2023 6.7 (H) 4.0 - 5.6 % Final     Comment:     ADA Screening Guidelines:  5.7-6.4%  Consistent with prediabetes  >or=6.5%  Consistent with diabetes    High levels of fetal hemoglobin interfere with the HbA1C  assay. Heterozygous hemoglobin variants (HbS, HgC, etc)do  not significantly interfere with this assay.   However, presence of multiple variants may affect accuracy.             ICU LOS 13h  Level of Care: OK to Transfer    Chart Check: 24 HR Done  Shift Summary/Plan for the shift: see care plan summary

## 2023-12-02 NOTE — PLAN OF CARE
12/02/23 1109   Post-Acute Status   Post-Acute Authorization HME;Home Health  ([facility]; [facility]; follow-ups)   HME Status Set-up Complete/Auth obtained   Home Health Status Set-up Complete/Auth obtained   Hospital Resources/Appts/Education Provided Provided education on problems/symptoms using teachback;Appointments scheduled and added to AVS;Provided patient/caregiver with written discharge plan information;Post-Acute resouces added to AVS   Discharge Delays None known at this time   Discharge Plan   Discharge Plan A Home with family;Home Health  ([facility]; [facility]; follow-ups)   Discharge Plan B Home with family;Home Health  ([facility]; [facility]; follow-up)

## 2023-12-02 NOTE — PT/OT/SLP EVAL
Occupational Therapy   Evaluation    Name: Eugene Gamez  MRN: [id number]  Admitting Diagnosis: Hyperkalemia  Recent Surgery: * No surgery found *      Recommendations:     Discharge Recommendations: Low Intensity Therapy (with caregiver assistance/supervision)  Discharge Equipment Recommendations:  none  Barriers to discharge:  None    Assessment:     Eugene Gamez is a 88 y.o. male with a medical diagnosis of Hyperkalemia. Performance deficits affecting function: weakness, impaired functional mobility, impaired endurance, gait instability, impaired balance, impaired self care skills, decreased lower extremity function, decreased ROM, decreased upper extremity function, decreased safety awareness.      Rehab Prognosis: Good; patient would benefit from acute skilled OT services to address these deficits and reach maximum level of function.       Plan:     Patient to be seen  (3-5x/week) to address the above listed problems via self-care/home management, therapeutic activities, therapeutic exercises  Plan of Care Expires: 12/16/23  Plan of Care Reviewed with: patient    Subjective     Chief Complaint: needing to use the urinal   Patient/Family Comments/goals: pleasantly agreeable to session     Occupational Profile:  Living Environment: pt lives with his daughter and granddaughter in a [facility] with 0 RICARDO. Bathroom set-up: tub/shower combo   Previous level of function: pt reports MOD I within the home using QC and use of RW in the community. MOD I with ADLs. Pt reports that he changes his colostomy bag himself.   Roles and Routines: drives; likes watching sports tv   Equipment Used at Home: cane, quad, walker, rolling, bedside commode, bath bench  Assistance upon Discharge: 4 of 8 adult children local; pt reports 24 hour supervision in the home     Pain/Comfort:  Pain Rating 1: 0/10    Patients cultural, spiritual, Cheondoism conflicts given the current situation: no    Objective:     Communicated with: nurseLiyah  prior to session.  Patient found HOB elevated with B/L heels offloaded by pillow with blood pressure cuff, pulse ox (continuous), telemetry, peripheral IV, colostomy, SCD upon OT entry to room.    General Precautions: Standard, diabetic, fall, hearing impaired  Orthopedic Precautions: N/A  Braces: N/A  Respiratory Status: Room air    V/S: 145/69, %, 70s bpm at rest, 110s bpm with activity     Occupational Performance:    Bed Mobility:    Patient completed Scooting anteriorly and laterally with stand by assistance  Patient completed Supine to Sit with stand by assistance with HOB elevated     Functional Mobility/Transfers:  Patient completed Sit <> Stand Transfer with contact guard assistance  with  rolling walker from the bed; CGA-MIN A from the low chair   Patient completed Bed <> Chair Transfer using Step Transfer technique with contact guard assistance with rolling walker  Functional Mobility: Gait belt donned prior to transfer for safety during mobility/transfers. Pt completed ~4 ft bed>chair using RW then completed x20 BLE standing marching within RW with CGA. Verbal cueing for upright posture within RW. Pt demo'd safe BUE placement for transfers.     Activities of Daily Living:  Grooming: supervision seated to use mouthwash, wash face, and comb hair   Upper Body Dressing: minimum assistance to don back gown while seated unsupported at EOB (d/t ICU lines)   Toileting: set-up with use of urinal seated     Cognitive/Visual Perceptual:  Cognitive/Psychosocial Skills:     -       Oriented to: Person, Place, and Time   -       Follows Commands/attention:follows simple commands   -       Communication: clear/fluent  -       Memory: No Deficits noted  -       Safety awareness/insight to disability: minimally impaired    -       Mood/Affect/Coping skills/emotional control: Cooperative and Pleasant  Visual/Perceptual:      -Intact  R/L discrimination      Physical Exam:  Balance:    -       seated: SBA/SUP;  standing: CGA with RW   Postural examination/scapula alignment:    -       Rounded shoulders  -       Forward head  Skin integrity: Visible skin intact  Edema:  no BUE edema noted   Sensation:    -       Intact  light/touch BUE  Dominant hand:    -       Left  Upper Extremity Range of Motion:     -       Right Upper Extremity: WFL  -       Left Upper Extremity: WFL  Upper Extremity Strength:    -       Right Upper Extremity: WFL  -       Left Upper Extremity: WFL   Strength:    -       Right Upper Extremity: WFL  -       Left Upper Extremity: WFL  Fine Motor Coordination:    -       Intact  Left hand, manipulation of objects and Right hand, manipulation of objects  Gross motor coordination:   WFL    AMPAC 6 Click ADL:  AMPAC Total Score: 22    Treatment & Education:  Pt educated on OT role/POC.   Importance of OOB activity with staff assistance. Encouraged OOB>chair daily   Safety during functional t/f and mobility   Seated upright, pt completed the following BUE AROM x10: shoulder flex/ext (alternating); x20 forward punches, x10 BLE ankle pumps   Encouraged pt to complete HEP 2-3x/day, 10 reps each    Multiple self-care tasks/functional mobility completed- assistance level noted above   All questions/concerns answered within OT scope of practice       Patient left  seated on pillow reclined in the chair with B/L heels offloaded by pillow and tray table in front of pt  with all lines intact, call button in reach, nurseLiyah, notified, and all needs met/within reach; curtain opened, lights on, blinds opened, tv on.     GOALS:   Multidisciplinary Problems       Occupational Therapy Goals          Problem: Occupational Therapy    Goal Priority Disciplines Outcome Interventions   Occupational Therapy Goal     OT, PT/OT Ongoing, Progressing    Description: Goals to be met by: 12/16/23     Patient will increase functional independence with ADLs by performing:    LE Dressing with Supervision.  Grooming while  standing at sink with Supervision.  Toileting from toilet with Supervision for hygiene and clothing management.   Supine to sit with Supervision.  Step transfer with Supervision  Toilet transfer to toilet with Supervision.  Upper extremity exercise program x15 reps per handout, with independence.                         History:     Past Medical History:   Diagnosis Date    Arthritis     Cancer     Diabetes mellitus     Elevated PSA     Gout, unspecified     Hypertension     Nuclear sclerotic cataract of both eyes 6/29/2018         Past Surgical History:   Procedure Laterality Date    CATARACT EXTRACTION      ou    EYE SURGERY      bilateral cataracts    removal of small bowel  Apr. 2015    Colostomy       Time Tracking:     OT Date of Treatment: 12/02/23  OT Start Time: 0858  OT Stop Time: 0928  OT Total Time (min): 30 min    Billable Minutes:Evaluation 15 min  Therapeutic Exercise 15 min  Total Time 30 min     12/2/2023

## 2023-12-02 NOTE — HOSPITAL COURSE
Eugene Gamez is a 88 y.o. male with a PMHx of Elevated PSA, Cancer, HTN, CKD 3,and DM presents to the ED due to chest pain.  EMS reports patient began experiencing a substernal chest pain yesterday and has a leaking ostomy bag. Patient states that his current chest pain episode began this morning has been constant. The pain is described as an indigestion, gas/pressure-like pain. His chest pain is relieved with movement of his arms inwards and belching. He took Aspirin x2 this morning without relief.patient has potassium level over 6.7,  Some EKG changes,patient has been started on IV calcium,Lokelma,insulin , and albuterol treatments,he denies chest pain at this time,he has normal troponin.  Hyperkalemia with albuterol,Lokelma,insulin is resolved,EKG change is resolved.patient was discharged home with .

## 2023-12-02 NOTE — NURSING
Discharge instructions given to patient and his daughter.  They verbalize understanding of medication regimen and of follow up appointments.  IV sites discontinued before discharge.  Discharged home with daughter.

## 2023-12-02 NOTE — PLAN OF CARE
Problem: Occupational Therapy  Goal: Occupational Therapy Goal  Description: Goals to be met by: 12/16/23     Patient will increase functional independence with ADLs by performing:    LE Dressing with Supervision.  Grooming while standing at sink with Supervision.  Toileting from toilet with Supervision for hygiene and clothing management.   Supine to sit with Supervision.  Step transfer with Supervision  Toilet transfer to toilet with Supervision.  Upper extremity exercise program x15 reps per handout, with independence.    Outcome: Ongoing, Progressing     SBA bed mobility; CGA using RW bed>chair. Pt completed ADLs and exercises seated with supervision. OT rec low intensity therapy with caregiver assistance/supervision at d/c in order to increase safety and independence with ADLs and all aspects of functional mobility.

## 2023-12-02 NOTE — PLAN OF CARE
12/02/23 1111   Final Note   Assessment Type Final Discharge Note   Anticipated Discharge Disposition Home-Health  ([facility]; follow-up)   What phone number can be called within the next 1-3 days to see how you are doing after discharge?   ([phone])   Hospital Resources/Appts/Education Provided Provided patient/caregiver with written discharge plan information;Provided education on problems/symptoms using teachback;Appointments scheduled and added to AVS;Post-Acute resouces added to AVS   Post-Acute Status   Post-Acute Authorization HME;Home Health  (Home; [facility])   HME Status Set-up Complete/Auth obtained   Home Health Status Set-up Complete/Auth obtained   Discharge Delays None known at this time

## 2023-12-02 NOTE — PLAN OF CARE
Patient to discharge with  services; [facility] or first available; Samia with [facility] accepted patient for services.

## 2023-12-02 NOTE — PLAN OF CARE
Pt remains in ICU alert and oriented. HR running NSR to SB on monitor. BP stable. On RA sating above 95%. Normothermic. PO medication given, swallowed without difficulty. Blood sugar monitored per order, no insulin needed per sliding scale. Voids via urinal with good urine output. Remained chest pain free this shift. No complaints at this time. No new falls, injuries or skin breakdown this shift.

## 2023-12-04 ENCOUNTER — PATIENT OUTREACH (OUTPATIENT)
Dept: ADMINISTRATIVE | Facility: CLINIC | Age: 88
End: 2023-12-04
Payer: MEDICARE

## 2023-12-04 NOTE — PROGRESS NOTES
Patient's daughter, Nicol, requested a call back between 12:00 PM and 12:30 PM.  C3 nurse attempted to contact patient's daughter, no answer and unable to leave a voicemail.

## 2023-12-04 NOTE — PROGRESS NOTES
C3 nurse attempted to contact Eugene Gamez, patient's daughter, Nicol, for a TCC post hospital discharge follow up call. The patient's daughter is unable to conduct the call @ this time and requested a callback.    The patient has a scheduled HOS appointment with Larry Monae MD on 12/15/2023 @ 2:20PM.  Patient is currently on the wait list for an earlier appointment.

## 2023-12-04 NOTE — PROGRESS NOTES
2nd attempt made to reach patient for TCC call. Left voicemail for patient's daughter, Nicol, to please call [phone] leave first name, last name, and .  I will return your call.

## 2023-12-05 NOTE — PROGRESS NOTES
3rd attempt made to reach patient for TCC call. Left voicemail please call [phone] leave first name, last name, and .  I will return your call.

## 2023-12-05 NOTE — PROGRESS NOTES
C3 nurse spoke with Eugene Gamez, patient's daughter, Nicol for a TCC post hospital discharge follow up call.  Informed patient's daughter of patient's HOSPFU with Larry Monae A., MD on 12/15/2023 @ 2:20 PM and it is noted that patient is on a wait list for an earlier appointment, she voiced understanding.

## 2023-12-13 ENCOUNTER — TELEPHONE (OUTPATIENT)
Dept: FAMILY MEDICINE | Facility: CLINIC | Age: 88
End: 2023-12-13
Payer: MEDICARE

## 2023-12-15 ENCOUNTER — LAB VISIT (OUTPATIENT)
Dept: LAB | Facility: HOSPITAL | Age: 88
End: 2023-12-15
Attending: FAMILY MEDICINE
Payer: MEDICARE

## 2023-12-15 ENCOUNTER — OFFICE VISIT (OUTPATIENT)
Dept: FAMILY MEDICINE | Facility: CLINIC | Age: 88
End: 2023-12-15
Payer: MEDICARE

## 2023-12-15 VITALS
HEIGHT: 74 IN | OXYGEN SATURATION: 97 % | SYSTOLIC BLOOD PRESSURE: 136 MMHG | RESPIRATION RATE: 18 BRPM | DIASTOLIC BLOOD PRESSURE: 76 MMHG | TEMPERATURE: 99 F | WEIGHT: 174.69 LBS | HEART RATE: 96 BPM | BODY MASS INDEX: 22.42 KG/M2

## 2023-12-15 DIAGNOSIS — E87.5 HYPERKALEMIA: ICD-10-CM

## 2023-12-15 DIAGNOSIS — I10 ESSENTIAL HYPERTENSION: ICD-10-CM

## 2023-12-15 DIAGNOSIS — E11.22 TYPE 2 DIABETES MELLITUS WITH STAGE 3B CHRONIC KIDNEY DISEASE, WITHOUT LONG-TERM CURRENT USE OF INSULIN: ICD-10-CM

## 2023-12-15 DIAGNOSIS — E11.22 CONTROLLED TYPE 2 DIABETES MELLITUS WITH STAGE 3 CHRONIC KIDNEY DISEASE, WITHOUT LONG-TERM CURRENT USE OF INSULIN: ICD-10-CM

## 2023-12-15 DIAGNOSIS — N18.32 TYPE 2 DIABETES MELLITUS WITH STAGE 3B CHRONIC KIDNEY DISEASE, WITHOUT LONG-TERM CURRENT USE OF INSULIN: ICD-10-CM

## 2023-12-15 DIAGNOSIS — M1A.9XX0 CHRONIC GOUT WITHOUT TOPHUS, UNSPECIFIED CAUSE, UNSPECIFIED SITE: ICD-10-CM

## 2023-12-15 DIAGNOSIS — Z23 INFLUENZA VACCINE NEEDED: ICD-10-CM

## 2023-12-15 DIAGNOSIS — N18.30 CONTROLLED TYPE 2 DIABETES MELLITUS WITH STAGE 3 CHRONIC KIDNEY DISEASE, WITHOUT LONG-TERM CURRENT USE OF INSULIN: ICD-10-CM

## 2023-12-15 DIAGNOSIS — E87.5 HYPERKALEMIA: Primary | ICD-10-CM

## 2023-12-15 DIAGNOSIS — Z85.038 HISTORY OF COLON CANCER: ICD-10-CM

## 2023-12-15 PROBLEM — D49.0 COLORECTAL NEOPLASM: Status: RESOLVED | Noted: 2018-01-30 | Resolved: 2023-12-15

## 2023-12-15 LAB
ALBUMIN SERPL BCP-MCNC: 4 G/DL (ref 3.5–5.2)
ALP SERPL-CCNC: 56 U/L (ref 55–135)
ALT SERPL W/O P-5'-P-CCNC: 13 U/L (ref 10–44)
ANION GAP SERPL CALC-SCNC: 8 MMOL/L (ref 8–16)
AST SERPL-CCNC: 19 U/L (ref 10–40)
BILIRUB SERPL-MCNC: 0.4 MG/DL (ref 0.1–1)
BUN SERPL-MCNC: 23 MG/DL (ref 8–23)
CALCIUM SERPL-MCNC: 10 MG/DL (ref 8.7–10.5)
CHLORIDE SERPL-SCNC: 110 MMOL/L (ref 95–110)
CO2 SERPL-SCNC: 21 MMOL/L (ref 23–29)
CREAT SERPL-MCNC: 2.1 MG/DL (ref 0.5–1.4)
EST. GFR  (NO RACE VARIABLE): 29.9 ML/MIN/1.73 M^2
GLUCOSE SERPL-MCNC: 138 MG/DL (ref 70–110)
POTASSIUM SERPL-SCNC: 6.1 MMOL/L (ref 3.5–5.1)
PROT SERPL-MCNC: 7.2 G/DL (ref 6–8.4)
SODIUM SERPL-SCNC: 139 MMOL/L (ref 136–145)

## 2023-12-15 PROCEDURE — 80053 COMPREHEN METABOLIC PANEL: CPT | Performed by: FAMILY MEDICINE

## 2023-12-15 PROCEDURE — 99215 OFFICE O/P EST HI 40 MIN: CPT | Mod: PBBFAC,PO,25 | Performed by: FAMILY MEDICINE

## 2023-12-15 PROCEDURE — 99999 PR PBB SHADOW E&M-EST. PATIENT-LVL V: CPT | Mod: PBBFAC,,, | Performed by: FAMILY MEDICINE

## 2023-12-15 PROCEDURE — 99214 OFFICE O/P EST MOD 30 MIN: CPT | Mod: S$PBB,,, | Performed by: FAMILY MEDICINE

## 2023-12-15 PROCEDURE — 99999PBSHW FLU VACCINE - QUADRIVALENT - ADJUVANTED: Mod: PBBFAC,,,

## 2023-12-15 PROCEDURE — G0008 ADMIN INFLUENZA VIRUS VAC: HCPCS | Mod: PBBFAC,PO

## 2023-12-15 PROCEDURE — 36415 COLL VENOUS BLD VENIPUNCTURE: CPT | Mod: PO | Performed by: FAMILY MEDICINE

## 2023-12-15 PROCEDURE — 90694 VACC AIIV4 NO PRSRV 0.5ML IM: CPT | Mod: PBBFAC,PO

## 2023-12-15 RX ORDER — SITAGLIPTIN 50 MG/1
50 TABLET, FILM COATED ORAL DAILY
Qty: 90 TABLET | Refills: 1 | Status: SHIPPED | OUTPATIENT
Start: 2023-12-15 | End: 2024-02-21 | Stop reason: SDUPTHER

## 2023-12-15 RX ORDER — AMLODIPINE BESYLATE 10 MG/1
10 TABLET ORAL DAILY
Qty: 90 TABLET | Refills: 0 | Status: SHIPPED | OUTPATIENT
Start: 2023-12-15 | End: 2024-02-21 | Stop reason: SDUPTHER

## 2023-12-15 RX ORDER — HYDRALAZINE HYDROCHLORIDE 100 MG/1
100 TABLET, FILM COATED ORAL DAILY
Qty: 90 TABLET | Refills: 1 | Status: SHIPPED | OUTPATIENT
Start: 2023-12-15 | End: 2024-02-21 | Stop reason: SDUPTHER

## 2023-12-15 RX ORDER — ALLOPURINOL 300 MG/1
300 TABLET ORAL DAILY
Qty: 90 TABLET | Refills: 3 | Status: SHIPPED | OUTPATIENT
Start: 2023-12-15 | End: 2024-02-21 | Stop reason: SDUPTHER

## 2023-12-15 NOTE — PROGRESS NOTES
Patient given flu vaccine via injection. 0 complaints of, tolerated well. Advised to wait in lobby 15 mins for adverse reaction. Verbalized understanding.   pt needs to get all other vaccine at the pharmacy

## 2023-12-15 NOTE — PROGRESS NOTES
Routine Office Visit    Patient Name: Eugene Gamez    : 1935  MRN: [id number]    Subjective:  Eugene is a 88 y.o. male who presents today for:    1. Hospital follow up  Patient presenting today for hospital follow up after being admitted for high potassium and chest pain.  He has a history of HTN, controlled type 2 DM, and and colon cancer.  He is no longer under treatment for colon cancer, blood pressure is well controlled as is his diabetes.  He states that his chest pain resolved shortly after being admitted and he was discharged within 24 hours.  No new symptoms.  He is taking all medications as prescribed.  No new concerns    Past Medical History  Past Medical History:   Diagnosis Date    Arthritis     Cancer     Diabetes mellitus     Elevated PSA     Gout, unspecified     Hypertension     Nuclear sclerotic cataract of both eyes 2018       Past Surgical History  Past Surgical History:   Procedure Laterality Date    CATARACT EXTRACTION      ou    EYE SURGERY      bilateral cataracts    removal of small bowel  2015    Colostomy       Family History  Family History   Problem Relation Age of Onset    Hypertension Mother     Diabetes Mother     No Known Problems Father     No Known Problems Sister     No Known Problems Brother     No Known Problems Maternal Aunt     No Known Problems Maternal Uncle     No Known Problems Paternal Aunt     No Known Problems Paternal Uncle     No Known Problems Maternal Grandmother     No Known Problems Maternal Grandfather     No Known Problems Paternal Grandmother     No Known Problems Paternal Grandfather     Amblyopia Neg Hx     Blindness Neg Hx     Cancer Neg Hx     Cataracts Neg Hx     Glaucoma Neg Hx     Macular degeneration Neg Hx     Retinal detachment Neg Hx     Strabismus Neg Hx     Stroke Neg Hx     Thyroid disease Neg Hx        Social History  Social History     Socioeconomic History    Marital status:     Number of children: 8   Tobacco Use     Smoking status: Never     Passive exposure: Never    Smokeless tobacco: Never    Tobacco comments:     smoked short while as a teen   Substance and Sexual Activity    Alcohol use: No    Drug use: No    Sexual activity: Not Currently     Partners: Female       Current Medications  Current Outpatient Medications on File Prior to Visit   Medication Sig Dispense Refill    DULoxetine (CYMBALTA) 30 MG capsule TAKE 1 CAPSULE BY MOUTH 2 TIMES DAILY. 180 capsule 3    ferrous gluconate (FERGON) 324 MG tablet Take 324 mg by mouth.      [DISCONTINUED] allopurinoL (ZYLOPRIM) 300 MG tablet Take 1 tablet (300 mg total) by mouth once daily. 90 tablet 3    [DISCONTINUED] amLODIPine (NORVASC) 10 MG tablet TAKE 1 TABLET BY MOUTH EVERY DAY 90 tablet 0    [DISCONTINUED] hydrALAZINE (APRESOLINE) 100 MG tablet TAKE 1 TABLET BY MOUTH EVERY DAY 90 tablet 1    [DISCONTINUED] JANUVIA 50 mg Tab TAKE 1 TABLET BY MOUTH EVERY DAY 90 tablet 1    gabapentin (NEURONTIN) 100 MG capsule Take 1 capsule (100 mg total) by mouth 3 (three) times daily. for 10 days 30 capsule 0    [DISCONTINUED] blood-glucose meter (ONE TOUCH ULTRAMINI) kit To test twice daily. Use as instructed 1 each 0    [DISCONTINUED] cholestyramine (QUESTRAN) 4 gram packet Take 1 packet (4 g total) by mouth 3 (three) times daily with meals. 270 packet 3    [DISCONTINUED] colchicine (COLCRYS) 0.6 mg tablet Take 1 tablet (0.6 mg total) by mouth daily as needed. 90 tablet 3    [DISCONTINUED] loratadine (CLARITIN) 10 mg tablet Take 10 mg by mouth once daily.      [DISCONTINUED] metoprolol tartrate (LOPRESSOR) 50 MG tablet Take 1 tablet (50 mg total) by mouth 2 (two) times daily. 60 tablet 0    [DISCONTINUED] QUEtiapine (SEROQUEL) 50 MG tablet Take 1 tablet (50 mg total) by mouth every evening. 30 tablet 0    [DISCONTINUED] sodium bicarbonate 650 MG tablet Take 1 tablet (650 mg total) by mouth 3 (three) times daily. 21 tablet 0     Current Facility-Administered Medications on File Prior to  "Visit   Medication Dose Route Frequency Provider Last Rate Last Admin    ondansetron HCl (PF) 4 mg/2 mL injection    PRN James Ryan, CRNA   4 mg at 06/29/15 7855       Allergies   Review of patient's allergies indicates:   Allergen Reactions    Iodine Other (See Comments)     Causes gout to flare up    Shellfish containing products      Causes gout to flare up       Review of Systems (Pertinent positives)  Review of Systems   Constitutional: Negative.    HENT: Negative.     Eyes: Negative.    Respiratory: Negative.     Cardiovascular: Negative.    Gastrointestinal: Negative.    Skin: Negative.    Neurological: Negative.          /76   Pulse 96   Temp 98.6 °F (37 °C) (Oral)   Resp 18   Ht 6' 2" (1.88 m)   Wt 79.2 kg (174 lb 11.4 oz)   SpO2 97%   BMI 22.43 kg/m²     GENERAL APPEARANCE: in no apparent distress and well developed and well nourished  HEENT: PERRL, EOMI, Sclera clear, anicteric  NECK: normal, supple, no adenopathy, thyroid normal in size  RESPIRATORY: appears well, vitals normal, no respiratory distress, acyanotic, normal RR, chest clear, no wheezing, crepitations, rhonchi, normal symmetric air entry  HEART: regular rate and rhythm, S1, S2 normal, no murmur, click, rub or gallop.    SKIN: no rashes, no wounds, no other lesions  PSYCH: Alert, oriented x 3, thought content appropriate, speech normal, pleasant and cooperative, good eye contact, well groomed    Assessment/Plan:  Eugene Gamez is a 88 y.o. male who presents today for :    Eugene was seen today for follow-up, hypertension and diabetes.    Diagnoses and all orders for this visit:    Hyperkalemia  -     Comprehensive Metabolic Panel; Future  - Recheck potassium today  -  Will evaluate meds once labs are back if potassium still elevated    Chronic gout without tophus, unspecified cause, unspecified site  -     allopurinoL (ZYLOPRIM) 300 MG tablet; Take 1 tablet (300 mg total) by mouth once daily.  - Patient requested medication " refill.  No acute flares    Essential hypertension  -     amLODIPine (NORVASC) 10 MG tablet; Take 1 tablet (10 mg total) by mouth once daily.  -     hydrALAZINE (APRESOLINE) 100 MG tablet; Take 1 tablet (100 mg total) by mouth once daily.  - Stable on current regimen  - No changes in medication needed at this time    Controlled type 2 diabetes mellitus with stage 3 chronic kidney disease, without long-term current use of insulin  -     JANUVIA 50 mg Tab; Take 1 tablet (50 mg total) by mouth once daily.  -     Ambulatory referral/consult to Podiatry; Future  - Medication refilled  -  He stated he needed diabetic shoes, so referral to podiatry placed  - Continue to monitor renal function givne stage 3 ckd    Influenza vaccine needed  -     Influenza (FLUAD) - Quadrivalent (Adjuvanted) *Preferred* (65+) (PF)    History of colon cancer  - No current treatments  - Patient states he is doing well and no concerns

## 2023-12-19 ENCOUNTER — TELEPHONE (OUTPATIENT)
Dept: FAMILY MEDICINE | Facility: CLINIC | Age: 88
End: 2023-12-19
Payer: MEDICARE

## 2023-12-19 DIAGNOSIS — E87.5 HYPERKALEMIA: Primary | ICD-10-CM

## 2023-12-19 NOTE — PROGRESS NOTES
Please let patient know that labs show his potassium going back up.  Please check to see if he is having any chest pain or muscle cramping.  I want to repeat the potassium this week    Thanks  Dr. Monae

## 2023-12-19 NOTE — TELEPHONE ENCOUNTER
Pt notified of doctor's instructions below. Pt verbalized understanding. Pt denies any chest pain or muscle cramping. Lab appt scheduled for tomorrow.

## 2023-12-19 NOTE — TELEPHONE ENCOUNTER
----- Message from Larry Monae MD sent at 12/19/2023  1:00 PM CST -----  Please let patient know that labs show his potassium going back up.  Please check to see if he is having any chest pain or muscle cramping.  I want to repeat the potassium this week    Thanks  Dr. Monae

## 2023-12-20 ENCOUNTER — LAB VISIT (OUTPATIENT)
Dept: LAB | Facility: HOSPITAL | Age: 88
End: 2023-12-20
Attending: FAMILY MEDICINE
Payer: MEDICARE

## 2023-12-20 DIAGNOSIS — E87.5 HYPERKALEMIA: ICD-10-CM

## 2023-12-20 LAB
ANION GAP SERPL CALC-SCNC: 12 MMOL/L (ref 8–16)
BUN SERPL-MCNC: 24 MG/DL (ref 8–23)
CALCIUM SERPL-MCNC: 10.1 MG/DL (ref 8.7–10.5)
CHLORIDE SERPL-SCNC: 111 MMOL/L (ref 95–110)
CO2 SERPL-SCNC: 20 MMOL/L (ref 23–29)
CREAT SERPL-MCNC: 1.9 MG/DL (ref 0.5–1.4)
EST. GFR  (NO RACE VARIABLE): 33.7 ML/MIN/1.73 M^2
GLUCOSE SERPL-MCNC: 152 MG/DL (ref 70–110)
POTASSIUM SERPL-SCNC: 5.7 MMOL/L (ref 3.5–5.1)
SODIUM SERPL-SCNC: 143 MMOL/L (ref 136–145)

## 2023-12-20 PROCEDURE — 80048 BASIC METABOLIC PNL TOTAL CA: CPT | Performed by: FAMILY MEDICINE

## 2023-12-20 PROCEDURE — 36415 COLL VENOUS BLD VENIPUNCTURE: CPT | Mod: PO | Performed by: FAMILY MEDICINE

## 2023-12-21 ENCOUNTER — TELEPHONE (OUTPATIENT)
Dept: FAMILY MEDICINE | Facility: CLINIC | Age: 88
End: 2023-12-21
Payer: MEDICARE

## 2023-12-21 ENCOUNTER — OFFICE VISIT (OUTPATIENT)
Dept: OPTOMETRY | Facility: CLINIC | Age: 88
End: 2023-12-21
Payer: MEDICARE

## 2023-12-21 DIAGNOSIS — H52.7 REFRACTIVE ERROR: ICD-10-CM

## 2023-12-21 DIAGNOSIS — E87.5 HYPERKALEMIA: ICD-10-CM

## 2023-12-21 DIAGNOSIS — E11.22 CONTROLLED TYPE 2 DIABETES MELLITUS WITH STAGE 3 CHRONIC KIDNEY DISEASE, WITHOUT LONG-TERM CURRENT USE OF INSULIN: ICD-10-CM

## 2023-12-21 DIAGNOSIS — N18.32 STAGE 3B CHRONIC KIDNEY DISEASE: Primary | ICD-10-CM

## 2023-12-21 DIAGNOSIS — Z96.1 PSEUDOPHAKIA: ICD-10-CM

## 2023-12-21 DIAGNOSIS — N18.30 CONTROLLED TYPE 2 DIABETES MELLITUS WITH STAGE 3 CHRONIC KIDNEY DISEASE, WITHOUT LONG-TERM CURRENT USE OF INSULIN: ICD-10-CM

## 2023-12-21 DIAGNOSIS — E11.9 TYPE 2 DIABETES MELLITUS WITHOUT COMPLICATION, WITHOUT LONG-TERM CURRENT USE OF INSULIN: Primary | ICD-10-CM

## 2023-12-21 PROCEDURE — 99999 PR PBB SHADOW E&M-EST. PATIENT-LVL III: CPT | Mod: PBBFAC,,, | Performed by: OPTOMETRIST

## 2023-12-21 PROCEDURE — 92015 DETERMINE REFRACTIVE STATE: CPT | Mod: ,,, | Performed by: OPTOMETRIST

## 2023-12-21 PROCEDURE — 92004 COMPRE OPH EXAM NEW PT 1/>: CPT | Mod: S$PBB,,, | Performed by: OPTOMETRIST

## 2023-12-21 PROCEDURE — 99213 OFFICE O/P EST LOW 20 MIN: CPT | Mod: PBBFAC,PO | Performed by: OPTOMETRIST

## 2023-12-21 PROCEDURE — 99999 PR PBB SHADOW E&M-EST. PATIENT-LVL III: ICD-10-PCS | Mod: PBBFAC,,, | Performed by: OPTOMETRIST

## 2023-12-21 PROCEDURE — 92004 PR EYE EXAM, NEW PATIENT,COMPREHESV: ICD-10-PCS | Mod: S$PBB,,, | Performed by: OPTOMETRIST

## 2023-12-21 PROCEDURE — 92015 PR REFRACTION: ICD-10-PCS | Mod: ,,, | Performed by: OPTOMETRIST

## 2023-12-21 NOTE — PROGRESS NOTES
Subjective:       Patient ID: Eugene Gamez is a 88 y.o. male      Chief Complaint   Patient presents with    Concerns About Ocular Health    Diabetic Eye Exam    Hypertensive Eye Exam     History of Present Illness  Dls: 1/15/15 Dr. Potter     87 y/o male presents today for diabetic eye exam.   Pt states no changes in vision. Pt wears bifocal glasses.   Pt c/o mucous os>od .     LBS ?     No tearing  No itching  No burning  No pain  + ha's  No floaters  No flashes    Eye meds  None    Pohx:   S/p CE Bilateral     Fohx:   None    Hemoglobin A1C       Date                     Value               Ref Range             Status                12/01/2023               6.2 (H)             4.0 - 5.6 %           Final                  05/09/2023               6.7 (H)             4.0 - 5.6 %           Final                  02/21/2022               7.8 (H)             4.0 - 5.6 %           Final                Assessment/Plan:     1. Type 2 diabetes mellitus without complication, without long-term current use of insulin  No diabetic retinopathy. Discussed with pt the effects of diabetes on vision, importance of good blood sugar control, compliance with meds, and follow up care with PCP. Return in 1 year for dilated eye exam, sooner PRN.    2. Controlled type 2 diabetes mellitus with stage 3 chronic kidney disease, without long-term current use of insulin  - Ambulatory referral/consult to Optometry    3. Pseudophakia  Well-centered. Clear.     4. Refractive error  Educated patient on refractive error and discussed lens options. Dispensed updated spectacle Rx. Educated about adaptation period to new specs.    Eyeglass Final Rx       Eyeglass Final Rx         Sphere Cylinder Add    Right -1.00 Sphere +2.75    Left [place] Sphere +2.75      Expiration Date: 12/21/2024                      Follow up in about 1 year (around 12/21/2024) for Diabetic Eye Exam.

## 2023-12-21 NOTE — PROGRESS NOTES
Covering provider. Please contact the patient and let him know that his potassium is still elevated but it has improved from last week. He should be keeping a low potassium diet (no bananas, spinach, etc). He needs to follow up with his Nephrologist regarding long term therapy for elevated potassium because it looks like this is a recurring issue. I would send in a K binder but this is contraindicated with his history of colostomy.

## 2023-12-21 NOTE — TELEPHONE ENCOUNTER
Pt notified of doctor's instructions below. Pt verbalized understanding. Pt states he has not seen his nephrologist in a long time. Pt is requesting a referral to an [facility] nephrologist.

## 2023-12-21 NOTE — TELEPHONE ENCOUNTER
----- Message from Meg Meza DO sent at 12/21/2023 12:44 PM CST -----  Covering provider. Please contact the patient and let him know that his potassium is still elevated but it has improved from last week. He should be keeping a low potassium diet (no bananas, spinach, etc). He needs to follow up with his Nephrologist regarding long term therapy for elevated potassium because it looks like this is a recurring issue. I would send in a K binder but this is contraindicated with his history of colostomy.

## 2023-12-22 ENCOUNTER — E-CONSULT (OUTPATIENT)
Dept: NEPHROLOGY | Facility: CLINIC | Age: 88
End: 2023-12-22
Payer: MEDICARE

## 2023-12-22 DIAGNOSIS — E87.5 HYPERKALEMIA: Primary | ICD-10-CM

## 2023-12-22 PROCEDURE — 99449 PR INTERPROF, PHONE/INTERNET/EHR, CONSULT, >= 31 MINS: ICD-10-PCS | Mod: ,,, | Performed by: INTERNAL MEDICINE

## 2023-12-22 PROCEDURE — 99449 NTRPROF PH1/NTRNET/EHR 31/>: CPT | Mod: ,,, | Performed by: INTERNAL MEDICINE

## 2023-12-22 NOTE — CONSULTS
[facility]  Response for E-Consult     Patient Name: Eugene Gamez  MRN: [id number]  Primary Care Provider: Larry Monae MD   Requesting Provider: Meg Meza DO  Consults    Recommendation:  88-year-old male with history of CKD and persistent intermittent hyperkalemia.  Also has history of colectomy.  Nephrology has been consulted for evaluation of hyperkalemia.  On review of the chart he has had intermittent persistent hyperkalemia for many years.  This is likely due to previous colectomy with decreased GI elimination of potassium compounded by the fact he has CKD 3.  He is not on medications that would typically increased potassium.  I would suggest a low-potassium diet and also starting him on Lokelma either 5 g or 10 g daily.  Due to the activity of Lokelma it can be used as it works along the entire GI system.  This is different from Veltassa that works in the terminal colon.    There is no contraindication to Lokelma in the setting of colectomy.    Contingency:  As above    Total time of Consultation: 35 minute    I did not speak to the requesting provider verbally about this.     *This eConsult is based on the clinical data available to me and is furnished without benefit of a physical examination. The eConsult will need to be interpreted in light of any clinical issues or changes in patient status not available to me at the time of filing this eConsults. Significant changes in patient condition or level of acuity should result in immediate formal consultation and reevaluation. Please alert me if you have further questions.    Thank you for this eConsult referral.     Joey Teran MD  [facility]

## 2023-12-22 NOTE — TELEPHONE ENCOUNTER
Put in a new referral to [facility] for chronic follow ups. I also put in an E-consult to Nephrology so that we can get recommendations about his potassium sooner than he would be seen in person.

## 2024-01-05 ENCOUNTER — TELEPHONE (OUTPATIENT)
Dept: FAMILY MEDICINE | Facility: CLINIC | Age: 89
End: 2024-01-05
Payer: MEDICARE

## 2024-01-05 ENCOUNTER — LAB VISIT (OUTPATIENT)
Dept: LAB | Facility: HOSPITAL | Age: 89
End: 2024-01-05
Attending: FAMILY MEDICINE
Payer: MEDICARE

## 2024-01-05 DIAGNOSIS — E87.5 HYPERKALEMIA: ICD-10-CM

## 2024-01-05 LAB
ANION GAP SERPL CALC-SCNC: 9 MMOL/L (ref 8–16)
BUN SERPL-MCNC: 22 MG/DL (ref 8–23)
CALCIUM SERPL-MCNC: 10.2 MG/DL (ref 8.7–10.5)
CHLORIDE SERPL-SCNC: 110 MMOL/L (ref 95–110)
CO2 SERPL-SCNC: 23 MMOL/L (ref 23–29)
CREAT SERPL-MCNC: 1.6 MG/DL (ref 0.5–1.4)
EST. GFR  (NO RACE VARIABLE): 41.4 ML/MIN/1.73 M^2
GLUCOSE SERPL-MCNC: 160 MG/DL (ref 70–110)
POTASSIUM SERPL-SCNC: 4.3 MMOL/L (ref 3.5–5.1)
SODIUM SERPL-SCNC: 142 MMOL/L (ref 136–145)

## 2024-01-05 PROCEDURE — 36415 COLL VENOUS BLD VENIPUNCTURE: CPT | Mod: PO | Performed by: FAMILY MEDICINE

## 2024-01-05 PROCEDURE — 80048 BASIC METABOLIC PNL TOTAL CA: CPT | Performed by: FAMILY MEDICINE

## 2024-01-05 NOTE — TELEPHONE ENCOUNTER
----- Message from Meg Meza, DO sent at 1/5/2024  4:35 PM CST -----  Please contact the patient and let him know his potassium is back to normal range.

## 2024-01-06 NOTE — TELEPHONE ENCOUNTER
----- Message from Mechelle Connor sent at 11/1/2019  4:45 PM CDT -----  Contact: MEKA ALEJO  Type: Patient Call Back    Who called: SAPNA KELLEY     What is the request in detail:requesting an order 18inch wheelchair with cushion  Can the clinic reply by [facility]? Call     Would the patient rather a call back or a response via [facility]?  Call   Best call back number: [phone]    Additional Information:       No indicators present

## 2024-01-29 DIAGNOSIS — N18.32 STAGE 3B CHRONIC KIDNEY DISEASE: Primary | ICD-10-CM

## 2024-01-29 PROBLEM — I12.9 HYPERTENSIVE KIDNEY DISEASE WITH STAGE 3B CHRONIC KIDNEY DISEASE: Status: ACTIVE | Noted: 2024-01-29

## 2024-01-29 PROBLEM — E79.0 HYPERURICEMIA: Status: ACTIVE | Noted: 2024-01-29

## 2024-01-29 NOTE — PROGRESS NOTES
[facility]  [address] [facility] [address], [address]  [place]  [phone]    Referring Provider: Larry Monae MD  PCP: Larry Monae MD  Hem/Onc: Young      HPI: Mr. Eugene Gamez is a 89 y.o. male with HTN, T2DM, gout, colon cancer in 2015 s/p ostomy and FOLFOX, biopsy-proven renal oncocytoma, and h/o PE/DVT who is here for new patient evaluation of Chronic Kidney Disease  Prior records obtained and reviewed. His baseline Cr appears to be 1.8-2.2 since 3/2022. Urine studies with proteinuria since at least 2016. Labs also with metabolic acidosis since 2008 and intermittent hyperkalemia since 2018. His K was 5.7 on 12/20/23; he was started on Lokelma 5g daily.   He was diagnosed with HTN > 50 years ago. BP has been variably controlled. No h/o hypertensive emergency. No edema.  He was diagnosed with T2DM ~2015. He has never required insulin. Highest A1c on file is 8.2% in 1/2018. Most recent A1c was 6.2% in 12/2023.   He reports longstanding h/o gout. Currently with flares < 1x/year. He reports compliance with allopurinol. He avoids all shellfish. Last uric acid 11.4 on 2/21/22.   He notes remote h/o kidney stones; > 20 years ago.  No family h/o kidney disease. Wife was on dialysis prior to her passing.   He has recently changed his diet (cut out sweet potatoes, greens, etc) which has resulted in improvement in both K and bicarb levels. He was no longer on Lokelma at time of 1/5 labs. He notes good water intake. He also enjoys 1-2 Body Armors per day.   He is here today with his daughter.        Past Medical History:   Diagnosis Date    Arthritis     Cancer     Diabetes mellitus     Elevated PSA     Gout, unspecified     Hypertension     Nuclear sclerotic cataract of both eyes 6/29/2018       Past Surgical History:   Procedure Laterality Date    CATARACT EXTRACTION      ou    EYE SURGERY      bilateral cataracts    removal of small bowel  Apr. 2015    Colostomy       Family History   Problem Relation Age of  Onset    Hypertension Mother     Diabetes Mother     No Known Problems Father     No Known Problems Sister     No Known Problems Brother     No Known Problems Maternal Aunt     No Known Problems Maternal Uncle     No Known Problems Paternal Aunt     No Known Problems Paternal Uncle     No Known Problems Maternal Grandmother     No Known Problems Maternal Grandfather     No Known Problems Paternal Grandmother     No Known Problems Paternal Grandfather     Amblyopia Neg Hx     Blindness Neg Hx     Cancer Neg Hx     Cataracts Neg Hx     Glaucoma Neg Hx     Macular degeneration Neg Hx     Retinal detachment Neg Hx     Strabismus Neg Hx     Stroke Neg Hx     Thyroid disease Neg Hx        Social History     Tobacco Use    Smoking status: Never     Passive exposure: Never    Smokeless tobacco: Never    Tobacco comments:     smoked short while as a teen   Substance Use Topics    Alcohol use: No    Drug use: No         ROS:  Complete ROS otherwise negative except as indicated above.         Current Outpatient Medications:     allopurinoL (ZYLOPRIM) 300 MG tablet, Take 1 tablet (300 mg total) by mouth once daily., Disp: 90 tablet, Rfl: 3    amLODIPine (NORVASC) 10 MG tablet, Take 1 tablet (10 mg total) by mouth once daily., Disp: 90 tablet, Rfl: 0    DULoxetine (CYMBALTA) 30 MG capsule, TAKE 1 CAPSULE BY MOUTH 2 TIMES DAILY., Disp: 180 capsule, Rfl: 3    ferrous gluconate (FERGON) 324 MG tablet, Take 324 mg by mouth., Disp: , Rfl:     gabapentin (NEURONTIN) 100 MG capsule, Take 1 capsule (100 mg total) by mouth 3 (three) times daily. for 10 days, Disp: 30 capsule, Rfl: 0    hydrALAZINE (APRESOLINE) 100 MG tablet, Take 1 tablet (100 mg total) by mouth once daily., Disp: 90 tablet, Rfl: 1    JANUVIA 50 mg Tab, Take 1 tablet (50 mg total) by mouth once daily., Disp: 90 tablet, Rfl: 1  No current facility-administered medications for this visit.    Facility-Administered Medications Ordered in Other Visits:     ondansetron HCl  "(PF) 4 mg/2 mL injection, , , PRN, Connor, James NERI, CRNA, 4 mg at 06/29/15 0856      Vitals: Blood pressure 120/76, pulse 72, resp. rate 18, height 6' 2" (1.88 m), weight 85.3 kg (188 lb), SpO2 97 %. Body mass index is 24.14 kg/m².    Physical Exam  Vitals reviewed.   Constitutional:       General: He is awake. He is not in acute distress.     Appearance: Normal appearance. He is well-developed.   HENT:      Head: Normocephalic and atraumatic.      Nose: Nose normal.      Mouth/Throat:      Mouth: Mucous membranes are moist.   Eyes:      Extraocular Movements: Extraocular movements intact.      Conjunctiva/sclera: Conjunctivae normal.   Cardiovascular:      Rate and Rhythm: Normal rate and regular rhythm.   Pulmonary:      Effort: Pulmonary effort is normal.      Breath sounds: Normal breath sounds. No rales.   Abdominal:      General: There is no distension.      Palpations: Abdomen is soft.   Musculoskeletal:         General: No tenderness or signs of injury.      Right lower leg: No edema.      Left lower leg: No edema.   Skin:     General: Skin is warm and dry.      Findings: No erythema or rash.   Neurological:      General: No focal deficit present.      Mental Status: He is alert. Mental status is at baseline.   Psychiatric:         Mood and Affect: Mood normal.         Behavior: Behavior normal.           Labs/Imaging:  Sodium   Date Value Ref Range Status   01/05/2024 142 136 - 145 mmol/L Final   12/20/2023 143 136 - 145 mmol/L Final   12/15/2023 139 136 - 145 mmol/L Final     Potassium   Date Value Ref Range Status   01/05/2024 4.3 3.5 - 5.1 mmol/L Final   12/20/2023 5.7 (H) 3.5 - 5.1 mmol/L Final     Comment:     *No Visible Hemolysis   12/15/2023 6.1 (H) 3.5 - 5.1 mmol/L Final     Comment:     *No Visible Hemolysis     Chloride   Date Value Ref Range Status   01/05/2024 110 95 - 110 mmol/L Final   12/20/2023 111 (H) 95 - 110 mmol/L Final   12/15/2023 110 95 - 110 mmol/L Final     CO2   Date Value Ref " Range Status   01/05/2024 23 23 - 29 mmol/L Final   12/20/2023 20 (L) 23 - 29 mmol/L Final   12/15/2023 21 (L) 23 - 29 mmol/L Final     BUN   Date Value Ref Range Status   01/05/2024 22 8 - 23 mg/dL Final   12/20/2023 24 (H) 8 - 23 mg/dL Final   12/15/2023 23 8 - 23 mg/dL Final     Creatinine   Date Value Ref Range Status   01/05/2024 1.6 (H) 0.5 - 1.4 mg/dL Final   12/20/2023 1.9 (H) 0.5 - 1.4 mg/dL Final   12/15/2023 2.1 (H) 0.5 - 1.4 mg/dL Final     eGFR   Date Value Ref Range Status   01/05/2024 41.4 (A) >60 mL/min/1.73 m^2 Final   12/20/2023 33.7 (A) >60 mL/min/1.73 m^2 Final   12/15/2023 29.9 (A) >60 mL/min/1.73 m^2 Final     Glucose   Date Value Ref Range Status   07/19/2022 206 (H) 65 - 99 mg/dL Final     Calcium   Date Value Ref Range Status   01/05/2024 10.2 8.7 - 10.5 mg/dL Final   12/20/2023 10.1 8.7 - 10.5 mg/dL Final   12/15/2023 10.0 8.7 - 10.5 mg/dL Final     Phosphorus   Date Value Ref Range Status   03/30/2022 3.5 2.7 - 4.5 mg/dL Final   02/26/2022 3.4 2.7 - 4.5 mg/dL Final   10/07/2019 3.7 2.7 - 4.5 mg/dL Final     Albumin   Date Value Ref Range Status   12/15/2023 4.0 3.5 - 5.2 g/dL Final   12/01/2023 4.1 3.5 - 5.2 g/dL Final   05/10/2023 3.7 3.5 - 5.2 g/dL Final       PTH, Intact   Date Value Ref Range Status   07/06/2016 188.0 (H) 9.0 - 77.0 pg/mL Final     Vit D, 25-Hydroxy   Date Value Ref Range Status   10/06/2019 11 (L) 30 - 96 ng/mL Final     Comment:     Vitamin D deficiency.........<10 ng/mL                              Vitamin D insufficiency......10-29 ng/mL       Vitamin D sufficiency........> or equal to 30 ng/mL  Vitamin D toxicity............>100 ng/mL       Uric Acid   Date Value Ref Range Status   02/21/2022 11.4 (H) 3.4 - 7.0 mg/dL Final   10/06/2019 3.1 (L) 3.4 - 7.0 mg/dL Final   01/30/2018 3.6 3.4 - 7.0 mg/dL Final       Hemoglobin   Date Value Ref Range Status   12/01/2023 12.1 (L) 14.0 - 18.0 g/dL Final   05/10/2023 12.5 (L) 14.0 - 18.0 g/dL Final   05/09/2023 12.7 (L) 14.0  - 18.0 g/dL Final       Prot/Creat Ratio, Urine   Date Value Ref Range Status   07/06/2016 0.43 (H) 0.00 - 0.20 Final           Renal US: 7/14/16: reviewed.    CT A/P 5/9/23: within the limits of a noncontrast examination, the kidneys are small and lobular in appearance.  There are multiple low-attenuation lesions in both kidneys.  The most concerning of these lesions of the kidneys is seen arising from the lower pole of the right kidney measuring approximately 3.3 x 3 x 3.8 cm (series 2 axial image 69 and series 601 sagittal image 97).        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 Will check UA and UPCR prior to next visit  - continue good water intake  - continue good BP control   - I foresee being the provider to manage this complex disease in the future.     Hyperkalemia  - due to metabolic acidosis/ostomy  - K 5.7 --> 4.3 with dietary changes alone. Acidosis also resolved with dietary changes  - no need for Lokelma at this time  - close follow-up to ensure stability  - if hyperK recurs, will likely start bicarb supplementation  - continue low K diet and good water intake  - reviewed Body Armor ingredients with patient; 700mg potassium per bottle    Metabolic acidosis, normal anion gap (NAG)  - due to ostomy +/- CKD  - bicarb 23 on 1/5/24  - no need for intervention at this time  - close follow-up to ensure stability; low threshold to add NaBicarb to regimen    Colostomy in place  - contributing to NAGMA and hyperK  - see above  - continue dietary changes and good hydration    Hyperuricemia  - gout currently controlled with allopurinol and dietary changes  - last uric acid  11.4 on 2022; will repeat           Treatment options and plan were discussed with the patient and/or caregiver.   RTC 6 weeks.    Kelly Jaeger MD  [facility]  [phone]

## 2024-01-30 ENCOUNTER — OFFICE VISIT (OUTPATIENT)
Dept: NEPHROLOGY | Facility: CLINIC | Age: 89
End: 2024-01-30
Payer: MEDICARE

## 2024-01-30 ENCOUNTER — EXTERNAL HOME HEALTH (OUTPATIENT)
Dept: HOME HEALTH SERVICES | Facility: HOSPITAL | Age: 89
End: 2024-01-30
Payer: MEDICARE

## 2024-01-30 VITALS
SYSTOLIC BLOOD PRESSURE: 120 MMHG | HEIGHT: 74 IN | WEIGHT: 188 LBS | DIASTOLIC BLOOD PRESSURE: 76 MMHG | BODY MASS INDEX: 24.13 KG/M2 | OXYGEN SATURATION: 97 % | HEART RATE: 72 BPM | RESPIRATION RATE: 18 BRPM

## 2024-01-30 DIAGNOSIS — E11.22 CONTROLLED TYPE 2 DIABETES MELLITUS WITH STAGE 3 CHRONIC KIDNEY DISEASE, WITHOUT LONG-TERM CURRENT USE OF INSULIN: ICD-10-CM

## 2024-01-30 DIAGNOSIS — N18.32 HYPERTENSIVE KIDNEY DISEASE WITH STAGE 3B CHRONIC KIDNEY DISEASE: Primary | ICD-10-CM

## 2024-01-30 DIAGNOSIS — N18.30 CONTROLLED TYPE 2 DIABETES MELLITUS WITH STAGE 3 CHRONIC KIDNEY DISEASE, WITHOUT LONG-TERM CURRENT USE OF INSULIN: ICD-10-CM

## 2024-01-30 DIAGNOSIS — I12.9 HYPERTENSIVE KIDNEY DISEASE WITH STAGE 3B CHRONIC KIDNEY DISEASE: Primary | ICD-10-CM

## 2024-01-30 DIAGNOSIS — E87.5 HYPERKALEMIA: ICD-10-CM

## 2024-01-30 DIAGNOSIS — E87.20 METABOLIC ACIDOSIS, NORMAL ANION GAP (NAG): ICD-10-CM

## 2024-01-30 DIAGNOSIS — E79.0 HYPERURICEMIA: ICD-10-CM

## 2024-01-30 DIAGNOSIS — Z93.3 COLOSTOMY IN PLACE: ICD-10-CM

## 2024-01-30 PROCEDURE — 99204 OFFICE O/P NEW MOD 45 MIN: CPT | Mod: S$PBB,,, | Performed by: INTERNAL MEDICINE

## 2024-01-30 PROCEDURE — 99999 PR PBB SHADOW E&M-EST. PATIENT-LVL III: CPT | Mod: PBBFAC,,, | Performed by: INTERNAL MEDICINE

## 2024-01-30 PROCEDURE — 99213 OFFICE O/P EST LOW 20 MIN: CPT | Mod: PBBFAC | Performed by: INTERNAL MEDICINE

## 2024-01-30 PROCEDURE — G2211 COMPLEX E/M VISIT ADD ON: HCPCS | Mod: S$PBB,,, | Performed by: INTERNAL MEDICINE

## 2024-01-30 NOTE — ASSESSMENT & PLAN NOTE
- due to metabolic acidosis/ostomy  - K 5.7 --> 4.3 with dietary changes alone. Acidosis also resolved with dietary changes  - no need for Lokelma at this time  - close follow-up to ensure stability  - if hyperK recurs, will likely start bicarb supplementation  - continue low K diet and good water intake  - reviewed Body Armor ingredients with patient; 700mg potassium per bottle

## 2024-01-30 NOTE — ASSESSMENT & PLAN NOTE
- gout currently controlled with allopurinol and dietary changes  - last uric acid 11.4 on 2022; will repeat

## 2024-01-30 NOTE — ASSESSMENT & PLAN NOTE
- baseline Cr 1.8-2.2 since 3/2022; clinically due to hypertensive arterionephrosclerosis + age-related nephron loss; though cannot rule out contribution from diabetes, hyperuricemia, or chronic metabolic acidosis   - Cr 1.6 today; stable and at baseline  - UPCR 0.43 in 2016; ACR 28 in 8/2023. Will check UA and UPCR prior to next visit  - continue good water intake  - continue good BP control   - I foresee being the provider to manage this complex disease in the future.

## 2024-01-30 NOTE — ASSESSMENT & PLAN NOTE
- due to ostomy +/- CKD  - bicarb 23 on 1/5/24  - no need for intervention at this time  - close follow-up to ensure stability; low threshold to add NaBicarb to regimen

## 2024-02-03 PROCEDURE — G0179 MD RECERTIFICATION HHA PT: HCPCS | Mod: ,,, | Performed by: FAMILY MEDICINE

## 2024-02-05 ENCOUNTER — DOCUMENT SCAN (OUTPATIENT)
Dept: HOME HEALTH SERVICES | Facility: HOSPITAL | Age: 89
End: 2024-02-05
Payer: MEDICARE

## 2024-02-20 ENCOUNTER — TELEPHONE (OUTPATIENT)
Dept: FAMILY MEDICINE | Facility: CLINIC | Age: 89
End: 2024-02-20
Payer: MEDICARE

## 2024-02-21 ENCOUNTER — OFFICE VISIT (OUTPATIENT)
Dept: FAMILY MEDICINE | Facility: CLINIC | Age: 89
End: 2024-02-21
Payer: MEDICARE

## 2024-02-21 VITALS
RESPIRATION RATE: 18 BRPM | OXYGEN SATURATION: 99 % | SYSTOLIC BLOOD PRESSURE: 138 MMHG | BODY MASS INDEX: 21.23 KG/M2 | HEIGHT: 74 IN | HEART RATE: 80 BPM | TEMPERATURE: 98 F | DIASTOLIC BLOOD PRESSURE: 62 MMHG | WEIGHT: 165.38 LBS

## 2024-02-21 DIAGNOSIS — I10 ESSENTIAL HYPERTENSION: ICD-10-CM

## 2024-02-21 DIAGNOSIS — T45.1X5A CHEMOTHERAPY-INDUCED NEUROPATHY: ICD-10-CM

## 2024-02-21 DIAGNOSIS — G47.00 INSOMNIA, UNSPECIFIED TYPE: ICD-10-CM

## 2024-02-21 DIAGNOSIS — N18.30 CONTROLLED TYPE 2 DIABETES MELLITUS WITH STAGE 3 CHRONIC KIDNEY DISEASE, WITHOUT LONG-TERM CURRENT USE OF INSULIN: ICD-10-CM

## 2024-02-21 DIAGNOSIS — M10.9 GOUT, UNSPECIFIED CAUSE, UNSPECIFIED CHRONICITY, UNSPECIFIED SITE: Primary | ICD-10-CM

## 2024-02-21 DIAGNOSIS — G62.0 CHEMOTHERAPY-INDUCED NEUROPATHY: ICD-10-CM

## 2024-02-21 DIAGNOSIS — E11.22 CONTROLLED TYPE 2 DIABETES MELLITUS WITH STAGE 3 CHRONIC KIDNEY DISEASE, WITHOUT LONG-TERM CURRENT USE OF INSULIN: ICD-10-CM

## 2024-02-21 PROCEDURE — G2211 COMPLEX E/M VISIT ADD ON: HCPCS | Mod: S$PBB,,, | Performed by: FAMILY MEDICINE

## 2024-02-21 PROCEDURE — 99214 OFFICE O/P EST MOD 30 MIN: CPT | Mod: S$PBB,,, | Performed by: FAMILY MEDICINE

## 2024-02-21 PROCEDURE — 99214 OFFICE O/P EST MOD 30 MIN: CPT | Mod: PBBFAC,PO | Performed by: FAMILY MEDICINE

## 2024-02-21 PROCEDURE — 99999 PR PBB SHADOW E&M-EST. PATIENT-LVL IV: CPT | Mod: PBBFAC,,, | Performed by: FAMILY MEDICINE

## 2024-02-21 RX ORDER — TRAZODONE HYDROCHLORIDE 50 MG/1
50 TABLET ORAL NIGHTLY PRN
Qty: 30 TABLET | Refills: 0 | Status: SHIPPED | OUTPATIENT
Start: 2024-02-21 | End: 2024-03-14

## 2024-02-21 RX ORDER — GABAPENTIN 100 MG/1
100 CAPSULE ORAL 3 TIMES DAILY
Qty: 270 CAPSULE | Refills: 1 | Status: SHIPPED | OUTPATIENT
Start: 2024-02-21 | End: 2024-08-19

## 2024-02-21 RX ORDER — DULOXETIN HYDROCHLORIDE 30 MG/1
30 CAPSULE, DELAYED RELEASE ORAL 2 TIMES DAILY
Qty: 180 CAPSULE | Refills: 3 | Status: SHIPPED | OUTPATIENT
Start: 2024-02-21

## 2024-02-21 RX ORDER — SITAGLIPTIN 50 MG/1
50 TABLET, FILM COATED ORAL DAILY
Qty: 90 TABLET | Refills: 1 | Status: SHIPPED | OUTPATIENT
Start: 2024-02-21

## 2024-02-21 RX ORDER — AMLODIPINE BESYLATE 10 MG/1
10 TABLET ORAL DAILY
Qty: 90 TABLET | Refills: 0 | Status: SHIPPED | OUTPATIENT
Start: 2024-02-21 | End: 2024-05-17

## 2024-02-21 RX ORDER — ALLOPURINOL 300 MG/1
1 TABLET ORAL DAILY
COMMUNITY
Start: 2023-12-05 | End: 2024-02-21 | Stop reason: SDUPTHER

## 2024-02-21 RX ORDER — FERROUS SULFATE 324(65)MG
1 TABLET, DELAYED RELEASE (ENTERIC COATED) ORAL DAILY
COMMUNITY
Start: 2023-12-05

## 2024-02-21 RX ORDER — METOPROLOL TARTRATE 50 MG/1
50 TABLET ORAL 2 TIMES DAILY
Qty: 60 TABLET | Refills: 0 | Status: CANCELLED | OUTPATIENT
Start: 2024-02-21 | End: 2024-03-22

## 2024-02-21 RX ORDER — AMLODIPINE BESYLATE 10 MG/1
1 TABLET ORAL DAILY
COMMUNITY
Start: 2023-12-05 | End: 2024-02-21

## 2024-02-21 RX ORDER — GABAPENTIN 100 MG/1
CAPSULE ORAL
COMMUNITY
Start: 2023-12-05 | End: 2024-02-21

## 2024-02-21 RX ORDER — HYDRALAZINE HYDROCHLORIDE 100 MG/1
TABLET, FILM COATED ORAL
COMMUNITY
Start: 2023-12-05 | End: 2024-06-10

## 2024-02-21 RX ORDER — ALLOPURINOL 300 MG/1
300 TABLET ORAL DAILY
Qty: 90 TABLET | Refills: 1 | Status: SHIPPED | OUTPATIENT
Start: 2024-02-21

## 2024-02-21 NOTE — PROGRESS NOTES
Routine Office Visit    Patient Name: Eugene Gamez    : 1935  MRN: [id number]    Subjective:  Eugene is a 89 y.o. male who presents today for:    1. Medication refill  Patient presenting today with family today for follow up and to get medication refills.  He has been doing well and continues to drive live independent.  No new concerns.  He recently saw nephrology and is adjusting his diet.  He is ambulating well with his walker.  No recent illnesses.    Past Medical History  Past Medical History:   Diagnosis Date    Arthritis     Cancer     Diabetes mellitus     Elevated PSA     Gout, unspecified     Hypertension     Nuclear sclerotic cataract of both eyes 2018       Past Surgical History  Past Surgical History:   Procedure Laterality Date    CATARACT EXTRACTION      ou    EYE SURGERY      bilateral cataracts    removal of small bowel  2015    Colostomy       Family History  Family History   Problem Relation Age of Onset    Hypertension Mother     Diabetes Mother     No Known Problems Father     No Known Problems Sister     No Known Problems Brother     No Known Problems Maternal Aunt     No Known Problems Maternal Uncle     No Known Problems Paternal Aunt     No Known Problems Paternal Uncle     No Known Problems Maternal Grandmother     No Known Problems Maternal Grandfather     No Known Problems Paternal Grandmother     No Known Problems Paternal Grandfather     Amblyopia Neg Hx     Blindness Neg Hx     Cancer Neg Hx     Cataracts Neg Hx     Glaucoma Neg Hx     Macular degeneration Neg Hx     Retinal detachment Neg Hx     Strabismus Neg Hx     Stroke Neg Hx     Thyroid disease Neg Hx        Social History  Social History     Socioeconomic History    Marital status:     Number of children: 8   Tobacco Use    Smoking status: Never     Passive exposure: Never    Smokeless tobacco: Never    Tobacco comments:     smoked short while as a teen   Substance and Sexual Activity    Alcohol use: No     Drug use: No    Sexual activity: Not Currently     Partners: Female       Current Medications  Current Outpatient Medications on File Prior to Visit   Medication Sig Dispense Refill    ferrous sulfate 324 mg (65 mg iron) TbEC Take 1 tablet by mouth once daily.      hydrALAZINE (APRESOLINE) 100 MG tablet 1 tablet 3 TIMES DAILY (route: oral)      [DISCONTINUED] allopurinoL (ZYLOPRIM) 300 MG tablet Take 1 tablet by mouth once daily.      [DISCONTINUED] amLODIPine (NORVASC) 10 MG tablet Take 1 tablet by mouth once daily.      [DISCONTINUED] colchicine (COLCRYS) 0.6 mg tablet Take 1 tablet (0.6 mg total) by mouth daily as needed. 90 tablet 3    [DISCONTINUED] DULoxetine (CYMBALTA) 30 MG capsule TAKE 1 CAPSULE BY MOUTH 2 TIMES DAILY. 180 capsule 3    [DISCONTINUED] DULoxetine 30 mg CDRS 1 capsule 2 TIMES DAILY (route: oral)      [DISCONTINUED] ferrous gluconate (FERGON) 324 MG tablet Take 324 mg by mouth.      [DISCONTINUED] gabapentin (NEURONTIN) 100 MG capsule 1 capsule 3 TIMES DAILY (route: oral)      [DISCONTINUED] JANUVIA 50 mg Tab Take 1 tablet (50 mg total) by mouth once daily. 90 tablet 1    [DISCONTINUED] loratadine (CLARITIN) 10 mg tablet Take 10 mg by mouth once daily.      [DISCONTINUED] metoprolol tartrate (LOPRESSOR) 50 MG tablet Take 1 tablet (50 mg total) by mouth 2 (two) times daily. 60 tablet 0    [DISCONTINUED] QUEtiapine (SEROQUEL) 50 MG tablet Take 1 tablet (50 mg total) by mouth every evening. 30 tablet 0    [DISCONTINUED] SITagliptin phosphate (JANUVIA) 50 MG Tab Take 1 tablet by mouth once daily.      [DISCONTINUED] allopurinoL (ZYLOPRIM) 300 MG tablet Take 1 tablet (300 mg total) by mouth once daily. 90 tablet 3    [DISCONTINUED] amLODIPine (NORVASC) 10 MG tablet Take 1 tablet (10 mg total) by mouth once daily. 90 tablet 0    [DISCONTINUED] blood-glucose meter (ONE TOUCH ULTRAMINI) kit To test twice daily. Use as instructed 1 each 0    [DISCONTINUED] cholestyramine (QUESTRAN) 4 gram packet Take  "1 packet (4 g total) by mouth 3 (three) times daily with meals. 270 packet 3    [DISCONTINUED] gabapentin (NEURONTIN) 100 MG capsule Take 1 capsule (100 mg total) by mouth 3 (three) times daily. for 10 days 30 capsule 0    [DISCONTINUED] hydrALAZINE (APRESOLINE) 100 MG tablet Take 1 tablet (100 mg total) by mouth once daily. 90 tablet 1    [DISCONTINUED] sodium bicarbonate 650 MG tablet Take 1 tablet (650 mg total) by mouth 3 (three) times daily. 21 tablet 0     Current Facility-Administered Medications on File Prior to Visit   Medication Dose Route Frequency Provider Last Rate Last Admin    ondansetron HCl (PF) 4 mg/2 mL injection    PRN James Ryan CRNA   4 mg at 06/29/15 08       Allergies   Review of patient's allergies indicates:   Allergen Reactions    Iodine Other (See Comments)     Causes gout to flare up    Shellfish containing products      Causes gout to flare up    Shellfish derived        Review of Systems (Pertinent positives)  Review of Systems   Constitutional: Negative.    Eyes: Negative.    Respiratory: Negative.     Gastrointestinal: Negative.    Musculoskeletal:  Positive for joint pain.   Skin: Negative.          /62   Pulse 80   Temp 97.6 °F (36.4 °C) (Oral)   Resp 18   Ht 6' 2" (1.88 m)   Wt 75 kg (165 lb 5.5 oz)   SpO2 99%   BMI 21.23 kg/m²     GENERAL APPEARANCE: in no apparent distress and well developed and well nourished  HEENT: PERRL, EOMI, Sclera clear, anicteric, Oropharynx clear, no lesions, Neck supple with midline trachea  NECK: normal, supple, no adenopathy, thyroid normal in size  RESPIRATORY: appears well, vitals normal, no respiratory distress, acyanotic, normal RR, chest clear, no wheezing, crepitations, rhonchi, normal symmetric air entry  HEART: regular rate and rhythm, S1, S2 normal, no murmur, click, rub or gallop.    SKIN: no rashes, no wounds, no other lesions  PSYCH: Alert, oriented x 3, thought content appropriate, speech normal, pleasant and " cooperative, good eye contact, well groomed,    Assessment/Plan:  Eugene Gamez is a 89 y.o. male who presents today for :    Eugene was seen today for follow-up, diabetes, hypertension and medication refill.    Diagnoses and all orders for this visit:    Gout, unspecified cause, unspecified chronicity, unspecified site  -     allopurinoL (ZYLOPRIM) 300 MG tablet; Take 1 tablet (300 mg total) by mouth once daily.  - Stable on current regimen  -  Medication refilled per patient request    Chemotherapy-induced neuropathy  -     DULoxetine (CYMBALTA) 30 MG capsule; Take 1 capsule (30 mg total) by mouth 2 (two) times daily.  -     gabapentin (NEURONTIN) 100 MG capsule; Take 1 capsule (100 mg total) by mouth 3 (three) times daily.  - Stable on current dose    Controlled type 2 diabetes mellitus with stage 3 chronic kidney disease, without long-term current use of insulin  -     JANUVIA 50 mg Tab; Take 1 tablet (50 mg total) by mouth once daily.  - Stable on current regimen  - A1c up to date    Essential hypertension  -     amLODIPine (NORVASC) 10 MG tablet; Take 1 tablet (10 mg total) by mouth once daily.  - Stable on current regimen  - No current shortness of breath or chest pain    Insomnia, unspecified type  -     traZODone (DESYREL) 50 MG tablet; Take 1 tablet (50 mg total) by mouth nightly as needed for Insomnia.  - Will try on trazodone at bedtime to help get to sleep  - Call with any concerns      Larry Monae MD

## 2024-02-26 ENCOUNTER — TELEPHONE (OUTPATIENT)
Dept: NEPHROLOGY | Facility: CLINIC | Age: 89
End: 2024-02-26
Payer: MEDICARE

## 2024-02-26 NOTE — TELEPHONE ENCOUNTER
I left a message for this patient's daughter in regards to scheduling labs prior ov. Patient's preference to work around her schedule.

## 2024-02-28 ENCOUNTER — TELEPHONE (OUTPATIENT)
Dept: NEPHROLOGY | Facility: CLINIC | Age: 89
End: 2024-02-28
Payer: MEDICARE

## 2024-02-28 NOTE — TELEPHONE ENCOUNTER
This patient's daughter was called in regards to ensuring her father's labs and urine sample were turned in prior to office visit with Dr. Jaeger. Daughter is aware and will try to make it to the lab with him on Monday.

## 2024-03-04 ENCOUNTER — LAB VISIT (OUTPATIENT)
Dept: LAB | Facility: HOSPITAL | Age: 89
End: 2024-03-04
Payer: MEDICARE

## 2024-03-04 DIAGNOSIS — E87.5 HYPERKALEMIA: ICD-10-CM

## 2024-03-04 DIAGNOSIS — N18.32 HYPERTENSIVE KIDNEY DISEASE WITH STAGE 3B CHRONIC KIDNEY DISEASE: ICD-10-CM

## 2024-03-04 DIAGNOSIS — I12.9 HYPERTENSIVE KIDNEY DISEASE WITH STAGE 3B CHRONIC KIDNEY DISEASE: ICD-10-CM

## 2024-03-04 LAB
ALBUMIN SERPL BCP-MCNC: 3.6 G/DL (ref 3.5–5.2)
ANION GAP SERPL CALC-SCNC: 11 MMOL/L (ref 8–16)
BASOPHILS # BLD AUTO: 0.03 K/UL (ref 0–0.2)
BASOPHILS NFR BLD: 0.5 % (ref 0–1.9)
BUN SERPL-MCNC: 24 MG/DL (ref 8–23)
CALCIUM SERPL-MCNC: 9.6 MG/DL (ref 8.7–10.5)
CHLORIDE SERPL-SCNC: 110 MMOL/L (ref 95–110)
CO2 SERPL-SCNC: 20 MMOL/L (ref 23–29)
CREAT SERPL-MCNC: 1.8 MG/DL (ref 0.5–1.4)
DIFFERENTIAL METHOD BLD: ABNORMAL
EOSINOPHIL # BLD AUTO: 0.1 K/UL (ref 0–0.5)
EOSINOPHIL NFR BLD: 1 % (ref 0–8)
ERYTHROCYTE [DISTWIDTH] IN BLOOD BY AUTOMATED COUNT: 14.3 % (ref 11.5–14.5)
EST. GFR  (NO RACE VARIABLE): 35.8 ML/MIN/1.73 M^2
GLUCOSE SERPL-MCNC: 102 MG/DL (ref 70–110)
HCT VFR BLD AUTO: 33.8 % (ref 40–54)
HGB BLD-MCNC: 10.7 G/DL (ref 14–18)
IMM GRANULOCYTES # BLD AUTO: 0.02 K/UL (ref 0–0.04)
IMM GRANULOCYTES NFR BLD AUTO: 0.3 % (ref 0–0.5)
LYMPHOCYTES # BLD AUTO: 1.7 K/UL (ref 1–4.8)
LYMPHOCYTES NFR BLD: 28.1 % (ref 18–48)
MCH RBC QN AUTO: 29.5 PG (ref 27–31)
MCHC RBC AUTO-ENTMCNC: 31.7 G/DL (ref 32–36)
MCV RBC AUTO: 93 FL (ref 82–98)
MONOCYTES # BLD AUTO: 0.6 K/UL (ref 0.3–1)
MONOCYTES NFR BLD: 10.6 % (ref 4–15)
NEUTROPHILS # BLD AUTO: 3.6 K/UL (ref 1.8–7.7)
NEUTROPHILS NFR BLD: 59.5 % (ref 38–73)
NRBC BLD-RTO: 0 /100 WBC
PHOSPHATE SERPL-MCNC: 3.5 MG/DL (ref 2.7–4.5)
PLATELET # BLD AUTO: 229 K/UL (ref 150–450)
PMV BLD AUTO: 11.3 FL (ref 9.2–12.9)
POTASSIUM SERPL-SCNC: 4.7 MMOL/L (ref 3.5–5.1)
PTH-INTACT SERPL-MCNC: 261.9 PG/ML (ref 9–77)
RBC # BLD AUTO: 3.63 M/UL (ref 4.6–6.2)
SODIUM SERPL-SCNC: 141 MMOL/L (ref 136–145)
URATE SERPL-MCNC: 7.1 MG/DL (ref 3.4–7)
WBC # BLD AUTO: 5.97 K/UL (ref 3.9–12.7)

## 2024-03-04 PROCEDURE — 36415 COLL VENOUS BLD VENIPUNCTURE: CPT | Mod: PO | Performed by: INTERNAL MEDICINE

## 2024-03-04 PROCEDURE — 80069 RENAL FUNCTION PANEL: CPT | Performed by: INTERNAL MEDICINE

## 2024-03-04 PROCEDURE — 83970 ASSAY OF PARATHORMONE: CPT | Performed by: INTERNAL MEDICINE

## 2024-03-04 PROCEDURE — 85025 COMPLETE CBC W/AUTO DIFF WBC: CPT | Performed by: INTERNAL MEDICINE

## 2024-03-04 PROCEDURE — 84550 ASSAY OF BLOOD/URIC ACID: CPT | Performed by: INTERNAL MEDICINE

## 2024-03-11 NOTE — PROGRESS NOTES
[facility]  [address] [facility], [address]  [place]  [phone]    Referring Provider: No ref. provider found  PCP: Larry Monae MD  Hem/Onc: Young        HPI: Mr. Eugene Gamez is a 89 y.o. male with HTN, T2DM, gout, colon cancer in 2015 s/p ostomy and FOLFOX, biopsy-proven renal oncocytoma, and h/o PE/DVT who is here for established patient evaluation of Chronic Kidney Disease  He was initially referred on 1/30/24. His baseline Cr appears to be 1.8-2.2 since 3/2022. Urine studies with proteinuria since at least 2016. Labs also with metabolic acidosis since 2008 and intermittent hyperkalemia since 2018. His K was 5.7 on 12/20/23; he was started on Lokelma 5g daily.   He was diagnosed with HTN > 50 years ago. BP has been variably controlled. No h/o hypertensive emergency. No edema.  He was diagnosed with T2DM ~2015. He has never required insulin. Highest A1c on file is 8.2% in 1/2018. Most recent A1c was 6.2% in 12/2023.   He reports longstanding h/o gout. Currently with flares < 1x/year. He reports compliance with allopurinol. He avoids all shellfish. Last uric acid 11.4 on 2/21/22.   He notes remote h/o kidney stones; > 20 years ago.  No family h/o kidney disease. Wife was on dialysis prior to her passing.   He has recently changed his diet (cut out sweet potatoes, greens, etc) which has resulted in improvement in both K and bicarb levels. He was no longer on Lokelma at time of 1/5 labs. He notes good water intake. He also enjoys 1-2 Body Armors per day.   He is here today with his daughter.    Interval Hx:   LV 1/30/24: no medication changes.  Today he reports to be doing well. Home health nurse checks his BP at home; no report of low BP.   He has been monitoring his diet and avoids high potassium foods as well as shellfish. No recent gout flares. No edema. Enjoys snacks.   Previously took NaBicarb; agreeable to restarting.       ROS:  Complete ROS otherwise negative except as indicated above.  "        Current Outpatient Medications:     allopurinoL (ZYLOPRIM) 300 MG tablet, Take 1 tablet (300 mg total) by mouth once daily., Disp: 90 tablet, Rfl: 1    amLODIPine (NORVASC) 10 MG tablet, Take 1 tablet (10 mg total) by mouth once daily., Disp: 90 tablet, Rfl: 0    DULoxetine (CYMBALTA) 30 MG capsule, Take 1 capsule (30 mg total) by mouth 2 (two) times daily., Disp: 180 capsule, Rfl: 3    ferrous sulfate 324 mg (65 mg iron) TbEC, Take 1 tablet by mouth once daily., Disp: , Rfl:     gabapentin (NEURONTIN) 100 MG capsule, Take 1 capsule (100 mg total) by mouth 3 (three) times daily., Disp: 270 capsule, Rfl: 1    hydrALAZINE (APRESOLINE) 100 MG tablet, 1 tablet 3 TIMES DAILY (route: oral), Disp: , Rfl:     JANUVIA 50 mg Tab, Take 1 tablet (50 mg total) by mouth once daily., Disp: 90 tablet, Rfl: 1    traZODone (DESYREL) 50 MG tablet, Take 1 tablet (50 mg total) by mouth nightly as needed for Insomnia., Disp: 30 tablet, Rfl: 0    ergocalciferol (ERGOCALCIFEROL) 50,000 unit Cap, Take 1 capsule (50,000 Units total) by mouth every 7 days., Disp: 4 capsule, Rfl: 5    sodium bicarbonate 650 MG tablet, Take 1 tablet (650 mg total) by mouth 2 (two) times daily., Disp: 60 tablet, Rfl: 11  No current facility-administered medications for this visit.    Facility-Administered Medications Ordered in Other Visits:     ondansetron HCl (PF) 4 mg/2 mL injection, , , PRN, James Ryan, KELSEY, 4 mg at 06/29/15 0856      Vitals: Blood pressure (!) 118/52, pulse 92, resp. rate 18, height 6' 2" (1.88 m), weight 74.8 kg (165 lb), SpO2 98 %. Body mass index is 21.18 kg/m².    Physical Exam  Vitals reviewed.   Constitutional:       General: He is awake. He is not in acute distress.     Appearance: Normal appearance. He is well-developed.   HENT:      Head: Normocephalic and atraumatic.      Nose: Nose normal.      Mouth/Throat:      Mouth: Mucous membranes are moist.   Eyes:      Extraocular Movements: Extraocular movements intact.      " Conjunctiva/sclera: Conjunctivae normal.   Pulmonary:      Effort: Pulmonary effort is normal.   Musculoskeletal:         General: No tenderness or signs of injury.      Right lower leg: No edema.      Left lower leg: No edema.      Comments: BLE with trace pitting edema   Skin:     General: Skin is warm and dry.      Findings: No erythema or rash.   Neurological:      General: No focal deficit present.      Mental Status: He is alert. Mental status is at baseline.   Psychiatric:         Mood and Affect: Mood normal.         Behavior: Behavior normal.             Labs/Imaging:  Sodium   Date Value Ref Range Status   03/04/2024 141 136 - 145 mmol/L Final   01/05/2024 142 136 - 145 mmol/L Final   12/20/2023 143 136 - 145 mmol/L Final     Potassium   Date Value Ref Range Status   03/04/2024 4.7 3.5 - 5.1 mmol/L Final   01/05/2024 4.3 3.5 - 5.1 mmol/L Final   12/20/2023 5.7 (H) 3.5 - 5.1 mmol/L Final     Comment:     *No Visible Hemolysis     Chloride   Date Value Ref Range Status   03/04/2024 110 95 - 110 mmol/L Final   01/05/2024 110 95 - 110 mmol/L Final   12/20/2023 111 (H) 95 - 110 mmol/L Final     CO2   Date Value Ref Range Status   03/04/2024 20 (L) 23 - 29 mmol/L Final   01/05/2024 23 23 - 29 mmol/L Final   12/20/2023 20 (L) 23 - 29 mmol/L Final     BUN   Date Value Ref Range Status   03/04/2024 24 (H) 8 - 23 mg/dL Final   01/05/2024 22 8 - 23 mg/dL Final   12/20/2023 24 (H) 8 - 23 mg/dL Final     Creatinine   Date Value Ref Range Status   03/04/2024 1.8 (H) 0.5 - 1.4 mg/dL Final   01/05/2024 1.6 (H) 0.5 - 1.4 mg/dL Final   12/20/2023 1.9 (H) 0.5 - 1.4 mg/dL Final     eGFR   Date Value Ref Range Status   03/04/2024 35.8 (A) >60 mL/min/1.73 m^2 Final   01/05/2024 41.4 (A) >60 mL/min/1.73 m^2 Final   12/20/2023 33.7 (A) >60 mL/min/1.73 m^2 Final     Glucose   Date Value Ref Range Status   07/19/2022 206 (H) 65 - 99 mg/dL Final     Calcium   Date Value Ref Range Status   03/04/2024 9.6 8.7 - 10.5 mg/dL Final    01/05/2024 10.2 8.7 - 10.5 mg/dL Final   12/20/2023 10.1 8.7 - 10.5 mg/dL Final     Phosphorus   Date Value Ref Range Status   03/04/2024 3.5 2.7 - 4.5 mg/dL Final   03/30/2022 3.5 2.7 - 4.5 mg/dL Final   02/26/2022 3.4 2.7 - 4.5 mg/dL Final     Albumin   Date Value Ref Range Status   03/04/2024 3.6 3.5 - 5.2 g/dL Final   12/15/2023 4.0 3.5 - 5.2 g/dL Final   12/01/2023 4.1 3.5 - 5.2 g/dL Final       PTH, Intact   Date Value Ref Range Status   03/04/2024 261.9 (H) 9.0 - 77.0 pg/mL Final   07/06/2016 188.0 (H) 9.0 - 77.0 pg/mL Final     Vit D, 25-Hydroxy   Date Value Ref Range Status   10/06/2019 11 (L) 30 - 96 ng/mL Final     Comment:     Vitamin D deficiency.........<10 ng/mL                              Vitamin D insufficiency......10-29 ng/mL       Vitamin D sufficiency........> or equal to 30 ng/mL  Vitamin D toxicity............>100 ng/mL       Uric Acid   Date Value Ref Range Status   03/04/2024 7.1 (H) 3.4 - 7.0 mg/dL Final   02/21/2022 11.4 (H) 3.4 - 7.0 mg/dL Final   10/06/2019 3.1 (L) 3.4 - 7.0 mg/dL Final       Hemoglobin   Date Value Ref Range Status   03/04/2024 10.7 (L) 14.0 - 18.0 g/dL Final   12/01/2023 12.1 (L) 14.0 - 18.0 g/dL Final   05/10/2023 12.5 (L) 14.0 - 18.0 g/dL Final       Prot/Creat Ratio, Urine   Date Value Ref Range Status   03/04/2024 0.05 0.00 - 0.20 Final   07/06/2016 0.43 (H) 0.00 - 0.20 Final         Renal US: 7/14/16: reviewed.    CT A/P 5/9/23: within the limits of a noncontrast examination, the kidneys are small and lobular in appearance.  There are multiple low-attenuation lesions in both kidneys.  The most concerning of these lesions of the kidneys is seen arising from the lower pole of the right kidney measuring approximately 3.3 x 3 x 3.8 cm (series 2 axial image 69 and series 601 sagittal image 97).      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 Avoid hypotension.   - I foresee being the provider to manage this complex disease in the future.     Hyperkalemia  - due to metabolic acidosis/ostomy  - K 4.3 --> 4.7 today; mildly worsening. Also with worsening acidosis  - starting NaBicarb 650mg BID  - continue low K diet and good water intake    Metabolic acidosis, normal anion gap (NAG)  - due to ostomy +/- CKD  - bicarb 23 --> 20 today; worsening  - restarting NaBicarb at 650mg BID    Colostomy in place  - contributing to NAGMA and hyperK  - see above  - continue dietary changes and good hydration    Hyperuricemia  - gout currently controlled with allopurinol and dietary changes  - uric acid 7.1 today; stable. No recent gout flares  - continue allopurinol 300mg daily    Anemia of chronic renal failure, stage 3b  - hgb 10.7; at goal for CKD  - will check iron studies    Secondary renal hyperparathyroidism  - ; uncontrolled  - CCa and phos normal  - starting ergo 50k units weekly          Treatment options and plan were discussed with the patient and/or caregiver.   RTC 3 months.       Kelly Jaeger MD  [facility]  [phone]

## 2024-03-12 ENCOUNTER — OFFICE VISIT (OUTPATIENT)
Dept: NEPHROLOGY | Facility: CLINIC | Age: 89
End: 2024-03-12
Payer: MEDICARE

## 2024-03-12 VITALS
SYSTOLIC BLOOD PRESSURE: 118 MMHG | BODY MASS INDEX: 21.17 KG/M2 | RESPIRATION RATE: 18 BRPM | HEART RATE: 92 BPM | OXYGEN SATURATION: 98 % | WEIGHT: 165 LBS | DIASTOLIC BLOOD PRESSURE: 52 MMHG | HEIGHT: 74 IN

## 2024-03-12 DIAGNOSIS — N25.81 SECONDARY RENAL HYPERPARATHYROIDISM: ICD-10-CM

## 2024-03-12 DIAGNOSIS — Z93.3 COLOSTOMY IN PLACE: ICD-10-CM

## 2024-03-12 DIAGNOSIS — E79.0 HYPERURICEMIA: ICD-10-CM

## 2024-03-12 DIAGNOSIS — D63.1 ANEMIA OF CHRONIC RENAL FAILURE, STAGE 3B: ICD-10-CM

## 2024-03-12 DIAGNOSIS — I12.9 HYPERTENSIVE KIDNEY DISEASE WITH STAGE 3B CHRONIC KIDNEY DISEASE: Primary | ICD-10-CM

## 2024-03-12 DIAGNOSIS — E87.20 METABOLIC ACIDOSIS, NORMAL ANION GAP (NAG): ICD-10-CM

## 2024-03-12 DIAGNOSIS — N18.32 ANEMIA OF CHRONIC RENAL FAILURE, STAGE 3B: ICD-10-CM

## 2024-03-12 DIAGNOSIS — N18.32 HYPERTENSIVE KIDNEY DISEASE WITH STAGE 3B CHRONIC KIDNEY DISEASE: Primary | ICD-10-CM

## 2024-03-12 DIAGNOSIS — E87.5 HYPERKALEMIA: ICD-10-CM

## 2024-03-12 PROCEDURE — G2211 COMPLEX E/M VISIT ADD ON: HCPCS | Mod: S$PBB,,, | Performed by: INTERNAL MEDICINE

## 2024-03-12 PROCEDURE — 99215 OFFICE O/P EST HI 40 MIN: CPT | Mod: PBBFAC | Performed by: INTERNAL MEDICINE

## 2024-03-12 PROCEDURE — 99214 OFFICE O/P EST MOD 30 MIN: CPT | Mod: S$PBB,,, | Performed by: INTERNAL MEDICINE

## 2024-03-12 PROCEDURE — 99999 PR PBB SHADOW E&M-EST. PATIENT-LVL V: CPT | Mod: PBBFAC,,, | Performed by: INTERNAL MEDICINE

## 2024-03-12 RX ORDER — ERGOCALCIFEROL 1.25 MG/1
50000 CAPSULE ORAL
Qty: 4 CAPSULE | Refills: 5 | Status: SHIPPED | OUTPATIENT
Start: 2024-03-12 | End: 2024-06-18

## 2024-03-12 RX ORDER — SODIUM BICARBONATE 650 MG/1
650 TABLET ORAL 2 TIMES DAILY
Qty: 60 TABLET | Refills: 11 | Status: SHIPPED | OUTPATIENT
Start: 2024-03-12 | End: 2025-03-12

## 2024-03-12 NOTE — ASSESSMENT & PLAN NOTE
- baseline Cr 1.8-2.2 since 3/2022; clinically due to hypertensive arterionephrosclerosis + age-related nephron loss; though cannot rule out contribution from diabetes, hyperuricemia, or chronic metabolic acidosis   - Cr 1.8 today; stable and at baseline  - UPCR 0.05 today; negative  - continue good water intake  - continue good BP control. Avoid hypotension.   - I foresee being the provider to manage this complex disease in the future.

## 2024-03-12 NOTE — ASSESSMENT & PLAN NOTE
- gout currently controlled with allopurinol and dietary changes  - uric acid 7.1 today; stable. No recent gout flares  - continue allopurinol 300mg daily

## 2024-03-12 NOTE — PATIENT INSTRUCTIONS
Let's start sodium bicarbonate 1 pill twice daily. This will help improve your bicarb levels and also help decrease your potassium levels.    Let's start ergocalciferol (vitamin D) once weekly.      Ask your home health nurse about possibly decreasing hydralazine (BP medication) from 3x/day to 2x/day.

## 2024-03-12 NOTE — ASSESSMENT & PLAN NOTE
- due to metabolic acidosis/ostomy  - K 4.3 --> 4.7 today; mildly worsening. Also with worsening acidosis  - starting NaBicarb 650mg BID  - continue low K diet and good water intake

## 2024-03-14 DIAGNOSIS — G47.00 INSOMNIA, UNSPECIFIED TYPE: ICD-10-CM

## 2024-03-14 RX ORDER — TRAZODONE HYDROCHLORIDE 50 MG/1
50 TABLET ORAL NIGHTLY
Qty: 90 TABLET | Refills: 3 | Status: SHIPPED | OUTPATIENT
Start: 2024-03-14

## 2024-03-14 NOTE — TELEPHONE ENCOUNTER
Refill Routing Note   Medication(s) are not appropriate for processing by [facility] for the following reason(s):        New or recently adjusted medication    [facility] action(s):  Defer     Requires labs : Yes             Appointments  past 12m or future 3m with PCP    Date Provider   Last Visit   2/21/2024 Larry Monae MD   Next Visit   7/3/2024 Larry Monae MD   ED visits in past 90 days: 0        Note composed:1:37 PM 03/14/2024

## 2024-03-14 NOTE — TELEPHONE ENCOUNTER
Care Due:                  Date            Visit Type   Department     Provider  --------------------------------------------------------------------------------                                EP -         [facility]                              PRIMARY      MED/ INTERNAL  Last Visit: 02-      CARE ([facility])   MED/ PEDS      Larry A  Page                              EP -         [facility]                              PRIMARY      MED/ INTERNAL  Next Visit: 07-      CARE ([facility])   MED/ PEDS      Larry A  Page                                                            Last  Test          Frequency    Reason                     Performed    Due Date  --------------------------------------------------------------------------------    HBA1C.......  6 months...  JANUVIA..................  12- 05-    [employer] Embedded Care Due Messages. Reference number: [id number].   3/14/2024 9:31:05 AM DESTINYT

## 2024-04-09 ENCOUNTER — EXTERNAL HOME HEALTH (OUTPATIENT)
Dept: HOME HEALTH SERVICES | Facility: HOSPITAL | Age: 89
End: 2024-04-09
Payer: MEDICARE

## 2024-05-09 ENCOUNTER — TELEPHONE (OUTPATIENT)
Dept: FAMILY MEDICINE | Facility: CLINIC | Age: 89
End: 2024-05-09
Payer: MEDICARE

## 2024-05-09 NOTE — TELEPHONE ENCOUNTER
----- Message from Maria Del Rosario Kerr sent at 5/9/2024  1:13 PM CDT -----  Regarding: SELF [phone]  .Type: Patient Call Back    Who called:self    What is the request in detail: patient requesting a prescription for diabetic shoes.    Can the clinic reply by [facility]?no    Would the patient rather a call back or a response via [facility]?call back    Best call back number [phone]

## 2024-05-20 ENCOUNTER — TELEPHONE (OUTPATIENT)
Dept: FAMILY MEDICINE | Facility: CLINIC | Age: 89
End: 2024-05-20
Payer: MEDICARE

## 2024-05-20 NOTE — TELEPHONE ENCOUNTER
Spoke with Daughter Mrs. Dow about diabetic  shoe for the pt   Pt must see the podiatry doctor before we can order the shoe for Medicare to  pay for the diabetic Shoe  gave the daughter the number for referral desk [phone]   Thanks vs   ----- Message from Devaughn Rice MA sent at 5/20/2024  9:04 AM CDT -----  Type: Patient Call Back    Who called: Paloma - Daughter     What is the request in detail: is asking for a call regarding diabetic shies for her dad..     Can the clinic reply by [facility]?No    Would the patient rather a call back or a response via [facility]? Yes, call     Best call back number: [phone]

## 2024-06-10 DIAGNOSIS — I10 ESSENTIAL (PRIMARY) HYPERTENSION: ICD-10-CM

## 2024-06-10 RX ORDER — HYDRALAZINE HYDROCHLORIDE 100 MG/1
100 TABLET, FILM COATED ORAL 3 TIMES DAILY
Qty: 270 TABLET | Refills: 2 | Status: SHIPPED | OUTPATIENT
Start: 2024-06-10 | End: 2024-06-18

## 2024-06-10 NOTE — TELEPHONE ENCOUNTER
No care due was identified.  [facility] Embedded Care Due Messages. Reference number: [id number].   6/10/2024 12:44:24 AM CDT

## 2024-06-10 NOTE — TELEPHONE ENCOUNTER
Refill Routing Note   Medication(s) are not appropriate for processing by [facility] for the following reason(s):        No active prescription written by provider:     [facility] action(s):  Defer      Medication Therapy Plan:         Appointments  past 12m or future 3m with PCP    Date Provider   Last Visit   2/21/2024 Larry Monae MD   Next Visit   7/3/2024 Larry Monae MD   ED visits in past 90 days: 0        Note composed:12:35 PM 06/10/2024

## 2024-06-11 ENCOUNTER — LAB VISIT (OUTPATIENT)
Dept: LAB | Facility: HOSPITAL | Age: 89
End: 2024-06-11
Payer: MEDICARE

## 2024-06-11 DIAGNOSIS — E87.5 HYPERKALEMIA: ICD-10-CM

## 2024-06-11 DIAGNOSIS — N18.32 HYPERTENSIVE KIDNEY DISEASE WITH STAGE 3B CHRONIC KIDNEY DISEASE: ICD-10-CM

## 2024-06-11 DIAGNOSIS — Z93.3 COLOSTOMY IN PLACE: ICD-10-CM

## 2024-06-11 DIAGNOSIS — N25.81 SECONDARY RENAL HYPERPARATHYROIDISM: ICD-10-CM

## 2024-06-11 DIAGNOSIS — E87.20 METABOLIC ACIDOSIS, NORMAL ANION GAP (NAG): ICD-10-CM

## 2024-06-11 DIAGNOSIS — I12.9 HYPERTENSIVE KIDNEY DISEASE WITH STAGE 3B CHRONIC KIDNEY DISEASE: ICD-10-CM

## 2024-06-11 DIAGNOSIS — E79.0 HYPERURICEMIA: ICD-10-CM

## 2024-06-11 DIAGNOSIS — D63.1 ANEMIA OF CHRONIC RENAL FAILURE, STAGE 3B: ICD-10-CM

## 2024-06-11 DIAGNOSIS — N18.32 ANEMIA OF CHRONIC RENAL FAILURE, STAGE 3B: ICD-10-CM

## 2024-06-11 LAB
ALBUMIN SERPL BCP-MCNC: 4 G/DL (ref 3.5–5.2)
ANION GAP SERPL CALC-SCNC: 16 MMOL/L (ref 8–16)
BASOPHILS # BLD AUTO: 0.04 K/UL (ref 0–0.2)
BASOPHILS NFR BLD: 0.7 % (ref 0–1.9)
BUN SERPL-MCNC: 36 MG/DL (ref 8–23)
CALCIUM SERPL-MCNC: 10.5 MG/DL (ref 8.7–10.5)
CHLORIDE SERPL-SCNC: 108 MMOL/L (ref 95–110)
CO2 SERPL-SCNC: 16 MMOL/L (ref 23–29)
CREAT SERPL-MCNC: 2.1 MG/DL (ref 0.5–1.4)
DIFFERENTIAL METHOD BLD: ABNORMAL
EOSINOPHIL # BLD AUTO: 0.1 K/UL (ref 0–0.5)
EOSINOPHIL NFR BLD: 1.7 % (ref 0–8)
ERYTHROCYTE [DISTWIDTH] IN BLOOD BY AUTOMATED COUNT: 14.1 % (ref 11.5–14.5)
EST. GFR  (NO RACE VARIABLE): 29.7 ML/MIN/1.73 M^2
FERRITIN SERPL-MCNC: 480 NG/ML (ref 20–300)
GLUCOSE SERPL-MCNC: 141 MG/DL (ref 70–110)
HCT VFR BLD AUTO: 35 % (ref 40–54)
HGB BLD-MCNC: 11.5 G/DL (ref 14–18)
IMM GRANULOCYTES # BLD AUTO: 0.04 K/UL (ref 0–0.04)
IMM GRANULOCYTES NFR BLD AUTO: 0.7 % (ref 0–0.5)
IRON SERPL-MCNC: 60 UG/DL (ref 45–160)
LYMPHOCYTES # BLD AUTO: 1.3 K/UL (ref 1–4.8)
LYMPHOCYTES NFR BLD: 22.1 % (ref 18–48)
MCH RBC QN AUTO: 30.3 PG (ref 27–31)
MCHC RBC AUTO-ENTMCNC: 32.9 G/DL (ref 32–36)
MCV RBC AUTO: 92 FL (ref 82–98)
MONOCYTES # BLD AUTO: 0.5 K/UL (ref 0.3–1)
MONOCYTES NFR BLD: 9.1 % (ref 4–15)
NEUTROPHILS # BLD AUTO: 3.8 K/UL (ref 1.8–7.7)
NEUTROPHILS NFR BLD: 65.7 % (ref 38–73)
NRBC BLD-RTO: 0 /100 WBC
PHOSPHATE SERPL-MCNC: 3.3 MG/DL (ref 2.7–4.5)
PLATELET # BLD AUTO: 293 K/UL (ref 150–450)
PMV BLD AUTO: 11.2 FL (ref 9.2–12.9)
POTASSIUM SERPL-SCNC: 4.9 MMOL/L (ref 3.5–5.1)
PTH-INTACT SERPL-MCNC: 300.2 PG/ML (ref 9–77)
RBC # BLD AUTO: 3.8 M/UL (ref 4.6–6.2)
SATURATED IRON: 23 % (ref 20–50)
SODIUM SERPL-SCNC: 140 MMOL/L (ref 136–145)
TOTAL IRON BINDING CAPACITY: 265 UG/DL (ref 250–450)
TRANSFERRIN SERPL-MCNC: 179 MG/DL (ref 200–375)
URATE SERPL-MCNC: 6.7 MG/DL (ref 3.4–7)
WBC # BLD AUTO: 5.8 K/UL (ref 3.9–12.7)

## 2024-06-11 PROCEDURE — 85025 COMPLETE CBC W/AUTO DIFF WBC: CPT | Performed by: INTERNAL MEDICINE

## 2024-06-11 PROCEDURE — 80069 RENAL FUNCTION PANEL: CPT | Performed by: INTERNAL MEDICINE

## 2024-06-11 PROCEDURE — 83540 ASSAY OF IRON: CPT | Performed by: INTERNAL MEDICINE

## 2024-06-11 PROCEDURE — 84550 ASSAY OF BLOOD/URIC ACID: CPT | Performed by: INTERNAL MEDICINE

## 2024-06-11 PROCEDURE — 82728 ASSAY OF FERRITIN: CPT | Performed by: INTERNAL MEDICINE

## 2024-06-11 PROCEDURE — 83970 ASSAY OF PARATHORMONE: CPT | Performed by: INTERNAL MEDICINE

## 2024-06-11 PROCEDURE — 36415 COLL VENOUS BLD VENIPUNCTURE: CPT | Mod: PO | Performed by: INTERNAL MEDICINE

## 2024-06-13 NOTE — PROGRESS NOTES
[facility]  [address] [facility], [address]  [place]  [phone]    PCP: Larry Monae MD  Hem/Onc: Young       HPI: Mr. Eugene Gamez is a 89 y.o. male with HTN, T2DM, gout, colon cancer in 2015 s/p ostomy and FOLFOX, biopsy-proven renal oncocytoma, and h/o PE/DVT who is here for established patient evaluation of Chronic Kidney Disease  He was initially referred on 1/30/24. His baseline Cr appears to be 1.8-2.2 since 3/2022. Urine studies with proteinuria since at least 2016. Labs also with metabolic acidosis since 2008 and intermittent hyperkalemia since 2018. His K was 5.7 on 12/20/23; he was started on Lokelma 5g daily.   He was diagnosed with HTN > 50 years ago. BP has been variably controlled. No h/o hypertensive emergency. No edema.  He was diagnosed with T2DM ~2015. He has never required insulin. Highest A1c on file is 8.2% in 1/2018. Most recent A1c was 6.2% in 12/2023.   He reports longstanding h/o gout. Currently with flares < 1x/year. He reports compliance with allopurinol. He avoids all shellfish. Last uric acid 11.4 on 2/21/22.   He notes remote h/o kidney stones; > 20 years ago.  No family h/o kidney disease. Wife was on dialysis prior to her passing.   He has recently changed his diet (cut out sweet potatoes, greens, etc) which has resulted in improvement in both K and bicarb levels. He was no longer on Lokelma at time of 1/5 labs. He notes good water intake. He also enjoys 1-2 Body Armors per day.   He is here today with his daughter.    1/30/24: no medication changes.       Interval Hx:   LV 3/12/24: started NaBicarb 650mg BID and ergo 50k weekly.   Today he reports to be doing well. He is hard of hearing today; planning to make an appt for hearing aids soon.   Doesn't check BP at home; but BP is on the low side today. Mild leg swelling; wears diabetic socks.   Reports compliance with NaBicarb. Possibly forgetting ergocalciferol.   +weight loss of 3 lbs since last visit; interested in  "starting protein shakes.       ROS:  Complete ROS otherwise negative except as indicated above.         Current Outpatient Medications:     allopurinoL (ZYLOPRIM) 300 MG tablet, Take 1 tablet (300 mg total) by mouth once daily., Disp: 90 tablet, Rfl: 1    amLODIPine (NORVASC) 10 MG tablet, TAKE 1 TABLET BY MOUTH EVERY DAY, Disp: 90 tablet, Rfl: 3    DULoxetine (CYMBALTA) 30 MG capsule, Take 1 capsule (30 mg total) by mouth 2 (two) times daily., Disp: 180 capsule, Rfl: 3    ferrous sulfate 324 mg (65 mg iron) TbEC, Take 1 tablet by mouth once daily., Disp: , Rfl:     gabapentin (NEURONTIN) 100 MG capsule, Take 1 capsule (100 mg total) by mouth 3 (three) times daily., Disp: 270 capsule, Rfl: 1    JANUVIA 50 mg Tab, Take 1 tablet (50 mg total) by mouth once daily., Disp: 90 tablet, Rfl: 1    sodium bicarbonate 650 MG tablet, Take 1 tablet (650 mg total) by mouth 2 (two) times daily., Disp: 60 tablet, Rfl: 11    traZODone (DESYREL) 50 MG tablet, TAKE 1 TABLET BY MOUTH NIGHTLY AS NEEDED FOR INSOMNIA., Disp: 90 tablet, Rfl: 3    calcitRIOL (ROCALTROL) 0.25 MCG Cap, Take 1 capsule (0.25 mcg total) by mouth every other day., Disp: 30 capsule, Rfl: 11    hydrALAZINE (APRESOLINE) 50 MG tablet, Take 1 tablet (50 mg total) by mouth 2 (two) times a day., Disp: 60 tablet, Rfl: 11  No current facility-administered medications for this visit.    Facility-Administered Medications Ordered in Other Visits:     ondansetron HCl (PF) 4 mg/2 mL injection, , , ROBERTN, James Ryan, CRNA, 4 mg at 06/29/15 0856      Vitals: Blood pressure (!) 116/54, pulse 98, resp. rate 18, height 6' 2" (1.88 m), weight 73.5 kg (162 lb 0.6 oz), SpO2 97%. Body mass index is 20.8 kg/m².    Physical Exam  Vitals reviewed.   Constitutional:       General: He is awake. He is not in acute distress.     Appearance: Normal appearance. He is well-developed.   HENT:      Head: Normocephalic and atraumatic.      Nose: Nose normal.      Mouth/Throat:      Mouth: Mucous " membranes are moist.   Eyes:      Extraocular Movements: Extraocular movements intact.      Conjunctiva/sclera: Conjunctivae normal.   Pulmonary:      Effort: Pulmonary effort is normal.   Musculoskeletal:         General: No tenderness or signs of injury.      Right lower leg: No edema.      Left lower leg: No edema.      Comments: Trace dependent edema   Skin:     General: Skin is warm and dry.      Findings: No erythema or rash.   Neurological:      General: No focal deficit present.      Mental Status: He is alert. Mental status is at baseline.   Psychiatric:         Mood and Affect: Mood normal.         Behavior: Behavior normal.             Labs/Imaging:  Sodium   Date Value Ref Range Status   06/11/2024 140 136 - 145 mmol/L Final   03/04/2024 141 136 - 145 mmol/L Final   01/05/2024 142 136 - 145 mmol/L Final     Potassium   Date Value Ref Range Status   06/11/2024 4.9 3.5 - 5.1 mmol/L Final   03/04/2024 4.7 3.5 - 5.1 mmol/L Final   01/05/2024 4.3 3.5 - 5.1 mmol/L Final     Chloride   Date Value Ref Range Status   06/11/2024 108 95 - 110 mmol/L Final   03/04/2024 110 95 - 110 mmol/L Final   01/05/2024 110 95 - 110 mmol/L Final     CO2   Date Value Ref Range Status   06/11/2024 16 (L) 23 - 29 mmol/L Final   03/04/2024 20 (L) 23 - 29 mmol/L Final   01/05/2024 23 23 - 29 mmol/L Final     BUN   Date Value Ref Range Status   06/11/2024 36 (H) 8 - 23 mg/dL Final   03/04/2024 24 (H) 8 - 23 mg/dL Final   01/05/2024 22 8 - 23 mg/dL Final     Creatinine   Date Value Ref Range Status   06/11/2024 2.1 (H) 0.5 - 1.4 mg/dL Final   03/04/2024 1.8 (H) 0.5 - 1.4 mg/dL Final   01/05/2024 1.6 (H) 0.5 - 1.4 mg/dL Final     eGFR   Date Value Ref Range Status   06/11/2024 29.7 (A) >60 mL/min/1.73 m^2 Final   03/04/2024 35.8 (A) >60 mL/min/1.73 m^2 Final   01/05/2024 41.4 (A) >60 mL/min/1.73 m^2 Final     Glucose   Date Value Ref Range Status   07/19/2022 206 (H) 65 - 99 mg/dL Final     Calcium   Date Value Ref Range Status    06/11/2024 10.5 8.7 - 10.5 mg/dL Final   03/04/2024 9.6 8.7 - 10.5 mg/dL Final   01/05/2024 10.2 8.7 - 10.5 mg/dL Final     Phosphorus   Date Value Ref Range Status   06/11/2024 3.3 2.7 - 4.5 mg/dL Final   03/04/2024 3.5 2.7 - 4.5 mg/dL Final   03/30/2022 3.5 2.7 - 4.5 mg/dL Final     Albumin   Date Value Ref Range Status   06/11/2024 4.0 3.5 - 5.2 g/dL Final   03/04/2024 3.6 3.5 - 5.2 g/dL Final   12/15/2023 4.0 3.5 - 5.2 g/dL Final       PTH, Intact   Date Value Ref Range Status   06/11/2024 300.2 (H) 9.0 - 77.0 pg/mL Final   03/04/2024 261.9 (H) 9.0 - 77.0 pg/mL Final   07/06/2016 188.0 (H) 9.0 - 77.0 pg/mL Final     Vit D, 25-Hydroxy   Date Value Ref Range Status   10/06/2019 11 (L) 30 - 96 ng/mL Final     Comment:     Vitamin D deficiency.........<10 ng/mL                              Vitamin D insufficiency......10-29 ng/mL       Vitamin D sufficiency........> or equal to 30 ng/mL  Vitamin D toxicity............>100 ng/mL       Uric Acid   Date Value Ref Range Status   06/11/2024 6.7 3.4 - 7.0 mg/dL Final   03/04/2024 7.1 (H) 3.4 - 7.0 mg/dL Final   02/21/2022 11.4 (H) 3.4 - 7.0 mg/dL Final       Hemoglobin   Date Value Ref Range Status   06/11/2024 11.5 (L) 14.0 - 18.0 g/dL Final   03/04/2024 10.7 (L) 14.0 - 18.0 g/dL Final   12/01/2023 12.1 (L) 14.0 - 18.0 g/dL Final       Prot/Creat Ratio, Urine   Date Value Ref Range Status   06/11/2024 Unable to calculate 0.00 - 0.20 Final   03/04/2024 0.05 0.00 - 0.20 Final   07/06/2016 0.43 (H) 0.00 - 0.20 Final           Renal US: 7/14/16: reviewed.     CT A/P 5/9/23: within the limits of a noncontrast examination, the kidneys are small and lobular in appearance.  There are multiple low-attenuation lesions in both kidneys.  The most concerning of these lesions of the kidneys is seen arising from the lower pole of the right kidney measuring approximately 3.3 x 3 x 3.8 cm (series 2 axial image 69 and series 601 sagittal image 97).      Impression/Plan:    Hypertensive  kidney disease with stage 3b chronic kidney disease  - baseline Cr 1.8-2.2 since 3/2022; clinically due to hypertensive arterionephrosclerosis + age-related nephron loss; though cannot rule out contribution from diabetes, hyperuricemia, or chronic metabolic acidosis   - Cr 1.8 --> 2.1 today; stable and at baseline  - remains without proteinuria  - adjusting BP medications as below  - continue good water intake    Hyperkalemia  - due to metabolic acidosis/ostomy  - K 4.7 --> 4.9 today; worsening.   - discussed low K diet; provided handout. Continue NaBicarb  - recommended avoiding Boost/Ensure due to high potassium content; consider Marino or Beneprotein instead   - continue good water intake    Metabolic acidosis, normal anion gap (NAG)  - due to ostomy +/- CKD  - bicarb 23 --> 20 --> 16 today; worsening  - continue NaBicarb 650mg BID for now; will likely double dose at next visit    Colostomy in place  - contributing to NAGMA and hyperK  - see above    Hyperuricemia  - gout currently controlled with allopurinol and dietary changes  - uric acid 6.7 today; stable. No recent gout flares  - continue allopurinol 300mg daily    Anemia of chronic renal failure, stage 3b  - hgb 10.7 --> 11.5; improved  - Tsat 23%, ferritin 480. No need for iron supplementation at this time    Secondary renal hyperparathyroidism  -  --> 300; worsening  - CCa 10.5, phos normal  - stopping ergocalciferol  - starting calcitriol 0.25mcg QOD. Will monitor Ca levels closely    Benign essential HTN  - soft BPs today  - decreasing hydralazine from 100mg BID to 50mg BID      Visit today included increased complexity associated with the care of the episodic problem CKD addressed and managing the longitudinal care of the patient due to the serious and/or complex managed problem(s) CKD, HTN.      Treatment options and plan were discussed with the patient and/or caregiver.   RTC 3 months.       Bronwyn LeBlanc, MD [facility]  [phone]

## 2024-06-18 ENCOUNTER — OFFICE VISIT (OUTPATIENT)
Dept: NEPHROLOGY | Facility: CLINIC | Age: 89
End: 2024-06-18
Payer: MEDICARE

## 2024-06-18 VITALS
SYSTOLIC BLOOD PRESSURE: 116 MMHG | RESPIRATION RATE: 18 BRPM | DIASTOLIC BLOOD PRESSURE: 54 MMHG | OXYGEN SATURATION: 97 % | HEIGHT: 74 IN | WEIGHT: 162.06 LBS | HEART RATE: 98 BPM | BODY MASS INDEX: 20.8 KG/M2

## 2024-06-18 DIAGNOSIS — N25.81 SECONDARY RENAL HYPERPARATHYROIDISM: ICD-10-CM

## 2024-06-18 DIAGNOSIS — I12.9 HYPERTENSIVE KIDNEY DISEASE WITH STAGE 3B CHRONIC KIDNEY DISEASE: Primary | ICD-10-CM

## 2024-06-18 DIAGNOSIS — E79.0 HYPERURICEMIA: ICD-10-CM

## 2024-06-18 DIAGNOSIS — Z93.3 COLOSTOMY IN PLACE: ICD-10-CM

## 2024-06-18 DIAGNOSIS — N18.32 HYPERTENSIVE KIDNEY DISEASE WITH STAGE 3B CHRONIC KIDNEY DISEASE: Primary | ICD-10-CM

## 2024-06-18 DIAGNOSIS — I10 BENIGN ESSENTIAL HTN: ICD-10-CM

## 2024-06-18 DIAGNOSIS — E87.20 METABOLIC ACIDOSIS, NORMAL ANION GAP (NAG): ICD-10-CM

## 2024-06-18 DIAGNOSIS — E87.5 HYPERKALEMIA: ICD-10-CM

## 2024-06-18 DIAGNOSIS — N18.32 ANEMIA OF CHRONIC RENAL FAILURE, STAGE 3B: ICD-10-CM

## 2024-06-18 DIAGNOSIS — D63.1 ANEMIA OF CHRONIC RENAL FAILURE, STAGE 3B: ICD-10-CM

## 2024-06-18 PROCEDURE — 99999 PR PBB SHADOW E&M-EST. PATIENT-LVL V: CPT | Mod: PBBFAC,,, | Performed by: INTERNAL MEDICINE

## 2024-06-18 PROCEDURE — 99215 OFFICE O/P EST HI 40 MIN: CPT | Mod: PBBFAC | Performed by: INTERNAL MEDICINE

## 2024-06-18 PROCEDURE — 99214 OFFICE O/P EST MOD 30 MIN: CPT | Mod: S$PBB,,, | Performed by: INTERNAL MEDICINE

## 2024-06-18 PROCEDURE — G2211 COMPLEX E/M VISIT ADD ON: HCPCS | Mod: S$PBB,,, | Performed by: INTERNAL MEDICINE

## 2024-06-18 RX ORDER — HYDRALAZINE HYDROCHLORIDE 50 MG/1
50 TABLET, FILM COATED ORAL 2 TIMES DAILY
Qty: 60 TABLET | Refills: 11 | Status: SHIPPED | OUTPATIENT
Start: 2024-06-18 | End: 2025-06-18

## 2024-06-18 RX ORDER — CALCITRIOL 0.25 UG/1
0.25 CAPSULE ORAL EVERY OTHER DAY
Qty: 30 CAPSULE | Refills: 11 | Status: SHIPPED | OUTPATIENT
Start: 2024-06-18

## 2024-06-18 NOTE — ASSESSMENT & PLAN NOTE
- due to metabolic acidosis/ostomy  - K 4.7 --> 4.9 today; worsening.   - discussed low K diet; provided handout. Continue NaBicarb  - recommended avoiding Boost/Ensure due to high potassium content; consider Marino or Beneprotein instead   - continue good water intake

## 2024-06-18 NOTE — PATIENT INSTRUCTIONS
Your kidney function is stable.  Your blood pressure is on the low side. Let's decrease hydralazine 100mg 2x/day to hydralazine 50mg 2x/day.  Stop taking ergocalciferol (the weekly vitamin D).  Instead, start taking calcitriol (activated vitamin D) every OTHER day.       I agree with protein supplementation, but be careful about potassium levels.  Marino and Beneprotein are good sources of protein with low potassium.

## 2024-06-18 NOTE — ASSESSMENT & PLAN NOTE
- due to ostomy +/- CKD  - bicarb 23 --> 20 --> 16 today; worsening  - continue NaBicarb 650mg BID for now; will likely double dose at next visit

## 2024-06-18 NOTE — ASSESSMENT & PLAN NOTE
-  --> 300; worsening  - CCa 10.5, phos normal  - stopping ergocalciferol  - starting calcitriol 0.25mcg QOD. Will monitor Ca levels closely

## 2024-06-18 NOTE — ASSESSMENT & PLAN NOTE
- hgb 10.7 --> 11.5; improved  - Tsat 23%, ferritin 480. No need for iron supplementation at this time

## 2024-06-18 NOTE — ASSESSMENT & PLAN NOTE
- gout currently controlled with allopurinol and dietary changes  - uric acid 6.7 today; stable. No recent gout flares  - continue allopurinol 300mg daily

## 2024-06-18 NOTE — ASSESSMENT & PLAN NOTE
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

## 2024-07-19 ENCOUNTER — TELEPHONE (OUTPATIENT)
Dept: FAMILY MEDICINE | Facility: CLINIC | Age: 89
End: 2024-07-19
Payer: MEDICARE

## 2024-09-19 ENCOUNTER — TELEPHONE (OUTPATIENT)
Dept: FAMILY MEDICINE | Facility: CLINIC | Age: 89
End: 2024-09-19
Payer: MEDICARE

## 2024-09-19 NOTE — TELEPHONE ENCOUNTER
Tried to reach pt about the no show/cancel/ reschedule  appt  on 09/16 /2024 therefore this appt has been rescheduled to 02/06/24 @ 10:40 am new appointment letter has been sent out to the pt in the mail also a my chart message   Done vs

## 2024-09-30 ENCOUNTER — LAB VISIT (OUTPATIENT)
Dept: LAB | Facility: HOSPITAL | Age: 89
End: 2024-09-30
Attending: FAMILY MEDICINE
Payer: MEDICARE

## 2024-09-30 DIAGNOSIS — N18.32 HYPERTENSIVE KIDNEY DISEASE WITH STAGE 3B CHRONIC KIDNEY DISEASE: ICD-10-CM

## 2024-09-30 DIAGNOSIS — Z93.3 COLOSTOMY IN PLACE: ICD-10-CM

## 2024-09-30 DIAGNOSIS — E87.5 HYPERKALEMIA: ICD-10-CM

## 2024-09-30 DIAGNOSIS — D63.1 ANEMIA OF CHRONIC RENAL FAILURE, STAGE 3B: ICD-10-CM

## 2024-09-30 DIAGNOSIS — N18.32 ANEMIA OF CHRONIC RENAL FAILURE, STAGE 3B: ICD-10-CM

## 2024-09-30 DIAGNOSIS — I12.9 HYPERTENSIVE KIDNEY DISEASE WITH STAGE 3B CHRONIC KIDNEY DISEASE: ICD-10-CM

## 2024-09-30 DIAGNOSIS — E87.20 METABOLIC ACIDOSIS, NORMAL ANION GAP (NAG): ICD-10-CM

## 2024-09-30 LAB
25(OH)D3+25(OH)D2 SERPL-MCNC: 26 NG/ML (ref 30–96)
ALBUMIN SERPL BCP-MCNC: 3.7 G/DL (ref 3.5–5.2)
ANION GAP SERPL CALC-SCNC: 10 MMOL/L (ref 8–16)
BASOPHILS # BLD AUTO: 0.06 K/UL (ref 0–0.2)
BASOPHILS NFR BLD: 1.2 % (ref 0–1.9)
BUN SERPL-MCNC: 23 MG/DL (ref 8–23)
CALCIUM SERPL-MCNC: 10.1 MG/DL (ref 8.7–10.5)
CHLORIDE SERPL-SCNC: 111 MMOL/L (ref 95–110)
CO2 SERPL-SCNC: 18 MMOL/L (ref 23–29)
CREAT SERPL-MCNC: 1.8 MG/DL (ref 0.5–1.4)
DIFFERENTIAL METHOD BLD: ABNORMAL
EOSINOPHIL # BLD AUTO: 0.1 K/UL (ref 0–0.5)
EOSINOPHIL NFR BLD: 1.9 % (ref 0–8)
ERYTHROCYTE [DISTWIDTH] IN BLOOD BY AUTOMATED COUNT: 14.4 % (ref 11.5–14.5)
EST. GFR  (NO RACE VARIABLE): 35.8 ML/MIN/1.73 M^2
GLUCOSE SERPL-MCNC: 111 MG/DL (ref 70–110)
HCT VFR BLD AUTO: 34.2 % (ref 40–54)
HGB BLD-MCNC: 10.7 G/DL (ref 14–18)
IMM GRANULOCYTES # BLD AUTO: 0.09 K/UL (ref 0–0.04)
IMM GRANULOCYTES NFR BLD AUTO: 1.7 % (ref 0–0.5)
LYMPHOCYTES # BLD AUTO: 1.3 K/UL (ref 1–4.8)
LYMPHOCYTES NFR BLD: 24.8 % (ref 18–48)
MCH RBC QN AUTO: 30 PG (ref 27–31)
MCHC RBC AUTO-ENTMCNC: 31.3 G/DL (ref 32–36)
MCV RBC AUTO: 96 FL (ref 82–98)
MONOCYTES # BLD AUTO: 0.5 K/UL (ref 0.3–1)
MONOCYTES NFR BLD: 9.9 % (ref 4–15)
NEUTROPHILS # BLD AUTO: 3.1 K/UL (ref 1.8–7.7)
NEUTROPHILS NFR BLD: 60.5 % (ref 38–73)
NRBC BLD-RTO: 0 /100 WBC
PHOSPHATE SERPL-MCNC: 3.1 MG/DL (ref 2.7–4.5)
PLATELET # BLD AUTO: 267 K/UL (ref 150–450)
PMV BLD AUTO: 11.5 FL (ref 9.2–12.9)
POTASSIUM SERPL-SCNC: 4.7 MMOL/L (ref 3.5–5.1)
PTH-INTACT SERPL-MCNC: 174.6 PG/ML (ref 9–77)
RBC # BLD AUTO: 3.57 M/UL (ref 4.6–6.2)
SODIUM SERPL-SCNC: 139 MMOL/L (ref 136–145)
URATE SERPL-MCNC: 8.5 MG/DL (ref 3.4–7)
WBC # BLD AUTO: 5.16 K/UL (ref 3.9–12.7)

## 2024-09-30 PROCEDURE — 85025 COMPLETE CBC W/AUTO DIFF WBC: CPT | Performed by: INTERNAL MEDICINE

## 2024-09-30 PROCEDURE — 82306 VITAMIN D 25 HYDROXY: CPT | Performed by: INTERNAL MEDICINE

## 2024-09-30 PROCEDURE — 36415 COLL VENOUS BLD VENIPUNCTURE: CPT | Mod: PO | Performed by: INTERNAL MEDICINE

## 2024-09-30 PROCEDURE — 80069 RENAL FUNCTION PANEL: CPT | Performed by: INTERNAL MEDICINE

## 2024-09-30 PROCEDURE — 83970 ASSAY OF PARATHORMONE: CPT | Performed by: INTERNAL MEDICINE

## 2024-09-30 PROCEDURE — 84550 ASSAY OF BLOOD/URIC ACID: CPT | Performed by: INTERNAL MEDICINE

## 2024-10-04 ENCOUNTER — OFFICE VISIT (OUTPATIENT)
Dept: NEPHROLOGY | Facility: CLINIC | Age: 89
End: 2024-10-04
Payer: MEDICARE

## 2024-10-04 VITALS
BODY MASS INDEX: 20.37 KG/M2 | OXYGEN SATURATION: 99 % | HEART RATE: 82 BPM | SYSTOLIC BLOOD PRESSURE: 116 MMHG | HEIGHT: 74 IN | RESPIRATION RATE: 18 BRPM | WEIGHT: 158.75 LBS | DIASTOLIC BLOOD PRESSURE: 60 MMHG

## 2024-10-04 DIAGNOSIS — N18.32 ANEMIA OF CHRONIC RENAL FAILURE, STAGE 3B: ICD-10-CM

## 2024-10-04 DIAGNOSIS — E87.5 HYPERKALEMIA: ICD-10-CM

## 2024-10-04 DIAGNOSIS — D63.1 ANEMIA OF CHRONIC RENAL FAILURE, STAGE 3B: ICD-10-CM

## 2024-10-04 DIAGNOSIS — E87.20 METABOLIC ACIDOSIS, NORMAL ANION GAP (NAG): ICD-10-CM

## 2024-10-04 DIAGNOSIS — I10 BENIGN ESSENTIAL HTN: ICD-10-CM

## 2024-10-04 DIAGNOSIS — N18.32 HYPERTENSIVE KIDNEY DISEASE WITH STAGE 3B CHRONIC KIDNEY DISEASE: Primary | ICD-10-CM

## 2024-10-04 DIAGNOSIS — I12.9 HYPERTENSIVE KIDNEY DISEASE WITH STAGE 3B CHRONIC KIDNEY DISEASE: Primary | ICD-10-CM

## 2024-10-04 DIAGNOSIS — Z93.3 COLOSTOMY IN PLACE: ICD-10-CM

## 2024-10-04 DIAGNOSIS — N25.81 SECONDARY RENAL HYPERPARATHYROIDISM: ICD-10-CM

## 2024-10-04 DIAGNOSIS — E79.0 HYPERURICEMIA: ICD-10-CM

## 2024-10-04 PROCEDURE — 99999 PR PBB SHADOW E&M-EST. PATIENT-LVL V: CPT | Mod: PBBFAC,,, | Performed by: INTERNAL MEDICINE

## 2024-10-04 PROCEDURE — 99215 OFFICE O/P EST HI 40 MIN: CPT | Mod: PBBFAC | Performed by: INTERNAL MEDICINE

## 2024-10-04 NOTE — PROGRESS NOTES
[facility]  [address] [facility] [address], [address]  [place]  [phone]    PCP: Larry Monae MD  Hem/Onc: Young       HPI: Mr. Eugene Gamez is a 89 y.o. male with HTN, T2DM, gout, colon cancer in 2015 s/p ostomy and FOLFOX, biopsy-proven renal oncocytoma, and h/o PE/DVT who is here for established patient evaluation of Chronic Kidney Disease  He was initially referred on 1/30/24. His baseline Cr appears to be 1.8-2.2 since 3/2022. Urine studies with proteinuria since at least 2016. Labs also with metabolic acidosis since 2008 and intermittent hyperkalemia since 2018. His K was 5.7 on 12/20/23; he was started on Lokelma 5g daily.   He was diagnosed with HTN > 50 years ago. BP has been variably controlled. No h/o hypertensive emergency. No edema.  He was diagnosed with T2DM ~2015. He has never required insulin. Highest A1c on file is 8.2% in 1/2018. Most recent A1c was 6.2% in 12/2023.   He reports longstanding h/o gout. Currently with flares < 1x/year. He reports compliance with allopurinol. He avoids all shellfish. Last uric acid 11.4 on 2/21/22.   He notes remote h/o kidney stones; > 20 years ago.  No family h/o kidney disease. Wife was on dialysis prior to her passing.   He has recently changed his diet (cut out sweet potatoes, greens, etc) which has resulted in improvement in both K and bicarb levels. He was no longer on Lokelma at time of 1/5 labs. He notes good water intake. He also enjoys 1-2 Body Armors per day.   He is here today with his daughter.     1/30/24: no medication changes.     3/12/24: started NaBicarb 650mg BID and ergo 50k weekly.         Interval Hx:   LV 6/18/24: decreased hydralazine from 100mg BID to 50mg BID; started calcitriol 0.25mcg QOD; provided low K diet handout; recommended protein supplementation.  Today he reports to be doing well. BP controlled on current regimen. No leg swelling. Cannot recall if he is taking sodium bicarbonate or not; agreed to restart if not taking.  "      ROS:  Complete ROS otherwise negative except as indicated above.         Current Outpatient Medications:     allopurinoL (ZYLOPRIM) 300 MG tablet, Take 1 tablet (300 mg total) by mouth once daily., Disp: 90 tablet, Rfl: 1    amLODIPine (NORVASC) 10 MG tablet, TAKE 1 TABLET BY MOUTH EVERY DAY, Disp: 90 tablet, Rfl: 3    calcitRIOL (ROCALTROL) 0.25 MCG Cap, Take 1 capsule (0.25 mcg total) by mouth every other day., Disp: 30 capsule, Rfl: 11    DULoxetine (CYMBALTA) 30 MG capsule, Take 1 capsule (30 mg total) by mouth 2 (two) times daily., Disp: 180 capsule, Rfl: 3    gabapentin (NEURONTIN) 100 MG capsule, Take 1 capsule (100 mg total) by mouth 3 (three) times daily., Disp: 270 capsule, Rfl: 1    hydrALAZINE (APRESOLINE) 50 MG tablet, Take 1 tablet (50 mg total) by mouth 2 (two) times a day., Disp: 60 tablet, Rfl: 11    JANUVIA 50 mg Tab, Take 1 tablet (50 mg total) by mouth once daily., Disp: 90 tablet, Rfl: 1    sodium bicarbonate 650 MG tablet, Take 1 tablet (650 mg total) by mouth 2 (two) times daily., Disp: 60 tablet, Rfl: 11    ferrous sulfate 324 mg (65 mg iron) TbEC, Take 1 tablet by mouth once daily., Disp: , Rfl:     traZODone (DESYREL) 50 MG tablet, TAKE 1 TABLET BY MOUTH NIGHTLY AS NEEDED FOR INSOMNIA. (Patient not taking: Reported on 10/4/2024), Disp: 90 tablet, Rfl: 3  No current facility-administered medications for this visit.    Facility-Administered Medications Ordered in Other Visits:     ondansetron HCl (PF) 4 mg/2 mL injection, , , PRN, James Ryan, CRNA, 4 mg at 06/29/15 0856      Vitals: Blood pressure 116/60, pulse 82, resp. rate 18, height 6' 2" (1.88 m), weight 72 kg (158 lb 11.7 oz), SpO2 99%. Body mass index is 20.38 kg/m².    Physical Exam  Vitals reviewed.   Constitutional:       General: He is awake. He is not in acute distress.     Appearance: Normal appearance. He is well-developed.   HENT:      Head: Normocephalic and atraumatic.      Nose: Nose normal.      Mouth/Throat:      " Mouth: Mucous membranes are moist.   Eyes:      Extraocular Movements: Extraocular movements intact.      Conjunctiva/sclera: Conjunctivae normal.   Pulmonary:      Effort: Pulmonary effort is normal.   Musculoskeletal:         General: No tenderness or signs of injury.      Right lower leg: No edema.      Left lower leg: No edema.   Skin:     General: Skin is warm and dry.      Findings: No erythema or rash.   Neurological:      General: No focal deficit present.      Mental Status: He is alert. Mental status is at baseline.   Psychiatric:         Mood and Affect: Mood normal.         Behavior: Behavior normal.           Labs/Imaging:  Sodium   Date Value Ref Range Status   09/30/2024 139 136 - 145 mmol/L Final   06/11/2024 140 136 - 145 mmol/L Final   03/04/2024 141 136 - 145 mmol/L Final     Potassium   Date Value Ref Range Status   09/30/2024 4.7 3.5 - 5.1 mmol/L Final   06/11/2024 4.9 3.5 - 5.1 mmol/L Final   03/04/2024 4.7 3.5 - 5.1 mmol/L Final     Chloride   Date Value Ref Range Status   09/30/2024 111 (H) 95 - 110 mmol/L Final   06/11/2024 108 95 - 110 mmol/L Final   03/04/2024 110 95 - 110 mmol/L Final     CO2   Date Value Ref Range Status   09/30/2024 18 (L) 23 - 29 mmol/L Final   06/11/2024 16 (L) 23 - 29 mmol/L Final   03/04/2024 20 (L) 23 - 29 mmol/L Final     BUN   Date Value Ref Range Status   09/30/2024 23 8 - 23 mg/dL Final   06/11/2024 36 (H) 8 - 23 mg/dL Final   03/04/2024 24 (H) 8 - 23 mg/dL Final     Creatinine   Date Value Ref Range Status   09/30/2024 1.8 (H) 0.5 - 1.4 mg/dL Final   06/11/2024 2.1 (H) 0.5 - 1.4 mg/dL Final   03/04/2024 1.8 (H) 0.5 - 1.4 mg/dL Final     eGFR   Date Value Ref Range Status   09/30/2024 35.8 (A) >60 mL/min/1.73 m^2 Final   06/11/2024 29.7 (A) >60 mL/min/1.73 m^2 Final   03/04/2024 35.8 (A) >60 mL/min/1.73 m^2 Final     Glucose   Date Value Ref Range Status   07/19/2022 206 (H) 65 - 99 mg/dL Final     Calcium   Date Value Ref Range Status   09/30/2024 10.1 8.7 -  10.5 mg/dL Final   06/11/2024 10.5 8.7 - 10.5 mg/dL Final   03/04/2024 9.6 8.7 - 10.5 mg/dL Final     Phosphorus   Date Value Ref Range Status   09/30/2024 3.1 2.7 - 4.5 mg/dL Final   06/11/2024 3.3 2.7 - 4.5 mg/dL Final   03/04/2024 3.5 2.7 - 4.5 mg/dL Final     Albumin   Date Value Ref Range Status   09/30/2024 3.7 3.5 - 5.2 g/dL Final   06/11/2024 4.0 3.5 - 5.2 g/dL Final   03/04/2024 3.6 3.5 - 5.2 g/dL Final       PTH, Intact   Date Value Ref Range Status   09/30/2024 174.6 (H) 9.0 - 77.0 pg/mL Final   06/11/2024 300.2 (H) 9.0 - 77.0 pg/mL Final   03/04/2024 261.9 (H) 9.0 - 77.0 pg/mL Final     Vit D, 25-Hydroxy   Date Value Ref Range Status   09/30/2024 26 (L) 30 - 96 ng/mL Final     Comment:     Vitamin D deficiency.........<10 ng/mL                              Vitamin D insufficiency......10-29 ng/mL       Vitamin D sufficiency........> or equal to 30 ng/mL  Vitamin D toxicity............>100 ng/mL       Uric Acid   Date Value Ref Range Status   09/30/2024 8.5 (H) 3.4 - 7.0 mg/dL Final   06/11/2024 6.7 3.4 - 7.0 mg/dL Final   03/04/2024 7.1 (H) 3.4 - 7.0 mg/dL Final       Hemoglobin   Date Value Ref Range Status   09/30/2024 10.7 (L) 14.0 - 18.0 g/dL Final   06/11/2024 11.5 (L) 14.0 - 18.0 g/dL Final   03/04/2024 10.7 (L) 14.0 - 18.0 g/dL Final       Prot/Creat Ratio, Urine   Date Value Ref Range Status   09/30/2024 Unable to calculate 0.00 - 0.20 Final   06/11/2024 Unable to calculate 0.00 - 0.20 Final   03/04/2024 0.05 0.00 - 0.20 Final           Renal US: 7/14/16: reviewed.     CT A/P 5/9/23: within the limits of a noncontrast examination, the kidneys are small and lobular in appearance.  There are multiple low-attenuation lesions in both kidneys.  The most concerning of these lesions of the kidneys is seen arising from the lower pole of the right kidney measuring approximately 3.3 x 3 x 3.8 cm (series 2 axial image 69 and series 601 sagittal image 97).      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 + age-related nephron loss; though cannot rule out contribution from diabetes, hyperuricemia, or chronic metabolic acidosis   - Cr 2.1 --> 1.8 today; stable and at baseline  - remains without proteinuria  - avoid hypotension  - continue good water intake     Hyperkalemia  - due to metabolic acidosis/ostomy  - K 4.9 --> 4.7 today; stable  - continue low K diet; previously provided low K handout  - continue NaBicarb  - continue good water intake     Metabolic acidosis, normal anion gap (NAG)  - due to ostomy +/- CKD  - bicarb 16 --> 18 today; stable  - continue NaBicarb 650mg BID; restart if not taking.      Colostomy in place  - contributing to NAGMA and hyperK  - see above     Hyperuricemia  - gout currently controlled with allopurinol and dietary changes  - uric acid 6.7 --> 8.5; worsening. Will repeat.   - continue allopurinol 300mg daily     Anemia of chronic renal failure, stage 3b  - hgb 10.7 --> 11.5 --> 10.7; stable  - ;ast Tsat 23%, ferritin 480. No need for iron supplementation at this time     Secondary renal hyperparathyroidism  -  --> 175; improved  - CCa and phos normal  - continue calcitriol 0.25mcg QOD     Benign essential HTN  -  stable; avoid hypotension       Visit today included increased complexity associated with the care of the episodic problem hyperkalemia addressed and managing the longitudinal care of the patient due to the serious and/or complex managed problem(s) CKD.      Treatment options and plan were discussed with the patient and/or caregiver.   RTC 3 months.       Kelly Jaeger MD  [facility]  [phone]

## 2025-01-14 ENCOUNTER — LAB VISIT (OUTPATIENT)
Dept: LAB | Facility: HOSPITAL | Age: 89
End: 2025-01-14
Attending: INTERNAL MEDICINE
Payer: MEDICARE

## 2025-01-14 DIAGNOSIS — E87.20 METABOLIC ACIDOSIS, NORMAL ANION GAP (NAG): ICD-10-CM

## 2025-01-14 DIAGNOSIS — N18.32 HYPERTENSIVE KIDNEY DISEASE WITH STAGE 3B CHRONIC KIDNEY DISEASE: ICD-10-CM

## 2025-01-14 DIAGNOSIS — I12.9 HYPERTENSIVE KIDNEY DISEASE WITH STAGE 3B CHRONIC KIDNEY DISEASE: ICD-10-CM

## 2025-01-14 DIAGNOSIS — E87.5 HYPERKALEMIA: ICD-10-CM

## 2025-01-14 LAB
BILIRUB UR QL STRIP: NEGATIVE
CLARITY UR REFRACT.AUTO: CLEAR
COLOR UR AUTO: YELLOW
CREAT UR-MCNC: 148 MG/DL (ref 23–375)
GLUCOSE UR QL STRIP: NEGATIVE
HGB UR QL STRIP: NEGATIVE
KETONES UR QL STRIP: NEGATIVE
LEUKOCYTE ESTERASE UR QL STRIP: NEGATIVE
NITRITE UR QL STRIP: NEGATIVE
PH UR STRIP: 6 [PH] (ref 5–8)
PROT UR QL STRIP: ABNORMAL
PROT UR-MCNC: 19 MG/DL (ref 0–15)
PROT/CREAT UR: 0.13 MG/G{CREAT} (ref 0–0.2)
SP GR UR STRIP: 1.01 (ref 1–1.03)
URN SPEC COLLECT METH UR: ABNORMAL

## 2025-01-14 PROCEDURE — 82570 ASSAY OF URINE CREATININE: CPT | Performed by: INTERNAL MEDICINE

## 2025-01-14 PROCEDURE — 81003 URINALYSIS AUTO W/O SCOPE: CPT | Performed by: INTERNAL MEDICINE

## 2025-01-23 PROBLEM — E11.22 TYPE 2 DIABETES MELLITUS WITH STAGE 3B CHRONIC KIDNEY DISEASE: Status: RESOLVED | Noted: 2023-12-15 | Resolved: 2025-01-23

## 2025-01-23 PROBLEM — N18.32 TYPE 2 DIABETES MELLITUS WITH STAGE 3B CHRONIC KIDNEY DISEASE: Status: RESOLVED | Noted: 2023-12-15 | Resolved: 2025-01-23

## 2025-01-27 ENCOUNTER — TELEPHONE (OUTPATIENT)
Dept: NEPHROLOGY | Facility: CLINIC | Age: 89
End: 2025-01-27
Payer: MEDICARE

## 2025-01-27 DIAGNOSIS — N18.32 STAGE 3B CHRONIC KIDNEY DISEASE: Primary | ICD-10-CM

## 2025-01-27 NOTE — TELEPHONE ENCOUNTER
I called this patient's daughter in reference of scheduling a follow up visit with Dr. Jaeger. Discussed viewed labs per Dr. Jaeger.

## 2025-01-27 NOTE — TELEPHONE ENCOUNTER
----- Message from Kelly Jaeger MD sent at 1/24/2025  2:39 PM CST -----  Let's reschedule him in 3-4 months with CKD labs.     If family asks about labs: renal function was stable; UPCR negative for protein. All electrolytes were great. Uric acid improved. Make sure he is still taking calcitriol QOD; if not, I can call in a new Rx.     Thanks!

## 2025-02-21 DIAGNOSIS — Z00.00 ENCOUNTER FOR MEDICARE ANNUAL WELLNESS EXAM: ICD-10-CM

## 2025-02-24 DIAGNOSIS — G62.0 CHEMOTHERAPY-INDUCED NEUROPATHY: ICD-10-CM

## 2025-02-24 DIAGNOSIS — T45.1X5A CHEMOTHERAPY-INDUCED NEUROPATHY: ICD-10-CM

## 2025-02-24 RX ORDER — DULOXETIN HYDROCHLORIDE 30 MG/1
30 CAPSULE, DELAYED RELEASE ORAL 2 TIMES DAILY
Qty: 180 CAPSULE | Refills: 0 | Status: SHIPPED | OUTPATIENT
Start: 2025-02-24

## 2025-02-24 NOTE — TELEPHONE ENCOUNTER
Care Due:                  Date            Visit Type   Department     Provider  --------------------------------------------------------------------------------                                [facility]                              PRIMARY      MED/ INTERNAL  Last Visit: 02-      CARE ([facility])   MED/ PEDS      Larry BETTENCOURT  Page  Next Visit: None Scheduled  None         None Found                                                            Last  Test          Frequency    Reason                     Performed    Due Date  --------------------------------------------------------------------------------    Office Visit  15 months..  DULoxetineMOO,       02- 05-                             allopurinoL, amLODIPine,                             traZODone................    CMP.........  12 months..  allopurinoL..............  12-   12-    HBA1C.......  6 months...  JANUVIA..................  12- 05-    [facility] Embedded Care Due Messages. Reference number: [id number].   2/24/2025 3:57:02 PM CST

## 2025-02-25 ENCOUNTER — TELEPHONE (OUTPATIENT)
Dept: FAMILY MEDICINE | Facility: CLINIC | Age: 89
End: 2025-02-25
Payer: MEDICARE

## 2025-02-25 NOTE — TELEPHONE ENCOUNTER
Phone pt informed that DULoxetine (CYMBALTA) 30 MG capsule medication has been sent to the pharmacy and ready for

## 2025-02-25 NOTE — TELEPHONE ENCOUNTER
Provider Staff:  Action required for this patient    Requires appointment   Requires labs      Please see care gap opportunities below in Care Due Message.    Thanks!  [facility]     Appointments      Date Provider   Last Visit   2/21/2024 Larry Monae MD   Next Visit   Visit date not found Larry Monae MD     Refill Decision Note   Eugene Gamez  is requesting a refill authorization.  Brief Assessment and Rationale for Refill:  Approve     Medication Therapy Plan:         Comments:     Note composed:8:04 PM 02/24/2025

## 2025-03-20 ENCOUNTER — OFFICE VISIT (OUTPATIENT)
Dept: FAMILY MEDICINE | Facility: CLINIC | Age: 89
End: 2025-03-20
Payer: MEDICARE

## 2025-03-20 VITALS
WEIGHT: 148.94 LBS | DIASTOLIC BLOOD PRESSURE: 60 MMHG | OXYGEN SATURATION: 97 % | HEART RATE: 106 BPM | SYSTOLIC BLOOD PRESSURE: 112 MMHG | BODY MASS INDEX: 19.11 KG/M2 | HEIGHT: 74 IN | TEMPERATURE: 98 F

## 2025-03-20 DIAGNOSIS — H91.93 BILATERAL HEARING LOSS, UNSPECIFIED HEARING LOSS TYPE: ICD-10-CM

## 2025-03-20 DIAGNOSIS — T45.1X5A CHEMOTHERAPY-INDUCED NEUROPATHY: ICD-10-CM

## 2025-03-20 DIAGNOSIS — M10.9 GOUT, UNSPECIFIED CAUSE, UNSPECIFIED CHRONICITY, UNSPECIFIED SITE: ICD-10-CM

## 2025-03-20 DIAGNOSIS — E11.22 CONTROLLED TYPE 2 DIABETES MELLITUS WITH STAGE 3 CHRONIC KIDNEY DISEASE, WITHOUT LONG-TERM CURRENT USE OF INSULIN: ICD-10-CM

## 2025-03-20 DIAGNOSIS — G47.00 INSOMNIA, UNSPECIFIED TYPE: ICD-10-CM

## 2025-03-20 DIAGNOSIS — B96.89 BACTERIAL SINUSITIS: ICD-10-CM

## 2025-03-20 DIAGNOSIS — I10 ESSENTIAL HYPERTENSION: ICD-10-CM

## 2025-03-20 DIAGNOSIS — M79.89 FOOT SWELLING: Primary | ICD-10-CM

## 2025-03-20 DIAGNOSIS — N18.30 CONTROLLED TYPE 2 DIABETES MELLITUS WITH STAGE 3 CHRONIC KIDNEY DISEASE, WITHOUT LONG-TERM CURRENT USE OF INSULIN: ICD-10-CM

## 2025-03-20 DIAGNOSIS — I12.9 HYPERTENSIVE KIDNEY DISEASE WITH STAGE 3B CHRONIC KIDNEY DISEASE: ICD-10-CM

## 2025-03-20 DIAGNOSIS — N25.81 SECONDARY RENAL HYPERPARATHYROIDISM: ICD-10-CM

## 2025-03-20 DIAGNOSIS — H61.23 BILATERAL IMPACTED CERUMEN: ICD-10-CM

## 2025-03-20 DIAGNOSIS — N18.32 HYPERTENSIVE KIDNEY DISEASE WITH STAGE 3B CHRONIC KIDNEY DISEASE: ICD-10-CM

## 2025-03-20 DIAGNOSIS — M54.9 UPPER BACK PAIN: ICD-10-CM

## 2025-03-20 DIAGNOSIS — G62.0 CHEMOTHERAPY-INDUCED NEUROPATHY: ICD-10-CM

## 2025-03-20 DIAGNOSIS — J32.9 BACTERIAL SINUSITIS: ICD-10-CM

## 2025-03-20 DIAGNOSIS — B35.3 TINEA PEDIS OF BOTH FEET: ICD-10-CM

## 2025-03-20 DIAGNOSIS — M25.471 RIGHT ANKLE SWELLING: ICD-10-CM

## 2025-03-20 PROCEDURE — 99999 PR PBB SHADOW E&M-EST. PATIENT-LVL V: CPT | Mod: PBBFAC,,,

## 2025-03-20 PROCEDURE — 99215 OFFICE O/P EST HI 40 MIN: CPT | Mod: PBBFAC,PO

## 2025-03-20 PROCEDURE — 69210 REMOVE IMPACTED EAR WAX UNI: CPT | Mod: PBBFAC,PO

## 2025-03-20 RX ORDER — SITAGLIPTIN 50 MG/1
50 TABLET, FILM COATED ORAL DAILY
Qty: 90 TABLET | Refills: 1 | Status: SHIPPED | OUTPATIENT
Start: 2025-03-20

## 2025-03-20 RX ORDER — ALLOPURINOL 300 MG/1
300 TABLET ORAL DAILY
Qty: 90 TABLET | Refills: 1 | Status: SHIPPED | OUTPATIENT
Start: 2025-03-20

## 2025-03-20 RX ORDER — AMLODIPINE BESYLATE 10 MG/1
10 TABLET ORAL DAILY
Qty: 90 TABLET | Refills: 3 | Status: SHIPPED | OUTPATIENT
Start: 2025-03-20

## 2025-03-20 RX ORDER — DOXYCYCLINE 100 MG/1
100 CAPSULE ORAL EVERY 12 HOURS
Qty: 14 CAPSULE | Refills: 0 | Status: SHIPPED | OUTPATIENT
Start: 2025-03-20 | End: 2025-03-27

## 2025-03-20 RX ORDER — LIDOCAINE 50 MG/G
1 PATCH TOPICAL DAILY
Qty: 30 PATCH | Refills: 2 | Status: SHIPPED | OUTPATIENT
Start: 2025-03-20

## 2025-03-20 RX ORDER — DULOXETIN HYDROCHLORIDE 30 MG/1
30 CAPSULE, DELAYED RELEASE ORAL 2 TIMES DAILY
Qty: 180 CAPSULE | Refills: 0 | Status: SHIPPED | OUTPATIENT
Start: 2025-03-20

## 2025-03-20 RX ORDER — KETOCONAZOLE 20 MG/G
CREAM TOPICAL DAILY
Qty: 60 G | Refills: 2 | Status: SHIPPED | OUTPATIENT
Start: 2025-03-20

## 2025-03-20 RX ORDER — HYDRALAZINE HYDROCHLORIDE 50 MG/1
50 TABLET, FILM COATED ORAL 2 TIMES DAILY
Qty: 60 TABLET | Refills: 11 | Status: SHIPPED | OUTPATIENT
Start: 2025-03-20 | End: 2026-03-20

## 2025-03-20 RX ORDER — TRAZODONE HYDROCHLORIDE 50 MG/1
50 TABLET ORAL NIGHTLY
Qty: 90 TABLET | Refills: 3 | Status: SHIPPED | OUTPATIENT
Start: 2025-03-20

## 2025-03-20 RX ORDER — GUAIFENESIN 600 MG/1
1200 TABLET, EXTENDED RELEASE ORAL 2 TIMES DAILY
Qty: 40 TABLET | Refills: 0 | Status: SHIPPED | OUTPATIENT
Start: 2025-03-20 | End: 2025-03-30

## 2025-03-20 RX ORDER — CALCITRIOL 0.25 UG/1
0.25 CAPSULE ORAL EVERY OTHER DAY
Qty: 30 CAPSULE | Refills: 11 | Status: SHIPPED | OUTPATIENT
Start: 2025-03-20

## 2025-03-20 NOTE — PROGRESS NOTES
HPI     Eugene Gamez is a 89 y.o. male with multiple medical diagnoses as listed in the medical history and problem list that presents for medication refills and foot swelling. PCP Dr. Mnoae with last visit in this clinic on 2/21/24.     Chief Complaint   Patient presents with    Medication Refill    Foot Swelling     HPI    CHIEF COMPLAINT:  Patient presents with multiple complaints including shoulder tightness, sore throat, headaches, and recent foot swelling.    HPI:  Patient reports intermittent tightness in the posterior shoulder blade region, sore throat with sharp, burning sensations, nasal congestion, and intermittent headaches. These symptoms are episodic, but the duration is not specified.    Patient's daughter observed significant bilateral foot edema a few days ago, which has since decreased but the feet remain slightly swollen. Patient sometimes has aching and sharp pain in his feet that fluctuates, but reports adequate ambulation.    The shoulder pain has been chronic, persisting for more than 2 years according to patient's daughter. Patient has a history of gout, with recent flare-ups in both feet and hands. He had gout in his hand approximately 2 weeks ago.    Patient appears to have hearing difficulties, though this is not directly reported as a symptom by the patient himself.    Patient denies shortness of breath.    MEDICAL HISTORY:  Patient has a history of gout affecting his feet and hands. He also has a history of diabetes, neuropathy, and hearing problems.         Assessment & Plan     1. Foot swelling    Trace edema to bilateral feet; pedal pulses 2+, patient denies pain. Sensation decreased bilaterally. No warmth or redness noted. Possibly positional; encouraged use of compression stocking daily and elevation of feet when sitting. Will obtain lab work today. Follow up with clinic for any worsening symptoms, or if symptoms fail to improve.       - BNP; Future  - COMPRESSION  STOCKINGS    2. Upper back pain    Tightness and very mild TTP of trapezius and rhomboid muscles. Ongoing for several years. Recommended stretching and daily lidocaine patch and use of PRN Voltaren gel. Follow up with clinic for any worsening symptoms, or if symptoms fail to improve.       - LIDOcaine (LIDODERM) 5 %; Place 1 patch onto the skin once daily. Remove & Discard patch within 12 hours or as directed by MD  Dispense: 30 patch; Refill: 2    3. Controlled type 2 diabetes mellitus with stage 3 chronic kidney disease, without long-term current use of insulin    Last A1c from 12/2023 was 6.2. Will check today. Continue Januvia daily. Avoid concentrated sweets, such as sugary drinks and foods. Continue exercising/healthy, active lifestyle. Patient denies any Sx of hyperglycemia such as polyuria/polydipsia, increased appetite, blurred vision, fatigue. Has not had any hypoglycemic Sx.    - Hemoglobin A1c; Future  - Microalbumin/creatinine urine ratio; Future  - Comprehensive Metabolic Panel; Future  - DIABETIC SHOES FOR HOME USE  - JANUVIA 50 mg Tab; Take 1 tablet (50 mg total) by mouth once daily.  Dispense: 90 tablet; Refill: 1    4. Bacterial sinusitis    Mild TTP of frontal sinuses. Given symptoms and clinical appearance, will treat for bacterial sinusitis. Follow up with clinic for any worsening symptoms, or if symptoms fail to improve.       - guaiFENesin (MUCINEX) 600 mg 12 hr tablet; Take 2 tablets (1,200 mg total) by mouth 2 (two) times daily. for 10 days  Dispense: 40 tablet; Refill: 0  - doxycycline (VIBRAMYCIN) 100 MG Cap; Take 1 capsule (100 mg total) by mouth every 12 (twelve) hours. for 7 days  Dispense: 14 capsule; Refill: 0    5. Tinea pedis of both feet    Slight redness noted between toes of bilateral feet; will treat for tinea pedis. Follow up with clinic for any worsening symptoms, or if symptoms fail to improve.       - ketoconazole (NIZORAL) 2 % cream; Apply topically once daily.  Dispense:  60 g; Refill: 2    6. Gout, unspecified cause, unspecified chronicity, unspecified site    Stable on allopurinol daily. The current medical regimen is effective;  continue present plan and medications.    - allopurinoL (ZYLOPRIM) 300 MG tablet; Take 1 tablet (300 mg total) by mouth once daily.  Dispense: 90 tablet; Refill: 1    7. Right ankle swelling    Noted with soft moveable mass to right ankle; possible lipoma vs. Gout. Non tender and not erythematous. Will obtain xray for further evaluation. Follow up with clinic for any worsening symptoms, or if symptoms fail to improve.       - X-Ray Ankle Complete Right; Future    8. Bilateral hearing loss, unspecified hearing loss type    Significant hearing loss noted with conversing during visit. Daughter reports this has been ongoing for several years. Will refer to Audiology for hearing assessment.     - Ambulatory referral/consult to Audiology; Future    9. Essential hypertension    Stable on amlodipine daily. The current medical regimen is effective;  continue present plan and medications.    - amLODIPine (NORVASC) 10 MG tablet; Take 1 tablet (10 mg total) by mouth once daily.  Dispense: 90 tablet; Refill: 3    10. Secondary renal hyperparathyroidism    Stable on calcitriol every other day. The current medical regimen is effective;  continue present plan and medications.    - calcitRIOL (ROCALTROL) 0.25 MCG Cap; Take 1 capsule (0.25 mcg total) by mouth every other day.  Dispense: 30 capsule; Refill: 11    11. Chemotherapy-induced neuropathy    Stable on cymbalta daily. The current medical regimen is effective;  continue present plan and medications.    - DULoxetine (CYMBALTA) 30 MG capsule; Take 1 capsule (30 mg total) by mouth 2 (two) times daily.  Dispense: 180 capsule; Refill: 0    12. Hypertensive kidney disease with stage 3b chronic kidney disease    Stable on hydralazine BID. The current medical regimen is effective;  continue present plan and medications.    -  hydrALAZINE (APRESOLINE) 50 MG tablet; Take 1 tablet (50 mg total) by mouth 2 (two) times a day.  Dispense: 60 tablet; Refill: 11    13. Insomnia, unspecified type    Stable on trazodone nightly. The current medical regimen is effective;  continue present plan and medications.    - traZODone (DESYREL) 50 MG tablet; Take 1 tablet (50 mg total) by mouth every evening.  Dispense: 90 tablet; Refill: 3    14. Bilateral cerumen impaction    Cerumen removal done in clinic today; patient tolerated well.       Discussed DDx, condition, and treatment.   Education sent to patient portal/included in after visit summary.  ED precautions given.   Notify provider if symptoms do not resolve or increase in severity.   Patient verbalizes understanding and agrees with plan of care.  --------------------------------------------      Health Maintenance:  Health Maintenance         Date Due Completion Date    Shingles Vaccine (1 of 2) Never done ---    RSV Vaccine (Age 60+ and Pregnant patients) (1 - 1-dose 75+ series) Never done ---    Hemoglobin A1c 06/01/2024 12/1/2023    Diabetes Urine Screening 08/11/2024 8/11/2023    Influenza Vaccine (1) 09/01/2024 12/15/2023    COVID-19 Vaccine (7 - 2024-25 season) 10/27/2024 9/1/2024    TETANUS VACCINE 01/18/2032 1/18/2022    Override on 1/25/2018: Not Clinically Appropriate            Discussed the importance of overdue vaccines which were offered during this encounter. Patient declined overdue vaccines at this time and Advised patient on the importance of completing overdue health maintenance items    Follow Up:  Follow up in about 3 months (around 6/20/2025).    Exam     Review of Systems:  (as noted above)  Review of Systems   Constitutional:  Negative for activity change, chills, fatigue and fever.   HENT:  Positive for congestion and sore throat. Negative for trouble swallowing.    Respiratory:  Negative for shortness of breath and wheezing.    Cardiovascular:  Negative for chest pain.    Gastrointestinal:  Negative for blood in stool.   Musculoskeletal:  Positive for arthralgias, gait problem (uses walker), joint swelling and myalgias.   Neurological:  Positive for headaches.       Physical Exam:   Physical Exam  Constitutional:       General: He is not in acute distress.     Appearance: Normal appearance. He is not ill-appearing.   HENT:      Head: Normocephalic and atraumatic.      Right Ear: Tympanic membrane normal. There is impacted cerumen.      Left Ear: Tympanic membrane normal. There is impacted cerumen.      Nose: Congestion present.      Right Sinus: Frontal sinus tenderness present.      Left Sinus: Frontal sinus tenderness present.      Mouth/Throat:      Mouth: Mucous membranes are moist.      Pharynx: No oropharyngeal exudate or posterior oropharyngeal erythema.   Cardiovascular:      Rate and Rhythm: Normal rate and regular rhythm.      Pulses: Normal pulses.      Heart sounds: Normal heart sounds. No murmur heard.  Pulmonary:      Effort: Pulmonary effort is normal. No respiratory distress.      Breath sounds: Normal breath sounds. No wheezing.   Abdominal:      General: Abdomen is flat. Bowel sounds are normal.      Palpations: Abdomen is soft.   Musculoskeletal:      Cervical back: Normal range of motion and neck supple. No rigidity.      Right ankle: Swelling present. No tenderness. Normal range of motion. Normal pulse.      Right foot: Normal range of motion and normal capillary refill. Swelling present. No deformity or tenderness. Normal pulse.      Left foot: Normal range of motion and normal capillary refill. Swelling present. No deformity or tenderness. Normal pulse.   Feet:      Right foot:      Protective Sensation: 7 sites tested.  4 sites sensed.      Skin integrity: Dry skin present.      Left foot:      Protective Sensation: 7 sites tested.  4 sites sensed.      Skin integrity: Dry skin present.   Lymphadenopathy:      Cervical: No cervical adenopathy.   Skin:      "General: Skin is warm and dry.      Capillary Refill: Capillary refill takes less than 2 seconds.   Neurological:      Mental Status: He is alert and oriented to person, place, and time.   Psychiatric:         Mood and Affect: Mood normal.         Behavior: Behavior normal.       Vitals:    03/20/25 1421   BP: 112/60   Pulse: 106   Temp: 97.7 °F (36.5 °C)   TempSrc: Oral   SpO2: 97%   Weight: 67.5 kg (148 lb 14.7 oz)   Height: 6' 2" (1.88 m)      Body mass index is 19.12 kg/m².        History     Past Medical History:  Past Medical History:   Diagnosis Date    Arthritis     Cancer     Diabetes mellitus     Elevated PSA     Gout, unspecified     Hypertension     Nuclear sclerotic cataract of both eyes 6/29/2018       Past Surgical History:  Past Surgical History:   Procedure Laterality Date    CATARACT EXTRACTION      ou    EYE SURGERY      bilateral cataracts    removal of small bowel  Apr. 2015    Colostomy       Social History:  Social History[1]    Family History:  Family History   Problem Relation Name Age of Onset    Hypertension Mother      Diabetes Mother      No Known Problems Father      No Known Problems Sister      No Known Problems Brother      No Known Problems Maternal Aunt      No Known Problems Maternal Uncle      No Known Problems Paternal Aunt      No Known Problems Paternal Uncle      No Known Problems Maternal Grandmother      No Known Problems Maternal Grandfather      No Known Problems Paternal Grandmother      No Known Problems Paternal Grandfather      Amblyopia Neg Hx      Blindness Neg Hx      Cancer Neg Hx      Cataracts Neg Hx      Glaucoma Neg Hx      Macular degeneration Neg Hx      Retinal detachment Neg Hx      Strabismus Neg Hx      Stroke Neg Hx      Thyroid disease Neg Hx         Allergies and Medications: (updated and reviewed)  Review of patient's allergies indicates:   Allergen Reactions    Iodine Other (See Comments)     Causes gout to flare up    Shellfish containing products  "     Causes gout to flare up    Shellfish derived      Current Medications[2]    Patient Care Team:  Larry Monae MD as PCP - General (Family Medicine)  Kelly Jaeger MD as Consulting Physician (Nephrology)      - The patient is given an After Visit Summary that lists all medications with directions, allergies, education, orders placed during this encounter and follow-up instructions.      - I have reviewed the patient's medical information including past medical, family, and social history sections including the medications and allergies.      - We discussed the patient's current medications.     This note was created by combination of typed  and MModal dictation.  Transcription errors may be present.  If there are any questions, please contact me.     This note was generated with the assistance of ambient listening technology. Verbal consent was obtained by the patient and accompanying visitor(s) for the recording of patient appointment to facilitate this note. I attest to having reviewed and edited the generated note for accuracy, though some syntax or spelling errors may persist. Please contact the author of this note for any clarification.                LEONEL Corona         [1]   Social History  Socioeconomic History    Marital status:     Number of children: 8   Tobacco Use    Smoking status: Never     Passive exposure: Never    Smokeless tobacco: Never    Tobacco comments:     smoked short while as a teen   Substance and Sexual Activity    Alcohol use: No    Drug use: No    Sexual activity: Not Currently     Partners: Female   [2]   Current Outpatient Medications   Medication Sig Dispense Refill    ferrous sulfate 324 mg (65 mg iron) TbEC Take 1 tablet by mouth once daily.      allopurinoL (ZYLOPRIM) 300 MG tablet Take 1 tablet (300 mg total) by mouth once daily. 90 tablet 1    amLODIPine (NORVASC) 10 MG tablet Take 1 tablet (10 mg total) by mouth once daily. 90 tablet 3     calcitRIOL (ROCALTROL) 0.25 MCG Cap Take 1 capsule (0.25 mcg total) by mouth every other day. 30 capsule 11    doxycycline (VIBRAMYCIN) 100 MG Cap Take 1 capsule (100 mg total) by mouth every 12 (twelve) hours. for 7 days 14 capsule 0    DULoxetine (CYMBALTA) 30 MG capsule Take 1 capsule (30 mg total) by mouth 2 (two) times daily. 180 capsule 0    gabapentin (NEURONTIN) 100 MG capsule Take 1 capsule (100 mg total) by mouth 3 (three) times daily. 270 capsule 1    guaiFENesin (MUCINEX) 600 mg 12 hr tablet Take 2 tablets (1,200 mg total) by mouth 2 (two) times daily. for 10 days 40 tablet 0    hydrALAZINE (APRESOLINE) 50 MG tablet Take 1 tablet (50 mg total) by mouth 2 (two) times a day. 60 tablet 11    JANUVIA 50 mg Tab Take 1 tablet (50 mg total) by mouth once daily. 90 tablet 1    ketoconazole (NIZORAL) 2 % cream Apply topically once daily. 60 g 2    LIDOcaine (LIDODERM) 5 % Place 1 patch onto the skin once daily. Remove & Discard patch within 12 hours or as directed by MD 30 patch 2    sodium bicarbonate 650 MG tablet Take 1 tablet (650 mg total) by mouth 2 (two) times daily. 60 tablet 11    traZODone (DESYREL) 50 MG tablet Take 1 tablet (50 mg total) by mouth every evening. 90 tablet 3     No current facility-administered medications for this visit.     Facility-Administered Medications Ordered in Other Visits   Medication Dose Route Frequency Provider Last Rate Last Admin    ondansetron HCl (PF) 4 mg/2 mL injection    PRN James Ryan CRNA   4 mg at 06/29/15 0856

## 2025-03-20 NOTE — PATIENT INSTRUCTIONS
Please call the Referral Desk to schedule Audiology referral at your earliest convenience:  [phone]      Gold Parra diabetic lotion    Lidocaine patches

## 2025-03-20 NOTE — PROCEDURES
Ear Cerumen Removal    Date/Time: 3/20/2025 2:30 PM    Performed by: Isaura Adams FNP  Authorized by: Isaura Adams FNP    Consent Done?:  Yes (Verbal)  Location details:  Both ears  Procedure type: curette    Cerumen  Removal Results:  Cerumen partially removed  Patient tolerance:  Patient tolerated the procedure well with no immediate complications

## 2025-03-21 ENCOUNTER — RESULTS FOLLOW-UP (OUTPATIENT)
Dept: FAMILY MEDICINE | Facility: CLINIC | Age: 89
End: 2025-03-21

## 2025-03-21 DIAGNOSIS — R79.89 ELEVATED BRAIN NATRIURETIC PEPTIDE (BNP) LEVEL: Primary | ICD-10-CM

## 2025-03-21 DIAGNOSIS — M10.9 GOUT OF RIGHT ANKLE, UNSPECIFIED CAUSE, UNSPECIFIED CHRONICITY: ICD-10-CM

## 2025-03-25 ENCOUNTER — TELEPHONE (OUTPATIENT)
Dept: FAMILY MEDICINE | Facility: CLINIC | Age: 89
End: 2025-03-25
Payer: MEDICARE

## 2025-03-25 ENCOUNTER — HOSPITAL ENCOUNTER (OUTPATIENT)
Dept: RADIOLOGY | Facility: HOSPITAL | Age: 89
Discharge: HOME | End: 2025-03-25
Payer: MEDICARE

## 2025-03-25 DIAGNOSIS — M25.471 RIGHT ANKLE SWELLING: ICD-10-CM

## 2025-03-25 PROCEDURE — 73610 X-RAY EXAM OF ANKLE: CPT | Mod: 26,RT,, | Performed by: RADIOLOGY

## 2025-03-25 PROCEDURE — 73610 X-RAY EXAM OF ANKLE: CPT | Mod: TC,FY,PO,RT

## 2025-03-25 NOTE — TELEPHONE ENCOUNTER
----- Message from LEONEL Corona sent at 3/24/2025  7:46 AM CDT -----  Please let him know his urine protein level was slightly elevated. The best thing to do for this is to monitor blood pressure and make sure it stays well controlled. Thank you!  ----- Message -----  From: [employer] [facility]  Sent: 3/20/2025  10:54 PM CDT  To: LEONEL Johnson

## 2025-03-25 NOTE — TELEPHONE ENCOUNTER
Attempted to call patient's daughter Nicol to inform of lab results , unable to leave voicemail . Spoke to patient to inform of lab results , patient voiced understanding. Will attempt to call patient's daughter back .

## 2025-03-27 ENCOUNTER — TELEPHONE (OUTPATIENT)
Dept: FAMILY MEDICINE | Facility: CLINIC | Age: 89
End: 2025-03-27
Payer: MEDICARE

## 2025-03-28 ENCOUNTER — TELEPHONE (OUTPATIENT)
Dept: FAMILY MEDICINE | Facility: CLINIC | Age: 89
End: 2025-03-28
Payer: MEDICARE

## 2025-03-28 RX ORDER — PREDNISONE 20 MG/1
40 TABLET ORAL DAILY
Qty: 6 TABLET | Refills: 0 | Status: SHIPPED | OUTPATIENT
Start: 2025-03-28 | End: 2025-03-31

## 2025-03-28 NOTE — TELEPHONE ENCOUNTER
Spoke with patient daughter because I couldn't get in touch with patient gave her the results from labs/x-ray results also made the appointment with Ruben FAITH on 04/11/25 @ 2:00 pm with a F/U visit with Ivonne on 10/03/25 @ 1:20 pm vs   Oral

## 2025-04-08 ENCOUNTER — OFFICE VISIT (OUTPATIENT)
Dept: CARDIOLOGY | Facility: CLINIC | Age: 89
End: 2025-04-08
Payer: MEDICARE

## 2025-04-08 VITALS
HEART RATE: 93 BPM | HEIGHT: 74 IN | BODY MASS INDEX: 18.58 KG/M2 | OXYGEN SATURATION: 99 % | DIASTOLIC BLOOD PRESSURE: 62 MMHG | SYSTOLIC BLOOD PRESSURE: 112 MMHG | RESPIRATION RATE: 18 BRPM | WEIGHT: 144.81 LBS

## 2025-04-08 DIAGNOSIS — R79.89 ELEVATED BRAIN NATRIURETIC PEPTIDE (BNP) LEVEL: ICD-10-CM

## 2025-04-08 DIAGNOSIS — I10 ESSENTIAL HYPERTENSION: Primary | ICD-10-CM

## 2025-04-08 LAB
OHS QRS DURATION: 74 MS
OHS QTC CALCULATION: 454 MS

## 2025-04-08 PROCEDURE — 93005 ELECTROCARDIOGRAM TRACING: CPT | Mod: PBBFAC,PO | Performed by: INTERNAL MEDICINE

## 2025-04-08 PROCEDURE — 99215 OFFICE O/P EST HI 40 MIN: CPT | Mod: PBBFAC,PO | Performed by: INTERNAL MEDICINE

## 2025-04-08 PROCEDURE — 93010 ELECTROCARDIOGRAM REPORT: CPT | Mod: S$PBB,,, | Performed by: INTERNAL MEDICINE

## 2025-04-08 PROCEDURE — 99999 PR PBB SHADOW E&M-EST. PATIENT-LVL V: CPT | Mod: PBBFAC,,, | Performed by: INTERNAL MEDICINE

## 2025-04-08 RX ORDER — ATORVASTATIN CALCIUM 20 MG/1
20 TABLET, FILM COATED ORAL DAILY
Qty: 90 TABLET | Refills: 3 | Status: SHIPPED | OUTPATIENT
Start: 2025-04-08 | End: 2026-04-08

## 2025-04-08 RX ORDER — ASPIRIN 81 MG/1
81 TABLET ORAL DAILY
Qty: 90 TABLET | Refills: 3 | Status: SHIPPED | OUTPATIENT
Start: 2025-04-08 | End: 2026-04-08

## 2025-04-08 NOTE — PROGRESS NOTES
CARDIOVASCULAR PROGRESS NOTE    REASON FOR CONSULT:   Eugene Gamez is a 89 y.o. male who presents for establishment of cardiology care.    HISTORY OF PRESENT ILLNESS:     He has past medical history of hypertension, hyperlipidemia, type 2 diabetes mellitus, CKD stage 3 with baseline creatinine around 1.6-1.8 and osteoarthritis.    He is accompanied by his daughter today.    He has been referred to Cardiology Clinic for evaluation of lower extremity edema and mildly elevated BNP.      He ambulates with the help of a walker.  No exertional shortness of breath or chest pain.  No orthopnea or PND.  Lower extremity edema is better.  Compliant with his blood pressure medications.     PAST MEDICAL HISTORY:     Past Medical History:   Diagnosis Date    Arthritis     Cancer     Diabetes mellitus     Elevated PSA     Gout, unspecified     Hypertension     Nuclear sclerotic cataract of both eyes 6/29/2018       PAST SURGICAL HISTORY:     Past Surgical History:   Procedure Laterality Date    CATARACT EXTRACTION      ou    EYE SURGERY      bilateral cataracts    removal of small bowel  Apr. 2015    Colostomy       ALLERGIES AND MEDICATION:     Review of patient's allergies indicates:   Allergen Reactions    Iodine Other (See Comments)     Causes gout to flare up    Shellfish containing products      Causes gout to flare up    Shellfish derived         Medication List            Accurate as of April 8, 2025  2:28 PM. If you have any questions, ask your nurse or doctor.                START taking these medications      aspirin 81 MG EC tablet  Commonly known as: ECOTRIN  Take 1 tablet (81 mg total) by mouth once daily.  Started by: Elias Ibanez MD     atorvastatin 20 MG tablet  Commonly known as: LIPITOR  Take 1 tablet (20 mg total) by mouth once daily.  Started by: Elias Ibanez MD            CONTINUE taking these medications      allopurinoL 300 MG tablet  Commonly known as: ZYLOPRIM  Take 1 tablet (300  mg total) by mouth once daily.     amLODIPine 10 MG tablet  Commonly known as: NORVASC  Take 1 tablet (10 mg total) by mouth once daily.     calcitRIOL 0.25 MCG Cap  Commonly known as: ROCALTROL  Take 1 capsule (0.25 mcg total) by mouth every other day.     DULoxetine 30 MG capsule  Commonly known as: CYMBALTA  Take 1 capsule (30 mg total) by mouth 2 (two) times daily.     ferrous sulfate 324 mg (65 mg iron) Tbec     gabapentin 100 MG capsule  Commonly known as: NEURONTIN  Take 1 capsule (100 mg total) by mouth 3 (three) times daily.     hydrALAZINE 50 MG tablet  Commonly known as: APRESOLINE  Take 1 tablet (50 mg total) by mouth 2 (two) times a day.     JANUVIA 50 mg Tab  Generic drug: SITagliptin phosphate  Take 1 tablet (50 mg total) by mouth once daily.     ketoconazole 2 % cream  Commonly known as: NIZORAL  Apply topically once daily.     LIDOcaine 5 %  Commonly known as: LIDODERM  Place 1 patch onto the skin once daily. Remove & Discard patch within 12 hours or as directed by MD     sodium bicarbonate 650 MG tablet  Take 1 tablet (650 mg total) by mouth 2 (two) times daily.     traZODone 50 MG tablet  Commonly known as: DESYREL  Take 1 tablet (50 mg total) by mouth every evening.               Where to Get Your Medications        These medications were sent to [facility] #[id number] - [place] - [address]  [address] [address]      Phone: [phone]   aspirin 81 MG EC tablet  atorvastatin 20 MG tablet         SOCIAL HISTORY:   Social History[1]    FAMILY HISTORY:     Family History   Problem Relation Name Age of Onset    Hypertension Mother      Diabetes Mother      No Known Problems Father      No Known Problems Sister      No Known Problems Brother      No Known Problems Maternal Aunt      No Known Problems Maternal Uncle      No Known Problems Paternal Aunt      No Known Problems Paternal Uncle      No Known Problems Maternal Grandmother      No Known Problems Maternal  "Grandfather      No Known Problems Paternal Grandmother      No Known Problems Paternal Grandfather      Amblyopia Neg Hx      Blindness Neg Hx      Cancer Neg Hx      Cataracts Neg Hx      Glaucoma Neg Hx      Macular degeneration Neg Hx      Retinal detachment Neg Hx      Strabismus Neg Hx      Stroke Neg Hx      Thyroid disease Neg Hx         REVIEW OF SYSTEMS:   Review of Systems   Constitutional:  Positive for malaise/fatigue.   Eyes:  Negative for blurred vision and photophobia.   Respiratory:  Negative for cough, hemoptysis, sputum production and shortness of breath.    Cardiovascular:  Negative for chest pain, palpitations, orthopnea and claudication.   Musculoskeletal:  Positive for myalgias.   Neurological:  Positive for dizziness. Negative for tingling and headaches.   Endo/Heme/Allergies:  Does not bruise/bleed easily.   Psychiatric/Behavioral:  Negative for depression.            PHYSICAL EXAM:     Vitals:    04/08/25 1359   BP: 112/62   Pulse: 93   Resp: 18    Body mass index is 18.6 kg/m².  Weight: 65.7 kg (144 lb 13.5 oz)   Height: 6' 2" (188 cm)     Gen: NAD  Head/Eyes/Ears/Nose: MMM, good dentition   Neck: No carotid bruits, no JVD  Lung: Clear to auscultation bilaterally, no wheezes/rales/ronchi, symmetrical lung expansion with inspiration  Heart: Normal S1/S2, regular rate and rhythm, no murmurs/rubs/gallops  Abdomen: Soft, NT/ND, no masses  Extremities: No lower extremity edema.  No wounds or other skin lesions  Skin: Normal color and turgor. No wounds rashes, no petechia, no ecchymoses.   Neuro: AAOx3    DATA:     Laboratory:  CBC:  Recent Labs   Lab 06/11/24  0930 09/30/24  0912 01/14/25  1330   WBC 5.80 5.16 6.82   Hemoglobin 11.5 L 10.7 L 11.5 L   Hematocrit 35.0 L 34.2 L 36.0 L   Platelets 293 267 297       CHEMISTRIES:  Recent Labs   Lab 06/10/22  1113 05/09/23  1441 03/04/24  1156 06/11/24  0930 09/30/24  0912 01/14/25  1330 03/20/25  1522   Glucose 145 H   < > 102 141 H 111 H 168 H 142 H "   Sodium 139   < > 141 140 139 144 139   Potassium 4.6   < > 4.7 4.9 4.7 4.4 4.5   BUN 33 H   < > 24 H 36 H 23 18 18   Creatinine 2.2 H   < > 1.8 H 2.1 H 1.8 H 1.7 H 1.6 H   eGFR if non  26 A  --   --   --   --   --   --    eGFR  --    < > 35.8 A 29.7 A 35.8 A 38.3 A 40.9 A   Calcium 10.0   < > 9.6 10.5 10.1 9.8 10.4    < > = values in this interval not displayed.       CARDIAC BIOMARKERS:  Recent Labs   Lab 05/10/23  0413 23  1215 23  1512   Troponin I 0.025 <0.006 0.006       HBA1C:  Hemoglobin A1C   Date Value Ref Range Status   2025 5.3 4.0 - 5.6 % Final     Comment:     ADA Screening Guidelines:  5.7-6.4%  Consistent with prediabetes  >or=6.5%  Consistent with diabetes    High levels of fetal hemoglobin interfere with the HbA1C  assay. Heterozygous hemoglobin variants (HbS, HgC, etc)do  not significantly interfere with this assay.   However, presence of multiple variants may affect accuracy.     2023 6.2 (H) 4.0 - 5.6 % Final     Comment:     ADA Screening Guidelines:  5.7-6.4%  Consistent with prediabetes  >or=6.5%  Consistent with diabetes    High levels of fetal hemoglobin interfere with the HbA1C  assay. Heterozygous hemoglobin variants (HbS, HgC, etc)do  not significantly interfere with this assay.   However, presence of multiple variants may affect accuracy.     2023 6.7 (H) 4.0 - 5.6 % Final     Comment:     ADA Screening Guidelines:  5.7-6.4%  Consistent with prediabetes  >or=6.5%  Consistent with diabetes    High levels of fetal hemoglobin interfere with the HbA1C  assay. Heterozygous hemoglobin variants (HbS, HgC, etc)do  not significantly interfere with this assay.   However, presence of multiple variants may affect accuracy.          COAGS:        LIPIDS/LFTS:  Recent Labs   Lab 23  1215 12/15/23  1459 25  1522   AST 18 19 19   ALT 17 13 11         CARDIAC DIAGNOSTICS:  :     [id number]  Sinus rhythm, first-degree AV block without any  ST-T wave change    12/1/2023  Sinus rhythm with marked sinus arrhythmia and first-degree AV block without any ST-T wave change      ECHO  5/2023  Technically difficult study.  The left ventricle is normal in size with concentric remodeling and normal systolic function.  The estimated ejection fraction is 60%.  Normal left ventricular diastolic function.  Normal right ventricular size with normal right ventricular systolic function.  There is no pulmonary hypertension.    3.  STRESS TEST  NA    4.  CARDIAC CATHETERIZATION  NA    5.  IMAGING   CT abdomen pelvis done in May, 2023 showed mild-to-moderate aortic calcification    6. OTHERS  A1C 5.3 (3/2025)      ASSESSMENT:   Lower extremity swelling  Hypertension  Hyperlipidemia  Type 2 diabetes mellitus  CKD stage 3 with baseline creatinine around 1.6-1.8  Mild-to-moderate aortic calcification    Lower extremity swelling is most likely due to chronic venous insufficiency.  He has minimally elevated cardiac BNP which is due to CKD/advanced age.    PLAN:   Echocardiogram  Lower extremity compression stockings  Blood pressure stable.  Continue hydralazine 50 mg b.i.d. and amlodipine 10 mg daily  If lower extremity edema and does not get better, would switch amlodipine with carvedilol  Start baby aspirin  Start atorvastatin 20 mg daily.  Gets lipid profile/LFTs in October, 2025  Mediterranean diet and daily light exercise    Follow up in 6 months    Elias Ibanez MD  [facility]      This note was created by combination of typed  and M-Modal dictation.   Transcription errors may be present.          [1]   Social History  Socioeconomic History    Marital status:     Number of children: 8   Tobacco Use    Smoking status: Never     Passive exposure: Never    Smokeless tobacco: Never    Tobacco comments:     smoked short while as a teen   Substance and Sexual Activity    Alcohol use: No    Drug use: No    Sexual activity: Not  Currently     Partners: Female

## 2025-04-17 ENCOUNTER — PATIENT OUTREACH (OUTPATIENT)
Dept: ADMINISTRATIVE | Facility: CLINIC | Age: 89
End: 2025-04-17
Payer: MEDICARE

## 2025-04-17 ENCOUNTER — TELEPHONE (OUTPATIENT)
Dept: FAMILY MEDICINE | Facility: CLINIC | Age: 89
End: 2025-04-17
Payer: MEDICARE

## 2025-04-17 NOTE — TELEPHONE ENCOUNTER
Spoke with patient daughter gaston gave her the information for the hospital follow up for dad. Vs

## 2025-04-17 NOTE — PROGRESS NOTES
C3 nurse attempted to contact Eugene Gamez for a TCC post hospital discharge follow up alex, regarding her discharge from an outside facility. No answer. LVM requesting a callback at [phone].    The patient does not have a scheduled HOSFU appointment. Message sent to PCP's staff to assist with HOSFU appointment scheduling.

## 2025-04-17 NOTE — PROGRESS NOTES
C3 nurse spoke with Eugene Gamez's daughter, Clemente, for a TCC post hospital discharge follow up call. The patient has a scheduled HOSFU with Adam Monte PA-C ([facility]) on 4/24/25 at 1430. No messages routed at this time.

## 2025-04-18 ENCOUNTER — HOSPITAL ENCOUNTER (INPATIENT)
Facility: HOSPITAL | Age: 89
LOS: 11 days | Discharge: NURSING, REHAB OR LONG-TERM CARE | DRG: 335 | End: 2025-04-29
Attending: STUDENT IN AN ORGANIZED HEALTH CARE EDUCATION/TRAINING PROGRAM | Admitting: HOSPITALIST
Payer: MEDICARE

## 2025-04-18 DIAGNOSIS — I47.19 ATRIAL TACHYCARDIA: ICD-10-CM

## 2025-04-18 DIAGNOSIS — K56.609 SBO (SMALL BOWEL OBSTRUCTION): Primary | ICD-10-CM

## 2025-04-18 DIAGNOSIS — R11.2 NAUSEA AND VOMITING: ICD-10-CM

## 2025-04-18 DIAGNOSIS — E87.0 HYPERNATREMIA: ICD-10-CM

## 2025-04-18 DIAGNOSIS — Z93.3 COLOSTOMY IN PLACE: ICD-10-CM

## 2025-04-18 DIAGNOSIS — I49.9 ARRHYTHMIA: ICD-10-CM

## 2025-04-18 DIAGNOSIS — I47.29 NSVT (NONSUSTAINED VENTRICULAR TACHYCARDIA): ICD-10-CM

## 2025-04-18 DIAGNOSIS — G47.00 INSOMNIA, UNSPECIFIED TYPE: ICD-10-CM

## 2025-04-18 DIAGNOSIS — R07.9 CHEST PAIN: ICD-10-CM

## 2025-04-18 DIAGNOSIS — E43 SEVERE PROTEIN-CALORIE MALNUTRITION: ICD-10-CM

## 2025-04-18 DIAGNOSIS — R41.0 DELIRIUM: ICD-10-CM

## 2025-04-18 DIAGNOSIS — I10 ESSENTIAL HYPERTENSION: ICD-10-CM

## 2025-04-18 PROBLEM — E87.20 METABOLIC ACIDOSIS, NORMAL ANION GAP (NAG): Status: RESOLVED | Noted: 2019-10-06 | Resolved: 2025-04-18

## 2025-04-18 PROBLEM — T17.998A MUCUS PLUG IN RESPIRATORY TRACT: Status: ACTIVE | Noted: 2025-04-18

## 2025-04-18 PROBLEM — K43.9 VENTRAL HERNIA: Status: ACTIVE | Noted: 2025-04-18

## 2025-04-18 PROBLEM — E87.5 HYPERKALEMIA: Status: RESOLVED | Noted: 2019-10-06 | Resolved: 2025-04-18

## 2025-04-18 PROBLEM — D50.0 ANEMIA, BLOOD LOSS: Status: RESOLVED | Noted: 2018-11-09 | Resolved: 2025-04-18

## 2025-04-18 PROBLEM — Z90.49 S/P LEFT COLECTOMY: Status: ACTIVE | Noted: 2025-04-18

## 2025-04-18 LAB
ABSOLUTE NEUTROPHIL MANUAL (OHS): 6.4 K/UL
ALBUMIN SERPL BCP-MCNC: 3.6 G/DL (ref 3.5–5.2)
ALP SERPL-CCNC: 56 UNIT/L (ref 40–150)
ALT SERPL W/O P-5'-P-CCNC: 25 UNIT/L (ref 10–44)
ANION GAP (OHS): 17 MMOL/L (ref 8–16)
AST SERPL-CCNC: 21 UNIT/L (ref 11–45)
BACTERIA #/AREA URNS AUTO: ABNORMAL /HPF
BILIRUB SERPL-MCNC: 0.7 MG/DL (ref 0.1–1)
BILIRUB UR QL STRIP.AUTO: NEGATIVE
BNP SERPL-MCNC: 21 PG/ML (ref 0–99)
BUN SERPL-MCNC: 63 MG/DL (ref 8–23)
CALCIUM SERPL-MCNC: 10.7 MG/DL (ref 8.7–10.5)
CHLORIDE SERPL-SCNC: 104 MMOL/L (ref 95–110)
CLARITY UR: CLEAR
CO2 SERPL-SCNC: 21 MMOL/L (ref 23–29)
COLOR UR AUTO: YELLOW
CREAT SERPL-MCNC: 2.4 MG/DL (ref 0.5–1.4)
CTP QC/QA: YES
CTP QC/QA: YES
ERYTHROCYTE [DISTWIDTH] IN BLOOD BY AUTOMATED COUNT: 14.3 % (ref 11.5–14.5)
GFR SERPLBLD CREATININE-BSD FMLA CKD-EPI: 25 ML/MIN/1.73/M2
GLUCOSE SERPL-MCNC: 209 MG/DL (ref 70–110)
GLUCOSE UR QL STRIP: NEGATIVE
HCT VFR BLD AUTO: 39.3 % (ref 40–54)
HGB BLD-MCNC: 12.8 GM/DL (ref 14–18)
HGB UR QL STRIP: NEGATIVE
HOLD SPECIMEN: NORMAL
HYALINE CASTS UR QL AUTO: 11 /LPF (ref 0–1)
HYPOCHROMIA BLD QL SMEAR: ABNORMAL
KETONES UR QL STRIP: NEGATIVE
LEUKOCYTE ESTERASE UR QL STRIP: ABNORMAL
LIPASE SERPL-CCNC: 4 U/L (ref 4–60)
LYMPHOCYTES NFR BLD MANUAL: 15 % (ref 18–48)
MAGNESIUM SERPL-MCNC: 2.1 MG/DL (ref 1.6–2.6)
MCH RBC QN AUTO: 29.2 PG (ref 27–31)
MCHC RBC AUTO-ENTMCNC: 32.6 G/DL (ref 32–36)
MCV RBC AUTO: 90 FL (ref 82–98)
METAMYELOCYTES NFR BLD MANUAL: 3 %
MICROSCOPIC COMMENT: ABNORMAL
MONOCYTES NFR BLD MANUAL: 8 % (ref 4–15)
MYELOCYTES NFR BLD MANUAL: 1 %
NEUTROPHILS NFR BLD MANUAL: 28 % (ref 38–73)
NEUTS BAND NFR BLD MANUAL: 45 %
NITRITE UR QL STRIP: NEGATIVE
NUCLEATED RBC (/100WBC) (OHS): 0 /100 WBC
OHS QRS DURATION: 66 MS
OHS QTC CALCULATION: 469 MS
PH UR STRIP: 5 [PH]
PLATELET # BLD AUTO: 314 K/UL (ref 150–450)
PLATELET BLD QL SMEAR: ABNORMAL
PMV BLD AUTO: 11.2 FL (ref 9.2–12.9)
POC MOLECULAR INFLUENZA A AGN: NEGATIVE
POC MOLECULAR INFLUENZA B AGN: NEGATIVE
POCT GLUCOSE: 202 MG/DL (ref 70–110)
POTASSIUM SERPL-SCNC: 4.6 MMOL/L (ref 3.5–5.1)
PROT SERPL-MCNC: 7.5 GM/DL (ref 6–8.4)
PROT UR QL STRIP: ABNORMAL
RBC # BLD AUTO: 4.38 M/UL (ref 4.6–6.2)
RBC #/AREA URNS AUTO: 7 /HPF (ref 0–4)
SARS-COV-2 RDRP RESP QL NAA+PROBE: NEGATIVE
SODIUM SERPL-SCNC: 142 MMOL/L (ref 136–145)
SP GR UR STRIP: 1.02
TROPONIN I SERPL DL<=0.01 NG/ML-MCNC: 0.02 NG/ML
TROPONIN I SERPL DL<=0.01 NG/ML-MCNC: 0.02 NG/ML
UROBILINOGEN UR STRIP-ACNC: NEGATIVE EU/DL
WBC # BLD AUTO: 8.7 K/UL (ref 3.9–12.7)
WBC #/AREA URNS AUTO: 2 /HPF (ref 0–5)
WBC CLUMPS UR QL AUTO: ABNORMAL

## 2025-04-18 PROCEDURE — 81001 URINALYSIS AUTO W/SCOPE: CPT | Performed by: STUDENT IN AN ORGANIZED HEALTH CARE EDUCATION/TRAINING PROGRAM

## 2025-04-18 PROCEDURE — 25000003 PHARM REV CODE 250: Performed by: HOSPITALIST

## 2025-04-18 PROCEDURE — 25000242 PHARM REV CODE 250 ALT 637 W/ HCPCS: Performed by: HOSPITALIST

## 2025-04-18 PROCEDURE — 63600175 PHARM REV CODE 636 W HCPCS: Performed by: STUDENT IN AN ORGANIZED HEALTH CARE EDUCATION/TRAINING PROGRAM

## 2025-04-18 PROCEDURE — 93010 ELECTROCARDIOGRAM REPORT: CPT | Mod: ,,, | Performed by: INTERNAL MEDICINE

## 2025-04-18 PROCEDURE — 99223 1ST HOSP IP/OBS HIGH 75: CPT | Mod: ,,, | Performed by: SURGERY

## 2025-04-18 PROCEDURE — 99900035 HC TECH TIME PER 15 MIN (STAT)

## 2025-04-18 PROCEDURE — 84484 ASSAY OF TROPONIN QUANT: CPT | Performed by: NURSE PRACTITIONER

## 2025-04-18 PROCEDURE — 87635 SARS-COV-2 COVID-19 AMP PRB: CPT | Performed by: STUDENT IN AN ORGANIZED HEALTH CARE EDUCATION/TRAINING PROGRAM

## 2025-04-18 PROCEDURE — 85027 COMPLETE CBC AUTOMATED: CPT | Performed by: NURSE PRACTITIONER

## 2025-04-18 PROCEDURE — 96361 HYDRATE IV INFUSION ADD-ON: CPT

## 2025-04-18 PROCEDURE — 63600175 PHARM REV CODE 636 W HCPCS: Performed by: HOSPITALIST

## 2025-04-18 PROCEDURE — 11000001 HC ACUTE MED/SURG PRIVATE ROOM

## 2025-04-18 PROCEDURE — 82962 GLUCOSE BLOOD TEST: CPT

## 2025-04-18 PROCEDURE — 94799 UNLISTED PULMONARY SVC/PX: CPT

## 2025-04-18 PROCEDURE — 83690 ASSAY OF LIPASE: CPT | Performed by: NURSE PRACTITIONER

## 2025-04-18 PROCEDURE — 93005 ELECTROCARDIOGRAM TRACING: CPT

## 2025-04-18 PROCEDURE — 94761 N-INVAS EAR/PLS OXIMETRY MLT: CPT

## 2025-04-18 PROCEDURE — 96374 THER/PROPH/DIAG INJ IV PUSH: CPT

## 2025-04-18 PROCEDURE — 87502 INFLUENZA DNA AMP PROBE: CPT

## 2025-04-18 PROCEDURE — 80053 COMPREHEN METABOLIC PANEL: CPT | Performed by: NURSE PRACTITIONER

## 2025-04-18 PROCEDURE — 83735 ASSAY OF MAGNESIUM: CPT | Performed by: STUDENT IN AN ORGANIZED HEALTH CARE EDUCATION/TRAINING PROGRAM

## 2025-04-18 PROCEDURE — 99285 EMERGENCY DEPT VISIT HI MDM: CPT | Mod: 25

## 2025-04-18 PROCEDURE — 94640 AIRWAY INHALATION TREATMENT: CPT

## 2025-04-18 PROCEDURE — 83880 ASSAY OF NATRIURETIC PEPTIDE: CPT | Performed by: NURSE PRACTITIONER

## 2025-04-18 RX ORDER — ONDANSETRON HYDROCHLORIDE 2 MG/ML
4 INJECTION, SOLUTION INTRAVENOUS EVERY 6 HOURS PRN
Status: DISCONTINUED | OUTPATIENT
Start: 2025-04-18 | End: 2025-04-29 | Stop reason: HOSPADM

## 2025-04-18 RX ORDER — PROCHLORPERAZINE EDISYLATE 5 MG/ML
5 INJECTION INTRAMUSCULAR; INTRAVENOUS EVERY 6 HOURS PRN
Status: DISCONTINUED | OUTPATIENT
Start: 2025-04-18 | End: 2025-04-29 | Stop reason: HOSPADM

## 2025-04-18 RX ORDER — SODIUM CHLORIDE, SODIUM LACTATE, POTASSIUM CHLORIDE, CALCIUM CHLORIDE 600; 310; 30; 20 MG/100ML; MG/100ML; MG/100ML; MG/100ML
INJECTION, SOLUTION INTRAVENOUS CONTINUOUS
Status: DISCONTINUED | OUTPATIENT
Start: 2025-04-18 | End: 2025-04-19

## 2025-04-18 RX ORDER — INSULIN ASPART 100 [IU]/ML
0-5 INJECTION, SOLUTION INTRAVENOUS; SUBCUTANEOUS EVERY 6 HOURS PRN
Status: DISCONTINUED | OUTPATIENT
Start: 2025-04-18 | End: 2025-04-29 | Stop reason: HOSPADM

## 2025-04-18 RX ORDER — MORPHINE SULFATE 4 MG/ML
2 INJECTION, SOLUTION INTRAMUSCULAR; INTRAVENOUS EVERY 4 HOURS PRN
Status: DISCONTINUED | OUTPATIENT
Start: 2025-04-18 | End: 2025-04-23

## 2025-04-18 RX ORDER — MORPHINE SULFATE 4 MG/ML
1 INJECTION, SOLUTION INTRAMUSCULAR; INTRAVENOUS EVERY 4 HOURS PRN
Status: DISCONTINUED | OUTPATIENT
Start: 2025-04-18 | End: 2025-04-23

## 2025-04-18 RX ORDER — IPRATROPIUM BROMIDE AND ALBUTEROL SULFATE 2.5; .5 MG/3ML; MG/3ML
3 SOLUTION RESPIRATORY (INHALATION) EVERY 6 HOURS
Status: DISCONTINUED | OUTPATIENT
Start: 2025-04-18 | End: 2025-04-18

## 2025-04-18 RX ORDER — GLUCAGON 1 MG
1 KIT INJECTION
Status: DISCONTINUED | OUTPATIENT
Start: 2025-04-18 | End: 2025-04-29 | Stop reason: HOSPADM

## 2025-04-18 RX ORDER — MUPIROCIN 20 MG/G
OINTMENT TOPICAL 2 TIMES DAILY
Status: COMPLETED | OUTPATIENT
Start: 2025-04-18 | End: 2025-04-23

## 2025-04-18 RX ORDER — IBUPROFEN 200 MG
16 TABLET ORAL
Status: DISCONTINUED | OUTPATIENT
Start: 2025-04-18 | End: 2025-04-29 | Stop reason: HOSPADM

## 2025-04-18 RX ORDER — IPRATROPIUM BROMIDE AND ALBUTEROL SULFATE 2.5; .5 MG/3ML; MG/3ML
3 SOLUTION RESPIRATORY (INHALATION)
Status: DISCONTINUED | OUTPATIENT
Start: 2025-04-19 | End: 2025-04-26

## 2025-04-18 RX ORDER — HYDRALAZINE HYDROCHLORIDE 20 MG/ML
10 INJECTION INTRAMUSCULAR; INTRAVENOUS EVERY 6 HOURS PRN
Status: DISCONTINUED | OUTPATIENT
Start: 2025-04-18 | End: 2025-04-29 | Stop reason: HOSPADM

## 2025-04-18 RX ORDER — IBUPROFEN 200 MG
24 TABLET ORAL
Status: DISCONTINUED | OUTPATIENT
Start: 2025-04-18 | End: 2025-04-29 | Stop reason: HOSPADM

## 2025-04-18 RX ORDER — NALOXONE HCL 0.4 MG/ML
0.02 VIAL (ML) INJECTION
Status: DISCONTINUED | OUTPATIENT
Start: 2025-04-18 | End: 2025-04-29 | Stop reason: HOSPADM

## 2025-04-18 RX ORDER — SODIUM CHLORIDE 0.9 % (FLUSH) 0.9 %
10 SYRINGE (ML) INJECTION EVERY 8 HOURS
Status: DISCONTINUED | OUTPATIENT
Start: 2025-04-18 | End: 2025-04-18

## 2025-04-18 RX ORDER — ONDANSETRON HYDROCHLORIDE 2 MG/ML
4 INJECTION, SOLUTION INTRAVENOUS
Status: COMPLETED | OUTPATIENT
Start: 2025-04-18 | End: 2025-04-18

## 2025-04-18 RX ADMIN — IPRATROPIUM BROMIDE AND ALBUTEROL SULFATE 3 ML: .5; 3 SOLUTION RESPIRATORY (INHALATION) at 08:04

## 2025-04-18 RX ADMIN — SODIUM CHLORIDE, POTASSIUM CHLORIDE, SODIUM LACTATE AND CALCIUM CHLORIDE 1000 ML: 600; 310; 30; 20 INJECTION, SOLUTION INTRAVENOUS at 12:04

## 2025-04-18 RX ADMIN — ONDANSETRON 4 MG: 2 INJECTION INTRAMUSCULAR; INTRAVENOUS at 12:04

## 2025-04-18 RX ADMIN — SODIUM CHLORIDE, POTASSIUM CHLORIDE, SODIUM LACTATE AND CALCIUM CHLORIDE: 600; 310; 30; 20 INJECTION, SOLUTION INTRAVENOUS at 08:04

## 2025-04-18 RX ADMIN — MUPIROCIN: 20 OINTMENT TOPICAL at 08:04

## 2025-04-18 RX ADMIN — SODIUM CHLORIDE, POTASSIUM CHLORIDE, SODIUM LACTATE AND CALCIUM CHLORIDE: 600; 310; 30; 20 INJECTION, SOLUTION INTRAVENOUS at 04:04

## 2025-04-18 NOTE — ASSESSMENT & PLAN NOTE
"- admitted with nausea, vomiting abdominal pain  - CT upon admission: "High-grade small bowel obstruction related to a 5 by 5 cm ventral abdominal wall hernia. Status post left colectomy common diverting left lower quadrant ostomy and multiple small bowel anastomoses with markedly dilated small bowel loops in the deep pelvis which may reflect acute superimposed on chronic change."  - General surgery consulted, anticipate NGT  - continue LR at 100 cc/hr  - PRN nausea, pain Rx  - NPO     "

## 2025-04-18 NOTE — ASSESSMENT & PLAN NOTE
Patient's blood pressure range in the last 24 hours was: BP  Min: 105/76  Max: 135/82.The patient's inpatient anti-hypertensive regimen is listed below:  Current Antihypertensives  hydrALAZINE injection 10 mg, Every 6 hours PRN, Intravenous    Plan  - BP is controlled, no changes needed to their regimen  - currently controlled without need for Rx  - PRN antihypertensives available

## 2025-04-18 NOTE — ED PROVIDER NOTES
"Encounter Date: 4/18/2025    SCRIBE #1 NOTE: I, Enidflavio Faust, am scribing for, and in the presence of,  Glo Wen MD.       History     Chief Complaint   Patient presents with    Vomiting    Nausea     Pt BIB [place] [facility] with complaints of vomiting times 2 days and generalized malaise. Pt denies abd pain, chest pain or SOB at this time. Pt reports he has lost a lot of weight recently.     Patient is a 89 y.o. male, with a PMHx of DM, gout, HTN, arthritis, who presents to the ED with vomiting for multiple days. Patient reports eating some "bad mustard greens" prior to symptoms beginning. He also reports abdominal pain with movement. He reports intermittent dysuria. He reports normal bowel movements. No other exacerbating or alleviating factors. Denies chest pain, SOB, or other associated symptoms.       The history is provided by the patient. No  was used.     Review of patient's allergies indicates:   Allergen Reactions    Iodine Other (See Comments)     Causes gout to flare up    Shellfish containing products      Causes gout to flare up    Shellfish derived      Past Medical History:   Diagnosis Date    Arthritis     Cancer     Diabetes mellitus     Elevated PSA     Gout, unspecified     Hypertension     Nuclear sclerotic cataract of both eyes 6/29/2018     Past Surgical History:   Procedure Laterality Date    CATARACT EXTRACTION      ou    EYE SURGERY      bilateral cataracts    removal of small bowel  Apr. 2015    Colostomy     Family History   Problem Relation Name Age of Onset    Hypertension Mother      Diabetes Mother      No Known Problems Father      No Known Problems Sister      No Known Problems Brother      No Known Problems Maternal Aunt      No Known Problems Maternal Uncle      No Known Problems Paternal Aunt      No Known Problems Paternal Uncle      No Known Problems Maternal Grandmother      No Known Problems Maternal Grandfather      No Known Problems Paternal " Grandmother      No Known Problems Paternal Grandfather      Amblyopia Neg Hx      Blindness Neg Hx      Cancer Neg Hx      Cataracts Neg Hx      Glaucoma Neg Hx      Macular degeneration Neg Hx      Retinal detachment Neg Hx      Strabismus Neg Hx      Stroke Neg Hx      Thyroid disease Neg Hx       Social History[1]  Review of Systems   Constitutional:  Negative for chills and fever.   HENT:  Negative for congestion, ear pain, rhinorrhea and sore throat.    Eyes:  Negative for redness.   Respiratory:  Negative for cough and shortness of breath.    Cardiovascular:  Negative for chest pain.   Gastrointestinal:  Positive for abdominal pain and vomiting. Negative for diarrhea and nausea.   Genitourinary:  Positive for dysuria. Negative for decreased urine volume, difficulty urinating, frequency, hematuria and urgency.   Musculoskeletal:  Negative for back pain and neck pain.   Skin:  Negative for rash.   Neurological:  Negative for headaches.   Psychiatric/Behavioral:  Negative for confusion.        Physical Exam     Initial Vitals [04/18/25 1055]   BP Pulse Resp Temp SpO2   122/67 103 20 98.7 °F (37.1 °C) 98 %      MAP       --         Physical Exam    Nursing note and vitals reviewed.  Constitutional: He is not diaphoretic. He appears cachectic. No distress.   Chronically ill appearing.    HENT:   Head: Normocephalic and atraumatic. Mouth/Throat: Oropharynx is clear and moist.   Eyes: EOM are normal. Pupils are equal, round, and reactive to light.   Neck: Neck supple.   Cardiovascular:  Normal rate and regular rhythm.           Pulmonary/Chest: Breath sounds normal. No respiratory distress.   Abdominal: Abdomen is soft. Bowel sounds are normal.   Colostomy bag in place to LLQ. Mild lower abdominal tenderness.  Reducible ventral hernia   Musculoskeletal:         General: No edema.      Cervical back: Neck supple.     Neurological: He is alert and oriented to person, place, and time.   Skin: Skin is warm and dry.    Psychiatric: He has a normal mood and affect.         ED Course   Procedures  Labs Reviewed   COMPREHENSIVE METABOLIC PANEL - Abnormal       Result Value    Sodium 142      Potassium 4.6      Chloride 104      CO2 21 (*)     Glucose 209 (*)     BUN 63 (*)     Creatinine 2.4 (*)     Calcium 10.7 (*)     Protein Total 7.5      Albumin 3.6      Bilirubin Total 0.7      ALP 56      AST 21      ALT 25      Anion Gap 17 (*)     eGFR 25 (*)    CBC WITH DIFFERENTIAL - Abnormal    WBC 8.70      RBC 4.38 (*)     HGB 12.8 (*)     HCT 39.3 (*)     MCV 90      MCH 29.2      MCHC 32.6      RDW 14.3      Platelet Count 314      MPV 11.2      Nucleated RBC 0     URINALYSIS, REFLEX TO URINE CULTURE - Abnormal    Color, UA Yellow      Appearance, UA Clear      pH, UA 5.0      Spec Grav UA 1.020      Protein, UA Trace (*)     Glucose, UA Negative      Ketones, UA Negative      Bilirubin, UA Negative      Blood, UA Negative      Nitrites, UA Negative      Urobilinogen, UA Negative      Leukocyte Esterase, UA Trace (*)    MANUAL DIFFERENTIAL - Abnormal    Gran # (ANC) 6.4      Segmented Neutrophil % 28.0 (*)     Bands % 45.0      Lymphocyte % 15.0 (*)     Monocyte % 8.0      Metamyelocyte % 3.0      Myelocyte % 1.0      Platelet Estimate Appears Normal      Hypochromia Occasional     URINALYSIS MICROSCOPIC - Abnormal    RBC, UA 7 (*)     WBC, UA 2      WBC Clumps, UA None      Bacteria, UA None      Hyaline Casts, UA 11 (*)     Microscopic Comment       TROPONIN I - Normal    Troponin-I 0.023     TROPONIN I - Normal    Troponin-I 0.018     B-TYPE NATRIURETIC PEPTIDE - Normal    BNP 21     LIPASE - Normal    Lipase Level 4     MAGNESIUM - Normal    Magnesium  2.1     CBC W/ AUTO DIFFERENTIAL    Narrative:     The following orders were created for panel order CBC auto differential.  Procedure                               Abnormality         Status                     ---------                               -----------         ------                      CBC with Differential[[id number]]       Abnormal            Final result               Manual Differential[[id number]]         Abnormal            Final result                 Please view results for these tests on the individual orders.   [place] URINE HOLD   SARS-COV-2 RDRP GENE    POC Rapid COVID Negative       Acceptable Yes     POCT INFLUENZA A/B MOLECULAR    POC Molecular Influenza A Ag Negative      POC Molecular Influenza B Ag Negative       Acceptable Yes       EKG Readings: (Independently Interpreted)   Sinus tachycardia, rate of 107, normal axis, PACs and PVCs present       Imaging Results              CT Abdomen Pelvis  Without Contrast (Final result)  Result time 04/18/25 15:03:16      Final result by James Cortés Jr., MD (04/18/25 15:03:16)                   Impression:      High-grade small bowel obstruction related to a 5 by 5 cm ventral abdominal wall hernia.    Right middle and right lower lobe mucous plugging and or endobronchial spread of infection/bronchitis.    Status post left colectomy common diverting left lower quadrant ostomy and multiple small bowel anastomoses with markedly dilated small bowel loops in the deep pelvis which may reflect acute superimposed on chronic change.    Findings of chronic medical renal disease and simple and complex renal cysts.  Complex cyst versus solid mass lower pole right kidney could be further evaluated with ultrasound if the patient cannot receive intravenous contrast.      Electronically signed by: James Zuleta Jr  Date:    04/18/2025  Time:    15:03               Narrative:    EXAMINATION:  CT ABDOMEN PELVIS WITHOUT CONTRAST    CLINICAL HISTORY:  Nausea/vomiting;    TECHNIQUE:  Low dose axial images, sagittal and coronal reformations were obtained from the lung bases to the pubic symphysis.  Contrast was not administered.    COMPARISON:  05/09/2023    FINDINGS:  In the right middle lobe and  right lower lobe there is peribronchial thickening and patchy branching lung opacity that may reflect mucous plugging and or bronchopneumonia.    Kidneys/ Ureters: Bilateral multifocal renal cortical scarring.  Multiple renal cysts are present including at least 1 hyperdense cyst.  There is redemonstration of an asymmetric contour lobulation of arising from the lower pole the right kidney which is similar to the prior study.  This may reflect a large complex cyst with solid mass difficult to exclude with certainty.  Stability over time favors a nonaggressive etiology.  Multiple calcification in the renal michelle may represent multiple nonobstructing stones.  There are    Liver: Normal in size and attenuation, with no focal hepatic lesions.    Gallbladder: No calcified gallstones.    Bile Ducts: No evidence of dilated ducts.    Pancreas: No mass or peripancreatic fat stranding.    Spleen: Unremarkable.    Adrenals: Unremarkable.    Pelvic organs: Prostate enlarged.    GI Tract/Mesentery: Patient is status post left colectomy with a left lower quadrant colostomy.  There is rectus diastasis and a superimposed wide necked ventral hernia containing multiple decompressed small bowel loops.  There is marked distension of the stomach and multiple small bowel loops throughout the abdomen and pelvis..  Findings are suggestive of incarcerated hernia.  The dilated bowel loops in the deep pelvis are associated with at least 2 suture lines is suggesting prior small bowel resection and anastomosis and these may be chronically dilated.    Retroperitoneum: No significant adenopathy.    Vasculature: Aortic atherosclerosis.    Bones: Unremarkable.                                       X-Ray Chest AP Portable (Final result)  Result time 04/18/25 13:27:59   Procedure changed from X-Ray Chest PA And Lateral     Final result by Clinton Pablo MD (04/18/25 13:27:59)                   Impression:      Increased streaky opacities in the  right lower lung zone, nonspecific and possibly atelectasis, edema, or infectious/inflammatory disease (including aspiration).      Electronically signed by: Clinton Pablo MD  Date:    04/18/2025  Time:    13:27               Narrative:    EXAMINATION:  XR CHEST AP PORTABLE    CLINICAL HISTORY:  Nausea and vomiting; Nausea with vomiting, unspecified    TECHNIQUE:  Single frontal view of the chest was performed.    COMPARISON:  Prior exams dating back to 2008    FINDINGS:  Left chest wall port catheter tip projects in the SVC, unchanged.    Increased streaky opacities in the right lower lung zone.  Left lung is clear.    No effusion or pneumothorax.    Unchanged cardiomediastinal silhouette.    No acute bone abnormality.                                       Medications   lactated ringers bolus 1,000 mL (0 mLs Intravenous Stopped 4/18/25 1301)   ondansetron injection 4 mg (4 mg Intravenous Given 4/18/25 1202)     Medical Decision Making  Eugene Gamez is a 89 y.o. male with h/o DM, gout, HTN, arthritis, who presents to the ED with vomiting for multiple days.    Initial vitals reassuring. Physical exam reveals a cachectic, chronically ill appearing man in no acute distress with mild lower abdominal tenderness to palpation.     DDx of abdominal pain is broad and includes pancreatitis, ACS, GERD, cholecystitis, cholelithiasis, appendicitis, pyelonephritis, kidney stone, diverticulitis, diverticulosis, constipation and UTI. Also considered SBO, abdominal aortic aneurysm, aortic dissection.     I will obtain the following to better assess:  CBC, CMP, lipase, UA, trop, Mag, BNP, chest x-ray, CTA/P. Immediate interventions include Zofran, LR.     DISCLAIMER: This note was prepared with [employer] voice recognition transcription software. Garbled syntax, mangled pronouns, and other bizarre constructions may be attributed to that software system.      Amount and/or Complexity of Data Reviewed  Labs: ordered. Decision-making  details documented in ED Course.  Radiology: ordered. Decision-making details documented in ED Course.  ECG/medicine tests: ordered and independent interpretation performed. Decision-making details documented in ED Course.    Risk  Prescription drug management.  Decision regarding hospitalization.            Scribe Attestation:   Scribe #1: I performed the above scribed service and the documentation accurately describes the services I performed. I attest to the accuracy of the note.        ED Course as of 04/18/25 1541   Fri Apr 18, 2025   1254 Patient's blood work is notable for an VINNY, anion gap of 17, hyperglycemia.  He is receiving fluid resuscitation [KI]   1509 Patient's CT scan concerning for small bowel obstruction, possible incarcerated ventral hernia, on re-examination patient's abdominal exam is unchanged.  He still has a reducible hernia and mild tenderness.  We will discuss patient with general surgery. [KI]   1515 General surgery will evaluate the patient [KI]   1519 We will discuss admission with Hospital Medicine [KI]   1541 Patient has been accepted to Hospital Medicine [KI]      ED Course User Index  [KI] Glo Wen MD                     I, Jazzy Wen MD,  personally performed the services described in this documentation. All medical record entries made by the scribe were at my direction and in my presence. I have reviewed the chart and agree that the record reflects my personal performance and is accurate and complete.        Clinical Impression:  Final diagnoses:  [R07.9] Chest pain  [R11.2] Nausea and vomiting  [K56.609] SBO (small bowel obstruction)          ED Disposition Condition    Admit                     [1]   Social History  Tobacco Use    Smoking status: Never     Passive exposure: Never    Smokeless tobacco: Never    Tobacco comments:     smoked short while as a teen   Substance Use Topics    Alcohol use: No    Drug use: No        Glo Wen MD  04/18/25  [id number]

## 2025-04-18 NOTE — NURSING
Patient transferred to unit via stretcher, eyes open and alert, resp even and unlabored at this, safely transferred into, Iv infusing LR, partial bed bath completed, soiled brief, skin assessment completed, skin opening on scrotum, skin barrier applied, meplelix applied to sacrum for preventative care, vital obtained, denies pain or discomfort at this, bed lower qand locked, call light within reach.

## 2025-04-18 NOTE — ASSESSMENT & PLAN NOTE
VINNY is likely due to prerenal from SBO. Baseline creatinine is 1. Most recent creatinine and eGFR are listed below.  Recent Labs     04/18/25  1159   CREATININE 2.4*   EGFRNORACEVR 25*   - UA unremarkable  - CT shows no hydronephrosis     Plan  - VINNY is worsening. Will continue current treatment  - Avoid nephrotoxins and renally dose meds for GFR listed above  - Monitor urine output, serial BMP, and adjust therapy as needed  - LR at 100cc/hr

## 2025-04-18 NOTE — CONSULTS
History & Physical    SUBJECTIVE:     History of Present Illness:  Patient is a 89 y.o. 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 In the emergency department, CTAP demonstrated ventral hernia and also evidence of small bowel obstruction.    Chief Complaint   Patient presents with    Vomiting    Nausea     Pt BIB [place] [facility] with complaints of vomiting times 2 days and generalized malaise. Pt denies abd pain, chest pain or SOB at this time. Pt reports he has lost a lot of weight recently.       Review of patient's allergies indicates:   Allergen Reactions    Iodine Other (See Comments)     Causes gout to flare up    Shellfish containing products      Causes gout to flare up    Shellfish derived        Current Medications[1]    Past Medical History:   Diagnosis Date    Arthritis     Cancer     Diabetes mellitus     Elevated PSA     Gout, unspecified     Hypertension     Nuclear sclerotic cataract of both eyes 6/29/2018     Past Surgical History:   Procedure Laterality Date    CATARACT EXTRACTION      ou    EYE SURGERY      bilateral cataracts    removal of small bowel  Apr. 2015    Colostomy     Family History   Problem Relation Name Age of Onset    Hypertension Mother      Diabetes Mother      No Known Problems Father      No Known Problems Sister      No Known Problems Brother      No Known Problems Maternal Aunt      No Known Problems Maternal Uncle      No Known Problems Paternal Aunt      No Known Problems Paternal Uncle      No Known Problems Maternal Grandmother      No Known Problems Maternal Grandfather      No Known Problems Paternal Grandmother      No Known Problems Paternal Grandfather      Amblyopia Neg Hx      Blindness Neg Hx      Cancer Neg Hx      Cataracts Neg Hx       "Glaucoma Neg Hx      Macular degeneration Neg Hx      Retinal detachment Neg Hx      Strabismus Neg Hx      Stroke Neg Hx      Thyroid disease Neg Hx       Social History[2]     Review of Systems:  Review of Systems   All other systems reviewed and are negative.        OBJECTIVE:     Vital Signs (Most Recent)  Temp: 98.7 °F (37.1 °C) (04/18/25 1055)  Pulse: (!) 113 (04/18/25 1600)  Resp: 19 (04/18/25 1210)  BP: 135/82 (04/18/25 1600)  SpO2: 98 % (04/18/25 1600)  6' 2" (1.88 m)  49.9 kg (110 lb)     Physical Exam:  Physical Exam  Constitutional:       Comments: cachectic   HENT:      Head: Normocephalic.   Eyes:      Pupils: Pupils are equal, round, and reactive to light.   Cardiovascular:      Rate and Rhythm: Normal rate and regular rhythm.   Pulmonary:      Effort: Pulmonary effort is normal. No respiratory distress.   Abdominal:      Comments: Distended, easily reducible ventral hernia with about 3cm defect. Colostomy with no gas or stool in the bag   Neurological:      Mental Status: He is alert.           Laboratory  CBC: Reviewed  CMP: Reviewed    Diagnostic Results:  CT: Reviewed    Significant Diagnostic Studies: Labs: CMP   Recent Labs   Lab 04/18/25  1159 04/19/25  0430    144   K 4.6 5.0    107   CO2 21* 24   BUN 63* 72*   CREATININE 2.4* 2.3*   CALCIUM 10.7* 9.5   ALBUMIN 3.6 3.0*   BILITOT 0.7 0.5   ALKPHOS 56 47   AST 21 15   ALT 25 18   ANIONGAP 17* 13    and CBC   Recent Labs   Lab 04/18/25  1159 04/19/25  0430   WBC 8.70 7.64   HGB 12.8* 11.0*   HCT 39.3* 34.9*    294     Radiology: CT scan: CT ABDOMEN PELVIS WITHOUT CONTRAST:   Results for orders placed or performed during the hospital encounter of 04/18/25   CT Abdomen Pelvis  Without Contrast    Narrative    EXAMINATION:  CT ABDOMEN PELVIS WITHOUT CONTRAST    CLINICAL HISTORY:  Nausea/vomiting;    TECHNIQUE:  Low dose axial images, sagittal and coronal reformations were obtained from the lung bases to the pubic symphysis.  " Contrast was not administered.    COMPARISON:  05/09/2023    FINDINGS:  In the right middle lobe and right lower lobe there is peribronchial thickening and patchy branching lung opacity that may reflect mucous plugging and or bronchopneumonia.    Kidneys/ Ureters: Bilateral multifocal renal cortical scarring.  Multiple renal cysts are present including at least 1 hyperdense cyst.  There is redemonstration of an asymmetric contour lobulation of arising from the lower pole the right kidney which is similar to the prior study.  This may reflect a large complex cyst with solid mass difficult to exclude with certainty.  Stability over time favors a nonaggressive etiology.  Multiple calcification in the renal michelle may represent multiple nonobstructing stones.  There are    Liver: Normal in size and attenuation, with no focal hepatic lesions.    Gallbladder: No calcified gallstones.    Bile Ducts: No evidence of dilated ducts.    Pancreas: No mass or peripancreatic fat stranding.    Spleen: Unremarkable.    Adrenals: Unremarkable.    Pelvic organs: Prostate enlarged.    GI Tract/Mesentery: Patient is status post left colectomy with a left lower quadrant colostomy.  There is rectus diastasis and a superimposed wide necked ventral hernia containing multiple decompressed small bowel loops.  There is marked distension of the stomach and multiple small bowel loops throughout the abdomen and pelvis..  Findings are suggestive of incarcerated hernia.  The dilated bowel loops in the deep pelvis are associated with at least 2 suture lines is suggesting prior small bowel resection and anastomosis and these may be chronically dilated.    Retroperitoneum: No significant adenopathy.    Vasculature: Aortic atherosclerosis.    Bones: Unremarkable.      Impression    High-grade small bowel obstruction related to a 5 by 5 cm ventral abdominal wall hernia.    Right middle and right lower lobe mucous plugging and or endobronchial spread of  infection/bronchitis.    Status post left colectomy common diverting left lower quadrant ostomy and multiple small bowel anastomoses with markedly dilated small bowel loops in the deep pelvis which may reflect acute superimposed on chronic change.    Findings of chronic medical renal disease and simple and complex renal cysts.  Complex cyst versus solid mass lower pole right kidney could be further evaluated with ultrasound if the patient cannot receive intravenous contrast.      Electronically signed by: James Zuleta Jr  Date:    04/18/2025  Time:    15:03       ASSESSMENT/PLAN:     Mr. Gamez is an 89 year old with the above past medical history who presents today with evidence of small bowel obstruction. Ventral hernia is soft and easily reducible with no concern for incarceration clinically.     PLAN:Plan     Keep NPO  Place NGT  IVF  Will plan for gastrografin in 24-48hrs if no return of bowel function    Honey Meneses MD PGY-II  [facility] General Surgery                [1]   Current Facility-Administered Medications   Medication Dose Route Frequency Provider Last Rate Last Admin    albuterol-ipratropium 2.5 mg-0.5 mg/3 mL nebulizer solution 3 mL  3 mL Nebulization Q6H Lashonda Barrera MD        dextrose 50% injection 12.5 g  12.5 g Intravenous PRN Lashonda Barrera MD        dextrose 50% injection 25 g  25 g Intravenous PRN Lashonda Barrera MD        glucagon (human recombinant) injection 1 mg  1 mg Intramuscular PRN Lashonda Barrera MD        glucose chewable tablet 16 g  16 g Oral PRN Lashonda Barrera MD        glucose chewable tablet 24 g  24 g Oral PRN Lashonda Barrera MD        hydrALAZINE injection 10 mg  10 mg Intravenous Q6H PRN Lashonda Barrera MD        insulin aspart U-100 pen 0-5 Units  0-5 Units Subcutaneous Q6H PRN Lashonda Barrera MD        lactated ringers infusion   Intravenous Continuous Lashonda Barrera  mL/hr at 04/18/25 1648 New Bag at 04/18/25 1648    morphine  injection 1 mg  1 mg Intravenous Q4H PRN Lashonda Barrera MD        morphine injection 2 mg  2 mg Intravenous Q4H PRN Lashonda Barrera MD        mupirocin 2 % ointment   Nasal BID Lashonda Barrera MD        naloxone 0.4 mg/mL injection 0.02 mg  0.02 mg Intravenous PRN Lashonda Barrera MD        ondansetron injection 4 mg  4 mg Intravenous Q6H PRN Lashonda Barrera MD        prochlorperazine injection Soln 5 mg  5 mg Intravenous Q6H PRN Lashonda Barrera MD        sodium chloride 0.9% flush 10 mL  10 mL Intravenous Q8H Lashonda Barrera MD         Current Outpatient Medications   Medication Sig Dispense Refill    allopurinoL (ZYLOPRIM) 300 MG tablet Take 1 tablet (300 mg total) by mouth once daily. 90 tablet 1    amLODIPine (NORVASC) 10 MG tablet Take 1 tablet (10 mg total) by mouth once daily. 90 tablet 3    aspirin (ECOTRIN) 81 MG EC tablet Take 1 tablet (81 mg total) by mouth once daily. 90 tablet 3    atorvastatin (LIPITOR) 20 MG tablet Take 1 tablet (20 mg total) by mouth once daily. 90 tablet 3    calcitRIOL (ROCALTROL) 0.25 MCG Cap Take 1 capsule (0.25 mcg total) by mouth every other day. 30 capsule 11    DULoxetine (CYMBALTA) 30 MG capsule Take 1 capsule (30 mg total) by mouth 2 (two) times daily. 180 capsule 0    ferrous sulfate 324 mg (65 mg iron) TbEC Take 1 tablet by mouth once daily.      gabapentin (NEURONTIN) 100 MG capsule Take 1 capsule (100 mg total) by mouth 3 (three) times daily. 270 capsule 1    hydrALAZINE (APRESOLINE) 50 MG tablet Take 1 tablet (50 mg total) by mouth 2 (two) times a day. 60 tablet 11    JANUVIA 50 mg Tab Take 1 tablet (50 mg total) by mouth once daily. 90 tablet 1    ketoconazole (NIZORAL) 2 % cream Apply topically once daily. 60 g 2    LIDOcaine (LIDODERM) 5 % Place 1 patch onto the skin once daily. Remove & Discard patch within 12 hours or as directed by MD 30 patch 2    sodium bicarbonate 650 MG tablet Take 1 tablet (650 mg total) by mouth 2 (two) times daily.  (Patient not taking: Reported on 4/17/2025) 60 tablet 11    traZODone (DESYREL) 50 MG tablet Take 1 tablet (50 mg total) by mouth every evening. 90 tablet 3     Facility-Administered Medications Ordered in Other Encounters   Medication Dose Route Frequency Provider Last Rate Last Admin    ondansetron HCl (PF) 4 mg/2 mL injection    PRN James Ryan, CRNA   4 mg at 06/29/15 0856   [2]   Social History  Tobacco Use    Smoking status: Never     Passive exposure: Never    Smokeless tobacco: Never    Tobacco comments:     smoked short while as a teen   Substance Use Topics    Alcohol use: No    Drug use: No

## 2025-04-18 NOTE — ASSESSMENT & PLAN NOTE
Anemia is likely due to chronic disease due to Chronic Kidney Disease. Most recent hemoglobin and hematocrit are listed below.  Recent Labs     04/18/25  1159   HGB 12.8*   HCT 39.3*     Plan  - Monitor serial CBC: Daily  - Transfuse PRBC if patient becomes hemodynamically unstable, symptomatic or H/H drops below 7/21.  - Patient has not received any PRBC transfusions to date  - Patient's anemia is currently stable

## 2025-04-18 NOTE — ASSESSMENT & PLAN NOTE
Body mass index is 14.12 kg/m².  Notably, albumin 3.6 on admission- perhaps due to dehydration  - must remain strict NPO at this time

## 2025-04-18 NOTE — ASSESSMENT & PLAN NOTE
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 Liver biopsy negative

## 2025-04-18 NOTE — H&P
[facility] Medicine  History & Physical    Patient Name: Eugene Gamez  MRN: [id number]  Patient Class: IP- Inpatient  Admission Date: 4/18/2025  Attending Physician: Lashonda Barrera MD   Primary Care Provider: Larry Monae MD         Patient information was obtained from patient, past medical records, and ER records.     Subjective:     Principal Problem:SBO (small bowel obstruction)    Chief Complaint:   Chief Complaint   Patient presents with    Vomiting    Nausea     Pt BIB [place] [facility] with complaints of vomiting times 2 days and generalized malaise. Pt denies abd pain, chest pain or SOB at this time. Pt reports he has lost a lot of weight recently.        HPI: Mr Eugene Gamez is a 89 y.o. man with history of L colectomy for colon cancer (2015) with ventral hernia, prior DM, HTN, CKD3 (baseline Cr 1) who presented with nausea and vomiting, abdominal pain.  The symptoms began a day or 2 ago but significantly worsened yesterday morning.  Last night and today He has not been able to keep much of anything down.  He denies any issues with his colostomy, stool has been normal.  Overall he feels weak and fatigued.  Denies dysuria, difficulty urinating, changes in bowel habits  His CMP shows creatinine 2.4, bicarb 21, anion gap 17, glucose 209; UA with trace protein, 7 RBC, 2 WBC; CT abdomen and pelvis with high-grade small-bowel obstruction related to a 5 x 5 cm ventral abdominal wall hernia, right middle and right lower lobe mucous plugging and/or endobronchial spread of infection/bronchitis.  He was admitted for high grade SBO. Started IVF. General surgery consulted.     Past Medical History:   Diagnosis Date    Arthritis     Cancer     Diabetes mellitus     Elevated PSA     Gout, unspecified     Hypertension     Nuclear sclerotic cataract of both eyes 6/29/2018       Past Surgical History:   Procedure Laterality Date    CATARACT EXTRACTION      ou    EYE SURGERY      bilateral  cataracts    removal of small bowel  Apr. 2015    Colostomy       Review of patient's allergies indicates:   Allergen Reactions    Iodine Other (See Comments)     Causes gout to flare up    Shellfish containing products      Causes gout to flare up    Shellfish derived        Current Facility-Administered Medications on File Prior to Encounter   Medication    ondansetron HCl (PF) 4 mg/2 mL injection     Current Outpatient Medications on File Prior to Encounter   Medication Sig    allopurinoL (ZYLOPRIM) 300 MG tablet Take 1 tablet (300 mg total) by mouth once daily.    amLODIPine (NORVASC) 10 MG tablet Take 1 tablet (10 mg total) by mouth once daily.    aspirin (ECOTRIN) 81 MG EC tablet Take 1 tablet (81 mg total) by mouth once daily.    atorvastatin (LIPITOR) 20 MG tablet Take 1 tablet (20 mg total) by mouth once daily.    calcitRIOL (ROCALTROL) 0.25 MCG Cap Take 1 capsule (0.25 mcg total) by mouth every other day.    DULoxetine (CYMBALTA) 30 MG capsule Take 1 capsule (30 mg total) by mouth 2 (two) times daily.    ferrous sulfate 324 mg (65 mg iron) TbEC Take 1 tablet by mouth once daily.    gabapentin (NEURONTIN) 100 MG capsule Take 1 capsule (100 mg total) by mouth 3 (three) times daily.    hydrALAZINE (APRESOLINE) 50 MG tablet Take 1 tablet (50 mg total) by mouth 2 (two) times a day.    JANUVIA 50 mg Tab Take 1 tablet (50 mg total) by mouth once daily.    ketoconazole (NIZORAL) 2 % cream Apply topically once daily.    LIDOcaine (LIDODERM) 5 % Place 1 patch onto the skin once daily. Remove & Discard patch within 12 hours or as directed by MD    sodium bicarbonate 650 MG tablet Take 1 tablet (650 mg total) by mouth 2 (two) times daily. (Patient not taking: Reported on 4/17/2025)    traZODone (DESYREL) 50 MG tablet Take 1 tablet (50 mg total) by mouth every evening.    [DISCONTINUED] blood-glucose meter (ONE TOUCH ULTRAMINI) kit To test twice daily. Use as instructed    [DISCONTINUED] cholestyramine (QUESTRAN) 4  gram packet Take 1 packet (4 g total) by mouth 3 (three) times daily with meals.    [DISCONTINUED] colchicine (COLCRYS) 0.6 mg tablet Take 1 tablet (0.6 mg total) by mouth daily as needed.    [DISCONTINUED] loratadine (CLARITIN) 10 mg tablet Take 10 mg by mouth once daily.    [DISCONTINUED] metoprolol tartrate (LOPRESSOR) 50 MG tablet Take 1 tablet (50 mg total) by mouth 2 (two) times daily.    [DISCONTINUED] QUEtiapine (SEROQUEL) 50 MG tablet Take 1 tablet (50 mg total) by mouth every evening.     Family History       Problem Relation (Age of Onset)    Diabetes Mother    Hypertension Mother    No Known Problems Father, Sister, Brother, Maternal Aunt, Maternal Uncle, Paternal Aunt, Paternal Uncle, Maternal Grandmother, Maternal Grandfather, Paternal Grandmother, Paternal Grandfather          Tobacco Use    Smoking status: Never     Passive exposure: Never    Smokeless tobacco: Never    Tobacco comments:     smoked short while as a teen   Substance and Sexual Activity    Alcohol use: No    Drug use: No    Sexual activity: Not Currently     Partners: Female     Review of Systems   Constitutional:  Positive for activity change and fatigue. Negative for fever.   Respiratory:  Negative for cough and shortness of breath.    Cardiovascular:  Negative for chest pain.   Gastrointestinal:  Positive for abdominal distention, abdominal pain, nausea and vomiting. Negative for constipation and diarrhea.   Genitourinary:  Negative for difficulty urinating and dysuria.   Musculoskeletal:  Positive for arthralgias (chronic).   Neurological:  Positive for weakness (Generalized).   Psychiatric/Behavioral:  Negative for agitation and confusion.      Objective:     Vital Signs (Most Recent):  Temp: 98.7 °F (37.1 °C) (04/18/25 1055)  Pulse: (!) 52 (04/18/25 1843)  Resp: 16 (04/18/25 1843)  BP: 127/60 (04/18/25 1843)  SpO2: 97 % (04/18/25 1843) Vital Signs (24h Range):  Temp:  [98.7 °F (37.1 °C)] 98.7 °F (37.1 °C)  Pulse:  []  "52  Resp:  [16-20] 16  SpO2:  [97 %-100 %] 97 %  BP: (105-135)/(60-82) 127/60     Weight: 49.9 kg (110 lb) (as per EMS)  Body mass index is 14.12 kg/m².     Physical Exam  Constitutional:       Appearance: He is ill-appearing.      Comments: Frail, cachectic   HENT:      Head:      Comments: Temporal wasting     Mouth/Throat:      Mouth: Mucous membranes are dry.   Eyes:      Extraocular Movements: Extraocular movements intact.   Cardiovascular:      Rate and Rhythm: Normal rate and regular rhythm.   Pulmonary:      Effort: Pulmonary effort is normal. No respiratory distress.   Abdominal:      General: There is distension.      Palpations: Abdomen is soft.      Tenderness: There is abdominal tenderness.      Hernia: A hernia is present.      Comments: Decreased bowels   Skin:     General: Skin is warm.   Neurological:      General: No focal deficit present.      Mental Status: He is alert.   Psychiatric:         Mood and Affect: Mood normal.         Behavior: Behavior normal.                Significant Labs: All pertinent labs within the past 24 hours have been reviewed.    Significant Imaging: I have reviewed all pertinent imaging results/findings within the past 24 hours.  Assessment/Plan:     Assessment & Plan  SBO (small bowel obstruction)  - admitted with nausea, vomiting abdominal pain  - CT upon admission: "High-grade small bowel obstruction related to a 5 by 5 cm ventral abdominal wall hernia. Status post left colectomy common diverting left lower quadrant ostomy and multiple small bowel anastomoses with markedly dilated small bowel loops in the deep pelvis which may reflect acute superimposed on chronic change."  - General surgery consulted, anticipate NGT  - continue LR at 100 cc/hr  - PRN nausea, pain Rx  - NPO     Acute renal failure superimposed on stage 3b chronic kidney disease  VINNY is likely due to  prerenal from SBO . Baseline creatinine is  1 . Most recent creatinine and eGFR are listed " below.  Recent Labs     04/18/25  1159   CREATININE 2.4*   EGFRNORACEVR 25*   - UA unremarkable  - CT shows no hydronephrosis     Plan  - VINNY is worsening. Will continue current treatment  - Avoid nephrotoxins and renally dose meds for GFR listed above  - Monitor urine output, serial BMP, and adjust therapy as needed  - LR at 100cc/hr   HTN, goal below 140/80  Patient's blood pressure range in the last 24 hours was: BP  Min: 105/76  Max: 135/82.The patient's inpatient anti-hypertensive regimen is listed below:  Current Antihypertensives  hydrALAZINE injection 10 mg, Every 6 hours PRN, Intravenous    Plan  - BP is controlled, no changes needed to their regimen  - currently controlled without need for Rx  - PRN antihypertensives available   Pulmonary emphysema, unspecified emphysema type  Patient's COPD is controlled currently.  Patient is currently off COPD Pathway.   Anemia of chronic renal failure, stage 3b  Anemia is likely due to chronic disease due to Chronic Kidney Disease. Most recent hemoglobin and hematocrit are listed below.  Recent Labs     04/18/25  1159   HGB 12.8*   HCT 39.3*     Plan  - Monitor serial CBC: Daily  - Transfuse PRBC if patient becomes hemodynamically unstable, symptomatic or H/H drops below 7/21.  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 Liver biopsy negative     Ventral hernia  - see SBO    Body mass index (BMI) less than 19  Body mass index is 14.12 kg/m².  Notably, albumin 3.6 on admission- perhaps due to dehydration  - must remain strict NPO at this time       Mucus plug in respiratory tract  - noted on CT  - nebs ordered     VTE  Risk Mitigation (From admission, onward)           Ordered     Place ARI hose  Until discontinued         04/18/25 1542     Place sequential compression device  Until discontinued         04/18/25 1542     Reason for No Pharmacological VTE Prophylaxis  Once        Question:  Reasons:  Answer:  Physician Provided (leave comment)  Comment:  potential OR    04/18/25 1542     IP VTE HIGH RISK PATIENT  Once         04/18/25 1542                                    Janell Kenney PA-C  Department of Hospital Medicine  [facility]

## 2025-04-18 NOTE — SUBJECTIVE & OBJECTIVE
Past Medical History:   Diagnosis Date    Arthritis     Cancer     Diabetes mellitus     Elevated PSA     Gout, unspecified     Hypertension     Nuclear sclerotic cataract of both eyes 6/29/2018       Past Surgical History:   Procedure Laterality Date    CATARACT EXTRACTION      ou    EYE SURGERY      bilateral cataracts    removal of small bowel  Apr. 2015    Colostomy       Review of patient's allergies indicates:   Allergen Reactions    Iodine Other (See Comments)     Causes gout to flare up    Shellfish containing products      Causes gout to flare up    Shellfish derived        Current Facility-Administered Medications on File Prior to Encounter   Medication    ondansetron HCl (PF) 4 mg/2 mL injection     Current Outpatient Medications on File Prior to Encounter   Medication Sig    allopurinoL (ZYLOPRIM) 300 MG tablet Take 1 tablet (300 mg total) by mouth once daily.    amLODIPine (NORVASC) 10 MG tablet Take 1 tablet (10 mg total) by mouth once daily.    aspirin (ECOTRIN) 81 MG EC tablet Take 1 tablet (81 mg total) by mouth once daily.    atorvastatin (LIPITOR) 20 MG tablet Take 1 tablet (20 mg total) by mouth once daily.    calcitRIOL (ROCALTROL) 0.25 MCG Cap Take 1 capsule (0.25 mcg total) by mouth every other day.    DULoxetine (CYMBALTA) 30 MG capsule Take 1 capsule (30 mg total) by mouth 2 (two) times daily.    ferrous sulfate 324 mg (65 mg iron) TbEC Take 1 tablet by mouth once daily.    gabapentin (NEURONTIN) 100 MG capsule Take 1 capsule (100 mg total) by mouth 3 (three) times daily.    hydrALAZINE (APRESOLINE) 50 MG tablet Take 1 tablet (50 mg total) by mouth 2 (two) times a day.    JANUVIA 50 mg Tab Take 1 tablet (50 mg total) by mouth once daily.    ketoconazole (NIZORAL) 2 % cream Apply topically once daily.    LIDOcaine (LIDODERM) 5 % Place 1 patch onto the skin once daily. Remove & Discard patch within 12 hours or as directed by MD    sodium bicarbonate 650 MG tablet Take 1 tablet (650 mg total)  by mouth 2 (two) times daily. (Patient not taking: Reported on 4/17/2025)    traZODone (DESYREL) 50 MG tablet Take 1 tablet (50 mg total) by mouth every evening.    [DISCONTINUED] blood-glucose meter (ONE TOUCH ULTRAMINI) kit To test twice daily. Use as instructed    [DISCONTINUED] cholestyramine (QUESTRAN) 4 gram packet Take 1 packet (4 g total) by mouth 3 (three) times daily with meals.    [DISCONTINUED] colchicine (COLCRYS) 0.6 mg tablet Take 1 tablet (0.6 mg total) by mouth daily as needed.    [DISCONTINUED] loratadine (CLARITIN) 10 mg tablet Take 10 mg by mouth once daily.    [DISCONTINUED] metoprolol tartrate (LOPRESSOR) 50 MG tablet Take 1 tablet (50 mg total) by mouth 2 (two) times daily.    [DISCONTINUED] QUEtiapine (SEROQUEL) 50 MG tablet Take 1 tablet (50 mg total) by mouth every evening.     Family History       Problem Relation (Age of Onset)    Diabetes Mother    Hypertension Mother    No Known Problems Father, Sister, Brother, Maternal Aunt, Maternal Uncle, Paternal Aunt, Paternal Uncle, Maternal Grandmother, Maternal Grandfather, Paternal Grandmother, Paternal Grandfather          Tobacco Use    Smoking status: Never     Passive exposure: Never    Smokeless tobacco: Never    Tobacco comments:     smoked short while as a teen   Substance and Sexual Activity    Alcohol use: No    Drug use: No    Sexual activity: Not Currently     Partners: Female     Review of Systems   Constitutional:  Positive for activity change and fatigue. Negative for fever.   Respiratory:  Negative for cough and shortness of breath.    Cardiovascular:  Negative for chest pain.   Gastrointestinal:  Positive for abdominal distention, abdominal pain, nausea and vomiting. Negative for constipation and diarrhea.   Genitourinary:  Negative for difficulty urinating and dysuria.   Musculoskeletal:  Positive for arthralgias (chronic).   Neurological:  Positive for weakness (Generalized).   Psychiatric/Behavioral:  Negative for agitation  and confusion.      Objective:     Vital Signs (Most Recent):  Temp: 98.7 °F (37.1 °C) (04/18/25 1055)  Pulse: (!) 52 (04/18/25 1843)  Resp: 16 (04/18/25 1843)  BP: 127/60 (04/18/25 1843)  SpO2: 97 % (04/18/25 1843) Vital Signs (24h Range):  Temp:  [98.7 °F (37.1 °C)] 98.7 °F (37.1 °C)  Pulse:  [] 52  Resp:  [16-20] 16  SpO2:  [97 %-100 %] 97 %  BP: (105-135)/(60-82) 127/60     Weight: 49.9 kg (110 lb) (as per EMS)  Body mass index is 14.12 kg/m².     Physical Exam  Constitutional:       Appearance: He is ill-appearing.      Comments: Frail, cachectic   HENT:      Head:      Comments: Temporal wasting     Mouth/Throat:      Mouth: Mucous membranes are dry.   Eyes:      Extraocular Movements: Extraocular movements intact.   Cardiovascular:      Rate and Rhythm: Normal rate and regular rhythm.   Pulmonary:      Effort: Pulmonary effort is normal. No respiratory distress.   Abdominal:      General: There is distension.      Palpations: Abdomen is soft.      Tenderness: There is abdominal tenderness.      Hernia: A hernia is present.      Comments: Decreased bowels   Skin:     General: Skin is warm.   Neurological:      General: No focal deficit present.      Mental Status: He is alert.   Psychiatric:         Mood and Affect: Mood normal.         Behavior: Behavior normal.                Significant Labs: All pertinent labs within the past 24 hours have been reviewed.    Significant Imaging: I have reviewed all pertinent imaging results/findings within the past 24 hours.

## 2025-04-18 NOTE — HPI
Mr Eugene Gamez is a 89 y.o. man with history of L colectomy for colon cancer (2015) with ventral hernia, prior DM, HTN, CKD3 (baseline Cr 1) who presented with nausea and vomiting, abdominal pain.  The symptoms began a day or 2 ago but significantly worsened yesterday morning.  Last night and today He has not been able to keep much of anything down.  He denies any issues with his colostomy, stool has been normal.  Overall he feels weak and fatigued.  Denies dysuria, difficulty urinating, changes in bowel habits His CMP shows creatinine 2.4, bicarb 21, anion gap 17, glucose 209; UA with trace protein, 7 RBC, 2 WBC; CT abdomen and pelvis with high-grade small-bowel obstruction related to a 5 x 5 cm ventral abdominal wall hernia, right middle and right lower lobe mucous plugging and/or endobronchial spread of infection/bronchitis.He was admitted for high grade SBO. Started IVF. General surgery consulted.

## 2025-04-19 LAB
ABSOLUTE NEUTROPHIL MANUAL (OHS): 5.6 K/UL
ALBUMIN SERPL BCP-MCNC: 3 G/DL (ref 3.5–5.2)
ALLENS TEST: ABNORMAL
ALP SERPL-CCNC: 47 UNIT/L (ref 40–150)
ALT SERPL W/O P-5'-P-CCNC: 18 UNIT/L (ref 10–44)
ANION GAP (OHS): 12 MMOL/L (ref 8–16)
ANION GAP (OHS): 13 MMOL/L (ref 8–16)
ANISOCYTOSIS BLD QL SMEAR: SLIGHT
AST SERPL-CCNC: 15 UNIT/L (ref 11–45)
BILIRUB SERPL-MCNC: 0.5 MG/DL (ref 0.1–1)
BUN SERPL-MCNC: 72 MG/DL (ref 8–23)
BUN SERPL-MCNC: 74 MG/DL (ref 8–23)
CALCIUM SERPL-MCNC: 9.3 MG/DL (ref 8.7–10.5)
CALCIUM SERPL-MCNC: 9.5 MG/DL (ref 8.7–10.5)
CHLORIDE SERPL-SCNC: 107 MMOL/L (ref 95–110)
CHLORIDE SERPL-SCNC: 109 MMOL/L (ref 95–110)
CO2 SERPL-SCNC: 23 MMOL/L (ref 23–29)
CO2 SERPL-SCNC: 24 MMOL/L (ref 23–29)
CREAT SERPL-MCNC: 2.2 MG/DL (ref 0.5–1.4)
CREAT SERPL-MCNC: 2.3 MG/DL (ref 0.5–1.4)
DELSYS: ABNORMAL
ERYTHROCYTE [DISTWIDTH] IN BLOOD BY AUTOMATED COUNT: 14.4 % (ref 11.5–14.5)
FLOW: 3
GFR SERPLBLD CREATININE-BSD FMLA CKD-EPI: 26 ML/MIN/1.73/M2
GFR SERPLBLD CREATININE-BSD FMLA CKD-EPI: 28 ML/MIN/1.73/M2
GLUCOSE SERPL-MCNC: 153 MG/DL (ref 70–110)
GLUCOSE SERPL-MCNC: 181 MG/DL (ref 70–110)
HCO3 UR-SCNC: 28.8 MMOL/L (ref 24–28)
HCT VFR BLD AUTO: 34.9 % (ref 40–54)
HGB BLD-MCNC: 11 GM/DL (ref 14–18)
HOLD SPECIMEN: NORMAL
HOLD SPECIMEN: NORMAL
LACTATE SERPL-SCNC: 1.9 MMOL/L (ref 0.5–2.2)
LYMPHOCYTES NFR BLD MANUAL: 12 % (ref 18–48)
MAGNESIUM SERPL-MCNC: 2.1 MG/DL (ref 1.6–2.6)
MAGNESIUM SERPL-MCNC: 2.1 MG/DL (ref 1.6–2.6)
MCH RBC QN AUTO: 28.9 PG (ref 27–31)
MCHC RBC AUTO-ENTMCNC: 31.5 G/DL (ref 32–36)
MCV RBC AUTO: 92 FL (ref 82–98)
MODE: ABNORMAL
MONOCYTES NFR BLD MANUAL: 15 % (ref 4–15)
NEUTROPHILS NFR BLD MANUAL: 32 % (ref 38–73)
NEUTS BAND NFR BLD MANUAL: 41 %
NUCLEATED RBC (/100WBC) (OHS): 0 /100 WBC
PCO2 BLDA: 53.7 MMHG (ref 35–45)
PH SMN: 7.34 [PH] (ref 7.35–7.45)
PHOSPHATE SERPL-MCNC: 4.1 MG/DL (ref 2.7–4.5)
PHOSPHATE SERPL-MCNC: 4.5 MG/DL (ref 2.7–4.5)
PLATELET # BLD AUTO: 294 K/UL (ref 150–450)
PLATELET BLD QL SMEAR: ABNORMAL
PMV BLD AUTO: 11.9 FL (ref 9.2–12.9)
PO2 BLDA: 23 MMHG (ref 40–60)
POC BE: 2 MMOL/L (ref -2–2)
POC SATURATED O2: 36 % (ref 95–100)
POC TCO2: 30 MMOL/L (ref 24–29)
POCT GLUCOSE: 165 MG/DL (ref 70–110)
POCT GLUCOSE: 182 MG/DL (ref 70–110)
POCT GLUCOSE: 182 MG/DL (ref 70–110)
POCT GLUCOSE: 185 MG/DL (ref 70–110)
POCT GLUCOSE: 193 MG/DL (ref 70–110)
POTASSIUM SERPL-SCNC: 4.3 MMOL/L (ref 3.5–5.1)
POTASSIUM SERPL-SCNC: 5 MMOL/L (ref 3.5–5.1)
PROT SERPL-MCNC: 6.2 GM/DL (ref 6–8.4)
RBC # BLD AUTO: 3.8 M/UL (ref 4.6–6.2)
SAMPLE: ABNORMAL
SITE: ABNORMAL
SODIUM SERPL-SCNC: 144 MMOL/L (ref 136–145)
SODIUM SERPL-SCNC: 144 MMOL/L (ref 136–145)
WBC # BLD AUTO: 7.64 K/UL (ref 3.9–12.7)

## 2025-04-19 PROCEDURE — 36415 COLL VENOUS BLD VENIPUNCTURE: CPT | Performed by: HOSPITALIST

## 2025-04-19 PROCEDURE — 99900035 HC TECH TIME PER 15 MIN (STAT)

## 2025-04-19 PROCEDURE — 94640 AIRWAY INHALATION TREATMENT: CPT

## 2025-04-19 PROCEDURE — 25000242 PHARM REV CODE 250 ALT 637 W/ HCPCS: Performed by: HOSPITALIST

## 2025-04-19 PROCEDURE — 82803 BLOOD GASES ANY COMBINATION: CPT

## 2025-04-19 PROCEDURE — 99233 SBSQ HOSP IP/OBS HIGH 50: CPT | Mod: ,,, | Performed by: SURGERY

## 2025-04-19 PROCEDURE — 99900031 HC PATIENT EDUCATION (STAT)

## 2025-04-19 PROCEDURE — 84450 TRANSFERASE (AST) (SGOT): CPT | Performed by: HOSPITALIST

## 2025-04-19 PROCEDURE — 25000003 PHARM REV CODE 250: Performed by: HOSPITALIST

## 2025-04-19 PROCEDURE — 94761 N-INVAS EAR/PLS OXIMETRY MLT: CPT

## 2025-04-19 PROCEDURE — 63600175 PHARM REV CODE 636 W HCPCS: Performed by: INTERNAL MEDICINE

## 2025-04-19 PROCEDURE — 84100 ASSAY OF PHOSPHORUS: CPT | Performed by: HOSPITALIST

## 2025-04-19 PROCEDURE — 63600175 PHARM REV CODE 636 W HCPCS: Performed by: HOSPITALIST

## 2025-04-19 PROCEDURE — 82800 BLOOD PH: CPT

## 2025-04-19 PROCEDURE — 83735 ASSAY OF MAGNESIUM: CPT | Performed by: HOSPITALIST

## 2025-04-19 PROCEDURE — 85025 COMPLETE CBC W/AUTO DIFF WBC: CPT | Performed by: HOSPITALIST

## 2025-04-19 PROCEDURE — 84100 ASSAY OF PHOSPHORUS: CPT | Performed by: INTERNAL MEDICINE

## 2025-04-19 PROCEDURE — 20000000 HC ICU ROOM

## 2025-04-19 PROCEDURE — 83735 ASSAY OF MAGNESIUM: CPT | Performed by: INTERNAL MEDICINE

## 2025-04-19 PROCEDURE — 83605 ASSAY OF LACTIC ACID: CPT | Performed by: HOSPITALIST

## 2025-04-19 RX ORDER — SODIUM CHLORIDE 9 MG/ML
INJECTION, SOLUTION INTRAVENOUS CONTINUOUS
Status: DISCONTINUED | OUTPATIENT
Start: 2025-04-19 | End: 2025-04-21

## 2025-04-19 RX ORDER — PANTOPRAZOLE SODIUM 40 MG/10ML
40 INJECTION, POWDER, LYOPHILIZED, FOR SOLUTION INTRAVENOUS DAILY
Status: DISCONTINUED | OUTPATIENT
Start: 2025-04-20 | End: 2025-04-29 | Stop reason: HOSPADM

## 2025-04-19 RX ORDER — HEPARIN SODIUM 5000 [USP'U]/ML
5000 INJECTION, SOLUTION INTRAVENOUS; SUBCUTANEOUS EVERY 8 HOURS
Status: DISCONTINUED | OUTPATIENT
Start: 2025-04-19 | End: 2025-04-29 | Stop reason: HOSPADM

## 2025-04-19 RX ADMIN — IPRATROPIUM BROMIDE AND ALBUTEROL SULFATE 3 ML: 2.5; .5 SOLUTION RESPIRATORY (INHALATION) at 02:04

## 2025-04-19 RX ADMIN — IPRATROPIUM BROMIDE AND ALBUTEROL SULFATE 3 ML: 2.5; .5 SOLUTION RESPIRATORY (INHALATION) at 08:04

## 2025-04-19 RX ADMIN — MUPIROCIN: 20 OINTMENT TOPICAL at 09:04

## 2025-04-19 RX ADMIN — IPRATROPIUM BROMIDE AND ALBUTEROL SULFATE 3 ML: 2.5; .5 SOLUTION RESPIRATORY (INHALATION) at 07:04

## 2025-04-19 RX ADMIN — SODIUM CHLORIDE, POTASSIUM CHLORIDE, SODIUM LACTATE AND CALCIUM CHLORIDE: 600; 310; 30; 20 INJECTION, SOLUTION INTRAVENOUS at 03:04

## 2025-04-19 RX ADMIN — ONDANSETRON 4 MG: 2 INJECTION INTRAMUSCULAR; INTRAVENOUS at 12:04

## 2025-04-19 RX ADMIN — SODIUM CHLORIDE, POTASSIUM CHLORIDE, SODIUM LACTATE AND CALCIUM CHLORIDE: 600; 310; 30; 20 INJECTION, SOLUTION INTRAVENOUS at 12:04

## 2025-04-19 RX ADMIN — HEPARIN SODIUM 5000 UNITS: 5000 INJECTION INTRAVENOUS; SUBCUTANEOUS at 09:04

## 2025-04-19 RX ADMIN — SODIUM CHLORIDE: 9 INJECTION, SOLUTION INTRAVENOUS at 03:04

## 2025-04-19 NOTE — NURSING
Report received, care assumed. Resting in bed quietly. AAO, responsive to verbal stimuli. Denies pain/discomfort. Board updated. Plan of care discussed with patient.  [facility]  Nurses Note -- 4 Eyes      4/19/2025       Skin assessed on: Q Shift      [x] No Pressure Injuries Present    [x]Prevention Measures Documented    [] Yes LDA  for Pressure Injury Previously documented     [] Yes New Pressure Injury Discovered   [] LDA for New Pressure Injury Added      Attending RN:  Kelly Mendoza RN     Second RN:  LAZ Ambrosio

## 2025-04-19 NOTE — ASSESSMENT & PLAN NOTE
VINNY is likely due to prerenal from SBO. Baseline creatinine is 1. Most recent creatinine and eGFR are listed below.  Recent Labs     04/18/25  1159 04/19/25  0430   CREATININE 2.4* 2.3*   EGFRNORACEVR 25* 26*   - UA unremarkable  - CT shows no hydronephrosis     Plan  - VINNY is improving but not yet at baseline  - Avoid nephrotoxins and renally dose meds for GFR listed above  - Monitor urine output, serial BMP, and adjust therapy as needed  - LR at 100cc/hr - continue

## 2025-04-19 NOTE — NURSING
[facility], [facility] - [place], [place]  Nurses Note -- 4 Eyes        Date:  04/18/2025        Skin assessed on:   Admit        [x] No Pressure Injuries Present                 [x]Prevention Measures Documented     [] Yes LDA Previously documented      [] Yes New Pressure Injury Discovered              [] LDA Added        Attending RN:  LAZ Alanis     Second RN:  EDUARDO Murphy

## 2025-04-19 NOTE — PROGRESS NOTES
Surgery Progress Note    Eugene Gamez is a 89 y.o. year old male in hospital for small bowel obstruction    Pain improving. No episodes of emesis    ROS:  Negative except above    PE:  Vitals:    04/19/25 0323 04/19/25 0736 04/19/25 0828 04/19/25 1132   BP: 116/75  116/69 118/67   BP Location:   Left arm Left arm   Patient Position:   Lying Lying   Pulse: 92 85 68 98   Resp: 18 17 18 18   Temp: 97.7 °F (36.5 °C)  97.9 °F (36.6 °C) 97.4 °F (36.3 °C)   TempSrc: Oral  Oral Oral   SpO2: 97% 98% 98% (!) 91%   Weight:       Height:           NAD  No belabored breathing  No skin changes  Abd soft distended, easily reducible hernia. Digitized ostomy easily. No gas or stool in bag    Significant Diagnostic Studies: N/A    A/P:  Eugene Gamez is a 89 y.o. year old male  with evidence of small bowel obstruction    -NGT to decompress  - will plan for gastrografin study tomorrow  - rest of care per primary    Honey Meneses  General Surgery - [facility]  4/19/2025

## 2025-04-19 NOTE — ASSESSMENT & PLAN NOTE
Anemia is likely due to chronic disease due to Chronic Kidney Disease. Most recent hemoglobin and hematocrit are listed below.  Recent Labs     04/18/25  1159 04/19/25  0430   HGB 12.8* 11.0*   HCT 39.3* 34.9*     Plan  - Monitor serial CBC: Daily  - Transfuse PRBC if patient becomes hemodynamically unstable, symptomatic or H/H drops below 7/21.  - Patient has not received any PRBC transfusions to date  - Patient's anemia is currently stable

## 2025-04-19 NOTE — ASSESSMENT & PLAN NOTE
Patient's COPD is controlled currently.  Patient is currently off COPD Pathway.  Stable on room air

## 2025-04-19 NOTE — PROGRESS NOTES
[facility]  Progress Note    Patient Name: Eugene Gamez  MRN: [id number]  Patient Class: IP- Inpatient   Admission Date: 4/18/2025  Length of Stay: 1 days  Attending Physician: Lashonda Barrera MD  Primary Care Provider: Larry Monae MD        Subjective     Principal Problem:SBO (small bowel obstruction)        HPI:  Mr Eugene Gamez is a 89 y.o. man with history of L colectomy for colon cancer (2015) with ventral hernia, prior DM, HTN, CKD3 (baseline Cr 1) who presented with nausea and vomiting, abdominal pain.  The symptoms began a day or 2 ago but significantly worsened yesterday morning.  Last night and today He has not been able to keep much of anything down.  He denies any issues with his colostomy, stool has been normal.  Overall he feels weak and fatigued.  Denies dysuria, difficulty urinating, changes in bowel habits  His CMP shows creatinine 2.4, bicarb 21, anion gap 17, glucose 209; UA with trace protein, 7 RBC, 2 WBC; CT abdomen and pelvis with high-grade small-bowel obstruction related to a 5 x 5 cm ventral abdominal wall hernia, right middle and right lower lobe mucous plugging and/or endobronchial spread of infection/bronchitis.  He was admitted for high grade SBO. Started IVF. General surgery consulted.     Overview/Hospital Course:  Mr Eugene Gamez is a 89 y.o. man with history of L colectomy for colon cancer (2015) with colostomy in place and ventral hernia who was admitted with SBO.  His pain is improved.  He does not have any nausea or vomiting.  Started IV fluids.  Did not place NG tube if he does not have any nausea or vomiting at this point.  General surgery consulted.  He does not have any ostomy output, bowel sounds are decreased, and abdomen is distended but not currently tense.     Interval History:  He is lying in bed and wakes up easily this morning is difficult to understand what he is saying.  He denies abdominal pain.  He denies  vomiting.    Review of Systems   Respiratory:  Negative for shortness of breath.    Cardiovascular:  Negative for chest pain.   Gastrointestinal:  Negative for abdominal pain and vomiting.     Objective:     Vital Signs (Most Recent):  Temp: 97.9 °F (36.6 °C) (04/19/25 0828)  Pulse: 68 (04/19/25 0828)  Resp: 18 (04/19/25 0828)  BP: 116/69 (04/19/25 0828)  SpO2: 98 % (04/19/25 0828) Vital Signs (24h Range):  Temp:  [97.4 °F (36.3 °C)-98.7 °F (37.1 °C)] 97.9 °F (36.6 °C)  Pulse:  [] 68  Resp:  [16-20] 18  SpO2:  [97 %-100 %] 98 %  BP: (105-146)/(60-82) 116/69     Weight: 62 kg (136 lb 11 oz)  Body mass index is 17.55 kg/m².    Intake/Output Summary (Last 24 hours) at 4/19/2025 0958  Last data filed at 4/19/2025 0653  Gross per 24 hour   Intake 1158.02 ml   Output 500 ml   Net 658.02 ml         Physical Exam  Vitals and nursing note reviewed.   Constitutional:       Appearance: He is ill-appearing.   HENT:      Head:      Comments: Temporal wasting     Nose: Nose normal.      Mouth/Throat:      Mouth: Mucous membranes are dry.   Cardiovascular:      Rate and Rhythm: Normal rate and regular rhythm.   Pulmonary:      Effort: Pulmonary effort is normal.      Breath sounds: Normal breath sounds.      Comments: Room air  Abdominal:      General: There is distension (Not tense).      Tenderness: There is no abdominal tenderness. There is no guarding or rebound.      Comments: Hypoactive bowel sounds.  Left-sided ostomy with no stool present   Musculoskeletal:      Right lower leg: No edema.      Left lower leg: No edema.   Skin:     General: Skin is warm and dry.   Neurological:      Mental Status: He is alert. Mental status is at baseline.               Significant Labs: All pertinent labs within the past 24 hours have been reviewed.    Significant Imaging: I have reviewed all pertinent imaging results/findings within the past 24 hours.      Assessment & Plan  SBO (small bowel obstruction)  - admitted with nausea,  "vomiting abdominal pain  - CT upon admission: "High-grade small bowel obstruction related to a 5 by 5 cm ventral abdominal wall hernia. Status post left colectomy common diverting left lower quadrant ostomy and multiple small bowel anastomoses with markedly dilated small bowel loops in the deep pelvis which may reflect acute superimposed on chronic change."  - General surgery consulted- awaiting recs  - continue LR at 100 cc/hr  - PRN nausea, pain Rx  - NPO     Acute renal failure superimposed on stage 3b chronic kidney disease  VINNY is likely due to  prerenal from SBO . Baseline creatinine is  1 . Most recent creatinine and eGFR are listed below.  Recent Labs     04/18/25  1159 04/19/25  0430   CREATININE 2.4* 2.3*   EGFRNORACEVR 25* 26*   - UA unremarkable  - CT shows no hydronephrosis     Plan  - VINNY is improving but not yet at baseline  - Avoid nephrotoxins and renally dose meds for GFR listed above  - Monitor urine output, serial BMP, and adjust therapy as needed  - LR at 100cc/hr - continue  HTN, goal below 140/80  Patient's blood pressure range in the last 24 hours was: BP  Min: 105/76  Max: 146/66.The patient's inpatient anti-hypertensive regimen is listed below:  Current Antihypertensives  hydrALAZINE injection 10 mg, Every 6 hours PRN, Intravenous    Plan  - BP is controlled, no changes needed to their regimen  - currently controlled without need for Rx  - PRN antihypertensives available   Pulmonary emphysema, unspecified emphysema type  Patient's COPD is controlled currently.  Patient is currently off COPD Pathway.  Stable on room air  Anemia of chronic renal failure, stage 3b  Anemia is likely due to chronic disease due to Chronic Kidney Disease. Most recent hemoglobin and hematocrit are listed below.  Recent Labs     04/18/25  1159 04/19/25  0430   HGB 12.8* 11.0*   HCT 39.3* 34.9*     Plan  - Monitor serial CBC: Daily  - Transfuse PRBC if patient becomes hemodynamically unstable, symptomatic or H/H " drops below 7/21.  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 Liver biopsy negative     Ventral hernia  - see SBO    Body mass index (BMI) less than 19  Body mass index is 17.55 kg/m².  Notably, albumin 3.6 on admission- perhaps due to dehydration  - must remain strict NPO at this time       Mucus plug in respiratory tract  - noted on CT  - nebs ordered   - remains stable on room air    VTE Risk Mitigation (From admission, onward)           Ordered     Place ARI hose  Until discontinued         04/18/25 1542     Place sequential compression device  Until discontinued         04/18/25 1542     Reason for No Pharmacological VTE Prophylaxis  Once        Question:  Reasons:  Answer:  Physician Provided (leave comment)  Comment:  potential OR    04/18/25 1542     IP VTE HIGH RISK PATIENT  Once         04/18/25 1542                    Discharge Planning   WIL:      Code Status: Full Code   Medical Readiness for Discharge Date:          12:28 PM Called suzanne Gamez to update her. All questions answered.     2:49 PM  Seen again this afternoon. He was vomiting today prior to NGT placement. NGT now in place with stool appearing liquid. He is less alert but does still awaken. His abdominal exam is unchanged- distended, hypoactive bowel sounds, hernia reducible. There is now gas in his ostomy bag. His BP, HR stable with no fever but his SpO2 is now 91%. Start supplemental O2. Check lactic. Continue current plan of care. Called suzanne Gamez to update her.     Advance Care Planning     Date: 04/19/2025  - discussed code status with suzanne Gamez  - she  states continue full code status  - time spent discussing 2 minutes         Lashonda Barrera MD  Department of Hospital Medicine   [facility]

## 2025-04-19 NOTE — NURSING
[facility], [facility] - [place]  Nurses Note -- 4 Eyes      4/18/2025       Skin assessed on: Q Shift      [x] No Pressure Injuries Present    [x]Prevention Measures Documented    [] Yes LDA  for Pressure Injury Previously documented     [] Yes New Pressure Injury Discovered   [] LDA for New Pressure Injury Added      Attending RN:  Hebert Mcgregor RN     Second RN:  Annabelle Solis

## 2025-04-19 NOTE — ASSESSMENT & PLAN NOTE
Patient's blood pressure range in the last 24 hours was: BP  Min: 105/76  Max: 146/66.The patient's inpatient anti-hypertensive regimen is listed below:  Current Antihypertensives  hydrALAZINE injection 10 mg, Every 6 hours PRN, Intravenous    Plan  - BP is controlled, no changes needed to their regimen  - currently controlled without need for Rx  - PRN antihypertensives available

## 2025-04-19 NOTE — PLAN OF CARE
Patient:   CHILANGO ALBRECHT            MRN: [id number]            FIN: [id number]              Age:   25 years     Sex:  MALE     :  89   Associated Diagnoses:   None   Author:   CHRISTOPHER POSEY     Addictions Daily Progress Note  BASIC INFORMATION  HISTORY SOURCE: Patient, chart, and nursing.   Medications (20) Active  Scheduled: (11)  Enoxaparin 40 mg/0.4 mL syringe  40 mg 0.4 mL, Subcutaneous, Q Bedtime  Folic acid 1 mg tab  1 mg 1 tab, Oral, Daily  Gabapentin 400 mg cap  400 mg 1 cap, Oral, Q8H  Influenza virus vaccine, inactivated PF (latex free) quadrivalent 0.5 mL IM inj SDV  0.5 mL, IM, On Call  Metoprolol tartrate 25 mg tab  25 mg 1 tab, Oral, Q12H  PHENobarbital  65 mg, Slow IV Push, Q6H  Pneumococcal adult vaccine 23-polyvalent 0.5 mL IM inj SDV  0.5 mL, IM, On Call  QUEtiapine 25 mg tab  25 mg 1 tab, Oral, Q Bedtime  QUEtiapine 25 mg tab  12.5 mg 0.5 tab, Oral, Daily  Thiamine 100 mg tab  100 mg 1 tab, Oral, Daily  Vitamin B-100 complex tab  1 tab, Oral, Daily  Continuous: (1)  Sodium Chloride 0.9% 1,000 mL + potassium CHLORIDE 20 mEq + magnesium sulfate 1 gm  1,000 mL, IV, 125 mL/hr  PRN: (8)  Acetaminophen 325 mg tab  650 mg 2 tab, Oral, Q6H  Calcium carbonate 500 mg chew tab [Ca 200 mg]  500 mg 1 tab, Chewed, TID  LORazepam 2 mg tab  2 mg 1 tab, Oral, Q30 Minutes  LORazepam 2 mg/1 mL inj SDV  2 mg 1 mL, Slow IV Push, Q15 Minutes  Melatonin 3 mg tab  3 mg 1 tab, Oral, Q Bedtime  Ondansetron 4 mg disintegrating tab  4 mg 1 tab, Oral, Q6H  Ondansetron 4 mg/2 mL inj SDV  4 mg 2 mL, Slow IV Push, Q8H  Senna-docusate sodium 8.6-50 mg tab  1 tab, Oral, Q Bedtime  CIWA SCALE:  Phenobarbital scheduled  Ativan 8 mg PRN since midnight  Ativan 30 mg PRN on   Subjective:  He is awake, knows he is at \"[facility]". Following commands, quite sleepy.  Has required Ativan 8 mg PRN since midnight in addition to scheduled phenobarbital.  Blood pressures still intermittently elevated.   Vitals    Problem: Adult Inpatient Plan of Care  Goal: Plan of Care Review  Outcome: Progressing  Goal: Patient-Specific Goal (Individualized)  Outcome: Progressing  Goal: Absence of Hospital-Acquired Illness or Injury  Outcome: Progressing  Goal: Optimal Comfort and Wellbeing  Outcome: Progressing  Goal: Readiness for Transition of Care  Outcome: Progressing     Problem: Infection  Goal: Absence of Infection Signs and Symptoms  Outcome: Progressing     Problem: Acute Kidney Injury/Impairment  Goal: Fluid and Electrolyte Balance  Outcome: Progressing  Goal: Improved Oral Intake  Outcome: Progressing  Goal: Effective Renal Function  Outcome: Progressing     Problem: Skin Injury Risk Increased  Goal: Skin Health and Integrity  Outcome: Progressing     Problem: Wound  Goal: Optimal Coping  Outcome: Progressing  Goal: Optimal Functional Ability  Outcome: Progressing  Goal: Absence of Infection Signs and Symptoms  Outcome: Progressing  Goal: Improved Oral Intake  Outcome: Progressing  Goal: Optimal Pain Control and Function  Outcome: Progressing  Goal: Skin Health and Integrity  Outcome: Progressing  Goal: Optimal Wound Healing  Outcome: Progressing      Redding in   collaboration with Melissa Levi NP.  I agree with the charting below.  .

## 2025-04-19 NOTE — NURSING
[facility]  Rapid Response Nurse Intervention/ Task    Date of Visit: 04/19/2025  Time of Visit: 1430       INTERVENTIONS/ TASK Completed:     I went to bedside per request of Annabelle NESBITT. Pt with thick brown stool draining from NGT. Pt appears very weak. He does respond but very little. I sent message to MD. ACOSTA at bedside.I will con't to monitor pt.

## 2025-04-19 NOTE — NURSING
18 fr NG tube to right nasal placed, patient tolerated well, resp even and unlabored, awaiting x ray for placement.

## 2025-04-19 NOTE — EICU
"Intervention Initiated From:  [facility] / EICU    Aida intervened regarding:  Rounding (Video assessment)    Doctor Notified:  Yes    Comments: Virtual ICU Admission    Admit Date: 2025  LOS: 1  Code Status: Full Code   : 1935  Bed: W274/W274 A:     Diagnosis: SBO (small bowel obstruction)    Patient  has a past medical history of Arthritis, Cancer, Diabetes mellitus, Elevated PSA, Gout, unspecified, Hypertension, and Nuclear sclerotic cataract of both eyes.    Last VS: /79 (BP Location: Left arm, Patient Position: Lying)   Pulse (!) 56   Temp 97.7 °F (36.5 °C) (Oral)   Resp 18   Ht 6' 2" (1.88 m)   Wt 62 kg (136 lb 11 oz)   SpO2 100%   BMI 17.55 kg/m²       VICU Review    VICU nurse assessment :  [place] completed and VTE prophylaxis review        Nursing orders placed : IP GREER Peripheral IV Access          "

## 2025-04-19 NOTE — ASSESSMENT & PLAN NOTE
Body mass index is 17.55 kg/m².  Notably, albumin 3.6 on admission- perhaps due to dehydration  - must remain strict NPO at this time

## 2025-04-19 NOTE — CARE UPDATE
Called by Rapid RN that pt was deteriorating and not responding to her, expressed concern that a code was imminent, and asked if she could move him to ICU.  To avoid delay in a possible emergency I agreed, placed transfer order, met them in the Unit.      On arrival to the Unit, pt was AAO x self and place, smiling and interacting with staff.  Rapid RN reports that he thankfully perked up en route to the Unit.  , /70.  Good UOP and peripheral perfusion.  Denied abdominal pain, or tenderness on palpation.  Reports feeling hungry.    Updated Dr Barrera, will try to update his dtr as well.  Patient stable.    Fantasma Zuñiga MD MPH  [facility]

## 2025-04-19 NOTE — NURSING
Upon rounding noted decreased responsiveness and LOC. Notified rapid nurse, Melida Massey RN who assessed patient and notified jennifer menjivar Dr. Moll. Patient transferred to ICU room 274. Report called to ALZ Keith.

## 2025-04-19 NOTE — HOSPITAL COURSE
Mr Eugene Gamez is a 89 y.o. man with history of L colectomy for colon cancer (2015) with colostomy in place and ventral hernia who was admitted with SBO. General surgery consulted.  NGT placed.  Patient with apparent episode of poor responsiveness.  Transferred to ICU, but quickly recovered.  Patient with atrial and ventricular ectopy on tele.  Cardiology consulted.  Patient started on Amiodarone drip. GGC ordered and unfortunately no passage to colon. Palliative following. Surgery is discussing ex-lap with patient and family, tentatively planned for 4/23. Patient underwent ex-lap on 4/23 and had lysis of adhesions, appears to be problem. NGT in place and extubated, back to ICU. Having some post operative delirium controlled on low dose precedex. Awaiting bowel function to return.  Stooling gas noted and ostomy on 04/25. GGC ordered with significant ostomy output overnight. NGT removed, SLP cleared for full liquid diet. PT/OT consulted-recommends moderate intensity therapy. Transitioned to PO amiodarone and metoprolol. Stable for transfer to floor.  Diet advanced as tolerated.  Medically stable for SNF. Patient has been accepted to [facility].    Patient states he is doing great this morning.  Tolerated his breakfast without difficulty.  Ate about 80% of his food.  No family at bedside.  However, was able to call patient's daughter Clemente and provided her an update and answered all questions or concerns. Pt denies any fever, headaches, chest pain, shortness of breath, palpitations, abdominal pain, nausea, vomiting, or any new weaknesses.  Patient's exam on discharge was as follow: Patient is alert and oriented, appears in no acute distress, heart with regular rate and rhythm, lungs clear to asculation with non-labored breathing, abdomen soft, and nontender, Gas and stool in bag, no new weaknesses or focal deficits seen. Coloplast pouch in place over left colostomy. Stoma pale pink bud. Functioning a yellow liquid  effluent    Patient and daughter Nicol (via phone) was counseled regarding any abnormal labs, differential diagnosis, treatment options, risk-benefit, lifestyle changes, prognosis, current condition, and medications. Patient and daughter Nicol (via phone) was interactive and attentive.  Patient and daughter Nicol (via phone) questions were answered in a respectful and timely manner. Patient was instructed to follow-up with PCP within 1 week and to continue taking medications as prescribed.  Instructed to also follow up with General surgery outpatient for post follow up. Also, extensively discussed the risks, benefits, and side effects of patient's medications. Discussed with Patient and daughter Nicol (via phone) about any medication changes. Patient verbalized understanding and agrees to treatment plan.  Patient is stable for discharge.  Stanislaw and suzanne Camarena (via phone) has no other questions or concerns at this time.  ED precautions discussed with the patient.    Vital signs are stable. Ambulating without any difficulty. Tolerating p.o. intake without any nausea or vomiting. Afebrile for over 24 hours. Patient is in stable condition and has no questions or concerns. Patient will be discharge to SNF once facility is prepared for patient's arrival and transportation secured .   CM/SW to assist with discharge planning.     Vitals:    04/29/25 0809 04/29/25 0832 04/29/25 1058 04/29/25 1129   BP: 129/69  129/69 126/64   BP Location: Left arm   Left arm   Patient Position: Lying   Lying   Pulse: 76  76 74   Resp: 18 18  18   Temp: 98.9 °F (37.2 °C)   97.9 °F (36.6 °C)   TempSrc: Oral   Oral   SpO2: 98% 98%  95%   Weight:       Height:

## 2025-04-19 NOTE — NURSING
Patient laying quietly in bed, IV fluids infusing via pump, placement verified by x ray, connect to LIWS, brown colored liquid into cannister, NAD noted, bed locked and low in position.

## 2025-04-19 NOTE — SUBJECTIVE & OBJECTIVE
Interval History:  He is lying in bed and wakes up easily this morning is difficult to understand what he is saying.  He denies abdominal pain.  He denies vomiting.    Review of Systems   Respiratory:  Negative for shortness of breath.    Cardiovascular:  Negative for chest pain.   Gastrointestinal:  Negative for abdominal pain and vomiting.     Objective:     Vital Signs (Most Recent):  Temp: 97.9 °F (36.6 °C) (04/19/25 0828)  Pulse: 68 (04/19/25 0828)  Resp: 18 (04/19/25 0828)  BP: 116/69 (04/19/25 0828)  SpO2: 98 % (04/19/25 0828) Vital Signs (24h Range):  Temp:  [97.4 °F (36.3 °C)-98.7 °F (37.1 °C)] 97.9 °F (36.6 °C)  Pulse:  [] 68  Resp:  [16-20] 18  SpO2:  [97 %-100 %] 98 %  BP: (105-146)/(60-82) 116/69     Weight: 62 kg (136 lb 11 oz)  Body mass index is 17.55 kg/m².    Intake/Output Summary (Last 24 hours) at 4/19/2025 0958  Last data filed at 4/19/2025 0653  Gross per 24 hour   Intake 1158.02 ml   Output 500 ml   Net 658.02 ml         Physical Exam  Vitals and nursing note reviewed.   Constitutional:       Appearance: He is ill-appearing.   HENT:      Head:      Comments: Temporal wasting     Nose: Nose normal.      Mouth/Throat:      Mouth: Mucous membranes are dry.   Cardiovascular:      Rate and Rhythm: Normal rate and regular rhythm.   Pulmonary:      Effort: Pulmonary effort is normal.      Breath sounds: Normal breath sounds.      Comments: Room air  Abdominal:      General: There is distension (Not tense).      Tenderness: There is no abdominal tenderness. There is no guarding or rebound.      Comments: Hypoactive bowel sounds.  Left-sided ostomy with no stool present   Musculoskeletal:      Right lower leg: No edema.      Left lower leg: No edema.   Skin:     General: Skin is warm and dry.   Neurological:      Mental Status: He is alert. Mental status is at baseline.               Significant Labs: All pertinent labs within the past 24 hours have been reviewed.    Significant Imaging: I have  reviewed all pertinent imaging results/findings within the past 24 hours.

## 2025-04-19 NOTE — PLAN OF CARE
[facility] - Med Surg  Initial Discharge Assessment       Primary Care Provider: Larry Monae MD  Case Management Assessment     PCP: See above  Pharmacy[facility] [address]    Patient Arrived From: home with daughter  Existing Help at Home: daughter    Barriers to Discharge: none    Discharge Plan:    A. home health   B. home with family      Discharge planning assessment completed with assistance from patient's daughterNicol, via telephone.  Patient is independent with assistive device.  Patient recently discharge from  following a syncopal episode.  When medically stable, patient will discharge to home (if approriate).  Patient might benefit from HH at discharge.  Daughter will transport if patient's mobility status warrants.       Admission Diagnosis: SBO (small bowel obstruction) [K56.609]  Nausea and vomiting [R11.2]  Chest pain [R07.9]    Admission Date: 4/18/2025  Expected Discharge Date:     Transition of Care Barriers: None    Payor: MEDICARE / Plan: MEDICARE PART A & B / Product Type: Government /     Extended Emergency Contact Information  Primary Emergency Contact: Mari Mcdonald  Relation: Daughter  Preferred language: English   needed? No  Secondary Emergency Contact: Nicol Gamez  Address: [address] [place] [address] [place] [address] [facility]  Home Phone: [phone]  Mobile Phone: [phone]  Relation: Daughter    Discharge Plan A: Home Health  Discharge Plan B: Home with family      [id number] [place] [address]  [address] [place] [address]  Phone: [phone] Fax: [phone]      Initial Assessment (most recent)       Adult Discharge Assessment - 04/19/25 0295          Discharge Assessment    Assessment Type Discharge Planning Assessment     Confirmed/corrected address, phone number and insurance Yes     Confirmed Demographics Correct on Facesheet     Source of Information family     If unable to respond/provide information  was family/caregiver contacted? Yes     Contact Name/Number Nicol Gamez ( daughter)  [phone]     When was your last doctors appointment? 04/14/25     Reason For Admission SBO     People in Home child(amina), adult     Facility Arrived From: home with daughter     Do you expect to return to your current living situation? Yes     Do you have help at home or someone to help you manage your care at home? Yes     Who are your caregiver(s) and their phone number(s)? daughter, Nicol Gamez  [phone]     Prior to hospitilization cognitive status: Alert/Oriented     Current cognitive status: Alert/Oriented     Walking or Climbing Stairs Difficulty yes     Walking or Climbing Stairs ambulation difficulty, requires equipment     Mobility Management RW     Dressing/Bathing Difficulty no     Equipment Currently Used at Home walker, rolling     Readmission within 30 days? Yes     Patient currently being followed by outpatient case management? No     Do you currently have service(s) that help you manage your care at home? No     Do you take prescription medications? Yes     Do you have prescription coverage? Yes     Coverage [employer]emnity     Do you have any problems affording any of your prescribed medications? No     Is the patient taking medications as prescribed? yes     Who is going to help you get home at discharge? Daughter-depends on patient's mobility status     How do you get to doctors appointments? family or friend will provide     Are you on dialysis? No     Do you take coumadin? No     Discharge Plan A Home Health     Discharge Plan B Home with family     DME Needed Upon Discharge  none     Discharge Plan discussed with: Adult children     Transition of Care Barriers None        OTHER    Name(s) of People in Home daughter                     Readmission Assessment (most recent)       Readmission Assessment - 04/19/25 3413          Readmission    Was this a planned readmission? No     Why were you  hospitalized in the last 30 days? syncope     Why were you readmitted? Alarmed about signs/symptoms     When you left the hospital where did you go? Home with Family     Did patient/caregiver refused recommended DC plan? No     When did you start not feeling well? Tuesday or Wednesday started feeling bad.     Did you have  a follow-up appointment on discharge? Yes     Did you go? No     Why? --   Appt scheduled for 4/25/2025

## 2025-04-20 PROBLEM — I47.19 ATRIAL TACHYCARDIA: Status: ACTIVE | Noted: 2025-04-20

## 2025-04-20 PROBLEM — I47.29 NSVT (NONSUSTAINED VENTRICULAR TACHYCARDIA): Status: ACTIVE | Noted: 2025-04-20

## 2025-04-20 LAB
ABSOLUTE NEUTROPHIL MANUAL (OHS): 5 K/UL
ALBUMIN SERPL BCP-MCNC: 3.1 G/DL (ref 3.5–5.2)
ALP SERPL-CCNC: 49 UNIT/L (ref 40–150)
ALT SERPL W/O P-5'-P-CCNC: 18 UNIT/L (ref 10–44)
ANION GAP (OHS): 13 MMOL/L (ref 8–16)
AST SERPL-CCNC: 17 UNIT/L (ref 11–45)
BILIRUB SERPL-MCNC: 0.5 MG/DL (ref 0.1–1)
BUN SERPL-MCNC: 72 MG/DL (ref 8–23)
CALCIUM SERPL-MCNC: 9.5 MG/DL (ref 8.7–10.5)
CHLORIDE SERPL-SCNC: 112 MMOL/L (ref 95–110)
CO2 SERPL-SCNC: 20 MMOL/L (ref 23–29)
CREAT SERPL-MCNC: 2.1 MG/DL (ref 0.5–1.4)
ERYTHROCYTE [DISTWIDTH] IN BLOOD BY AUTOMATED COUNT: 14.5 % (ref 11.5–14.5)
GFR SERPLBLD CREATININE-BSD FMLA CKD-EPI: 30 ML/MIN/1.73/M2
GLUCOSE SERPL-MCNC: 139 MG/DL (ref 70–110)
HCT VFR BLD AUTO: 38.4 % (ref 40–54)
HGB BLD-MCNC: 12.4 GM/DL (ref 14–18)
LYMPHOCYTES NFR BLD MANUAL: 16 % (ref 18–48)
MAGNESIUM SERPL-MCNC: 2.2 MG/DL (ref 1.6–2.6)
MCH RBC QN AUTO: 29.2 PG (ref 27–31)
MCHC RBC AUTO-ENTMCNC: 32.3 G/DL (ref 32–36)
MCV RBC AUTO: 91 FL (ref 82–98)
METAMYELOCYTES NFR BLD MANUAL: 2 %
MONOCYTES NFR BLD MANUAL: 7 % (ref 4–15)
NEUTROPHILS NFR BLD MANUAL: 51 % (ref 38–73)
NEUTS BAND NFR BLD MANUAL: 24 %
NUCLEATED RBC (/100WBC) (OHS): 0 /100 WBC
PHOSPHATE SERPL-MCNC: 3.9 MG/DL (ref 2.7–4.5)
PLATELET # BLD AUTO: 263 K/UL (ref 150–450)
PLATELET BLD QL SMEAR: ABNORMAL
PMV BLD AUTO: 11.3 FL (ref 9.2–12.9)
POCT GLUCOSE: 154 MG/DL (ref 70–110)
POTASSIUM SERPL-SCNC: 4.3 MMOL/L (ref 3.5–5.1)
PROT SERPL-MCNC: 6.7 GM/DL (ref 6–8.4)
RBC # BLD AUTO: 4.24 M/UL (ref 4.6–6.2)
SODIUM SERPL-SCNC: 145 MMOL/L (ref 136–145)
WBC # BLD AUTO: 6.67 K/UL (ref 3.9–12.7)

## 2025-04-20 PROCEDURE — 83735 ASSAY OF MAGNESIUM: CPT | Performed by: HOSPITALIST

## 2025-04-20 PROCEDURE — 25000003 PHARM REV CODE 250: Performed by: HOSPITALIST

## 2025-04-20 PROCEDURE — 20000000 HC ICU ROOM

## 2025-04-20 PROCEDURE — 63600175 PHARM REV CODE 636 W HCPCS: Performed by: HOSPITALIST

## 2025-04-20 PROCEDURE — 85007 BL SMEAR W/DIFF WBC COUNT: CPT | Performed by: HOSPITALIST

## 2025-04-20 PROCEDURE — 99222 1ST HOSP IP/OBS MODERATE 55: CPT | Mod: ,,, | Performed by: INTERNAL MEDICINE

## 2025-04-20 PROCEDURE — 25000003 PHARM REV CODE 250: Performed by: STUDENT IN AN ORGANIZED HEALTH CARE EDUCATION/TRAINING PROGRAM

## 2025-04-20 PROCEDURE — 25000003 PHARM REV CODE 250: Performed by: INTERNAL MEDICINE

## 2025-04-20 PROCEDURE — 80053 COMPREHEN METABOLIC PANEL: CPT | Performed by: HOSPITALIST

## 2025-04-20 PROCEDURE — 27000221 HC OXYGEN, UP TO 24 HOURS

## 2025-04-20 PROCEDURE — 94761 N-INVAS EAR/PLS OXIMETRY MLT: CPT

## 2025-04-20 PROCEDURE — 99900031 HC PATIENT EDUCATION (STAT)

## 2025-04-20 PROCEDURE — 25000242 PHARM REV CODE 250 ALT 637 W/ HCPCS: Performed by: HOSPITALIST

## 2025-04-20 PROCEDURE — 36415 COLL VENOUS BLD VENIPUNCTURE: CPT | Performed by: HOSPITALIST

## 2025-04-20 PROCEDURE — 84100 ASSAY OF PHOSPHORUS: CPT | Performed by: HOSPITALIST

## 2025-04-20 PROCEDURE — 99233 SBSQ HOSP IP/OBS HIGH 50: CPT | Mod: ,,, | Performed by: SURGERY

## 2025-04-20 PROCEDURE — 94640 AIRWAY INHALATION TREATMENT: CPT

## 2025-04-20 PROCEDURE — 63600175 PHARM REV CODE 636 W HCPCS: Performed by: INTERNAL MEDICINE

## 2025-04-20 RX ORDER — ACETAMINOPHEN 325 MG/1
650 TABLET ORAL EVERY 6 HOURS PRN
Status: DISCONTINUED | OUTPATIENT
Start: 2025-04-20 | End: 2025-04-29 | Stop reason: HOSPADM

## 2025-04-20 RX ORDER — METOPROLOL TARTRATE 1 MG/ML
5 INJECTION, SOLUTION INTRAVENOUS EVERY 6 HOURS
Status: DISCONTINUED | OUTPATIENT
Start: 2025-04-20 | End: 2025-04-26

## 2025-04-20 RX ADMIN — MUPIROCIN: 20 OINTMENT TOPICAL at 09:04

## 2025-04-20 RX ADMIN — HEPARIN SODIUM 5000 UNITS: 5000 INJECTION INTRAVENOUS; SUBCUTANEOUS at 02:04

## 2025-04-20 RX ADMIN — ACETAMINOPHEN 650 MG: 325 TABLET ORAL at 08:04

## 2025-04-20 RX ADMIN — HEPARIN SODIUM 5000 UNITS: 5000 INJECTION INTRAVENOUS; SUBCUTANEOUS at 05:04

## 2025-04-20 RX ADMIN — IPRATROPIUM BROMIDE AND ALBUTEROL SULFATE 3 ML: 2.5; .5 SOLUTION RESPIRATORY (INHALATION) at 07:04

## 2025-04-20 RX ADMIN — SODIUM CHLORIDE: 9 INJECTION, SOLUTION INTRAVENOUS at 05:04

## 2025-04-20 RX ADMIN — ONDANSETRON 4 MG: 2 INJECTION INTRAMUSCULAR; INTRAVENOUS at 08:04

## 2025-04-20 RX ADMIN — IPRATROPIUM BROMIDE AND ALBUTEROL SULFATE 3 ML: 2.5; .5 SOLUTION RESPIRATORY (INHALATION) at 08:04

## 2025-04-20 RX ADMIN — SODIUM CHLORIDE: 9 INJECTION, SOLUTION INTRAVENOUS at 03:04

## 2025-04-20 RX ADMIN — IPRATROPIUM BROMIDE AND ALBUTEROL SULFATE 3 ML: 2.5; .5 SOLUTION RESPIRATORY (INHALATION) at 02:04

## 2025-04-20 RX ADMIN — PANTOPRAZOLE SODIUM 40 MG: 40 INJECTION, POWDER, FOR SOLUTION INTRAVENOUS at 09:04

## 2025-04-20 RX ADMIN — METOROPROLOL TARTRATE 5 MG: 5 INJECTION, SOLUTION INTRAVENOUS at 05:04

## 2025-04-20 NOTE — CONSULTS
[facility] - Intensive Care  Cardiology  Consult Note    Patient Name: Eugene Gamez  MRN: [id number]  Admission Date: 4/18/2025  Hospital Length of Stay: 2 days  Code Status: Full Code   Attending Provider: Darion Mace MD   Consulting Provider: Elias Ibanez MD  Primary Care Physician: Larry Monae MD  Principal Problem:SBO (small bowel obstruction)    Patient information was obtained from ER records.     Inpatient consult to Cardiology  Consult performed by: Elias Ibanez MD  Consult ordered by: Darion Mace MD        Subjective:     Chief Complaint:  Atrial tachycardia    HPI:   Mr Eugene Gamez is a 89 y.o. man with history of L colectomy for colon cancer (2015) with ventral hernia, prior DM, HTN, CKD3 (baseline Cr 1) who presented with nausea and vomiting, abdominal pain.  The symptoms began a day or 2 ago but significantly worsened yesterday morning.  Last night and today He has not been able to keep much of anything down.  He denies any issues with his colostomy, stool has been normal.  Overall he feels weak and fatigued.  Denies dysuria, difficulty urinating, changes in bowel habits  His CMP shows creatinine 2.4, bicarb 21, anion gap 17, glucose 209; UA with trace protein, 7 RBC, 2 WBC; CT abdomen and pelvis with high-grade small-bowel obstruction related to a 5 x 5 cm ventral abdominal wall hernia, right middle and right lower lobe mucous plugging and/or endobronchial spread of infection/bronchitis.    Cardiology is consulted for management of atrial tachycardia.    Mr. Gamez is an 89-year-old male with past medical history of hypertension, hyperlipidemia, type 2 diabetes mellitus, history of colon cancer status post left colectomy in 2015 who was admitted to the hospital for evaluation of abdominal pain alongside nausea and vomiting.  CT abdomen pelvis was concerning for high-grade small-bowel obstruction.  He was upgraded to ICU as a result of decreased responsiveness.   Currently he has a NG tube in for stomach decompression.  He has been having bouts of atrial tachycardia.  Upon interview, he denied any chest pain, shortness of breath or palpitations.  He is currently getting IV fluid resuscitation.    Past Medical History:   Diagnosis Date    Arthritis     Cancer     Diabetes mellitus     Elevated PSA     Gout, unspecified     Hypertension     Nuclear sclerotic cataract of both eyes 6/29/2018       Past Surgical History:   Procedure Laterality Date    CATARACT EXTRACTION      ou    EYE SURGERY      bilateral cataracts    removal of small bowel  Apr. 2015    Colostomy       Review of patient's allergies indicates:   Allergen Reactions    Iodine Other (See Comments)     Causes gout to flare up    Shellfish containing products      Causes gout to flare up    Shellfish derived        Current Facility-Administered Medications on File Prior to Encounter   Medication    ondansetron HCl (PF) 4 mg/2 mL injection     Current Outpatient Medications on File Prior to Encounter   Medication Sig    allopurinoL (ZYLOPRIM) 300 MG tablet Take 1 tablet (300 mg total) by mouth once daily.    amLODIPine (NORVASC) 10 MG tablet Take 1 tablet (10 mg total) by mouth once daily.    aspirin (ECOTRIN) 81 MG EC tablet Take 1 tablet (81 mg total) by mouth once daily.    atorvastatin (LIPITOR) 20 MG tablet Take 1 tablet (20 mg total) by mouth once daily.    calcitRIOL (ROCALTROL) 0.25 MCG Cap Take 1 capsule (0.25 mcg total) by mouth every other day.    DULoxetine (CYMBALTA) 30 MG capsule Take 1 capsule (30 mg total) by mouth 2 (two) times daily.    ferrous sulfate 324 mg (65 mg iron) TbEC Take 1 tablet by mouth once daily.    gabapentin (NEURONTIN) 100 MG capsule Take 1 capsule (100 mg total) by mouth 3 (three) times daily.    hydrALAZINE (APRESOLINE) 50 MG tablet Take 1 tablet (50 mg total) by mouth 2 (two) times a day.    JANUVIA 50 mg Tab Take 1 tablet (50 mg total) by mouth once daily.    ketoconazole  (NIZORAL) 2 % cream Apply topically once daily.    LIDOcaine (LIDODERM) 5 % Place 1 patch onto the skin once daily. Remove & Discard patch within 12 hours or as directed by MD    sodium bicarbonate 650 MG tablet Take 1 tablet (650 mg total) by mouth 2 (two) times daily. (Patient not taking: Reported on 4/17/2025)    traZODone (DESYREL) 50 MG tablet Take 1 tablet (50 mg total) by mouth every evening.    [DISCONTINUED] blood-glucose meter (ONE TOUCH ULTRAMINI) kit To test twice daily. Use as instructed    [DISCONTINUED] cholestyramine (QUESTRAN) 4 gram packet Take 1 packet (4 g total) by mouth 3 (three) times daily with meals.    [DISCONTINUED] colchicine (COLCRYS) 0.6 mg tablet Take 1 tablet (0.6 mg total) by mouth daily as needed.    [DISCONTINUED] loratadine (CLARITIN) 10 mg tablet Take 10 mg by mouth once daily.    [DISCONTINUED] metoprolol tartrate (LOPRESSOR) 50 MG tablet Take 1 tablet (50 mg total) by mouth 2 (two) times daily.    [DISCONTINUED] QUEtiapine (SEROQUEL) 50 MG tablet Take 1 tablet (50 mg total) by mouth every evening.     Family History       Problem Relation (Age of Onset)    Diabetes Mother    Hypertension Mother    No Known Problems Father, Sister, Brother, Maternal Aunt, Maternal Uncle, Paternal Aunt, Paternal Uncle, Maternal Grandmother, Maternal Grandfather, Paternal Grandmother, Paternal Grandfather          Tobacco Use    Smoking status: Never     Passive exposure: Never    Smokeless tobacco: Never    Tobacco comments:     smoked short while as a teen   Substance and Sexual Activity    Alcohol use: No    Drug use: No    Sexual activity: Not Currently     Partners: Female     Review of Systems   Constitutional: Positive for malaise/fatigue.   Cardiovascular:  Positive for irregular heartbeat. Negative for chest pain, claudication, dyspnea on exertion, leg swelling, near-syncope, orthopnea, palpitations, paroxysmal nocturnal dyspnea and syncope.   Respiratory:  Negative for cough, shortness  of breath, snoring and sputum production.    Gastrointestinal:  Positive for abdominal pain, nausea and vomiting. Negative for dysphagia and heartburn.   Neurological:  Positive for weakness. Negative for dizziness, headaches and loss of balance.     Objective:     Vital Signs (Most Recent):  Temp: 98.1 °F (36.7 °C) (04/20/25 1200)  Pulse: 95 (04/20/25 1500)  Resp: (!) 29 (04/20/25 1500)  BP: 132/76 (04/20/25 1500)  SpO2: 100 % (04/20/25 1500) Vital Signs (24h Range):  Temp:  [97.5 °F (36.4 °C)-98.1 °F (36.7 °C)] 98.1 °F (36.7 °C)  Pulse:  [] 95  Resp:  [11-38] 29  SpO2:  [83 %-100 %] 100 %  BP: ()/() 132/76     Weight: 62 kg (136 lb 11 oz)  Body mass index is 17.55 kg/m².    SpO2: 100 %         Intake/Output Summary (Last 24 hours) at 4/20/2025 1537  Last data filed at 4/20/2025 0546  Gross per 24 hour   Intake 1059.94 ml   Output 1100 ml   Net -40.06 ml       Lines/Drains/Airways       Central Venous Catheter Line  Duration             Port A Cath Single Lumen Subclavian Left -- days              Drain  Duration                  Colostomy 04/23/15 LLQ 3650 days         NG/OG Tube 04/19/25 1130 [place] sump 18 Fr. Right nostril 1 day              Peripheral Intravenous Line  Duration                  Peripheral IV - Single Lumen 04/18/25 1201 22 G Left;Posterior Forearm 2 days                     Physical Exam  Constitutional:       Appearance: He is ill-appearing.   HENT:      Head: Normocephalic and atraumatic.      Mouth/Throat:      Mouth: Mucous membranes are moist.   Eyes:      Extraocular Movements: Extraocular movements intact.      Pupils: Pupils are equal, round, and reactive to light.   Cardiovascular:      Rate and Rhythm: Normal rate and regular rhythm.      Pulses: Normal pulses.      Heart sounds: Normal heart sounds.   Pulmonary:      Effort: Pulmonary effort is normal.      Breath sounds: Normal breath sounds.   Abdominal:      General: Bowel sounds are normal.      Palpations:  Abdomen is soft.   Musculoskeletal:      Left lower leg: No edema.   Skin:     General: Skin is warm.   Psychiatric:         Mood and Affect: Mood normal.         Behavior: Behavior normal.          Current Medications:   albuterol-ipratropium  3 mL Nebulization Q6H WAKE    heparin (porcine)  5,000 Units Subcutaneous Q8H    metoprolol  5 mg Intravenous Q6H    mupirocin   Nasal BID    pantoprazole  40 mg Intravenous Daily      0.9% NaCl   Intravenous Continuous 100 mL/hr at 04/20/25 0546 New Bag at 04/20/25 0546       Current Facility-Administered Medications:     dextrose 50%, 12.5 g, Intravenous, PRN    dextrose 50%, 25 g, Intravenous, PRN    glucagon (human recombinant), 1 mg, Intramuscular, PRN    glucose, 16 g, Oral, PRN    glucose, 24 g, Oral, PRN    hydrALAZINE, 10 mg, Intravenous, Q6H PRN    insulin aspart U-100, 0-5 Units, Subcutaneous, Q6H PRN    morphine, 1 mg, Intravenous, Q4H PRN    morphine, 2 mg, Intravenous, Q4H PRN    naloxone, 0.02 mg, Intravenous, PRN    ondansetron, 4 mg, Intravenous, Q6H PRN    prochlorperazine, 5 mg, Intravenous, Q6H PRN    Laboratory (all labs reviewed):  CBC:  Recent Labs   Lab 09/30/24  0912 01/14/25  1330 04/18/25  1159 04/19/25  0430 04/20/25  0349   WBC 5.16 6.82 8.70 7.64 6.67   Hemoglobin 10.7 L 11.5 L  --   --   --    HGB  --   --  12.8 L 11.0 L 12.4 L   Hematocrit 34.2 L 36.0 L  --   --   --    HCT  --   --  39.3 L 34.9 L 38.4 L   Platelet Count  --   --  314 294 263   Platelets 267 297  --   --   --        CHEMISTRIES:  Recent Labs   Lab 06/10/22  1113 05/09/23  1441 03/04/24  1156 06/11/24  0930 09/30/24  0912 01/14/25  1330 03/20/25  1522 04/11/25  1411 04/13/25  0654 04/18/25  1159 04/19/25  0430 04/19/25 2004 04/20/25  0349   Glucose 145 H   < > 102 141 H 111 H 168 H 142 H  --   --   --   --   --   --    Sodium 139   < > 141 140 139 144 139   < > 140 142 144 144 145   Potassium 4.6   < > 4.7 4.9 4.7 4.4 4.5   < > 4.2 4.6 5.0 4.3 4.3   BUN 33 H   < > 24 H 36 H 23  18 18   < > 13.9 63 H 72 H 74 H 72 H   Creatinine 2.2 H   < > 1.8 H 2.1 H 1.8 H 1.7 H 1.6 H   < > 1.08 2.4 H 2.3 H 2.2 H 2.1 H   eGFR if non  26 A  --   --   --   --   --   --   --   --   --   --   --   --    eGFR  --    < > 35.8 A 29.7 A 35.8 A 38.3 A 40.9 A   < > 66 L 25 L 26 L 28 L 30 L   Calcium 10.0   < > 9.6 10.5 10.1 9.8 10.4   < > 9.4 10.7 H 9.5 9.3 9.5   Magnesium   --   --   --   --   --   --   --   --   --  2.1 2.1 2.1 2.2    < > = values in this interval not displayed.       CARDIAC BIOMARKERS:  Recent Labs   Lab 05/10/23  0413 12/01/23  1215 12/01/23  1512 04/18/25  1159 04/18/25  1421   Troponin I 0.025 <0.006 0.006  --   --    Troponin-I  --   --   --  0.023 0.018       COAGS:  Recent Labs   Lab 04/11/25  1411   INR 1.1       LIPIDS/LFTS:  Recent Labs   Lab 03/20/25  1522 04/11/25  1411 04/18/25  1159 04/19/25  0430 04/20/25  0349   AST 19 12 21 15 17   ALT 11 9 25 18 18       BNP:  Recent Labs   Lab 05/09/23  1441 12/01/23  1215 03/20/25  1522 04/18/25  1159   BNP 10 29 132 H 21       TSH:  Recent Labs   Lab 05/09/23  1441   TSH 2.468       Free T4:        Diagnostic Results:  ECG (personally reviewed and interpreted tracing(s)):  4/18/2025  Sinus rhythm, PVCs and PACs    Chest X-Ray (personally reviewed and interpreted image(s)): 4/18/2025  Increased interstitial opacity more in the right lower lung zone    Echo: 5/2023  Technically difficult study.  The left ventricle is normal in size with concentric remodeling and normal systolic function.  The estimated ejection fraction is 60%.  Normal left ventricular diastolic function.  Normal right ventricular size with normal right ventricular systolic function.  There is no pulmonary hypertension.    Stress Test: NA    Cath: NA   Assessment and Plan:     * SBO (small bowel obstruction)  Management per IM/surgery    Atrial tachycardia  Telemetry reviewed.  Has been having intermittent episodes of atrial tachycardia with heart rate varying  between 130 to 170  Mostly remains in normal sinus rhythm  Electrolytes are within the normal limits    Would recommend starting IV metoprolol 5 mg q.6 (standing order).  Do not administer metoprolol if systolic blood pressure is less than 90 mm Hg  Continue tele monitoring  Echocardiogram pending    Acute renal failure superimposed on stage 3b chronic kidney disease  Creatinine around 2.2 - 2.3  Baseline creatinine normal  Most likely prerenal in setting of nausea vomiting and decreased p.o. intake  Currently getting maintenance cleared  Trend creatinine        VTE Risk Mitigation (From admission, onward)           Ordered     heparin (porcine) injection 5,000 Units  Every 8 hours         04/19/25 1933     Place ARI hose  Until discontinued         04/18/25 1542     Place sequential compression device  Until discontinued         04/18/25 1542     Reason for No Pharmacological VTE Prophylaxis  Once        Question:  Reasons:  Answer:  Physician Provided (leave comment)  Comment:  potential OR    04/18/25 1542     IP VTE HIGH RISK PATIENT  Once         04/18/25 1542                    Elias Ibanez MD  Cardiology   [facility]

## 2025-04-20 NOTE — ASSESSMENT & PLAN NOTE
Telemetry reviewed.  Has been having intermittent episodes of atrial tachycardia with heart rate varying between 130 to 170  Mostly remains in normal sinus rhythm  Electrolytes are within the normal limits    Would recommend starting IV metoprolol 5 mg q.6 (standing order).  Do not administer metoprolol if systolic blood pressure is less than 90 mm Hg  Continue tele monitoring  Echocardiogram pending

## 2025-04-20 NOTE — PLAN OF CARE
ICU PLAN OF CARE    Dx: SBO (small bowel obstruction)    Goals of Care: Monitor NG output, urine output, and mental status    Vital Signs (last 12 hours):   Temp: 97.5 F   BP  RR  HR  SPO2    Neuro: Follows commands  and Arouses to Voice    Cardiac: normal sinus rhythm        Respiratory:  5L Nasal Cannula     Gtts:  ml/hr    Urine Output: Voids Spontaneously  500 mL/shift    Drains: N/A    Diet: NPO      Labs/Accuchecks: Daily q6    Skin:  INTACT.  Patient turned q2h, bony prominences protected, and mattress inflated/working correctly.     Shift Events:  Monitor, .  See flowsheet for further assessment/details.  Family updated on current condition/plan of care, questions answered, and emotional support provided.  MD updated on current condition, vitals, labs, and gtts.   Problem: Adult Inpatient Plan of Care  Goal: Plan of Care Review  Outcome: Progressing  Goal: Patient-Specific Goal (Individualized)  Outcome: Progressing  Goal: Absence of Hospital-Acquired Illness or Injury  Outcome: Progressing  Goal: Optimal Comfort and Wellbeing  Outcome: Progressing  Goal: Readiness for Transition of Care  Outcome: Progressing     Problem: Infection  Goal: Absence of Infection Signs and Symptoms  Outcome: Progressing     Problem: Acute Kidney Injury/Impairment  Goal: Fluid and Electrolyte Balance  Outcome: Progressing  Goal: Improved Oral Intake  Outcome: Progressing  Goal: Effective Renal Function  Outcome: Progressing     Problem: Skin Injury Risk Increased  Goal: Skin Health and Integrity  Outcome: Progressing     Problem: Wound  Goal: Optimal Coping  Outcome: Progressing  Goal: Optimal Functional Ability  Outcome: Progressing  Goal: Absence of Infection Signs and Symptoms  Outcome: Progressing  Goal: Improved Oral Intake  Outcome: Progressing  Goal: Optimal Pain Control and Function  Outcome: Progressing  Goal: Skin Health and Integrity  Outcome: Progressing  Goal: Optimal Wound Healing  Outcome: Progressing

## 2025-04-20 NOTE — ASSESSMENT & PLAN NOTE
Creatinine around 2.2 - 2.3  Baseline creatinine normal  Most likely prerenal in setting of nausea vomiting and decreased p.o. intake  Currently getting maintenance cleared  Trend creatinine

## 2025-04-20 NOTE — EICU
eICU Physician Brief Note    Chart reviewed. On camera, the patient is resting in bed, in NAD, sat 100%.  Mr Eugene Gamez is an 89 year old man admitted 4/18 with SBO, VINNY. Surgery consulted and the patient is being managed medically, NGT in place, plan for gastrografin study in am. He has been improving gradually clinically. This evening 4/19 he was found by the floor RN to be unresponsive and concern was raised for possible near-code situation. He was emergently trsf to the ICU. During transport to the ICU, the patient woke up and was AOx3, presumably no change from baseline.  PMH: left colectomy for colon CA, with colostomy in place, ventral hernia, DM2, HTN, CKD III, COPD.  Labs and investigations reviewed.  Current medications reviewed.  A/P:  SBO  Continue conservative management and monitor symptoms.  Surgical service following.  VINNY on CKD  Continue IV hydration and trend Cre and electrolytes.  Multiple PVCs on EKG, ordered BMP, Mag and phos as well as 12 lead EKG.  HTN  Patient is strict NPO.  Treat BP prn with IV meds.  COPD  Is on Duonebs.  Had mucus plugging on CT, monitor symptoms.  GI/DVT prophylaxis - start PPI as patient will be NPO for >48 hrs; Heparin SC, SCDs and mobility protocol.    eICU is available should acute issues arise.

## 2025-04-20 NOTE — SUBJECTIVE & OBJECTIVE
Past Medical History:   Diagnosis Date    Arthritis     Cancer     Diabetes mellitus     Elevated PSA     Gout, unspecified     Hypertension     Nuclear sclerotic cataract of both eyes 6/29/2018       Past Surgical History:   Procedure Laterality Date    CATARACT EXTRACTION      ou    EYE SURGERY      bilateral cataracts    removal of small bowel  Apr. 2015    Colostomy       Review of patient's allergies indicates:   Allergen Reactions    Iodine Other (See Comments)     Causes gout to flare up    Shellfish containing products      Causes gout to flare up    Shellfish derived        Current Facility-Administered Medications on File Prior to Encounter   Medication    ondansetron HCl (PF) 4 mg/2 mL injection     Current Outpatient Medications on File Prior to Encounter   Medication Sig    allopurinoL (ZYLOPRIM) 300 MG tablet Take 1 tablet (300 mg total) by mouth once daily.    amLODIPine (NORVASC) 10 MG tablet Take 1 tablet (10 mg total) by mouth once daily.    aspirin (ECOTRIN) 81 MG EC tablet Take 1 tablet (81 mg total) by mouth once daily.    atorvastatin (LIPITOR) 20 MG tablet Take 1 tablet (20 mg total) by mouth once daily.    calcitRIOL (ROCALTROL) 0.25 MCG Cap Take 1 capsule (0.25 mcg total) by mouth every other day.    DULoxetine (CYMBALTA) 30 MG capsule Take 1 capsule (30 mg total) by mouth 2 (two) times daily.    ferrous sulfate 324 mg (65 mg iron) TbEC Take 1 tablet by mouth once daily.    gabapentin (NEURONTIN) 100 MG capsule Take 1 capsule (100 mg total) by mouth 3 (three) times daily.    hydrALAZINE (APRESOLINE) 50 MG tablet Take 1 tablet (50 mg total) by mouth 2 (two) times a day.    JANUVIA 50 mg Tab Take 1 tablet (50 mg total) by mouth once daily.    ketoconazole (NIZORAL) 2 % cream Apply topically once daily.    LIDOcaine (LIDODERM) 5 % Place 1 patch onto the skin once daily. Remove & Discard patch within 12 hours or as directed by MD    sodium bicarbonate 650 MG tablet Take 1 tablet (650 mg total)  by mouth 2 (two) times daily. (Patient not taking: Reported on 4/17/2025)    traZODone (DESYREL) 50 MG tablet Take 1 tablet (50 mg total) by mouth every evening.    [DISCONTINUED] blood-glucose meter (ONE TOUCH ULTRAMINI) kit To test twice daily. Use as instructed    [DISCONTINUED] cholestyramine (QUESTRAN) 4 gram packet Take 1 packet (4 g total) by mouth 3 (three) times daily with meals.    [DISCONTINUED] colchicine (COLCRYS) 0.6 mg tablet Take 1 tablet (0.6 mg total) by mouth daily as needed.    [DISCONTINUED] loratadine (CLARITIN) 10 mg tablet Take 10 mg by mouth once daily.    [DISCONTINUED] metoprolol tartrate (LOPRESSOR) 50 MG tablet Take 1 tablet (50 mg total) by mouth 2 (two) times daily.    [DISCONTINUED] QUEtiapine (SEROQUEL) 50 MG tablet Take 1 tablet (50 mg total) by mouth every evening.     Family History       Problem Relation (Age of Onset)    Diabetes Mother    Hypertension Mother    No Known Problems Father, Sister, Brother, Maternal Aunt, Maternal Uncle, Paternal Aunt, Paternal Uncle, Maternal Grandmother, Maternal Grandfather, Paternal Grandmother, Paternal Grandfather          Tobacco Use    Smoking status: Never     Passive exposure: Never    Smokeless tobacco: Never    Tobacco comments:     smoked short while as a teen   Substance and Sexual Activity    Alcohol use: No    Drug use: No    Sexual activity: Not Currently     Partners: Female     Review of Systems   Constitutional: Positive for malaise/fatigue.   Cardiovascular:  Positive for irregular heartbeat. Negative for chest pain, claudication, dyspnea on exertion, leg swelling, near-syncope, orthopnea, palpitations, paroxysmal nocturnal dyspnea and syncope.   Respiratory:  Negative for cough, shortness of breath, snoring and sputum production.    Gastrointestinal:  Positive for abdominal pain, nausea and vomiting. Negative for dysphagia and heartburn.   Neurological:  Positive for weakness. Negative for dizziness, headaches and loss of  balance.     Objective:     Vital Signs (Most Recent):  Temp: 98.1 °F (36.7 °C) (04/20/25 1200)  Pulse: 95 (04/20/25 1500)  Resp: (!) 29 (04/20/25 1500)  BP: 132/76 (04/20/25 1500)  SpO2: 100 % (04/20/25 1500) Vital Signs (24h Range):  Temp:  [97.5 °F (36.4 °C)-98.1 °F (36.7 °C)] 98.1 °F (36.7 °C)  Pulse:  [] 95  Resp:  [11-38] 29  SpO2:  [83 %-100 %] 100 %  BP: ()/() 132/76     Weight: 62 kg (136 lb 11 oz)  Body mass index is 17.55 kg/m².    SpO2: 100 %         Intake/Output Summary (Last 24 hours) at 4/20/2025 1537  Last data filed at 4/20/2025 0546  Gross per 24 hour   Intake 1059.94 ml   Output 1100 ml   Net -40.06 ml       Lines/Drains/Airways       Central Venous Catheter Line  Duration             Port A Cath Single Lumen Subclavian Left -- days              Drain  Duration                  Colostomy 04/23/15 LLQ 3650 days         NG/OG Tube 04/19/25 1130 [place] sump 18 Fr. Right nostril 1 day              Peripheral Intravenous Line  Duration                  Peripheral IV - Single Lumen 04/18/25 1201 22 G Left;Posterior Forearm 2 days                     Physical Exam  Constitutional:       Appearance: He is ill-appearing.   HENT:      Head: Normocephalic and atraumatic.      Mouth/Throat:      Mouth: Mucous membranes are moist.   Eyes:      Extraocular Movements: Extraocular movements intact.      Pupils: Pupils are equal, round, and reactive to light.   Cardiovascular:      Rate and Rhythm: Normal rate and regular rhythm.      Pulses: Normal pulses.      Heart sounds: Normal heart sounds.   Pulmonary:      Effort: Pulmonary effort is normal.      Breath sounds: Normal breath sounds.   Abdominal:      General: Bowel sounds are normal.      Palpations: Abdomen is soft.   Musculoskeletal:      Left lower leg: No edema.   Skin:     General: Skin is warm.   Psychiatric:         Mood and Affect: Mood normal.         Behavior: Behavior normal.          Current Medications:    albuterol-ipratropium  3 mL Nebulization Q6H WAKE    heparin (porcine)  5,000 Units Subcutaneous Q8H    metoprolol  5 mg Intravenous Q6H    mupirocin   Nasal BID    pantoprazole  40 mg Intravenous Daily      0.9% NaCl   Intravenous Continuous 100 mL/hr at 04/20/25 0546 New Bag at 04/20/25 0546       Current Facility-Administered Medications:     dextrose 50%, 12.5 g, Intravenous, PRN    dextrose 50%, 25 g, Intravenous, PRN    glucagon (human recombinant), 1 mg, Intramuscular, PRN    glucose, 16 g, Oral, PRN    glucose, 24 g, Oral, PRN    hydrALAZINE, 10 mg, Intravenous, Q6H PRN    insulin aspart U-100, 0-5 Units, Subcutaneous, Q6H PRN    morphine, 1 mg, Intravenous, Q4H PRN    morphine, 2 mg, Intravenous, Q4H PRN    naloxone, 0.02 mg, Intravenous, PRN    ondansetron, 4 mg, Intravenous, Q6H PRN    prochlorperazine, 5 mg, Intravenous, Q6H PRN    Laboratory (all labs reviewed):  CBC:  Recent Labs   Lab 09/30/24  0912 01/14/25  1330 04/18/25  1159 04/19/25  0430 04/20/25  0349   WBC 5.16 6.82 8.70 7.64 6.67   Hemoglobin 10.7 L 11.5 L  --   --   --    HGB  --   --  12.8 L 11.0 L 12.4 L   Hematocrit 34.2 L 36.0 L  --   --   --    HCT  --   --  39.3 L 34.9 L 38.4 L   Platelet Count  --   --  314 294 263   Platelets 267 297  --   --   --        CHEMISTRIES:  Recent Labs   Lab 06/10/22  1113 05/09/23  1441 03/04/24  1156 06/11/24  0930 09/30/24  0912 01/14/25  1330 03/20/25  1522 04/11/25  1411 04/13/25  0654 04/18/25  1159 04/19/25  0430 04/19/25  2004 04/20/25  0349   Glucose 145 H   < > 102 141 H 111 H 168 H 142 H  --   --   --   --   --   --    Sodium 139   < > 141 140 139 144 139   < > 140 142 144 144 145   Potassium 4.6   < > 4.7 4.9 4.7 4.4 4.5   < > 4.2 4.6 5.0 4.3 4.3   BUN 33 H   < > 24 H 36 H 23 18 18   < > 13.9 63 H 72 H 74 H 72 H   Creatinine 2.2 H   < > 1.8 H 2.1 H 1.8 H 1.7 H 1.6 H   < > 1.08 2.4 H 2.3 H 2.2 H 2.1 H   eGFR if non  26 A  --   --   --   --   --   --   --   --   --   --   --   --     eGFR  --    < > 35.8 A 29.7 A 35.8 A 38.3 A 40.9 A   < > 66 L 25 L 26 L 28 L 30 L   Calcium 10.0   < > 9.6 10.5 10.1 9.8 10.4   < > 9.4 10.7 H 9.5 9.3 9.5   Magnesium   --   --   --   --   --   --   --   --   --  2.1 2.1 2.1 2.2    < > = values in this interval not displayed.       CARDIAC BIOMARKERS:  Recent Labs   Lab 05/10/23  0413 12/01/23  1215 12/01/23  1512 04/18/25  1159 04/18/25  1421   Troponin I 0.025 <0.006 0.006  --   --    Troponin-I  --   --   --  0.023 0.018       COAGS:  Recent Labs   Lab 04/11/25  1411   INR 1.1       LIPIDS/LFTS:  Recent Labs   Lab 03/20/25  1522 04/11/25  1411 04/18/25  1159 04/19/25  0430 04/20/25  0349   AST 19 12 21 15 17   ALT 11 9 25 18 18       BNP:  Recent Labs   Lab 05/09/23  1441 12/01/23  1215 03/20/25  1522 04/18/25  1159   BNP 10 29 132 H 21       TSH:  Recent Labs   Lab 05/09/23  1441   TSH 2.468       Free T4:        Diagnostic Results:  ECG (personally reviewed and interpreted tracing(s)):  4/18/2025  Sinus rhythm, PVCs and PACs    Chest X-Ray (personally reviewed and interpreted image(s)): 4/18/2025  Increased interstitial opacity more in the right lower lung zone    Echo: 5/2023  Technically difficult study.  The left ventricle is normal in size with concentric remodeling and normal systolic function.  The estimated ejection fraction is 60%.  Normal left ventricular diastolic function.  Normal right ventricular size with normal right ventricular systolic function.  There is no pulmonary hypertension.    Stress Test: NA    Cath: NA    ambulatory

## 2025-04-20 NOTE — ASSESSMENT & PLAN NOTE
"- CT upon admission: "High-grade small bowel obstruction related to a 5 by 5 cm ventral abdominal wall hernia. Status post left colectomy common diverting left lower quadrant ostomy and multiple small bowel anastomoses with markedly dilated small bowel loops in the deep pelvis which may reflect acute superimposed on chronic change."  - General surgery consulted  - NPO   NGT placed.    "

## 2025-04-20 NOTE — ASSESSMENT & PLAN NOTE
Patient's blood pressure range in the last 24 hours was: BP  Min: 95/61  Max: 162/111.The patient's inpatient anti-hypertensive regimen is listed below:  Current Antihypertensives  hydrALAZINE injection 10 mg, Every 6 hours PRN, Intravenous    Plan  - BP increasing as patient unable to take his oral BP medications.  Continue with prn IV Hydralazine.

## 2025-04-20 NOTE — PROGRESS NOTES
Surgery Progress Note    Eugene Gamez is a 89 y.o. year old male in hospital for small bowel obstruction    Patient had an episode of decreased responsiveness. Stepped up to ICU. No more alert.     No f/c/n/v/sob/cp  Abdominal pain improving    ROS:  Negative except above    PE:  Vitals:    04/20/25 0930 04/20/25 1000 04/20/25 1030 04/20/25 1100   BP: 138/66 95/61 104/69 126/68   BP Location:       Patient Position:       Pulse: 97 107 99 92   Resp: (!) 23 (!) 31 (!) 25 (!) 33   Temp:       TempSrc:       SpO2: 100% (!) 83% 100% 96%   Weight:       Height:           NAD  No belabored breathing  No skin changes  Abd soft mildly distended    Significant Diagnostic Studies: N/A    A/P:  Eugene Gamez is a 89 y.o. year old male  with small bowel obstruction    -gastrograffin challenge today  - keep NPO, NGT  - rest of care per primary    Honey Meneses  General Surgery - [facility]  4/20/2025

## 2025-04-20 NOTE — HPI
Mr Eugene Gamez is a 89 y.o. man with history of L colectomy for colon cancer (2015) with ventral hernia, prior DM, HTN, CKD3 (baseline Cr 1) who presented with nausea and vomiting, abdominal pain.  The symptoms began a day or 2 ago but significantly worsened yesterday morning.  Last night and today He has not been able to keep much of anything down.  He denies any issues with his colostomy, stool has been normal.  Overall he feels weak and fatigued.  Denies dysuria, difficulty urinating, changes in bowel habits  His CMP shows creatinine 2.4, bicarb 21, anion gap 17, glucose 209; UA with trace protein, 7 RBC, 2 WBC; CT abdomen and pelvis with high-grade small-bowel obstruction related to a 5 x 5 cm ventral abdominal wall hernia, right middle and right lower lobe mucous plugging and/or endobronchial spread of infection/bronchitis.    Cardiology is consulted for management of atrial tachycardia.    Mr. Gamez is an 89-year-old male with past medical history of hypertension, hyperlipidemia, type 2 diabetes mellitus, history of colon cancer status post left colectomy in 2015 who was admitted to the hospital for evaluation of abdominal pain alongside nausea and vomiting.  CT abdomen pelvis was concerning for high-grade small-bowel obstruction.  He was upgraded to ICU as a result of decreased responsiveness.  Currently he has a NG tube in for stomach decompression.  He has been having bouts of atrial tachycardia.  Upon interview, he denied any chest pain, shortness of breath or palpitations.  He is currently getting IV fluid resuscitation.

## 2025-04-20 NOTE — ASSESSMENT & PLAN NOTE
Anemia is likely due to chronic disease due to Chronic Kidney Disease. Most recent hemoglobin and hematocrit are listed below.  Recent Labs     04/18/25  1159 04/19/25  0430 04/20/25  0349   HGB 12.8* 11.0* 12.4*   HCT 39.3* 34.9* 38.4*     Plan  - Monitor serial CBC: Daily  - Transfuse PRBC if patient becomes hemodynamically unstable, symptomatic or H/H drops below 7/21.  - Patient has not received any PRBC transfusions to date  - Patient's anemia is currently stable

## 2025-04-20 NOTE — PROGRESS NOTES
[facility] Medicine  Progress Note    Patient Name: Eugene Gamez  MRN: [id number]  Patient Class: IP- Inpatient   Admission Date: 4/18/2025  Length of Stay: 2 days  Attending Physician: Darion Mace MD  Primary Care Provider: Larry Monae MD        Subjective     Principal Problem:SBO (small bowel obstruction)        HPI:  Mr Eugene Gamez is a 89 y.o. man with history of L colectomy for colon cancer (2015) with ventral hernia, prior DM, HTN, CKD3 (baseline Cr 1) who presented with nausea and vomiting, abdominal pain.  The symptoms began a day or 2 ago but significantly worsened yesterday morning.  Last night and today He has not been able to keep much of anything down.  He denies any issues with his colostomy, stool has been normal.  Overall he feels weak and fatigued.  Denies dysuria, difficulty urinating, changes in bowel habits  His CMP shows creatinine 2.4, bicarb 21, anion gap 17, glucose 209; UA with trace protein, 7 RBC, 2 WBC; CT abdomen and pelvis with high-grade small-bowel obstruction related to a 5 x 5 cm ventral abdominal wall hernia, right middle and right lower lobe mucous plugging and/or endobronchial spread of infection/bronchitis.  He was admitted for high grade SBO. Started IVF. General surgery consulted.     Overview/Hospital Course:  Mr Eugene Gamez is a 89 y.o. man with history of L colectomy for colon cancer (2015) with colostomy in place and ventral hernia who was admitted with SBO. General surgery consulted.  NGT placed.    Interval History: episode yesterday where patient was apparently poorly responsive. Moved to ICU.  Symptoms quickly resolved.    Review of Systems   HENT:  Negative for ear discharge and ear pain.    Eyes:  Negative for discharge and itching.   Endocrine: Negative for cold intolerance and heat intolerance.   Neurological:  Negative for seizures and syncope.     Objective:     Vital Signs (Most Recent):  Temp: 98.1 °F (36.7 °C)  (04/20/25 1200)  Pulse: 106 (04/20/25 1429)  Resp: (!) 25 (04/20/25 1429)  BP: (!) 157/77 (04/20/25 1400)  SpO2: 100 % (04/20/25 1400) Vital Signs (24h Range):  Temp:  [97.5 °F (36.4 °C)-98.1 °F (36.7 °C)] 98.1 °F (36.7 °C)  Pulse:  [] 106  Resp:  [11-38] 25  SpO2:  [83 %-100 %] 100 %  BP: ()/() 157/77     Weight: 62 kg (136 lb 11 oz)  Body mass index is 17.55 kg/m².    Intake/Output Summary (Last 24 hours) at 4/20/2025 1436  Last data filed at 4/20/2025 0546  Gross per 24 hour   Intake 1059.94 ml   Output 1100 ml   Net -40.06 ml         Physical Exam  Vitals and nursing note reviewed.   Constitutional:       Appearance: He is ill-appearing.   HENT:      Head:      Comments: Temporal wasting     Nose:      Comments: NGT in place     Mouth/Throat:      Mouth: Mucous membranes are dry.   Cardiovascular:      Rate and Rhythm: Normal rate and regular rhythm.   Pulmonary:      Effort: Pulmonary effort is normal.      Breath sounds: Normal breath sounds.   Abdominal:      General: There is distension (Not tense).      Tenderness: There is no abdominal tenderness. There is no guarding or rebound.      Comments: Hypoactive bowel sounds.  Left-sided ostomy with no stool present   Musculoskeletal:      Right lower leg: No edema.      Left lower leg: No edema.   Skin:     General: Skin is warm and dry.   Neurological:      Mental Status: He is alert. Mental status is at baseline.               Significant Labs: All pertinent labs within the past 24 hours have been reviewed.  BMP:   Recent Labs   Lab 04/20/25  0349      K 4.3   *   CO2 20*   BUN 72*   CREATININE 2.1*   CALCIUM 9.5   MG 2.2     CBC:   Recent Labs   Lab 04/19/25  0430 04/20/25  0349   WBC 7.64 6.67   HGB 11.0* 12.4*   HCT 34.9* 38.4*    263       Significant Imaging: I have reviewed all pertinent imaging results/findings within the past 24 hours.      Assessment & Plan  SBO (small bowel obstruction)  - CT upon admission:  ""High-grade small bowel obstruction related to a 5 by 5 cm ventral abdominal wall hernia. Status post left colectomy common diverting left lower quadrant ostomy and multiple small bowel anastomoses with markedly dilated small bowel loops in the deep pelvis which may reflect acute superimposed on chronic change."  - General surgery consulted  - NPO   NGT placed.    Acute renal failure superimposed on stage 3b chronic kidney disease  VINNY is likely due to  prerenal from SBO . Baseline creatinine is  1 . Most recent creatinine and eGFR are listed below.  Recent Labs     04/19/25 0430 04/19/25 2004 04/20/25  0349   CREATININE 2.3* 2.2* 2.1*   EGFRNORACEVR 26* 28* 30*   - UA unremarkable  - CT shows no hydronephrosis     Plan  - VINNY is improving but not yet at baseline  - Avoid nephrotoxins and renally dose meds for GFR listed above  - Monitor urine output, serial BMP, and adjust therapy as needed  - LR at 100cc/hr - continue  HTN, goal below 140/80  Patient's blood pressure range in the last 24 hours was: BP  Min: 95/61  Max: 162/111.The patient's inpatient anti-hypertensive regimen is listed below:  Current Antihypertensives  hydrALAZINE injection 10 mg, Every 6 hours PRN, Intravenous    Plan  - BP increasing as patient unable to take his oral BP medications.  Continue with prn IV Hydralazine.   Pulmonary emphysema, unspecified emphysema type  Patient's COPD is controlled currently.  Patient is currently off COPD Pathway.  Stable on room air  Anemia of chronic renal failure, stage 3b  Anemia is likely due to chronic disease due to Chronic Kidney Disease. Most recent hemoglobin and hematocrit are listed below.  Recent Labs     04/18/25  1159 04/19/25 0430 04/20/25  0349   HGB 12.8* 11.0* 12.4*   HCT 39.3* 34.9* 38.4*     Plan  - Monitor serial CBC: Daily  - Transfuse PRBC if patient becomes hemodynamically unstable, symptomatic or H/H drops below 7/21.  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 Liver biopsy negative     Ventral hernia  - see SBO    Body mass index (BMI) less than 19  Body mass index is 17.55 kg/m².  Notably, albumin 3.6 on admission- perhaps due to dehydration  - must remain strict NPO at this time       Mucus plug in respiratory tract  - noted on CT  - nebs ordered   - remains stable on room air    NSVT (nonsustained ventricular tachycardia)  Episodes of NSVT on monitoring.  Check EKG  Check Echo.  Cardiology consult.    VTE Risk Mitigation (From admission, onward)           Ordered     heparin (porcine) injection 5,000 Units  Every 8 hours         04/19/25 1933     Place ARI hose  Until discontinued         04/18/25 1542     Place sequential compression device  Until discontinued         04/18/25 1542     Reason for No Pharmacological VTE Prophylaxis  Once        Question:  Reasons:  Answer:  Physician Provided (leave comment)  Comment:  potential OR    04/18/25 1542     IP VTE HIGH RISK PATIENT  Once         04/18/25 1542                    Discharge Planning   WIL:      Code Status: Full Code   Medical Readiness for Discharge Date:   Discharge Plan A: Home Health                Darion Mace MD  Department of Hospital Medicine   [facility] [place] - Intensive [facility]

## 2025-04-20 NOTE — EICU
Virtual ICU Quality Rounds    Admit Date: 4/18/2025  Hospital Day: 2    ICU Day: 21h    24H Vital Sign Range:  Temp:  [97.5 °F (36.4 °C)-98.1 °F (36.7 °C)]   Pulse:  []   Resp:  [11-38]   BP: ()/()   SpO2:  [83 %-100 %]     VICU Surveillance Screening    Daily review of  line necessity(optional): Central line(s) in place    Central line type (optional): Port            LDA reconciliation : Yes

## 2025-04-20 NOTE — PLAN OF CARE
Still no stool in colostomy.  NG continues to LIWS with minimal brown drainage.  Intermittent,  self limiting runs of SVT/ a flutter with HR up to 150's.  Cardiology and palliative care consults placed.  Awaiting gastogaffin challenge as ordered by surgery team.  Asking for water and to eat.  IV fluids continue at 100cc/hr.    Problem: Adult Inpatient Plan of Care  Goal: Plan of Care Review  Outcome: Not Progressing  Goal: Patient-Specific Goal (Individualized)  Outcome: Not Progressing  Goal: Absence of Hospital-Acquired Illness or Injury  Outcome: Not Progressing  Goal: Optimal Comfort and Wellbeing  Outcome: Not Progressing  Goal: Readiness for Transition of Care  Outcome: Not Progressing     Problem: Infection  Goal: Absence of Infection Signs and Symptoms  Outcome: Not Progressing     Problem: Acute Kidney Injury/Impairment  Goal: Fluid and Electrolyte Balance  Outcome: Not Progressing  Goal: Improved Oral Intake  Outcome: Not Progressing  Goal: Effective Renal Function  Outcome: Not Progressing     Problem: Skin Injury Risk Increased  Goal: Skin Health and Integrity  Outcome: Not Progressing     Problem: Wound  Goal: Optimal Coping  Outcome: Not Progressing  Goal: Optimal Functional Ability  Outcome: Not Progressing  Goal: Absence of Infection Signs and Symptoms  Outcome: Not Progressing  Goal: Improved Oral Intake  Outcome: Not Progressing  Goal: Optimal Pain Control and Function  Outcome: Not Progressing  Goal: Skin Health and Integrity  Outcome: Not Progressing  Goal: Optimal Wound Healing  Outcome: Not Progressing     Problem: Fall Injury Risk  Goal: Absence of Fall and Fall-Related Injury  Outcome: Not Progressing     Problem: Coping Ineffective  Goal: Effective Coping  Outcome: Not Progressing

## 2025-04-20 NOTE — SUBJECTIVE & OBJECTIVE
Interval History: episode yesterday where patient was apparently poorly responsive. Moved to ICU.  Symptoms quickly resolved.    Review of Systems   HENT:  Negative for ear discharge and ear pain.    Eyes:  Negative for discharge and itching.   Endocrine: Negative for cold intolerance and heat intolerance.   Neurological:  Negative for seizures and syncope.     Objective:     Vital Signs (Most Recent):  Temp: 98.1 °F (36.7 °C) (04/20/25 1200)  Pulse: 106 (04/20/25 1429)  Resp: (!) 25 (04/20/25 1429)  BP: (!) 157/77 (04/20/25 1400)  SpO2: 100 % (04/20/25 1400) Vital Signs (24h Range):  Temp:  [97.5 °F (36.4 °C)-98.1 °F (36.7 °C)] 98.1 °F (36.7 °C)  Pulse:  [] 106  Resp:  [11-38] 25  SpO2:  [83 %-100 %] 100 %  BP: ()/() 157/77     Weight: 62 kg (136 lb 11 oz)  Body mass index is 17.55 kg/m².    Intake/Output Summary (Last 24 hours) at 4/20/2025 1436  Last data filed at 4/20/2025 0546  Gross per 24 hour   Intake 1059.94 ml   Output 1100 ml   Net -40.06 ml         Physical Exam  Vitals and nursing note reviewed.   Constitutional:       Appearance: He is ill-appearing.   HENT:      Head:      Comments: Temporal wasting     Nose:      Comments: NGT in place     Mouth/Throat:      Mouth: Mucous membranes are dry.   Cardiovascular:      Rate and Rhythm: Normal rate and regular rhythm.   Pulmonary:      Effort: Pulmonary effort is normal.      Breath sounds: Normal breath sounds.   Abdominal:      General: There is distension (Not tense).      Tenderness: There is no abdominal tenderness. There is no guarding or rebound.      Comments: Hypoactive bowel sounds.  Left-sided ostomy with no stool present   Musculoskeletal:      Right lower leg: No edema.      Left lower leg: No edema.   Skin:     General: Skin is warm and dry.   Neurological:      Mental Status: He is alert. Mental status is at baseline.               Significant Labs: All pertinent labs within the past 24 hours have been reviewed.  BMP:    Recent Labs   Lab 04/20/25  0349      K 4.3   *   CO2 20*   BUN 72*   CREATININE 2.1*   CALCIUM 9.5   MG 2.2     CBC:   Recent Labs   Lab 04/19/25  0430 04/20/25  0349   WBC 7.64 6.67   HGB 11.0* 12.4*   HCT 34.9* 38.4*    263       Significant Imaging: I have reviewed all pertinent imaging results/findings within the past 24 hours.

## 2025-04-20 NOTE — ASSESSMENT & PLAN NOTE
VINNY is likely due to prerenal from SBO. Baseline creatinine is 1. Most recent creatinine and eGFR are listed below.  Recent Labs     04/19/25  0430 04/19/25 2004 04/20/25  0349   CREATININE 2.3* 2.2* 2.1*   EGFRNORACEVR 26* 28* 30*   - UA unremarkable  - CT shows no hydronephrosis     Plan  - VINNY is improving but not yet at baseline  - Avoid nephrotoxins and renally dose meds for GFR listed above  - Monitor urine output, serial BMP, and adjust therapy as needed  - LR at 100cc/hr - continue

## 2025-04-21 PROBLEM — T17.998A MUCUS PLUG IN RESPIRATORY TRACT: Status: RESOLVED | Noted: 2025-04-18 | Resolved: 2025-04-21

## 2025-04-21 PROBLEM — R41.82 ALTERED MENTAL STATUS: Status: ACTIVE | Noted: 2025-04-21

## 2025-04-21 PROBLEM — Z71.89 ADVANCE CARE PLANNING: Status: ACTIVE | Noted: 2025-04-21

## 2025-04-21 PROBLEM — E43 SEVERE PROTEIN-CALORIE MALNUTRITION: Status: ACTIVE | Noted: 2025-04-18

## 2025-04-21 PROBLEM — E87.0 HYPERNATREMIA: Status: ACTIVE | Noted: 2025-04-21

## 2025-04-21 LAB
ABSOLUTE EOSINOPHIL (OHS): 0.01 K/UL
ABSOLUTE MONOCYTE (OHS): 1.06 K/UL (ref 0.3–1)
ABSOLUTE NEUTROPHIL COUNT (OHS): 4.87 K/UL (ref 1.8–7.7)
ALBUMIN SERPL BCP-MCNC: 2.8 G/DL (ref 3.5–5.2)
ALP SERPL-CCNC: 41 UNIT/L (ref 40–150)
ALT SERPL W/O P-5'-P-CCNC: 13 UNIT/L (ref 10–44)
ANION GAP (OHS): 14 MMOL/L (ref 8–16)
AST SERPL-CCNC: 14 UNIT/L (ref 11–45)
BASOPHILS # BLD AUTO: 0.01 K/UL
BASOPHILS NFR BLD AUTO: 0.1 %
BILIRUB SERPL-MCNC: 0.4 MG/DL (ref 0.1–1)
BSA FOR ECHO PROCEDURE: 1.8 M2
BUN SERPL-MCNC: 69 MG/DL (ref 8–23)
CALCIUM SERPL-MCNC: 8.8 MG/DL (ref 8.7–10.5)
CHLORIDE SERPL-SCNC: 119 MMOL/L (ref 95–110)
CO2 SERPL-SCNC: 18 MMOL/L (ref 23–29)
CREAT SERPL-MCNC: 1.8 MG/DL (ref 0.5–1.4)
CV ECHO LV RWT: 0.48 CM
DOP CALC LVOT AREA: 3.1 CM2
DOP CALC LVOT DIAMETER: 2 CM
ECHO LV POSTERIOR WALL: 1 CM (ref 0.6–1.1)
ERYTHROCYTE [DISTWIDTH] IN BLOOD BY AUTOMATED COUNT: 14.4 % (ref 11.5–14.5)
FRACTIONAL SHORTENING: 23.8 % (ref 28–44)
GFR SERPLBLD CREATININE-BSD FMLA CKD-EPI: 36 ML/MIN/1.73/M2
GLUCOSE SERPL-MCNC: 103 MG/DL (ref 70–110)
HCT VFR BLD AUTO: 35.7 % (ref 40–54)
HGB BLD-MCNC: 11.1 GM/DL (ref 14–18)
IMM GRANULOCYTES # BLD AUTO: 0.04 K/UL (ref 0–0.04)
IMM GRANULOCYTES NFR BLD AUTO: 0.6 % (ref 0–0.5)
INTERVENTRICULAR SEPTUM: 1.1 CM (ref 0.6–1.1)
LEFT ATRIUM SIZE: 3.5 CM
LEFT INTERNAL DIMENSION IN SYSTOLE: 3.2 CM (ref 2.1–4)
LEFT VENTRICLE DIASTOLIC VOLUME INDEX: 43.24 ML/M2
LEFT VENTRICLE DIASTOLIC VOLUME: 80 ML
LEFT VENTRICLE MASS INDEX: 79.5 G/M2
LEFT VENTRICLE SYSTOLIC VOLUME INDEX: 22.2 ML/M2
LEFT VENTRICLE SYSTOLIC VOLUME: 41 ML
LEFT VENTRICULAR INTERNAL DIMENSION IN DIASTOLE: 4.2 CM (ref 3.5–6)
LEFT VENTRICULAR MASS: 147 G
LVED V (TEICH): 80.3 ML
LVES V (TEICH): 41.15 ML
LYMPHOCYTES # BLD AUTO: 0.82 K/UL (ref 1–4.8)
MAGNESIUM SERPL-MCNC: 2.2 MG/DL (ref 1.6–2.6)
MCH RBC QN AUTO: 29.2 PG (ref 27–31)
MCHC RBC AUTO-ENTMCNC: 31.1 G/DL (ref 32–36)
MCV RBC AUTO: 94 FL (ref 82–98)
NUCLEATED RBC (/100WBC) (OHS): 0 /100 WBC
PHOSPHATE SERPL-MCNC: 4 MG/DL (ref 2.7–4.5)
PISA TR MAX VEL: 2.3 M/S
PLATELET # BLD AUTO: 295 K/UL (ref 150–450)
PMV BLD AUTO: 10.8 FL (ref 9.2–12.9)
POCT GLUCOSE: 105 MG/DL (ref 70–110)
POTASSIUM SERPL-SCNC: 4.1 MMOL/L (ref 3.5–5.1)
PROT SERPL-MCNC: 5.9 GM/DL (ref 6–8.4)
RBC # BLD AUTO: 3.8 M/UL (ref 4.6–6.2)
RELATIVE EOSINOPHIL (OHS): 0.1 %
RELATIVE LYMPHOCYTE (OHS): 12 % (ref 18–48)
RELATIVE MONOCYTE (OHS): 15.6 % (ref 4–15)
RELATIVE NEUTROPHIL (OHS): 71.6 % (ref 38–73)
SINUS: 3.94 CM
SODIUM SERPL-SCNC: 151 MMOL/L (ref 136–145)
STJ: 2.81 CM
TR MAX PG: 21 MMHG
WBC # BLD AUTO: 6.81 K/UL (ref 3.9–12.7)
Z-SCORE OF LEFT VENTRICULAR DIMENSION IN END DIASTOLE: -2.02
Z-SCORE OF LEFT VENTRICULAR DIMENSION IN END SYSTOLE: 0.07

## 2025-04-21 PROCEDURE — 25000242 PHARM REV CODE 250 ALT 637 W/ HCPCS: Performed by: HOSPITALIST

## 2025-04-21 PROCEDURE — 94761 N-INVAS EAR/PLS OXIMETRY MLT: CPT

## 2025-04-21 PROCEDURE — 63600175 PHARM REV CODE 636 W HCPCS: Performed by: INTERNAL MEDICINE

## 2025-04-21 PROCEDURE — 63600175 PHARM REV CODE 636 W HCPCS: Performed by: HOSPITALIST

## 2025-04-21 PROCEDURE — 82040 ASSAY OF SERUM ALBUMIN: CPT | Performed by: HOSPITALIST

## 2025-04-21 PROCEDURE — 83735 ASSAY OF MAGNESIUM: CPT | Performed by: HOSPITALIST

## 2025-04-21 PROCEDURE — 25000003 PHARM REV CODE 250: Performed by: INTERNAL MEDICINE

## 2025-04-21 PROCEDURE — 94640 AIRWAY INHALATION TREATMENT: CPT

## 2025-04-21 PROCEDURE — 25000003 PHARM REV CODE 250: Performed by: HOSPITALIST

## 2025-04-21 PROCEDURE — 20000000 HC ICU ROOM

## 2025-04-21 PROCEDURE — 84100 ASSAY OF PHOSPHORUS: CPT | Performed by: HOSPITALIST

## 2025-04-21 PROCEDURE — 27000221 HC OXYGEN, UP TO 24 HOURS

## 2025-04-21 PROCEDURE — 63600175 PHARM REV CODE 636 W HCPCS

## 2025-04-21 PROCEDURE — 25500020 PHARM REV CODE 255: Performed by: HOSPITALIST

## 2025-04-21 PROCEDURE — 85025 COMPLETE CBC W/AUTO DIFF WBC: CPT | Performed by: HOSPITALIST

## 2025-04-21 PROCEDURE — 99223 1ST HOSP IP/OBS HIGH 75: CPT | Mod: 25,,, | Performed by: REGISTERED NURSE

## 2025-04-21 PROCEDURE — 99232 SBSQ HOSP IP/OBS MODERATE 35: CPT | Mod: 25,,, | Performed by: INTERNAL MEDICINE

## 2025-04-21 PROCEDURE — S5010 5% DEXTROSE AND 0.45% SALINE: HCPCS | Performed by: HOSPITALIST

## 2025-04-21 PROCEDURE — 36415 COLL VENOUS BLD VENIPUNCTURE: CPT | Performed by: HOSPITALIST

## 2025-04-21 RX ORDER — DIATRIZOATE MEGLUMINE AND DIATRIZOATE SODIUM 660; 100 MG/ML; MG/ML
120 SOLUTION ORAL; RECTAL
Status: COMPLETED | OUTPATIENT
Start: 2025-04-21 | End: 2025-04-21

## 2025-04-21 RX ORDER — DIPHENHYDRAMINE HYDROCHLORIDE 50 MG/ML
50 INJECTION, SOLUTION INTRAMUSCULAR; INTRAVENOUS ONCE
Status: COMPLETED | OUTPATIENT
Start: 2025-04-21 | End: 2025-04-21

## 2025-04-21 RX ORDER — DEXTROSE MONOHYDRATE AND SODIUM CHLORIDE 5; .45 G/100ML; G/100ML
INJECTION, SOLUTION INTRAVENOUS CONTINUOUS
Status: DISCONTINUED | OUTPATIENT
Start: 2025-04-21 | End: 2025-04-22

## 2025-04-21 RX ADMIN — PANTOPRAZOLE SODIUM 40 MG: 40 INJECTION, POWDER, FOR SOLUTION INTRAVENOUS at 08:04

## 2025-04-21 RX ADMIN — MUPIROCIN: 20 OINTMENT TOPICAL at 10:04

## 2025-04-21 RX ADMIN — METOROPROLOL TARTRATE 5 MG: 5 INJECTION, SOLUTION INTRAVENOUS at 11:04

## 2025-04-21 RX ADMIN — HYDROCORTISONE SODIUM SUCCINATE 200 MG: 100 INJECTION, POWDER, FOR SOLUTION INTRAMUSCULAR; INTRAVENOUS at 08:04

## 2025-04-21 RX ADMIN — AMIODARONE HYDROCHLORIDE 1 MG/MIN: 1.8 INJECTION, SOLUTION INTRAVENOUS at 08:04

## 2025-04-21 RX ADMIN — AMIODARONE HYDROCHLORIDE 0.5 MG/MIN: 1.8 INJECTION, SOLUTION INTRAVENOUS at 06:04

## 2025-04-21 RX ADMIN — SODIUM CHLORIDE: 9 INJECTION, SOLUTION INTRAVENOUS at 11:04

## 2025-04-21 RX ADMIN — METOROPROLOL TARTRATE 5 MG: 5 INJECTION, SOLUTION INTRAVENOUS at 06:04

## 2025-04-21 RX ADMIN — HEPARIN SODIUM 5000 UNITS: 5000 INJECTION INTRAVENOUS; SUBCUTANEOUS at 12:04

## 2025-04-21 RX ADMIN — SODIUM CHLORIDE: 9 INJECTION, SOLUTION INTRAVENOUS at 01:04

## 2025-04-21 RX ADMIN — DEXTROSE AND SODIUM CHLORIDE: 5; 450 INJECTION, SOLUTION INTRAVENOUS at 12:04

## 2025-04-21 RX ADMIN — AMIODARONE HYDROCHLORIDE 1 MG/MIN: 1.8 INJECTION, SOLUTION INTRAVENOUS at 02:04

## 2025-04-21 RX ADMIN — HEPARIN SODIUM 5000 UNITS: 5000 INJECTION INTRAVENOUS; SUBCUTANEOUS at 06:04

## 2025-04-21 RX ADMIN — DIPHENHYDRAMINE HYDROCHLORIDE 50 MG: 50 INJECTION INTRAMUSCULAR; INTRAVENOUS at 11:04

## 2025-04-21 RX ADMIN — IPRATROPIUM BROMIDE AND ALBUTEROL SULFATE 3 ML: 2.5; .5 SOLUTION RESPIRATORY (INHALATION) at 07:04

## 2025-04-21 RX ADMIN — METOROPROLOL TARTRATE 5 MG: 5 INJECTION, SOLUTION INTRAVENOUS at 12:04

## 2025-04-21 RX ADMIN — MUPIROCIN: 20 OINTMENT TOPICAL at 08:04

## 2025-04-21 RX ADMIN — IPRATROPIUM BROMIDE AND ALBUTEROL SULFATE 3 ML: 2.5; .5 SOLUTION RESPIRATORY (INHALATION) at 08:04

## 2025-04-21 RX ADMIN — MUPIROCIN: 20 OINTMENT TOPICAL at 12:04

## 2025-04-21 RX ADMIN — HEPARIN SODIUM 5000 UNITS: 5000 INJECTION INTRAVENOUS; SUBCUTANEOUS at 10:04

## 2025-04-21 RX ADMIN — HEPARIN SODIUM 5000 UNITS: 5000 INJECTION INTRAVENOUS; SUBCUTANEOUS at 02:04

## 2025-04-21 RX ADMIN — IPRATROPIUM BROMIDE AND ALBUTEROL SULFATE 3 ML: 2.5; .5 SOLUTION RESPIRATORY (INHALATION) at 01:04

## 2025-04-21 RX ADMIN — DIATRIZOATE MEGLUMINE AND DIATRIZOATE SODIUM 120 ML: 600; 100 SOLUTION ORAL; RECTAL at 12:04

## 2025-04-21 NOTE — ASSESSMENT & PLAN NOTE
Hypernatremia is likely due to normal saline IVF's. The patient's most recent sodium results are listed below.  Recent Labs     04/19/25  2004 04/20/25  0349 04/21/25  0330    145 151*     Plan  - Aim to correct the sodium by 8-10mEq in 24 hours.   - Plan to correct their hypernatremia with Select IV fluids: D5W/0.45 NaCl  - Will plan to trend the patient's sodium: Daily

## 2025-04-21 NOTE — HOSPITAL COURSE
Patient is status post exploratory laparotomy yesterday  Uneventful surgery  No issues from cardiac perspective during the surgery or in perioperative period    Telemetry reviewed.  Minimal atrial ectopy.

## 2025-04-21 NOTE — ASSESSMENT & PLAN NOTE
Anemia is likely due to chronic disease due to Chronic Kidney Disease. Most recent hemoglobin and hematocrit are listed below.  Recent Labs     04/19/25  0430 04/20/25  0349 04/21/25  0330   HGB 11.0* 12.4* 11.1*   HCT 34.9* 38.4* 35.7*     Plan  - Monitor serial CBC: Daily  - Transfuse PRBC if patient becomes hemodynamically unstable, symptomatic or H/H drops below 7/21.  - Patient has not received any PRBC transfusions to date  - Patient's anemia is currently stable

## 2025-04-21 NOTE — ASSESSMENT & PLAN NOTE
Patient's blood pressure range in the last 24 hours was: BP  Min: 104/60  Max: 159/69.The patient's inpatient anti-hypertensive regimen is listed below:  Current Antihypertensives  hydrALAZINE injection 10 mg, Every 6 hours PRN, Intravenous  metoprolol injection 5 mg, Every 6 hours, Intravenous    Plan  - BP increasing as patient unable to take his oral BP medications.  Continue with prn IV Hydralazine.

## 2025-04-21 NOTE — PLAN OF CARE
Problem: Adult Inpatient Plan of Care  Goal: Plan of Care Review  Outcome: Ongoing     Problem: Adult Inpatient Plan of Care  Goal: Patient-Specific Goal (Individualized)  Outcome: Ongoing     Problem: Adult Inpatient Plan of Care  Goal: Absence of Hospital-Acquired Illness or Injury  Outcome: Ongoing     Problem: Adult Inpatient Plan of Care  Goal: Optimal Comfort and Wellbeing  Outcome: Ongoing     Problem: Adult Inpatient Plan of Care  Goal: Readiness for Transition of Care  Outcome: Ongoing

## 2025-04-21 NOTE — ASSESSMENT & PLAN NOTE
"4/21/2025 - Consult   - consult received; interval chart reviewed in detail; discussed pt with MDT   - met with patient at bedside; introduction to palliative medicine team and role in current care and admission   - pt is polite and able to proide name and answer some simple questions, but concerning capacity and mental status   - ladi[remy to review MPOA document on file from 2015 naming Chel Barnes and Sade Sosa as MPOA; pt initially unable to indentify these names, later identified Chel as "lady who cooks for him" (daughter confirms it is a friend who is no longer physically/mentally able to act in this capacity) and pt unable to identify Sade (daughter confirms in pt's sister)  - pt identifies daughter, Nicol, as who should be contacted regarding is medical decisions   - called pt's daughter Nicol, introduction to palliative medicine team; brief medical update provided   - learned more about pt outside of current admission; he is  and has 7 children total; he lives with daughter, Nicol; not all children are local; Pt lives with daughter Nicol (who is a PCT in psych); son, Eugene Maradiaga, is also active in pt's day to day care   - Nicol shares that at baseline pt is of sound mind (with occasional forgetfulness, repeats things, consistent with age) and can typically perform all ADL's himself, ambulatory with use of walker, and until recently drove himself; recently stopped driving but leaves the house with Clemente or Eugene daily for activities which is his favorite thing to do, especially if going to go out to eat ; Clemente requesting PT/OT when safe for pt to ensure no loss in abilities   - GOC/ACP discussion  - reviewed MPOA and living will documents on file with Nicol; will likely need to revisit MPOA documentation when pt returns to baseline mental status   - discussed resources regarding other advanced directives, outside of medical care; recommended [employer] as resource for " further information on this and or an atty; but reassured that palliative team and assist with MPOA documentation and medical wishes   - overall goal for improvement in condition and maintaining as much abilities as possible   - emotional support provided   - Allowed time for questions/concerns; all addressed; expressed availability of myself/palliative team for additional questions/concerns   - will plan for follow up with pt for continued assessment of mental status for further ACP/GOC discussions

## 2025-04-21 NOTE — NURSING
[facility] [place]  Nurses Note -- 4 Eyes      4/21/2025       Skin assessed on: Q Shift      [x] No Pressure Injuries Present    [x]Prevention Measures Documented    [] Yes LDA  for Pressure Injury Previously documented     [] Yes New Pressure Injury Discovered   [] LDA for New Pressure Injury Added      Attending RN:  Latoya Minor RN     Second RN:  LAZ Pelletier

## 2025-04-21 NOTE — SUBJECTIVE & OBJECTIVE
Past Medical History:   Diagnosis Date    Arthritis     Cancer     Diabetes mellitus     Elevated PSA     Gout, unspecified     Hypertension     Nuclear sclerotic cataract of both eyes 6/29/2018       Past Surgical History:   Procedure Laterality Date    CATARACT EXTRACTION      ou    EYE SURGERY      bilateral cataracts    removal of small bowel  Apr. 2015    Colostomy       Review of patient's allergies indicates:   Allergen Reactions    Iodine Other (See Comments)     Causes gout to flare up    Shellfish containing products      Causes gout to flare up    Shellfish derived        Medications:  Continuous Infusions:   amiodarone in dextrose 5%  0.5 mg/min Intravenous Continuous        D5 and 0.45% NaCl   Intravenous Continuous 50 mL/hr at 04/21/25 1224 New Bag at 04/21/25 1224     Scheduled Meds:   albuterol-ipratropium  3 mL Nebulization Q6H WAKE    heparin (porcine)  5,000 Units Subcutaneous Q8H    metoprolol  5 mg Intravenous Q6H    mupirocin   Nasal BID    pantoprazole  40 mg Intravenous Daily     PRN Meds:  Current Facility-Administered Medications:     acetaminophen, 650 mg, Oral, Q6H PRN    dextrose 50%, 12.5 g, Intravenous, PRN    dextrose 50%, 25 g, Intravenous, PRN    glucagon (human recombinant), 1 mg, Intramuscular, PRN    glucose, 16 g, Oral, PRN    glucose, 24 g, Oral, PRN    hydrALAZINE, 10 mg, Intravenous, Q6H PRN    insulin aspart U-100, 0-5 Units, Subcutaneous, Q6H PRN    morphine, 1 mg, Intravenous, Q4H PRN    morphine, 2 mg, Intravenous, Q4H PRN    naloxone, 0.02 mg, Intravenous, PRN    ondansetron, 4 mg, Intravenous, Q6H PRN    prochlorperazine, 5 mg, Intravenous, Q6H PRN    Family History       Problem Relation (Age of Onset)    Diabetes Mother    Hypertension Mother    No Known Problems Father, Sister, Brother, Maternal Aunt, Maternal Uncle, Paternal Aunt, Paternal Uncle, Maternal Grandmother, Maternal Grandfather, Paternal Grandmother, Paternal Grandfather          Tobacco Use    Smoking  status: Never     Passive exposure: Never    Smokeless tobacco: Never    Tobacco comments:     smoked short while as a teen   Substance and Sexual Activity    Alcohol use: No    Drug use: No    Sexual activity: Not Currently     Partners: Female       Review of Systems   Unable to perform ROS: Mental status change     Objective:     Vital Signs (Most Recent):  Temp: 98.2 °F (36.8 °C) (04/21/25 1120)  Pulse: 69 (04/21/25 1317)  Resp: (!) 21 (04/21/25 1317)  BP: (!) 142/67 (04/21/25 1200)  SpO2: 100 % (04/21/25 1317) Vital Signs (24h Range):  Temp:  [97.2 °F (36.2 °C)-98.4 °F (36.9 °C)] 98.2 °F (36.8 °C)  Pulse:  [] 69  Resp:  [18-41] 21  SpO2:  [97 %-100 %] 100 %  BP: (104-159)/(59-77) 142/67     Weight: 62 kg (136 lb 11 oz)  Body mass index is 17.55 kg/m².       Physical Exam  Vitals and nursing note reviewed.   Constitutional:       General: He is awake.      Appearance: He is cachectic.   HENT:      Head: Atraumatic.      Comments: Temporal wasting  Pulmonary:      Effort: Pulmonary effort is normal. No respiratory distress.   Abdominal:      Comments: ostomy   Musculoskeletal:      Right lower leg: No edema.      Left lower leg: No edema.   Neurological:      Mental Status: He is alert and easily aroused.      Comments: Oriented to self; mental status not at baseline    Psychiatric:         Behavior: Behavior is cooperative.        Advance Care Planning   Advance Directives:   Living Will: Yes        Copy on chart: Yes    [place]: No    Do Not Resuscitate Status: No    Medical Power of : Yes (2015 MPOA on file see ACP)      Decision Making:  Family answered questions  Goals of Care: The family endorses that what is most important right now is to focus on remaining as independent as possible and improvement in condition.    Accordingly, we have decided that the best plan to meet the patient's goals includes continuing with treatment       Significant Labs: All pertinent labs within the past 24 hours  have been reviewed.  CBC:   Recent Labs   Lab 04/21/25  0330   WBC 6.81   HGB 11.1*   HCT 35.7*   MCV 94        BMP:  Recent Labs   Lab 04/21/25  0330   *   K 4.1   *   CO2 18*   BUN 69*   CREATININE 1.8*   CALCIUM 8.8   MG 2.2     LFT:  Lab Results   Component Value Date    AST 14 04/21/2025    ALKPHOS 41 04/21/2025    BILITOT 0.4 04/21/2025     Albumin:   Albumin   Date Value Ref Range Status   04/21/2025 2.8 (L) 3.5 - 5.2 g/dL Final   04/11/2025 4.2 3.4 - 5.0 g/dL Final   03/20/2025 4.2 3.5 - 5.2 g/dL Final     Protein:   Total Protein   Date Value Ref Range Status   03/20/2025 7.8 6.0 - 8.4 g/dL Final     Lactic acid:   Lab Results   Component Value Date    LACTATE 1.9 04/19/2025    LACTATE 2.4 (H) 10/06/2019       Significant Imaging: I have reviewed all pertinent imaging results/findings within the past 24 hours.

## 2025-04-21 NOTE — CONSULTS
"[facility] [place] - Intensive Care  Palliative Medicine  Consult Note    Patient Name: Eugene Gamez  MRN: [id number]  Admission Date: 4/18/2025  Hospital Length of Stay: 3 days  Code Status: Full Code   Attending Provider: Darion Mace MD  Consulting Provider: Estella Argueta NP  Primary Care Physician: Larry Monae MD  Principal Problem:SBO (small bowel obstruction)    Patient information was obtained from patient, relative(s), past medical records, ER records, and primary team.      Inpatient consult to Palliative Care  Consult performed by: Estella Argueta NP  Consult ordered by: Darion Mace MD  Reason for consult: goals of care and advanced care planning        Assessment/Plan:   Palliative Care  Advance Care Planning   4/21/2025 - Consult   - consult received; interval chart reviewed in detail; discussed pt with MDT   - met with patient at bedside; introduction to palliative medicine team and role in current care and admission   - pt is polite and able to proide name and answer some simple questions, but concerning capacity and mental status   - attemp[remy to review MPOA document on file from 2015 naming Chel Barnes and Sade Sosa as MPOA; pt initially unable to indentify these names, later identified Chel as "lady who cooks for him" (daughter confirms it is a friend who is no longer physically/mentally able to act in this capacity) and pt unable to identify Sade (daughter confirms in pt's sister)  - pt identifies daughter, Nicol, as who should be contacted regarding is medical decisions   - called pt's daughter Nicol, introduction to palliative medicine team; brief medical update provided   - learned more about pt outside of current admission; he is  and has 7 children total; he lives with daughter, Nicol; not all children are local; Pt lives with daughter Nicol (who is a PCT in psych); son, Eugene Maradiaga, is also active in pt's day to day care   - Nicol shares that at baseline " pt is of sound mind (with occasional forgetfulness, repeats things, consistent with age) and can typically perform all ADL's himself, ambulatory with use of walker, and until recently drove himself; recently stopped driving but leaves the house with Clemente or Eugene daily for activities which is his favorite thing to do, especially if going to go out to eat ; Clemente requesting PT/OT when safe for pt to ensure no loss in abilities   - GOC/ACP discussion  - reviewed MPOA and living will documents on file with Nicol; will likely need to revisit MPOA documentation when pt returns to baseline mental status   - discussed resources regarding other advanced directives, outside of medical care; recommended [facility] as resource for further information on this and or an atty; but reassured that palliative team and assist with MPOA documentation and medical wishes   - overall goal for improvement in condition and maintaining as much abilities as possible   - emotional support provided   - Allowed time for questions/concerns; all addressed; expressed availability of myself/palliative team for additional questions/concerns   - will plan for follow up with pt for continued assessment of mental status for further ACP/GOC discussions     Neuro  Altered mental status  - family shares concern for AMS; at baseline pt with occasional forgetfulness but overall cognitively and physically sound   - family confirms current mental status not consistent with baseline  - recommend delirium precautions and PT/OT     Cardiac/Vascular  Atrial tachycardia  - Mgmt per cardiology and primary     Renal/  Acute renal failure superimposed on stage 3b chronic kidney disease  - Noted; mgmt per primary team. Seems to be improving, though would not be an RRT candidate if this worsens.     Endocrine  Severe protein-calorie malnutrition  - BMI 17, and with visible cachexia; contributes to overall poor prognosis and hospice qualification   -  "pt with >13% body weight loss in the past 6 months and >8% body weight loss in the past month; pt with decreased appetite only in the last few days before admission; per family pt eats constantly and very hearty appetite; they have also been concerned about weight loss compared to intake with history of cancer   - consider additional work of for causes of weight loss       GI  * SBO (small bowel obstruction)  - Mgmt per surgery; gastrograffin study today. Does not seem that he would be a surgical candidate even if this worsens or fails to improve.     S/P left colectomy  - Noted history secondary to colon cancer    Thank you for your consult. I will follow-up with patient. Please contact us if you have any additional questions.    Subjective:     HPI:   From H&P: " Mr Eugene Gamez is a 89 y.o. man with history of L colectomy for colon cancer (2015) with ventral hernia, prior DM, HTN, CKD3 (baseline Cr 1) who presented with nausea and vomiting, abdominal pain.  The symptoms began a day or 2 ago but significantly worsened yesterday morning.  Last night and today He has not been able to keep much of anything down.  He denies any issues with his colostomy, stool has been normal.  Overall he feels weak and fatigued.  Denies dysuria, difficulty urinating, changes in bowel habits  His CMP shows creatinine 2.4, bicarb 21, anion gap 17, glucose 209; UA with trace protein, 7 RBC, 2 WBC; CT abdomen and pelvis with high-grade small-bowel obstruction related to a 5 x 5 cm ventral abdominal wall hernia, right middle and right lower lobe mucous plugging and/or endobronchial spread of infection/bronchitis.  He was admitted for high grade SBO. Started IVF. General surgery consulted. "    Palliative medicine consulted for goals of care discussion and advance care planning; for details of visit, see advance care planning section of plan.       Hospital Course:  No notes on file      Past Medical History:   Diagnosis Date    " Arthritis     Cancer     Diabetes mellitus     Elevated PSA     Gout, unspecified     Hypertension     Nuclear sclerotic cataract of both eyes 6/29/2018       Past Surgical History:   Procedure Laterality Date    CATARACT EXTRACTION      ou    EYE SURGERY      bilateral cataracts    removal of small bowel  Apr. 2015    Colostomy       Review of patient's allergies indicates:   Allergen Reactions    Iodine Other (See Comments)     Causes gout to flare up    Shellfish containing products      Causes gout to flare up    Shellfish derived        Medications:  Continuous Infusions:   amiodarone in dextrose 5%  0.5 mg/min Intravenous Continuous        D5 and 0.45% NaCl   Intravenous Continuous 50 mL/hr at 04/21/25 1224 New Bag at 04/21/25 1224     Scheduled Meds:   albuterol-ipratropium  3 mL Nebulization Q6H WAKE    heparin (porcine)  5,000 Units Subcutaneous Q8H    metoprolol  5 mg Intravenous Q6H    mupirocin   Nasal BID    pantoprazole  40 mg Intravenous Daily     PRN Meds:  Current Facility-Administered Medications:     acetaminophen, 650 mg, Oral, Q6H PRN    dextrose 50%, 12.5 g, Intravenous, PRN    dextrose 50%, 25 g, Intravenous, PRN    glucagon (human recombinant), 1 mg, Intramuscular, PRN    glucose, 16 g, Oral, PRN    glucose, 24 g, Oral, PRN    hydrALAZINE, 10 mg, Intravenous, Q6H PRN    insulin aspart U-100, 0-5 Units, Subcutaneous, Q6H PRN    morphine, 1 mg, Intravenous, Q4H PRN    morphine, 2 mg, Intravenous, Q4H PRN    naloxone, 0.02 mg, Intravenous, PRN    ondansetron, 4 mg, Intravenous, Q6H PRN    prochlorperazine, 5 mg, Intravenous, Q6H PRN    Family History       Problem Relation (Age of Onset)    Diabetes Mother    Hypertension Mother    No Known Problems Father, Sister, Brother, Maternal Aunt, Maternal Uncle, Paternal Aunt, Paternal Uncle, Maternal Grandmother, Maternal Grandfather, Paternal Grandmother, Paternal Grandfather          Tobacco Use    Smoking status: Never     Passive exposure: Never     Smokeless tobacco: Never    Tobacco comments:     smoked short while as a teen   Substance and Sexual Activity    Alcohol use: No    Drug use: No    Sexual activity: Not Currently     Partners: Female       Review of Systems   Unable to perform ROS: Mental status change     Objective:     Vital Signs (Most Recent):  Temp: 98.2 °F (36.8 °C) (04/21/25 1120)  Pulse: 69 (04/21/25 1317)  Resp: (!) 21 (04/21/25 1317)  BP: (!) 142/67 (04/21/25 1200)  SpO2: 100 % (04/21/25 1317) Vital Signs (24h Range):  Temp:  [97.2 °F (36.2 °C)-98.4 °F (36.9 °C)] 98.2 °F (36.8 °C)  Pulse:  [] 69  Resp:  [18-41] 21  SpO2:  [97 %-100 %] 100 %  BP: (104-159)/(59-77) 142/67     Weight: 62 kg (136 lb 11 oz)  Body mass index is 17.55 kg/m².       Physical Exam  Vitals and nursing note reviewed.   Constitutional:       General: He is awake.      Appearance: He is cachectic.   HENT:      Head: Atraumatic.      Comments: Temporal wasting  Pulmonary:      Effort: Pulmonary effort is normal. No respiratory distress.   Abdominal:      Comments: ostomy   Musculoskeletal:      Right lower leg: No edema.      Left lower leg: No edema.   Neurological:      Mental Status: He is alert and easily aroused.      Comments: Oriented to self; mental status not at baseline    Psychiatric:         Behavior: Behavior is cooperative.        Advance Care Planning   Advance Directives:   Living Will: Yes        Copy on chart: Yes    LaPOST: No    Do Not Resuscitate Status: No    Medical Power of : Yes (2015 MPOA on file see ACP)      Decision Making:  Family answered questions  Goals of Care: The family endorses that what is most important right now is to focus on remaining as independent as possible and improvement in condition.    Accordingly, we have decided that the best plan to meet the patient's goals includes continuing with treatment       Significant Labs: All pertinent labs within the past 24 hours have been reviewed.  CBC:   Recent Labs   Lab  04/21/25  0330   WBC 6.81   HGB 11.1*   HCT 35.7*   MCV 94        BMP:  Recent Labs   Lab 04/21/25  0330   *   K 4.1   *   CO2 18*   BUN 69*   CREATININE 1.8*   CALCIUM 8.8   MG 2.2     LFT:  Lab Results   Component Value Date    AST 14 04/21/2025    ALKPHOS 41 04/21/2025    BILITOT 0.4 04/21/2025     Albumin:   Albumin   Date Value Ref Range Status   04/21/2025 2.8 (L) 3.5 - 5.2 g/dL Final   04/11/2025 4.2 3.4 - 5.0 g/dL Final   03/20/2025 4.2 3.5 - 5.2 g/dL Final     Protein:   Total Protein   Date Value Ref Range Status   03/20/2025 7.8 6.0 - 8.4 g/dL Final     Lactic acid:   Lab Results   Component Value Date    LACTATE 1.9 04/19/2025    LACTATE 2.4 (H) 10/06/2019       Significant Imaging: I have reviewed all pertinent imaging results/findings within the past 24 hours.      Total visit time: 70 minutes    70 min visit time including: face to face time in discussion of symptom assessment, and exploring options and burdens of offered treatments.  This also includes non-face to face time preparing to see the patient including chart review, obtaining and/or reviewing separately obtained history, documenting clinical information in the electronic or other health record, independently interpreting results and communicating results to the patient/family/caregiver, family discussions by phone if not able to be present, coordination of care with other specialists, and discharge planning.     ACP time spent included in initial consult time : goals of care, advanced care planning, emotional support, formulating and communicating prognosis, exploring burden/ benefit of various approaches of treatment.     Estella Argueta NP  Palliative Medicine  [facility] - [place] - Intensive Care

## 2025-04-21 NOTE — PROGRESS NOTES
Surgery Progress Note    Eugene Gamez is a 89 y.o. year old male in hospital for small bowel obstruction    No colostomy function noted. GGF not done due to shellfish allergy    ROS:  Negative except above    PE:  Vitals:    04/21/25 0200 04/21/25 0301 04/21/25 0500 04/21/25 0600   BP: (!) 146/68 (!) 149/65 131/73 (!) 151/76   BP Location:  Right arm     Patient Position:  Lying     Pulse: 67 75 84 75   Resp: (!) 22 (!) 26 (!) 21 18   Temp:  97.2 °F (36.2 °C)     TempSrc:  Axillary     SpO2: 100% 100% 100% 100%   Weight:       Height:           NAD  No belabored breathing  No skin changes  Abd soft mildly distended, ventral hernia soft easily reducible. No gas or stool in colostomy bag    Significant Diagnostic Studies: N/A    A/P:  Eugene Gamez is a 89 y.o. year old male  with small bowel obstruction    -gastrograffin challenge today. Delayed yesterday due to concern for contrast (iodine/shellfish) allergy. Per daughter, does not have an anaphylactic reaction but related to gouty flare  - keep NPO, NGT  - rest of care per primary    Honey Meneses  General Surgery - [facility]  4/21/2025

## 2025-04-21 NOTE — EICU
Virtual ICU Quality Rounds    Admit Date: 4/18/2025  Hospital Day: 3    ICU Day: 1d 13h    24H Vital Sign Range:  Temp:  [97.2 °F (36.2 °C)-98.4 °F (36.9 °C)]   Pulse:  []   Resp:  [18-41]   BP: ()/(59-89)   SpO2:  [83 %-100 %]     VICU Surveillance Screening                    LDA reconciliation : Yes

## 2025-04-21 NOTE — EICU
Intervention Initiated From:  COR / MIMIU    Aida intervened regarding:  Rounding (Video assessment)  VICU Night Rounds Checklist  24H Vital Sign Range:  Temp:  [97.5 °F (36.4 °C)-98.4 °F (36.9 °C)]   Pulse:  []   Resp:  [11-38]   BP: ()/()   SpO2:  [83 %-100 %]     Video rounds

## 2025-04-21 NOTE — SUBJECTIVE & OBJECTIVE
Interval History: remains in ICU.  NGT in placed.  Frequent ectopy and episodes of NSVT on tele.    Review of Systems   HENT:  Negative for ear discharge and ear pain.    Eyes:  Negative for discharge and itching.   Endocrine: Negative for cold intolerance and heat intolerance.   Neurological:  Negative for seizures and syncope.     Objective:     Vital Signs (Most Recent):  Temp: 98.2 °F (36.8 °C) (04/21/25 1120)  Pulse: 89 (04/21/25 1120)  Resp: 20 (04/21/25 1100)  BP: 132/72 (04/21/25 1120)  SpO2: 97 % (04/21/25 1100) Vital Signs (24h Range):  Temp:  [97.2 °F (36.2 °C)-98.4 °F (36.9 °C)] 98.2 °F (36.8 °C)  Pulse:  [] 89  Resp:  [18-41] 20  SpO2:  [97 %-100 %] 97 %  BP: (104-159)/(59-89) 132/72     Weight: 62 kg (136 lb 11 oz)  Body mass index is 17.55 kg/m².    Intake/Output Summary (Last 24 hours) at 4/21/2025 1203  Last data filed at 4/21/2025 1100  Gross per 24 hour   Intake 2454.81 ml   Output 1725 ml   Net 729.81 ml         Physical Exam  Vitals and nursing note reviewed.   Constitutional:       Appearance: He is ill-appearing.   HENT:      Head:      Comments: Temporal wasting     Nose:      Comments: NGT in place     Mouth/Throat:      Mouth: Mucous membranes are dry.   Cardiovascular:      Rate and Rhythm: Normal rate and regular rhythm.   Pulmonary:      Effort: Pulmonary effort is normal.      Breath sounds: Normal breath sounds.   Abdominal:      General: There is distension (Not tense).      Tenderness: There is no abdominal tenderness. There is no guarding or rebound.      Comments: Hypoactive bowel sounds.  Left-sided ostomy with no stool present   Musculoskeletal:      Right lower leg: No edema.      Left lower leg: No edema.   Skin:     General: Skin is warm and dry.   Neurological:      Mental Status: He is alert. Mental status is at baseline.               Significant Labs: All pertinent labs within the past 24 hours have been reviewed.  BMP:   Recent Labs   Lab 04/21/25  0330   *    K 4.1   *   CO2 18*   BUN 69*   CREATININE 1.8*   CALCIUM 8.8   MG 2.2     CBC:   Recent Labs   Lab 04/20/25  0349 04/21/25  0330   WBC 6.67 6.81   HGB 12.4* 11.1*   HCT 38.4* 35.7*    295       Significant Imaging: I have reviewed all pertinent imaging results/findings within the past 24 hours.

## 2025-04-21 NOTE — ASSESSMENT & PLAN NOTE
- Mgmt per surgery; gastrograffin study today. Does not seem that he would be a surgical candidate even if this worsens or fails to improve.

## 2025-04-21 NOTE — ASSESSMENT & PLAN NOTE
Still with significant atrial and ventricular ectopy  Predominant rhythm is sinus rhythm  Electrolytes are within the normal limits    Start amiodarone drip without bolus to suppress PACs and PVCs  Continue IV metoprolol 5 mg q.6 (standing order).  Do not administer metoprolol if systolic blood pressure is less than 90 mm Hg  Continue tele monitoring  Echocardiogram performed.  We will review

## 2025-04-21 NOTE — HPI
"From H&P: " Mr Eugene Gamez is a 89 y.o. man with history of L colectomy for colon cancer (2015) with ventral hernia, prior DM, HTN, CKD3 (baseline Cr 1) who presented with nausea and vomiting, abdominal pain.  The symptoms began a day or 2 ago but significantly worsened yesterday morning.  Last night and today He has not been able to keep much of anything down.  He denies any issues with his colostomy, stool has been normal.  Overall he feels weak and fatigued.  Denies dysuria, difficulty urinating, changes in bowel habits  His CMP shows creatinine 2.4, bicarb 21, anion gap 17, glucose 209; UA with trace protein, 7 RBC, 2 WBC; CT abdomen and pelvis with high-grade small-bowel obstruction related to a 5 x 5 cm ventral abdominal wall hernia, right middle and right lower lobe mucous plugging and/or endobronchial spread of infection/bronchitis.  He was admitted for high grade SBO. Started IVF. General surgery consulted. "    Palliative medicine consulted for goals of care discussion and advance care planning; for details of visit, see advance care planning section of plan.     "

## 2025-04-21 NOTE — ASSESSMENT & PLAN NOTE
- BMI 17, and with visible cachexia; contributes to overall poor prognosis and hospice qualification   - pt with >13% body weight loss in the past 6 months and >8% body weight loss in the past month; pt with decreased appetite only in the last few days before admission; per family pt eats constantly and very hearty appetite; they have also been concerned about weight loss compared to intake with history of cancer   - consider additional work of for causes of weight loss

## 2025-04-21 NOTE — PLAN OF CARE
Pt remains in ICU on Amio infusion. NG tube clamped for gastrograffin imaging. Voiding in urinal. No output from colostomy. No falls, injuries, or skin breakdown.       Problem: Adult Inpatient Plan of Care  Goal: Plan of Care Review  Outcome: Progressing  Goal: Patient-Specific Goal (Individualized)  Outcome: Progressing  Goal: Absence of Hospital-Acquired Illness or Injury  Outcome: Progressing  Goal: Optimal Comfort and Wellbeing  Outcome: Progressing  Goal: Readiness for Transition of Care  Outcome: Progressing     Problem: Infection  Goal: Absence of Infection Signs and Symptoms  Outcome: Progressing     Problem: Acute Kidney Injury/Impairment  Goal: Fluid and Electrolyte Balance  Outcome: Progressing  Goal: Improved Oral Intake  Outcome: Progressing  Goal: Effective Renal Function  Outcome: Progressing     Problem: Skin Injury Risk Increased  Goal: Skin Health and Integrity  Outcome: Progressing     Problem: Wound  Goal: Optimal Coping  Outcome: Progressing  Goal: Optimal Functional Ability  Outcome: Progressing  Goal: Absence of Infection Signs and Symptoms  Outcome: Progressing  Goal: Improved Oral Intake  Outcome: Progressing  Goal: Optimal Pain Control and Function  Outcome: Progressing  Goal: Skin Health and Integrity  Outcome: Progressing  Goal: Optimal Wound Healing  Outcome: Progressing     Problem: Fall Injury Risk  Goal: Absence of Fall and Fall-Related Injury  Outcome: Progressing     Problem: Coping Ineffective  Goal: Effective Coping  Outcome: Progressing

## 2025-04-21 NOTE — ASSESSMENT & PLAN NOTE
VINNY is likely due to prerenal from SBO. Baseline creatinine is 1. Most recent creatinine and eGFR are listed below.  Recent Labs     04/19/25 2004 04/20/25  0349 04/21/25  0330   CREATININE 2.2* 2.1* 1.8*   EGFRNORACEVR 28* 30* 36*   - UA unremarkable  - CT shows no hydronephrosis     Plan  - Avoid nephrotoxins and renally dose meds for GFR listed above  - Monitor urine output, serial BMP, and adjust therapy as needed  - improving on IVF's

## 2025-04-21 NOTE — ASSESSMENT & PLAN NOTE
- Noted; mgmt per primary team. Seems to be improving, though would not be an RRT candidate if this worsens.

## 2025-04-21 NOTE — ASSESSMENT & PLAN NOTE
Episodes of NSVT on monitoring.  Check Echo.  Cardiology consulted.  Atrial and ventricular ectopy.  Started on amiodarone drip per Cards recommendations.

## 2025-04-21 NOTE — PROGRESS NOTES
[facility]  Cardiology  Progress Note    Patient Name: Eugene Gamez  MRN: [id number]  Admission Date: 4/18/2025  Hospital Length of Stay: 3 days  Code Status: Full Code   Attending Physician: Darion Mace MD   Primary Care Physician: Larry Monae MD  Expected Discharge Date:   Principal Problem:SBO (small bowel obstruction)    Subjective:     Hospital Course:   Patient was seen and examined this morning.  Telemetry reviewed  Started on IV metoprolol 5 mg q.6 without much resolution in atrial or ventricular ectopy  Sinus rhythm with frequent PACs and PVCs  No chest pain or shortness of breath        Review of Systems   Cardiovascular:  Positive for irregular heartbeat. Negative for chest pain, claudication, dyspnea on exertion, leg swelling, near-syncope, orthopnea, palpitations, paroxysmal nocturnal dyspnea and syncope.   Respiratory:  Negative for cough, shortness of breath, snoring and sputum production.    Gastrointestinal:  Negative for abdominal pain, dysphagia, heartburn, nausea and vomiting.   Neurological:  Positive for dizziness and weakness. Negative for headaches and loss of balance.     Objective:     Vital Signs (Most Recent):  Temp: 98.1 °F (36.7 °C) (04/21/25 0718)  Pulse: 64 (04/21/25 0900)  Resp: 20 (04/21/25 0900)  BP: 104/60 (04/21/25 0900)  SpO2: 97 % (04/21/25 0900) Vital Signs (24h Range):  Temp:  [97.2 °F (36.2 °C)-98.4 °F (36.9 °C)] 98.1 °F (36.7 °C)  Pulse:  [] 64  Resp:  [18-41] 20  SpO2:  [96 %-100 %] 97 %  BP: (104-159)/(59-89) 104/60     Weight: 62 kg (136 lb 11 oz)  Body mass index is 17.55 kg/m².     SpO2: 97 %         Intake/Output Summary (Last 24 hours) at 4/21/2025 1040  Last data filed at 4/21/2025 0900  Gross per 24 hour   Intake 2388.22 ml   Output 1725 ml   Net 663.22 ml       Lines/Drains/Airways       Central Venous Catheter Line  Duration             Port A Cath Single Lumen Subclavian Left -- days              Drain  Duration                   Colostomy 04/23/15 LLQ 3651 days         NG/OG Tube 04/19/25 1130 [place] sump 18 Fr. Right nostril 1 day              Peripheral Intravenous Line  Duration                  Peripheral IV - Single Lumen 04/18/25 1201 22 G Left;Posterior Forearm 2 days                       Physical Exam  Constitutional:       Appearance: He is ill-appearing.   HENT:      Head: Normocephalic and atraumatic.      Mouth/Throat:      Mouth: Mucous membranes are moist.   Eyes:      Extraocular Movements: Extraocular movements intact.      Pupils: Pupils are equal, round, and reactive to light.   Cardiovascular:      Rate and Rhythm: Normal rate. Rhythm irregular.      Pulses: Normal pulses.      Heart sounds: Normal heart sounds.   Pulmonary:      Effort: Pulmonary effort is normal.      Breath sounds: Normal breath sounds.   Abdominal:      General: Bowel sounds are normal.      Palpations: Abdomen is soft.   Musculoskeletal:      Left lower leg: No edema.   Skin:     General: Skin is warm.            Current Medications:   albuterol-ipratropium  3 mL Nebulization Q6H WAKE    diphenhydrAMINE  50 mg Intravenous Once    heparin (porcine)  5,000 Units Subcutaneous Q8H    metoprolol  5 mg Intravenous Q6H    mupirocin   Nasal BID    pantoprazole  40 mg Intravenous Daily      0.9% NaCl   Intravenous Continuous 100 mL/hr at 04/21/25 0900 Rate Verify at 04/21/25 0900    amiodarone in dextrose 5%  1 mg/min Intravenous Continuous 33.3 mL/hr at 04/21/25 0900 1 mg/min at 04/21/25 0900    amiodarone in dextrose 5%  0.5 mg/min Intravenous Continuous           Current Facility-Administered Medications:     acetaminophen, 650 mg, Oral, Q6H PRN    dextrose 50%, 12.5 g, Intravenous, PRN    dextrose 50%, 25 g, Intravenous, PRN    glucagon (human recombinant), 1 mg, Intramuscular, PRN    glucose, 16 g, Oral, PRN    glucose, 24 g, Oral, PRN    hydrALAZINE, 10 mg, Intravenous, Q6H PRN    insulin aspart U-100, 0-5 Units, Subcutaneous, Q6H PRN    morphine, 1  mg, Intravenous, Q4H PRN    morphine, 2 mg, Intravenous, Q4H PRN    naloxone, 0.02 mg, Intravenous, PRN    ondansetron, 4 mg, Intravenous, Q6H PRN    prochlorperazine, 5 mg, Intravenous, Q6H PRN    Laboratory (all labs reviewed):  CBC:  Recent Labs   Lab 01/14/25  1330 04/18/25  1159 04/19/25  0430 04/20/25  0349 04/21/25  0330   WBC 6.82 8.70 7.64 6.67 6.81   Hemoglobin 11.5 L  --   --   --   --    HGB  --  12.8 L 11.0 L 12.4 L 11.1 L   Hematocrit 36.0 L  --   --   --   --    HCT  --  39.3 L 34.9 L 38.4 L 35.7 L   Platelet Count  --  314 294 263 295   Platelets 297  --   --   --   --        CHEMISTRIES:  Recent Labs   Lab 06/10/22  1113 05/09/23  1441 03/04/24  1156 06/11/24  0930 09/30/24  0912 01/14/25  1330 03/20/25  1522 04/11/25  1411 04/18/25  1159 04/19/25  0430 04/19/25  2004 04/20/25  0349 04/21/25  0330   Glucose 145 H   < > 102 141 H 111 H 168 H 142 H  --   --   --   --   --   --    Sodium 139   < > 141 140 139 144 139   < > 142 144 144 145 151 H   Potassium 4.6   < > 4.7 4.9 4.7 4.4 4.5   < > 4.6 5.0 4.3 4.3 4.1   BUN 33 H   < > 24 H 36 H 23 18 18   < > 63 H 72 H 74 H 72 H 69 H   Creatinine 2.2 H   < > 1.8 H 2.1 H 1.8 H 1.7 H 1.6 H   < > 2.4 H 2.3 H 2.2 H 2.1 H 1.8 H   eGFR if non  26 A  --   --   --   --   --   --   --   --   --   --   --   --    eGFR  --    < > 35.8 A 29.7 A 35.8 A 38.3 A 40.9 A   < > 25 L 26 L 28 L 30 L 36 L   Calcium 10.0   < > 9.6 10.5 10.1 9.8 10.4   < > 10.7 H 9.5 9.3 9.5 8.8   Magnesium   --   --   --   --   --   --   --   --  2.1 2.1 2.1 2.2 2.2    < > = values in this interval not displayed.       CARDIAC BIOMARKERS:  Recent Labs   Lab 05/10/23  0413 12/01/23  1215 12/01/23  1512 04/18/25  1159 04/18/25  1421   Troponin I 0.025 <0.006 0.006  --   --    Troponin-I  --   --   --  0.023 0.018       COAGS:  Recent Labs   Lab 04/11/25  1411   INR 1.1       LIPIDS/LFTS:  Recent Labs   Lab 04/11/25  1411 04/18/25  1159 04/19/25  0430 04/20/25  0349 04/21/25  0330    AST 12 21 15 17 14   ALT 9 25 18 18 13       BNP:  Recent Labs   Lab 05/09/23  1441 12/01/23  1215 03/20/25  1522 04/18/25  1159   BNP 10 29 132 H 21       TSH:  Recent Labs   Lab 05/09/23  1441   TSH 2.468       Free T4:          Assessment and Plan:       * SBO (small bowel obstruction)  Management per IM/surgery    Atrial tachycardia  Still with significant atrial and ventricular ectopy  Predominant rhythm is sinus rhythm  Electrolytes are within the normal limits    Start amiodarone drip without bolus to suppress PACs and PVCs  Continue IV metoprolol 5 mg q.6 (standing order).  Do not administer metoprolol if systolic blood pressure is less than 90 mm Hg  Continue tele monitoring  Echocardiogram performed.  We will review    Acute renal failure superimposed on stage 3b chronic kidney disease  Creatinine around 2.2 - 2.3  Baseline creatinine normal  Most likely prerenal in setting of nausea vomiting and decreased p.o. intake  Currently getting maintenance cleared  Trend creatinine        VTE Risk Mitigation (From admission, onward)           Ordered     heparin (porcine) injection 5,000 Units  Every 8 hours         04/19/25 1933     Place ARI hose  Until discontinued         04/18/25 1542     Place sequential compression device  Until discontinued         04/18/25 1542     Reason for No Pharmacological VTE Prophylaxis  Once        Question:  Reasons:  Answer:  Physician Provided (leave comment)  Comment:  potential OR    04/18/25 1542     IP VTE HIGH RISK PATIENT  Once         04/18/25 1542                    Elias Ibanez MD  Cardiology  [facility] - Intensive Care

## 2025-04-21 NOTE — ASSESSMENT & PLAN NOTE
"- CT upon admission: "High-grade small bowel obstruction related to a 5 by 5 cm ventral abdominal wall hernia. Status post left colectomy common diverting left lower quadrant ostomy and multiple small bowel anastomoses with markedly dilated small bowel loops in the deep pelvis which may reflect acute superimposed on chronic change."  - General surgery consulted  - NPO   NGT placed.  Gastrograffin challenge today.    "

## 2025-04-21 NOTE — NURSING
[facility] [place]  Nurses Note -- 4 Eyes      4/21/2025       Skin assessed on: Q Shift      [x] No Pressure Injuries Present    [x]Prevention Measures Documented    [] Yes LDA  for Pressure Injury Previously documented     [] Yes New Pressure Injury Discovered   [] LDA for New Pressure Injury Added      Attending RN:  Shiloh Smith RN     Second RN:  Russell NESBITT

## 2025-04-21 NOTE — ASSESSMENT & PLAN NOTE
- family shares concern for AMS; at baseline pt with occasional forgetfulness but overall cognitively and physically sound   - family confirms current mental status not consistent with baseline  - recommend delirium precautions and PT/OT

## 2025-04-21 NOTE — PROGRESS NOTES
[facility] Medicine  Progress Note    Patient Name: Eugene Gamez  MRN: [id number]  Patient Class: IP- Inpatient   Admission Date: 4/18/2025  Length of Stay: 3 days  Attending Physician: Darion Mace MD  Primary Care Provider: Laryr Monae MD        Subjective     Principal Problem:SBO (small bowel obstruction)        HPI:  Mr Eugene Gamez is a 89 y.o. man with history of L colectomy for colon cancer (2015) with ventral hernia, prior DM, HTN, CKD3 (baseline Cr 1) who presented with nausea and vomiting, abdominal pain.  The symptoms began a day or 2 ago but significantly worsened yesterday morning.  Last night and today He has not been able to keep much of anything down.  He denies any issues with his colostomy, stool has been normal.  Overall he feels weak and fatigued.  Denies dysuria, difficulty urinating, changes in bowel habits  His CMP shows creatinine 2.4, bicarb 21, anion gap 17, glucose 209; UA with trace protein, 7 RBC, 2 WBC; CT abdomen and pelvis with high-grade small-bowel obstruction related to a 5 x 5 cm ventral abdominal wall hernia, right middle and right lower lobe mucous plugging and/or endobronchial spread of infection/bronchitis.  He was admitted for high grade SBO. Started IVF. General surgery consulted.     Overview/Hospital Course:  Mr Eugene Gamez is a 89 y.o. man with history of L colectomy for colon cancer (2015) with colostomy in place and ventral hernia who was admitted with SBO. General surgery consulted.  NGT placed.  Patient with apparent episode of poor responsiveness.  Transferred to ICU, but quickly recovered.  Patient with atrial and ventricular ectopy on tele.  Cardiology consulted.  Patient started on Amiodarone drip.    Interval History: remains in ICU.  NGT in placed.  Frequent ectopy and episodes of NSVT on tele.    Review of Systems   HENT:  Negative for ear discharge and ear pain.    Eyes:  Negative for discharge and itching.    Endocrine: Negative for cold intolerance and heat intolerance.   Neurological:  Negative for seizures and syncope.     Objective:     Vital Signs (Most Recent):  Temp: 98.2 °F (36.8 °C) (04/21/25 1120)  Pulse: 89 (04/21/25 1120)  Resp: 20 (04/21/25 1100)  BP: 132/72 (04/21/25 1120)  SpO2: 97 % (04/21/25 1100) Vital Signs (24h Range):  Temp:  [97.2 °F (36.2 °C)-98.4 °F (36.9 °C)] 98.2 °F (36.8 °C)  Pulse:  [] 89  Resp:  [18-41] 20  SpO2:  [97 %-100 %] 97 %  BP: (104-159)/(59-89) 132/72     Weight: 62 kg (136 lb 11 oz)  Body mass index is 17.55 kg/m².    Intake/Output Summary (Last 24 hours) at 4/21/2025 1203  Last data filed at 4/21/2025 1100  Gross per 24 hour   Intake 2454.81 ml   Output 1725 ml   Net 729.81 ml         Physical Exam  Vitals and nursing note reviewed.   Constitutional:       Appearance: He is ill-appearing.   HENT:      Head:      Comments: Temporal wasting     Nose:      Comments: NGT in place     Mouth/Throat:      Mouth: Mucous membranes are dry.   Cardiovascular:      Rate and Rhythm: Normal rate and regular rhythm.   Pulmonary:      Effort: Pulmonary effort is normal.      Breath sounds: Normal breath sounds.   Abdominal:      General: There is distension (Not tense).      Tenderness: There is no abdominal tenderness. There is no guarding or rebound.      Comments: Hypoactive bowel sounds.  Left-sided ostomy with no stool present   Musculoskeletal:      Right lower leg: No edema.      Left lower leg: No edema.   Skin:     General: Skin is warm and dry.   Neurological:      Mental Status: He is alert. Mental status is at baseline.               Significant Labs: All pertinent labs within the past 24 hours have been reviewed.  BMP:   Recent Labs   Lab 04/21/25  0330   *   K 4.1   *   CO2 18*   BUN 69*   CREATININE 1.8*   CALCIUM 8.8   MG 2.2     CBC:   Recent Labs   Lab 04/20/25  0349 04/21/25  0330   WBC 6.67 6.81   HGB 12.4* 11.1*   HCT 38.4* 35.7*    295  "      Significant Imaging: I have reviewed all pertinent imaging results/findings within the past 24 hours.      Assessment & Plan  SBO (small bowel obstruction)  - CT upon admission: "High-grade small bowel obstruction related to a 5 by 5 cm ventral abdominal wall hernia. Status post left colectomy common diverting left lower quadrant ostomy and multiple small bowel anastomoses with markedly dilated small bowel loops in the deep pelvis which may reflect acute superimposed on chronic change."  - General surgery consulted  - NPO   NGT placed.  Gastrograffin challenge today.    Acute renal failure superimposed on stage 3b chronic kidney disease  VINNY is likely due to  prerenal from SBO . Baseline creatinine is  1 . Most recent creatinine and eGFR are listed below.  Recent Labs     04/19/25 2004 04/20/25 0349 04/21/25  0330   CREATININE 2.2* 2.1* 1.8*   EGFRNORACEVR 28* 30* 36*   - UA unremarkable  - CT shows no hydronephrosis     Plan  - Avoid nephrotoxins and renally dose meds for GFR listed above  - Monitor urine output, serial BMP, and adjust therapy as needed  - improving on IVF's  HTN, goal below 140/80  Patient's blood pressure range in the last 24 hours was: BP  Min: 104/60  Max: 159/69.The patient's inpatient anti-hypertensive regimen is listed below:  Current Antihypertensives  hydrALAZINE injection 10 mg, Every 6 hours PRN, Intravenous  metoprolol injection 5 mg, Every 6 hours, Intravenous    Plan  - BP increasing as patient unable to take his oral BP medications.  Continue with prn IV Hydralazine.   Pulmonary emphysema, unspecified emphysema type  Patient's COPD is controlled currently.  Patient is currently off COPD Pathway.  Stable on room air  Anemia of chronic renal failure, stage 3b  Anemia is likely due to chronic disease due to Chronic Kidney Disease. Most recent hemoglobin and hematocrit are listed below.  Recent Labs     04/19/25  0430 04/20/25 0349 04/21/25  0330   HGB 11.0* 12.4* 11.1*   HCT " 34.9* 38.4* 35.7*     Plan  - Monitor serial CBC: Daily  - Transfuse PRBC if patient becomes hemodynamically unstable, symptomatic or H/H drops below 7/21.  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 Liver biopsy negative     Ventral hernia  - see SBO    Severe protein-calorie malnutrition  Body mass index is 17.55 kg/m².  Notably, albumin 3.6 on admission- perhaps due to dehydration  - must remain strict NPO at this time       NSVT (nonsustained ventricular tachycardia)  Episodes of NSVT on monitoring.  Check Echo.  Cardiology consulted.  Atrial and ventricular ectopy.  Started on amiodarone drip per Cards recommendations.      Atrial tachycardia      Advance care planning  Palliative Care consult.    Hypernatremia  Hypernatremia is likely due to  normal saline IVF's . The patient's most recent sodium results are listed below.  Recent Labs     04/19/25 2004 04/20/25  0349 04/21/25  0330    145 151*     Plan  - Aim to correct the sodium by 8-10mEq in 24 hours.   - Plan to correct their hypernatremia with Select IV fluids: D5W/0.45 NaCl  - Will plan to trend the patient's sodium: Daily    VTE Risk Mitigation (From admission, onward)           Ordered     heparin (porcine) injection 5,000 Units  Every 8 hours         04/19/25 1933     Place ARI hose  Until discontinued         04/18/25 1542     Place sequential compression device  Until discontinued         04/18/25 1542     Reason for No Pharmacological VTE Prophylaxis  Once        Question:  Reasons:  Answer:  Physician Provided (leave comment)  Comment:  potential OR    04/18/25 1542     IP VTE HIGH RISK  PATIENT  Once         04/18/25 1542                    Discharge Planning   WIL:      Code Status: Full Code   Medical Readiness for Discharge Date:   Discharge Plan A: Home Health                    Darion Mace MD  Department of Hospital Medicine   [facility] [place] - Intensive Care

## 2025-04-21 NOTE — SUBJECTIVE & OBJECTIVE
Review of Systems   Cardiovascular:  Positive for irregular heartbeat. Negative for chest pain, claudication, dyspnea on exertion, leg swelling, near-syncope, orthopnea, palpitations, paroxysmal nocturnal dyspnea and syncope.   Respiratory:  Negative for cough, shortness of breath, snoring and sputum production.    Gastrointestinal:  Negative for abdominal pain, dysphagia, heartburn, nausea and vomiting.   Neurological:  Positive for dizziness and weakness. Negative for headaches and loss of balance.     Objective:     Vital Signs (Most Recent):  Temp: 98.1 °F (36.7 °C) (04/21/25 0718)  Pulse: 64 (04/21/25 0900)  Resp: 20 (04/21/25 0900)  BP: 104/60 (04/21/25 0900)  SpO2: 97 % (04/21/25 0900) Vital Signs (24h Range):  Temp:  [97.2 °F (36.2 °C)-98.4 °F (36.9 °C)] 98.1 °F (36.7 °C)  Pulse:  [] 64  Resp:  [18-41] 20  SpO2:  [96 %-100 %] 97 %  BP: (104-159)/(59-89) 104/60     Weight: 62 kg (136 lb 11 oz)  Body mass index is 17.55 kg/m².     SpO2: 97 %         Intake/Output Summary (Last 24 hours) at 4/21/2025 1040  Last data filed at 4/21/2025 0900  Gross per 24 hour   Intake 2388.22 ml   Output 1725 ml   Net 663.22 ml       Lines/Drains/Airways       Central Venous Catheter Line  Duration             Port A Cath Single Lumen Subclavian Left -- days              Drain  Duration                  Colostomy 04/23/15 LLQ 3651 days         NG/OG Tube 04/19/25 1130 [place] sump 18 Fr. Right nostril 1 day              Peripheral Intravenous Line  Duration                  Peripheral IV - Single Lumen 04/18/25 1201 22 G Left;Posterior Forearm 2 days                       Physical Exam  Constitutional:       Appearance: He is ill-appearing.   HENT:      Head: Normocephalic and atraumatic.      Mouth/Throat:      Mouth: Mucous membranes are moist.   Eyes:      Extraocular Movements: Extraocular movements intact.      Pupils: Pupils are equal, round, and reactive to light.   Cardiovascular:      Rate and Rhythm: Normal  rate. Rhythm irregular.      Pulses: Normal pulses.      Heart sounds: Normal heart sounds.   Pulmonary:      Effort: Pulmonary effort is normal.      Breath sounds: Normal breath sounds.   Abdominal:      General: Bowel sounds are normal.      Palpations: Abdomen is soft.   Musculoskeletal:      Left lower leg: No edema.   Skin:     General: Skin is warm.            Current Medications:   albuterol-ipratropium  3 mL Nebulization Q6H WAKE    diphenhydrAMINE  50 mg Intravenous Once    heparin (porcine)  5,000 Units Subcutaneous Q8H    metoprolol  5 mg Intravenous Q6H    mupirocin   Nasal BID    pantoprazole  40 mg Intravenous Daily      0.9% NaCl   Intravenous Continuous 100 mL/hr at 04/21/25 0900 Rate Verify at 04/21/25 0900    amiodarone in dextrose 5%  1 mg/min Intravenous Continuous 33.3 mL/hr at 04/21/25 0900 1 mg/min at 04/21/25 0900    amiodarone in dextrose 5%  0.5 mg/min Intravenous Continuous           Current Facility-Administered Medications:     acetaminophen, 650 mg, Oral, Q6H PRN    dextrose 50%, 12.5 g, Intravenous, PRN    dextrose 50%, 25 g, Intravenous, PRN    glucagon (human recombinant), 1 mg, Intramuscular, PRN    glucose, 16 g, Oral, PRN    glucose, 24 g, Oral, PRN    hydrALAZINE, 10 mg, Intravenous, Q6H PRN    insulin aspart U-100, 0-5 Units, Subcutaneous, Q6H PRN    morphine, 1 mg, Intravenous, Q4H PRN    morphine, 2 mg, Intravenous, Q4H PRN    naloxone, 0.02 mg, Intravenous, PRN    ondansetron, 4 mg, Intravenous, Q6H PRN    prochlorperazine, 5 mg, Intravenous, Q6H PRN    Laboratory (all labs reviewed):  CBC:  Recent Labs   Lab 01/14/25  1330 04/18/25  1159 04/19/25  0430 04/20/25  0349 04/21/25  0330   WBC 6.82 8.70 7.64 6.67 6.81   Hemoglobin 11.5 L  --   --   --   --    HGB  --  12.8 L 11.0 L 12.4 L 11.1 L   Hematocrit 36.0 L  --   --   --   --    HCT  --  39.3 L 34.9 L 38.4 L 35.7 L   Platelet Count  --  314 294 263 295   Platelets 297  --   --   --   --        CHEMISTRIES:  Recent Labs    Lab 06/10/22  1113 05/09/23  1441 03/04/24  1156 06/11/24  0930 09/30/24  0912 01/14/25  1330 03/20/25  1522 04/11/25  1411 04/18/25  1159 04/19/25  0430 04/19/25 2004 04/20/25  0349 04/21/25  0330   Glucose 145 H   < > 102 141 H 111 H 168 H 142 H  --   --   --   --   --   --    Sodium 139   < > 141 140 139 144 139   < > 142 144 144 145 151 H   Potassium 4.6   < > 4.7 4.9 4.7 4.4 4.5   < > 4.6 5.0 4.3 4.3 4.1   BUN 33 H   < > 24 H 36 H 23 18 18   < > 63 H 72 H 74 H 72 H 69 H   Creatinine 2.2 H   < > 1.8 H 2.1 H 1.8 H 1.7 H 1.6 H   < > 2.4 H 2.3 H 2.2 H 2.1 H 1.8 H   eGFR if non  26 A  --   --   --   --   --   --   --   --   --   --   --   --    eGFR  --    < > 35.8 A 29.7 A 35.8 A 38.3 A 40.9 A   < > 25 L 26 L 28 L 30 L 36 L   Calcium 10.0   < > 9.6 10.5 10.1 9.8 10.4   < > 10.7 H 9.5 9.3 9.5 8.8   Magnesium   --   --   --   --   --   --   --   --  2.1 2.1 2.1 2.2 2.2    < > = values in this interval not displayed.       CARDIAC BIOMARKERS:  Recent Labs   Lab 05/10/23  0413 12/01/23  1215 12/01/23  1512 04/18/25  1159 04/18/25  1421   Troponin I 0.025 <0.006 0.006  --   --    Troponin-I  --   --   --  0.023 0.018       COAGS:  Recent Labs   Lab 04/11/25  1411   INR 1.1       LIPIDS/LFTS:  Recent Labs   Lab 04/11/25  1411 04/18/25  1159 04/19/25  0430 04/20/25  0349 04/21/25  0330   AST 12 21 15 17 14   ALT 9 25 18 18 13       BNP:  Recent Labs   Lab 05/09/23  1441 12/01/23  1215 03/20/25  1522 04/18/25  1159   BNP 10 29 132 H 21       TSH:  Recent Labs   Lab 05/09/23  1441   TSH 2.468       Free T4:

## 2025-04-22 ENCOUNTER — ANESTHESIA EVENT (OUTPATIENT)
Dept: SURGERY | Facility: HOSPITAL | Age: 89
End: 2025-04-22
Payer: MEDICARE

## 2025-04-22 LAB
ALBUMIN SERPL BCP-MCNC: 2.7 G/DL (ref 3.5–5.2)
ALP SERPL-CCNC: 42 UNIT/L (ref 40–150)
ALT SERPL W/O P-5'-P-CCNC: 10 UNIT/L (ref 10–44)
ANION GAP (OHS): 10 MMOL/L (ref 8–16)
AST SERPL-CCNC: 13 UNIT/L (ref 11–45)
BILIRUB SERPL-MCNC: 0.4 MG/DL (ref 0.1–1)
BUN SERPL-MCNC: 60 MG/DL (ref 8–23)
CALCIUM SERPL-MCNC: 9.4 MG/DL (ref 8.7–10.5)
CHLORIDE SERPL-SCNC: 120 MMOL/L (ref 95–110)
CO2 SERPL-SCNC: 19 MMOL/L (ref 23–29)
CREAT SERPL-MCNC: 1.8 MG/DL (ref 0.5–1.4)
GFR SERPLBLD CREATININE-BSD FMLA CKD-EPI: 36 ML/MIN/1.73/M2
GLUCOSE SERPL-MCNC: 103 MG/DL (ref 70–110)
GLUCOSE SERPL-MCNC: 223 MG/DL (ref 70–110)
POCT GLUCOSE: 129 MG/DL (ref 70–110)
POTASSIUM SERPL-SCNC: 3.8 MMOL/L (ref 3.5–5.1)
PROT SERPL-MCNC: 5.7 GM/DL (ref 6–8.4)
SODIUM SERPL-SCNC: 149 MMOL/L (ref 136–145)

## 2025-04-22 PROCEDURE — 25000003 PHARM REV CODE 250: Performed by: STUDENT IN AN ORGANIZED HEALTH CARE EDUCATION/TRAINING PROGRAM

## 2025-04-22 PROCEDURE — 94761 N-INVAS EAR/PLS OXIMETRY MLT: CPT

## 2025-04-22 PROCEDURE — 94640 AIRWAY INHALATION TREATMENT: CPT

## 2025-04-22 PROCEDURE — 99233 SBSQ HOSP IP/OBS HIGH 50: CPT | Mod: 25,,, | Performed by: REGISTERED NURSE

## 2025-04-22 PROCEDURE — 63600175 PHARM REV CODE 636 W HCPCS: Performed by: HOSPITALIST

## 2025-04-22 PROCEDURE — 20000000 HC ICU ROOM

## 2025-04-22 PROCEDURE — 25000003 PHARM REV CODE 250: Performed by: HOSPITALIST

## 2025-04-22 PROCEDURE — S5010 5% DEXTROSE AND 0.45% SALINE: HCPCS | Performed by: STUDENT IN AN ORGANIZED HEALTH CARE EDUCATION/TRAINING PROGRAM

## 2025-04-22 PROCEDURE — 99497 ADVNCD CARE PLAN 30 MIN: CPT | Mod: 25,,, | Performed by: REGISTERED NURSE

## 2025-04-22 PROCEDURE — 25000003 PHARM REV CODE 250: Performed by: INTERNAL MEDICINE

## 2025-04-22 PROCEDURE — 36415 COLL VENOUS BLD VENIPUNCTURE: CPT | Performed by: STUDENT IN AN ORGANIZED HEALTH CARE EDUCATION/TRAINING PROGRAM

## 2025-04-22 PROCEDURE — 82565 ASSAY OF CREATININE: CPT | Performed by: STUDENT IN AN ORGANIZED HEALTH CARE EDUCATION/TRAINING PROGRAM

## 2025-04-22 PROCEDURE — 25000242 PHARM REV CODE 250 ALT 637 W/ HCPCS: Performed by: HOSPITALIST

## 2025-04-22 PROCEDURE — 99232 SBSQ HOSP IP/OBS MODERATE 35: CPT | Mod: ,,, | Performed by: INTERNAL MEDICINE

## 2025-04-22 PROCEDURE — 63600175 PHARM REV CODE 636 W HCPCS: Performed by: INTERNAL MEDICINE

## 2025-04-22 PROCEDURE — S5010 5% DEXTROSE AND 0.45% SALINE: HCPCS | Performed by: HOSPITALIST

## 2025-04-22 RX ORDER — SODIUM CHLORIDE 450 MG/100ML
INJECTION, SOLUTION INTRAVENOUS CONTINUOUS
Status: DISCONTINUED | OUTPATIENT
Start: 2025-04-23 | End: 2025-04-23

## 2025-04-22 RX ADMIN — IPRATROPIUM BROMIDE AND ALBUTEROL SULFATE 3 ML: 2.5; .5 SOLUTION RESPIRATORY (INHALATION) at 08:04

## 2025-04-22 RX ADMIN — METOROPROLOL TARTRATE 5 MG: 5 INJECTION, SOLUTION INTRAVENOUS at 11:04

## 2025-04-22 RX ADMIN — MUPIROCIN: 20 OINTMENT TOPICAL at 08:04

## 2025-04-22 RX ADMIN — IPRATROPIUM BROMIDE AND ALBUTEROL SULFATE 3 ML: 2.5; .5 SOLUTION RESPIRATORY (INHALATION) at 01:04

## 2025-04-22 RX ADMIN — AMIODARONE HYDROCHLORIDE 0.5 MG/MIN: 1.8 INJECTION, SOLUTION INTRAVENOUS at 11:04

## 2025-04-22 RX ADMIN — AMIODARONE HYDROCHLORIDE 0.5 MG/MIN: 1.8 INJECTION, SOLUTION INTRAVENOUS at 12:04

## 2025-04-22 RX ADMIN — METOROPROLOL TARTRATE 5 MG: 5 INJECTION, SOLUTION INTRAVENOUS at 05:04

## 2025-04-22 RX ADMIN — INSULIN ASPART 2 UNITS: 100 INJECTION, SOLUTION INTRAVENOUS; SUBCUTANEOUS at 04:04

## 2025-04-22 RX ADMIN — METOROPROLOL TARTRATE 5 MG: 5 INJECTION, SOLUTION INTRAVENOUS at 12:04

## 2025-04-22 RX ADMIN — METOROPROLOL TARTRATE 5 MG: 5 INJECTION, SOLUTION INTRAVENOUS at 06:04

## 2025-04-22 RX ADMIN — HEPARIN SODIUM 5000 UNITS: 5000 INJECTION INTRAVENOUS; SUBCUTANEOUS at 09:04

## 2025-04-22 RX ADMIN — HEPARIN SODIUM 5000 UNITS: 5000 INJECTION INTRAVENOUS; SUBCUTANEOUS at 06:04

## 2025-04-22 RX ADMIN — INSULIN ASPART 1 UNITS: 100 INJECTION, SOLUTION INTRAVENOUS; SUBCUTANEOUS at 11:04

## 2025-04-22 RX ADMIN — HEPARIN SODIUM 5000 UNITS: 5000 INJECTION INTRAVENOUS; SUBCUTANEOUS at 02:04

## 2025-04-22 RX ADMIN — DEXTROSE AND SODIUM CHLORIDE: 5; 450 INJECTION, SOLUTION INTRAVENOUS at 11:04

## 2025-04-22 RX ADMIN — MUPIROCIN: 20 OINTMENT TOPICAL at 09:04

## 2025-04-22 RX ADMIN — PANTOPRAZOLE SODIUM 40 MG: 40 INJECTION, POWDER, FOR SOLUTION INTRAVENOUS at 08:04

## 2025-04-22 RX ADMIN — DEXTROSE AND SODIUM CHLORIDE: 5; 450 INJECTION, SOLUTION INTRAVENOUS at 08:04

## 2025-04-22 NOTE — ASSESSMENT & PLAN NOTE
Hypernatremia is likely due to normal saline IVF's. The patient's most recent sodium results are listed below.  Recent Labs     04/20/25  0349 04/21/25  0330 04/22/25  0835    151* 149*     Plan  - Aim to correct the sodium by 8-10mEq in 24 hours.   - Plan to correct their hypernatremia with Select IV fluids: D5W/0.45 NaCl - increase rate to 75 cc/hr  - Will plan to trend the patient's sodium: Daily  - this is slowly improving

## 2025-04-22 NOTE — ASSESSMENT & PLAN NOTE
"4/22/2025  - interval chart reviewed; pr discussed with MDT during ICU rounds   - met with pt and daughter Nicol at bedside  - pt with improving mental status but still not at baseline per Nicol; pt able to report that he has 7 children accurately, and provided Nicol's name   - pt identifies his daughters as preferred decision makers (points to daughter Nicol, but did not specify further); Nicol confirms that she is communication with pt's children and all in agreement with current plan   - reviewed wished regarding offered surgery (see SBO)   - pt's rell is Jew; open to pastoral care team and daughter plans to attempt to contact pt's    - emotional support provided; answered all questions   - coordinated additional support with CM, palliative SW, and    - updated MDT     4/21/2025 - Consult   - consult received; interval chart reviewed in detail; discussed pt with MDT   - met with patient at bedside; introduction to palliative medicine team and role in current care and admission   - pt is polite and able to proide name and answer some simple questions, but concerning capacity and mental status   - attemp[remy to review MPOA document on file from 2015 naming Chel Barnes and [place] as MPOA; pt initially unable to indentify these names, later identified Chel as "lady who cooks for him" (daughter confirms it is a friend who is no longer physically/mentally able to act in this capacity) and pt unable to identify Sade (daughter confirms in pt's sister)  - pt identifies daughter, Nicol, as who should be contacted regarding is medical decisions   - called pt's daughter Nicol, introduction to palliative medicine team; brief medical update provided   - learned more about pt outside of current admission; he is  and has 7 children total; he lives with daughter, Nicol; not all children are local; Pt lives with daughter Nicol (who is a PCT in psych); son, Eugene Maradiaga, is also active in pt's " day to day care   - Nicol shares that at baseline pt is of sound mind (with occasional forgetfulness, repeats things, consistent with age) and can typically perform all ADL's himself, ambulatory with use of walker, and until recently drove himself; recently stopped driving but leaves the house with Clemente or Eugene daily for activities which is his favorite thing to do, especially if going to go out to eat ; Clemente requesting PT/OT when safe for pt to ensure no loss in abilities   - GOC/ACP discussion  - reviewed MPOA and living will documents on file with Nicol; will likely need to revisit MPOA documentation when pt returns to baseline mental status   - discussed resources regarding other advanced directives, outside of medical care; recommended [facility] [place] as resource for further information on this and or an atty; but reassured that palliative team and assist with MPOA documentation and medical wishes   - overall goal for improvement in condition and maintaining as much abilities as possible   - emotional support provided   - Allowed time for questions/concerns; all addressed; expressed availability of myself/palliative team for additional questions/concerns   - will plan for follow up with pt for continued assessment of mental status for further ACP/GOC discussions

## 2025-04-22 NOTE — ASSESSMENT & PLAN NOTE
- BMI 17, and with visible cachexia; contributes to overall poor prognosis and hospice qualification   - pt with >13% body weight loss in the past 6 months and >8% body weight loss in the past month; pt with decreased appetite only in the last few days before admission; per family pt eats constantly and very hearty appetite; they have also been concerned about weight loss compared to intake with history of cancer   - consider additional work of for causes of weight loss if family wished to persue; will plan for further discussion following SBO treatment

## 2025-04-22 NOTE — ASSESSMENT & PLAN NOTE
Patient's blood pressure range in the last 24 hours was: BP  Min: 109/65  Max: 167/77.The patient's inpatient anti-hypertensive regimen is listed below:  Current Antihypertensives  hydrALAZINE injection 10 mg, Every 6 hours PRN, Intravenous  metoprolol injection 5 mg, Every 6 hours, Intravenous    Plan  - BP increasing as patient unable to take his oral BP medications.  Continue with prn IV Hydralazine.

## 2025-04-22 NOTE — SUBJECTIVE & OBJECTIVE
Interval History: continues on IV amiodarone, did not pass Gastrografin challenge yesterday.  Surgery is discussing possibility of ex lap with patient to fixed small-bowel obstruction.  Palliative following with ongoing care but appears patient and family would want this.    Review of Systems  Objective:     Vital Signs (Most Recent):  Temp: 98.2 °F (36.8 °C) (04/22/25 1105)  Pulse: 66 (04/22/25 1200)  Resp: 20 (04/22/25 1200)  BP: 132/71 (04/22/25 1200)  SpO2: 97 % (04/22/25 1200) Vital Signs (24h Range):  Temp:  [97.8 °F (36.6 °C)-98.2 °F (36.8 °C)] 98.2 °F (36.8 °C)  Pulse:  [59-81] 66  Resp:  [19-35] 20  SpO2:  [93 %-100 %] 97 %  BP: (109-167)/(61-86) 132/71     Weight: 62 kg (136 lb 11 oz)  Body mass index is 17.55 kg/m².    Intake/Output Summary (Last 24 hours) at 4/22/2025 1353  Last data filed at 4/22/2025 1000  Gross per 24 hour   Intake 1374.48 ml   Output 625 ml   Net 749.48 ml         Physical Exam  Vitals and nursing note reviewed.   Constitutional:       Appearance: He is ill-appearing.   HENT:      Head:      Comments: Temporal wasting     Nose:      Comments: NGT in place     Mouth/Throat:      Mouth: Mucous membranes are dry.   Cardiovascular:      Rate and Rhythm: Normal rate and regular rhythm.   Pulmonary:      Effort: Pulmonary effort is normal.      Breath sounds: Normal breath sounds.   Abdominal:      General: There is distension (Not tense).      Tenderness: There is no abdominal tenderness. There is no guarding or rebound.      Comments: Hypoactive bowel sounds.  Left-sided ostomy with no stool present   Musculoskeletal:      Right lower leg: No edema.      Left lower leg: No edema.   Skin:     General: Skin is warm and dry.   Neurological:      Mental Status: He is alert. Mental status is at baseline.               Significant Labs: All pertinent labs within the past 24 hours have been reviewed.  BMP:   Recent Labs   Lab 04/21/25  0330 04/22/25  0835   * 149*   K 4.1 3.8   *  120*   CO2 18* 19*   BUN 69* 60*   CREATININE 1.8* 1.8*   CALCIUM 8.8 9.4   MG 2.2  --      CBC:   Recent Labs   Lab 04/21/25  0330   WBC 6.81   HGB 11.1*   HCT 35.7*          Significant Imaging: I have reviewed all pertinent imaging results/findings within the past 24 hours.

## 2025-04-22 NOTE — ASSESSMENT & PLAN NOTE
Anemia is likely due to chronic disease due to Chronic Kidney Disease. Most recent hemoglobin and hematocrit are listed below.  Recent Labs     04/20/25  0349 04/21/25  0330   HGB 12.4* 11.1*   HCT 38.4* 35.7*     Plan  - Monitor serial CBC: Daily  - Transfuse PRBC if patient becomes hemodynamically unstable, symptomatic or H/H drops below 7/21.  - Patient has not received any PRBC transfusions to date  - Patient's anemia is currently stable

## 2025-04-22 NOTE — ASSESSMENT & PLAN NOTE
VINNY is likely due to prerenal from SBO. Baseline creatinine is 1. Most recent creatinine and eGFR are listed below.  Recent Labs     04/20/25  0349 04/21/25  0330 04/22/25  0835   CREATININE 2.1* 1.8* 1.8*   EGFRNORACEVR 30* 36* 36*   - UA unremarkable  - CT shows no hydronephrosis     Plan  - Avoid nephrotoxins and renally dose meds for GFR listed above  - Monitor urine output, serial BMP, and adjust therapy as needed  - improving on IVF's

## 2025-04-22 NOTE — SUBJECTIVE & OBJECTIVE
Review of Systems   Cardiovascular:  Negative for chest pain, claudication, dyspnea on exertion, irregular heartbeat, leg swelling, near-syncope, orthopnea, palpitations, paroxysmal nocturnal dyspnea and syncope.   Respiratory:  Negative for cough, shortness of breath, snoring and sputum production.    Gastrointestinal:  Negative for abdominal pain, dysphagia, heartburn, nausea and vomiting.   Neurological:  Negative for dizziness, headaches, loss of balance and weakness.     Objective:     Vital Signs (Most Recent):  Temp: 98 °F (36.7 °C) (04/22/25 0800)  Pulse: 75 (04/22/25 1136)  Resp: 20 (04/22/25 1000)  BP: 128/61 (04/22/25 1136)  SpO2: 98 % (04/22/25 1000) Vital Signs (24h Range):  Temp:  [97.8 °F (36.6 °C)-98.1 °F (36.7 °C)] 98 °F (36.7 °C)  Pulse:  [63-81] 75  Resp:  [19-35] 20  SpO2:  [93 %-100 %] 98 %  BP: (109-167)/(61-86) 128/61     Weight: 62 kg (136 lb 11 oz)  Body mass index is 17.55 kg/m².     SpO2: 98 %         Intake/Output Summary (Last 24 hours) at 4/22/2025 1147  Last data filed at 4/22/2025 1000  Gross per 24 hour   Intake 1504.87 ml   Output 625 ml   Net 879.87 ml       Lines/Drains/Airways       Central Venous Catheter Line  Duration             Port A Cath Single Lumen Subclavian Left -- days              Drain  Duration                  Colostomy 04/23/15 LLQ 3652 days         NG/OG Tube 04/19/25 1130 [place] sump 18 Fr. Right nostril 3 days    Male External Urinary Catheter 04/22/25 0100 <1 day              Peripheral Intravenous Line  Duration                  Peripheral IV - Single Lumen 04/18/25 1201 22 G Left;Posterior Forearm 3 days         Peripheral IV - Single Lumen 04/21/25 1253 20 G Anterior;Distal;Left Upper Arm <1 day                       Physical Exam  Constitutional:       General: He is in acute distress.      Appearance: He is ill-appearing.   HENT:      Head: Normocephalic and atraumatic.      Mouth/Throat:      Mouth: Mucous membranes are moist.   Eyes:       Extraocular Movements: Extraocular movements intact.      Pupils: Pupils are equal, round, and reactive to light.   Cardiovascular:      Rate and Rhythm: Normal rate and regular rhythm.      Pulses: Normal pulses.      Heart sounds: Normal heart sounds.   Pulmonary:      Effort: Pulmonary effort is normal.      Breath sounds: Normal breath sounds.   Abdominal:      General: Bowel sounds are normal.      Palpations: Abdomen is soft.   Musculoskeletal:      Left lower leg: No edema.   Skin:     General: Skin is warm.            Current Medications:   albuterol-ipratropium  3 mL Nebulization Q6H WAKE    heparin (porcine)  5,000 Units Subcutaneous Q8H    metoprolol  5 mg Intravenous Q6H    mupirocin   Nasal BID    pantoprazole  40 mg Intravenous Daily      amiodarone in dextrose 5%  0.5 mg/min Intravenous Continuous 16.7 mL/hr at 04/22/25 1137 0.5 mg/min at 04/22/25 1137    D5 and 0.45% NaCl   Intravenous Continuous 50 mL/hr at 04/22/25 1000 Rate Verify at 04/22/25 1000       Current Facility-Administered Medications:     acetaminophen, 650 mg, Oral, Q6H PRN    dextrose 50%, 12.5 g, Intravenous, PRN    dextrose 50%, 25 g, Intravenous, PRN    glucagon (human recombinant), 1 mg, Intramuscular, PRN    glucose, 16 g, Oral, PRN    glucose, 24 g, Oral, PRN    hydrALAZINE, 10 mg, Intravenous, Q6H PRN    insulin aspart U-100, 0-5 Units, Subcutaneous, Q6H PRN    morphine, 1 mg, Intravenous, Q4H PRN    morphine, 2 mg, Intravenous, Q4H PRN    naloxone, 0.02 mg, Intravenous, PRN    ondansetron, 4 mg, Intravenous, Q6H PRN    prochlorperazine, 5 mg, Intravenous, Q6H PRN    Laboratory (all labs reviewed):  CBC:  Recent Labs   Lab 01/14/25  1330 04/18/25  1159 04/19/25  0430 04/20/25  0349 04/21/25  0330   WBC 6.82 8.70 7.64 6.67 6.81   Hemoglobin 11.5 L  --   --   --   --    HGB  --  12.8 L 11.0 L 12.4 L 11.1 L   Hematocrit 36.0 L  --   --   --   --    HCT  --  39.3 L 34.9 L 38.4 L 35.7 L   Platelet Count  --  551 521 300 034    Platelets 297  --   --   --   --        CHEMISTRIES:  Recent Labs   Lab 06/10/22  1113 05/09/23  1441 03/04/24  1156 06/11/24  0930 09/30/24  0912 01/14/25  1330 03/20/25  1522 04/11/25  1411 04/18/25  1159 04/19/25  0430 04/19/25  2004 04/20/25  0349 04/21/25  0330 04/22/25  0835   Glucose 145 H   < > 102 141 H 111 H 168 H 142 H  --   --   --   --   --   --   --    Sodium 139   < > 141 140 139 144 139   < > 142 144 144 145 151 H 149 H   Potassium 4.6   < > 4.7 4.9 4.7 4.4 4.5   < > 4.6 5.0 4.3 4.3 4.1 3.8   BUN 33 H   < > 24 H 36 H 23 18 18   < > 63 H 72 H 74 H 72 H 69 H 60 H   Creatinine 2.2 H   < > 1.8 H 2.1 H 1.8 H 1.7 H 1.6 H   < > 2.4 H 2.3 H 2.2 H 2.1 H 1.8 H 1.8 H   eGFR if non  26 A  --   --   --   --   --   --   --   --   --   --   --   --   --    eGFR  --    < > 35.8 A 29.7 A 35.8 A 38.3 A 40.9 A   < > 25 L 26 L 28 L 30 L 36 L 36 L   Calcium 10.0   < > 9.6 10.5 10.1 9.8 10.4   < > 10.7 H 9.5 9.3 9.5 8.8 9.4   Magnesium   --   --   --   --   --   --   --   --  2.1 2.1 2.1 2.2 2.2  --     < > = values in this interval not displayed.       CARDIAC BIOMARKERS:  Recent Labs   Lab 05/10/23  0413 12/01/23  1215 12/01/23  1512 04/18/25  1159 04/18/25  1421   Troponin I 0.025 <0.006 0.006  --   --    Troponin-I  --   --   --  0.023 0.018       COAGS:  Recent Labs   Lab 04/11/25  1411   INR 1.1       LIPIDS/LFTS:  Recent Labs   Lab 04/18/25  1159 04/19/25  0430 04/20/25  0349 04/21/25  0330 04/22/25  0835   AST 21 15 17 14 13   ALT 25 18 18 13 10       BNP:  Recent Labs   Lab 05/09/23  1441 12/01/23  1215 03/20/25  1522 04/18/25  1159   BNP 10 29 132 H 21       TSH:  Recent Labs   Lab 05/09/23  1441   TSH 2.468       Free T4:

## 2025-04-22 NOTE — NURSING
[facility]  Nurses Note -- 4 Eyes      4/22/2025       Skin assessed on: Q Shift      [x] No Pressure Injuries Present    [x]Prevention Measures Documented    [] Yes LDA  for Pressure Injury Previously documented     [] Yes New Pressure Injury Discovered   [] LDA for New Pressure Injury Added      Attending RN:  Latoya Minor RN     Second RN:  LAZ Matamoros

## 2025-04-22 NOTE — ASSESSMENT & PLAN NOTE
Creatinine around 2.2 - 2.3  Baseline creatinine normal  Most likely prerenal in setting of nausea vomiting and decreased p.o. intake  Creatinine trending towards normal limits  Currently getting maintenance cleared

## 2025-04-22 NOTE — ASSESSMENT & PLAN NOTE
"- CT upon admission: "High-grade small bowel obstruction related to a 5 by 5 cm ventral abdominal wall hernia. Status post left colectomy common diverting left lower quadrant ostomy and multiple small bowel anastomoses with markedly dilated small bowel loops in the deep pelvis which may reflect acute superimposed on chronic change."  - General surgery consulted  - NPO   - NGT placed and LIWS  - GGC ordered for 4/21 and now passage of contrast to colon, no gas/stool in ostomy bag  - Surgery discussing ex-lap with patient and family - tentatively for 4/23/25  - Cardiology aware and following, will ask for risk stratification for surgery      "

## 2025-04-22 NOTE — SUBJECTIVE & OBJECTIVE
Medications:  Continuous Infusions:   amiodarone in dextrose 5%  0.5 mg/min Intravenous Continuous 16.7 mL/hr at 04/22/25 1137 0.5 mg/min at 04/22/25 1137    D5 and 0.45% NaCl   Intravenous Continuous 50 mL/hr at 04/22/25 1000 Rate Verify at 04/22/25 1000     Scheduled Meds:   albuterol-ipratropium  3 mL Nebulization Q6H WAKE    heparin (porcine)  5,000 Units Subcutaneous Q8H    metoprolol  5 mg Intravenous Q6H    mupirocin   Nasal BID    pantoprazole  40 mg Intravenous Daily     PRN Meds:  Current Facility-Administered Medications:     acetaminophen, 650 mg, Oral, Q6H PRN    dextrose 50%, 12.5 g, Intravenous, PRN    dextrose 50%, 25 g, Intravenous, PRN    glucagon (human recombinant), 1 mg, Intramuscular, PRN    glucose, 16 g, Oral, PRN    glucose, 24 g, Oral, PRN    hydrALAZINE, 10 mg, Intravenous, Q6H PRN    insulin aspart U-100, 0-5 Units, Subcutaneous, Q6H PRN    morphine, 1 mg, Intravenous, Q4H PRN    morphine, 2 mg, Intravenous, Q4H PRN    naloxone, 0.02 mg, Intravenous, PRN    ondansetron, 4 mg, Intravenous, Q6H PRN    prochlorperazine, 5 mg, Intravenous, Q6H PRN    Objective:     Vital Signs (Most Recent):  Temp: 98.2 °F (36.8 °C) (04/22/25 1105)  Pulse: 66 (04/22/25 1200)  Resp: 20 (04/22/25 1200)  BP: 132/71 (04/22/25 1200)  SpO2: 97 % (04/22/25 1200) Vital Signs (24h Range):  Temp:  [97.8 °F (36.6 °C)-98.2 °F (36.8 °C)] 98.2 °F (36.8 °C)  Pulse:  [59-81] 66  Resp:  [19-35] 20  SpO2:  [93 %-100 %] 97 %  BP: (109-167)/(61-86) 132/71     Weight: 62 kg (136 lb 11 oz)  Body mass index is 17.55 kg/m².       Physical Exam  Vitals and nursing note reviewed.   Constitutional:       General: He is awake.      Appearance: He is cachectic.   HENT:      Head: Atraumatic.      Comments: Temporal wasting  Pulmonary:      Effort: Pulmonary effort is normal. No respiratory distress.   Abdominal:      Comments: ostomy   Musculoskeletal:      Right lower leg: No edema.      Left lower leg: No edema.   Neurological:       Mental Status: He is alert and easily aroused.      Comments: Oriented to self and daughter; mental status not at baseline    Psychiatric:         Behavior: Behavior is cooperative.       Advance Care Planning   Advance Directives:   Living Will: Yes        Copy on chart: Yes    LaPOST: No    Do Not Resuscitate Status: No    Medical Power of : Yes (reports his daugthers are preferred decision makers; plan for further discussion)    Goals of Care: What is most important right now is to focus on remaining as independent as possible, improvement in condition but with limits to invasive therapies. Accordingly, we have decided that the best plan to meet the patient's goals includes continuing with treatment.         Significant Labs: All pertinent labs within the past 24 hours have been reviewed.  CBC:   Recent Labs   Lab 04/21/25  0330   WBC 6.81   HGB 11.1*   HCT 35.7*   MCV 94        BMP:  Recent Labs   Lab 04/22/25  0835   *   K 3.8   *   CO2 19*   BUN 60*   CREATININE 1.8*   CALCIUM 9.4     LFT:  Lab Results   Component Value Date    AST 13 04/22/2025    ALKPHOS 42 04/22/2025    BILITOT 0.4 04/22/2025     Albumin:   Albumin   Date Value Ref Range Status   04/22/2025 2.7 (L) 3.5 - 5.2 g/dL Final   04/11/2025 4.2 3.4 - 5.0 g/dL Final   03/20/2025 4.2 3.5 - 5.2 g/dL Final     Protein:   Total Protein   Date Value Ref Range Status   03/20/2025 7.8 6.0 - 8.4 g/dL Final     Lactic acid:   Lab Results   Component Value Date    LACTATE 1.9 04/19/2025    LACTATE 2.4 (H) 10/06/2019       Significant Imaging: I have reviewed all pertinent imaging results/findings within the past 24 hours.

## 2025-04-22 NOTE — NURSING
[facility]  Nurses Note -- 4 Eyes      4/22/2025       Skin assessed on: Q Shift      [x] No Pressure Injuries Present    [x]Prevention Measures Documented    [] Yes LDA  for Pressure Injury Previously documented     [] Yes New Pressure Injury Discovered   [] LDA for New Pressure Injury Added      Attending RN:  Shiloh Smith RN     Second RN:  Pita NESBITT

## 2025-04-22 NOTE — ASSESSMENT & PLAN NOTE
Atrial and ventricular ectopy have significantly improved  Continue amiodarone drip without bolus to suppress PACs and PVCs  Continue IV metoprolol 5 mg q.6 (standing order).  Do not administer metoprolol if systolic blood pressure is less than 90 mm Hg  Continue tele monitoring  Normal EF noted  Keep electrolytes repleted    We will keep amiodarone drip going while he is NPO  Once he is allowed to eat or drink orally, switch amiodarone drip to p.o. amiodarone 400 mg b.i.d..

## 2025-04-22 NOTE — PLAN OF CARE
Changes in medical condition or discharge plan: Patient remains in ICU with some improvement    Does patient need new DME? tbd    Follow up appts needed: Patient will need followup appointments with PCP and addtional appointments as ordered by the discharging provider.      Medically stable: not at this time    Estimated Discharge Date: unknown       04/22/25 1605   Discharge Reassessment   Assessment Type Discharge Planning Reassessment   Did the patient's condition or plan change since previous assessment? Yes   Discharge Plan discussed with:   (in MDTs this am)   Communicated WIL with patient/caregiver Date not available/Unable to determine   Discharge Plan A Home with family   Discharge Plan B Home with family   DME Needed Upon Discharge    (tbd)   Transition of Care Barriers None   Why the patient remains in the hospital Requires continued medical care

## 2025-04-22 NOTE — CARE UPDATE
Brief cardiology note:    Plan for general surgery noted.  Patient is to undergo exploratory laparotomy tomorrow to find out the cause for small-bowel obstruction.    Cardiology has been asked to do preop risk stratification.    Recent echocardiogram showed normal LV systolic function.  Patient is currently on amiodarone drip given frequent atrial ectopy.  Since being on amiodarone, atrial ectopy has significantly  improved.    Although he does have cardiovascular risk factors including hypertension, hyperlipidemia , CKD stage 3 and type 2 diabetes mellitus,  he is medically optimized from cardiac perspective.    RCRI 3 (15%  Chance of adverse outcome within 1st 30 days after surgery).    He is at least  moderate but not at prohibitive risk for this surgical procedure.  No further cardiac testing is needed at the moment.  Would recommend continuation of IV metoprolol in perioperative period if blood pressure is okay to minimize ectopy.    Cardiology is available if needed.      Elias Ibanez MD  [facility]

## 2025-04-22 NOTE — ASSESSMENT & PLAN NOTE
- Mgmt per surgery; gastrograffin study 4/21  - surgery team offering surgery; pt and family in agreement if necessary only means of improving condition   - risks of post-op delirium reviewed

## 2025-04-22 NOTE — ASSESSMENT & PLAN NOTE
- family shares concern for AMS; at baseline pt with occasional forgetfulness but overall cognitively and physically sound   - family confirms current mental status not consistent with baseline  - recommend delirium precautions and PT/OT when appropriate   - pt is extremely [place]; left ear is strongest ear; pt does not notify team of this which contributes to pt seeming more confused than he currently is; still ongoing AMS but improving

## 2025-04-22 NOTE — PLAN OF CARE
Pt remains in ICU on amio infusion. Remains NPO. Voiding in condom cath. No falls, injuries, or skin breakdown. Pt turned Q 2 hours.       Problem: Adult Inpatient Plan of Care  Goal: Plan of Care Review  Outcome: Progressing  Goal: Patient-Specific Goal (Individualized)  Outcome: Progressing  Goal: Absence of Hospital-Acquired Illness or Injury  Outcome: Progressing  Goal: Optimal Comfort and Wellbeing  Outcome: Progressing  Goal: Readiness for Transition of Care  Outcome: Progressing     Problem: Infection  Goal: Absence of Infection Signs and Symptoms  Outcome: Progressing     Problem: Acute Kidney Injury/Impairment  Goal: Fluid and Electrolyte Balance  Outcome: Progressing     Problem: Skin Injury Risk Increased  Goal: Skin Health and Integrity  Outcome: Progressing     Problem: Wound  Goal: Optimal Coping  Outcome: Progressing  Goal: Optimal Functional Ability  Outcome: Progressing  Goal: Absence of Infection Signs and Symptoms  Outcome: Progressing  Goal: Improved Oral Intake  Outcome: Progressing  Goal: Optimal Pain Control and Function  Outcome: Progressing  Goal: Skin Health and Integrity  Outcome: Progressing  Goal: Optimal Wound Healing  Outcome: Progressing     Problem: Fall Injury Risk  Goal: Absence of Fall and Fall-Related Injury  Outcome: Progressing     Problem: Coping Ineffective  Goal: Effective Coping  Outcome: Progressing

## 2025-04-22 NOTE — PROGRESS NOTES
[facility] Medicine  Progress Note    Patient Name: Eugene Gamez  MRN: [id number]  Patient Class: IP- Inpatient   Admission Date: 4/18/2025  Length of Stay: 4 days  Attending Physician: Cristino Marie MD  Primary Care Provider: Larry Monae MD        Subjective     Principal Problem:SBO (small bowel obstruction)        HPI:  Mr Eugene Gamez is a 89 y.o. man with history of L colectomy for colon cancer (2015) with ventral hernia, prior DM, HTN, CKD3 (baseline Cr 1) who presented with nausea and vomiting, abdominal pain.  The symptoms began a day or 2 ago but significantly worsened yesterday morning.  Last night and today He has not been able to keep much of anything down.  He denies any issues with his colostomy, stool has been normal.  Overall he feels weak and fatigued.  Denies dysuria, difficulty urinating, changes in bowel habits  His CMP shows creatinine 2.4, bicarb 21, anion gap 17, glucose 209; UA with trace protein, 7 RBC, 2 WBC; CT abdomen and pelvis with high-grade small-bowel obstruction related to a 5 x 5 cm ventral abdominal wall hernia, right middle and right lower lobe mucous plugging and/or endobronchial spread of infection/bronchitis.  He was admitted for high grade SBO. Started IVF. General surgery consulted.     Overview/Hospital Course:  Mr Eugene Gamez is a 89 y.o. man with history of L colectomy for colon cancer (2015) with colostomy in place and ventral hernia who was admitted with SBO. General surgery consulted.  NGT placed.  Patient with apparent episode of poor responsiveness.  Transferred to ICU, but quickly recovered.  Patient with atrial and ventricular ectopy on tele.  Cardiology consulted.  Patient started on Amiodarone drip. GGC ordered and unfortunately no passage to colon. Palliative following. Surgery is discussing ex-lap with patient and family, tentatively planned for 4/23.    Interval History: continues on IV amiodarone, did not pass  Gastrografin challenge yesterday.  Surgery is discussing possibility of ex lap with patient to fixed small-bowel obstruction.  Palliative following with ongoing care but appears patient and family would want this.    Review of Systems  Objective:     Vital Signs (Most Recent):  Temp: 98.2 °F (36.8 °C) (04/22/25 1105)  Pulse: 66 (04/22/25 1200)  Resp: 20 (04/22/25 1200)  BP: 132/71 (04/22/25 1200)  SpO2: 97 % (04/22/25 1200) Vital Signs (24h Range):  Temp:  [97.8 °F (36.6 °C)-98.2 °F (36.8 °C)] 98.2 °F (36.8 °C)  Pulse:  [59-81] 66  Resp:  [19-35] 20  SpO2:  [93 %-100 %] 97 %  BP: (109-167)/(61-86) 132/71     Weight: 62 kg (136 lb 11 oz)  Body mass index is 17.55 kg/m².    Intake/Output Summary (Last 24 hours) at 4/22/2025 1353  Last data filed at 4/22/2025 1000  Gross per 24 hour   Intake 1374.48 ml   Output 625 ml   Net 749.48 ml         Physical Exam  Vitals and nursing note reviewed.   Constitutional:       Appearance: He is ill-appearing.   HENT:      Head:      Comments: Temporal wasting     Nose:      Comments: NGT in place     Mouth/Throat:      Mouth: Mucous membranes are dry.   Cardiovascular:      Rate and Rhythm: Normal rate and regular rhythm.   Pulmonary:      Effort: Pulmonary effort is normal.      Breath sounds: Normal breath sounds.   Abdominal:      General: There is distension (Not tense).      Tenderness: There is no abdominal tenderness. There is no guarding or rebound.      Comments: Hypoactive bowel sounds.  Left-sided ostomy with no stool present   Musculoskeletal:      Right lower leg: No edema.      Left lower leg: No edema.   Skin:     General: Skin is warm and dry.   Neurological:      Mental Status: He is alert. Mental status is at baseline.               Significant Labs: All pertinent labs within the past 24 hours have been reviewed.  BMP:   Recent Labs   Lab 04/21/25  0330 04/22/25  0835   * 149*   K 4.1 3.8   * 120*   CO2 18* 19*   BUN 69* 60*   CREATININE 1.8* 1.8*  "  CALCIUM 8.8 9.4   MG 2.2  --      CBC:   Recent Labs   Lab 04/21/25  0330   WBC 6.81   HGB 11.1*   HCT 35.7*          Significant Imaging: I have reviewed all pertinent imaging results/findings within the past 24 hours.      Assessment & Plan  SBO (small bowel obstruction)  - CT upon admission: "High-grade small bowel obstruction related to a 5 by 5 cm ventral abdominal wall hernia. Status post left colectomy common diverting left lower quadrant ostomy and multiple small bowel anastomoses with markedly dilated small bowel loops in the deep pelvis which may reflect acute superimposed on chronic change."  - General surgery consulted  - NPO   - NGT placed and LIWS  - GGC ordered for 4/21 and now passage of contrast to colon, no gas/stool in ostomy bag  - Surgery discussing ex-lap with patient and family - tentatively for 4/23/25  - Cardiology aware and following, will ask for risk stratification for surgery      Acute renal failure superimposed on stage 3b chronic kidney disease  VINNY is likely due to  prerenal from SBO . Baseline creatinine is  1 . Most recent creatinine and eGFR are listed below.  Recent Labs     04/20/25  0349 04/21/25  0330 04/22/25  0835   CREATININE 2.1* 1.8* 1.8*   EGFRNORACEVR 30* 36* 36*   - UA unremarkable  - CT shows no hydronephrosis     Plan  - Avoid nephrotoxins and renally dose meds for GFR listed above  - Monitor urine output, serial BMP, and adjust therapy as needed  - improving on IVF's  HTN, goal below 140/80  Patient's blood pressure range in the last 24 hours was: BP  Min: 109/65  Max: 167/77.The patient's inpatient anti-hypertensive regimen is listed below:  Current Antihypertensives  hydrALAZINE injection 10 mg, Every 6 hours PRN, Intravenous  metoprolol injection 5 mg, Every 6 hours, Intravenous    Plan  - BP increasing as patient unable to take his oral BP medications.  Continue with prn IV Hydralazine.   Pulmonary emphysema, unspecified emphysema type  Patient's COPD " is controlled currently.  Patient is currently off COPD Pathway.  Stable on room air  Anemia of chronic renal failure, stage 3b  Anemia is likely due to chronic disease due to Chronic Kidney Disease. Most recent hemoglobin and hematocrit are listed below.  Recent Labs     04/20/25  0349 04/21/25  0330   HGB 12.4* 11.1*   HCT 38.4* 35.7*     Plan  - Monitor serial CBC: Daily  - Transfuse PRBC if patient becomes hemodynamically unstable, symptomatic or H/H drops below 7/21.  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 Liver biopsy negative     Ventral hernia  - see SBO    Severe protein-calorie malnutrition  Body mass index is 17.55 kg/m².  Notably, albumin 3.6 on admission- perhaps due to dehydration  - must remain strict NPO at this time       NSVT (nonsustained ventricular tachycardia)  Episodes of NSVT on monitoring.  Check Echo.  Cardiology consulted.  Atrial and ventricular ectopy.  Started on amiodarone drip per Cards recommendations.  - on IV metoprolol      Atrial tachycardia      Advance care planning  Palliative Care consult.    Hypernatremia  Hypernatremia is likely due to  normal saline IVF's . The patient's most recent sodium results are listed below.  Recent Labs     04/20/25  0349 04/21/25  0330 04/22/25  0835    151* 149*     Plan  - Aim to correct the sodium by 8-10mEq in 24 hours.   - Plan to correct their hypernatremia with Select IV fluids: D5W/0.45 NaCl - increase rate to 75 cc/hr  - Will plan to trend the patient's sodium: Daily  - this is slowly improving    Altered mental status      VTE Risk Mitigation (From admission, onward)            Ordered     heparin (porcine) injection 5,000 Units  Every 8 hours         04/19/25 1933     Place ARI hose  Until discontinued         04/18/25 1542     Place sequential compression device  Until discontinued         04/18/25 1542     Reason for No Pharmacological VTE Prophylaxis  Once        Question:  Reasons:  Answer:  Physician Provided (leave comment)  Comment:  potential OR    04/18/25 1542     IP VTE HIGH RISK PATIENT  Once         04/18/25 1542                    Discharge Planning   WIL:      Code Status: Full Code   Medical Readiness for Discharge Date:   Discharge Plan A: Home Health            Critical care time spent on the evaluation and treatment of severe organ dysfunction, review of pertinent labs and imaging studies, discussions with consulting providers and discussions with patient/family: 25 minutes.            Cristino Marie MD  Department of Hospital Medicine   [facility] [place] - Intensive Care

## 2025-04-22 NOTE — ASSESSMENT & PLAN NOTE
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 Liver biopsy negative

## 2025-04-22 NOTE — CONSULTS
"1512:    Consult was conducted with the patient and his medical provider. Medical Provider reports that the patient's daughter visited with him earlier today and has been in and out of the area today,     She also reports that the patient has been crying stating "I am hungry and have not had any food for three days!    My visit with the patient found him awake and sitting up in bed . He engaged in cognitive conversation withh me providing me with his L[employer] information: Pastor Eugene Arias, [facility] [place]. A telephone alex was made to [phone]- no answer. A voicemail message was left for  Hugo to visit with his member whenever possible,    Prayers were offered for the patient and his family. The Spiritual Care Team will continue to provide spiritual support to the patient.        Emy Bondin  [phone]  "

## 2025-04-22 NOTE — PROGRESS NOTES
"[facility] - Intensive Care  Palliative Medicine  Progress Note    Patient Name: Eugene Gamez  MRN: [id number]  Admission Date: 4/18/2025  Hospital Length of Stay: 4 days  Code Status: Full Code   Attending Provider: Cristino Marie MD  Consulting Provider: Estella Argueta NP  Primary Care Physician: Larry Monae MD  Principal Problem:SBO (small bowel obstruction)    Patient information was obtained from patient, relative(s), and primary team.      Assessment/Plan:   Palliative Care  Advance Care Planning     4/22/2025  - interval chart reviewed; pr discussed with MDT during ICU rounds   - met with pt and daughter Nicol at bedside  - pt with improving mental status but still not at baseline per Nicol; pt able to report that he has 7 children accurately, and provided Nicol's name   - pt identifies his daughters as preferred decision makers (points to daughter Nicol, but did not specify further); Nicol confirms that she is communication with pt's children and all in agreement with current plan   - reviewed wished regarding offered surgery (see SBO)   - pt's rell is Scientologist; open to pastoral care team and daughter plans to attempt to contact pt's    - emotional support provided; answered all questions   - coordinated additional support with CM, palliative SW, and    - updated MDT     4/21/2025 - Consult   - consult received; interval chart reviewed in detail; discussed pt with MDT   - met with patient at bedside; introduction to palliative medicine team and role in current care and admission   - pt is polite and able to proide name and answer some simple questions, but concerning capacity and mental status   - attemp[remy to review MPOA document on file from 2015 naming Chel Barnes and [place] S[place] as MPOA; pt initially unable to indentify these names, later identified Chel as "lady who cooks for him" (daughter confirms it is a friend who is no longer physically/mentally able to act in this " capacity) and pt unable to identify Sade (daughter confirms in pt's sister)  - pt identifies daughter, Nicol, as who should be contacted regarding is medical decisions   - called pt's daughter Nicol, introduction to palliative medicine team; brief medical update provided   - learned more about pt outside of current admission; he is  and has 7 children total; he lives with daughter, Nicol; not all children are local; Pt lives with daughter Nicol (who is a PCT in psych); son, Eugene Maradiaga, is also active in pt's day to day care   - Nicol shares that at baseline pt is of sound mind (with occasional forgetfulness, repeats things, consistent with age) and can typically perform all ADL's himself, ambulatory with use of walker, and until recently drove himself; recently stopped driving but leaves the house with Clemente or Eugene daily for activities which is his favorite thing to do, especially if going to go out to eat ; Clemente requesting PT/OT when safe for pt to ensure no loss in abilities   - GOC/ACP discussion  - reviewed MPOA and living will documents on file with Nicol; will likely need to revisit MPOA documentation when pt returns to baseline mental status   - discussed resources regarding other advanced directives, outside of medical care; recommended [facility] [place] [facility] [employer] as resource for further information on this and or an atty; but reassured that palliative team and assist with MPOA documentation and medical wishes   - overall goal for improvement in condition and maintaining as much abilities as possible   - emotional support provided   - Allowed time for questions/concerns; all addressed; expressed availability of myself/palliative team for additional questions/concerns   - will plan for follow up with pt for continued assessment of mental status for further ACP/GOC discussions     Neuro  Altered mental status  - family shares concern for AMS; at baseline pt with occasional forgetfulness but  overall cognitively and physically sound   - family confirms current mental status not consistent with baseline  - recommend delirium precautions and PT/OT when appropriate   - pt is extremely [place]; left ear is strongest ear; pt does not notify team of this which contributes to pt seeming more confused than he currently is; still ongoing AMS but improving     Cardiac/Vascular  Atrial tachycardia  - Mgmt per cardiology and primary     Renal/  Acute renal failure superimposed on stage 3b chronic kidney disease  - Noted; mgmt per primary team. Seems to be improving, though would not be an RRT candidate if this worsens.     Endocrine  Severe protein-calorie malnutrition  - BMI 17, and with visible cachexia; contributes to overall poor prognosis and hospice qualification   - pt with >13% body weight loss in the past 6 months and >8% body weight loss in the past month; pt with decreased appetite only in the last few days before admission; per family pt eats constantly and very hearty appetite; they have also been concerned about weight loss compared to intake with history of cancer   - consider additional work of for causes of weight loss if family wished to persue; will plan for further discussion following SBO treatment      GI  * SBO (small bowel obstruction)  - Mgmt per surgery; gastrograffin study 4/21  - surgery team offering surgery; pt and family in agreement if necessary only means of improving condition   - risks of post-op delirium reviewed     S/P left colectomy  - Noted history secondary to colon cancer    I will follow-up with patient. Please contact us if you have any additional questions.    Total visit time: 55 minutes    35 min visit time including: face to face time in discussion of symptom assessment, and exploring options and burdens of offered treatments.  This also includes non-face to face time preparing to see the patient including chart review, obtaining and/or reviewing separately obtained  "history, documenting clinical information in the electronic or other health record, independently interpreting results and communicating results to the patient/family/caregiver, family discussions by phone if not able to be present, coordination of care with other specialists, and discharge planning.     20 min ACP time spent: goals of care, advanced care planning, emotional support, formulating and communicating prognosis, exploring burden/ benefit of various approaches of treatment.     Estella Argueta NP  Palliative Medicine  [facility] [place]     Subjective:     Chief Complaint:   Chief Complaint   Patient presents with    Vomiting    Nausea     Pt BIB [place] [facility] with complaints of vomiting times 2 days and generalized malaise. Pt denies abd pain, chest pain or SOB at this time. Pt reports he has lost a lot of weight recently.       HPI:   From H&P: " Mr Eugene Gamez is a 89 y.o. man with history of L colectomy for colon cancer (2015) with ventral hernia, prior DM, HTN, CKD3 (baseline Cr 1) who presented with nausea and vomiting, abdominal pain.  The symptoms began a day or 2 ago but significantly worsened yesterday morning.  Last night and today He has not been able to keep much of anything down.  He denies any issues with his colostomy, stool has been normal.  Overall he feels weak and fatigued.  Denies dysuria, difficulty urinating, changes in bowel habits  His CMP shows creatinine 2.4, bicarb 21, anion gap 17, glucose 209; UA with trace protein, 7 RBC, 2 WBC; CT abdomen and pelvis with high-grade small-bowel obstruction related to a 5 x 5 cm ventral abdominal wall hernia, right middle and right lower lobe mucous plugging and/or endobronchial spread of infection/bronchitis.  He was admitted for high grade SBO. Started IVF. General surgery consulted. "    Palliative medicine consulted for goals of care discussion and advance care planning; for details of visit, see advance care planning " section of plan.       Hospital Course:  No notes on file      Medications:  Continuous Infusions:   amiodarone in dextrose 5%  0.5 mg/min Intravenous Continuous 16.7 mL/hr at 04/22/25 1137 0.5 mg/min at 04/22/25 1137    D5 and 0.45% NaCl   Intravenous Continuous 50 mL/hr at 04/22/25 1000 Rate Verify at 04/22/25 1000     Scheduled Meds:   albuterol-ipratropium  3 mL Nebulization Q6H WAKE    heparin (porcine)  5,000 Units Subcutaneous Q8H    metoprolol  5 mg Intravenous Q6H    mupirocin   Nasal BID    pantoprazole  40 mg Intravenous Daily     PRN Meds:  Current Facility-Administered Medications:     acetaminophen, 650 mg, Oral, Q6H PRN    dextrose 50%, 12.5 g, Intravenous, PRN    dextrose 50%, 25 g, Intravenous, PRN    glucagon (human recombinant), 1 mg, Intramuscular, PRN    glucose, 16 g, Oral, PRN    glucose, 24 g, Oral, PRN    hydrALAZINE, 10 mg, Intravenous, Q6H PRN    insulin aspart U-100, 0-5 Units, Subcutaneous, Q6H PRN    morphine, 1 mg, Intravenous, Q4H PRN    morphine, 2 mg, Intravenous, Q4H PRN    naloxone, 0.02 mg, Intravenous, PRN    ondansetron, 4 mg, Intravenous, Q6H PRN    prochlorperazine, 5 mg, Intravenous, Q6H PRN    Objective:     Vital Signs (Most Recent):  Temp: 98.2 °F (36.8 °C) (04/22/25 1105)  Pulse: 66 (04/22/25 1200)  Resp: 20 (04/22/25 1200)  BP: 132/71 (04/22/25 1200)  SpO2: 97 % (04/22/25 1200) Vital Signs (24h Range):  Temp:  [97.8 °F (36.6 °C)-98.2 °F (36.8 °C)] 98.2 °F (36.8 °C)  Pulse:  [59-81] 66  Resp:  [19-35] 20  SpO2:  [93 %-100 %] 97 %  BP: (109-167)/(61-86) 132/71     Weight: 62 kg (136 lb 11 oz)  Body mass index is 17.55 kg/m².       Physical Exam  Vitals and nursing note reviewed.   Constitutional:       General: He is awake.      Appearance: He is cachectic.   HENT:      Head: Atraumatic.      Comments: Temporal wasting  Pulmonary:      Effort: Pulmonary effort is normal. No respiratory distress.   Abdominal:      Comments: ostomy   Musculoskeletal:      Right lower leg:  No edema.      Left lower leg: No edema.   Neurological:      Mental Status: He is alert and easily aroused.      Comments: Oriented to self and daughter; mental status not at baseline    Psychiatric:         Behavior: Behavior is cooperative.       Advance Care Planning  Advance Directives:   Living Will: Yes        Copy on chart: Yes    [place]POST: No    Do Not Resuscitate Status: No    Medical Power of : Yes (reports his daugthers are preferred decision makers; plan for further discussion)    Goals of Care: What is most important right now is to focus on remaining as independent as possible, improvement in condition but with limits to invasive therapies. Accordingly, we have decided that the best plan to meet the patient's goals includes continuing with treatment.         Significant Labs: All pertinent labs within the past 24 hours have been reviewed.  CBC:   Recent Labs   Lab 04/21/25  0330   WBC 6.81   HGB 11.1*   HCT 35.7*   MCV 94        BMP:  Recent Labs   Lab 04/22/25  0835   *   K 3.8   *   CO2 19*   BUN 60*   CREATININE 1.8*   CALCIUM 9.4     LFT:  Lab Results   Component Value Date    AST 13 04/22/2025    ALKPHOS 42 04/22/2025    BILITOT 0.4 04/22/2025     Albumin:   Albumin   Date Value Ref Range Status   04/22/2025 2.7 (L) 3.5 - 5.2 g/dL Final   04/11/2025 4.2 3.4 - 5.0 g/dL Final   03/20/2025 4.2 3.5 - 5.2 g/dL Final     Protein:   Total Protein   Date Value Ref Range Status   03/20/2025 7.8 6.0 - 8.4 g/dL Final     Lactic acid:   Lab Results   Component Value Date    LACTATE 1.9 04/19/2025    LACTATE 2.4 (H) 10/06/2019       Significant Imaging: I have reviewed all pertinent imaging results/findings within the past 24 hours.    Total visit time: 20 minutes    35 min visit time including: face to face time in discussion of symptom assessment, and exploring options and burdens of offered treatments.  This also includes non-face to face time preparing to see the patient including  chart review, obtaining and/or reviewing separately obtained history, documenting clinical information in the electronic or other health record, independently interpreting results and communicating results to the patient/family/caregiver, family discussions by phone if not able to be present, coordination of care with other specialists, and discharge planning.     20 min ACP time spent: goals of care, advanced care planning, emotional support, formulating and communicating prognosis, exploring burden/ benefit of various approaches of treatment.     Estella Argueta NP  Palliative Medicine  [facility] - Intensive Care

## 2025-04-22 NOTE — ASSESSMENT & PLAN NOTE
Hypernatremia is likely due to normal saline IVF's. The patient's most recent sodium results are listed below.  Recent Labs     04/21/25  0330 04/22/25  0835 04/23/25  0400   * 149* 151*     Plan  - Aim to correct the sodium by 8-10mEq in 24 hours.   - Plan to correct their hypernatremia with Select IV fluids: D5W/0.45 NaCl - increase rate to 75 cc/hr. Now 0.45 NS.  - Will plan to trend the patient's sodium: Daily  - this is slowly improving

## 2025-04-22 NOTE — PLAN OF CARE
Problem: Adult Inpatient Plan of Care  Goal: Plan of Care Review  Outcome: Ongoing  Goal: Patient-Specific Goal (Individualized)  Outcome: Ongoing  Goal: Absence of Hospital-Acquired Illness or Injury  Outcome: Ongoing  Goal: Optimal Comfort and Wellbeing  Outcome: Ongoing  Goal: Readiness for Transition of Care  Outcome: Ongoing     Problem: Infection  Goal: Absence of Infection Signs and Symptoms  Outcome: Ongoing     Problem: Acute Kidney Injury/Impairment  Goal: Fluid and Electrolyte Balance  Outcome: Ongoing  Goal: Improved Oral Intake  Outcome: Ongoing  Goal: Effective Renal Function  Outcome: Ongoing     Problem: Skin Injury Risk Increased  Goal: Skin Health and Integrity  Outcome: Ongoing     Problem: Wound  Goal: Optimal Coping  Outcome: Ongoing  Goal: Optimal Functional Ability  Outcome: Ongoing  Goal: Absence of Infection Signs and Symptoms  Outcome: Ongoing  Goal: Improved Oral Intake  Outcome: Ongoing  Goal: Optimal Pain Control and Function  Outcome: Ongoing  Goal: Skin Health and Integrity  Outcome: Ongoing  Goal: Optimal Wound Healing  Outcome: Ongoing     Problem: Fall Injury Risk  Goal: Absence of Fall and Fall-Related Injury  Outcome: Ongoing     Problem: Coping Ineffective  Goal: Effective Coping  Outcome: Ongoing

## 2025-04-22 NOTE — PROGRESS NOTES
[facility] Intensive Care  Cardiology  Progress Note    Patient Name: Eugene Gamez  MRN: [id number]  Admission Date: 4/18/2025  Hospital Length of Stay: 4 days  Code Status: Full Code   Attending Physician: Cristino Marie MD   Primary Care Physician: Larry Monae MD  Expected Discharge Date:   Principal Problem:SBO (small bowel obstruction)    Subjective:     Hospital Course:   Patient was seen and examined this morning.  Telemetry reviewed  PACs burden has significantly improved after initiation of IV amiodarone  No chest pain or shortness of breath.  Echocardiogram reviewed.  Normal LV systolic function.        Review of Systems   Cardiovascular:  Negative for chest pain, claudication, dyspnea on exertion, irregular heartbeat, leg swelling, near-syncope, orthopnea, palpitations, paroxysmal nocturnal dyspnea and syncope.   Respiratory:  Negative for cough, shortness of breath, snoring and sputum production.    Gastrointestinal:  Negative for abdominal pain, dysphagia, heartburn, nausea and vomiting.   Neurological:  Negative for dizziness, headaches, loss of balance and weakness.     Objective:     Vital Signs (Most Recent):  Temp: 98 °F (36.7 °C) (04/22/25 0800)  Pulse: 75 (04/22/25 1136)  Resp: 20 (04/22/25 1000)  BP: 128/61 (04/22/25 1136)  SpO2: 98 % (04/22/25 1000) Vital Signs (24h Range):  Temp:  [97.8 °F (36.6 °C)-98.1 °F (36.7 °C)] 98 °F (36.7 °C)  Pulse:  [63-81] 75  Resp:  [19-35] 20  SpO2:  [93 %-100 %] 98 %  BP: (109-167)/(61-86) 128/61     Weight: 62 kg (136 lb 11 oz)  Body mass index is 17.55 kg/m².     SpO2: 98 %         Intake/Output Summary (Last 24 hours) at 4/22/2025 1147  Last data filed at 4/22/2025 1000  Gross per 24 hour   Intake 1504.87 ml   Output 625 ml   Net 879.87 ml       Lines/Drains/Airways       Central Venous Catheter Line  Duration             Port A Cath Single Lumen Subclavian Left -- days              Drain  Duration                  Colostomy 04/23/15 LLQ 3652 days          NG/OG Tube 04/19/25 1130 Falguni sump 18 Fr. Right nostril 3 days    Male External Urinary Catheter 04/22/25 0100 <1 day              Peripheral Intravenous Line  Duration                  Peripheral IV - Single Lumen 04/18/25 1201 22 G Left;Posterior Forearm 3 days         Peripheral IV - Single Lumen 04/21/25 1253 20 G Anterior;Distal;Left Upper Arm <1 day                       Physical Exam  Constitutional:       General: He is in acute distress.      Appearance: He is ill-appearing.   HENT:      Head: Normocephalic and atraumatic.      Mouth/Throat:      Mouth: Mucous membranes are moist.   Eyes:      Extraocular Movements: Extraocular movements intact.      Pupils: Pupils are equal, round, and reactive to light.   Cardiovascular:      Rate and Rhythm: Normal rate and regular rhythm.      Pulses: Normal pulses.      Heart sounds: Normal heart sounds.   Pulmonary:      Effort: Pulmonary effort is normal.      Breath sounds: Normal breath sounds.   Abdominal:      General: Bowel sounds are normal.      Palpations: Abdomen is soft.   Musculoskeletal:      Left lower leg: No edema.   Skin:     General: Skin is warm.            Current Medications:   albuterol-ipratropium  3 mL Nebulization Q6H WAKE    heparin (porcine)  5,000 Units Subcutaneous Q8H    metoprolol  5 mg Intravenous Q6H    mupirocin   Nasal BID    pantoprazole  40 mg Intravenous Daily      amiodarone in dextrose 5%  0.5 mg/min Intravenous Continuous 16.7 mL/hr at 04/22/25 1137 0.5 mg/min at 04/22/25 1137    D5 and 0.45% NaCl   Intravenous Continuous 50 mL/hr at 04/22/25 1000 Rate Verify at 04/22/25 1000       Current Facility-Administered Medications:     acetaminophen, 650 mg, Oral, Q6H PRN    dextrose 50%, 12.5 g, Intravenous, PRN    dextrose 50%, 25 g, Intravenous, PRN    glucagon (human recombinant), 1 mg, Intramuscular, PRN    glucose, 16 g, Oral, PRN    glucose, 24 g, Oral, PRN    hydrALAZINE, 10 mg, Intravenous, Q6H PRN    insulin aspart U-100,  0-5 Units, Subcutaneous, Q6H PRN    morphine, 1 mg, Intravenous, Q4H PRN    morphine, 2 mg, Intravenous, Q4H PRN    naloxone, 0.02 mg, Intravenous, PRN    ondansetron, 4 mg, Intravenous, Q6H PRN    prochlorperazine, 5 mg, Intravenous, Q6H PRN    Laboratory (all labs reviewed):  CBC:  Recent Labs   Lab 01/14/25  1330 04/18/25  1159 04/19/25  0430 04/20/25  0349 04/21/25  0330   WBC 6.82 8.70 7.64 6.67 6.81   Hemoglobin 11.5 L  --   --   --   --    HGB  --  12.8 L 11.0 L 12.4 L 11.1 L   Hematocrit 36.0 L  --   --   --   --    HCT  --  39.3 L 34.9 L 38.4 L 35.7 L   Platelet Count  --  314 294 263 295   Platelets 297  --   --   --   --        CHEMISTRIES:  Recent Labs   Lab 06/10/22  1113 05/09/23  1441 03/04/24  1156 06/11/24  0930 09/30/24  0912 01/14/25  1330 03/20/25  1522 04/11/25  1411 04/18/25  1159 04/19/25  0430 04/19/25  2004 04/20/25  0349 04/21/25  0330 04/22/25  0835   Glucose 145 H   < > 102 141 H 111 H 168 H 142 H  --   --   --   --   --   --   --    Sodium 139   < > 141 140 139 144 139   < > 142 144 144 145 151 H 149 H   Potassium 4.6   < > 4.7 4.9 4.7 4.4 4.5   < > 4.6 5.0 4.3 4.3 4.1 3.8   BUN 33 H   < > 24 H 36 H 23 18 18   < > 63 H 72 H 74 H 72 H 69 H 60 H   Creatinine 2.2 H   < > 1.8 H 2.1 H 1.8 H 1.7 H 1.6 H   < > 2.4 H 2.3 H 2.2 H 2.1 H 1.8 H 1.8 H   eGFR if non  26 A  --   --   --   --   --   --   --   --   --   --   --   --   --    eGFR  --    < > 35.8 A 29.7 A 35.8 A 38.3 A 40.9 A   < > 25 L 26 L 28 L 30 L 36 L 36 L   Calcium 10.0   < > 9.6 10.5 10.1 9.8 10.4   < > 10.7 H 9.5 9.3 9.5 8.8 9.4   Magnesium   --   --   --   --   --   --   --   --  2.1 2.1 2.1 2.2 2.2  --     < > = values in this interval not displayed.       CARDIAC BIOMARKERS:  Recent Labs   Lab 05/10/23  0413 12/01/23  1215 12/01/23  1512 04/18/25  1159 04/18/25  1421   Troponin I 0.025 <0.006 0.006  --   --    Troponin-I  --   --   --  0.023 0.018       COAGS:  Recent Labs   Lab 04/11/25  1411   INR 1.1        LIPIDS/LFTS:  Recent Labs   Lab 04/18/25  1159 04/19/25  0430 04/20/25  0349 04/21/25  0330 04/22/25  0835   AST 21 15 17 14 13   ALT 25 18 18 13 10       BNP:  Recent Labs   Lab 05/09/23  1441 12/01/23  1215 03/20/25  1522 04/18/25  1159   BNP 10 29 132 H 21       TSH:  Recent Labs   Lab 05/09/23  1441   TSH 2.468       Free T4:          Assessment and Plan:       * SBO (small bowel obstruction)  Management per IM/surgery    Atrial tachycardia  Atrial and ventricular ectopy have significantly improved  Continue amiodarone drip without bolus to suppress PACs and PVCs  Continue IV metoprolol 5 mg q.6 (standing order).  Do not administer metoprolol if systolic blood pressure is less than 90 mm Hg  Continue tele monitoring  Normal EF noted  Keep electrolytes repleted    We will keep amiodarone drip going while he is NPO  Once he is allowed to eat or drink orally, switch amiodarone drip to p.o. amiodarone 400 mg b.i.d..      Acute renal failure superimposed on stage 3b chronic kidney disease  Creatinine around 2.2 - 2.3  Baseline creatinine normal  Most likely prerenal in setting of nausea vomiting and decreased p.o. intake  Creatinine trending towards normal limits  Currently getting maintenance cleared          VTE Risk Mitigation (From admission, onward)           Ordered     heparin (porcine) injection 5,000 Units  Every 8 hours         04/19/25 1933     Place ARI hose  Until discontinued         04/18/25 1542     Place sequential compression device  Until discontinued         04/18/25 1542     Reason for No Pharmacological VTE Prophylaxis  Once        Question:  Reasons:  Answer:  Physician Provided (leave comment)  Comment:  potential OR    04/18/25 1542     IP VTE HIGH RISK PATIENT  Once         04/18/25 1542                    Elias Ibanez MD  Cardiology  [facility] Intensive Care

## 2025-04-22 NOTE — EICU
Virtual ICU Quality Rounds    Admit Date: 4/18/2025  Hospital Day: 4    ICU Day: 2d 15h    24H Vital Sign Range:  Temp:  [97.8 °F (36.6 °C)-98.2 °F (36.8 °C)]   Pulse:  [63-89]   Resp:  [19-35]   BP: (109-167)/(62-86)   SpO2:  [93 %-100 %]     VICU Surveillance Screening                    LDA reconciliation : Yes

## 2025-04-22 NOTE — ASSESSMENT & PLAN NOTE
Episodes of NSVT on monitoring.  Check Echo.  Cardiology consulted.  Atrial and ventricular ectopy.  Started on amiodarone drip per Cards recommendations.  - on IV metoprolol

## 2025-04-22 NOTE — PROGRESS NOTES
Surgery Progress Note    Eugene Gamez is a 89 y.o. year old male in hospital for small bowel obstruction    Feeling some abdominal tightness  Abdomen more distended today  GGF study with no progression of contrast into bowel or colon  No colostomy function noted    ROS:  Negative except above    PE:  Vitals:    04/22/25 1100 04/22/25 1105 04/22/25 1136 04/22/25 1200   BP: 128/61  128/61 132/71   BP Location:       Patient Position:       Pulse: (!) 59  75 66   Resp: 20   20   Temp:  98.2 °F (36.8 °C)     TempSrc:  Oral     SpO2: 98%   97%   Weight:       Height:           NAD  No belabored breathing  No skin changes  Abd soft distended, ventral hernia soft easily reducible. No gas or stool in colostomy bag    Significant Diagnostic Studies: N/A    A/P:  Eugene Gamez is a 89 y.o. year old male  with small bowel obstruction    - results of GGF study reviewed with patient and daughter at bedside. Lack of contrast progression suggestive of bowel obstruction that is unlikely to resolve on its on. Informed daughter that we would likely pursue surgical exploration if patient does not have return of bowel function  - will plan tentatively for tomorrow for exploratory laparotomy  - cardiac risk stratification  - keep NPO, NGT  - rest of care per primary    Honey Meneses  General Surgery - [facility]  4/22/2025

## 2025-04-23 ENCOUNTER — ANESTHESIA (OUTPATIENT)
Dept: SURGERY | Facility: HOSPITAL | Age: 89
End: 2025-04-23
Payer: MEDICARE

## 2025-04-23 PROBLEM — G93.41 ENCEPHALOPATHY, METABOLIC: Status: ACTIVE | Noted: 2025-04-21

## 2025-04-23 PROBLEM — R41.0 DELIRIUM: Status: ACTIVE | Noted: 2025-04-21

## 2025-04-23 LAB
ABSOLUTE EOSINOPHIL (OHS): 0.03 K/UL
ABSOLUTE MONOCYTE (OHS): 0.93 K/UL (ref 0.3–1)
ABSOLUTE NEUTROPHIL COUNT (OHS): 6.2 K/UL (ref 1.8–7.7)
ALBUMIN SERPL BCP-MCNC: 3 G/DL (ref 3.5–5.2)
ALP SERPL-CCNC: 51 UNIT/L (ref 40–150)
ALT SERPL W/O P-5'-P-CCNC: 10 UNIT/L (ref 10–44)
ANION GAP (OHS): 9 MMOL/L (ref 8–16)
AST SERPL-CCNC: 13 UNIT/L (ref 11–45)
BASOPHILS # BLD AUTO: 0.02 K/UL
BASOPHILS NFR BLD AUTO: 0.2 %
BILIRUB SERPL-MCNC: 0.3 MG/DL (ref 0.1–1)
BUN SERPL-MCNC: 48 MG/DL (ref 8–23)
CALCIUM SERPL-MCNC: 9.5 MG/DL (ref 8.7–10.5)
CHLORIDE SERPL-SCNC: 120 MMOL/L (ref 95–110)
CO2 SERPL-SCNC: 22 MMOL/L (ref 23–29)
CREAT SERPL-MCNC: 1.7 MG/DL (ref 0.5–1.4)
ERYTHROCYTE [DISTWIDTH] IN BLOOD BY AUTOMATED COUNT: 14.4 % (ref 11.5–14.5)
GFR SERPLBLD CREATININE-BSD FMLA CKD-EPI: 38 ML/MIN/1.73/M2
GLUCOSE SERPL-MCNC: 160 MG/DL (ref 70–110)
HCT VFR BLD AUTO: 38.1 % (ref 40–54)
HGB BLD-MCNC: 11.9 GM/DL (ref 14–18)
IMM GRANULOCYTES # BLD AUTO: 0.12 K/UL (ref 0–0.04)
IMM GRANULOCYTES NFR BLD AUTO: 1.5 % (ref 0–0.5)
LYMPHOCYTES # BLD AUTO: 0.9 K/UL (ref 1–4.8)
MAGNESIUM SERPL-MCNC: 2.1 MG/DL (ref 1.6–2.6)
MCH RBC QN AUTO: 29.3 PG (ref 27–31)
MCHC RBC AUTO-ENTMCNC: 31.2 G/DL (ref 32–36)
MCV RBC AUTO: 94 FL (ref 82–98)
NUCLEATED RBC (/100WBC) (OHS): 0 /100 WBC
PLATELET # BLD AUTO: 309 K/UL (ref 150–450)
PMV BLD AUTO: 10.6 FL (ref 9.2–12.9)
POCT GLUCOSE: 190 MG/DL (ref 70–110)
POTASSIUM SERPL-SCNC: 3.6 MMOL/L (ref 3.5–5.1)
PROT SERPL-MCNC: 6.3 GM/DL (ref 6–8.4)
RBC # BLD AUTO: 4.06 M/UL (ref 4.6–6.2)
RELATIVE EOSINOPHIL (OHS): 0.4 %
RELATIVE LYMPHOCYTE (OHS): 11 % (ref 18–48)
RELATIVE MONOCYTE (OHS): 11.3 % (ref 4–15)
RELATIVE NEUTROPHIL (OHS): 75.6 % (ref 38–73)
SODIUM SERPL-SCNC: 151 MMOL/L (ref 136–145)
WBC # BLD AUTO: 8.2 K/UL (ref 3.9–12.7)

## 2025-04-23 PROCEDURE — 99900031 HC PATIENT EDUCATION (STAT)

## 2025-04-23 PROCEDURE — 37000008 HC ANESTHESIA 1ST 15 MINUTES: Performed by: SURGERY

## 2025-04-23 PROCEDURE — 85025 COMPLETE CBC W/AUTO DIFF WBC: CPT | Performed by: STUDENT IN AN ORGANIZED HEALTH CARE EDUCATION/TRAINING PROGRAM

## 2025-04-23 PROCEDURE — 36000707: Performed by: SURGERY

## 2025-04-23 PROCEDURE — 25000003 PHARM REV CODE 250

## 2025-04-23 PROCEDURE — 63600175 PHARM REV CODE 636 W HCPCS: Performed by: HOSPITALIST

## 2025-04-23 PROCEDURE — 20000000 HC ICU ROOM

## 2025-04-23 PROCEDURE — 63600175 PHARM REV CODE 636 W HCPCS: Performed by: STUDENT IN AN ORGANIZED HEALTH CARE EDUCATION/TRAINING PROGRAM

## 2025-04-23 PROCEDURE — 80053 COMPREHEN METABOLIC PANEL: CPT | Performed by: STUDENT IN AN ORGANIZED HEALTH CARE EDUCATION/TRAINING PROGRAM

## 2025-04-23 PROCEDURE — 0DN80ZZ RELEASE SMALL INTESTINE, OPEN APPROACH: ICD-10-PCS | Performed by: SURGERY

## 2025-04-23 PROCEDURE — 88302 TISSUE EXAM BY PATHOLOGIST: CPT | Mod: TC | Performed by: SURGERY

## 2025-04-23 PROCEDURE — 37000009 HC ANESTHESIA EA ADD 15 MINS: Performed by: SURGERY

## 2025-04-23 PROCEDURE — 25000003 PHARM REV CODE 250: Performed by: INTERNAL MEDICINE

## 2025-04-23 PROCEDURE — 63600175 PHARM REV CODE 636 W HCPCS

## 2025-04-23 PROCEDURE — 0WQF0ZZ REPAIR ABDOMINAL WALL, OPEN APPROACH: ICD-10-PCS | Performed by: SURGERY

## 2025-04-23 PROCEDURE — 63600175 PHARM REV CODE 636 W HCPCS: Performed by: INTERNAL MEDICINE

## 2025-04-23 PROCEDURE — 25000003 PHARM REV CODE 250: Performed by: STUDENT IN AN ORGANIZED HEALTH CARE EDUCATION/TRAINING PROGRAM

## 2025-04-23 PROCEDURE — 25000003 PHARM REV CODE 250: Performed by: SURGERY

## 2025-04-23 PROCEDURE — 63600175 PHARM REV CODE 636 W HCPCS: Performed by: SURGERY

## 2025-04-23 PROCEDURE — P9045 ALBUMIN (HUMAN), 5%, 250 ML: HCPCS | Mod: JZ,TB

## 2025-04-23 PROCEDURE — 99291 CRITICAL CARE FIRST HOUR: CPT | Mod: ,,, | Performed by: INTERNAL MEDICINE

## 2025-04-23 PROCEDURE — 0D9670Z DRAINAGE OF STOMACH WITH DRAINAGE DEVICE, VIA NATURAL OR ARTIFICIAL OPENING: ICD-10-PCS | Performed by: SURGERY

## 2025-04-23 PROCEDURE — 94761 N-INVAS EAR/PLS OXIMETRY MLT: CPT

## 2025-04-23 PROCEDURE — 36000706: Performed by: SURGERY

## 2025-04-23 PROCEDURE — 25000242 PHARM REV CODE 250 ALT 637 W/ HCPCS: Performed by: HOSPITALIST

## 2025-04-23 PROCEDURE — 83735 ASSAY OF MAGNESIUM: CPT | Performed by: STUDENT IN AN ORGANIZED HEALTH CARE EDUCATION/TRAINING PROGRAM

## 2025-04-23 PROCEDURE — 88302 TISSUE EXAM BY PATHOLOGIST: CPT | Mod: 26,,, | Performed by: PATHOLOGY

## 2025-04-23 PROCEDURE — 94640 AIRWAY INHALATION TREATMENT: CPT

## 2025-04-23 PROCEDURE — 36415 COLL VENOUS BLD VENIPUNCTURE: CPT | Performed by: STUDENT IN AN ORGANIZED HEALTH CARE EDUCATION/TRAINING PROGRAM

## 2025-04-23 RX ORDER — ROCURONIUM BROMIDE 10 MG/ML
INJECTION, SOLUTION INTRAVENOUS
Status: DISCONTINUED | OUTPATIENT
Start: 2025-04-23 | End: 2025-04-23

## 2025-04-23 RX ORDER — SUCCINYLCHOLINE CHLORIDE 20 MG/ML
INJECTION INTRAMUSCULAR; INTRAVENOUS
Status: DISCONTINUED | OUTPATIENT
Start: 2025-04-23 | End: 2025-04-23

## 2025-04-23 RX ORDER — ALBUMIN HUMAN 50 G/1000ML
SOLUTION INTRAVENOUS
Status: DISCONTINUED | OUTPATIENT
Start: 2025-04-23 | End: 2025-04-23

## 2025-04-23 RX ORDER — HYDROMORPHONE HYDROCHLORIDE 1 MG/ML
0.5 INJECTION, SOLUTION INTRAMUSCULAR; INTRAVENOUS; SUBCUTANEOUS EVERY 6 HOURS PRN
Status: DISCONTINUED | OUTPATIENT
Start: 2025-04-23 | End: 2025-04-29 | Stop reason: HOSPADM

## 2025-04-23 RX ORDER — HYDROMORPHONE HYDROCHLORIDE 1 MG/ML
0.2 INJECTION, SOLUTION INTRAMUSCULAR; INTRAVENOUS; SUBCUTANEOUS EVERY 6 HOURS PRN
Status: DISCONTINUED | OUTPATIENT
Start: 2025-04-23 | End: 2025-04-29 | Stop reason: HOSPADM

## 2025-04-23 RX ORDER — ONDANSETRON HYDROCHLORIDE 2 MG/ML
INJECTION, SOLUTION INTRAVENOUS
Status: DISCONTINUED | OUTPATIENT
Start: 2025-04-23 | End: 2025-04-23

## 2025-04-23 RX ORDER — FENTANYL CITRATE 50 UG/ML
25 INJECTION, SOLUTION INTRAMUSCULAR; INTRAVENOUS EVERY 5 MIN PRN
Status: DISCONTINUED | OUTPATIENT
Start: 2025-04-23 | End: 2025-04-23

## 2025-04-23 RX ORDER — ACETAMINOPHEN 10 MG/ML
1000 INJECTION, SOLUTION INTRAVENOUS ONCE
Status: COMPLETED | OUTPATIENT
Start: 2025-04-23 | End: 2025-04-23

## 2025-04-23 RX ORDER — GLUCAGON 1 MG
1 KIT INJECTION
Status: DISCONTINUED | OUTPATIENT
Start: 2025-04-23 | End: 2025-04-26

## 2025-04-23 RX ORDER — ONDANSETRON HYDROCHLORIDE 2 MG/ML
4 INJECTION, SOLUTION INTRAVENOUS DAILY PRN
Status: DISCONTINUED | OUTPATIENT
Start: 2025-04-23 | End: 2025-04-26

## 2025-04-23 RX ORDER — HYDROMORPHONE HYDROCHLORIDE 1 MG/ML
0.5 INJECTION, SOLUTION INTRAMUSCULAR; INTRAVENOUS; SUBCUTANEOUS EVERY 6 HOURS PRN
Status: DISCONTINUED | OUTPATIENT
Start: 2025-04-23 | End: 2025-04-23

## 2025-04-23 RX ORDER — PHENYLEPHRINE HYDROCHLORIDE 10 MG/ML
INJECTION INTRAVENOUS
Status: DISCONTINUED | OUTPATIENT
Start: 2025-04-23 | End: 2025-04-23

## 2025-04-23 RX ORDER — FENTANYL CITRATE 50 UG/ML
INJECTION, SOLUTION INTRAMUSCULAR; INTRAVENOUS
Status: DISCONTINUED | OUTPATIENT
Start: 2025-04-23 | End: 2025-04-23

## 2025-04-23 RX ORDER — SODIUM CHLORIDE 450 MG/100ML
INJECTION, SOLUTION INTRAVENOUS CONTINUOUS
Status: DISCONTINUED | OUTPATIENT
Start: 2025-04-23 | End: 2025-04-24

## 2025-04-23 RX ORDER — HYDROMORPHONE HYDROCHLORIDE 2 MG/ML
INJECTION, SOLUTION INTRAMUSCULAR; INTRAVENOUS; SUBCUTANEOUS
Status: DISCONTINUED | OUTPATIENT
Start: 2025-04-23 | End: 2025-04-23

## 2025-04-23 RX ORDER — ACETAMINOPHEN 10 MG/ML
INJECTION, SOLUTION INTRAVENOUS
Status: DISCONTINUED | OUTPATIENT
Start: 2025-04-23 | End: 2025-04-23

## 2025-04-23 RX ORDER — LIDOCAINE HYDROCHLORIDE 20 MG/ML
INJECTION INTRAVENOUS
Status: DISCONTINUED | OUTPATIENT
Start: 2025-04-23 | End: 2025-04-23

## 2025-04-23 RX ORDER — VASOPRESSIN 20 [USP'U]/ML
INJECTION, SOLUTION INTRAMUSCULAR; SUBCUTANEOUS
Status: DISCONTINUED | OUTPATIENT
Start: 2025-04-23 | End: 2025-04-23

## 2025-04-23 RX ORDER — BUPIVACAINE HYDROCHLORIDE 2.5 MG/ML
INJECTION, SOLUTION EPIDURAL; INFILTRATION; INTRACAUDAL; PERINEURAL
Status: DISCONTINUED | OUTPATIENT
Start: 2025-04-23 | End: 2025-04-23 | Stop reason: HOSPADM

## 2025-04-23 RX ORDER — ALBUMIN HUMAN 250 G/1000ML
SOLUTION INTRAVENOUS
Status: DISCONTINUED | OUTPATIENT
Start: 2025-04-23 | End: 2025-04-23

## 2025-04-23 RX ORDER — HYDROMORPHONE HYDROCHLORIDE 1 MG/ML
1 INJECTION, SOLUTION INTRAMUSCULAR; INTRAVENOUS; SUBCUTANEOUS EVERY 6 HOURS PRN
Status: DISCONTINUED | OUTPATIENT
Start: 2025-04-23 | End: 2025-04-23

## 2025-04-23 RX ORDER — PROPOFOL 10 MG/ML
VIAL (ML) INTRAVENOUS
Status: DISCONTINUED | OUTPATIENT
Start: 2025-04-23 | End: 2025-04-23

## 2025-04-23 RX ORDER — HYDROMORPHONE HYDROCHLORIDE 2 MG/ML
0.2 INJECTION, SOLUTION INTRAMUSCULAR; INTRAVENOUS; SUBCUTANEOUS EVERY 5 MIN PRN
Status: DISCONTINUED | OUTPATIENT
Start: 2025-04-23 | End: 2025-04-23

## 2025-04-23 RX ORDER — EPHEDRINE SULFATE 50 MG/ML
INJECTION, SOLUTION INTRAVENOUS
Status: DISCONTINUED | OUTPATIENT
Start: 2025-04-23 | End: 2025-04-23

## 2025-04-23 RX ORDER — DEXMEDETOMIDINE HYDROCHLORIDE 4 UG/ML
0-1.4 INJECTION, SOLUTION INTRAVENOUS CONTINUOUS
Status: DISCONTINUED | OUTPATIENT
Start: 2025-04-23 | End: 2025-04-25

## 2025-04-23 RX ORDER — SODIUM CHLORIDE 0.9 % (FLUSH) 0.9 %
10 SYRINGE (ML) INJECTION
Status: DISCONTINUED | OUTPATIENT
Start: 2025-04-23 | End: 2025-04-26

## 2025-04-23 RX ADMIN — PHENYLEPHRINE HYDROCHLORIDE 100 MCG: 10 INJECTION INTRAVENOUS at 09:04

## 2025-04-23 RX ADMIN — FENTANYL CITRATE 50 MCG: 50 INJECTION, SOLUTION INTRAMUSCULAR; INTRAVENOUS at 09:04

## 2025-04-23 RX ADMIN — EPHEDRINE SULFATE 15 MG: 50 INJECTION INTRAVENOUS at 09:04

## 2025-04-23 RX ADMIN — ACETAMINOPHEN 1000 MG: 10 INJECTION INTRAVENOUS at 05:04

## 2025-04-23 RX ADMIN — ALBUMIN (HUMAN) 250 ML: 12.5 SOLUTION INTRAVENOUS at 09:04

## 2025-04-23 RX ADMIN — ACETAMINOPHEN 1000 MG: 10 INJECTION, SOLUTION INTRAVENOUS at 10:04

## 2025-04-23 RX ADMIN — SODIUM CHLORIDE, SODIUM LACTATE, POTASSIUM CHLORIDE, AND CALCIUM CHLORIDE: .6; .31; .03; .02 INJECTION, SOLUTION INTRAVENOUS at 10:04

## 2025-04-23 RX ADMIN — SODIUM CHLORIDE: 4.5 INJECTION, SOLUTION INTRAVENOUS at 09:04

## 2025-04-23 RX ADMIN — ONDANSETRON 4 MG: 2 INJECTION INTRAMUSCULAR; INTRAVENOUS at 03:04

## 2025-04-23 RX ADMIN — SUCCINYLCHOLINE CHLORIDE 100 MG: 20 INJECTION, SOLUTION INTRAMUSCULAR; INTRAVENOUS at 09:04

## 2025-04-23 RX ADMIN — VASOPRESSIN 2 UNITS: 20 INJECTION, SOLUTION INTRAMUSCULAR; SUBCUTANEOUS at 09:04

## 2025-04-23 RX ADMIN — PHENYLEPHRINE HYDROCHLORIDE 200 MCG: 10 INJECTION INTRAVENOUS at 09:04

## 2025-04-23 RX ADMIN — METOROPROLOL TARTRATE 5 MG: 5 INJECTION, SOLUTION INTRAVENOUS at 11:04

## 2025-04-23 RX ADMIN — METOROPROLOL TARTRATE 5 MG: 5 INJECTION, SOLUTION INTRAVENOUS at 05:04

## 2025-04-23 RX ADMIN — LIDOCAINE HYDROCHLORIDE 75 MG: 20 INJECTION, SOLUTION INTRAVENOUS at 09:04

## 2025-04-23 RX ADMIN — SODIUM CHLORIDE: 4.5 INJECTION, SOLUTION INTRAVENOUS at 12:04

## 2025-04-23 RX ADMIN — SODIUM CHLORIDE, SODIUM LACTATE, POTASSIUM CHLORIDE, AND CALCIUM CHLORIDE: .6; .31; .03; .02 INJECTION, SOLUTION INTRAVENOUS at 09:04

## 2025-04-23 RX ADMIN — SUGAMMADEX 200 MG: 100 INJECTION, SOLUTION INTRAVENOUS at 11:04

## 2025-04-23 RX ADMIN — EPHEDRINE SULFATE 10 MG: 50 INJECTION INTRAVENOUS at 10:04

## 2025-04-23 RX ADMIN — PROPOFOL 140 MG: 10 INJECTION, EMULSION INTRAVENOUS at 09:04

## 2025-04-23 RX ADMIN — IPRATROPIUM BROMIDE AND ALBUTEROL SULFATE 3 ML: 2.5; .5 SOLUTION RESPIRATORY (INHALATION) at 08:04

## 2025-04-23 RX ADMIN — HYDROMORPHONE HYDROCHLORIDE 0.2 MG: 2 INJECTION, SOLUTION INTRAMUSCULAR; INTRAVENOUS; SUBCUTANEOUS at 11:04

## 2025-04-23 RX ADMIN — AMIODARONE HYDROCHLORIDE 0.5 MG/MIN: 1.8 INJECTION, SOLUTION INTRAVENOUS at 11:04

## 2025-04-23 RX ADMIN — ROCURONIUM BROMIDE 20 MG: 10 INJECTION INTRAVENOUS at 09:04

## 2025-04-23 RX ADMIN — CEFAZOLIN SODIUM 2 G: 1 POWDER, FOR SOLUTION INTRAMUSCULAR; INTRAVENOUS at 09:04

## 2025-04-23 RX ADMIN — IPRATROPIUM BROMIDE AND ALBUTEROL SULFATE 3 ML: 2.5; .5 SOLUTION RESPIRATORY (INHALATION) at 07:04

## 2025-04-23 RX ADMIN — METOROPROLOL TARTRATE 5 MG: 5 INJECTION, SOLUTION INTRAVENOUS at 06:04

## 2025-04-23 RX ADMIN — IPRATROPIUM BROMIDE AND ALBUTEROL SULFATE 3 ML: 2.5; .5 SOLUTION RESPIRATORY (INHALATION) at 02:04

## 2025-04-23 RX ADMIN — PANTOPRAZOLE SODIUM 40 MG: 40 INJECTION, POWDER, FOR SOLUTION INTRAVENOUS at 08:04

## 2025-04-23 RX ADMIN — ROCURONIUM BROMIDE 30 MG: 10 INJECTION INTRAVENOUS at 09:04

## 2025-04-23 RX ADMIN — HYDROMORPHONE HYDROCHLORIDE 0.4 MG: 2 INJECTION, SOLUTION INTRAMUSCULAR; INTRAVENOUS; SUBCUTANEOUS at 11:04

## 2025-04-23 RX ADMIN — MUPIROCIN: 20 OINTMENT TOPICAL at 08:04

## 2025-04-23 RX ADMIN — ONDANSETRON 4 MG: 2 INJECTION, SOLUTION INTRAMUSCULAR; INTRAVENOUS at 10:04

## 2025-04-23 RX ADMIN — ONDANSETRON 4 MG: 2 INJECTION INTRAMUSCULAR; INTRAVENOUS at 12:04

## 2025-04-23 RX ADMIN — SODIUM CHLORIDE: 4.5 INJECTION, SOLUTION INTRAVENOUS at 08:04

## 2025-04-23 RX ADMIN — DEXMEDETOMIDINE HYDROCHLORIDE 0.2 MCG/KG/HR: 4 INJECTION INTRAVENOUS at 02:04

## 2025-04-23 RX ADMIN — METOROPROLOL TARTRATE 5 MG: 5 INJECTION, SOLUTION INTRAVENOUS at 12:04

## 2025-04-23 RX ADMIN — HYDROMORPHONE HYDROCHLORIDE 0.5 MG: 1 INJECTION, SOLUTION INTRAMUSCULAR; INTRAVENOUS; SUBCUTANEOUS at 12:04

## 2025-04-23 NOTE — H&P
Surgery note    Patient consented and taken today for ex lap.  Has sbo which has not improved for several days despite decompression with NGT and gastrograffin challenge. His abdomen is more distended.  Discussed hernia repair, bowel resection if indicated, lysis of adhesions.  He will be high risk for post op complications including heart, respiratory failure, death, hernia recurrence, bowel injury/leak.  He and his family are in understanding of the situation.  To OR for ex lap.

## 2025-04-23 NOTE — PROGRESS NOTES
[facility] Medicine  Progress Note    Patient Name: Eugene Gamez  MRN: [id number]  Patient Class: IP- Inpatient   Admission Date: 4/18/2025  Length of Stay: 5 days  Attending Physician: Cristino Marie MD  Primary Care Provider: Larry Monae MD        Subjective     Principal Problem:SBO (small bowel obstruction)        HPI:  Mr Eugene Gamez is a 89 y.o. man with history of L colectomy for colon cancer (2015) with ventral hernia, prior DM, HTN, CKD3 (baseline Cr 1) who presented with nausea and vomiting, abdominal pain.  The symptoms began a day or 2 ago but significantly worsened yesterday morning.  Last night and today He has not been able to keep much of anything down.  He denies any issues with his colostomy, stool has been normal.  Overall he feels weak and fatigued.  Denies dysuria, difficulty urinating, changes in bowel habits  His CMP shows creatinine 2.4, bicarb 21, anion gap 17, glucose 209; UA with trace protein, 7 RBC, 2 WBC; CT abdomen and pelvis with high-grade small-bowel obstruction related to a 5 x 5 cm ventral abdominal wall hernia, right middle and right lower lobe mucous plugging and/or endobronchial spread of infection/bronchitis.  He was admitted for high grade SBO. Started IVF. General surgery consulted.     Overview/Hospital Course:  Mr Eugene Gamez is a 89 y.o. man with history of L colectomy for colon cancer (2015) with colostomy in place and ventral hernia who was admitted with SBO. General surgery consulted.  NGT placed.  Patient with apparent episode of poor responsiveness.  Transferred to ICU, but quickly recovered.  Patient with atrial and ventricular ectopy on tele.  Cardiology consulted.  Patient started on Amiodarone drip. GGC ordered and unfortunately no passage to colon. Palliative following. Surgery is discussing ex-lap with patient and family, tentatively planned for 4/23. Patient underwent ex-lap on 4/23 and had lysis of adhesions, appears  to be problem. NGT in place and extubated, back to ICU.     Interval History: seen after surgery, appears disoriented but awake and alert. NGT in place. Denies pain currently but wants to stretch. Daughter coming to bedside and updated by surgery team.     Later in afternoon, patient is deliriuous. Reports he needs to leave and we are keeping him here against his will, unable to redirect. Started on precedex. Discussed with daughter Nicol over the phone.    Review of Systems  Objective:     Vital Signs (Most Recent):  Temp: (!) 95.8 °F (35.4 °C) (04/23/25 1200)  Pulse: 76 (04/23/25 1330)  Resp: (!) 29 (04/23/25 1330)  BP: (!) 142/81 (04/23/25 1330)  SpO2: 96 % (04/23/25 1330) Vital Signs (24h Range):  Temp:  [95.8 °F (35.4 °C)-98.4 °F (36.9 °C)] 95.8 °F (35.4 °C)  Pulse:  [64-99] 76  Resp:  [12-43] 29  SpO2:  [96 %-100 %] 96 %  BP: (105-168)/(61-88) 142/81     Weight: 66.7 kg (147 lb 0.8 oz)  Body mass index is 18.88 kg/m².    Intake/Output Summary (Last 24 hours) at 4/23/2025 1400  Last data filed at 4/23/2025 1251  Gross per 24 hour   Intake 3067.61 ml   Output 3230 ml   Net -162.39 ml         Physical Exam  Vitals and nursing note reviewed.   Constitutional:       Appearance: He is ill-appearing.   HENT:      Head:      Comments: Temporal wasting     Nose:      Comments: NGT in place     Mouth/Throat:      Mouth: Mucous membranes are dry.   Cardiovascular:      Rate and Rhythm: Normal rate and regular rhythm.   Pulmonary:      Effort: Pulmonary effort is normal.      Breath sounds: Normal breath sounds.   Abdominal:      General: There is distension (Not tense).      Tenderness: There is no abdominal tenderness. There is no guarding or rebound.      Comments: Hypoactive bowel sounds.  Left-sided ostomy with no stool present  Midline incision dressing intact   Musculoskeletal:      Right lower leg: No edema.      Left lower leg: No edema.   Skin:     General: Skin is warm and dry.   Neurological:      Mental  "Status: He is alert.      Comments: Alert, awake but disoriented after surgery               Significant Labs: All pertinent labs within the past 24 hours have been reviewed.  BMP:   Recent Labs   Lab 04/23/25  0400   *   K 3.6   *   CO2 22*   BUN 48*   CREATININE 1.7*   CALCIUM 9.5   MG 2.1     CBC:   Recent Labs   Lab 04/23/25  0400   WBC 8.20   HGB 11.9*   HCT 38.1*          Significant Imaging: I have reviewed all pertinent imaging results/findings within the past 24 hours.      Assessment & Plan  SBO (small bowel obstruction)  - CT upon admission: "High-grade small bowel obstruction related to a 5 by 5 cm ventral abdominal wall hernia. Status post left colectomy common diverting left lower quadrant ostomy and multiple small bowel anastomoses with markedly dilated small bowel loops in the deep pelvis which may reflect acute superimposed on chronic change."  - General surgery consulted  - NPO   - NGT placed and LIWS  - GGC ordered for 4/21 and now passage of contrast to colon, no gas/stool in ostomy bag  - Surgery discussing ex-lap with patient and family - tentatively for 4/23/25  - Cardiology aware and following, will ask for risk stratification for surgery    - underwent exlap with lysis of adhesions on 4/23.     Acute renal failure superimposed on stage 3b chronic kidney disease  VINNY is likely due to  prerenal from SBO . Baseline creatinine is  1 . Most recent creatinine and eGFR are listed below.  Recent Labs     04/21/25  0330 04/22/25  0835 04/23/25  0400   CREATININE 1.8* 1.8* 1.7*   EGFRNORACEVR 36* 36* 38*   - UA unremarkable  - CT shows no hydronephrosis     Plan  - Avoid nephrotoxins and renally dose meds for GFR listed above  - Monitor urine output, serial BMP, and adjust therapy as needed  - improving on IVF's  HTN, goal below 140/80  Patient's blood pressure range in the last 24 hours was: BP  Min: 105/66  Max: 168/88.The patient's inpatient anti-hypertensive regimen is listed " below:  Current Antihypertensives  hydrALAZINE injection 10 mg, Every 6 hours PRN, Intravenous  metoprolol injection 5 mg, Every 6 hours, Intravenous    Plan  - BP increasing as patient unable to take his oral BP medications.  Continue with prn IV Hydralazine.   Pulmonary emphysema, unspecified emphysema type  Patient's COPD is controlled currently.  Patient is currently off COPD Pathway.  Stable on room air  Anemia of chronic renal failure, stage 3b  Anemia is likely due to chronic disease due to Chronic Kidney Disease. Most recent hemoglobin and hematocrit are listed below.  Recent Labs     04/21/25  0330 04/23/25  0400   HGB 11.1* 11.9*   HCT 35.7* 38.1*     Plan  - Monitor serial CBC: Daily  - Transfuse PRBC if patient becomes hemodynamically unstable, symptomatic or H/H drops below 7/21.  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 Liver biopsy negative     Ventral hernia  - see SBO    Severe protein-calorie malnutrition  Body mass index is 18.88 kg/m².  Notably, albumin 3.6 on admission- perhaps due to dehydration  - must remain strict NPO at this time until BM      NSVT (nonsustained ventricular tachycardia)  Episodes of NSVT on monitoring.  Check Echo.  Cardiology consulted.  Atrial and ventricular ectopy.  Started on amiodarone drip per Cards recommendations.  - on IV metoprolol      Atrial tachycardia      Advance care planning  Palliative Care consult.    Hypernatremia  Hypernatremia is likely due to  normal saline IVF's . The patient's most recent sodium results are listed below.  Recent Labs     04/21/25  0330 04/22/25  0835 04/23/25  0400   *  149* 151*     Plan  - Aim to correct the sodium by 8-10mEq in 24 hours.   - Plan to correct their hypernatremia with Select IV fluids: D5W/0.45 NaCl - increase rate to 75 cc/hr. Now 0.45 NS.  - Will plan to trend the patient's sodium: Daily  - this is slowly improving    Delirium  Post operative delirium noted. Unable to redirect, concern for removal of NGT and other tubes. Had ex-lap 4/23.  - precedex drip for now    VTE Risk Mitigation (From admission, onward)           Ordered     heparin (porcine) injection 5,000 Units  Every 8 hours         04/19/25 1933     Place ARI hose  Until discontinued         04/18/25 1542     Place sequential compression device  Until discontinued         04/18/25 1542     Reason for No Pharmacological VTE Prophylaxis  Once        Question:  Reasons:  Answer:  Physician Provided (leave comment)  Comment:  potential OR    04/18/25 1542     IP VTE HIGH RISK PATIENT  Once         04/18/25 1542                    Discharge Planning   WIL:      Code Status: Full Code   Medical Readiness for Discharge Date:   Discharge Plan A: Home with family            Critical care time spent on the evaluation and treatment of severe organ dysfunction, review of pertinent labs and imaging studies, discussions with consulting providers and discussions with patient/family: 35 minutes.            Cristino Marie MD  Department of Hospital Medicine   [facility] [place] - Intensive Care

## 2025-04-23 NOTE — PLAN OF CARE
Exploratory lap done today.  Delerium post surgery requiring Precedex drip.  He is calm and cooperative with low dose Precedex.  Amiodarone continues at .5mg with sinus rhythm per monitor.  IV fluids continue at 125/hr.  Pain well controlled with current regimen.  NG continues to LIWS.

## 2025-04-23 NOTE — NURSING
"Very confused and trying to get up out of bed.  States " I got to get out of here!  I live in [place]."  Sitting up in bed and says I am keeping him here against his will.  Dr. Marie notified and Precedex infusion started.  "

## 2025-04-23 NOTE — SUBJECTIVE & OBJECTIVE
Interval History: seen after surgery, appears disoriented but awake and alert. NGT in place. Denies pain currently but wants to stretch. Daughter coming to bedside and updated by surgery team.     Later in afternoon, patient is deliriuous. Reports he needs to leave and we are keeping him here against his will, unable to redirect. Started on precedex. Discussed with daughter Nicol over the phone.    Review of Systems  Objective:     Vital Signs (Most Recent):  Temp: (!) 95.8 °F (35.4 °C) (04/23/25 1200)  Pulse: 76 (04/23/25 1330)  Resp: (!) 29 (04/23/25 1330)  BP: (!) 142/81 (04/23/25 1330)  SpO2: 96 % (04/23/25 1330) Vital Signs (24h Range):  Temp:  [95.8 °F (35.4 °C)-98.4 °F (36.9 °C)] 95.8 °F (35.4 °C)  Pulse:  [64-99] 76  Resp:  [12-43] 29  SpO2:  [96 %-100 %] 96 %  BP: (105-168)/(61-88) 142/81     Weight: 66.7 kg (147 lb 0.8 oz)  Body mass index is 18.88 kg/m².    Intake/Output Summary (Last 24 hours) at 4/23/2025 1400  Last data filed at 4/23/2025 1251  Gross per 24 hour   Intake 3067.61 ml   Output 3230 ml   Net -162.39 ml         Physical Exam  Vitals and nursing note reviewed.   Constitutional:       Appearance: He is ill-appearing.   HENT:      Head:      Comments: Temporal wasting     Nose:      Comments: NGT in place     Mouth/Throat:      Mouth: Mucous membranes are dry.   Cardiovascular:      Rate and Rhythm: Normal rate and regular rhythm.   Pulmonary:      Effort: Pulmonary effort is normal.      Breath sounds: Normal breath sounds.   Abdominal:      General: There is distension (Not tense).      Tenderness: There is no abdominal tenderness. There is no guarding or rebound.      Comments: Hypoactive bowel sounds.  Left-sided ostomy with no stool present  Midline incision dressing intact   Musculoskeletal:      Right lower leg: No edema.      Left lower leg: No edema.   Skin:     General: Skin is warm and dry.   Neurological:      Mental Status: He is alert.      Comments: Alert, awake but disoriented  after surgery               Significant Labs: All pertinent labs within the past 24 hours have been reviewed.  BMP:   Recent Labs   Lab 04/23/25  0400   *   K 3.6   *   CO2 22*   BUN 48*   CREATININE 1.7*   CALCIUM 9.5   MG 2.1     CBC:   Recent Labs   Lab 04/23/25  0400   WBC 8.20   HGB 11.9*   HCT 38.1*          Significant Imaging: I have reviewed all pertinent imaging results/findings within the past 24 hours.

## 2025-04-23 NOTE — ASSESSMENT & PLAN NOTE
VINNY is likely due to prerenal from SBO. Baseline creatinine is 1. Most recent creatinine and eGFR are listed below.  Recent Labs     04/21/25  0330 04/22/25  0835 04/23/25  0400   CREATININE 1.8* 1.8* 1.7*   EGFRNORACEVR 36* 36* 38*   - UA unremarkable  - CT shows no hydronephrosis     Plan  - Avoid nephrotoxins and renally dose meds for GFR listed above  - Monitor urine output, serial BMP, and adjust therapy as needed  - improving on IVF's

## 2025-04-23 NOTE — ASSESSMENT & PLAN NOTE
Anemia is likely due to chronic disease due to Chronic Kidney Disease. Most recent hemoglobin and hematocrit are listed below.  Recent Labs     04/21/25  0330 04/23/25  0400   HGB 11.1* 11.9*   HCT 35.7* 38.1*     Plan  - Monitor serial CBC: Daily  - Transfuse PRBC if patient becomes hemodynamically unstable, symptomatic or H/H drops below 7/21.  - Patient has not received any PRBC transfusions to date  - Patient's anemia is currently stable

## 2025-04-23 NOTE — TRANSFER OF CARE
"Anesthesia Transfer of Care Note    Patient: Eugene Gamez    Procedure(s) Performed: Procedure(s) (LRB):  LAPAROTOMY, EXPLORATORY (N/A)  REPAIR, HERNIA, INCISIONAL (N/A)    Patient location: ICU    Transport from OR: Transported from OR on 6-10 L/min O2 by face mask with adequate spontaneous ventilation. Continuous ECG monitoring in transport. Continuous SpO2 monitoring in transport    Post pain: adequate analgesia    Post assessment: no apparent anesthetic complications and tolerated procedure well    Post vital signs: stable    Level of consciousness: awake    Nausea/Vomiting: no nausea/vomiting    Complications: none    Transfer of care protocol was followed      Last vitals: Visit Vitals  /70 (BP Location: Right arm, Patient Position: Lying)   Pulse 85   Temp 36.8 °C (98.2 °F) (Oral)   Resp 12   Ht 6' 2" (1.88 m)   Wt 66.7 kg (147 lb 0.8 oz)   SpO2 98%   BMI 18.88 kg/m²     "

## 2025-04-23 NOTE — NURSING
[facility]  Nurses Note -- 4 Eyes      4/22/2025       Skin assessed on: Q Shift      [x] No Pressure Injuries Present    [x]Prevention Measures Documented    [] Yes LDA  for Pressure Injury Previously documented     [] Yes New Pressure Injury Discovered   [] LDA for New Pressure Injury Added      Attending RN:  Beatrice Landa RN     Second RN:  LAZ Matamoros

## 2025-04-23 NOTE — ASSESSMENT & PLAN NOTE
Plan for exploratory laparotomy noted for later today    He is at least  moderate but not at prohibitive risk for this surgical procedure.  No further cardiac testing is needed at the moment.  Would recommend continuation of IV metoprolol in perioperative period if blood pressure is okay to minimize ectopy.

## 2025-04-23 NOTE — EICU
eICU Physician Virtual/Remote Brief Evaluation Note      Message from bedside RN  D5 half-normal saline running at 75 mL/hour and blood glucose has been greater than 200 x 2  Chart reviewed   /81, P 65, R 25, O2 sat 87  Admitted for small-bowel obstruction  Patient is on amiodarone drip which is also dextrose containing   Patient does not appear to room been adequately fluid resuscitated for small-bowel obstruction and sodium is rising  IV changed to half normal sterile saline at 125. mL/hour times 12 hours      BEduarda Etienne MD  eICU Attending  [phone]    This report has been created through the use of [employer] dictation software. Typographical and content errors may occur with this process. While efforts are made to detect and correct such errors, in some cases errors will persist. For this reason, wording in this document should be considered in the proper context and not strictly verbatim

## 2025-04-23 NOTE — ASSESSMENT & PLAN NOTE
"- CT upon admission: "High-grade small bowel obstruction related to a 5 by 5 cm ventral abdominal wall hernia. Status post left colectomy common diverting left lower quadrant ostomy and multiple small bowel anastomoses with markedly dilated small bowel loops in the deep pelvis which may reflect acute superimposed on chronic change."  - General surgery consulted  - NPO   - NGT placed and LIWS  - GGC ordered for 4/21 and now passage of contrast to colon, no gas/stool in ostomy bag  - Surgery discussing ex-lap with patient and family - tentatively for 4/23/25  - Cardiology aware and following, will ask for risk stratification for surgery    - underwent exlap with lysis of adhesions on 4/23.     "

## 2025-04-23 NOTE — ANESTHESIA PREPROCEDURE EVALUATION
04/22/2025  Eugene Gamez is a 89 y.o., male.    Pre-operative evaluation for Procedure(s) (LRB):  LAPAROTOMY, EXPLORATORY (N/A)    Eugene Gamez is a 89 y.o. male     Patient Active Problem List   Diagnosis    Arthritis    ED (erectile dysfunction)    Essential hypertension    Gout    SBO (small bowel obstruction)    Debility    Carcinoma in situ of colon    Educational circumstance    Chemotherapy induced neutropenia    Chemotherapy-induced neuropathy    Venous stasis    Acute renal failure superimposed on stage 3b chronic kidney disease    Bilateral renal masses    Colostomy care    Colostomy in place    Flu vaccine need    Benign essential HTN    Nuclear sclerotic cataract of both eyes    HTN, goal below 140/80    Anemia of chronic disease    Elevated CEA    SDH (subdural hematoma)    Pulmonary emphysema, unspecified emphysema type    History of colon cancer    Pseudophakia    Refractive error    Hypertensive kidney disease with stage 3b chronic kidney disease    Hyperuricemia    Anemia of chronic renal failure, stage 3b    Secondary renal hyperparathyroidism    S/P left colectomy    Ventral hernia    Severe protein-calorie malnutrition    NSVT (nonsustained ventricular tachycardia)    Atrial tachycardia    Advance care planning    Hypernatremia    Altered mental status       Review of patient's allergies indicates:   Allergen Reactions    Iodine Other (See Comments)     Causes gout to flare up    Shellfish containing products      Causes gout to flare up    Shellfish derived        Medications Ordered Prior to Encounter[1]    Past Surgical History:   Procedure Laterality Date    CATARACT EXTRACTION      ou    EYE SURGERY      bilateral cataracts    removal of small bowel  Apr. 2015    Colostomy       Social History[2]    VITALS    Wt Readings from Last 3 Encounters:   04/22/25 66.7 kg (147 lb 0.8 oz)    04/08/25 65.7 kg (144 lb 13.5 oz)   03/20/25 67.5 kg (148 lb 14.7 oz)     Temp Readings from Last 3 Encounters:   04/22/25 36.5 °C (97.7 °F) (Oral)   03/20/25 36.5 °C (97.7 °F) (Oral)   02/21/24 36.4 °C (97.6 °F) (Oral)     BP Readings from Last 3 Encounters:   04/22/25 138/68   04/08/25 112/62   03/20/25 112/60     Pulse Readings from Last 3 Encounters:   04/22/25 74   04/08/25 93   03/20/25 106       LABS  CBC:   Recent Labs     04/20/25  0349 04/21/25  0330   WBC 6.67 6.81   RBC 4.24* 3.80*   HGB 12.4* 11.1*   HCT 38.4* 35.7*    295   MCV 91 94   MCH 29.2 29.2   MCHC 32.3 31.1*       CMP:   Recent Labs     04/20/25  0349 04/21/25  0330 04/22/25  0835    151* 149*   K 4.3 4.1 3.8   * 119* 120*   CO2 20* 18* 19*   BUN 72* 69* 60*   CREATININE 2.1* 1.8* 1.8*   MG 2.2 2.2  --    PHOS 3.9 4.0  --    CALCIUM 9.5 8.8 9.4   ALBUMIN 3.1* 2.8* 2.7*   ALKPHOS 49 41 42   ALT 18 13 10   AST 17 14 13   BILITOT 0.5 0.4 0.4             Pre-op Assessment    I have reviewed the Patient Summary Reports.     I have reviewed the Nursing Notes. I have reviewed the NPO Status.   I have reviewed the Medications.     Review of Systems  Anesthesia Hx:  No previous Anesthesia   History of prior surgery of interest to airway management or planning:          Denies Family Hx of Anesthesia complications.    Denies Personal Hx of Anesthesia complications.                    Social:  Non-Smoker, No Alcohol Use       Hematology/Oncology:  Hematology Normal                  Hematology Comments: Pulmonary emboli      --  Cancer in past history:                 Oncology Comments: Colon ca s/p resection with colostomy     EENT/Dental:  EENT/Dental Normal           Cardiovascular:  Exercise tolerance: good   Hypertension               Ventricular & atrial ectopy -- NSVT  Amiodarone infusion    2D Echo 4/22/25  ·  Technically challenging study. Frequent atrial ectopy noted during the study  ·  Left Ventricle: The left ventricle is  normal in size. Mildly increased wall thickness. There is concentric remodeling. There is normal systolic function with a visually estimated ejection fraction of 60 - 65%. Unable to assess diastolic function due to poor image quality.  ·  Right Ventricle: The right ventricle is normal in size. Systolic function is normal.  ·  Aortic Valve: There is mild aortic valve sclerosis.  ·  Aorta: The Aortic root is mildly dilated measuring 3.94 cm. The Ascending aorta is normal.  ·  Pulmonary Artery: Pulmonary artery pressure could not be accurately determined.                                Pulmonary:   COPD                     Renal/:  Chronic Renal Disease, CKD   VINNY             Hepatic/GI:        SBO             Musculoskeletal:  Musculoskeletal Normal                Neurological:  Neurology Normal                                      Endocrine:  Diabetes, type 2               Physical Exam  General: Cooperative, Alert and Oriented    Airway:  Mallampati: III   Mouth Opening: Normal  TM Distance: Normal  Tongue: Normal  Neck ROM: Normal ROM    Dental:  Edentulous    Chest/Lungs:  Normal Respiratory Rate    Heart:  Rate: Normal  Rhythm: Occasional Prematures        Anesthesia Plan  Type of Anesthesia, risks & benefits discussed:    Anesthesia Type: Gen ETT  Intra-op Monitoring Plan: Standard ASA Monitors  Post Op Pain Control Plan: multimodal analgesia and IV/PO Opioids PRN  Induction:  IV  Airway Plan: Video, Post-Induction  Informed Consent: Informed consent signed with the Patient and all parties understand the risks and agree with anesthesia plan.  All questions answered.   ASA Score: 3  Anesthesia Plan Notes: Per cardiology: He is at least  moderate but not at prohibitive risk for this surgical procedure.  No further cardiac testing is needed at the moment.  Would recommend continuation of IV metoprolol in perioperative period if blood pressure is okay to minimize ectopy.    Ready For Surgery From Anesthesia  Perspective.     .           [1]   Current Facility-Administered Medications on File Prior to Encounter   Medication Dose Route Frequency Provider Last Rate Last Admin    ondansetron HCl (PF) 4 mg/2 mL injection    PRN James Ryan, CRNA   4 mg at 06/29/15 0856     Current Outpatient Medications on File Prior to Encounter   Medication Sig Dispense Refill    allopurinoL (ZYLOPRIM) 300 MG tablet Take 1 tablet (300 mg total) by mouth once daily. 90 tablet 1    amLODIPine (NORVASC) 10 MG tablet Take 1 tablet (10 mg total) by mouth once daily. 90 tablet 3    aspirin (ECOTRIN) 81 MG EC tablet Take 1 tablet (81 mg total) by mouth once daily. 90 tablet 3    atorvastatin (LIPITOR) 20 MG tablet Take 1 tablet (20 mg total) by mouth once daily. 90 tablet 3    calcitRIOL (ROCALTROL) 0.25 MCG Cap Take 1 capsule (0.25 mcg total) by mouth every other day. 30 capsule 11    DULoxetine (CYMBALTA) 30 MG capsule Take 1 capsule (30 mg total) by mouth 2 (two) times daily. 180 capsule 0    ferrous sulfate 324 mg (65 mg iron) TbEC Take 1 tablet by mouth once daily.      gabapentin (NEURONTIN) 100 MG capsule Take 1 capsule (100 mg total) by mouth 3 (three) times daily. 270 capsule 1    hydrALAZINE (APRESOLINE) 50 MG tablet Take 1 tablet (50 mg total) by mouth 2 (two) times a day. 60 tablet 11    JANUVIA 50 mg Tab Take 1 tablet (50 mg total) by mouth once daily. 90 tablet 1    ketoconazole (NIZORAL) 2 % cream Apply topically once daily. 60 g 2    LIDOcaine (LIDODERM) 5 % Place 1 patch onto the skin once daily. Remove & Discard patch within 12 hours or as directed by MD 30 patch 2    sodium bicarbonate 650 MG tablet Take 1 tablet (650 mg total) by mouth 2 (two) times daily. (Patient not taking: Reported on 4/17/2025) 60 tablet 11    traZODone (DESYREL) 50 MG tablet Take 1 tablet (50 mg total) by mouth every evening. 90 tablet 3    [DISCONTINUED] blood-glucose meter (ONE TOUCH ULTRAMINI) kit To test twice daily. Use as instructed 1 each 0     [DISCONTINUED] cholestyramine (QUESTRAN) 4 gram packet Take 1 packet (4 g total) by mouth 3 (three) times daily with meals. 270 packet 3    [DISCONTINUED] colchicine (COLCRYS) 0.6 mg tablet Take 1 tablet (0.6 mg total) by mouth daily as needed. 90 tablet 3    [DISCONTINUED] loratadine (CLARITIN) 10 mg tablet Take 10 mg by mouth once daily.      [DISCONTINUED] metoprolol tartrate (LOPRESSOR) 50 MG tablet Take 1 tablet (50 mg total) by mouth 2 (two) times daily. 60 tablet 0    [DISCONTINUED] QUEtiapine (SEROQUEL) 50 MG tablet Take 1 tablet (50 mg total) by mouth every evening. 30 tablet 0   [2]   Social History  Socioeconomic History    Marital status:     Number of children: 8   Tobacco Use    Smoking status: Never     Passive exposure: Never    Smokeless tobacco: Never    Tobacco comments:     smoked short while as a teen   Substance and Sexual Activity    Alcohol use: No    Drug use: No    Sexual activity: Not Currently     Partners: Female     Social Drivers of Health     Financial Resource Strain: Low Risk  (4/20/2025)    Overall Financial Resource Strain (CARDIA)     Difficulty of Paying Living Expenses: Not hard at all   Recent Concern: Financial Resource Strain - Medium Risk (4/18/2025)    Overall Financial Resource Strain (CARDIA)     Difficulty of Paying Living Expenses: Somewhat hard   Food Insecurity: No Food Insecurity (4/20/2025)    Hunger Vital Sign     Worried About Running Out of Food in the Last Year: Never true     Ran Out of Food in the Last Year: Never true   Recent Concern: Food Insecurity - Food Insecurity Present (4/18/2025)    Hunger Vital Sign     Worried About Running Out of Food in the Last Year: Often true     Ran Out of Food in the Last Year: Sometimes true   Transportation Needs: Unmet Transportation Needs (4/18/2025)    PRAPARE - Transportation     Lack of Transportation (Medical): Yes     Lack of Transportation (Non-Medical): No   Stress: No Stress Concern Present (4/20/2025)     [employer] - Occupational Stress Questionnaire     Feeling of Stress : Not at all   Recent Concern: Stress - Stress Concern Present (4/18/2025)    [employer] - Occupational Stress Questionnaire     Feeling of Stress : Rather much   Housing Stability: Low Risk  (4/20/2025)    Housing Stability Vital Sign     Unable to Pay for Housing in the Last Year: No     Number of Times Moved in the Last Year: 0     Homeless in the Last Year: No   Recent Concern: Housing Stability - High Risk (4/18/2025)    Housing Stability Vital Sign     Unable to Pay for Housing in the Last Year: Yes     Number of Times Moved in the Last Year: 0     Homeless in the Last Year: No

## 2025-04-23 NOTE — EICU
Intervention Initiated From:  COR / LOCO Parra intervened regarding:  Rounding (Video assessment)        Comments:   VICU Night Rounds Checklist  24H Vital Sign Range:  Temp:  [97.7 °F (36.5 °C)-98.4 °F (36.9 °C)]   Pulse:  [51-83]   Resp:  [19-35]   BP: (109-167)/(61-78)   SpO2:  [95 %-100 %]     Video rounds

## 2025-04-23 NOTE — EICU
Virtual ICU Quality Rounds    Admit Date: 4/18/2025  Hospital Day: 5    ICU Day: 3d 13h    24H Vital Sign Range:  Temp:  [97.7 °F (36.5 °C)-98.4 °F (36.9 °C)]   Pulse:  [51-99]   Resp:  [19-35]   BP: (105-168)/(61-88)   SpO2:  [95 %-100 %]     VICU Surveillance Screening                    LDA reconciliation : Yes

## 2025-04-23 NOTE — OP NOTE
[facility] - Intensive Care  Operative Note    SUMMARY     Surgery Date: 4/23/2025     Surgeons and Role:     * Abran Camacho MD - Primary     * Honey Meneses MD - Resident - Assisting        Pre-op Diagnosis:  SBO (small bowel obstruction) [K56.609]    Post-op Diagnosis:  Post-Op Diagnosis Codes:     * SBO (small bowel obstruction) [K56.609]    Procedure(s) (LRB):  LAPAROTOMY, EXPLORATORY (N/A)  REPAIR, HERNIA, INCISIONAL (N/A)    Anesthesia: General    Indication for procedure: Eugene Gamez is 89 y.o. male with history of colon cancer ex lap colectomy and end colostomy, who presents to the hospital with small-bowel obstruction.  Despite NG tube decompression and Gastrografin challenge patient has failed to resolve his small bowel obstruction which appears related to adhesions likely rather bowel incarcerated in the hernia as he does have a reducible hernia. After discussion of disease process, we discussed options and have elected for operation to perform exploratory laparotomy possible lysis of adhesions possible bowel resection and any other indicated procedures including hernia repair.    Description of Procedure: After consent was obtained, patient was taken to the OR. The abdomen was prepped and draped in a standard sterile fashion after general anesthesia was started. Time out was performed. Antibiotics were started with 2 g Ancef. We began the procedure by injecting Exparel with Marcaine and a vertical incision cutting down through the skin making our way to the hernia sac.  Careful dissection was performed to ensure no injury to any bowel.  We did dissect around the hernia sac making our way down to the fascia and then we entered the hernia sac and resected the hernia sac.  We encountered some significantly distended small bowel however there was no evidence of perforated or necrotic or compromised small bowel.  We extended our incision and our laparotomy so we could reach the adhesions there was some  adhesions of small bowel adherent to the abdominal wall on the underside.  There were several bands of tissue that were lysed with a combination of blunt sharp and cautery dissection.  We finally identified the definitive band causing the obstruction and there was a definitive transition point.  We cut this band of scar tissue and we were then able to ensure patency of the lumen and we ran the rest of the small bowel and ensured that there were no other adhesions or obstructions.  We started at the ligament of Treitz and made our way all the way down to the cecum.  We did also verify NG tube placement the stomach at this time the NG tube appeared to be clogged it was removed and replaced and once we got the NG tube into the stomach appropriately we suctioned out 1.5 L of feculent dark fluid.  This helped somewhat decompress the small bowel.      We then considered a mesh repair for his hernia however at this time we noted that the 2 edges of the fascia came together with minimal tension and so we ran the abdominal wall closed with running 1. Looped PDS.    We did have to use the Bovie electrocautery to create some flaps of the fascia  it from the skin and this gave us some more slack for the closure.  During this time we identified at the 11 o'clock position on the upper with superior right side of the fascia there was a separate hernia extending laterally approximately 3 cm and this was closed separately with interrupted 0 Ethibond sutures.    We then injected the remainder of the Exparel plus Marcaine and we closed the skin with staples covered by sterile island dressing    All counts were correct x2. Patient was awakened from anesthesia and taken to the recovery room in a stable condition having suffered no issues at this time.    Description of the findings of the procedure:    Operative Findings:   Dense adhesions, transition point found and lysed.  Bowel all viable.  Closure unremarkable, the hernia  ended up re-approximated/closed w no tension.  NGT replaced. It was clogged. 1.5 L of fluid suctioned out.     Estimated Blood Loss: 15 mL         Specimens:   Specimen (24h ago, onward)       Start     Ordered    04/23/25 1048  Specimen to Pathology General Surgery  RELEASE UPON ORDERING        References:    Click here for ordering Quick Tip   Question:  Release to patient  Answer:  Immediate    04/23/25 1048                  Drains/Implants: None

## 2025-04-23 NOTE — PROGRESS NOTES
Surgery Progress Note    Eugene Gamez is a 89 y.o. year old male in hospital for small bowel obstruction    Feeling some abdominal tightness  Abdomen more distended today    ROS:  Negative except above    PE:  Vitals:    04/23/25 0600 04/23/25 0610 04/23/25 0728 04/23/25 0730   BP: (!) 146/72 (!) 146/72     BP Location:       Patient Position:       Pulse: 70 99 74 77   Resp: (!) 32 (!) 28 (!) 22 (!) 35   Temp:    98.2 °F (36.8 °C)   TempSrc:    Axillary   SpO2: 100% 99% 99% 97%   Weight:       Height:           NAD  No belabored breathing  No skin changes  Abd soft distended, ventral hernia soft easily reducible. No gas or stool in colostomy bag    Significant Diagnostic Studies: N/A    A/P:  Eugene Gamez is a 89 y.o. year old male  with small bowel obstruction. Patient not progressing with conservative management.    - OR today for exploratory laparotomy. Benefits, risks and alternatives explained to daughter Nicol Gamez  - keep NPO, NGT to CARMELINA Meneses  General Surgery - [facility]  4/23/2025

## 2025-04-23 NOTE — SUBJECTIVE & OBJECTIVE
Review of Systems   Constitutional: Positive for malaise/fatigue.   Cardiovascular:  Negative for chest pain, claudication, dyspnea on exertion, irregular heartbeat, leg swelling, near-syncope, orthopnea, palpitations, paroxysmal nocturnal dyspnea and syncope.   Respiratory:  Negative for cough, shortness of breath, snoring and sputum production.    Gastrointestinal:  Negative for abdominal pain, dysphagia, heartburn, nausea and vomiting.   Neurological:  Positive for weakness. Negative for dizziness, headaches and loss of balance.     Objective:     Vital Signs (Most Recent):  Temp: 98.2 °F (36.8 °C) (04/23/25 0730)  Pulse: 69 (04/23/25 0800)  Resp: (!) 28 (04/23/25 0800)  BP: 133/66 (04/23/25 0800)  SpO2: 100 % (04/23/25 0800) Vital Signs (24h Range):  Temp:  [97.7 °F (36.5 °C)-98.4 °F (36.9 °C)] 98.2 °F (36.8 °C)  Pulse:  [51-99] 69  Resp:  [19-35] 28  SpO2:  [95 %-100 %] 100 %  BP: (105-168)/(61-88) 133/66     Weight: 66.7 kg (147 lb 0.8 oz)  Body mass index is 18.88 kg/m².     SpO2: 100 %         Intake/Output Summary (Last 24 hours) at 4/23/2025 0959  Last data filed at 4/23/2025 0943  Gross per 24 hour   Intake 2294.44 ml   Output 1055 ml   Net 1239.44 ml       Lines/Drains/Airways       Central Venous Catheter Line  Duration             Port A Cath Single Lumen Subclavian Left -- days              Drain  Duration                  Colostomy 04/23/15 LLQ 3653 days         NG/OG Tube 04/19/25 1130 [place] sump 18 Fr. Right nostril 3 days    Male External Urinary Catheter 04/22/25 0100 1 day         Urethral Catheter 04/23/25 Non-latex;Straight-tip 16 Fr. <1 day              Airway  Duration                  Airway - Non-Surgical 04/23/25 0908 <1 day              Peripheral Intravenous Line  Duration                  Peripheral IV - Single Lumen 04/18/25 1201 22 G Left;Posterior Forearm 4 days         Peripheral IV - Single Lumen 04/21/25 1253 20 G Anterior;Distal;Left Upper Arm 1 day         Peripheral IV -  Single Lumen 04/23/25 0925 18 G No Right Forearm <1 day                       Physical Exam  Constitutional:       General: He is in acute distress.      Appearance: He is ill-appearing.   HENT:      Head: Normocephalic and atraumatic.   Eyes:      Extraocular Movements: Extraocular movements intact.      Pupils: Pupils are equal, round, and reactive to light.   Cardiovascular:      Rate and Rhythm: Normal rate and regular rhythm.      Pulses: Normal pulses.      Heart sounds: Normal heart sounds.   Pulmonary:      Effort: Pulmonary effort is normal.      Breath sounds: Normal breath sounds.   Musculoskeletal:      Left lower leg: No edema.   Skin:     General: Skin is warm.            Current Medications:   albuterol-ipratropium  3 mL Nebulization Q6H WAKE    heparin (porcine)  5,000 Units Subcutaneous Q8H    metoprolol  5 mg Intravenous Q6H    pantoprazole  40 mg Intravenous Daily      0.45% NaCl   Intravenous Continuous 500 mL/hr at 04/23/25 0940 Rate Change at 04/23/25 0940    amiodarone in dextrose 5%  0.5 mg/min Intravenous Continuous 16.7 mL/hr at 04/23/25 0600 0.5 mg/min at 04/23/25 0600       Current Facility-Administered Medications:     acetaminophen, 650 mg, Oral, Q6H PRN    dextrose 50%, 12.5 g, Intravenous, PRN    dextrose 50%, 25 g, Intravenous, PRN    glucagon (human recombinant), 1 mg, Intramuscular, PRN    glucose, 16 g, Oral, PRN    glucose, 24 g, Oral, PRN    hydrALAZINE, 10 mg, Intravenous, Q6H PRN    insulin aspart U-100, 0-5 Units, Subcutaneous, Q6H PRN    morphine, 1 mg, Intravenous, Q4H PRN    morphine, 2 mg, Intravenous, Q4H PRN    naloxone, 0.02 mg, Intravenous, PRN    ondansetron, 4 mg, Intravenous, Q6H PRN    prochlorperazine, 5 mg, Intravenous, Q6H PRN    Laboratory (all labs reviewed):  CBC:  Recent Labs   Lab 04/18/25  1159 04/19/25  0430 04/20/25  0349 04/21/25  0330 04/23/25  0400   WBC 8.70 7.64 6.67 6.81 8.20   HGB 12.8 L 11.0 L 12.4 L 11.1 L 11.9 L   HCT 39.3 L 34.9 L 38.4 L 35.7  L 38.1 L   Platelet Count 314 294 263 295 309       CHEMISTRIES:  Recent Labs   Lab 06/10/22  1113 05/09/23  1441 03/04/24  1156 06/11/24  0930 09/30/24  0912 01/14/25  1330 03/20/25  1522 04/11/25  1411 04/19/25  0430 04/19/25  2004 04/20/25  0349 04/21/25  0330 04/22/25  0835 04/23/25  0400   Glucose 145 H   < > 102 141 H 111 H 168 H 142 H  --   --   --   --   --   --   --    Sodium 139   < > 141 140 139 144 139   < > 144 144 145 151 H 149 H 151 H   Potassium 4.6   < > 4.7 4.9 4.7 4.4 4.5   < > 5.0 4.3 4.3 4.1 3.8 3.6   BUN 33 H   < > 24 H 36 H 23 18 18   < > 72 H 74 H 72 H 69 H 60 H 48 H   Creatinine 2.2 H   < > 1.8 H 2.1 H 1.8 H 1.7 H 1.6 H   < > 2.3 H 2.2 H 2.1 H 1.8 H 1.8 H 1.7 H   eGFR if non  26 A  --   --   --   --   --   --   --   --   --   --   --   --   --    eGFR  --    < > 35.8 A 29.7 A 35.8 A 38.3 A 40.9 A   < > 26 L 28 L 30 L 36 L 36 L 38 L   Calcium 10.0   < > 9.6 10.5 10.1 9.8 10.4   < > 9.5 9.3 9.5 8.8 9.4 9.5   Magnesium   --   --   --   --   --   --   --    < > 2.1 2.1 2.2 2.2  --  2.1    < > = values in this interval not displayed.       CARDIAC BIOMARKERS:  Recent Labs   Lab 05/10/23  0413 12/01/23  1215 12/01/23  1512 04/18/25  1159 04/18/25  1421   Troponin I 0.025 <0.006 0.006  --   --    Troponin-I  --   --   --  0.023 0.018       COAGS:  Recent Labs   Lab 04/11/25  1411   INR 1.1       LIPIDS/LFTS:  Recent Labs   Lab 04/19/25  0430 04/20/25  0349 04/21/25  0330 04/22/25  0835 04/23/25  0400   AST 15 17 14 13 13   ALT 18 18 13 10 10       BNP:  Recent Labs   Lab 05/09/23  1441 12/01/23  1215 03/20/25  1522 04/18/25  1159   BNP 10 29 132 H 21       TSH:  Recent Labs   Lab 05/09/23  1441   TSH 2.468       Free T4:

## 2025-04-23 NOTE — ASSESSMENT & PLAN NOTE
Post operative delirium noted. Unable to redirect, concern for removal of NGT and other tubes. Had ex-lap 4/23.  - precedex drip for now

## 2025-04-23 NOTE — ASSESSMENT & PLAN NOTE
Patient's blood pressure range in the last 24 hours was: BP  Min: 105/66  Max: 168/88.The patient's inpatient anti-hypertensive regimen is listed below:  Current Antihypertensives  hydrALAZINE injection 10 mg, Every 6 hours PRN, Intravenous  metoprolol injection 5 mg, Every 6 hours, Intravenous    Plan  - BP increasing as patient unable to take his oral BP medications.  Continue with prn IV Hydralazine.

## 2025-04-23 NOTE — NURSING
Returned to room from surgery.  He is awake and confused asking to get up and help him stretch out.  Frequently reaching for NG tube and appears in pain.  NG tube placed to suction and pain meds ordered.  IV fluids in progress at 125/hr.  Paredes in place now and draining clear yellow urine.  VSS.

## 2025-04-23 NOTE — BRIEF OP NOTE
[facility] - Intensive Care  Brief Operative Note    SUMMARY     Surgery Date: 4/23/2025     Surgeons and Role:     * Abran Camacho MD - Primary     * Honey Meneses MD - Resident - Assisting        Pre-op Diagnosis:  SBO (small bowel obstruction) [K56.609]    Post-op Diagnosis:  Post-Op Diagnosis Codes:     * SBO (small bowel obstruction) [K56.609]    Procedure(s) (LRB):  LAPAROTOMY, EXPLORATORY (N/A)  REPAIR, HERNIA, INCISIONAL (N/A)  Lysis of adhesions    Anesthesia: General    Implants:  * No implants in log *    Operative Findings:   Dense adhesions, transition point found and lysed.  Bowel all viable.  Closure unremarkable, the hernia ended up re-approximated/closed w no tension.  NGT replaced. It was clogged. 1.5 L of fluid suctioned out.    Estimated Blood Loss: 15 mL    Estimated Blood Loss has been documented.         Specimens:   Specimen (24h ago, onward)       Start     Ordered    04/23/25 1048  Specimen to Pathology General Surgery  RELEASE UPON ORDERING        References:    Click here for ordering Quick Tip   Question:  Release to patient  Answer:  Immediate    04/23/25 1048                  ID Type Source Tests Collected by Time Destination   1 : hernia sac Tissue Abdomen SPECIMEN TO PATHOLOGY Abran Camacho MD 4/23/2025 1038        [id number]

## 2025-04-23 NOTE — ANESTHESIA POSTPROCEDURE EVALUATION
Anesthesia Post Evaluation    Patient: Eugene Gamez    Procedure(s) Performed: Procedure(s) (LRB):  LAPAROTOMY, EXPLORATORY (N/A)  REPAIR, HERNIA, INCISIONAL (N/A)    Final Anesthesia Type: general      Patient location during evaluation: ICU  Patient participation: Yes- Able to Participate  Level of consciousness: awake and responds to stimulation  Post-procedure vital signs: reviewed and stable  Pain management: adequate  Airway patency: patent    PONV status at discharge: No PONV  Anesthetic complications: no      Cardiovascular status: blood pressure returned to baseline and hemodynamically stable  Respiratory status: unassisted, spontaneous ventilation and nasal cannula  Hydration status: euvolemic  Follow-up not needed.              Vitals Value Taken Time   /81 04/23/25 11:46   Temp 36.8 °C (98.2 °F) 04/23/25 11:43   Pulse 84 04/23/25 11:58   Resp 35 04/23/25 11:58   SpO2 97 % 04/23/25 11:58   Vitals shown include unfiled device data.      No case tracking events are documented in the log.      Pain/Caroline Score: No data recorded

## 2025-04-23 NOTE — ASSESSMENT & PLAN NOTE
Creatinine around 2.2 - 2.3  Baseline creatinine normal  Most likely prerenal in setting of nausea vomiting and decreased p.o. intake  Creatinine trending towards normal limits  Currently getting maintenance fluids

## 2025-04-23 NOTE — ASSESSMENT & PLAN NOTE
Body mass index is 18.88 kg/m².  Notably, albumin 3.6 on admission- perhaps due to dehydration  - must remain strict NPO at this time until BM

## 2025-04-23 NOTE — PROGRESS NOTES
[facility] Intensive Care  Cardiology  Progress Note    Patient Name: Eugene Gamez  MRN: [id number]  Admission Date: 4/18/2025  Hospital Length of Stay: 5 days  Code Status: Full Code   Attending Physician: Cristino Marie MD   Primary Care Physician: Larry Monae MD  Expected Discharge Date:   Principal Problem:SBO (small bowel obstruction)    Subjective:     Hospital Course:   Patient was seen and examined this morning.  Telemetry reviewed  PACs burden  is minimal since initiation of IV amiodarone  No chest pain or shortness of breath.  Echocardiogram reviewed.  Normal LV systolic function.        Review of Systems   Constitutional: Positive for malaise/fatigue.   Cardiovascular:  Negative for chest pain, claudication, dyspnea on exertion, irregular heartbeat, leg swelling, near-syncope, orthopnea, palpitations, paroxysmal nocturnal dyspnea and syncope.   Respiratory:  Negative for cough, shortness of breath, snoring and sputum production.    Gastrointestinal:  Negative for abdominal pain, dysphagia, heartburn, nausea and vomiting.   Neurological:  Positive for weakness. Negative for dizziness, headaches and loss of balance.     Objective:     Vital Signs (Most Recent):  Temp: 98.2 °F (36.8 °C) (04/23/25 0730)  Pulse: 69 (04/23/25 0800)  Resp: (!) 28 (04/23/25 0800)  BP: 133/66 (04/23/25 0800)  SpO2: 100 % (04/23/25 0800) Vital Signs (24h Range):  Temp:  [97.7 °F (36.5 °C)-98.4 °F (36.9 °C)] 98.2 °F (36.8 °C)  Pulse:  [51-99] 69  Resp:  [19-35] 28  SpO2:  [95 %-100 %] 100 %  BP: (105-168)/(61-88) 133/66     Weight: 66.7 kg (147 lb 0.8 oz)  Body mass index is 18.88 kg/m².     SpO2: 100 %         Intake/Output Summary (Last 24 hours) at 4/23/2025 0959  Last data filed at 4/23/2025 0943  Gross per 24 hour   Intake 2294.44 ml   Output 1055 ml   Net 1239.44 ml       Lines/Drains/Airways       Central Venous Catheter Line  Duration             Port A Cath Single Lumen Subclavian Left -- days              Drain   Duration                  Colostomy 04/23/15 LLQ 3653 days         NG/OG Tube 04/19/25 1130 Kerr sump 18 Fr. Right nostril 3 days    Male External Urinary Catheter 04/22/25 0100 1 day         Urethral Catheter 04/23/25 Non-latex;Straight-tip 16 Fr. <1 day              Airway  Duration                  Airway - Non-Surgical 04/23/25 0908 <1 day              Peripheral Intravenous Line  Duration                  Peripheral IV - Single Lumen 04/18/25 1201 22 G Left;Posterior Forearm 4 days         Peripheral IV - Single Lumen 04/21/25 1253 20 G Anterior;Distal;Left Upper Arm 1 day         Peripheral IV - Single Lumen 04/23/25 0925 18 G No Right Forearm <1 day                       Physical Exam  Constitutional:       General: He is in acute distress.      Appearance: He is ill-appearing.   HENT:      Head: Normocephalic and atraumatic.   Eyes:      Extraocular Movements: Extraocular movements intact.      Pupils: Pupils are equal, round, and reactive to light.   Cardiovascular:      Rate and Rhythm: Normal rate and regular rhythm.      Pulses: Normal pulses.      Heart sounds: Normal heart sounds.   Pulmonary:      Effort: Pulmonary effort is normal.      Breath sounds: Normal breath sounds.   Musculoskeletal:      Left lower leg: No edema.   Skin:     General: Skin is warm.            Current Medications:   albuterol-ipratropium  3 mL Nebulization Q6H WAKE    heparin (porcine)  5,000 Units Subcutaneous Q8H    metoprolol  5 mg Intravenous Q6H    pantoprazole  40 mg Intravenous Daily      0.45% NaCl   Intravenous Continuous 500 mL/hr at 04/23/25 0940 Rate Change at 04/23/25 0940    amiodarone in dextrose 5%  0.5 mg/min Intravenous Continuous 16.7 mL/hr at 04/23/25 0600 0.5 mg/min at 04/23/25 0600       Current Facility-Administered Medications:     acetaminophen, 650 mg, Oral, Q6H PRN    dextrose 50%, 12.5 g, Intravenous, PRN    dextrose 50%, 25 g, Intravenous, PRN    glucagon (human recombinant), 1 mg, Intramuscular,  PRN    glucose, 16 g, Oral, PRN    glucose, 24 g, Oral, PRN    hydrALAZINE, 10 mg, Intravenous, Q6H PRN    insulin aspart U-100, 0-5 Units, Subcutaneous, Q6H PRN    morphine, 1 mg, Intravenous, Q4H PRN    morphine, 2 mg, Intravenous, Q4H PRN    naloxone, 0.02 mg, Intravenous, PRN    ondansetron, 4 mg, Intravenous, Q6H PRN    prochlorperazine, 5 mg, Intravenous, Q6H PRN    Laboratory (all labs reviewed):  CBC:  Recent Labs   Lab 04/18/25  1159 04/19/25  0430 04/20/25  0349 04/21/25  0330 04/23/25  0400   WBC 8.70 7.64 6.67 6.81 8.20   HGB 12.8 L 11.0 L 12.4 L 11.1 L 11.9 L   HCT 39.3 L 34.9 L 38.4 L 35.7 L 38.1 L   Platelet Count 314 294 263 295 309       CHEMISTRIES:  Recent Labs   Lab 06/10/22  1113 05/09/23  1441 03/04/24  1156 06/11/24  0930 09/30/24  0912 01/14/25  1330 03/20/25  1522 04/11/25  1411 04/19/25  0430 04/19/25  2004 04/20/25  0349 04/21/25  0330 04/22/25  0835 04/23/25  0400   Glucose 145 H   < > 102 141 H 111 H 168 H 142 H  --   --   --   --   --   --   --    Sodium 139   < > 141 140 139 144 139   < > 144 144 145 151 H 149 H 151 H   Potassium 4.6   < > 4.7 4.9 4.7 4.4 4.5   < > 5.0 4.3 4.3 4.1 3.8 3.6   BUN 33 H   < > 24 H 36 H 23 18 18   < > 72 H 74 H 72 H 69 H 60 H 48 H   Creatinine 2.2 H   < > 1.8 H 2.1 H 1.8 H 1.7 H 1.6 H   < > 2.3 H 2.2 H 2.1 H 1.8 H 1.8 H 1.7 H   eGFR if non  26 A  --   --   --   --   --   --   --   --   --   --   --   --   --    eGFR  --    < > 35.8 A 29.7 A 35.8 A 38.3 A 40.9 A   < > 26 L 28 L 30 L 36 L 36 L 38 L   Calcium 10.0   < > 9.6 10.5 10.1 9.8 10.4   < > 9.5 9.3 9.5 8.8 9.4 9.5   Magnesium   --   --   --   --   --   --   --    < > 2.1 2.1 2.2 2.2  --  2.1    < > = values in this interval not displayed.       CARDIAC BIOMARKERS:  Recent Labs   Lab 05/10/23  0413 12/01/23  1215 12/01/23  1512 04/18/25  1159 04/18/25  1421   Troponin I 0.025 <0.006 0.006  --   --    Troponin-I  --   --   --  0.023 0.018       COAGS:  Recent Labs   Lab 04/11/25  1411    INR 1.1       LIPIDS/LFTS:  Recent Labs   Lab 04/19/25  0430 04/20/25  0349 04/21/25  0330 04/22/25  0835 04/23/25  0400   AST 15 17 14 13 13   ALT 18 18 13 10 10       BNP:  Recent Labs   Lab 05/09/23  1441 12/01/23  1215 03/20/25  1522 04/18/25  1159   BNP 10 29 132 H 21       TSH:  Recent Labs   Lab 05/09/23  1441   TSH 2.468       Free T4:        Assessment and Plan:       * SBO (small bowel obstruction)  Plan for exploratory laparotomy noted for later today    He is at least  moderate but not at prohibitive risk for this surgical procedure.  No further cardiac testing is needed at the moment.  Would recommend continuation of IV metoprolol in perioperative period if blood pressure is okay to minimize ectopy.     Atrial tachycardia  Atrial and ventricular ectopy have significantly improved  Continue amiodarone drip without bolus to suppress PACs and PVCs  Continue IV metoprolol 5 mg q.6 (standing order).  Do not administer metoprolol if systolic blood pressure is less than 90 mm Hg  Continue tele monitoring  Normal EF noted  Keep electrolytes repleted    We will keep amiodarone drip going while he is NPO  Once he is allowed to eat or drink orally, switch amiodarone drip to p.o. amiodarone 400 mg b.i.d..      Acute renal failure superimposed on stage 3b chronic kidney disease  Creatinine around 2.2 - 2.3  Baseline creatinine normal  Most likely prerenal in setting of nausea vomiting and decreased p.o. intake  Creatinine trending towards normal limits  Currently getting maintenance fluids          VTE Risk Mitigation (From admission, onward)           Ordered     heparin (porcine) injection 5,000 Units  Every 8 hours         04/19/25 1933     Place ARI hose  Until discontinued         04/18/25 1542     Place sequential compression device  Until discontinued         04/18/25 1542     Reason for No Pharmacological VTE Prophylaxis  Once        Question:  Reasons:  Answer:  Physician Provided (leave comment)   Comment:  potential OR    04/18/25 1542     IP VTE HIGH RISK PATIENT  Once         04/18/25 1543                    Elias Ibanez MD  Cardiology  [facility] - Intensive Care

## 2025-04-23 NOTE — ANESTHESIA PROCEDURE NOTES
Intubation    Date/Time: 4/23/2025 9:08 AM    Performed by: Lawrence Richardson CRNA  Authorized by: Clinton Gomez MD    Intubation:     Induction:  Rapid sequence induction    Intubated:  Postinduction    Mask Ventilation:  Easy with oral airway    Attempts:  1    Attempted By:  Student    Method of Intubation:  Video laryngoscopy    Blade:  Seaman 3    Laryngeal View Grade: Grade I - full view of cords      Difficult Airway Encountered?: No      Complications:  None    Airway Device:  Oral endotracheal tube    Airway Device Size:  7.5    Style/Cuff Inflation:  Cuffed    Inflation Amount (mL):  5    Tube secured:  23    Secured at:  The lips    Placement Verified By:  Capnometry    DIFFICULT INTUBATION DESCRIPTOR: edentulous.    Findings Post-Intubation:  BS equal bilateral and atraumatic/condition of teeth unchanged

## 2025-04-23 NOTE — PLAN OF CARE
Patient remains in ICU. Vital signs stable. Currently on 0.45% NS gtt and Amiodarone gtt. PRN medication administered to patient once with moderate relief for nausea/vomiting. Patient has remained NPO and CHG bath has been completed. Held 6 AM dose of subcutaneous Heparin per MD Mijares due to possible ex-lap today. Turned patient q2h. Plan of care reviewed with patient and his daughter, Nicol, who was at the bedside briefly. Patient remains free of falls, injury, or breakdown.     Problem: Adult Inpatient Plan of Care  Goal: Plan of Care Review  Outcome: Progressing  Goal: Patient-Specific Goal (Individualized)  Outcome: Progressing  Goal: Absence of Hospital-Acquired Illness or Injury  Outcome: Progressing  Goal: Optimal Comfort and Wellbeing  Outcome: Progressing  Goal: Readiness for Transition of Care  Outcome: Progressing     Problem: Infection  Goal: Absence of Infection Signs and Symptoms  Outcome: Progressing     Problem: Acute Kidney Injury/Impairment  Goal: Fluid and Electrolyte Balance  Outcome: Progressing  Goal: Improved Oral Intake  Outcome: Progressing  Goal: Effective Renal Function  Outcome: Progressing     Problem: Skin Injury Risk Increased  Goal: Skin Health and Integrity  Outcome: Progressing     Problem: Wound  Goal: Optimal Coping  Outcome: Progressing  Goal: Optimal Functional Ability  Outcome: Progressing  Goal: Absence of Infection Signs and Symptoms  Outcome: Progressing  Goal: Improved Oral Intake  Outcome: Progressing  Goal: Optimal Pain Control and Function  Outcome: Progressing  Goal: Skin Health and Integrity  Outcome: Progressing  Goal: Optimal Wound Healing  Outcome: Progressing     Problem: Fall Injury Risk  Goal: Absence of Fall and Fall-Related Injury  Outcome: Progressing     Problem: Coping Ineffective  Goal: Effective Coping  Outcome: Progressing

## 2025-04-24 LAB
ABSOLUTE EOSINOPHIL (OHS): 0.01 K/UL
ABSOLUTE MONOCYTE (OHS): 0.44 K/UL (ref 0.3–1)
ABSOLUTE NEUTROPHIL COUNT (OHS): 8.31 K/UL (ref 1.8–7.7)
ALBUMIN SERPL BCP-MCNC: 2.6 G/DL (ref 3.5–5.2)
ALP SERPL-CCNC: 37 UNIT/L (ref 40–150)
ALT SERPL W/O P-5'-P-CCNC: 9 UNIT/L (ref 10–44)
ANION GAP (OHS): 9 MMOL/L (ref 8–16)
AST SERPL-CCNC: 11 UNIT/L (ref 11–45)
BASOPHILS # BLD AUTO: 0.01 K/UL
BASOPHILS NFR BLD AUTO: 0.1 %
BILIRUB SERPL-MCNC: 0.3 MG/DL (ref 0.1–1)
BUN SERPL-MCNC: 39 MG/DL (ref 8–23)
CALCIUM SERPL-MCNC: 8.5 MG/DL (ref 8.7–10.5)
CHLORIDE SERPL-SCNC: 116 MMOL/L (ref 95–110)
CO2 SERPL-SCNC: 19 MMOL/L (ref 23–29)
CREAT SERPL-MCNC: 1.5 MG/DL (ref 0.5–1.4)
ERYTHROCYTE [DISTWIDTH] IN BLOOD BY AUTOMATED COUNT: 14.5 % (ref 11.5–14.5)
ESTROGEN SERPL-MCNC: NORMAL PG/ML
GFR SERPLBLD CREATININE-BSD FMLA CKD-EPI: 44 ML/MIN/1.73/M2
GLUCOSE SERPL-MCNC: 157 MG/DL (ref 70–110)
HCT VFR BLD AUTO: 31 % (ref 40–54)
HGB BLD-MCNC: 9.7 GM/DL (ref 14–18)
IMM GRANULOCYTES # BLD AUTO: 0.08 K/UL (ref 0–0.04)
IMM GRANULOCYTES NFR BLD AUTO: 0.8 % (ref 0–0.5)
INSULIN SERPL-ACNC: NORMAL U[IU]/ML
LAB AP CLINICAL INFORMATION: NORMAL
LAB AP GROSS DESCRIPTION: NORMAL
LAB AP PERFORMING LOCATION(S): NORMAL
LAB AP REPORT FOOTNOTES: NORMAL
LYMPHOCYTES # BLD AUTO: 0.68 K/UL (ref 1–4.8)
MAGNESIUM SERPL-MCNC: 1.8 MG/DL (ref 1.6–2.6)
MCH RBC QN AUTO: 29 PG (ref 27–31)
MCHC RBC AUTO-ENTMCNC: 31.3 G/DL (ref 32–36)
MCV RBC AUTO: 93 FL (ref 82–98)
NUCLEATED RBC (/100WBC) (OHS): 0 /100 WBC
PLATELET # BLD AUTO: 202 K/UL (ref 150–450)
PMV BLD AUTO: 10.4 FL (ref 9.2–12.9)
POCT GLUCOSE: 113 MG/DL (ref 70–110)
POCT GLUCOSE: 147 MG/DL (ref 70–110)
POCT GLUCOSE: 147 MG/DL (ref 70–110)
POCT GLUCOSE: 170 MG/DL (ref 70–110)
POCT GLUCOSE: 179 MG/DL (ref 70–110)
POTASSIUM SERPL-SCNC: 3.5 MMOL/L (ref 3.5–5.1)
PROT SERPL-MCNC: 5.1 GM/DL (ref 6–8.4)
RBC # BLD AUTO: 3.35 M/UL (ref 4.6–6.2)
RELATIVE EOSINOPHIL (OHS): 0.1 %
RELATIVE LYMPHOCYTE (OHS): 7.1 % (ref 18–48)
RELATIVE MONOCYTE (OHS): 4.6 % (ref 4–15)
RELATIVE NEUTROPHIL (OHS): 87.3 % (ref 38–73)
SODIUM SERPL-SCNC: 144 MMOL/L (ref 136–145)
WBC # BLD AUTO: 9.53 K/UL (ref 3.9–12.7)

## 2025-04-24 PROCEDURE — 80053 COMPREHEN METABOLIC PANEL: CPT | Performed by: STUDENT IN AN ORGANIZED HEALTH CARE EDUCATION/TRAINING PROGRAM

## 2025-04-24 PROCEDURE — 99291 CRITICAL CARE FIRST HOUR: CPT | Mod: ,,, | Performed by: INTERNAL MEDICINE

## 2025-04-24 PROCEDURE — 25000003 PHARM REV CODE 250: Performed by: STUDENT IN AN ORGANIZED HEALTH CARE EDUCATION/TRAINING PROGRAM

## 2025-04-24 PROCEDURE — 25000003 PHARM REV CODE 250: Performed by: INTERNAL MEDICINE

## 2025-04-24 PROCEDURE — 25000242 PHARM REV CODE 250 ALT 637 W/ HCPCS: Performed by: HOSPITALIST

## 2025-04-24 PROCEDURE — 36415 COLL VENOUS BLD VENIPUNCTURE: CPT | Performed by: STUDENT IN AN ORGANIZED HEALTH CARE EDUCATION/TRAINING PROGRAM

## 2025-04-24 PROCEDURE — 20000000 HC ICU ROOM

## 2025-04-24 PROCEDURE — C1751 CATH, INF, PER/CENT/MIDLINE: HCPCS

## 2025-04-24 PROCEDURE — 85025 COMPLETE CBC W/AUTO DIFF WBC: CPT | Performed by: STUDENT IN AN ORGANIZED HEALTH CARE EDUCATION/TRAINING PROGRAM

## 2025-04-24 PROCEDURE — 63600175 PHARM REV CODE 636 W HCPCS: Performed by: INTERNAL MEDICINE

## 2025-04-24 PROCEDURE — 94640 AIRWAY INHALATION TREATMENT: CPT

## 2025-04-24 PROCEDURE — 63600175 PHARM REV CODE 636 W HCPCS: Performed by: HOSPITALIST

## 2025-04-24 PROCEDURE — 36410 VNPNXR 3YR/> PHY/QHP DX/THER: CPT

## 2025-04-24 PROCEDURE — 83735 ASSAY OF MAGNESIUM: CPT | Performed by: STUDENT IN AN ORGANIZED HEALTH CARE EDUCATION/TRAINING PROGRAM

## 2025-04-24 PROCEDURE — 63600175 PHARM REV CODE 636 W HCPCS: Performed by: STUDENT IN AN ORGANIZED HEALTH CARE EDUCATION/TRAINING PROGRAM

## 2025-04-24 RX ORDER — POTASSIUM CHLORIDE 7.45 MG/ML
10 INJECTION INTRAVENOUS
Status: COMPLETED | OUTPATIENT
Start: 2025-04-24 | End: 2025-04-24

## 2025-04-24 RX ORDER — DEXTROSE MONOHYDRATE AND SODIUM CHLORIDE 5; .9 G/100ML; G/100ML
INJECTION, SOLUTION INTRAVENOUS CONTINUOUS
Status: DISCONTINUED | OUTPATIENT
Start: 2025-04-24 | End: 2025-04-25

## 2025-04-24 RX ADMIN — IPRATROPIUM BROMIDE AND ALBUTEROL SULFATE 3 ML: 2.5; .5 SOLUTION RESPIRATORY (INHALATION) at 08:04

## 2025-04-24 RX ADMIN — POTASSIUM CHLORIDE 10 MEQ: 7.46 INJECTION, SOLUTION INTRAVENOUS at 08:04

## 2025-04-24 RX ADMIN — POTASSIUM CHLORIDE 10 MEQ: 7.46 INJECTION, SOLUTION INTRAVENOUS at 09:04

## 2025-04-24 RX ADMIN — IPRATROPIUM BROMIDE AND ALBUTEROL SULFATE 3 ML: 2.5; .5 SOLUTION RESPIRATORY (INHALATION) at 07:04

## 2025-04-24 RX ADMIN — HYDROMORPHONE HYDROCHLORIDE 0.2 MG: 1 INJECTION, SOLUTION INTRAMUSCULAR; INTRAVENOUS; SUBCUTANEOUS at 10:04

## 2025-04-24 RX ADMIN — HEPARIN SODIUM 5000 UNITS: 5000 INJECTION INTRAVENOUS; SUBCUTANEOUS at 01:04

## 2025-04-24 RX ADMIN — AMIODARONE HYDROCHLORIDE 0.5 MG/MIN: 1.8 INJECTION, SOLUTION INTRAVENOUS at 09:04

## 2025-04-24 RX ADMIN — AMIODARONE HYDROCHLORIDE 0.5 MG/MIN: 1.8 INJECTION, SOLUTION INTRAVENOUS at 10:04

## 2025-04-24 RX ADMIN — DEXMEDETOMIDINE HYDROCHLORIDE 0.2 MCG/KG/HR: 4 INJECTION INTRAVENOUS at 01:04

## 2025-04-24 RX ADMIN — DEXTROSE AND SODIUM CHLORIDE: 5; 900 INJECTION, SOLUTION INTRAVENOUS at 09:04

## 2025-04-24 RX ADMIN — METOROPROLOL TARTRATE 5 MG: 5 INJECTION, SOLUTION INTRAVENOUS at 01:04

## 2025-04-24 RX ADMIN — HEPARIN SODIUM 5000 UNITS: 5000 INJECTION INTRAVENOUS; SUBCUTANEOUS at 10:04

## 2025-04-24 RX ADMIN — SODIUM CHLORIDE: 4.5 INJECTION, SOLUTION INTRAVENOUS at 05:04

## 2025-04-24 RX ADMIN — METOROPROLOL TARTRATE 5 MG: 5 INJECTION, SOLUTION INTRAVENOUS at 05:04

## 2025-04-24 RX ADMIN — METOROPROLOL TARTRATE 5 MG: 5 INJECTION, SOLUTION INTRAVENOUS at 06:04

## 2025-04-24 RX ADMIN — POTASSIUM CHLORIDE 10 MEQ: 7.46 INJECTION, SOLUTION INTRAVENOUS at 07:04

## 2025-04-24 RX ADMIN — PANTOPRAZOLE SODIUM 40 MG: 40 INJECTION, POWDER, FOR SOLUTION INTRAVENOUS at 09:04

## 2025-04-24 RX ADMIN — IPRATROPIUM BROMIDE AND ALBUTEROL SULFATE 3 ML: 2.5; .5 SOLUTION RESPIRATORY (INHALATION) at 01:04

## 2025-04-24 RX ADMIN — POTASSIUM CHLORIDE 10 MEQ: 7.46 INJECTION, SOLUTION INTRAVENOUS at 06:04

## 2025-04-24 NOTE — ASSESSMENT & PLAN NOTE
VINNY is likely due to prerenal from SBO. Baseline creatinine is 1. Most recent creatinine and eGFR are listed below.  Recent Labs     04/22/25  0835 04/23/25  0400 04/24/25  0358   CREATININE 1.8* 1.7* 1.5*   EGFRNORACEVR 36* 38* 44*   - UA unremarkable  - CT shows no hydronephrosis     Plan  - Avoid nephrotoxins and renally dose meds for GFR listed above  - Monitor urine output, serial BMP, and adjust therapy as needed  - improving on IVF's

## 2025-04-24 NOTE — EICU
Intervention Initiated From:  COR / MIMIU    Aida intervened regarding:  Rounding (Video assessment)    VICU Night Rounds Checklist  24H Vital Sign Range:  Temp:  [95.8 °F (35.4 °C)-98.2 °F (36.8 °C)]   Pulse:  [59-99]   Resp:  [7-43]   BP: ()/(54-88)   SpO2:  [92 %-100 %]     Video rounds

## 2025-04-24 NOTE — PLAN OF CARE
Patient remains in ICU. Vital signs stable. Patient remains on 0.45% NS gtt, Amiodarone gtt, and Precedex gtt. Remains strict NPO with NG to low intermittent suction and 50 mL of output. Dressing remains dry and intact with dried drainage present. Colostomy has had no output or flatus. Turned q2h. Plan of care reviewed with patient. Patient remains free of falls, injury, or breakdown.     Problem: Adult Inpatient Plan of Care  Goal: Plan of Care Review  Outcome: Progressing  Goal: Patient-Specific Goal (Individualized)  Outcome: Progressing  Goal: Absence of Hospital-Acquired Illness or Injury  Outcome: Progressing  Goal: Optimal Comfort and Wellbeing  Outcome: Progressing  Goal: Readiness for Transition of Care  Outcome: Progressing     Problem: Infection  Goal: Absence of Infection Signs and Symptoms  Outcome: Progressing     Problem: Acute Kidney Injury/Impairment  Goal: Fluid and Electrolyte Balance  Outcome: Progressing  Goal: Improved Oral Intake  Outcome: Progressing  Goal: Effective Renal Function  Outcome: Progressing     Problem: Skin Injury Risk Increased  Goal: Skin Health and Integrity  Outcome: Progressing     Problem: Wound  Goal: Optimal Coping  Outcome: Progressing  Goal: Optimal Functional Ability  Outcome: Progressing  Goal: Absence of Infection Signs and Symptoms  Outcome: Progressing  Goal: Improved Oral Intake  Outcome: Progressing  Goal: Optimal Pain Control and Function  Outcome: Progressing  Goal: Skin Health and Integrity  Outcome: Progressing  Goal: Optimal Wound Healing  Outcome: Progressing     Problem: Fall Injury Risk  Goal: Absence of Fall and Fall-Related Injury  Outcome: Progressing     Problem: Coping Ineffective  Goal: Effective Coping  Outcome: Progressing

## 2025-04-24 NOTE — EICU
Virtual ICU Quality Rounds    Admit Date: 4/18/2025  Hospital Day: 6    ICU Day: 4d 13h    24H Vital Sign Range:  Temp:  [95.8 °F (35.4 °C)-98.2 °F (36.8 °C)]   Pulse:  [59-88]   Resp:  [7-43]   BP: ()/(50-84)   SpO2:  [92 %-100 %]     VICU Surveillance Screening    Daily review of  line necessity(optional): Urinary catheter in place            Paredes Indications : Critically ill in the intensive care unit requiring hourly monitoring

## 2025-04-24 NOTE — NURSING
[facility] [place]  Nurses Note -- 4 Eyes      4/23/2025       Skin assessed on: Q Shift      [x] No Pressure Injuries Present    [x]Prevention Measures Documented    [] Yes LDA  for Pressure Injury Previously documented     [] Yes New Pressure Injury Discovered   [] LDA for New Pressure Injury Added      Attending RN:  Beatrice Landa RN     Second RN:  LAZ Pelletier

## 2025-04-24 NOTE — ASSESSMENT & PLAN NOTE
Patient's blood pressure range in the last 24 hours was: BP  Min: 94/50  Max: 125/66.The patient's inpatient anti-hypertensive regimen is listed below:  Current Antihypertensives  hydrALAZINE injection 10 mg, Every 6 hours PRN, Intravenous  metoprolol injection 5 mg, Every 6 hours, Intravenous    Plan  - BP increasing as patient unable to take his oral BP medications.  Continue with prn IV Hydralazine.

## 2025-04-24 NOTE — PLAN OF CARE
Pt had no issues as of present this shift. Pt's daughter visiting with pt at bedside, and updated on POC. Pt remains pleasant. Only complaint from pt is that he is ready to eat. Pt explained reasoning of NPO status, and is understandable to plan. No output from colostomy this shift, but pt did have flatus in bag, which pt has not had x 2 days.     Problem: Adult Inpatient Plan of Care  Goal: Plan of Care Review  Outcome: Progressing  Goal: Patient-Specific Goal (Individualized)  Outcome: Progressing  Goal: Absence of Hospital-Acquired Illness or Injury  Outcome: Progressing  Goal: Optimal Comfort and Wellbeing  Outcome: Progressing  Goal: Readiness for Transition of Care  Outcome: Progressing     Problem: Infection  Goal: Absence of Infection Signs and Symptoms  Outcome: Progressing     Problem: Acute Kidney Injury/Impairment  Goal: Fluid and Electrolyte Balance  Outcome: Progressing  Goal: Effective Renal Function  Outcome: Progressing     Problem: Skin Injury Risk Increased  Goal: Skin Health and Integrity  Outcome: Progressing     Problem: Wound  Goal: Optimal Coping  Outcome: Progressing  Goal: Optimal Functional Ability  Outcome: Progressing  Goal: Absence of Infection Signs and Symptoms  Outcome: Progressing  Goal: Optimal Pain Control and Function  Outcome: Progressing  Goal: Skin Health and Integrity  Outcome: Progressing  Goal: Optimal Wound Healing  Outcome: Progressing     Problem: Fall Injury Risk  Goal: Absence of Fall and Fall-Related Injury  Outcome: Progressing     Problem: Coping Ineffective  Goal: Effective Coping  Outcome: Progressing

## 2025-04-24 NOTE — ASSESSMENT & PLAN NOTE
Hypernatremia is likely due to normal saline IVF's. The patient's most recent sodium results are listed below.  Recent Labs     04/22/25  0835 04/23/25  0400 04/24/25  0358   * 151* 144     Plan  - Aim to correct the sodium by 8-10mEq in 24 hours.   - Plan to correct their hypernatremia with Select IV fluids: D5W/0.45 NaCl - increase rate to 75 cc/hr. Now 0.45 NS.  - Will plan to trend the patient's sodium: Daily  - this is slowly improving

## 2025-04-24 NOTE — ASSESSMENT & PLAN NOTE
Episodes of NSVT on monitoring.  Check Echo.  Cardiology consulted.  Atrial and ventricular ectopy.  Started on amiodarone drip per Cards recommendations.  - on IV metoprolol  - once able to take PO, will switch to PO amiodarone 400 mg PO bid until outpatient follow up

## 2025-04-24 NOTE — SUBJECTIVE & OBJECTIVE
Interval History: maintained on low dose precedex overnight and less delirous today. Asking for water, still with NGT and no gas/stool in ostomy bag. Did not want to get up today, reports his abdomen is sore.    Review of Systems  Objective:     Vital Signs (Most Recent):  Temp: 98.9 °F (37.2 °C) (04/24/25 1200)  Pulse: 65 (04/24/25 1400)  Resp: 18 (04/24/25 1400)  BP: (!) 101/55 (04/24/25 1400)  SpO2: (!) 94 % (04/24/25 1400) Vital Signs (24h Range):  Temp:  [97.4 °F (36.3 °C)-98.9 °F (37.2 °C)] 98.9 °F (37.2 °C)  Pulse:  [59-81] 65  Resp:  [7-31] 18  SpO2:  [94 %-100 %] 94 %  BP: ()/(50-70) 101/55     Weight: 66.7 kg (147 lb 0.8 oz)  Body mass index is 18.88 kg/m².    Intake/Output Summary (Last 24 hours) at 4/24/2025 1432  Last data filed at 4/24/2025 1400  Gross per 24 hour   Intake 3915.78 ml   Output 1120 ml   Net 2795.78 ml         Physical Exam  Vitals and nursing note reviewed.   Constitutional:       Appearance: He is ill-appearing.   HENT:      Head:      Comments: Temporal wasting     Nose:      Comments: NGT in place     Mouth/Throat:      Mouth: Mucous membranes are dry.   Cardiovascular:      Rate and Rhythm: Normal rate and regular rhythm.   Pulmonary:      Effort: Pulmonary effort is normal.      Breath sounds: Normal breath sounds.   Abdominal:      General: There is distension (Not tense).      Tenderness: There is no abdominal tenderness. There is no guarding or rebound.      Comments: Hypoactive bowel sounds.  Left-sided ostomy with no stool present  Midline incision dressing intact   Musculoskeletal:      Right lower leg: No edema.      Left lower leg: No edema.   Skin:     General: Skin is warm and dry.   Neurological:      Mental Status: He is alert.      Comments: Alert, awake and calm. Oriented to self               Significant Labs: All pertinent labs within the past 24 hours have been reviewed.  BMP:   Recent Labs   Lab 04/24/25  0358      K 3.5   *   CO2 19*   BUN 39*    CREATININE 1.5*   CALCIUM 8.5*   MG 1.8     CBC:   Recent Labs   Lab 04/23/25  0400 04/24/25  0358   WBC 8.20 9.53   HGB 11.9* 9.7*   HCT 38.1* 31.0*    202       Significant Imaging: I have reviewed all pertinent imaging results/findings within the past 24 hours.

## 2025-04-24 NOTE — PROGRESS NOTES
[facility] Medicine  Progress Note    Patient Name: Eugene Gamez  MRN: [id number]  Patient Class: IP- Inpatient   Admission Date: 4/18/2025  Length of Stay: 6 days  Attending Physician: Cristino Marie MD  Primary Care Provider: Larry Monae MD        Subjective     Principal Problem:SBO (small bowel obstruction)        HPI:  Mr Eugene Gamez is a 89 y.o. man with history of L colectomy for colon cancer (2015) with ventral hernia, prior DM, HTN, CKD3 (baseline Cr 1) who presented with nausea and vomiting, abdominal pain.  The symptoms began a day or 2 ago but significantly worsened yesterday morning.  Last night and today He has not been able to keep much of anything down.  He denies any issues with his colostomy, stool has been normal.  Overall he feels weak and fatigued.  Denies dysuria, difficulty urinating, changes in bowel habits  His CMP shows creatinine 2.4, bicarb 21, anion gap 17, glucose 209; UA with trace protein, 7 RBC, 2 WBC; CT abdomen and pelvis with high-grade small-bowel obstruction related to a 5 x 5 cm ventral abdominal wall hernia, right middle and right lower lobe mucous plugging and/or endobronchial spread of infection/bronchitis.  He was admitted for high grade SBO. Started IVF. General surgery consulted.     Overview/Hospital Course:  Mr Eugene Gamez is a 89 y.o. man with history of L colectomy for colon cancer (2015) with colostomy in place and ventral hernia who was admitted with SBO. General surgery consulted.  NGT placed.  Patient with apparent episode of poor responsiveness.  Transferred to ICU, but quickly recovered.  Patient with atrial and ventricular ectopy on tele.  Cardiology consulted.  Patient started on Amiodarone drip. GGC ordered and unfortunately no passage to colon. Palliative following. Surgery is discussing ex-lap with patient and family, tentatively planned for 4/23. Patient underwent ex-lap on 4/23 and had lysis of adhesions, appears  to be problem. NGT in place and extubated, back to ICU. Having some post operative delirium controlled on low dose precedex. Awaiting bowel function to return.    Interval History: maintained on low dose precedex overnight and less delirous today. Asking for water, still with NGT and no gas/stool in ostomy bag. Did not want to get up today, reports his abdomen is sore.    Review of Systems  Objective:     Vital Signs (Most Recent):  Temp: 98.9 °F (37.2 °C) (04/24/25 1200)  Pulse: 65 (04/24/25 1400)  Resp: 18 (04/24/25 1400)  BP: (!) 101/55 (04/24/25 1400)  SpO2: (!) 94 % (04/24/25 1400) Vital Signs (24h Range):  Temp:  [97.4 °F (36.3 °C)-98.9 °F (37.2 °C)] 98.9 °F (37.2 °C)  Pulse:  [59-81] 65  Resp:  [7-31] 18  SpO2:  [94 %-100 %] 94 %  BP: ()/(50-70) 101/55     Weight: 66.7 kg (147 lb 0.8 oz)  Body mass index is 18.88 kg/m².    Intake/Output Summary (Last 24 hours) at 4/24/2025 1432  Last data filed at 4/24/2025 1400  Gross per 24 hour   Intake 3915.78 ml   Output 1120 ml   Net 2795.78 ml         Physical Exam  Vitals and nursing note reviewed.   Constitutional:       Appearance: He is ill-appearing.   HENT:      Head:      Comments: Temporal wasting     Nose:      Comments: NGT in place     Mouth/Throat:      Mouth: Mucous membranes are dry.   Cardiovascular:      Rate and Rhythm: Normal rate and regular rhythm.   Pulmonary:      Effort: Pulmonary effort is normal.      Breath sounds: Normal breath sounds.   Abdominal:      General: There is distension (Not tense).      Tenderness: There is no abdominal tenderness. There is no guarding or rebound.      Comments: Hypoactive bowel sounds.  Left-sided ostomy with no stool present  Midline incision dressing intact   Musculoskeletal:      Right lower leg: No edema.      Left lower leg: No edema.   Skin:     General: Skin is warm and dry.   Neurological:      Mental Status: He is alert.      Comments: Alert, awake and calm. Oriented to self          "      Significant Labs: All pertinent labs within the past 24 hours have been reviewed.  BMP:   Recent Labs   Lab 04/24/25  0358      K 3.5   *   CO2 19*   BUN 39*   CREATININE 1.5*   CALCIUM 8.5*   MG 1.8     CBC:   Recent Labs   Lab 04/23/25  0400 04/24/25  0358   WBC 8.20 9.53   HGB 11.9* 9.7*   HCT 38.1* 31.0*    202       Significant Imaging: I have reviewed all pertinent imaging results/findings within the past 24 hours.      Assessment & Plan  SBO (small bowel obstruction)  - CT upon admission: "High-grade small bowel obstruction related to a 5 by 5 cm ventral abdominal wall hernia. Status post left colectomy common diverting left lower quadrant ostomy and multiple small bowel anastomoses with markedly dilated small bowel loops in the deep pelvis which may reflect acute superimposed on chronic change."  - General surgery consulted  - NPO   - NGT placed and LIWS  - GGC ordered for 4/21 and now passage of contrast to colon, no gas/stool in ostomy bag  - Surgery discussing ex-lap with patient and family - tentatively for 4/23/25  - Cardiology aware and following, will ask for risk stratification for surgery    - underwent exlap with lysis of adhesions on 4/23.   - NGT in place, awaiting bowel function to return    Acute renal failure superimposed on stage 3b chronic kidney disease  VINNY is likely due to  prerenal from SBO . Baseline creatinine is  1 . Most recent creatinine and eGFR are listed below.  Recent Labs     04/22/25  0835 04/23/25  0400 04/24/25  0358   CREATININE 1.8* 1.7* 1.5*   EGFRNORACEVR 36* 38* 44*   - UA unremarkable  - CT shows no hydronephrosis     Plan  - Avoid nephrotoxins and renally dose meds for GFR listed above  - Monitor urine output, serial BMP, and adjust therapy as needed  - improving on IVF's  HTN, goal below 140/80  Patient's blood pressure range in the last 24 hours was: BP  Min: 94/50  Max: 125/66.The patient's inpatient anti-hypertensive regimen is listed " below:  Current Antihypertensives  hydrALAZINE injection 10 mg, Every 6 hours PRN, Intravenous  metoprolol injection 5 mg, Every 6 hours, Intravenous    Plan  - BP increasing as patient unable to take his oral BP medications.  Continue with prn IV Hydralazine.   Pulmonary emphysema, unspecified emphysema type  Patient's COPD is controlled currently.  Patient is currently off COPD Pathway.  Stable on room air  Anemia of chronic renal failure, stage 3b  Anemia is likely due to chronic disease due to Chronic Kidney Disease. Most recent hemoglobin and hematocrit are listed below.  Recent Labs     04/23/25  0400 04/24/25  0358   HGB 11.9* 9.7*   HCT 38.1* 31.0*     Plan  - Monitor serial CBC: Daily  - Transfuse PRBC if patient becomes hemodynamically unstable, symptomatic or H/H drops below 7/21.  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 Liver biopsy negative     Ventral hernia  - see SBO    Severe protein-calorie malnutrition  Body mass index is 18.88 kg/m².  Notably, albumin 3.6 on admission- perhaps due to dehydration  - must remain strict NPO at this time until BM      NSVT (nonsustained ventricular tachycardia)  Episodes of NSVT on monitoring.  Check Echo.  Cardiology consulted.  Atrial and ventricular ectopy.  Started on amiodarone drip per Cards recommendations.  - on IV metoprolol  - once able to take PO, will switch to PO amiodarone 400 mg PO bid until outpatient follow up      Atrial tachycardia      Advance care planning  Palliative Care consult.    Hypernatremia  Hypernatremia is likely due to  normal saline IVF's . The patient's most recent sodium  results are listed below.  Recent Labs     04/22/25  0835 04/23/25  0400 04/24/25  0358   * 151* 144     Plan  - Aim to correct the sodium by 8-10mEq in 24 hours.   - Plan to correct their hypernatremia with Select IV fluids: D5W/0.45 NaCl - increase rate to 75 cc/hr. Now 0.45 NS.  - Will plan to trend the patient's sodium: Daily  - this is slowly improving    Delirium  Post operative delirium noted. Unable to redirect, concern for removal of NGT and other tubes. Had ex-lap 4/23.  - precedex drip for now    VTE Risk Mitigation (From admission, onward)           Ordered     heparin (porcine) injection 5,000 Units  Every 8 hours         04/19/25 1933     Place ARI hose  Until discontinued         04/18/25 1542     Place sequential compression device  Until discontinued         04/18/25 1542     Reason for No Pharmacological VTE Prophylaxis  Once        Question:  Reasons:  Answer:  Physician Provided (leave comment)  Comment:  potential OR    04/18/25 1542     IP VTE HIGH RISK PATIENT  Once         04/18/25 1542                    Discharge Planning   WIL:      Code Status: Full Code   Medical Readiness for Discharge Date:   Discharge Plan A: Home with family            Critical care time spent on the evaluation and treatment of severe organ dysfunction, review of pertinent labs and imaging studies, discussions with consulting providers and discussions with patient/family: 25 minutes.            Cristino Marie MD  Department of Hospital Medicine   [facility] [place] - Intensive Care

## 2025-04-24 NOTE — ASSESSMENT & PLAN NOTE
"- CT upon admission: "High-grade small bowel obstruction related to a 5 by 5 cm ventral abdominal wall hernia. Status post left colectomy common diverting left lower quadrant ostomy and multiple small bowel anastomoses with markedly dilated small bowel loops in the deep pelvis which may reflect acute superimposed on chronic change."  - General surgery consulted  - NPO   - NGT placed and LIWS  - GGC ordered for 4/21 and now passage of contrast to colon, no gas/stool in ostomy bag  - Surgery discussing ex-lap with patient and family - tentatively for 4/23/25  - Cardiology aware and following, will ask for risk stratification for surgery    - underwent exlap with lysis of adhesions on 4/23.   - NGT in place, awaiting bowel function to return    "

## 2025-04-24 NOTE — ASSESSMENT & PLAN NOTE
Anemia is likely due to chronic disease due to Chronic Kidney Disease. Most recent hemoglobin and hematocrit are listed below.  Recent Labs     04/23/25  0400 04/24/25  0358   HGB 11.9* 9.7*   HCT 38.1* 31.0*     Plan  - Monitor serial CBC: Daily  - Transfuse PRBC if patient becomes hemodynamically unstable, symptomatic or H/H drops below 7/21.  - Patient has not received any PRBC transfusions to date  - Patient's anemia is currently stable

## 2025-04-24 NOTE — PROGRESS NOTES
Surgery Progress Note    Eugene Gamez is a 89 y.o. year old male in hospital for small bowel obstruction. He underwent exlap with lysis of adhesions on 4/23/25. Tolerated procedure well     Required precedex infusion for delerium  Pain is controlled. No colostomy output yet    ROS:  Negative except above    PE:  Vitals:    04/24/25 1000 04/24/25 1030 04/24/25 1100 04/24/25 1130   BP: (!) 101/55 113/64 (!) 98/53 (!) 103/56   BP Location:       Patient Position:       Pulse: 68 71 68 69   Resp: 17 18 17 17   Temp:       TempSrc:       SpO2: (!) 94% 95% 96% 95%   Weight:       Height:           NAD  No belabored breathing  No skin changes  Abd soft mildly distended. Incisions c/d/I. No gas or stool in colostomy bag    Significant Diagnostic Studies: N/A    A/P:  Eugene Gamez is a 89 y.o. year old male  with small bowel obstruction. Patient not progressing with conservative management; decision made to proceed to or for exlap with IZZY. Tolerated procedure well.     - keep NPO, NGT to LIWS  - IVF  - replete lytes prn for K <4, Phos <3, Mg <2  - rest of care per ICU      Honey Meneses  General Surgery - [facility]  4/24/2025

## 2025-04-25 LAB
ABSOLUTE EOSINOPHIL (OHS): 0.01 K/UL
ABSOLUTE MONOCYTE (OHS): 0.46 K/UL (ref 0.3–1)
ABSOLUTE NEUTROPHIL COUNT (OHS): 9.14 K/UL (ref 1.8–7.7)
ALBUMIN SERPL BCP-MCNC: 2 G/DL (ref 3.5–5.2)
ALP SERPL-CCNC: 38 UNIT/L (ref 40–150)
ALT SERPL W/O P-5'-P-CCNC: 5 UNIT/L (ref 10–44)
ANION GAP (OHS): 7 MMOL/L (ref 8–16)
AST SERPL-CCNC: 9 UNIT/L (ref 11–45)
BASOPHILS # BLD AUTO: 0.01 K/UL
BASOPHILS NFR BLD AUTO: 0.1 %
BILIRUB SERPL-MCNC: 0.2 MG/DL (ref 0.1–1)
BUN SERPL-MCNC: 34 MG/DL (ref 8–23)
CALCIUM SERPL-MCNC: 8.4 MG/DL (ref 8.7–10.5)
CHLORIDE SERPL-SCNC: 117 MMOL/L (ref 95–110)
CO2 SERPL-SCNC: 21 MMOL/L (ref 23–29)
CREAT SERPL-MCNC: 1.5 MG/DL (ref 0.5–1.4)
ERYTHROCYTE [DISTWIDTH] IN BLOOD BY AUTOMATED COUNT: 14.5 % (ref 11.5–14.5)
GFR SERPLBLD CREATININE-BSD FMLA CKD-EPI: 44 ML/MIN/1.73/M2
GLUCOSE SERPL-MCNC: 174 MG/DL (ref 70–110)
HCT VFR BLD AUTO: 27.1 % (ref 40–54)
HGB BLD-MCNC: 8.6 GM/DL (ref 14–18)
IMM GRANULOCYTES # BLD AUTO: 0.12 K/UL (ref 0–0.04)
IMM GRANULOCYTES NFR BLD AUTO: 1.2 % (ref 0–0.5)
LYMPHOCYTES # BLD AUTO: 0.52 K/UL (ref 1–4.8)
MAGNESIUM SERPL-MCNC: 1.7 MG/DL (ref 1.6–2.6)
MCH RBC QN AUTO: 29.3 PG (ref 27–31)
MCHC RBC AUTO-ENTMCNC: 31.7 G/DL (ref 32–36)
MCV RBC AUTO: 92 FL (ref 82–98)
NUCLEATED RBC (/100WBC) (OHS): 0 /100 WBC
PLATELET # BLD AUTO: 165 K/UL (ref 150–450)
PMV BLD AUTO: 10.7 FL (ref 9.2–12.9)
POCT GLUCOSE: 130 MG/DL (ref 70–110)
POCT GLUCOSE: 132 MG/DL (ref 70–110)
POCT GLUCOSE: 165 MG/DL (ref 70–110)
POCT GLUCOSE: 166 MG/DL (ref 70–110)
POCT GLUCOSE: 189 MG/DL (ref 70–110)
POCT GLUCOSE: 190 MG/DL (ref 70–110)
POCT GLUCOSE: 191 MG/DL (ref 70–110)
POCT GLUCOSE: 192 MG/DL (ref 70–110)
POCT GLUCOSE: 192 MG/DL (ref 70–110)
POCT GLUCOSE: 208 MG/DL (ref 70–110)
POCT GLUCOSE: 224 MG/DL (ref 70–110)
POCT GLUCOSE: 227 MG/DL (ref 70–110)
POCT GLUCOSE: 237 MG/DL (ref 70–110)
POTASSIUM SERPL-SCNC: 3.6 MMOL/L (ref 3.5–5.1)
PROT SERPL-MCNC: 4.5 GM/DL (ref 6–8.4)
RBC # BLD AUTO: 2.94 M/UL (ref 4.6–6.2)
RELATIVE EOSINOPHIL (OHS): 0.1 %
RELATIVE LYMPHOCYTE (OHS): 5.1 % (ref 18–48)
RELATIVE MONOCYTE (OHS): 4.5 % (ref 4–15)
RELATIVE NEUTROPHIL (OHS): 89 % (ref 38–73)
SODIUM SERPL-SCNC: 145 MMOL/L (ref 136–145)
WBC # BLD AUTO: 10.26 K/UL (ref 3.9–12.7)

## 2025-04-25 PROCEDURE — 20000000 HC ICU ROOM

## 2025-04-25 PROCEDURE — 83735 ASSAY OF MAGNESIUM: CPT | Performed by: STUDENT IN AN ORGANIZED HEALTH CARE EDUCATION/TRAINING PROGRAM

## 2025-04-25 PROCEDURE — 94761 N-INVAS EAR/PLS OXIMETRY MLT: CPT

## 2025-04-25 PROCEDURE — 25000003 PHARM REV CODE 250: Performed by: STUDENT IN AN ORGANIZED HEALTH CARE EDUCATION/TRAINING PROGRAM

## 2025-04-25 PROCEDURE — 25000003 PHARM REV CODE 250: Performed by: INTERNAL MEDICINE

## 2025-04-25 PROCEDURE — 85025 COMPLETE CBC W/AUTO DIFF WBC: CPT | Performed by: STUDENT IN AN ORGANIZED HEALTH CARE EDUCATION/TRAINING PROGRAM

## 2025-04-25 PROCEDURE — 63600175 PHARM REV CODE 636 W HCPCS: Performed by: INTERNAL MEDICINE

## 2025-04-25 PROCEDURE — 97166 OT EVAL MOD COMPLEX 45 MIN: CPT

## 2025-04-25 PROCEDURE — 51798 US URINE CAPACITY MEASURE: CPT

## 2025-04-25 PROCEDURE — 99497 ADVNCD CARE PLAN 30 MIN: CPT | Mod: 25,,, | Performed by: REGISTERED NURSE

## 2025-04-25 PROCEDURE — 63600175 PHARM REV CODE 636 W HCPCS: Performed by: HOSPITALIST

## 2025-04-25 PROCEDURE — 63600175 PHARM REV CODE 636 W HCPCS: Performed by: STUDENT IN AN ORGANIZED HEALTH CARE EDUCATION/TRAINING PROGRAM

## 2025-04-25 PROCEDURE — 82040 ASSAY OF SERUM ALBUMIN: CPT | Performed by: STUDENT IN AN ORGANIZED HEALTH CARE EDUCATION/TRAINING PROGRAM

## 2025-04-25 PROCEDURE — 94640 AIRWAY INHALATION TREATMENT: CPT

## 2025-04-25 PROCEDURE — 25500020 PHARM REV CODE 255: Performed by: STUDENT IN AN ORGANIZED HEALTH CARE EDUCATION/TRAINING PROGRAM

## 2025-04-25 PROCEDURE — 92610 EVALUATE SWALLOWING FUNCTION: CPT

## 2025-04-25 PROCEDURE — 99223 1ST HOSP IP/OBS HIGH 75: CPT | Mod: 25,,, | Performed by: REGISTERED NURSE

## 2025-04-25 PROCEDURE — 97530 THERAPEUTIC ACTIVITIES: CPT

## 2025-04-25 PROCEDURE — 25000242 PHARM REV CODE 250 ALT 637 W/ HCPCS: Performed by: HOSPITALIST

## 2025-04-25 RX ORDER — DIATRIZOATE MEGLUMINE AND DIATRIZOATE SODIUM 660; 100 MG/ML; MG/ML
120 SOLUTION ORAL; RECTAL
Status: COMPLETED | OUTPATIENT
Start: 2025-04-25 | End: 2025-04-25

## 2025-04-25 RX ORDER — DIPHENHYDRAMINE HYDROCHLORIDE 50 MG/ML
50 INJECTION, SOLUTION INTRAMUSCULAR; INTRAVENOUS ONCE
Status: COMPLETED | OUTPATIENT
Start: 2025-04-25 | End: 2025-04-25

## 2025-04-25 RX ORDER — POTASSIUM CHLORIDE 7.45 MG/ML
10 INJECTION INTRAVENOUS ONCE
Status: COMPLETED | OUTPATIENT
Start: 2025-04-25 | End: 2025-04-25

## 2025-04-25 RX ORDER — AMIODARONE HYDROCHLORIDE 200 MG/1
400 TABLET ORAL 2 TIMES DAILY
Status: DISCONTINUED | OUTPATIENT
Start: 2025-04-25 | End: 2025-04-29 | Stop reason: HOSPADM

## 2025-04-25 RX ADMIN — IPRATROPIUM BROMIDE AND ALBUTEROL SULFATE 3 ML: 2.5; .5 SOLUTION RESPIRATORY (INHALATION) at 01:04

## 2025-04-25 RX ADMIN — METOROPROLOL TARTRATE 5 MG: 5 INJECTION, SOLUTION INTRAVENOUS at 12:04

## 2025-04-25 RX ADMIN — INSULIN ASPART 1 UNITS: 100 INJECTION, SOLUTION INTRAVENOUS; SUBCUTANEOUS at 11:04

## 2025-04-25 RX ADMIN — DIATRIZOATE MEGLUMINE AND DIATRIZOATE SODIUM 120 ML: 600; 100 SOLUTION ORAL; RECTAL at 01:04

## 2025-04-25 RX ADMIN — AMIODARONE HYDROCHLORIDE 0.5 MG/MIN: 1.8 INJECTION, SOLUTION INTRAVENOUS at 09:04

## 2025-04-25 RX ADMIN — INSULIN ASPART 1 UNITS: 100 INJECTION, SOLUTION INTRAVENOUS; SUBCUTANEOUS at 04:04

## 2025-04-25 RX ADMIN — PANTOPRAZOLE SODIUM 40 MG: 40 INJECTION, POWDER, FOR SOLUTION INTRAVENOUS at 08:04

## 2025-04-25 RX ADMIN — HEPARIN SODIUM 5000 UNITS: 5000 INJECTION INTRAVENOUS; SUBCUTANEOUS at 01:04

## 2025-04-25 RX ADMIN — HEPARIN SODIUM 5000 UNITS: 5000 INJECTION INTRAVENOUS; SUBCUTANEOUS at 09:04

## 2025-04-25 RX ADMIN — METOROPROLOL TARTRATE 5 MG: 5 INJECTION, SOLUTION INTRAVENOUS at 11:04

## 2025-04-25 RX ADMIN — AMIODARONE HYDROCHLORIDE 400 MG: 200 TABLET ORAL at 09:04

## 2025-04-25 RX ADMIN — METOROPROLOL TARTRATE 5 MG: 5 INJECTION, SOLUTION INTRAVENOUS at 05:04

## 2025-04-25 RX ADMIN — DIPHENHYDRAMINE HYDROCHLORIDE 50 MG: 50 INJECTION INTRAMUSCULAR; INTRAVENOUS at 12:04

## 2025-04-25 RX ADMIN — METOROPROLOL TARTRATE 5 MG: 5 INJECTION, SOLUTION INTRAVENOUS at 06:04

## 2025-04-25 RX ADMIN — IPRATROPIUM BROMIDE AND ALBUTEROL SULFATE 3 ML: 2.5; .5 SOLUTION RESPIRATORY (INHALATION) at 07:04

## 2025-04-25 RX ADMIN — HEPARIN SODIUM 5000 UNITS: 5000 INJECTION INTRAVENOUS; SUBCUTANEOUS at 05:04

## 2025-04-25 RX ADMIN — HYDROCORTISONE SODIUM SUCCINATE 200 MG: 100 INJECTION, POWDER, FOR SOLUTION INTRAMUSCULAR; INTRAVENOUS at 01:04

## 2025-04-25 RX ADMIN — DEXTROSE AND SODIUM CHLORIDE: 5; 900 INJECTION, SOLUTION INTRAVENOUS at 03:04

## 2025-04-25 RX ADMIN — POTASSIUM CHLORIDE 10 MEQ: 7.46 INJECTION, SOLUTION INTRAVENOUS at 06:04

## 2025-04-25 RX ADMIN — DEXTROSE AND SODIUM CHLORIDE: 5; 900 INJECTION, SOLUTION INTRAVENOUS at 02:04

## 2025-04-25 NOTE — NURSING
[facility] [place]  Nurses Note -- 4 Eyes      4/24/2025       Skin assessed on: Q Shift      [x] No Pressure Injuries Present    [x]Prevention Measures Documented    [] Yes LDA  for Pressure Injury Previously documented     [] Yes New Pressure Injury Discovered   [] LDA for New Pressure Injury Added      Attending RN:  Beatrice Landa RN     Second RN:  LAZ Lenz

## 2025-04-25 NOTE — ASSESSMENT & PLAN NOTE
VINNY is likely due to prerenal from SBO. Baseline creatinine is 1. Most recent creatinine and eGFR are listed below.  Recent Labs     04/23/25  0400 04/24/25  0358 04/25/25  0358   CREATININE 1.7* 1.5* 1.5*   EGFRNORACEVR 38* 44* 44*   - UA unremarkable  - CT shows no hydronephrosis     Plan  - Avoid nephrotoxins and renally dose meds for GFR listed above  - Monitor urine output, serial BMP, and adjust therapy as needed  - improving on IVF's

## 2025-04-25 NOTE — PROGRESS NOTES
[facility] Intensive Care  Cardiology  Progress Note    Patient Name: Eugene Gamez  MRN: [id number]  Admission Date: 4/18/2025  Hospital Length of Stay: 6 days  Code Status: Full Code   Attending Physician: Cristino Marie MD   Primary Care Physician: Larry Monae MD  Expected Discharge Date:   Principal Problem:SBO (small bowel obstruction)    Subjective:     Hospital Course:   Patient is status post exploratory laparotomy yesterday  Uneventful surgery  No issues from cardiac perspective during the surgery or in perioperative period    Telemetry reviewed.  Minimal atrial ectopy.        Review of Systems   Cardiovascular:  Negative for chest pain, claudication, dyspnea on exertion, irregular heartbeat, leg swelling, near-syncope, orthopnea, palpitations, paroxysmal nocturnal dyspnea and syncope.   Respiratory:  Negative for cough, shortness of breath, snoring and sputum production.    Gastrointestinal:  Positive for abdominal pain. Negative for dysphagia, heartburn, nausea and vomiting.   Neurological:  Negative for dizziness, headaches, loss of balance and weakness.     Objective:     Vital Signs (Most Recent):  Temp: 98.8 °F (37.1 °C) (04/24/25 1600)  Pulse: 67 (04/24/25 1900)  Resp: 20 (04/24/25 1900)  BP: (!) 114/57 (04/24/25 1900)  SpO2: 97 % (04/24/25 1900) Vital Signs (24h Range):  Temp:  [97.6 °F (36.4 °C)-98.9 °F (37.2 °C)] 98.8 °F (37.1 °C)  Pulse:  [61-72] 67  Resp:  [10-31] 20  SpO2:  [94 %-100 %] 97 %  BP: ()/(50-64) 114/57     Weight: 66.7 kg (147 lb 0.8 oz)  Body mass index is 18.88 kg/m².     SpO2: 97 %         Intake/Output Summary (Last 24 hours) at 4/24/2025 1934  Last data filed at 4/24/2025 1800  Gross per 24 hour   Intake 3135.07 ml   Output 750 ml   Net 2385.07 ml       Lines/Drains/Airways       Central Venous Catheter Line  Duration             Port A Cath Single Lumen Subclavian Left -- days              Drain  Duration                  Colostomy 04/23/15 LLQ 3654 days          NG/OG Tube 04/19/25 1130 Falguni sump 18 Fr. Right nostril 5 days         Urethral Catheter 04/23/25 Non-latex;Straight-tip 16 Fr. 1 day              Peripheral Intravenous Line  Duration                  Peripheral IV - Single Lumen 04/23/25 0925 18 G No Right Forearm 1 day                       Physical Exam  Constitutional:       General: He is in acute distress.      Appearance: He is ill-appearing.   HENT:      Head: Normocephalic and atraumatic.   Eyes:      Extraocular Movements: Extraocular movements intact.      Pupils: Pupils are equal, round, and reactive to light.   Cardiovascular:      Rate and Rhythm: Normal rate and regular rhythm.      Pulses: Normal pulses.      Heart sounds: Normal heart sounds.   Pulmonary:      Effort: Pulmonary effort is normal.      Breath sounds: Normal breath sounds.   Musculoskeletal:      Left lower leg: No edema.   Skin:     General: Skin is warm.            Current Medications:   albuterol-ipratropium  3 mL Nebulization Q6H WAKE    heparin (porcine)  5,000 Units Subcutaneous Q8H    metoprolol  5 mg Intravenous Q6H    pantoprazole  40 mg Intravenous Daily      amiodarone in dextrose 5%  0.5 mg/min Intravenous Continuous 16.7 mL/hr at 04/24/25 1800 0.5 mg/min at 04/24/25 1800    dexmedeTOMIDine (Precedex) infusion (titrating)  0-1.4 mcg/kg/hr Intravenous Continuous 3.34 mL/hr at 04/24/25 1800 0.2 mcg/kg/hr at 04/24/25 1800    D5 and 0.9% NaCl   Intravenous Continuous   Paused at 04/24/25 1722       Current Facility-Administered Medications:     acetaminophen, 650 mg, Oral, Q6H PRN    dextrose 50%, 12.5 g, Intravenous, PRN    dextrose 50%, 12.5 g, Intravenous, PRN    dextrose 50%, 25 g, Intravenous, PRN    glucagon (human recombinant), 1 mg, Intramuscular, PRN    glucagon (human recombinant), 1 mg, Intramuscular, PRN    glucose, 16 g, Oral, PRN    glucose, 24 g, Oral, PRN    hydrALAZINE, 10 mg, Intravenous, Q6H PRN    HYDROmorphone, 0.2 mg, Intravenous, Q6H PRN     HYDROmorphone, 0.5 mg, Intravenous, Q6H PRN    insulin aspart U-100, 0-5 Units, Subcutaneous, Q6H PRN    naloxone, 0.02 mg, Intravenous, PRN    ondansetron, 4 mg, Intravenous, Q6H PRN    ondansetron, 4 mg, Intravenous, Daily PRN    prochlorperazine, 5 mg, Intravenous, Q6H PRN    sodium chloride 0.9%, 10 mL, Intravenous, PRN    Laboratory (all labs reviewed):  CBC:  Recent Labs   Lab 04/19/25  0430 04/20/25  0349 04/21/25  0330 04/23/25  0400 04/24/25  0358   WBC 7.64 6.67 6.81 8.20 9.53   HGB 11.0 L 12.4 L 11.1 L 11.9 L 9.7 L   HCT 34.9 L 38.4 L 35.7 L 38.1 L 31.0 L   Platelet Count 294 263 295 309 202       CHEMISTRIES:  Recent Labs   Lab 06/10/22  1113 05/09/23  1441 03/04/24  1156 06/11/24  0930 09/30/24  0912 01/14/25  1330 03/20/25  1522 04/11/25  1411 04/19/25  2004 04/20/25  0349 04/21/25  0330 04/22/25  0835 04/23/25  0400 04/24/25  0358   Glucose 145 H   < > 102 141 H 111 H 168 H 142 H  --   --   --   --   --   --   --    Sodium 139   < > 141 140 139 144 139   < > 144 145 151 H 149 H 151 H 144   Potassium 4.6   < > 4.7 4.9 4.7 4.4 4.5   < > 4.3 4.3 4.1 3.8 3.6 3.5   BUN 33 H   < > 24 H 36 H 23 18 18   < > 74 H 72 H 69 H 60 H 48 H 39 H   Creatinine 2.2 H   < > 1.8 H 2.1 H 1.8 H 1.7 H 1.6 H   < > 2.2 H 2.1 H 1.8 H 1.8 H 1.7 H 1.5 H   eGFR if non  26 A  --   --   --   --   --   --   --   --   --   --   --   --   --    eGFR  --    < > 35.8 A 29.7 A 35.8 A 38.3 A 40.9 A   < > 28 L 30 L 36 L 36 L 38 L 44 L   Calcium 10.0   < > 9.6 10.5 10.1 9.8 10.4   < > 9.3 9.5 8.8 9.4 9.5 8.5 L   Magnesium   --   --   --   --   --   --   --    < > 2.1 2.2 2.2  --  2.1 1.8    < > = values in this interval not displayed.       CARDIAC BIOMARKERS:  Recent Labs   Lab 05/10/23  0413 12/01/23  1215 12/01/23  1512 04/18/25  1159 04/18/25  1421   Troponin I 0.025 <0.006 0.006  --   --    Troponin-I  --   --   --  0.023 0.018       COAGS:  Recent Labs   Lab 04/11/25  1411   INR 1.1       LIPIDS/LFTS:  Recent Labs   Lab  04/20/25  0349 04/21/25  0330 04/22/25  0835 04/23/25  0400 04/24/25  0358   AST 17 14 13 13 11   ALT 18 13 10 10 9 L       BNP:  Recent Labs   Lab 05/09/23  1441 12/01/23  1215 03/20/25  1522 04/18/25  1159   BNP 10 29 132 H 21       TSH:  Recent Labs   Lab 05/09/23  1441   TSH 2.468       Free T4:        Assessment and Plan:       Atrial tachycardia  Atrial and ventricular ectopy have significantly improved  Continue amiodarone drip without bolus to suppress PACs and PVCs  Continue IV metoprolol 5 mg q.6 (standing order).  Do not administer metoprolol if systolic blood pressure is less than 90 mm Hg  Continue tele monitoring  Normal EF noted  Keep electrolytes repleted    We will keep amiodarone drip going while he is NPO  Once he is allowed to eat or drink orally, switch amiodarone drip to p.o. amiodarone 400 mg b.i.d. For 1 month        Acute renal failure superimposed on stage 3b chronic kidney disease  Creatinine around 2.2 - 2.3  Baseline creatinine normal  Most likely prerenal in setting of nausea vomiting and decreased p.o. intake  Creatinine trending towards normal limits  Currently getting maintenance fluids          VTE Risk Mitigation (From admission, onward)           Ordered     heparin (porcine) injection 5,000 Units  Every 8 hours         04/19/25 1933     Place ARI hose  Until discontinued         04/18/25 1542     Place sequential compression device  Until discontinued         04/18/25 1542     Reason for No Pharmacological VTE Prophylaxis  Once        Question:  Reasons:  Answer:  Physician Provided (leave comment)  Comment:  potential OR    04/18/25 1542     IP VTE HIGH RISK PATIENT  Once         04/18/25 1542                  Cardiology will sign off.  Follow with me in the clinic in 1 month.    Elias Ibanez MD  Cardiology  [facility] [place] - Intensive Care

## 2025-04-25 NOTE — PT/OT/SLP EVAL
Occupational Therapy   Evaluation    Name: Eugene Gamez  MRN: [id number]  Admitting Diagnosis: SBO (small bowel obstruction)  Recent Surgery: Procedure(s) (LRB):  LAPAROTOMY, EXPLORATORY (N/A)  REPAIR, HERNIA, INCISIONAL (N/A) 2 Days Post-Op    Recommendations:     Discharge Recommendations: Moderate Intensity Therapy  Discharge Equipment Recommendations:  to be determined by next level of care, hospital bed  Barriers to discharge:  Decreased caregiver support, Inaccessible home environment    Assessment:     Eugene Gamez is a 89 y.o. male with a medical diagnosis of SBO (small bowel obstruction).  He presents with deconditioning, some pain in abdomen. Performance deficits affecting function: weakness, impaired endurance, impaired self care skills, impaired functional mobility, gait instability, impaired balance, decreased ROM, pain, decreased safety awareness, decreased lower extremity function, decreased upper extremity function, impaired cognition, impaired skin, impaired cardiopulmonary response to activity.      Rehab Prognosis: Good; patient would benefit from acute skilled OT services to address these deficits and reach maximum level of function.       Plan:     Patient to be seen 3 x/week to address the above listed problems via self-care/home management, therapeutic activities, therapeutic exercises  Plan of Care Expires: 05/25/25  Plan of Care Reviewed with: patient, daughter    Subjective     Chief Complaint: Pt reports increased pain in abdomen with bed mobility  Patient/Family Comments/goals: To be able to stand, to return to PLOF    Occupational Profile:  Living Environment: Pt lives with spouse and daughter in [facility], [facility], [facility]/ with [place]  Previous level of function: Indpe to Mod Indep ADLs and functional mobility    Roles and Routines: Caretaker to self and home. Light meal prep but family assists for most cooking/cleaning. Pt no longer driving. No hobbies reported  Equipment Used at Home: walker,  rolling, wheelchair, shower chair, bedside commode, rollator  Assistance upon Discharge: Family    Pain/Comfort:  Pain Rating 1: 0/10  Location - Orientation 1: generalized  Location 1: abdomen  Pain Addressed 1: Reposition, Distraction, Cessation of Activity  Pain Rating Post-Intervention 1:  (unable to rate but did report abdominal pain with movement)    Patients cultural, spiritual, Mu-ism conflicts given the current situation:      Objective:     Communicated with: nsg prior to session.  Patient found HOB elevated with blood pressure cuff, peripheral IV, Condom Catheter, SCD, telemetry, NG tube, pulse ox (continuous), colostomy upon OT entry to room.    General Precautions: Standard, fall  Orthopedic Precautions: N/A  Braces: N/A  Respiratory Status: Room air    Occupational Performance:    Bed Mobility:    Patient completed Rolling/Turning to Left with  maximal assistance and total assistance  Patient completed Scooting/Bridging with maximal assistance and total assistance  Patient completed Supine to Sit with maximal assistance and total assistance  Patient completed Sit to Supine with maximal assistance and total assistance    Functional Mobility/Transfers:  Patient completed Sit <> Stand Transfer attempted x4 with RW but unable to safely perform. Limited B knee flexion in seated and posterior lean increased in stance.  Functional Mobility: Max A static seated initially but improved to CGA/min A; max A lateral scoot seated EOB    Activities of Daily Living:  Upper Body Dressing: maximal assistance to don/doff gown as robe seated EOB  Lower Body Dressing: total assistance to don/doff B socks supine in bed    Cognitive/Visual Perceptual:  Cognitive/Psychosocial Skills:     -       Oriented to: Person, Place, and Situation, loosely to time  -       Follows Commands/attention:Follows one and two step  commands  -       Communication: clear/fluent  -       Memory: Fairly intact but some decreased ability to  answer PLOF questions accurately   -       Safety awareness/insight to disability: intact   -       Mood/Affect/Coping skills/emotional control: Appropriate to situation    Physical Exam:  Postural examination/scapula alignment:    -       Rounded shoulders, forward head, posterior pelvic tilt, tight posterior chain noted overall  Skin integrity: Surgical wound abdomen not visualized this session; reported heel pain R heel but upon palpitation, then reported no pain. No apparent skin break down noted  Sensation:    -       Intact  Motor Planning:    -       WFL  Dominant hand:    -       L handed primarily but reports ambidextrous  Upper Extremity Range of Motion: BUE WFL     Upper Extremity Strength:  BUE grossly 3+ to 4-/5   Strength:  B hands WFL  Fine Motor Coordination:    -       Fairly intact  Gross motor coordination:   WFL     AMPAC 6 Click ADL:  AMPAC Total Score: 8    Treatment & Education:  Pt educated on role of OT and POC.   Pt performing skills as listed above.     Patient left HOB elevated with all lines intact, call button in reach, nsg notified, and daughter present    GOALS:   Multidisciplinary Problems       Occupational Therapy Goals          Problem: Occupational Therapy    Goal Priority Disciplines Outcome Interventions   Occupational Therapy Goal     OT, PT/OT Progressing    Description: Goals to be met by: 05/25/2025      Patient will increase functional independence with ADLs by performing:    UE Dressing with Supervision.  LE Dressing with Minimal Assistance.  Grooming while seated or standing with Supervision.  Toileting from toilet or BSC with Minimal Assistance for hygiene and clothing management.   Toilet transfer to toilet of BSC with Minimal Assistance.  Increased functional strength to WF for self care.  Upper extremity exercise program x10 reps per handout, with assistance as needed.                           DME Justifications:  Eugene requires a hospital bed due to him  requiring positioning of the body in ways not feasible with an ordinary bed due to being completely immobile and due to limited ability and cannot independently make changes in body position without the use of the bed.The positioning of the body cannot be sufficiently resolved by the use of pillows and wedges.    History:     Past Medical History:   Diagnosis Date    Arthritis     Cancer     Diabetes mellitus     Elevated PSA     Gout, unspecified     Hypertension     Nuclear sclerotic cataract of both eyes 6/29/2018         Past Surgical History:   Procedure Laterality Date    CATARACT EXTRACTION      ou    EYE SURGERY      bilateral cataracts    LAPAROTOMY, EXPLORATORY N/A 4/23/2025    Procedure: LAPAROTOMY, EXPLORATORY;  Surgeon: Abran Camacho MD;  Location: [facility] OR;  Service: General;  Laterality: N/A;  lysis of adhsions    removal of small bowel  Apr. 2015    Colostomy    REPAIR, HERNIA, INCISIONAL N/A 4/23/2025    Procedure: REPAIR, HERNIA, INCISIONAL;  Surgeon: Abran Camacho MD;  Location: [facility] OR;  Service: General;  Laterality: N/A;       Time Tracking:     OT Date of Treatment: 04/25/25  OT Start Time: 1221  OT Stop Time: 1252  OT Total Time (min): 31 min    Billable Minutes:Evaluation 16  Therapeutic Activity 15    4/25/2025

## 2025-04-25 NOTE — PROGRESS NOTES
[facility] Medicine  Progress Note    Patient Name: Eugene Gamez  MRN: [id number]  Patient Class: IP- Inpatient   Admission Date: 4/18/2025  Length of Stay: 7 days  Attending Physician: Cristino Marie MD  Primary Care Provider: Larry Monae MD        Subjective     Principal Problem:SBO (small bowel obstruction)        HPI:  Mr Eugene Gamez is a 89 y.o. man with history of L colectomy for colon cancer (2015) with ventral hernia, prior DM, HTN, CKD3 (baseline Cr 1) who presented with nausea and vomiting, abdominal pain.  The symptoms began a day or 2 ago but significantly worsened yesterday morning.  Last night and today He has not been able to keep much of anything down.  He denies any issues with his colostomy, stool has been normal.  Overall he feels weak and fatigued.  Denies dysuria, difficulty urinating, changes in bowel habits  His CMP shows creatinine 2.4, bicarb 21, anion gap 17, glucose 209; UA with trace protein, 7 RBC, 2 WBC; CT abdomen and pelvis with high-grade small-bowel obstruction related to a 5 x 5 cm ventral abdominal wall hernia, right middle and right lower lobe mucous plugging and/or endobronchial spread of infection/bronchitis.  He was admitted for high grade SBO. Started IVF. General surgery consulted.     Overview/Hospital Course:  Mr Eugene Gamez is a 89 y.o. man with history of L colectomy for colon cancer (2015) with colostomy in place and ventral hernia who was admitted with SBO. General surgery consulted.  NGT placed.  Patient with apparent episode of poor responsiveness.  Transferred to ICU, but quickly recovered.  Patient with atrial and ventricular ectopy on tele.  Cardiology consulted.  Patient started on Amiodarone drip. GGC ordered and unfortunately no passage to colon. Palliative following. Surgery is discussing ex-lap with patient and family, tentatively planned for 4/23. Patient underwent ex-lap on 4/23 and had lysis of adhesions, appears  to be problem. NGT in place and extubated, back to ICU. Having some post operative delirium controlled on low dose precedex. Awaiting bowel function to return.  Stooling gas noted and ostomy on 04/25.    Interval History:  Stooling gas noted in ostomy bag.  Off Precedex.  Clamping NG tube today.  PT/OT/SLP consulted    Review of Systems  Objective:     Vital Signs (Most Recent):  Temp: 97.9 °F (36.6 °C) (04/25/25 0730)  Pulse: 65 (04/25/25 1030)  Resp: 18 (04/25/25 1030)  BP: (!) 126/58 (04/25/25 1030)  SpO2: 100 % (04/25/25 1030) Vital Signs (24h Range):  Temp:  [97 °F (36.1 °C)-98.9 °F (37.2 °C)] 97.9 °F (36.6 °C)  Pulse:  [58-72] 65  Resp:  [11-28] 18  SpO2:  [94 %-100 %] 100 %  BP: ()/(53-64) 126/58     Weight: 66.7 kg (147 lb 0.8 oz)  Body mass index is 18.88 kg/m².    Intake/Output Summary (Last 24 hours) at 4/25/2025 1052  Last data filed at 4/25/2025 1000  Gross per 24 hour   Intake 2113.88 ml   Output 680 ml   Net 1433.88 ml         Physical Exam  Vitals and nursing note reviewed.   Constitutional:       Appearance: He is ill-appearing.   HENT:      Head:      Comments: Temporal wasting     Nose:      Comments: NGT in place     Mouth/Throat:      Mouth: Mucous membranes are dry.   Cardiovascular:      Rate and Rhythm: Normal rate and regular rhythm.   Pulmonary:      Effort: Pulmonary effort is normal.      Breath sounds: Normal breath sounds.   Abdominal:      General: There is distension (Not tense).      Tenderness: There is no abdominal tenderness. There is no guarding or rebound.      Comments: Hypoactive bowel sounds.  Left-sided ostomy with stool/gas present  Midline incision dressing intact   Musculoskeletal:      Right lower leg: No edema.      Left lower leg: No edema.   Skin:     General: Skin is warm and dry.   Neurological:      Mental Status: He is alert.      Comments: Alert, awake and calm. Oriented to self               Significant Labs: All pertinent labs within the past 24 hours  "have been reviewed.  BMP:   Recent Labs   Lab 04/25/25  0358      K 3.6   *   CO2 21*   BUN 34*   CREATININE 1.5*   CALCIUM 8.4*   MG 1.7     CBC:   Recent Labs   Lab 04/24/25  0358 04/25/25  0358   WBC 9.53 10.26   HGB 9.7* 8.6*   HCT 31.0* 27.1*    165       Significant Imaging: I have reviewed all pertinent imaging results/findings within the past 24 hours.      Assessment & Plan  SBO (small bowel obstruction)  - CT upon admission: "High-grade small bowel obstruction related to a 5 by 5 cm ventral abdominal wall hernia. Status post left colectomy common diverting left lower quadrant ostomy and multiple small bowel anastomoses with markedly dilated small bowel loops in the deep pelvis which may reflect acute superimposed on chronic change."  - General surgery consulted  - NPO   - NGT placed and LIWS  - GGC ordered for 4/21 and now passage of contrast to colon, no gas/stool in ostomy bag  - Surgery discussing ex-lap with patient and family - tentatively for 4/23/25  - Cardiology aware and following, will ask for risk stratification for surgery     - underwent exlap with lysis of adhesions on 4/23.   - NGT in place  - Stool/gas in ostomy on 4/25. Clamp NGT. SLP consulted for eval. Keep ngt until cleared for diet  Acute renal failure superimposed on stage 3b chronic kidney disease  VINNY is likely due to  prerenal from SBO . Baseline creatinine is  1 . Most recent creatinine and eGFR are listed below.  Recent Labs     04/23/25  0400 04/24/25  0358 04/25/25  0358   CREATININE 1.7* 1.5* 1.5*   EGFRNORACEVR 38* 44* 44*   - UA unremarkable  - CT shows no hydronephrosis     Plan  - Avoid nephrotoxins and renally dose meds for GFR listed above  - Monitor urine output, serial BMP, and adjust therapy as needed  - improving on IVF's  HTN, goal below 140/80  Patient's blood pressure range in the last 24 hours was: BP  Min: 98/58  Max: 126/58.The patient's inpatient anti-hypertensive regimen is listed " below:  Current Antihypertensives  hydrALAZINE injection 10 mg, Every 6 hours PRN, Intravenous  metoprolol injection 5 mg, Every 6 hours, Intravenous    Plan  - BP increasing as patient unable to take his oral BP medications.  Continue with prn IV Hydralazine.   Pulmonary emphysema, unspecified emphysema type  Patient's COPD is controlled currently.  Patient is currently off COPD Pathway.  Stable on room air  Anemia of chronic renal failure, stage 3b  Anemia is likely due to chronic disease due to Chronic Kidney Disease. Most recent hemoglobin and hematocrit are listed below.  Recent Labs     04/23/25  0400 04/24/25  0358 04/25/25  0358   HGB 11.9* 9.7* 8.6*   HCT 38.1* 31.0* 27.1*     Plan  - Monitor serial CBC: Daily  - Transfuse PRBC if patient becomes hemodynamically unstable, symptomatic or H/H drops below 7/21.  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 Liver biopsy negative     Ventral hernia  - see SBO    Severe protein-calorie malnutrition  Body mass index is 18.88 kg/m².  Notably, albumin 3.6 on admission- perhaps due to dehydration  - now with bowel movements  - SLP consulted for diet  - if not safe to swallow, will start tube feeds      NSVT (nonsustained ventricular tachycardia)  Episodes of NSVT on monitoring.  Check Echo.  Cardiology consulted.  Atrial and ventricular ectopy.  Started on amiodarone drip per Cards recommendations.  - on IV metoprolol  - once able to take PO, will switch to PO amiodarone 400 mg PO bid until outpatient follow up      Atrial tachycardia  - on iv amio and iv metoprolol  - switch to PO once able to take PO    Advance  care planning  Palliative Care consult.    Hypernatremia  Hypernatremia is likely due to  normal saline IVF's . The patient's most recent sodium results are listed below.  Recent Labs     04/23/25  0400 04/24/25  0358 04/25/25  0358   * 144 145     Plan  - Aim to correct the sodium by 8-10mEq in 24 hours.   - Plan to correct their hypernatremia with Select IV fluids: D5W/0.45 NaCl - increase rate to 75 cc/hr. Now 0.45 NS.  - Will plan to trend the patient's sodium: Daily  - now resolved with fluids    Delirium  Post operative delirium noted. Unable to redirect, concern for removal of NGT and other tubes. Had ex-lap 4/23.  - precedex drip now off  - resolved    VTE Risk Mitigation (From admission, onward)           Ordered     heparin (porcine) injection 5,000 Units  Every 8 hours         04/19/25 1933     Place ARI hose  Until discontinued         04/18/25 1542     Place sequential compression device  Until discontinued         04/18/25 1542     Reason for No Pharmacological VTE Prophylaxis  Once        Question:  Reasons:  Answer:  Physician Provided (leave comment)  Comment:  potential OR    04/18/25 1542     IP VTE HIGH RISK PATIENT  Once         04/18/25 1542                    Discharge Planning   WIL:      Code Status: Full Code   Medical Readiness for Discharge Date:   Discharge Plan A: Home with family            Critical care time spent on the evaluation and treatment of severe organ dysfunction, review of pertinent labs and imaging studies, discussions with consulting providers and discussions with patient/family: 25 minutes.    Please place Justification for DME        Cristino Marie MD  Department of Hospital Medicine   [facility] - Intensive Care

## 2025-04-25 NOTE — EICU
Intervention Initiated From:  [facility] / EICU    Aida intervened regarding:  Rounding (Video assessment)    Virtual ICU Quality Rounds    Admit Date: 4/18/2025  Hospital Day: 7    ICU Day: 5d 13h    24H Vital Sign Range:  Temp:  [97 °F (36.1 °C)-98.9 °F (37.2 °C)]   Pulse:  [58-72]   Resp:  [11-28]   BP: ()/(53-64)   SpO2:  [94 %-100 %]     VICU Surveillance Screening    Daily review of  line necessity(optional): Central line(s) in place    port    Central line indications : Amiodarone        LDA reconciliation : Yes

## 2025-04-25 NOTE — ASSESSMENT & PLAN NOTE
Post operative delirium noted. Unable to redirect, concern for removal of NGT and other tubes. Had ex-lap 4/23.  - precedex drip now off  - resolved

## 2025-04-25 NOTE — ASSESSMENT & PLAN NOTE
Hypernatremia is likely due to normal saline IVF's. The patient's most recent sodium results are listed below.  Recent Labs     04/23/25  0400 04/24/25  0358 04/25/25  0358   * 144 145     Plan  - Aim to correct the sodium by 8-10mEq in 24 hours.   - Plan to correct their hypernatremia with Select IV fluids: D5W/0.45 NaCl - increase rate to 75 cc/hr. Now 0.45 NS.  - Will plan to trend the patient's sodium: Daily  - now resolved with fluids

## 2025-04-25 NOTE — SUBJECTIVE & OBJECTIVE
Medications:  Continuous Infusions:   amiodarone in dextrose 5%  0.5 mg/min Intravenous Continuous 16.7 mL/hr at 04/25/25 1200 0.5 mg/min at 04/25/25 1200    D5 and 0.9% NaCl   Intravenous Continuous 75 mL/hr at 04/25/25 1200 Rate Verify at 04/25/25 1200     Scheduled Meds:   albuterol-ipratropium  3 mL Nebulization Q6H WAKE    heparin (porcine)  5,000 Units Subcutaneous Q8H    metoprolol  5 mg Intravenous Q6H    pantoprazole  40 mg Intravenous Daily     PRN Meds:  Current Facility-Administered Medications:     acetaminophen, 650 mg, Oral, Q6H PRN    dextrose 50%, 12.5 g, Intravenous, PRN    dextrose 50%, 12.5 g, Intravenous, PRN    dextrose 50%, 25 g, Intravenous, PRN    glucagon (human recombinant), 1 mg, Intramuscular, PRN    glucagon (human recombinant), 1 mg, Intramuscular, PRN    glucose, 16 g, Oral, PRN    glucose, 24 g, Oral, PRN    hydrALAZINE, 10 mg, Intravenous, Q6H PRN    HYDROmorphone, 0.2 mg, Intravenous, Q6H PRN    HYDROmorphone, 0.5 mg, Intravenous, Q6H PRN    insulin aspart U-100, 0-5 Units, Subcutaneous, Q6H PRN    naloxone, 0.02 mg, Intravenous, PRN    ondansetron, 4 mg, Intravenous, Q6H PRN    ondansetron, 4 mg, Intravenous, Daily PRN    prochlorperazine, 5 mg, Intravenous, Q6H PRN    sodium chloride 0.9%, 10 mL, Intravenous, PRN    Objective:     Vital Signs (Most Recent):  Temp: 98.3 °F (36.8 °C) (04/25/25 1101)  Pulse: 78 (04/25/25 1304)  Resp: (!) 24 (04/25/25 1304)  BP: 127/61 (04/25/25 1101)  SpO2: 100 % (04/25/25 1304) Vital Signs (24h Range):  Temp:  [97 °F (36.1 °C)-98.8 °F (37.1 °C)] 98.3 °F (36.8 °C)  Pulse:  [58-78] 78  Resp:  [11-28] 24  SpO2:  [94 %-100 %] 100 %  BP: ()/(54-64) 127/61     Weight: 66.7 kg (147 lb 0.8 oz)  Body mass index is 18.88 kg/m².       Physical Exam  Vitals and nursing note reviewed.   Constitutional:       General: He is awake.      Appearance: He is cachectic.   HENT:      Head: Atraumatic.      Comments: Temporal wasting  Pulmonary:      Effort:  Pulmonary effort is normal. No respiratory distress.   Abdominal:      Comments: ostomy   Musculoskeletal:      Right lower leg: No edema.      Left lower leg: No edema.   Neurological:      Mental Status: He is alert and easily aroused.      Comments: Oriented to self and daughter; mental status not at baseline    Psychiatric:         Behavior: Behavior is cooperative.       Advance Care Planning   Advance Directives:   Living Will: Yes        Copy on chart: Yes    [place]POST: No    Do Not Resuscitate Status: No    Medical Power of : Yes (reports his daugthers are preferred decision makers; plan for further discussion)    Goals of Care: What is most important right now is to focus on remaining as independent as possible, improvement in condition but with limits to invasive therapies. Accordingly, we have decided that the best plan to meet the patient's goals includes continuing with treatment.         Significant Labs: All pertinent labs within the past 24 hours have been reviewed.  CBC:   Recent Labs   Lab 04/25/25  0358   WBC 10.26   HGB 8.6*   HCT 27.1*   MCV 92        BMP:  Recent Labs   Lab 04/25/25 0358      K 3.6   *   CO2 21*   BUN 34*   CREATININE 1.5*   CALCIUM 8.4*   MG 1.7     LFT:  Lab Results   Component Value Date    AST 9 (L) 04/25/2025    ALKPHOS 38 (L) 04/25/2025    BILITOT 0.2 04/25/2025     Albumin:   Albumin   Date Value Ref Range Status   04/25/2025 2.0 (L) 3.5 - 5.2 g/dL Final   04/11/2025 4.2 3.4 - 5.0 g/dL Final   03/20/2025 4.2 3.5 - 5.2 g/dL Final     Protein:   Total Protein   Date Value Ref Range Status   03/20/2025 7.8 6.0 - 8.4 g/dL Final     Lactic acid:   Lab Results   Component Value Date    LACTATE 1.9 04/19/2025    LACTATE 2.4 (H) 10/06/2019       Significant Imaging: I have reviewed all pertinent imaging results/findings within the past 24 hours.

## 2025-04-25 NOTE — PROGRESS NOTES
[facility] - Intensive Care  Palliative Medicine  Progress Note    Patient Name: Eugene Gamez  MRN: [id number]  Admission Date: 4/18/2025  Hospital Length of Stay: 7 days  Code Status: Full Code   Attending Provider: Cristino Marie MD  Consulting Provider: Estella Argueta NP  Primary Care Physician: Larry Monae MD  Principal Problem:SBO (small bowel obstruction)    Patient information was obtained from patient, relative(s), and primary team.      Assessment/Plan:   Palliative Care  Advance Care Planning      4/25/2025  - interval chart reviewed; pt discussed with MDT during ICU rounds   - met with pt and daughterMari at bedside   - pt with improving mental status follow post-op delirium that required precedex   - brief medical update provided and later returned along with Dr. Marie, , who provided a more detailed update   - overall GOC for continued improvement in condition and working with therapy for recommendations regarding goal of SNF and then home with daughter   - plan to allow for continued improvement in mental status for further discussion of pt's wishes regarding code status and appointment MPOA  - emotional support provided; allowed time for questions/concerns   - updated  MDT     4/22/2025  - interval chart reviewed; pt discussed with MDT during ICU rounds   - met with pt and daughter Nicol at bedside  - pt with improving mental status but still not at baseline per Nicol; pt able to report that he has 7 children accurately, and provided Nicol's name   - pt identifies his daughters as preferred decision makers (points to daughter Nicol, but did not specify further); Nicol confirms that she is communication with pt's children and all in agreement with current plan   - reviewed wished regarding offered surgery (see SBO)   - pt's rell is Restorationist; open to pastoral care team and daughter plans to attempt to contact pt's    - emotional support provided; answered all questions   -  "coordinated additional support with CM, palliative SW, and    - updated MDT     4/21/2025 - Consult   - consult received; interval chart reviewed in detail; discussed pt with MDT   - met with patient at bedside; introduction to palliative medicine team and role in current care and admission   - pt is polite and able to proide name and answer some simple questions, but concerning capacity and mental status   - attemp[remy to review MPOA document on file from 2015 naming Chel Barnes and Sade Sosa as MPOA; pt initially unable to indentify these names, later identified Chel as "lady who cooks for him" (daughter confirms it is a friend who is no longer physically/mentally able to act in this capacity) and pt unable to identify Sade (daughter confirms in pt's sister)  - pt identifies daughter, Nicol, as who should be contacted regarding is medical decisions   - called pt's daughter Nicol, introduction to palliative medicine team; brief medical update provided   - learned more about pt outside of current admission; he is  and has 7 children total; he lives with daughter, Nicol; not all children are local; Pt lives with daughter Nicol (who is a PCT in psych); son, Eugene Maradiaga, is also active in pt's day to day care   - Nicol shares that at baseline pt is of sound mind (with occasional forgetfulness, repeats things, consistent with age) and can typically perform all ADL's himself, ambulatory with use of walker, and until recently drove himself; recently stopped driving but leaves the house with Clemente or Eugene daily for activities which is his favorite thing to do, especially if going to go out to eat ; Clemente requesting PT/OT when safe for pt to ensure no loss in abilities   - GOC/ACP discussion  - reviewed MPOA and living will documents on file with Nicol; will likely need to revisit MPOA documentation when pt returns to baseline mental status   - discussed resources regarding other advanced " directives, outside of medical care; recommended [facility] as resource for further information on this and or an atty; but reassured that palliative team and assist with MPOA documentation and medical wishes   - overall goal for improvement in condition and maintaining as much abilities as possible   - emotional support provided   - Allowed time for questions/concerns; all addressed; expressed availability of myself/palliative team for additional questions/concerns   - will plan for follow up with pt for continued assessment of mental status for further ACP/GOC discussions     Renal/  Acute renal failure superimposed on stage 3b chronic kidney disease  - Noted; mgmt per primary team. Seems to be improving, though would not be an RRT candidate if this worsens.     Endocrine  Severe protein-calorie malnutrition  - BMI 17, and with visible cachexia; contributes to overall poor prognosis and hospice qualification   - pt with >13% body weight loss in the past 6 months and >8% body weight loss in the past month; pt with decreased appetite only in the last few days before admission; per family pt eats constantly and very hearty appetite; they have also been concerned about weight loss compared to intake with history of cancer   - consider additional work up for causes of weight loss if family wished to persue; will plan for further discussion following SBO treatment      GI  * SBO (small bowel obstruction)  - Mgmt per surgery; pt underwent surgery, pt doing well overall post surgery since recovering form post op delirium   - working towards being able to start diet as he did well with SLP and is awaiting gastrografin     S/P left colectomy  - Noted history secondary to colon cancer    I will follow-up with patient. Please contact us if you have any additional questions.    Subjective:     Chief Complaint:   Chief Complaint   Patient presents with    Vomiting    Nausea     Pt BIB [facility] with  "complaints of vomiting times 2 days and generalized malaise. Pt denies abd pain, chest pain or SOB at this time. Pt reports he has lost a lot of weight recently.       HPI:   From H&P: " Mr Eugene Gamez is a 89 y.o. man with history of L colectomy for colon cancer (2015) with ventral hernia, prior DM, HTN, CKD3 (baseline Cr 1) who presented with nausea and vomiting, abdominal pain.  The symptoms began a day or 2 ago but significantly worsened yesterday morning.  Last night and today He has not been able to keep much of anything down.  He denies any issues with his colostomy, stool has been normal.  Overall he feels weak and fatigued.  Denies dysuria, difficulty urinating, changes in bowel habits  His CMP shows creatinine 2.4, bicarb 21, anion gap 17, glucose 209; UA with trace protein, 7 RBC, 2 WBC; CT abdomen and pelvis with high-grade small-bowel obstruction related to a 5 x 5 cm ventral abdominal wall hernia, right middle and right lower lobe mucous plugging and/or endobronchial spread of infection/bronchitis.  He was admitted for high grade SBO. Started IVF. General surgery consulted. "    Palliative medicine consulted for goals of care discussion and advance care planning; for details of visit, see advance care planning section of plan.       Hospital Course:  No notes on file      Medications:  Continuous Infusions:   amiodarone in dextrose 5%  0.5 mg/min Intravenous Continuous 16.7 mL/hr at 04/25/25 1200 0.5 mg/min at 04/25/25 1200    D5 and 0.9% NaCl   Intravenous Continuous 75 mL/hr at 04/25/25 1200 Rate Verify at 04/25/25 1200     Scheduled Meds:   albuterol-ipratropium  3 mL Nebulization Q6H WAKE    heparin (porcine)  5,000 Units Subcutaneous Q8H    metoprolol  5 mg Intravenous Q6H    pantoprazole  40 mg Intravenous Daily     PRN Meds:  Current Facility-Administered Medications:     acetaminophen, 650 mg, Oral, Q6H PRN    dextrose 50%, 12.5 g, Intravenous, PRN    dextrose 50%, 12.5 g, Intravenous, " PRN    dextrose 50%, 25 g, Intravenous, PRN    glucagon (human recombinant), 1 mg, Intramuscular, PRN    glucagon (human recombinant), 1 mg, Intramuscular, PRN    glucose, 16 g, Oral, PRN    glucose, 24 g, Oral, PRN    hydrALAZINE, 10 mg, Intravenous, Q6H PRN    HYDROmorphone, 0.2 mg, Intravenous, Q6H PRN    HYDROmorphone, 0.5 mg, Intravenous, Q6H PRN    insulin aspart U-100, 0-5 Units, Subcutaneous, Q6H PRN    naloxone, 0.02 mg, Intravenous, PRN    ondansetron, 4 mg, Intravenous, Q6H PRN    ondansetron, 4 mg, Intravenous, Daily PRN    prochlorperazine, 5 mg, Intravenous, Q6H PRN    sodium chloride 0.9%, 10 mL, Intravenous, PRN    Objective:     Vital Signs (Most Recent):  Temp: 98.3 °F (36.8 °C) (04/25/25 1101)  Pulse: 78 (04/25/25 1304)  Resp: (!) 24 (04/25/25 1304)  BP: 127/61 (04/25/25 1101)  SpO2: 100 % (04/25/25 1304) Vital Signs (24h Range):  Temp:  [97 °F (36.1 °C)-98.8 °F (37.1 °C)] 98.3 °F (36.8 °C)  Pulse:  [58-78] 78  Resp:  [11-28] 24  SpO2:  [94 %-100 %] 100 %  BP: ()/(54-64) 127/61     Weight: 66.7 kg (147 lb 0.8 oz)  Body mass index is 18.88 kg/m².       Physical Exam  Vitals and nursing note reviewed.   Constitutional:       General: He is awake.      Appearance: He is cachectic.   HENT:      Head: Atraumatic.      Comments: Temporal wasting  Pulmonary:      Effort: Pulmonary effort is normal. No respiratory distress.   Abdominal:      Comments: ostomy   Musculoskeletal:      Right lower leg: No edema.      Left lower leg: No edema.   Neurological:      Mental Status: He is alert and easily aroused.      Comments: Oriented to self and daughter; mental status not at baseline    Psychiatric:         Behavior: Behavior is cooperative.       Advance Care Planning  Advance Directives:   Living Will: Yes        Copy on chart: Yes    LaPOST: No    Do Not Resuscitate Status: No    Medical Power of : Yes (reports his daugthers are preferred decision makers; plan for further discussion)    Goals  of Care: What is most important right now is to focus on remaining as independent as possible, improvement in condition but with limits to invasive therapies. Accordingly, we have decided that the best plan to meet the patient's goals includes continuing with treatment.         Significant Labs: All pertinent labs within the past 24 hours have been reviewed.  CBC:   Recent Labs   Lab 04/25/25  0358   WBC 10.26   HGB 8.6*   HCT 27.1*   MCV 92        BMP:  Recent Labs   Lab 04/25/25  0358      K 3.6   *   CO2 21*   BUN 34*   CREATININE 1.5*   CALCIUM 8.4*   MG 1.7     LFT:  Lab Results   Component Value Date    AST 9 (L) 04/25/2025    ALKPHOS 38 (L) 04/25/2025    BILITOT 0.2 04/25/2025     Albumin:   Albumin   Date Value Ref Range Status   04/25/2025 2.0 (L) 3.5 - 5.2 g/dL Final   04/11/2025 4.2 3.4 - 5.0 g/dL Final   03/20/2025 4.2 3.5 - 5.2 g/dL Final     Protein:   Total Protein   Date Value Ref Range Status   03/20/2025 7.8 6.0 - 8.4 g/dL Final     Lactic acid:   Lab Results   Component Value Date    LACTATE 1.9 04/19/2025    LACTATE 2.4 (H) 10/06/2019       Significant Imaging: I have reviewed all pertinent imaging results/findings within the past 24 hours.    Total visit time: 51 minutes    35 min visit time including: face to face time in discussion of symptom assessment, and exploring options and burdens of offered treatments.  This also includes non-face to face time preparing to see the patient including chart review, obtaining and/or reviewing separately obtained history, documenting clinical information in the electronic or other health record, independently interpreting results and communicating results to the patient/family/caregiver, family discussions by phone if not able to be present, coordination of care with other specialists, and discharge planning.     16 min ACP time spent: goals of care, advanced care planning, emotional support, formulating and communicating prognosis,  exploring burden/ benefit of various approaches of treatment.       Estella Argueta NP  Palliative Medicine  [facility] - Intensive Care

## 2025-04-25 NOTE — ASSESSMENT & PLAN NOTE
Atrial and ventricular ectopy have significantly improved  Continue amiodarone drip without bolus to suppress PACs and PVCs  Continue IV metoprolol 5 mg q.6 (standing order).  Do not administer metoprolol if systolic blood pressure is less than 90 mm Hg  Continue tele monitoring  Normal EF noted  Keep electrolytes repleted    We will keep amiodarone drip going while he is NPO  Once he is allowed to eat or drink orally, switch amiodarone drip to p.o. amiodarone 400 mg b.i.d. For 1 month

## 2025-04-25 NOTE — ASSESSMENT & PLAN NOTE
- Mgmt per surgery; pt underwent surgery, pt doing well overall post surgery since recovering form post op delirium   - working towards being able to start diet as he did well with SLP and is awaiting gastrografin

## 2025-04-25 NOTE — PLAN OF CARE
Problem: Occupational Therapy  Goal: Occupational Therapy Goal  Description: Goals to be met by: 05/25/2025      Patient will increase functional independence with ADLs by performing:    UE Dressing with Supervision.  LE Dressing with Minimal Assistance.  Grooming while seated or standing with Supervision.  Toileting from toilet or BSC with Minimal Assistance for hygiene and clothing management.   Toilet transfer to toilet of BSC with Minimal Assistance.  Increased functional strength to WFL for self care.  Upper extremity exercise program x10 reps per handout, with assistance as needed.      Outcome: Progressing    Pt would benefit from continued OT to address deficits in self care and functional mobility. Recommending moderate intensity therapy but may progress well with therapies; DME needs defer to next level of care; would likely benefit from hospital bed 2/2 limited ability/inability to change positions in bed.

## 2025-04-25 NOTE — ASSESSMENT & PLAN NOTE
"- CT upon admission: "High-grade small bowel obstruction related to a 5 by 5 cm ventral abdominal wall hernia. Status post left colectomy common diverting left lower quadrant ostomy and multiple small bowel anastomoses with markedly dilated small bowel loops in the deep pelvis which may reflect acute superimposed on chronic change."  - General surgery consulted  - NPO   - NGT placed and LIWS  - GGC ordered for 4/21 and now passage of contrast to colon, no gas/stool in ostomy bag  - Surgery discussing ex-lap with patient and family - tentatively for 4/23/25  - Cardiology aware and following, will ask for risk stratification for surgery     - underwent exlap with lysis of adhesions on 4/23.   - NGT in place  - Stool/gas in ostomy on 4/25. Clamp NGT. SLP consulted for eval. Keep ngt until cleared for diet  "

## 2025-04-25 NOTE — PT/OT/SLP EVAL
Speech Language Pathology Evaluation  Bedside Swallow    Patient Name:  Eugene Gamez   MRN:  [id number]  Admitting Diagnosis: SBO (small bowel obstruction)    Recommendations:                 General Recommendations: Dysphagia tx  Diet recommendations:   , Full liquids, Thin liquids - IDDSI Level 0   Aspiration Precautions: 1 bite/sip at a time, Alternating bites/sips, Assistance with meals, HOB to 90 degrees, Meds crushed in puree, and Small bites/sips   General Precautions: Standard,    Communication strategies:   Increased speaking volume 2/2 pt [place]    Assessment:     Eugene Gamez is a 89 y.o. male with a dx of SBO (small bowel obstruction), w/ NGT in place for suctioning (clamped this AM). Pt presents w/ generalized weakened swallow, w/ need for multiple swallows to clear all boluses, ST suspecting swallowing fx is negatively impacted by current NGT. ST recs conservative Full liquid diet w/ thin liquids until NGT is d/c. Meds crushed in pudding. ST to cont to follow.    History:     Past Medical History:   Diagnosis Date    Arthritis     Cancer     Diabetes mellitus     Elevated PSA     Gout, unspecified     Hypertension     Nuclear sclerotic cataract of both eyes 6/29/2018       Past Surgical History:   Procedure Laterality Date    CATARACT EXTRACTION      ou    EYE SURGERY      bilateral cataracts    LAPAROTOMY, EXPLORATORY N/A 4/23/2025    Procedure: LAPAROTOMY, EXPLORATORY;  Surgeon: Abran Camacho MD;  Location: [facility] OR;  Service: General;  Laterality: N/A;  lysis of adhsions    removal of small bowel  Apr. 2015    Colostomy    REPAIR, HERNIA, INCISIONAL N/A 4/23/2025    Procedure: REPAIR, HERNIA, INCISIONAL;  Surgeon: Abran Camacho MD;  Location: [facility] OR;  Service: General;  Laterality: N/A;       Prior Intubation HX:  Sx: 4/23    Modified Barium Swallow: None per EMR    Chest X-Rays:   4/18/25    Impression:     Increased streaky opacities in the right lower lung zone, nonspecific and possibly  atelectasis, edema, or infectious/inflammatory disease (including aspiration).    Prior diet: Unknown 2/2 pt poor historian.      Subjective     ST obtained clearance from pt's MD for b/s swallow evaluation prior to entrance. Pt was alert and awake upon entrance and agreeable to b/s eval.     Patient goals: Initial po     Pain/Comfort:  Pain Rating 1: 0/10    Respiratory Status: Room air    Objective:     Oral Musculature Evaluation  Oral Musculature: general weakness  Dentition: edentulous  Secretion Management: adequate  Mucosal Quality: good  Oral Labial Strength and Mobility: WFL  Lingual Strength and Mobility: WFL  Voice Prior to PO Intake: Clear and audible    Bedside Swallow Eval:   Consistencies Assessed:  Thin liquids -Straw sips of water  Puree -Tbsp bites of applesauce      Oral Phase:   WFL    Pharyngeal Phase:   decreased hyolaryngeal excursion to palpation  delayed swallow initation  no overt clinical signs/symptoms of aspiration  no overt clinical signs/symptoms of pharyngeal dysphagia  multiple spontaneous swallows    Compensatory Strategies  None    Treatment: It should be noted that silent aspiration cannot be r/o via b/s assessment. ST provided education to the pt which included role of ST in POC, ST POC, and diet recs. Pt will require on-going education.      ST notified pt's nurse and MD regarding diet recs.      Goals:   Multidisciplinary Problems       SLP Goals          Problem: SLP    Goal Priority Disciplines Outcome   SLP Goal    Low SLP Progressing   Description: Goals:  1. Pt will participate in on-going assessment of swallowing function.                         Plan:     Patient to be seen:  2 x/week, 3 x/week   Plan of Care expires:     Plan of Care reviewed with:  patient   SLP Follow-Up:  Yes       Discharge recommendations:      Barriers to Discharge:  None    Time Tracking:     SLP Treatment Date:   04/25/25  Speech Start Time:  1135  Speech Stop Time:  1155     Speech Total Time  (min):  20 min    Billable Minutes: Eval Swallow and Oral Function 20 04/25/2025

## 2025-04-25 NOTE — ASSESSMENT & PLAN NOTE
Anemia is likely due to chronic disease due to Chronic Kidney Disease. Most recent hemoglobin and hematocrit are listed below.  Recent Labs     04/23/25  0400 04/24/25  0358 04/25/25  0358   HGB 11.9* 9.7* 8.6*   HCT 38.1* 31.0* 27.1*     Plan  - Monitor serial CBC: Daily  - Transfuse PRBC if patient becomes hemodynamically unstable, symptomatic or H/H drops below 7/21.  - Patient has not received any PRBC transfusions to date  - Patient's anemia is currently stable

## 2025-04-25 NOTE — ASSESSMENT & PLAN NOTE
Body mass index is 18.88 kg/m².  Notably, albumin 3.6 on admission- perhaps due to dehydration  - now with bowel movements  - SLP consulted for diet  - if not safe to swallow, will start tube feeds

## 2025-04-25 NOTE — SUBJECTIVE & OBJECTIVE
Interval History:  Stooling gas noted in ostomy bag.  Off Precedex.  Clamping NG tube today.  PT/OT/SLP consulted    Review of Systems  Objective:     Vital Signs (Most Recent):  Temp: 97.9 °F (36.6 °C) (04/25/25 0730)  Pulse: 65 (04/25/25 1030)  Resp: 18 (04/25/25 1030)  BP: (!) 126/58 (04/25/25 1030)  SpO2: 100 % (04/25/25 1030) Vital Signs (24h Range):  Temp:  [97 °F (36.1 °C)-98.9 °F (37.2 °C)] 97.9 °F (36.6 °C)  Pulse:  [58-72] 65  Resp:  [11-28] 18  SpO2:  [94 %-100 %] 100 %  BP: ()/(53-64) 126/58     Weight: 66.7 kg (147 lb 0.8 oz)  Body mass index is 18.88 kg/m².    Intake/Output Summary (Last 24 hours) at 4/25/2025 1052  Last data filed at 4/25/2025 1000  Gross per 24 hour   Intake 2113.88 ml   Output 680 ml   Net 1433.88 ml         Physical Exam  Vitals and nursing note reviewed.   Constitutional:       Appearance: He is ill-appearing.   HENT:      Head:      Comments: Temporal wasting     Nose:      Comments: NGT in place     Mouth/Throat:      Mouth: Mucous membranes are dry.   Cardiovascular:      Rate and Rhythm: Normal rate and regular rhythm.   Pulmonary:      Effort: Pulmonary effort is normal.      Breath sounds: Normal breath sounds.   Abdominal:      General: There is distension (Not tense).      Tenderness: There is no abdominal tenderness. There is no guarding or rebound.      Comments: Hypoactive bowel sounds.  Left-sided ostomy with stool/gas present  Midline incision dressing intact   Musculoskeletal:      Right lower leg: No edema.      Left lower leg: No edema.   Skin:     General: Skin is warm and dry.   Neurological:      Mental Status: He is alert.      Comments: Alert, awake and calm. Oriented to self               Significant Labs: All pertinent labs within the past 24 hours have been reviewed.  BMP:   Recent Labs   Lab 04/25/25  0358      K 3.6   *   CO2 21*   BUN 34*   CREATININE 1.5*   CALCIUM 8.4*   MG 1.7     CBC:   Recent Labs   Lab 04/24/25  9562  04/25/25  0358   WBC 9.53 10.26   HGB 9.7* 8.6*   HCT 31.0* 27.1*    165       Significant Imaging: I have reviewed all pertinent imaging results/findings within the past 24 hours.

## 2025-04-25 NOTE — PLAN OF CARE
Patient remains in ICU. Remains on D5 NS gtt and Amiodarone gtt. Remains strict NPO with NG to low intermittent suction and 50 mL of output. Abdominal dressing remains dry and intact. Colostomy has had flatus and very minimal stool present on the inside of the bag, however, not enough to measure. Patient turned q2h. PRN pain medication administered once. Plan of care reviewed with patient. Patient remains free of falls, injury, or breakdown.     Problem: Adult Inpatient Plan of Care  Goal: Plan of Care Review  Outcome: Progressing  Goal: Patient-Specific Goal (Individualized)  Outcome: Progressing  Goal: Absence of Hospital-Acquired Illness or Injury  Outcome: Progressing  Goal: Optimal Comfort and Wellbeing  Outcome: Progressing  Goal: Readiness for Transition of Care  Outcome: Progressing     Problem: Infection  Goal: Absence of Infection Signs and Symptoms  Outcome: Progressing     Problem: Acute Kidney Injury/Impairment  Goal: Fluid and Electrolyte Balance  Outcome: Progressing  Goal: Improved Oral Intake  Outcome: Progressing  Goal: Effective Renal Function  Outcome: Progressing     Problem: Skin Injury Risk Increased  Goal: Skin Health and Integrity  Outcome: Progressing     Problem: Wound  Goal: Optimal Coping  Outcome: Progressing  Goal: Optimal Functional Ability  Outcome: Progressing  Goal: Absence of Infection Signs and Symptoms  Outcome: Progressing  Goal: Improved Oral Intake  Outcome: Progressing  Goal: Optimal Pain Control and Function  Outcome: Progressing  Goal: Skin Health and Integrity  Outcome: Progressing  Goal: Optimal Wound Healing  Outcome: Progressing     Problem: Fall Injury Risk  Goal: Absence of Fall and Fall-Related Injury  Outcome: Progressing     Problem: Coping Ineffective  Goal: Effective Coping  Outcome: Progressing

## 2025-04-25 NOTE — EICU
[facility] Night Rounds Checklist  24H Vital Sign Range:  Temp:  [97 °F (36.1 °C)-98.9 °F (37.2 °C)]   Pulse:  [61-72]   Resp:  [10-28]   BP: ()/(53-64)   SpO2:  [94 %-100 %]     Video rounds and LDA reconciliation

## 2025-04-25 NOTE — ASSESSMENT & PLAN NOTE
4/25/2025  - interval chart reviewed; pt discussed with MDT during ICU rounds   - met with pt and daughterMari at bedside   - pt with improving mental status follow post-op delirium that required precedex   - brief medical update provided and later returned along with Dr. Marie, , who provided a more detailed update   - overall GOC for continued improvement in condition and working with therapy for recommendations regarding goal of SNF and then home with daughter   - plan to allow for continued improvement in mental status for further discussion of pt's wishes regarding code status and appointment MPOA  - emotional support provided; allowed time for questions/concerns   - updated  MDT     4/22/2025  - interval chart reviewed; pt discussed with MDT during ICU rounds   - met with pt and daughter Nicol at bedside  - pt with improving mental status but still not at baseline per Nicol; pt able to report that he has 7 children accurately, and provided Nicol's name   - pt identifies his daughters as preferred decision makers (points to daughter Nicol, but did not specify further); Nicol confirms that she is communication with pt's children and all in agreement with current plan   - reviewed wished regarding offered surgery (see SBO)   - pt's rell is Orthodox; open to pastoral care team and daughter plans to attempt to contact pt's    - emotional support provided; answered all questions   - coordinated additional support with CM, palliative SW, and    - updated MDT     4/21/2025 - Consult   - consult received; interval chart reviewed in detail; discussed pt with MDT   - met with patient at bedside; introduction to palliative medicine team and role in current care and admission   - pt is polite and able to proide name and answer some simple questions, but concerning capacity and mental status   - attemp[remy to review MPOA document on file from 2015 naming Chel Barnes and Sade Sosa as MPOA; pt initially  "unable to indentify these names, later identified Chel as "lady who cooks for him" (daughter confirms it is a friend who is no longer physically/mentally able to act in this capacity) and pt unable to identify Sade (daughter confirms in pt's sister)  - pt identifies daughterNicol, as who should be contacted regarding is medical decisions   - called pt's daughter Nicol, introduction to palliative medicine team; brief medical update provided   - learned more about pt outside of current admission; he is  and has 7 children total; he lives with daughterNicol; not all children are local; Pt lives with daughter Nicol (who is a PCT in psych); son, Eugene Maradiaga, is also active in pt's day to day care   - Nicol shares that at baseline pt is of sound mind (with occasional forgetfulness, repeats things, consistent with age) and can typically perform all ADL's himself, ambulatory with use of walker, and until recently drove himself; recently stopped driving but leaves the house with Clemente or Eugene daily for activities which is his favorite thing to do, especially if going to go out to eat ; Clemente requesting PT/OT when safe for pt to ensure no loss in abilities   - GOC/ACP discussion  - reviewed MPOA and living will documents on file with Nicol; will likely need to revisit MPOA documentation when pt returns to baseline mental status   - discussed resources regarding other advanced directives, outside of medical care; recommended [facility] [facility] [employer] as resource for further information on this and or an atty; but reassured that palliative team and assist with MPOA documentation and medical wishes   - overall goal for improvement in condition and maintaining as much abilities as possible   - emotional support provided   - Allowed time for questions/concerns; all addressed; expressed availability of myself/palliative team for additional questions/concerns   - will plan for follow up with pt for continued " assessment of mental status for further ACP/GOC discussions

## 2025-04-25 NOTE — ASSESSMENT & PLAN NOTE
- BMI 17, and with visible cachexia; contributes to overall poor prognosis and hospice qualification   - pt with >13% body weight loss in the past 6 months and >8% body weight loss in the past month; pt with decreased appetite only in the last few days before admission; per family pt eats constantly and very hearty appetite; they have also been concerned about weight loss compared to intake with history of cancer   - consider additional work up for causes of weight loss if family wished to persue; will plan for further discussion following SBO treatment

## 2025-04-25 NOTE — ASSESSMENT & PLAN NOTE
Patient's blood pressure range in the last 24 hours was: BP  Min: 98/58  Max: 126/58.The patient's inpatient anti-hypertensive regimen is listed below:  Current Antihypertensives  hydrALAZINE injection 10 mg, Every 6 hours PRN, Intravenous  metoprolol injection 5 mg, Every 6 hours, Intravenous    Plan  - BP increasing as patient unable to take his oral BP medications.  Continue with prn IV Hydralazine.

## 2025-04-25 NOTE — SUBJECTIVE & OBJECTIVE
Review of Systems   Cardiovascular:  Negative for chest pain, claudication, dyspnea on exertion, irregular heartbeat, leg swelling, near-syncope, orthopnea, palpitations, paroxysmal nocturnal dyspnea and syncope.   Respiratory:  Negative for cough, shortness of breath, snoring and sputum production.    Gastrointestinal:  Positive for abdominal pain. Negative for dysphagia, heartburn, nausea and vomiting.   Neurological:  Negative for dizziness, headaches, loss of balance and weakness.     Objective:     Vital Signs (Most Recent):  Temp: 98.8 °F (37.1 °C) (04/24/25 1600)  Pulse: 67 (04/24/25 1900)  Resp: 20 (04/24/25 1900)  BP: (!) 114/57 (04/24/25 1900)  SpO2: 97 % (04/24/25 1900) Vital Signs (24h Range):  Temp:  [97.6 °F (36.4 °C)-98.9 °F (37.2 °C)] 98.8 °F (37.1 °C)  Pulse:  [61-72] 67  Resp:  [10-31] 20  SpO2:  [94 %-100 %] 97 %  BP: ()/(50-64) 114/57     Weight: 66.7 kg (147 lb 0.8 oz)  Body mass index is 18.88 kg/m².     SpO2: 97 %         Intake/Output Summary (Last 24 hours) at 4/24/2025 1934  Last data filed at 4/24/2025 1800  Gross per 24 hour   Intake 3135.07 ml   Output 750 ml   Net 2385.07 ml       Lines/Drains/Airways       Central Venous Catheter Line  Duration             Port A Cath Single Lumen Subclavian Left -- days              Drain  Duration                  Colostomy 04/23/15 LLQ 3654 days         NG/OG Tube 04/19/25 1130 [place] sump 18 Fr. Right nostril 5 days         Urethral Catheter 04/23/25 Non-latex;Straight-tip 16 Fr. 1 day              Peripheral Intravenous Line  Duration                  Peripheral IV - Single Lumen 04/23/25 0925 18 G No Right Forearm 1 day                       Physical Exam  Constitutional:       General: He is in acute distress.      Appearance: He is ill-appearing.   HENT:      Head: Normocephalic and atraumatic.   Eyes:      Extraocular Movements: Extraocular movements intact.      Pupils: Pupils are equal, round, and reactive to light.    Cardiovascular:      Rate and Rhythm: Normal rate and regular rhythm.      Pulses: Normal pulses.      Heart sounds: Normal heart sounds.   Pulmonary:      Effort: Pulmonary effort is normal.      Breath sounds: Normal breath sounds.   Musculoskeletal:      Left lower leg: No edema.   Skin:     General: Skin is warm.            Current Medications:   albuterol-ipratropium  3 mL Nebulization Q6H WAKE    heparin (porcine)  5,000 Units Subcutaneous Q8H    metoprolol  5 mg Intravenous Q6H    pantoprazole  40 mg Intravenous Daily      amiodarone in dextrose 5%  0.5 mg/min Intravenous Continuous 16.7 mL/hr at 04/24/25 1800 0.5 mg/min at 04/24/25 1800    dexmedeTOMIDine (Precedex) infusion (titrating)  0-1.4 mcg/kg/hr Intravenous Continuous 3.34 mL/hr at 04/24/25 1800 0.2 mcg/kg/hr at 04/24/25 1800    D5 and 0.9% NaCl   Intravenous Continuous   Paused at 04/24/25 1722       Current Facility-Administered Medications:     acetaminophen, 650 mg, Oral, Q6H PRN    dextrose 50%, 12.5 g, Intravenous, PRN    dextrose 50%, 12.5 g, Intravenous, PRN    dextrose 50%, 25 g, Intravenous, PRN    glucagon (human recombinant), 1 mg, Intramuscular, PRN    glucagon (human recombinant), 1 mg, Intramuscular, PRN    glucose, 16 g, Oral, PRN    glucose, 24 g, Oral, PRN    hydrALAZINE, 10 mg, Intravenous, Q6H PRN    HYDROmorphone, 0.2 mg, Intravenous, Q6H PRN    HYDROmorphone, 0.5 mg, Intravenous, Q6H PRN    insulin aspart U-100, 0-5 Units, Subcutaneous, Q6H PRN    naloxone, 0.02 mg, Intravenous, PRN    ondansetron, 4 mg, Intravenous, Q6H PRN    ondansetron, 4 mg, Intravenous, Daily PRN    prochlorperazine, 5 mg, Intravenous, Q6H PRN    sodium chloride 0.9%, 10 mL, Intravenous, PRN    Laboratory (all labs reviewed):  CBC:  Recent Labs   Lab 04/19/25  0430 04/20/25  0349 04/21/25  0330 04/23/25  0400 04/24/25  0358   WBC 7.64 6.67 6.81 8.20 9.53   HGB 11.0 L 12.4 L 11.1 L 11.9 L 9.7 L   HCT 34.9 L 38.4 L 35.7 L 38.1 L 31.0 L   Platelet Count 294  263 295 309 202       CHEMISTRIES:  Recent Labs   Lab 06/10/22  1113 05/09/23  1441 03/04/24  1156 06/11/24  0930 09/30/24  0912 01/14/25  1330 03/20/25  1522 04/11/25  1411 04/19/25 2004 04/20/25  0349 04/21/25  0330 04/22/25  0835 04/23/25  0400 04/24/25  0358   Glucose 145 H   < > 102 141 H 111 H 168 H 142 H  --   --   --   --   --   --   --    Sodium 139   < > 141 140 139 144 139   < > 144 145 151 H 149 H 151 H 144   Potassium 4.6   < > 4.7 4.9 4.7 4.4 4.5   < > 4.3 4.3 4.1 3.8 3.6 3.5   BUN 33 H   < > 24 H 36 H 23 18 18   < > 74 H 72 H 69 H 60 H 48 H 39 H   Creatinine 2.2 H   < > 1.8 H 2.1 H 1.8 H 1.7 H 1.6 H   < > 2.2 H 2.1 H 1.8 H 1.8 H 1.7 H 1.5 H   eGFR if non  26 A  --   --   --   --   --   --   --   --   --   --   --   --   --    eGFR  --    < > 35.8 A 29.7 A 35.8 A 38.3 A 40.9 A   < > 28 L 30 L 36 L 36 L 38 L 44 L   Calcium 10.0   < > 9.6 10.5 10.1 9.8 10.4   < > 9.3 9.5 8.8 9.4 9.5 8.5 L   Magnesium   --   --   --   --   --   --   --    < > 2.1 2.2 2.2  --  2.1 1.8    < > = values in this interval not displayed.       CARDIAC BIOMARKERS:  Recent Labs   Lab 05/10/23  0413 12/01/23  1215 12/01/23  1512 04/18/25  1159 04/18/25  1421   Troponin I 0.025 <0.006 0.006  --   --    Troponin-I  --   --   --  0.023 0.018       COAGS:  Recent Labs   Lab 04/11/25  1411   INR 1.1       LIPIDS/LFTS:  Recent Labs   Lab 04/20/25  0349 04/21/25  0330 04/22/25  0835 04/23/25  0400 04/24/25  0358   AST 17 14 13 13 11   ALT 18 13 10 10 9 L       BNP:  Recent Labs   Lab 05/09/23  1441 12/01/23  1215 03/20/25  1522 04/18/25  1159   BNP 10 29 132 H 21       TSH:  Recent Labs   Lab 05/09/23  1441   TSH 2.468       Free T4:

## 2025-04-25 NOTE — PLAN OF CARE
Problem: SLP  Goal: SLP Goal  Description: Goals:  1. Pt will participate in on-going assessment of swallowing function.    Outcome: Progressing    B/s swallow eval completed. Pt w/ good oral prep across pureed boluses. Multiple spontaneous swallows noted per bolus, likely 2/2 pharyngeal stasis 2/2 NGT in place. No overt s/s of aspiration across all thin liquid and pureed trials. ST recs conservative Full liquid diet w/ thin liquids until NGT is d/c. Meds crushed in pudding. ST to cont to follow for possible diet advancement once NGT is d/c.

## 2025-04-25 NOTE — PLAN OF CARE
Patient had gastrograffin done today, NGT removed .     Full liquid diet started, tolerated well.     Denies pain all shift.   Colostomy putting out flatus and brown stool.     Daughter visits.

## 2025-04-25 NOTE — PLAN OF CARE
Changes in medical condition or discharge plan: PT/OT to evaluate for best DC plan.  ST recommended liquid diet.  Amio able to switch to PO once able to swallow.     Does patient need new DME? tbd    Follow up appts needed: Patient will need followup appointments with PCP and addtional appointments as ordered by the discharging provider.      Medically stable: not at this time    Estimated Discharge Date: unknown       04/25/25 1522   Discharge Reassessment   Assessment Type Discharge Planning Reassessment   Did the patient's condition or plan change since previous assessment? Yes   Discharge Plan discussed with:   (in SIBR this am)   Communicated WIL with patient/caregiver Date not available/Unable to determine   Discharge Plan A   (tbd)   Discharge Plan B   (tbd)   DME Needed Upon Discharge    (tbd)   Transition of Care Barriers None   Why the patient remains in the hospital Requires continued medical care

## 2025-04-25 NOTE — NURSING
[facility] [place]  Nurses Note -- 4 Eyes      4/25/2025       Skin assessed on: Q Shift      [x] No Pressure Injuries Present    [x]Prevention Measures Documented    [] Yes LDA  for Pressure Injury Previously documented     [] Yes New Pressure Injury Discovered   [] LDA for New Pressure Injury Added      Attending RN:  Tamie Nagy RN     Second RN:  Eugene NESBITT

## 2025-04-26 LAB
ABSOLUTE EOSINOPHIL (OHS): 0 K/UL
ABSOLUTE MONOCYTE (OHS): 0.51 K/UL (ref 0.3–1)
ABSOLUTE NEUTROPHIL COUNT (OHS): 14.59 K/UL (ref 1.8–7.7)
ALBUMIN SERPL BCP-MCNC: 2.1 G/DL (ref 3.5–5.2)
ALP SERPL-CCNC: 45 UNIT/L (ref 40–150)
ALT SERPL W/O P-5'-P-CCNC: 6 UNIT/L (ref 10–44)
ANION GAP (OHS): 7 MMOL/L (ref 8–16)
AST SERPL-CCNC: 13 UNIT/L (ref 11–45)
BASOPHILS # BLD AUTO: 0.02 K/UL
BASOPHILS NFR BLD AUTO: 0.1 %
BILIRUB SERPL-MCNC: 0.3 MG/DL (ref 0.1–1)
BUN SERPL-MCNC: 29 MG/DL (ref 8–23)
CALCIUM SERPL-MCNC: 9.2 MG/DL (ref 8.7–10.5)
CHLORIDE SERPL-SCNC: 121 MMOL/L (ref 95–110)
CO2 SERPL-SCNC: 20 MMOL/L (ref 23–29)
CREAT SERPL-MCNC: 1.5 MG/DL (ref 0.5–1.4)
ERYTHROCYTE [DISTWIDTH] IN BLOOD BY AUTOMATED COUNT: 14.8 % (ref 11.5–14.5)
GFR SERPLBLD CREATININE-BSD FMLA CKD-EPI: 44 ML/MIN/1.73/M2
GLUCOSE SERPL-MCNC: 134 MG/DL (ref 70–110)
HCT VFR BLD AUTO: 30.1 % (ref 40–54)
HGB BLD-MCNC: 9.5 GM/DL (ref 14–18)
IMM GRANULOCYTES # BLD AUTO: 0.23 K/UL (ref 0–0.04)
IMM GRANULOCYTES NFR BLD AUTO: 1.4 % (ref 0–0.5)
LYMPHOCYTES # BLD AUTO: 0.53 K/UL (ref 1–4.8)
MAGNESIUM SERPL-MCNC: 1.8 MG/DL (ref 1.6–2.6)
MCH RBC QN AUTO: 29.1 PG (ref 27–31)
MCHC RBC AUTO-ENTMCNC: 31.6 G/DL (ref 32–36)
MCV RBC AUTO: 92 FL (ref 82–98)
NUCLEATED RBC (/100WBC) (OHS): 0 /100 WBC
PHOSPHATE SERPL-MCNC: 2.2 MG/DL (ref 2.7–4.5)
PLATELET # BLD AUTO: 212 K/UL (ref 150–450)
PMV BLD AUTO: 12.2 FL (ref 9.2–12.9)
POCT GLUCOSE: 114 MG/DL (ref 70–110)
POCT GLUCOSE: 146 MG/DL (ref 70–110)
POCT GLUCOSE: 147 MG/DL (ref 70–110)
POCT GLUCOSE: 154 MG/DL (ref 70–110)
POCT GLUCOSE: 95 MG/DL (ref 70–110)
POTASSIUM SERPL-SCNC: 3.8 MMOL/L (ref 3.5–5.1)
PROT SERPL-MCNC: 5.2 GM/DL (ref 6–8.4)
RBC # BLD AUTO: 3.27 M/UL (ref 4.6–6.2)
RELATIVE EOSINOPHIL (OHS): 0 %
RELATIVE LYMPHOCYTE (OHS): 3.3 % (ref 18–48)
RELATIVE MONOCYTE (OHS): 3.2 % (ref 4–15)
RELATIVE NEUTROPHIL (OHS): 92 % (ref 38–73)
SODIUM SERPL-SCNC: 148 MMOL/L (ref 136–145)
WBC # BLD AUTO: 15.88 K/UL (ref 3.9–12.7)

## 2025-04-26 PROCEDURE — 97535 SELF CARE MNGMENT TRAINING: CPT

## 2025-04-26 PROCEDURE — 99233 SBSQ HOSP IP/OBS HIGH 50: CPT | Mod: ,,, | Performed by: STUDENT IN AN ORGANIZED HEALTH CARE EDUCATION/TRAINING PROGRAM

## 2025-04-26 PROCEDURE — 85025 COMPLETE CBC W/AUTO DIFF WBC: CPT | Performed by: STUDENT IN AN ORGANIZED HEALTH CARE EDUCATION/TRAINING PROGRAM

## 2025-04-26 PROCEDURE — 63600175 PHARM REV CODE 636 W HCPCS: Performed by: STUDENT IN AN ORGANIZED HEALTH CARE EDUCATION/TRAINING PROGRAM

## 2025-04-26 PROCEDURE — 80053 COMPREHEN METABOLIC PANEL: CPT | Performed by: STUDENT IN AN ORGANIZED HEALTH CARE EDUCATION/TRAINING PROGRAM

## 2025-04-26 PROCEDURE — 25000242 PHARM REV CODE 250 ALT 637 W/ HCPCS: Performed by: HOSPITALIST

## 2025-04-26 PROCEDURE — 99900035 HC TECH TIME PER 15 MIN (STAT)

## 2025-04-26 PROCEDURE — 94761 N-INVAS EAR/PLS OXIMETRY MLT: CPT

## 2025-04-26 PROCEDURE — 63600175 PHARM REV CODE 636 W HCPCS: Performed by: INTERNAL MEDICINE

## 2025-04-26 PROCEDURE — 11000001 HC ACUTE MED/SURG PRIVATE ROOM

## 2025-04-26 PROCEDURE — 94640 AIRWAY INHALATION TREATMENT: CPT

## 2025-04-26 PROCEDURE — 92526 ORAL FUNCTION THERAPY: CPT

## 2025-04-26 PROCEDURE — 97530 THERAPEUTIC ACTIVITIES: CPT

## 2025-04-26 PROCEDURE — 51798 US URINE CAPACITY MEASURE: CPT

## 2025-04-26 PROCEDURE — 25000003 PHARM REV CODE 250: Performed by: STUDENT IN AN ORGANIZED HEALTH CARE EDUCATION/TRAINING PROGRAM

## 2025-04-26 PROCEDURE — 84100 ASSAY OF PHOSPHORUS: CPT | Performed by: STUDENT IN AN ORGANIZED HEALTH CARE EDUCATION/TRAINING PROGRAM

## 2025-04-26 PROCEDURE — 83735 ASSAY OF MAGNESIUM: CPT | Performed by: STUDENT IN AN ORGANIZED HEALTH CARE EDUCATION/TRAINING PROGRAM

## 2025-04-26 PROCEDURE — 97161 PT EVAL LOW COMPLEX 20 MIN: CPT

## 2025-04-26 PROCEDURE — 25000003 PHARM REV CODE 250: Performed by: INTERNAL MEDICINE

## 2025-04-26 RX ORDER — IPRATROPIUM BROMIDE AND ALBUTEROL SULFATE 2.5; .5 MG/3ML; MG/3ML
3 SOLUTION RESPIRATORY (INHALATION) EVERY 6 HOURS PRN
Status: DISCONTINUED | OUTPATIENT
Start: 2025-04-26 | End: 2025-04-29 | Stop reason: HOSPADM

## 2025-04-26 RX ORDER — METOPROLOL TARTRATE 25 MG/1
12.5 TABLET ORAL 2 TIMES DAILY
Status: DISCONTINUED | OUTPATIENT
Start: 2025-04-26 | End: 2025-04-29 | Stop reason: HOSPADM

## 2025-04-26 RX ADMIN — HYDROMORPHONE HYDROCHLORIDE 0.5 MG: 1 INJECTION, SOLUTION INTRAMUSCULAR; INTRAVENOUS; SUBCUTANEOUS at 06:04

## 2025-04-26 RX ADMIN — AMIODARONE HYDROCHLORIDE 400 MG: 200 TABLET ORAL at 08:04

## 2025-04-26 RX ADMIN — IPRATROPIUM BROMIDE AND ALBUTEROL SULFATE 3 ML: 2.5; .5 SOLUTION RESPIRATORY (INHALATION) at 08:04

## 2025-04-26 RX ADMIN — AMIODARONE HYDROCHLORIDE 400 MG: 200 TABLET ORAL at 09:04

## 2025-04-26 RX ADMIN — METOROPROLOL TARTRATE 5 MG: 5 INJECTION, SOLUTION INTRAVENOUS at 05:04

## 2025-04-26 RX ADMIN — METOPROLOL TARTRATE 12.5 MG: 25 TABLET, FILM COATED ORAL at 01:04

## 2025-04-26 RX ADMIN — METOPROLOL TARTRATE 12.5 MG: 25 TABLET, FILM COATED ORAL at 09:04

## 2025-04-26 RX ADMIN — HEPARIN SODIUM 5000 UNITS: 5000 INJECTION INTRAVENOUS; SUBCUTANEOUS at 02:04

## 2025-04-26 RX ADMIN — HEPARIN SODIUM 5000 UNITS: 5000 INJECTION INTRAVENOUS; SUBCUTANEOUS at 05:04

## 2025-04-26 RX ADMIN — PANTOPRAZOLE SODIUM 40 MG: 40 INJECTION, POWDER, FOR SOLUTION INTRAVENOUS at 08:04

## 2025-04-26 RX ADMIN — HEPARIN SODIUM 5000 UNITS: 5000 INJECTION INTRAVENOUS; SUBCUTANEOUS at 09:04

## 2025-04-26 NOTE — SUBJECTIVE & OBJECTIVE
Interval History:  GGC ordered yesterday and had large amount of stool/gas output overnight. Tolerating diet thus far. Meds transitioned to PO. Stable for transfer to floor. He reports feeling well today.    Review of Systems  Objective:     Vital Signs (Most Recent):  Temp: 98.3 °F (36.8 °C) (04/26/25 0701)  Pulse: 85 (04/26/25 1000)  Resp: (!) 37 (04/26/25 1000)  BP: (!) 103/57 (04/26/25 1000)  SpO2: 96 % (04/26/25 1000) Vital Signs (24h Range):  Temp:  [97.7 °F (36.5 °C)-98.4 °F (36.9 °C)] 98.3 °F (36.8 °C)  Pulse:  [60-96] 85  Resp:  [14-46] 37  SpO2:  [94 %-100 %] 96 %  BP: (103-169)/(57-90) 103/57     Weight: 66.7 kg (147 lb 0.8 oz)  Body mass index is 18.88 kg/m².    Intake/Output Summary (Last 24 hours) at 4/26/2025 1031  Last data filed at 4/26/2025 0701  Gross per 24 hour   Intake 1097.69 ml   Output 1525 ml   Net -427.31 ml         Physical Exam  Vitals and nursing note reviewed.   Constitutional:       Appearance: He is ill-appearing.   HENT:      Mouth/Throat:      Mouth: Mucous membranes are dry.   Cardiovascular:      Rate and Rhythm: Normal rate and regular rhythm.   Pulmonary:      Effort: Pulmonary effort is normal.      Breath sounds: Normal breath sounds.   Abdominal:      General: There is distension (Not tense).      Tenderness: There is no abdominal tenderness. There is no guarding or rebound.      Comments: Midline incision intact, ostomy with stool/gas. Abdomen soft   Musculoskeletal:      Right lower leg: No edema.      Left lower leg: No edema.   Skin:     General: Skin is warm and dry.   Neurological:      Mental Status: He is alert.      Comments: Alert, awake and calm. Oriented to self               Significant Labs: All pertinent labs within the past 24 hours have been reviewed.  BMP:   Recent Labs   Lab 04/26/25  0259   *   K 3.8   *   CO2 20*   BUN 29*   CREATININE 1.5*   CALCIUM 9.2   MG 1.8     CBC:   Recent Labs   Lab 04/25/25  0358 04/26/25  0259   WBC 10.26 15.88*    HGB 8.6* 9.5*   HCT 27.1* 30.1*    212       Significant Imaging: I have reviewed all pertinent imaging results/findings within the past 24 hours.

## 2025-04-26 NOTE — PT/OT/SLP EVAL
Physical Therapy Evaluation and Treatment    Patient Name: Eugene Gamez   MRN: [id number]  Recent Surgery: Procedure(s) (LRB):  LAPAROTOMY, EXPLORATORY (N/A)  REPAIR, HERNIA, INCISIONAL (N/A) 3 Days Post-Op    Recommendations:     Discharge Recommendations: Moderate Intensity Therapy   Discharge Equipment Recommendations: none       Assessment:     Eugene Gamez is a 89 y.o. male admitted with a medical diagnosis of SBO (small bowel obstruction). He presents with the following impairments/functional limitations: weakness, impaired endurance, impaired functional mobility, gait instability, decreased lower extremity function, decreased ROM.     Rehab Prognosis: Good and Fair; patient would benefit from acute PT services to address these deficits and reach maximum level of function.    Plan:     During this hospitalization, patient to be seen 3 x/week to address the above listed problems via gait training, therapeutic activities, therapeutic exercises    Plan of Care Expires: 05/02/25    Subjective     Chief Complaint: None  Patient Comments/Goals: Pt agreeable to PT evaluation and treatment.  Pain/Comfort:  Pain Rating 1: 0/10    Social History:  Living Environment: Patient lives with their spouse and daughter in a single story home with number of outside stair(s): 0  Prior Level of Function: Prior to admission, patient was modified independent  Equipment Used at Home: wheelchair, walker, rolling, shower chair, bedside commode, rollator  DME owned (not currently used): none  Assistance Upon Discharge: family and facility staff    Objective:     Communicated with Tamie north prior to session. Patient found HOB elevated with blood pressure cuff, pressure relief boots, PureWick, pulse ox (continuous), SCD, telemetry upon PT entry to room.    General Precautions: Standard, fall   Orthopedic Precautions: N/A   Braces: N/A    Respiratory Status: Room air    Exams:  Cognition: Patient is oriented to Person, Place, and  Situation  RLE ROM: Deficits: decreased knee flexion  RLE Strength: WFL except knee  LLE ROM: Deficits: decreased knee flexion  LLE Strength: WFL except knee      Functional Mobility:  Gait belt applied - Yes  Bed Mobility  Supine to Sit: maximal assistance for LE management and trunk management  Transfers  Sit to Stand: maximal assistance with rolling walker and with cues for hand placement  Gait  Patient ambulated 3 small steps to B/S chair with rolling walker and maximal assistance. Patient demonstrates occasional unsteady gait and flexed posture. Pt requesting PT to pull him forward as his trunk was extended.   Balance  Sitting: stand by assistance  Standing: maximal assistance      Therapeutic Activities and Exercises:   Patient educated on role of acute care PT and PT POC and call light usage  Patient educated about importance of OOB mobility and remaining up in chair most of the day.  OOB to chair, pt requested wash cloth to wash under his arms, provided him with a soapy towel to wash his armpits and warm wash cloth to wipe his face.    AM-PAC 6 CLICK MOBILITY  Total Score:11    Patient left  reclined in chair  with all lines intact, call button in reach, RN notified, and all needs in reach .  Spoke with nurse to inform her of need for 2 person to assist pt to stand with RW and once standing he can take a few steps back to bed with assistance.    GOALS:   Multidisciplinary Problems       Physical Therapy Goals          Problem: Physical Therapy    Goal Priority Disciplines Outcome Interventions   Physical Therapy Goal     PT, PT/OT Progressing    Description: Goals to be met by: 25     Patient will increase functional independence with mobility by performin. Pt to be (S) with bed mobility.  2. Pt to transfer with min A.  3. Pt to ambulate 50' w/RW CGA.  4. Pt to be (I) with HEP.                         DME Justifications:  No DME recommended requiring DME justifications    History:     Past Medical  History:   Diagnosis Date    Arthritis     Cancer     Diabetes mellitus     Elevated PSA     Gout, unspecified     Hypertension     Nuclear sclerotic cataract of both eyes 6/29/2018       Past Surgical History:   Procedure Laterality Date    CATARACT EXTRACTION      ou    EYE SURGERY      bilateral cataracts    LAPAROTOMY, EXPLORATORY N/A 4/23/2025    Procedure: LAPAROTOMY, EXPLORATORY;  Surgeon: Abran Camacho MD;  Location: [facility] OR;  Service: General;  Laterality: N/A;  lysis of adhsions    removal of small bowel  Apr. 2015    Colostomy    REPAIR, HERNIA, INCISIONAL N/A 4/23/2025    Procedure: REPAIR, HERNIA, INCISIONAL;  Surgeon: Abran Camacho MD;  Location: [facility] OR;  Service: General;  Laterality: N/A;       Time Tracking:     PT Received On: 04/26/25  PT Start Time: 0952  PT Stop Time: 1019  PT Total Time (min): 27 min     Billable Minutes: Evaluation 10 and Therapeutic Activity 17    4/26/2025

## 2025-04-26 NOTE — PROGRESS NOTES
Surgery Progress Note    Eugene Gamez is a 89 y.o. year old male in hospital for small bowel obstruction. He underwent exlap with lysis of adhesions on 4/23/25. Tolerated procedure well     Diet advanced. Tolerated well. Having ostomy function    ROS:  Negative except above    PE:  Vitals:    04/26/25 0701 04/26/25 0832 04/26/25 1000 04/26/25 1101   BP: (!) 144/72  (!) 103/57 125/67   BP Location:       Patient Position:       Pulse: 62 76 85 87   Resp: (!) 32 (!) 30 (!) 37 (!) 41   Temp: 98.3 °F (36.8 °C)   97.9 °F (36.6 °C)   TempSrc: Oral   Axillary   SpO2: 100% 100% 96% 98%   Weight:       Height:           NAD  No belabored breathing  No skin changes  Abd soft less distended. Gas and stool in bag    Significant Diagnostic Studies: N/A    A/P:  Eugene Gamez is a 89 y.o. year old male  with small bowel obstruction. Patient not progressing with conservative management; decision made to proceed to or for exlap with IZZY. Tolerated procedure well.     - continue diet  - agree with step down to the floor  - rest of care per primary team    Honey Meneses  General Surgery - [facility]  4/26/2025

## 2025-04-26 NOTE — PT/OT/SLP PROGRESS
Speech Language Pathology Treatment    Patient Name:  Eugene Gamez   MRN:  [id number]  Admitting Diagnosis: SBO (small bowel obstruction)    Recommendations:                 General Recommendations:  Dysphagia therapy  Diet recommendations:  Puree Diet - IDDSI Level 4, Liquid Diet Level: Thin liquids - IDDSI Level 0   Aspiration Precautions: 1 bite/sip at a time, Meds whole 1 at a time, and Small bites/sips   General Precautions: Standard, aspiration, pureed diet  Communication strategies:   [facility]    Assessment:     Eugene Gamez is a 89 y.o. male with a dx of SBO (small bowel obstruction). Pt noted to demonstrate improved swallow since initial eval yesterday. NTG no longer in place. ST recs: advance diet to pureed and con't thin liquids and whole meds one at a time. ST will provide ongoing education regarding safe swallowing strategies and assess for further diet advancement once family brings pt's dentures from home.    Subjective     Pt alert and oriented seated in chair at bedside upon SLP arrival. LAZ Willett reported pt to be transferred out of ICU to the floor today.      Pain/Comfort:  Pain Rating 1: 0/10    Respiratory Status: Room air    Objective:     Has the patient been evaluated by SLP for swallowing?   Yes  Keep patient NPO? No   Current Respiratory Status:          NGT removed yesterday.    Pt accepted multiple trials of thin liquids via straw and pt consumed 5 trials of pudding . All trials were administered by SLP. Pt demonstrated slow oral prep and tongue pumping of pureed consistency. Slightly delayed swallow initiation across trials noted. No throat clearing or coughing observed across consistencies. Clear vocal quality post swallow observed.    Please note silent aspiration cannot be ruled out at bedside.    Education: Pt was educated regarding new diet recs and aspiration precautions.Pt v/o of education provided.       Goals:   Multidisciplinary Problems       SLP Goals          Problem: SLP     Goal Priority Disciplines Outcome   SLP Goal    Low SLP Progressing   Description: Goals:  1. Pt will participate in on-going assessment of swallowing function.    2.Pt will tolerate PO diet of pureed consistency with thin liquids without overt s/s of aspiration.                           Plan:     Patient to be seen:  2 x/week, 3 x/week   Plan of Care expires:     Plan of Care reviewed with:  patient   SLP Follow-Up:  Yes       Discharge recommendations:      Barriers to Discharge:  None    Time Tracking:     SLP Treatment Date:   04/26/25  Speech Start Time:  1037  Speech Stop Time:  1101     Speech Total Time (min):  24 min    Billable Minutes: Treatment Swallowing Dysfunction 14 min and Self Care/Home Management Training 10 min    04/26/2025

## 2025-04-26 NOTE — PLAN OF CARE
Patient more awake today, tolerating diet and is able to feed himself today.   Up to chair with PT, but difficult to get back, legs very weak and needed a lot of support, even with walker in use.    Transfer orders put in, awaiting a room.    Daughter visits.

## 2025-04-26 NOTE — ASSESSMENT & PLAN NOTE
"- CT upon admission: "High-grade small bowel obstruction related to a 5 by 5 cm ventral abdominal wall hernia. Status post left colectomy common diverting left lower quadrant ostomy and multiple small bowel anastomoses with markedly dilated small bowel loops in the deep pelvis which may reflect acute superimposed on chronic change."  - General surgery consulted  - NPO   - NGT placed and LIWS  - GGC ordered for 4/21 and now passage of contrast to colon, no gas/stool in ostomy bag  - Surgery discussing ex-lap with patient and family - tentatively for 4/23/25  - Cardiology aware and following, will ask for risk stratification for surgery     - underwent exlap with lysis of adhesions on 4/23.   - NGT in place  - Stool/gas in ostomy on 4/25 and again after GGC  - full liquid diet, family to bring dentures -- SLP following   "

## 2025-04-26 NOTE — PROGRESS NOTES
[facility]  Progress Note    Patient Name: Eugene Gamez  MRN: [id number]  Patient Class: IP- Inpatient   Admission Date: 4/18/2025  Length of Stay: 8 days  Attending Physician: Cristino Marie MD  Primary Care Provider: Larry Monae MD        Subjective     Principal Problem:SBO (small bowel obstruction)        HPI:  Mr Eugene Gamez is a 89 y.o. man with history of L colectomy for colon cancer (2015) with ventral hernia, prior DM, HTN, CKD3 (baseline Cr 1) who presented with nausea and vomiting, abdominal pain.  The symptoms began a day or 2 ago but significantly worsened yesterday morning.  Last night and today He has not been able to keep much of anything down.  He denies any issues with his colostomy, stool has been normal.  Overall he feels weak and fatigued.  Denies dysuria, difficulty urinating, changes in bowel habits  His CMP shows creatinine 2.4, bicarb 21, anion gap 17, glucose 209; UA with trace protein, 7 RBC, 2 WBC; CT abdomen and pelvis with high-grade small-bowel obstruction related to a 5 x 5 cm ventral abdominal wall hernia, right middle and right lower lobe mucous plugging and/or endobronchial spread of infection/bronchitis.  He was admitted for high grade SBO. Started IVF. General surgery consulted.     Overview/Hospital Course:  Mr Eugene Gamez is a 89 y.o. man with history of L colectomy for colon cancer (2015) with colostomy in place and ventral hernia who was admitted with SBO. General surgery consulted.  NGT placed.  Patient with apparent episode of poor responsiveness.  Transferred to ICU, but quickly recovered.  Patient with atrial and ventricular ectopy on tele.  Cardiology consulted.  Patient started on Amiodarone drip. GGC ordered and unfortunately no passage to colon. Palliative following. Surgery is discussing ex-lap with patient and family, tentatively planned for 4/23. Patient underwent ex-lap on 4/23 and had lysis of adhesions, appears  to be problem. NGT in place and extubated, back to ICU. Having some post operative delirium controlled on low dose precedex. Awaiting bowel function to return.  Stooling gas noted and ostomy on 04/25. GGC ordered with significant ostomy output overnight. NGT removed, SLP cleared for full liquid diet. PT/OT consulted. Transitioned to PO amiodarone and metoprolol. Stable for transfer to floor.    Interval History:  GGC ordered yesterday and had large amount of stool/gas output overnight. Tolerating diet thus far. Meds transitioned to PO. Stable for transfer to floor. He reports feeling well today.    Review of Systems  Objective:     Vital Signs (Most Recent):  Temp: 98.3 °F (36.8 °C) (04/26/25 0701)  Pulse: 85 (04/26/25 1000)  Resp: (!) 37 (04/26/25 1000)  BP: (!) 103/57 (04/26/25 1000)  SpO2: 96 % (04/26/25 1000) Vital Signs (24h Range):  Temp:  [97.7 °F (36.5 °C)-98.4 °F (36.9 °C)] 98.3 °F (36.8 °C)  Pulse:  [60-96] 85  Resp:  [14-46] 37  SpO2:  [94 %-100 %] 96 %  BP: (103-169)/(57-90) 103/57     Weight: 66.7 kg (147 lb 0.8 oz)  Body mass index is 18.88 kg/m².    Intake/Output Summary (Last 24 hours) at 4/26/2025 1031  Last data filed at 4/26/2025 0701  Gross per 24 hour   Intake 1097.69 ml   Output 1525 ml   Net -427.31 ml         Physical Exam  Vitals and nursing note reviewed.   Constitutional:       Appearance: He is ill-appearing.   HENT:      Mouth/Throat:      Mouth: Mucous membranes are dry.   Cardiovascular:      Rate and Rhythm: Normal rate and regular rhythm.   Pulmonary:      Effort: Pulmonary effort is normal.      Breath sounds: Normal breath sounds.   Abdominal:      General: There is distension (Not tense).      Tenderness: There is no abdominal tenderness. There is no guarding or rebound.      Comments: Midline incision intact, ostomy with stool/gas. Abdomen soft   Musculoskeletal:      Right lower leg: No edema.      Left lower leg: No edema.   Skin:     General: Skin is warm and dry.  "  Neurological:      Mental Status: He is alert.      Comments: Alert, awake and calm. Oriented to self               Significant Labs: All pertinent labs within the past 24 hours have been reviewed.  BMP:   Recent Labs   Lab 04/26/25  0259   *   K 3.8   *   CO2 20*   BUN 29*   CREATININE 1.5*   CALCIUM 9.2   MG 1.8     CBC:   Recent Labs   Lab 04/25/25  0358 04/26/25  0259   WBC 10.26 15.88*   HGB 8.6* 9.5*   HCT 27.1* 30.1*    212       Significant Imaging: I have reviewed all pertinent imaging results/findings within the past 24 hours.      Assessment & Plan  SBO (small bowel obstruction)  - CT upon admission: "High-grade small bowel obstruction related to a 5 by 5 cm ventral abdominal wall hernia. Status post left colectomy common diverting left lower quadrant ostomy and multiple small bowel anastomoses with markedly dilated small bowel loops in the deep pelvis which may reflect acute superimposed on chronic change."  - General surgery consulted  - NPO   - NGT placed and LIWS  - GGC ordered for 4/21 and now passage of contrast to colon, no gas/stool in ostomy bag  - Surgery discussing ex-lap with patient and family - tentatively for 4/23/25  - Cardiology aware and following, will ask for risk stratification for surgery     - underwent exlap with lysis of adhesions on 4/23.   - NGT in place  - Stool/gas in ostomy on 4/25 and again after GGC  - full liquid diet, family to bring dentures -- SLP following   Acute renal failure superimposed on stage 3b chronic kidney disease  VINNY is likely due to  prerenal from SBO . Baseline creatinine is  1 . Most recent creatinine and eGFR are listed below.  Recent Labs     04/24/25  0358 04/25/25  0358 04/26/25  0259   CREATININE 1.5* 1.5* 1.5*   EGFRNORACEVR 44* 44* 44*   - UA unremarkable  - CT shows no hydronephrosis     Plan  - Avoid nephrotoxins and renally dose meds for GFR listed above  - Monitor urine output, serial BMP, and adjust therapy as needed  - " improving on IVF's and stable  HTN, goal below 140/80  Patient's blood pressure range in the last 24 hours was: BP  Min: 103/57  Max: 169/85.The patient's inpatient anti-hypertensive regimen is listed below:  Current Antihypertensives  hydrALAZINE injection 10 mg, Every 6 hours PRN, Intravenous  metoprolol tartrate (LOPRESSOR) split tablet 12.5 mg, 2 times daily, Oral    Plan  - BP increasing as patient unable to take his oral BP medications.  Continue with prn IV Hydralazine.   Pulmonary emphysema, unspecified emphysema type  Patient's COPD is controlled currently.  Patient is currently off COPD Pathway.  Stable on room air  Anemia of chronic renal failure, stage 3b  Anemia is likely due to chronic disease due to Chronic Kidney Disease. Most recent hemoglobin and hematocrit are listed below.  Recent Labs     04/24/25  0358 04/25/25  0358 04/26/25  0259   HGB 9.7* 8.6* 9.5*   HCT 31.0* 27.1* 30.1*     Plan  - Monitor serial CBC: Daily  - Transfuse PRBC if patient becomes hemodynamically unstable, symptomatic or H/H drops below 7/21.  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 Liver biopsy negative     Ventral hernia  - see SBO    Severe protein-calorie malnutrition  Body mass index is 18.88 kg/m².  Notably, albumin 3.6 on admission- perhaps due to dehydration  - now with bowel movements  - SLP consulted for diet - on full liquids, bringing dentures to re-evaluate for advancement      NSVT (nonsustained ventricular tachycardia)  Episodes of NSVT on monitoring.  Check Echo.  Cardiology consulted.  Atrial and ventricular ectopy.  Started on amiodarone drip per Cards  recommendations.  - on IV metoprolol  - switch to PO amiodarone 400 mg PO bid until outpatient follow up      Atrial tachycardia  - on iv amio and iv metoprolol  - Now on PO amiodarone and metoprolol    Advance care planning  Palliative Care consult. Full code at this time.    Hypernatremia  Hypernatremia is likely due to  normal saline IVF's . The patient's most recent sodium results are listed below.  Recent Labs     04/24/25  0358 04/25/25  0358 04/26/25  0259    145 148*     Plan  - Aim to correct the sodium by 8-10mEq in 24 hours.   - Plan to correct their hypernatremia with Select IV fluids: D5W/0.45 NaCl - increase rate to 75 cc/hr. Now 0.45 NS.  - Will plan to trend the patient's sodium: Daily  - now resolved with fluids however slightly elevated again  - encourage PO intake now he is able  - recheck in AM    Delirium  Post operative delirium noted. Unable to redirect, concern for removal of NGT and other tubes. Had ex-lap 4/23.  - precedex drip now off  - resolved    VTE Risk Mitigation (From admission, onward)           Ordered     heparin (porcine) injection 5,000 Units  Every 8 hours         04/19/25 1933     Place ARI hose  Until discontinued         04/18/25 1542     Place sequential compression device  Until discontinued         04/18/25 1542     Reason for No Pharmacological VTE Prophylaxis  Once        Question:  Reasons:  Answer:  Physician Provided (leave comment)  Comment:  potential OR    04/18/25 1542     IP VTE HIGH RISK PATIENT  Once         04/18/25 1542                    Discharge Planning   WIL:      Code Status: Full Code   Medical Readiness for Discharge Date:   Discharge Plan A:  (tbd)            Critical care time spent on the evaluation and treatment of severe organ dysfunction, review of pertinent labs and imaging studies, discussions with consulting providers and discussions with patient/family: 20 minutes.    Please place Justification for DME        Cristino Marie  MD  Department of Hospital Medicine   [facility] - [place] - Intensive Care

## 2025-04-26 NOTE — ASSESSMENT & PLAN NOTE
Patient's COPD is controlled currently.  Patient is currently off COPD Pathway.  Stable on room air   Alert-The patient is alert, awake and responds to voice. The patient is oriented to time, place, and person. The triage nurse is able to obtain subjective information.

## 2025-04-26 NOTE — ASSESSMENT & PLAN NOTE
Hypernatremia is likely due to normal saline IVF's. The patient's most recent sodium results are listed below.  Recent Labs     04/24/25  0358 04/25/25  0358 04/26/25  0259    145 148*     Plan  - Aim to correct the sodium by 8-10mEq in 24 hours.   - Plan to correct their hypernatremia with Select IV fluids: D5W/0.45 NaCl - increase rate to 75 cc/hr. Now 0.45 NS.  - Will plan to trend the patient's sodium: Daily  - now resolved with fluids however slightly elevated again  - encourage PO intake now he is able  - recheck in AM

## 2025-04-26 NOTE — EICU
Virtual ICU Quality Rounds    Admit Date: 4/18/2025  Hospital Day: 8    ICU Day: 6d 15h    24H Vital Sign Range:  Temp:  [97.7 °F (36.5 °C)-98.4 °F (36.9 °C)]   Pulse:  [60-96]   Resp:  [14-46]   BP: (103-169)/(57-90)   SpO2:  [94 %-100 %]     VICU Surveillance Screening    Daily review of  line necessity(optional): Central line(s) in place    Central line type (optional): Port    LDA reconciliation : Yes    Nursing orders placed : IP GREER Central Line Care

## 2025-04-26 NOTE — EICU
YANE Night Rounds Checklist  24H Vital Sign Range:  Temp:  [97 °F (36.1 °C)-98.3 °F (36.8 °C)]   Pulse:  [58-79]   Resp:  [11-37]   BP: (104-146)/(56-78)   SpO2:  [96 %-100 %]     Video rounds and LDA reconciliation

## 2025-04-26 NOTE — PROGRESS NOTES
Surgery Progress Note    Eugene Gamez is a 89 y.o. year old male in hospital for small bowel obstruction. He underwent exlap with lysis of adhesions on 4/23/25. Tolerated procedure well     Pain is controlled    ROS:  Negative except above    PE:  Vitals:    04/26/25 0545 04/26/25 0600 04/26/25 0701 04/26/25 0832   BP: (!) 169/85 (!) 142/69 (!) 144/72    BP Location:  Left arm     Patient Position:  Lying     Pulse: 69 60 62 76   Resp: (!) 23 (!) 27 (!) 32 (!) 30   Temp:   98.3 °F (36.8 °C)    TempSrc:   Oral    SpO2: 100% 100% 100% 100%   Weight:       Height:           NAD  No belabored breathing  No skin changes  Abd soft mildly distended. Incisions c/d/I. No gas or stool in colostomy bag    Significant Diagnostic Studies: N/A    A/P:  Eugene Gamez is a 89 y.o. year old male  with small bowel obstruction. Patient not progressing with conservative management; decision made to proceed to or for exlap with IZZY. Tolerated procedure well.     - Gastrograffin challenge  - NGT removal once done      Honey Meneses  General Surgery - [facility]  4/26/2025

## 2025-04-26 NOTE — ASSESSMENT & PLAN NOTE
VINNY is likely due to prerenal from SBO. Baseline creatinine is 1. Most recent creatinine and eGFR are listed below.  Recent Labs     04/24/25  0358 04/25/25  0358 04/26/25  0259   CREATININE 1.5* 1.5* 1.5*   EGFRNORACEVR 44* 44* 44*   - UA unremarkable  - CT shows no hydronephrosis     Plan  - Avoid nephrotoxins and renally dose meds for GFR listed above  - Monitor urine output, serial BMP, and adjust therapy as needed  - improving on IVF's and stable

## 2025-04-26 NOTE — NURSING
[facility] [place]  Nurses Note -- 4 Eyes      4/26/2025       Skin assessed on: Q Shift      [x] No Pressure Injuries Present    [x]Prevention Measures Documented    [] Yes LDA  for Pressure Injury Previously documented     [] Yes New Pressure Injury Discovered   [] LDA for New Pressure Injury Added      Attending RN:  Tamie Nagy RN     Second RN:  Eugene NESBITT

## 2025-04-26 NOTE — PLAN OF CARE
Patient remains in the ICU. Saturation maintained in room air. Patient is not oriented to place, time and situation. Colostomy bag  and external urinary catheter in place. Bladder scan done and  300 ml urine drained.   Problem: Adult Inpatient Plan of Care  Goal: Plan of Care Review  Outcome: Progressing  Goal: Patient-Specific Goal (Individualized)  Outcome: Progressing  Goal: Absence of Hospital-Acquired Illness or Injury  Outcome: Progressing  Goal: Optimal Comfort and Wellbeing  Outcome: Progressing  Goal: Readiness for Transition of Care  Outcome: Progressing     Problem: Infection  Goal: Absence of Infection Signs and Symptoms  Outcome: Progressing     Problem: Acute Kidney Injury/Impairment  Goal: Fluid and Electrolyte Balance  Outcome: Progressing  Goal: Improved Oral Intake  Outcome: Progressing  Goal: Effective Renal Function  Outcome: Progressing     Problem: Skin Injury Risk Increased  Goal: Skin Health and Integrity  Outcome: Progressing     Problem: Wound  Goal: Optimal Coping  Outcome: Progressing  Goal: Optimal Functional Ability  Outcome: Progressing  Goal: Absence of Infection Signs and Symptoms  Outcome: Progressing  Goal: Improved Oral Intake  Outcome: Progressing  Goal: Optimal Pain Control and Function  Outcome: Progressing  Goal: Skin Health and Integrity  Outcome: Progressing  Goal: Optimal Wound Healing  Outcome: Progressing     Problem: Fall Injury Risk  Goal: Absence of Fall and Fall-Related Injury  Outcome: Progressing     Problem: Coping Ineffective  Goal: Effective Coping  Outcome: Progressing

## 2025-04-26 NOTE — ASSESSMENT & PLAN NOTE
Episodes of NSVT on monitoring.  Check Echo.  Cardiology consulted.  Atrial and ventricular ectopy.  Started on amiodarone drip per Cards recommendations.  - on IV metoprolol  - switch to PO amiodarone 400 mg PO bid until outpatient follow up

## 2025-04-26 NOTE — ASSESSMENT & PLAN NOTE
Body mass index is 18.88 kg/m².  Notably, albumin 3.6 on admission- perhaps due to dehydration  - now with bowel movements  - SLP consulted for diet - on full liquids, bringing dentures to re-evaluate for advancement

## 2025-04-26 NOTE — ASSESSMENT & PLAN NOTE
Anemia is likely due to chronic disease due to Chronic Kidney Disease. Most recent hemoglobin and hematocrit are listed below.  Recent Labs     04/24/25  0358 04/25/25  0358 04/26/25  0259   HGB 9.7* 8.6* 9.5*   HCT 31.0* 27.1* 30.1*     Plan  - Monitor serial CBC: Daily  - Transfuse PRBC if patient becomes hemodynamically unstable, symptomatic or H/H drops below 7/21.  - Patient has not received any PRBC transfusions to date  - Patient's anemia is currently stable

## 2025-04-26 NOTE — NURSING
[facility] [place]  Nurses Note -- 4 Eyes      4/26/2025       Skin assessed on: Q Shift      [x] No Pressure Injuries Present    [x]Prevention Measures Documented    [] Yes LDA  for Pressure Injury Previously documented     [] Yes New Pressure Injury Discovered   [] LDA for New Pressure Injury Added      Attending RN:  Donna Cole RN     Second RN:   LAZ Damon

## 2025-04-26 NOTE — PROVIDER TRANSFER
Mr Eugene Gamez is a 89 y.o. man with history of L colectomy for colon cancer (2015) with colostomy in place and ventral hernia who was admitted with SBO. General surgery consulted. NGT placed. Patient with apparent episode of poor responsiveness. Transferred to ICU, but quickly recovered. Patient with atrial and ventricular ectopy on tele. Cardiology consulted. Patient started on Amiodarone drip. GGC ordered and unfortunately no passage to colon. Palliative following. Surgery is discussing ex-lap with patient and family, tentatively planned for 4/23. Patient underwent ex-lap on 4/23 and had lysis of adhesions, appears to be problem. NGT in place and extubated, back to ICU. Having some post operative delirium controlled on low dose precedex now resolved. Stool and gas noted and ostomy on 04/25. GGC ordered with significant ostomy output overnight. NGT removed, SLP cleared for full liquid diet. PT/OT consulted. Transitioned to PO amiodarone and metoprolol. Stable for transfer to floor.     - f/u SLP recommendations  - PT/OT   - f/u Na levels, may require fluids    Cristino Marie MD  Department of Hospital Medicine  [facility] [place]

## 2025-04-26 NOTE — PLAN OF CARE
Problem: Physical Therapy  Goal: Physical Therapy Goal  Description: Goals to be met by: 25     Patient will increase functional independence with mobility by performin. Pt to be (S) with bed mobility.  2. Pt to transfer with min A.  3. Pt to ambulate 50' w/RW CGA.  4. Pt to be (I) with HEP.    Outcome: Progressing   Initial eval completed, see in chart for details.

## 2025-04-27 LAB
ABSOLUTE EOSINOPHIL (OHS): 0.08 K/UL
ABSOLUTE MONOCYTE (OHS): 0.54 K/UL (ref 0.3–1)
ABSOLUTE NEUTROPHIL COUNT (OHS): 8.78 K/UL (ref 1.8–7.7)
ALBUMIN SERPL BCP-MCNC: 2.1 G/DL (ref 3.5–5.2)
ALP SERPL-CCNC: 43 UNIT/L (ref 40–150)
ALT SERPL W/O P-5'-P-CCNC: 6 UNIT/L (ref 10–44)
ANION GAP (OHS): 8 MMOL/L (ref 8–16)
AST SERPL-CCNC: 10 UNIT/L (ref 11–45)
BASOPHILS # BLD AUTO: 0.01 K/UL
BASOPHILS NFR BLD AUTO: 0.1 %
BILIRUB SERPL-MCNC: 0.4 MG/DL (ref 0.1–1)
BUN SERPL-MCNC: 24 MG/DL (ref 8–23)
CALCIUM SERPL-MCNC: 9.1 MG/DL (ref 8.7–10.5)
CHLORIDE SERPL-SCNC: 119 MMOL/L (ref 95–110)
CO2 SERPL-SCNC: 19 MMOL/L (ref 23–29)
CREAT SERPL-MCNC: 1.4 MG/DL (ref 0.5–1.4)
ERYTHROCYTE [DISTWIDTH] IN BLOOD BY AUTOMATED COUNT: 14.8 % (ref 11.5–14.5)
GFR SERPLBLD CREATININE-BSD FMLA CKD-EPI: 48 ML/MIN/1.73/M2
GLUCOSE SERPL-MCNC: 76 MG/DL (ref 70–110)
HCT VFR BLD AUTO: 28.4 % (ref 40–54)
HGB BLD-MCNC: 9.1 GM/DL (ref 14–18)
IMM GRANULOCYTES # BLD AUTO: 0.13 K/UL (ref 0–0.04)
IMM GRANULOCYTES NFR BLD AUTO: 1.3 % (ref 0–0.5)
LYMPHOCYTES # BLD AUTO: 0.7 K/UL (ref 1–4.8)
MAGNESIUM SERPL-MCNC: 1.7 MG/DL (ref 1.6–2.6)
MCH RBC QN AUTO: 29.3 PG (ref 27–31)
MCHC RBC AUTO-ENTMCNC: 32 G/DL (ref 32–36)
MCV RBC AUTO: 91 FL (ref 82–98)
NUCLEATED RBC (/100WBC) (OHS): 0 /100 WBC
PHOSPHATE SERPL-MCNC: 1.9 MG/DL (ref 2.7–4.5)
PLATELET # BLD AUTO: 216 K/UL (ref 150–450)
PMV BLD AUTO: 11.6 FL (ref 9.2–12.9)
POCT GLUCOSE: 157 MG/DL (ref 70–110)
POTASSIUM SERPL-SCNC: 3.5 MMOL/L (ref 3.5–5.1)
PROT SERPL-MCNC: 5.1 GM/DL (ref 6–8.4)
RBC # BLD AUTO: 3.11 M/UL (ref 4.6–6.2)
RELATIVE EOSINOPHIL (OHS): 0.8 %
RELATIVE LYMPHOCYTE (OHS): 6.8 % (ref 18–48)
RELATIVE MONOCYTE (OHS): 5.3 % (ref 4–15)
RELATIVE NEUTROPHIL (OHS): 85.7 % (ref 38–73)
SODIUM SERPL-SCNC: 146 MMOL/L (ref 136–145)
WBC # BLD AUTO: 10.24 K/UL (ref 3.9–12.7)

## 2025-04-27 PROCEDURE — 11000001 HC ACUTE MED/SURG PRIVATE ROOM

## 2025-04-27 PROCEDURE — 85025 COMPLETE CBC W/AUTO DIFF WBC: CPT | Performed by: STUDENT IN AN ORGANIZED HEALTH CARE EDUCATION/TRAINING PROGRAM

## 2025-04-27 PROCEDURE — 80053 COMPREHEN METABOLIC PANEL: CPT | Performed by: STUDENT IN AN ORGANIZED HEALTH CARE EDUCATION/TRAINING PROGRAM

## 2025-04-27 PROCEDURE — 63600175 PHARM REV CODE 636 W HCPCS: Performed by: STUDENT IN AN ORGANIZED HEALTH CARE EDUCATION/TRAINING PROGRAM

## 2025-04-27 PROCEDURE — 83735 ASSAY OF MAGNESIUM: CPT | Performed by: STUDENT IN AN ORGANIZED HEALTH CARE EDUCATION/TRAINING PROGRAM

## 2025-04-27 PROCEDURE — 84100 ASSAY OF PHOSPHORUS: CPT | Performed by: STUDENT IN AN ORGANIZED HEALTH CARE EDUCATION/TRAINING PROGRAM

## 2025-04-27 PROCEDURE — 25000003 PHARM REV CODE 250: Performed by: STUDENT IN AN ORGANIZED HEALTH CARE EDUCATION/TRAINING PROGRAM

## 2025-04-27 RX ADMIN — HEPARIN SODIUM 5000 UNITS: 5000 INJECTION INTRAVENOUS; SUBCUTANEOUS at 01:04

## 2025-04-27 RX ADMIN — AMIODARONE HYDROCHLORIDE 400 MG: 200 TABLET ORAL at 08:04

## 2025-04-27 RX ADMIN — PANTOPRAZOLE SODIUM 40 MG: 40 INJECTION, POWDER, FOR SOLUTION INTRAVENOUS at 08:04

## 2025-04-27 RX ADMIN — METOPROLOL TARTRATE 12.5 MG: 25 TABLET, FILM COATED ORAL at 08:04

## 2025-04-27 RX ADMIN — HEPARIN SODIUM 5000 UNITS: 5000 INJECTION INTRAVENOUS; SUBCUTANEOUS at 09:04

## 2025-04-27 RX ADMIN — HEPARIN SODIUM 5000 UNITS: 5000 INJECTION INTRAVENOUS; SUBCUTANEOUS at 06:04

## 2025-04-27 NOTE — PLAN OF CARE
Problem: Skin Injury Risk Increased  Goal: Skin Health and Integrity  Outcome: Progressing     Problem: Wound  Goal: Optimal Coping  Outcome: Progressing     Problem: Fall Injury Risk  Goal: Absence of Fall and Fall-Related Injury  Outcome: Progressing     Problem: Adult Inpatient Plan of Care  Goal: Optimal Comfort and Wellbeing  Outcome: Progressing

## 2025-04-27 NOTE — NURSING
Pt received from ICU.alert and oriented to self.  Telemetry monitor in place.  Scrotum with moist dermatitis. External catheter in place.  Suctioned applied. Foam dressing to sacrum.  LLQ colostomy intact.  Air released.  Bed in low position.  Bed alarm on.  Call light within reach.  NAD.  .

## 2025-04-27 NOTE — NURSING
[facility] [place]  Nurses Note -- 4 Eyes      4/26/2025       Skin assessed on: Q Shift      [x] No Pressure Injuries Present    [x]Prevention Measures Documented    [] Yes LDA  for Pressure Injury Previously documented     [] Yes New Pressure Injury Discovered   [] LDA for New Pressure Injury Added      Attending RN:  Donna Cole RN     Second RN:   LAZ Damon

## 2025-04-27 NOTE — PROGRESS NOTES
[facility] Medicine  Progress Note    Patient Name: Eugene Gamez  MRN: [id number]  Patient Class: IP- Inpatient   Admission Date: 4/18/2025  Length of Stay: 9 days  Attending Physician: Gustavo Tsai MD  Primary Care Provider: Larry Monae MD        Subjective     Principal Problem:SBO (small bowel obstruction)        HPI:  Mr Eugene Gamez is a 89 y.o. man with history of L colectomy for colon cancer (2015) with ventral hernia, prior DM, HTN, CKD3 (baseline Cr 1) who presented with nausea and vomiting, abdominal pain.  The symptoms began a day or 2 ago but significantly worsened yesterday morning.  Last night and today He has not been able to keep much of anything down.  He denies any issues with his colostomy, stool has been normal.  Overall he feels weak and fatigued.  Denies dysuria, difficulty urinating, changes in bowel habits His CMP shows creatinine 2.4, bicarb 21, anion gap 17, glucose 209; UA with trace protein, 7 RBC, 2 WBC; CT abdomen and pelvis with high-grade small-bowel obstruction related to a 5 x 5 cm ventral abdominal wall hernia, right middle and right lower lobe mucous plugging and/or endobronchial spread of infection/bronchitis.He was admitted for high grade SBO. Started IVF. General surgery consulted.     Overview/Hospital Course:  Mr Eugene Gamez is a 89 y.o. man with history of L colectomy for colon cancer (2015) with colostomy in place and ventral hernia who was admitted with SBO. General surgery consulted.  NGT placed.  Patient with apparent episode of poor responsiveness.  Transferred to ICU, but quickly recovered.  Patient with atrial and ventricular ectopy on tele.  Cardiology consulted.  Patient started on Amiodarone drip. GGC ordered and unfortunately no passage to colon. Palliative following. Surgery is discussing ex-lap with patient and family, tentatively planned for 4/23. Patient underwent ex-lap on 4/23 and had lysis of adhesions, appears to be  problem. NGT in place and extubated, back to ICU. Having some post operative delirium controlled on low dose precedex. Awaiting bowel function to return.  Stooling gas noted and ostomy on 04/25. GGC ordered with significant ostomy output overnight. NGT removed, SLP cleared for full liquid diet. PT/OT consulted. Transitioned to PO amiodarone and metoprolol. Stable for transfer to floor.    Diet advanced to puree. HR stable, remove tele. Medically ready for SNF on Monday if accepted.    Interval History:  NAEON.  No new issues.   CC- Fatigue  All questions answered and updates on care given.       ROS:  General: Negative for fevers   Cardiac: Negative for chest pain   Pulmonary: Negative for wheezing  GI: Negative for abdominal distention      Vitals:    04/26/25 2314 04/26/25 2319 04/27/25 0255 04/27/25 0401   BP: 127/81   (!) 154/74   BP Location: Left arm   Left arm   Patient Position: Lying   Lying   Pulse: 75 64 64 72   Resp: 20   18   Temp: 98.1 °F (36.7 °C)   98.7 °F (37.1 °C)   TempSrc: Oral   Oral   SpO2: 96%   (!) 93%   Weight:       Height:              Body mass index is 18.88 kg/m².      PHYSICAL EXAM:  GENERAL APPEARANCE: alert and cooperative.     HEAD: NC/AT  CARDIAC: There is no cyanosis or pallor.   LUNGS: No apparent wheezing or stridor.  ABDOMEN: Non-distended. No guarding.  PSYCHIATRIC: No tangential speech. No Hyperactive features.        Recent Results (from the past 24 hours)   POCT glucose    Collection Time: 04/26/25  7:32 AM   Result Value Ref Range    POCT Glucose 95 70 - 110 mg/dL   POCT glucose    Collection Time: 04/26/25 11:10 AM   Result Value Ref Range    POCT Glucose 147 (H) 70 - 110 mg/dL   POCT glucose    Collection Time: 04/26/25  4:15 PM   Result Value Ref Range    POCT Glucose 154 (H) 70 - 110 mg/dL   POCT glucose    Collection Time: 04/26/25  9:16 PM   Result Value Ref Range    POCT Glucose 157 (H) 70 - 110 mg/dL   CBC with Differential    Collection Time: 04/27/25  2:31 AM    Result Value Ref Range    WBC 10.24 3.90 - 12.70 K/uL    RBC 3.11 (L) 4.60 - 6.20 M/uL    HGB 9.1 (L) 14.0 - 18.0 gm/dL    HCT 28.4 (L) 40.0 - 54.0 %    MCV 91 82 - 98 fL    MCH 29.3 27.0 - 31.0 pg    MCHC 32.0 32.0 - 36.0 g/dL    RDW 14.8 (H) 11.5 - 14.5 %    Platelet Count 216 150 - 450 K/uL    MPV 11.6 9.2 - 12.9 fL    Nucleated RBC 0 <=0 /100 WBC    Neut % 85.7 (H) 38 - 73 %    Lymph % 6.8 (L) 18 - 48 %    Mono % 5.3 4 - 15 %    Eos % 0.8 <=8 %    Basophil % 0.1 <=1.9 %    Imm Grans % 1.3 (H) 0.0 - 0.5 %    Neut # 8.78 (H) 1.8 - 7.7 K/uL    Lymph # 0.70 (L) 1 - 4.8 K/uL    Mono # 0.54 0.3 - 1 K/uL    Eos # 0.08 <=0.5 K/uL    Baso # 0.01 <=0.2 K/uL    Imm Grans # 0.13 (H) 0.00 - 0.04 K/uL       Microbiology Results (last 7 days)       ** No results found for the last 168 hours. **             Imaging Results              CT Abdomen Pelvis  Without Contrast (Final result)  Result time 04/18/25 15:03:16      Final result by James Cortés Jr., MD (04/18/25 15:03:16)                   Impression:      High-grade small bowel obstruction related to a 5 by 5 cm ventral abdominal wall hernia.    Right middle and right lower lobe mucous plugging and or endobronchial spread of infection/bronchitis.    Status post left colectomy common diverting left lower quadrant ostomy and multiple small bowel anastomoses with markedly dilated small bowel loops in the deep pelvis which may reflect acute superimposed on chronic change.    Findings of chronic medical renal disease and simple and complex renal cysts.  Complex cyst versus solid mass lower pole right kidney could be further evaluated with ultrasound if the patient cannot receive intravenous contrast.      Electronically signed by: James Zuleta Jr  Date:    04/18/2025  Time:    15:03               Narrative:    EXAMINATION:  CT ABDOMEN PELVIS WITHOUT CONTRAST    CLINICAL HISTORY:  Nausea/vomiting;    TECHNIQUE:  Low dose axial images, sagittal and coronal  reformations were obtained from the lung bases to the pubic symphysis.  Contrast was not administered.    COMPARISON:  05/09/2023    FINDINGS:  In the right middle lobe and right lower lobe there is peribronchial thickening and patchy branching lung opacity that may reflect mucous plugging and or bronchopneumonia.    Kidneys/ Ureters: Bilateral multifocal renal cortical scarring.  Multiple renal cysts are present including at least 1 hyperdense cyst.  There is redemonstration of an asymmetric contour lobulation of arising from the lower pole the right kidney which is similar to the prior study.  This may reflect a large complex cyst with solid mass difficult to exclude with certainty.  Stability over time favors a nonaggressive etiology.  Multiple calcification in the renal michelle may represent multiple nonobstructing stones.  There are    Liver: Normal in size and attenuation, with no focal hepatic lesions.    Gallbladder: No calcified gallstones.    Bile Ducts: No evidence of dilated ducts.    Pancreas: No mass or peripancreatic fat stranding.    Spleen: Unremarkable.    Adrenals: Unremarkable.    Pelvic organs: Prostate enlarged.    GI Tract/Mesentery: Patient is status post left colectomy with a left lower quadrant colostomy.  There is rectus diastasis and a superimposed wide necked ventral hernia containing multiple decompressed small bowel loops.  There is marked distension of the stomach and multiple small bowel loops throughout the abdomen and pelvis..  Findings are suggestive of incarcerated hernia.  The dilated bowel loops in the deep pelvis are associated with at least 2 suture lines is suggesting prior small bowel resection and anastomosis and these may be chronically dilated.    Retroperitoneum: No significant adenopathy.    Vasculature: Aortic atherosclerosis.    Bones: Unremarkable.                                       X-Ray Chest AP Portable (Final result)  Result time 04/18/25 13:27:59   Procedure  "changed from X-Ray Chest PA And Lateral     Final result by Clinton Pablo MD (04/18/25 13:27:59)                   Impression:      Increased streaky opacities in the right lower lung zone, nonspecific and possibly atelectasis, edema, or infectious/inflammatory disease (including aspiration).      Electronically signed by: Clinton Pablo MD  Date:    04/18/2025  Time:    13:27               Narrative:    EXAMINATION:  XR CHEST AP PORTABLE    CLINICAL HISTORY:  Nausea and vomiting; Nausea with vomiting, unspecified    TECHNIQUE:  Single frontal view of the chest was performed.    COMPARISON:  Prior exams dating back to 2008    FINDINGS:  Left chest wall port catheter tip projects in the SVC, unchanged.    Increased streaky opacities in the right lower lung zone.  Left lung is clear.    No effusion or pneumothorax.    Unchanged cardiomediastinal silhouette.    No acute bone abnormality.                                               Assessment & Plan  SBO (small bowel obstruction)  - CT upon admission: "High-grade small bowel obstruction related to a 5 by 5 cm ventral abdominal wall hernia. Status post left colectomy common diverting left lower quadrant ostomy and multiple small bowel anastomoses with markedly dilated small bowel loops in the deep pelvis which may reflect acute superimposed on chronic change."  - General surgery consulted  - NPO   - NGT placed and LIWS  - GGC ordered for 4/21 and now passage of contrast to colon, no gas/stool in ostomy bag  - Surgery discussing ex-lap with patient and family - tentatively for 4/23/25  - Cardiology aware and following, will ask for risk stratification for surgery     - underwent exlap with lysis of adhesions on 4/23.   - NGT in place  - Stool/gas in ostomy on 4/25 and again after GGC  - full liquid diet, family to bring dentures -- SLP following   Acute renal failure superimposed on stage 3b chronic kidney disease  VINNY is likely due to  prerenal from SBO . Baseline " creatinine is  1 . Most recent creatinine and eGFR are listed below.  Recent Labs     04/25/25  0358 04/26/25  0259   CREATININE 1.5* 1.5*   EGFRNORACEVR 44* 44*   - UA unremarkable  - CT shows no hydronephrosis     Plan  - Avoid nephrotoxins and renally dose meds for GFR listed above  - Monitor urine output, serial BMP, and adjust therapy as needed  - improving on IVF's and stable  HTN, goal below 140/80  Patient's blood pressure range in the last 24 hours was: BP  Min: 103/57  Max: 184/87.The patient's inpatient anti-hypertensive regimen is listed below:  Current Antihypertensives  hydrALAZINE injection 10 mg, Every 6 hours PRN, Intravenous  metoprolol tartrate (LOPRESSOR) split tablet 12.5 mg, 2 times daily, Oral    Plan  - BP increasing as patient unable to take his oral BP medications.  Continue with prn IV Hydralazine.   Pulmonary emphysema, unspecified emphysema type  Patient's COPD is controlled currently.  Patient is currently off COPD Pathway.  Stable on room air  Anemia of chronic renal failure, stage 3b  Anemia is likely due to chronic disease due to Chronic Kidney Disease. Most recent hemoglobin and hematocrit are listed below.  Recent Labs     04/25/25  0358 04/26/25  0259 04/27/25  0231   HGB 8.6* 9.5* 9.1*   HCT 27.1* 30.1* 28.4*     Plan  - Monitor serial CBC: Daily  - Transfuse PRBC if patient becomes hemodynamically unstable, symptomatic or H/H drops below 7/21.  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 Liver biopsy negative     Ventral hernia  - see SBO    Severe protein-calorie malnutrition  Body mass index is 18.88 kg/m².  Notably, albumin  3.6 on admission- perhaps due to dehydration  - now with bowel movements  - SLP consulted for diet - on full liquids, bringing dentures to re-evaluate for advancement      NSVT (nonsustained ventricular tachycardia)  Episodes of NSVT on monitoring.  Check Echo.  Cardiology consulted.  Atrial and ventricular ectopy.  Started on amiodarone drip per Cards recommendations.  - on IV metoprolol  - switch to PO amiodarone 400 mg PO bid until outpatient follow up      Atrial tachycardia  - on iv amio and iv metoprolol  - Now on PO amiodarone and metoprolol    Advance care planning  Palliative Care consult. Full code at this time.    Hypernatremia  Hypernatremia is likely due to  normal saline IVF's . The patient's most recent sodium results are listed below.  Recent Labs     04/25/25  0358 04/26/25  0259    148*     Plan  - Aim to correct the sodium by 8-10mEq in 24 hours.   - Plan to correct their hypernatremia with Select IV fluids: D5W/0.45 NaCl - increase rate to 75 cc/hr. Now 0.45 NS.  - Will plan to trend the patient's sodium: Daily  - now resolved with fluids however slightly elevated again  - encourage PO intake now he is able  - recheck in AM    Delirium  Post operative delirium noted. Unable to redirect, concern for removal of NGT and other tubes. Had ex-lap 4/23.  - precedex drip now off  - resolved    VTE Risk Mitigation (From admission, onward)           Ordered     heparin (porcine) injection 5,000 Units  Every 8 hours         04/19/25 1933     Place ARI hose  Until discontinued         04/18/25 1542     Place sequential compression device  Until discontinued         04/18/25 1542     Reason for No Pharmacological VTE Prophylaxis  Once        Question:  Reasons:  Answer:  Physician Provided (leave comment)  Comment:  potential OR    04/18/25 1542     IP VTE HIGH RISK PATIENT  Once         04/18/25 1542                    Discharge Planning   WIL: 4/28/2025     Code Status: Full Code   Medical Readiness  for Discharge Date:   Discharge Plan A:  (tbd)                Please place Justification for DME        Gustavo Tsai MD  Department of Hospital Medicine   [facility]

## 2025-04-27 NOTE — ASSESSMENT & PLAN NOTE
Hypernatremia is likely due to normal saline IVF's. The patient's most recent sodium results are listed below.  Recent Labs     04/25/25  0358 04/26/25  0259    148*     Plan  - Aim to correct the sodium by 8-10mEq in 24 hours.   - Plan to correct their hypernatremia with Select IV fluids: D5W/0.45 NaCl - increase rate to 75 cc/hr. Now 0.45 NS.  - Will plan to trend the patient's sodium: Daily  - now resolved with fluids however slightly elevated again  - encourage PO intake now he is able  - recheck in AM

## 2025-04-27 NOTE — EICU
JORDIU Night Rounds Checklist  24H Vital Sign Range:  Temp:  [97.7 °F (36.5 °C)-98.4 °F (36.9 °C)]   Pulse:  [54-96]   Resp:  [15-41]   BP: (103-184)/(57-90)   SpO2:  [90 %-100 %]     Video rounds and LDA reconciliation

## 2025-04-27 NOTE — NURSING
[facility] [place]  Nurses Note -- 4 Eyes      4/27/2025       Skin assessed on: Q Shift      [x] No Pressure Injuries Present    []Prevention Measures Documented    [] Yes LDA  for Pressure Injury Previously documented     [] Yes New Pressure Injury Discovered   [] LDA for New Pressure Injury Added      Attending RN:  Sheyla Bernal LPN     Second RN:  Mariana Garcia RN

## 2025-04-27 NOTE — ASSESSMENT & PLAN NOTE
Anemia is likely due to chronic disease due to Chronic Kidney Disease. Most recent hemoglobin and hematocrit are listed below.  Recent Labs     04/25/25  0358 04/26/25  0259 04/27/25  0231   HGB 8.6* 9.5* 9.1*   HCT 27.1* 30.1* 28.4*     Plan  - Monitor serial CBC: Daily  - Transfuse PRBC if patient becomes hemodynamically unstable, symptomatic or H/H drops below 7/21.  - Patient has not received any PRBC transfusions to date  - Patient's anemia is currently stable

## 2025-04-27 NOTE — NURSING TRANSFER
Nursing Transfer Note      4/26/2025   11:36 PM    Nurse giving hand off   Donna  Nurse receiving handoff:  Mariana    Reason patient is being transferred:  Level of care    Transfer To: room 315 from room 274    Transfer via bed    Transfer with cardiac monitoring    Transported by  RN    Transfer Vital Signs:  Blood Pressure:157/78  Heart Rate: 111  O2: 96  Temperature: 97.7  Respirations:18    Telemetry: Box Number [id number]  Order for Tele Monitor? Yes    Additional Lines: NA    Medicines sent:  YES    Patient belongings transferred with patient: Yes    Chart send with patient: Yes    Notified: daughter    Patient reassessed at 2230  on 04/26/2025 (date, time)  1  Upon arrival to floor: cardiac monitor applied

## 2025-04-27 NOTE — ASSESSMENT & PLAN NOTE
Patient's blood pressure range in the last 24 hours was: BP  Min: 103/57  Max: 184/87.The patient's inpatient anti-hypertensive regimen is listed below:  Current Antihypertensives  hydrALAZINE injection 10 mg, Every 6 hours PRN, Intravenous  metoprolol tartrate (LOPRESSOR) split tablet 12.5 mg, 2 times daily, Oral    Plan  - BP increasing as patient unable to take his oral BP medications.  Continue with prn IV Hydralazine.

## 2025-04-27 NOTE — PROGRESS NOTES
Surgery Progress Note    Eugene Gamez is a 89 y.o. year old male in hospital for small bowel obstruction. He underwent exlap with lysis of adhesions on 4/23/25. Tolerated procedure well     Diet advanced. Tolerated well. Having ostomy function    ROS:  Negative except above    PE:  Vitals:    04/27/25 0723 04/27/25 0740 04/27/25 1048 04/27/25 1115   BP:  (!) 158/84 (!) 155/75    BP Location:  Left arm Left arm    Patient Position:  Lying Lying    Pulse: 67 75 86 72   Resp:  20 20    Temp:  97.7 °F (36.5 °C) 97.7 °F (36.5 °C)    TempSrc:  Oral Oral    SpO2:  98% 95%    Weight:       Height:           NAD  No belabored breathing  No skin changes  Abd soft less distended. Gas and stool in bag    Significant Diagnostic Studies: N/A    A/P:  Eugene Gamez is a 89 y.o. year old male  with small bowel obstruction. Patient not progressing with conservative management; decision made to proceed to or for exlap with IZZY. Tolerated procedure well.     - stable for discharge from surgical perspective  - rest of care per primary    Honey Meneses  General Surgery - [facility]  4/27/2025

## 2025-04-27 NOTE — ASSESSMENT & PLAN NOTE
VINNY is likely due to prerenal from SBO. Baseline creatinine is 1. Most recent creatinine and eGFR are listed below.  Recent Labs     04/25/25  0358 04/26/25  0259   CREATININE 1.5* 1.5*   EGFRNORACEVR 44* 44*   - UA unremarkable  - CT shows no hydronephrosis     Plan  - Avoid nephrotoxins and renally dose meds for GFR listed above  - Monitor urine output, serial BMP, and adjust therapy as needed  - improving on IVF's and stable

## 2025-04-27 NOTE — PLAN OF CARE
Problem: Adult Inpatient Plan of Care  Goal: Plan of Care Review  Outcome: Progressing  Goal: Patient-Specific Goal (Individualized)  Outcome: Progressing  Goal: Absence of Hospital-Acquired Illness or Injury  Outcome: Progressing  Goal: Optimal Comfort and Wellbeing  Outcome: Progressing  Goal: Readiness for Transition of Care  Outcome: Progressing     Problem: Infection  Goal: Absence of Infection Signs and Symptoms  Outcome: Progressing     Problem: Acute Kidney Injury/Impairment  Goal: Fluid and Electrolyte Balance  Outcome: Progressing  Goal: Improved Oral Intake  Outcome: Progressing  Goal: Effective Renal Function  Outcome: Progressing     Problem: Wound  Goal: Optimal Coping  Outcome: Progressing  Goal: Optimal Functional Ability  Outcome: Progressing  Goal: Absence of Infection Signs and Symptoms  Outcome: Progressing  Goal: Improved Oral Intake  Outcome: Progressing  Goal: Optimal Pain Control and Function  Outcome: Progressing  Goal: Skin Health and Integrity  Outcome: Progressing  Goal: Optimal Wound Healing  Outcome: Progressing     Problem: Fall Injury Risk  Goal: Absence of Fall and Fall-Related Injury  Outcome: Progressing     Problem: Coping Ineffective  Goal: Effective Coping  Outcome: Progressing

## 2025-04-28 LAB
ABSOLUTE EOSINOPHIL (OHS): 0.09 K/UL
ABSOLUTE MONOCYTE (OHS): 0.57 K/UL (ref 0.3–1)
ABSOLUTE NEUTROPHIL COUNT (OHS): 6.81 K/UL (ref 1.8–7.7)
ALBUMIN SERPL BCP-MCNC: 2.2 G/DL (ref 3.5–5.2)
ALP SERPL-CCNC: 43 UNIT/L (ref 40–150)
ALT SERPL W/O P-5'-P-CCNC: 6 UNIT/L (ref 10–44)
ANION GAP (OHS): 8 MMOL/L (ref 8–16)
AST SERPL-CCNC: 10 UNIT/L (ref 11–45)
BASOPHILS # BLD AUTO: 0.02 K/UL
BASOPHILS NFR BLD AUTO: 0.2 %
BILIRUB SERPL-MCNC: 0.7 MG/DL (ref 0.1–1)
BUN SERPL-MCNC: 19 MG/DL (ref 8–23)
CALCIUM SERPL-MCNC: 9.1 MG/DL (ref 8.7–10.5)
CHLORIDE SERPL-SCNC: 120 MMOL/L (ref 95–110)
CO2 SERPL-SCNC: 18 MMOL/L (ref 23–29)
CREAT SERPL-MCNC: 1.2 MG/DL (ref 0.5–1.4)
ERYTHROCYTE [DISTWIDTH] IN BLOOD BY AUTOMATED COUNT: 14.8 % (ref 11.5–14.5)
GFR SERPLBLD CREATININE-BSD FMLA CKD-EPI: 58 ML/MIN/1.73/M2
GLUCOSE SERPL-MCNC: 86 MG/DL (ref 70–110)
HCT VFR BLD AUTO: 31.7 % (ref 40–54)
HGB BLD-MCNC: 9.9 GM/DL (ref 14–18)
IMM GRANULOCYTES # BLD AUTO: 0.09 K/UL (ref 0–0.04)
IMM GRANULOCYTES NFR BLD AUTO: 1.1 % (ref 0–0.5)
LYMPHOCYTES # BLD AUTO: 0.69 K/UL (ref 1–4.8)
MAGNESIUM SERPL-MCNC: 1.6 MG/DL (ref 1.6–2.6)
MCH RBC QN AUTO: 28.5 PG (ref 27–31)
MCHC RBC AUTO-ENTMCNC: 31.2 G/DL (ref 32–36)
MCV RBC AUTO: 91 FL (ref 82–98)
NUCLEATED RBC (/100WBC) (OHS): 0 /100 WBC
PHOSPHATE SERPL-MCNC: 1.8 MG/DL (ref 2.7–4.5)
PLATELET # BLD AUTO: 245 K/UL (ref 150–450)
PMV BLD AUTO: 11.4 FL (ref 9.2–12.9)
POCT GLUCOSE: 96 MG/DL (ref 70–110)
POTASSIUM SERPL-SCNC: 3.7 MMOL/L (ref 3.5–5.1)
PROT SERPL-MCNC: 5.3 GM/DL (ref 6–8.4)
RBC # BLD AUTO: 3.47 M/UL (ref 4.6–6.2)
RELATIVE EOSINOPHIL (OHS): 1.1 %
RELATIVE LYMPHOCYTE (OHS): 8.3 % (ref 18–48)
RELATIVE MONOCYTE (OHS): 6.9 % (ref 4–15)
RELATIVE NEUTROPHIL (OHS): 82.4 % (ref 38–73)
SODIUM SERPL-SCNC: 146 MMOL/L (ref 136–145)
WBC # BLD AUTO: 8.27 K/UL (ref 3.9–12.7)

## 2025-04-28 PROCEDURE — 84100 ASSAY OF PHOSPHORUS: CPT | Performed by: STUDENT IN AN ORGANIZED HEALTH CARE EDUCATION/TRAINING PROGRAM

## 2025-04-28 PROCEDURE — 83735 ASSAY OF MAGNESIUM: CPT | Performed by: STUDENT IN AN ORGANIZED HEALTH CARE EDUCATION/TRAINING PROGRAM

## 2025-04-28 PROCEDURE — 94761 N-INVAS EAR/PLS OXIMETRY MLT: CPT

## 2025-04-28 PROCEDURE — 63600175 PHARM REV CODE 636 W HCPCS: Performed by: STUDENT IN AN ORGANIZED HEALTH CARE EDUCATION/TRAINING PROGRAM

## 2025-04-28 PROCEDURE — 99222 1ST HOSP IP/OBS MODERATE 55: CPT | Mod: ,,,

## 2025-04-28 PROCEDURE — 85025 COMPLETE CBC W/AUTO DIFF WBC: CPT | Performed by: STUDENT IN AN ORGANIZED HEALTH CARE EDUCATION/TRAINING PROGRAM

## 2025-04-28 PROCEDURE — 11000001 HC ACUTE MED/SURG PRIVATE ROOM

## 2025-04-28 PROCEDURE — 86580 TB INTRADERMAL TEST: CPT | Performed by: STUDENT IN AN ORGANIZED HEALTH CARE EDUCATION/TRAINING PROGRAM

## 2025-04-28 PROCEDURE — 82040 ASSAY OF SERUM ALBUMIN: CPT | Performed by: STUDENT IN AN ORGANIZED HEALTH CARE EDUCATION/TRAINING PROGRAM

## 2025-04-28 PROCEDURE — 99900035 HC TECH TIME PER 15 MIN (STAT)

## 2025-04-28 PROCEDURE — 97530 THERAPEUTIC ACTIVITIES: CPT | Mod: CQ

## 2025-04-28 PROCEDURE — 97110 THERAPEUTIC EXERCISES: CPT | Mod: CQ

## 2025-04-28 PROCEDURE — 30200315 PPD INTRADERMAL TEST REV CODE 302: Performed by: STUDENT IN AN ORGANIZED HEALTH CARE EDUCATION/TRAINING PROGRAM

## 2025-04-28 PROCEDURE — 25000003 PHARM REV CODE 250: Performed by: STUDENT IN AN ORGANIZED HEALTH CARE EDUCATION/TRAINING PROGRAM

## 2025-04-28 RX ADMIN — METOPROLOL TARTRATE 12.5 MG: 25 TABLET, FILM COATED ORAL at 09:04

## 2025-04-28 RX ADMIN — HEPARIN SODIUM 5000 UNITS: 5000 INJECTION INTRAVENOUS; SUBCUTANEOUS at 04:04

## 2025-04-28 RX ADMIN — AMIODARONE HYDROCHLORIDE 400 MG: 200 TABLET ORAL at 09:04

## 2025-04-28 RX ADMIN — HEPARIN SODIUM 5000 UNITS: 5000 INJECTION INTRAVENOUS; SUBCUTANEOUS at 06:04

## 2025-04-28 RX ADMIN — HEPARIN SODIUM 5000 UNITS: 5000 INJECTION INTRAVENOUS; SUBCUTANEOUS at 09:04

## 2025-04-28 RX ADMIN — TUBERCULIN PURIFIED PROTEIN DERIVATIVE 5 UNITS: 5 INJECTION, SOLUTION INTRADERMAL at 06:04

## 2025-04-28 RX ADMIN — PANTOPRAZOLE SODIUM 40 MG: 40 INJECTION, POWDER, FOR SOLUTION INTRAVENOUS at 09:04

## 2025-04-28 NOTE — PLAN OF CARE
Problem: Adult Inpatient Plan of Care  Goal: Plan of Care Review  Outcome: Progressing  Goal: Patient-Specific Goal (Individualized)  Outcome: Progressing  Goal: Absence of Hospital-Acquired Illness or Injury  Outcome: Progressing  Goal: Optimal Comfort and Wellbeing  Outcome: Progressing  Goal: Readiness for Transition of Care  Outcome: Progressing     Problem: Infection  Goal: Absence of Infection Signs and Symptoms  Outcome: Progressing     Problem: Acute Kidney Injury/Impairment  Goal: Fluid and Electrolyte Balance  Outcome: Progressing  Goal: Improved Oral Intake  Outcome: Progressing  Goal: Effective Renal Function  Outcome: Progressing     Problem: Skin Injury Risk Increased  Goal: Skin Health and Integrity  Outcome: Progressing     Problem: Wound  Goal: Optimal Coping  Outcome: Progressing  Goal: Optimal Functional Ability  Outcome: Progressing  Goal: Absence of Infection Signs and Symptoms  Outcome: Progressing  Goal: Improved Oral Intake  Outcome: Progressing  Goal: Optimal Pain Control and Function  Outcome: Progressing  Goal: Skin Health and Integrity  Outcome: Progressing  Goal: Optimal Wound Healing  Outcome: Progressing     Problem: Coping Ineffective  Goal: Effective Coping  Outcome: Progressing     Problem: Fall Injury Risk  Goal: Absence of Fall and Fall-Related Injury  Outcome: Progressing

## 2025-04-28 NOTE — ASSESSMENT & PLAN NOTE
Patient's blood pressure range in the last 24 hours was: BP  Min: 118/70  Max: 155/75.The patient's inpatient anti-hypertensive regimen is listed below:  Current Antihypertensives  hydrALAZINE injection 10 mg, Every 6 hours PRN, Intravenous  metoprolol tartrate (LOPRESSOR) split tablet 12.5 mg, 2 times daily, Oral    Plan  - BP increasing as patient unable to take his oral BP medications.  Continue with prn IV Hydralazine.

## 2025-04-28 NOTE — ASSESSMENT & PLAN NOTE
VINNY is likely due to prerenal from SBO. Baseline creatinine is 1. Most recent creatinine and eGFR are listed below.  Recent Labs     04/26/25  0259 04/27/25  0231 04/28/25  0255   CREATININE 1.5* 1.4 1.2   EGFRNORACEVR 44* 48* 58*   - UA unremarkable  - CT shows no hydronephrosis     Plan  - Avoid nephrotoxins and renally dose meds for GFR listed above  - Monitor urine output, serial BMP, and adjust therapy as needed  - improving on IVF's and stable

## 2025-04-28 NOTE — PT/OT/SLP PROGRESS
Speech Language Pathology      Eugene Gamez  MRN: [id number]    Patient not seen today secondary to nursing care  . No reports of diet intolerance, will attempt again tomorrow of still admitted. Vilma Purdy, DASHAWN-SLP

## 2025-04-28 NOTE — ASSESSMENT & PLAN NOTE
Hypernatremia is likely due to normal saline IVF's. The patient's most recent sodium results are listed below.  Recent Labs     04/26/25  0259 04/27/25  0231 04/28/25  0255   * 146* 146*     Plan  - Aim to correct the sodium by 8-10mEq in 24 hours.   - Plan to correct their hypernatremia with Select IV fluids: D5W/0.45 NaCl - increase rate to 75 cc/hr. Now 0.45 NS.  - Will plan to trend the patient's sodium: Daily  - now resolved with fluids however slightly elevated again  - encourage PO intake now he is able  - recheck in AM

## 2025-04-28 NOTE — ASSESSMENT & PLAN NOTE
Anemia is likely due to chronic disease due to Chronic Kidney Disease. Most recent hemoglobin and hematocrit are listed below.  Recent Labs     04/26/25  0259 04/27/25  0231 04/28/25  0255   HGB 9.5* 9.1* 9.9*   HCT 30.1* 28.4* 31.7*     Plan  - Monitor serial CBC: Daily  - Transfuse PRBC if patient becomes hemodynamically unstable, symptomatic or H/H drops below 7/21.  - Patient has not received any PRBC transfusions to date  - Patient's anemia is currently stable

## 2025-04-28 NOTE — PLAN OF CARE
Changes in medical condition or discharge plan:    Patient admitted for SBO continues to require medical care. It is recommended that patient discharge to a skilled nursing facility. Referrals were sent on 04/26/25.     9:04 am CM received a call from Dee with [facility]. [facility] has clinically accepted patient. Dee contacted patient's daughter Nicol. Nicol will complete admissions paperwork at 1pm today.     12:23 pm CM called in LOCET and completed PASRR in [facility].     CM awaiting 142    CM will continue to follow patient for CM needs.     Does patient need new DME? None     Follow up appts needed: PCP after SNF    Medically stable: Patient is not medically stable for discharge     Estimated Discharge Date:  1-2 days   04/28/25 1248   Discharge Reassessment   Assessment Type Discharge Planning Reassessment   Did the patient's condition or plan change since previous assessment? Yes   Communicated WIL with patient/caregiver Yes   Discharge Plan A Skilled Nursing Facility   Discharge Plan B Skilled Nursing Facility   DME Needed Upon Discharge  none   Transition of Care Barriers None   Why the patient remains in the hospital Requires continued medical care   Post-Acute Status   Post-Acute Authorization Placement   Post-Acute Placement Status Referrals Sent   Coverage Medicare Part A & B   Discharge Delays None known at this time

## 2025-04-28 NOTE — CONSULTS
[facility] [place] - Telemetry  Wound Care  WO LIZZY    Patient Name:  Eugene Gamez   MRN:  [id number]  Date: 4/28/2025  Diagnosis: SBO (small bowel obstruction)    History:     Past Medical History:   Diagnosis Date    Arthritis     Cancer     Diabetes mellitus     Elevated PSA     Gout, unspecified     Hypertension     Nuclear sclerotic cataract of both eyes 6/29/2018       Social History[1]    Precautions:     Allergies as of 04/18/2025 - Reviewed 04/18/2025   Allergen Reaction Noted    Iodine Other (See Comments) 05/14/2019    Shellfish containing products  01/13/2014    Shellfish derived  12/02/2023       [facility] Assessment Details/Treatment     Active Problem List with Overview Notes    Diagnosis Date Noted    Advance care planning 04/21/2025    Hypernatremia 04/21/2025    Delirium 04/21/2025    NSVT (nonsustained ventricular tachycardia) 04/20/2025    Atrial tachycardia 04/20/2025    S/P left colectomy 04/18/2025    Ventral hernia 04/18/2025    Severe protein-calorie malnutrition 04/18/2025    Anemia of chronic renal failure, stage 3b 03/12/2024    Secondary renal hyperparathyroidism 03/12/2024    Hypertensive kidney disease with stage 3b chronic kidney disease 01/29/2024    Hyperuricemia 01/29/2024    Pseudophakia 12/21/2023    Refractive error 12/21/2023    History of colon cancer 12/15/2023    Pulmonary emphysema, unspecified emphysema type 08/11/2023    SDH (subdural hematoma) 01/18/2022    Anemia of chronic disease 02/12/2019    Elevated CEA 02/12/2019    HTN, goal below 140/80 11/09/2018    Nuclear sclerotic cataract of both eyes 06/29/2018    Benign essential HTN 06/27/2018    Bilateral renal masses 10/17/2017     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      Colostomy care 10/17/2017    Colostomy in place 10/17/2017    Flu vaccine need 10/17/2017    Acute renal failure superimposed on stage 3b chronic kidney disease 07/06/2016    Chemotherapy-induced neuropathy 01/25/2016    Venous stasis 01/25/2016    Chemotherapy  induced neutropenia 07/18/2015    Educational circumstance 07/13/2015    Carcinoma in situ of colon 06/29/2015    Debility 05/01/2015    SBO (small bowel obstruction) 04/30/2015    Gout 04/06/2015    Essential hypertension 02/26/2015    ED (erectile dysfunction) 01/13/2014    Arthritis       Nursing consult for colostomy  An 89 year old male admitted 4/18/25 from home with complaint of vomiting and nausea for 2 days with generalized malaise. Reports loss of weight recently.   S/P left colectomy with colostomy for colon cancer in 2015 4/28 WBC 8.27 Hgb 9.9 Hct 31.7 Alb 2.2 Weight 66.7 kg   On Isoflex mattress; Rui score 17  4/18 6:47 pm 4 Eyes Skin Assessment- No Pressure Injuries Present  4/23 S/P Exploratory laparotomy, incisional hernia repair, lysis of adhesions per Dr. Camacho for small bowel obstruction  4/28 Surgery progress note- stable for discharge from surgical perspective. Gas and stool in pouch.   Plan for discharge to SNF. Patient has had ostomy for 10 years. Will assist nursing as needed with ostomy management.  Assessment:  Coloplast pouch in place over left colostomy. Stoma pale pink bud. Functioning a yellow liquid effluent. Nursing changed pouch this am.  Daughter reports patient performs self care at home and has pouches delivered to the house.  Treatment/Plan:  Since surgery did not involve the stoma. Patient can continue managing stoma with pouch at home. If pouch does not contain stool when discharged home, MD can order [facility] referral for evaluation for different appliance.   Plan is discharge to [facility] for skilled nursing.     04/28/2025         [1]   Social History  Socioeconomic History    Marital status:     Number of children: 8   Tobacco Use    Smoking status: Never     Passive exposure: Never    Smokeless tobacco: Never    Tobacco comments:     smoked short while as a teen   Substance and Sexual Activity    Alcohol use: No    Drug use: No    Sexual activity:  Not Currently     Partners: Female     Social Drivers of Health     Financial Resource Strain: Low Risk  (4/20/2025)    Overall Financial Resource Strain (CARDIA)     Difficulty of Paying Living Expenses: Not hard at all   Recent Concern: Financial Resource Strain - Medium Risk (4/18/2025)    Overall Financial Resource Strain (CARDIA)     Difficulty of Paying Living Expenses: Somewhat hard   Food Insecurity: No Food Insecurity (4/20/2025)    Hunger Vital Sign     Worried About Running Out of Food in the Last Year: Never true     Ran Out of Food in the Last Year: Never true   Recent Concern: Food Insecurity - Food Insecurity Present (4/18/2025)    Hunger Vital Sign     Worried About Running Out of Food in the Last Year: Often true     Ran Out of Food in the Last Year: Sometimes true   Transportation Needs: Unmet Transportation Needs (4/18/2025)    PRAPARE - Transportation     Lack of Transportation (Medical): Yes     Lack of Transportation (Non-Medical): No   Stress: No Stress Concern Present (4/20/2025)    [employer] - Occupational Stress Questionnaire     Feeling of Stress : Not at all   Recent Concern: Stress - Stress Concern Present (4/18/2025)    [employer] - Occupational Stress Questionnaire     Feeling of Stress : Rather much   Housing Stability: Low Risk  (4/20/2025)    Housing Stability Vital Sign     Unable to Pay for Housing in the Last Year: No     Number of Times Moved in the Last Year: 0     Homeless in the Last Year: No   Recent Concern: Housing Stability - High Risk (4/18/2025)    Housing Stability Vital Sign     Unable to Pay for Housing in the Last Year: Yes     Number of Times Moved in the Last Year: 0     Homeless in the Last Year: No

## 2025-04-28 NOTE — PROGRESS NOTES
Surgery Progress Note    Eugene Gamez is a 89 y.o. year old male in hospital for small bowel obstruction. He underwent exlap with lysis of adhesions on 4/23/25. Tolerated procedure well     Diet advanced. Tolerated well. Having ostomy function    ROS:  Negative except above    PE:  Vitals:    04/28/25 0013 04/28/25 0416 04/28/25 0726 04/28/25 0907   BP: (!) 153/76 118/70 (!) 146/67 (!) 146/67   BP Location: Left arm Right arm Left arm    Patient Position: Lying Lying Lying    Pulse: 79 82 71 71   Resp: 18 18 19    Temp: 97.8 °F (36.6 °C) 98.2 °F (36.8 °C) 97.4 °F (36.3 °C)    TempSrc: Oral Oral Oral    SpO2: 97% 99% 100%    Weight:       Height:           NAD  No belabored breathing  No skin changes  Abd soft less distended. Gas and stool in bag    Significant Diagnostic Studies: N/A    A/P:  Eugene Gamez is a 89 y.o. year old male  with small bowel obstruction. Patient not progressing with conservative management; decision made to proceed to or for exlap with IZZY. Tolerated procedure well.     - stable for discharge from surgical perspective  - General Surgery will sign off  - rest of care per primary    Honey Meneses  General Surgery - [facility]  4/28/2025

## 2025-04-28 NOTE — PROGRESS NOTES
[facility] Medicine  Progress Note    Patient Name: Eugene Gamez  MRN: [id number]  Patient Class: IP- Inpatient   Admission Date: 4/18/2025  Length of Stay: 10 days  Attending Physician: Gustavo Tsai MD  Primary Care Provider: Larry Monae MD        Subjective     Principal Problem:SBO (small bowel obstruction)        HPI:  Mr Eugene Gamez is a 89 y.o. man with history of L colectomy for colon cancer (2015) with ventral hernia, prior DM, HTN, CKD3 (baseline Cr 1) who presented with nausea and vomiting, abdominal pain.  The symptoms began a day or 2 ago but significantly worsened yesterday morning.  Last night and today He has not been able to keep much of anything down.  He denies any issues with his colostomy, stool has been normal.  Overall he feels weak and fatigued.  Denies dysuria, difficulty urinating, changes in bowel habits His CMP shows creatinine 2.4, bicarb 21, anion gap 17, glucose 209; UA with trace protein, 7 RBC, 2 WBC; CT abdomen and pelvis with high-grade small-bowel obstruction related to a 5 x 5 cm ventral abdominal wall hernia, right middle and right lower lobe mucous plugging and/or endobronchial spread of infection/bronchitis.He was admitted for high grade SBO. Started IVF. General surgery consulted.     Overview/Hospital Course:  Mr Eugene Gamez is a 89 y.o. man with history of L colectomy for colon cancer (2015) with colostomy in place and ventral hernia who was admitted with SBO. General surgery consulted.  NGT placed.  Patient with apparent episode of poor responsiveness.  Transferred to ICU, but quickly recovered.  Patient with atrial and ventricular ectopy on tele.  Cardiology consulted.  Patient started on Amiodarone drip. GGC ordered and unfortunately no passage to colon. Palliative following. Surgery is discussing ex-lap with patient and family, tentatively planned for 4/23. Patient underwent ex-lap on 4/23 and had lysis of adhesions, appears to be  problem. NGT in place and extubated, back to ICU. Having some post operative delirium controlled on low dose precedex. Awaiting bowel function to return.  Stooling gas noted and ostomy on 04/25. GGC ordered with significant ostomy output overnight. NGT removed, SLP cleared for full liquid diet. PT/OT consulted. Transitioned to PO amiodarone and metoprolol. Stable for transfer to floor.    Diet advanced to puree. HR stable, remove tele. Medically ready for SNF on Monday/Tues if accepted.    Interval History:  NAEON.  No new issues.   CC- Fatigue  All questions answered and updates on care given.       ROS:  General: Negative for fevers   Cardiac: Negative for chest pain   Pulmonary: Negative for wheezing  GI: Negative for abdominal distention      Vitals:    04/27/25 2012 04/28/25 0013 04/28/25 0416 04/28/25 0726   BP: (!) 154/67 (!) 153/76 118/70 (!) 146/67   BP Location: Left arm Left arm Right arm Left arm   Patient Position: Lying Lying Lying Lying   Pulse: 80 79 82 71   Resp: 17 18 18 19   Temp: 97.9 °F (36.6 °C) 97.8 °F (36.6 °C) 98.2 °F (36.8 °C) 97.4 °F (36.3 °C)   TempSrc: Oral Oral Oral Oral   SpO2: 96% 97% 99% 100%   Weight:       Height:              Body mass index is 18.88 kg/m².      PHYSICAL EXAM:  GENERAL APPEARANCE: alert and cooperative.     HEAD: NC/AT  CARDIAC: There is no cyanosis or pallor.   LUNGS: No apparent wheezing or stridor.  ABDOMEN: Non-distended. No guarding.  PSYCHIATRIC: No tangential speech. No Hyperactive features.        Recent Results (from the past 24 hours)   POCT glucose    Collection Time: 04/27/25 10:11 PM   Result Value Ref Range    POCT Glucose 96 70 - 110 mg/dL   Comprehensive Metabolic Panel    Collection Time: 04/28/25  2:55 AM   Result Value Ref Range    Sodium 146 (H) 136 - 145 mmol/L    Potassium 3.7 3.5 - 5.1 mmol/L    Chloride 120 (H) 95 - 110 mmol/L    CO2 18 (L) 23 - 29 mmol/L    Glucose 86 70 - 110 mg/dL    BUN 19 8 - 23 mg/dL    Creatinine 1.2 0.5 - 1.4 mg/dL     Calcium 9.1 8.7 - 10.5 mg/dL    Protein Total 5.3 (L) 6.0 - 8.4 gm/dL    Albumin 2.2 (L) 3.5 - 5.2 g/dL    Bilirubin Total 0.7 0.1 - 1.0 mg/dL    ALP 43 40 - 150 unit/L    AST 10 (L) 11 - 45 unit/L    ALT 6 (L) 10 - 44 unit/L    Anion Gap 8 8 - 16 mmol/L    eGFR 58 (L) >60 mL/min/1.73/m2   Magnesium    Collection Time: 04/28/25  2:55 AM   Result Value Ref Range    Magnesium  1.6 1.6 - 2.6 mg/dL   Phosphorus    Collection Time: 04/28/25  2:55 AM   Result Value Ref Range    Phosphorus Level 1.8 (L) 2.7 - 4.5 mg/dL   CBC with Differential    Collection Time: 04/28/25  2:55 AM   Result Value Ref Range    WBC 8.27 3.90 - 12.70 K/uL    RBC 3.47 (L) 4.60 - 6.20 M/uL    HGB 9.9 (L) 14.0 - 18.0 gm/dL    HCT 31.7 (L) 40.0 - 54.0 %    MCV 91 82 - 98 fL    MCH 28.5 27.0 - 31.0 pg    MCHC 31.2 (L) 32.0 - 36.0 g/dL    RDW 14.8 (H) 11.5 - 14.5 %    Platelet Count 245 150 - 450 K/uL    MPV 11.4 9.2 - 12.9 fL    Nucleated RBC 0 <=0 /100 WBC    Neut % 82.4 (H) 38 - 73 %    Lymph % 8.3 (L) 18 - 48 %    Mono % 6.9 4 - 15 %    Eos % 1.1 <=8 %    Basophil % 0.2 <=1.9 %    Imm Grans % 1.1 (H) 0.0 - 0.5 %    Neut # 6.81 1.8 - 7.7 K/uL    Lymph # 0.69 (L) 1 - 4.8 K/uL    Mono # 0.57 0.3 - 1 K/uL    Eos # 0.09 <=0.5 K/uL    Baso # 0.02 <=0.2 K/uL    Imm Grans # 0.09 (H) 0.00 - 0.04 K/uL       Microbiology Results (last 7 days)       ** No results found for the last 168 hours. **             Imaging Results              CT Abdomen Pelvis  Without Contrast (Final result)  Result time 04/18/25 15:03:16      Final result by James Cortés Jr., MD (04/18/25 15:03:16)                   Impression:      High-grade small bowel obstruction related to a 5 by 5 cm ventral abdominal wall hernia.    Right middle and right lower lobe mucous plugging and or endobronchial spread of infection/bronchitis.    Status post left colectomy common diverting left lower quadrant ostomy and multiple small bowel anastomoses with markedly dilated small bowel  loops in the deep pelvis which may reflect acute superimposed on chronic change.    Findings of chronic medical renal disease and simple and complex renal cysts.  Complex cyst versus solid mass lower pole right kidney could be further evaluated with ultrasound if the patient cannot receive intravenous contrast.      Electronically signed by: James Zuleta Jr  Date:    04/18/2025  Time:    15:03               Narrative:    EXAMINATION:  CT ABDOMEN PELVIS WITHOUT CONTRAST    CLINICAL HISTORY:  Nausea/vomiting;    TECHNIQUE:  Low dose axial images, sagittal and coronal reformations were obtained from the lung bases to the pubic symphysis.  Contrast was not administered.    COMPARISON:  05/09/2023    FINDINGS:  In the right middle lobe and right lower lobe there is peribronchial thickening and patchy branching lung opacity that may reflect mucous plugging and or bronchopneumonia.    Kidneys/ Ureters: Bilateral multifocal renal cortical scarring.  Multiple renal cysts are present including at least 1 hyperdense cyst.  There is redemonstration of an asymmetric contour lobulation of arising from the lower pole the right kidney which is similar to the prior study.  This may reflect a large complex cyst with solid mass difficult to exclude with certainty.  Stability over time favors a nonaggressive etiology.  Multiple calcification in the renal michelle may represent multiple nonobstructing stones.  There are    Liver: Normal in size and attenuation, with no focal hepatic lesions.    Gallbladder: No calcified gallstones.    Bile Ducts: No evidence of dilated ducts.    Pancreas: No mass or peripancreatic fat stranding.    Spleen: Unremarkable.    Adrenals: Unremarkable.    Pelvic organs: Prostate enlarged.    GI Tract/Mesentery: Patient is status post left colectomy with a left lower quadrant colostomy.  There is rectus diastasis and a superimposed wide necked ventral hernia containing multiple decompressed small bowel loops.  " There is marked distension of the stomach and multiple small bowel loops throughout the abdomen and pelvis..  Findings are suggestive of incarcerated hernia.  The dilated bowel loops in the deep pelvis are associated with at least 2 suture lines is suggesting prior small bowel resection and anastomosis and these may be chronically dilated.    Retroperitoneum: No significant adenopathy.    Vasculature: Aortic atherosclerosis.    Bones: Unremarkable.                                       X-Ray Chest AP Portable (Final result)  Result time 04/18/25 13:27:59   Procedure changed from X-Ray Chest PA And Lateral     Final result by Clinton Pablo MD (04/18/25 13:27:59)                   Impression:      Increased streaky opacities in the right lower lung zone, nonspecific and possibly atelectasis, edema, or infectious/inflammatory disease (including aspiration).      Electronically signed by: Clinton Pablo MD  Date:    04/18/2025  Time:    13:27               Narrative:    EXAMINATION:  XR CHEST AP PORTABLE    CLINICAL HISTORY:  Nausea and vomiting; Nausea with vomiting, unspecified    TECHNIQUE:  Single frontal view of the chest was performed.    COMPARISON:  Prior exams dating back to 2008    FINDINGS:  Left chest wall port catheter tip projects in the SVC, unchanged.    Increased streaky opacities in the right lower lung zone.  Left lung is clear.    No effusion or pneumothorax.    Unchanged cardiomediastinal silhouette.    No acute bone abnormality.                                               Assessment & Plan  SBO (small bowel obstruction)  - CT upon admission: "High-grade small bowel obstruction related to a 5 by 5 cm ventral abdominal wall hernia. Status post left colectomy common diverting left lower quadrant ostomy and multiple small bowel anastomoses with markedly dilated small bowel loops in the deep pelvis which may reflect acute superimposed on chronic change."  - General surgery consulted  - NPO   - " NGT placed and LIWS  - GGC ordered for 4/21 and now passage of contrast to colon, no gas/stool in ostomy bag  - Surgery discussing ex-lap with patient and family - tentatively for 4/23/25  - Cardiology aware and following, will ask for risk stratification for surgery     - underwent exlap with lysis of adhesions on 4/23.   - NGT in place  - Stool/gas in ostomy on 4/25 and again after GGC  - full liquid diet, family to bring dentures -- SLP following   Acute renal failure superimposed on stage 3b chronic kidney disease  VINNY is likely due to  prerenal from SBO . Baseline creatinine is  1 . Most recent creatinine and eGFR are listed below.  Recent Labs     04/26/25  0259 04/27/25  0231 04/28/25  0255   CREATININE 1.5* 1.4 1.2   EGFRNORACEVR 44* 48* 58*   - UA unremarkable  - CT shows no hydronephrosis     Plan  - Avoid nephrotoxins and renally dose meds for GFR listed above  - Monitor urine output, serial BMP, and adjust therapy as needed  - improving on IVF's and stable  HTN, goal below 140/80  Patient's blood pressure range in the last 24 hours was: BP  Min: 118/70  Max: 155/75.The patient's inpatient anti-hypertensive regimen is listed below:  Current Antihypertensives  hydrALAZINE injection 10 mg, Every 6 hours PRN, Intravenous  metoprolol tartrate (LOPRESSOR) split tablet 12.5 mg, 2 times daily, Oral    Plan  - BP increasing as patient unable to take his oral BP medications.  Continue with prn IV Hydralazine.   Pulmonary emphysema, unspecified emphysema type  Patient's COPD is controlled currently.  Patient is currently off COPD Pathway.  Stable on room air  Anemia of chronic renal failure, stage 3b  Anemia is likely due to chronic disease due to Chronic Kidney Disease. Most recent hemoglobin and hematocrit are listed below.  Recent Labs     04/26/25  0259 04/27/25  0231 04/28/25  0255   HGB 9.5* 9.1* 9.9*   HCT 30.1* 28.4* 31.7*     Plan  - Monitor serial CBC: Daily  - Transfuse PRBC if patient becomes  hemodynamically unstable, symptomatic or H/H drops below 7/21.  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 Liver biopsy negative     Ventral hernia  - see SBO    Severe protein-calorie malnutrition  Body mass index is 18.88 kg/m².  Notably, albumin 3.6 on admission- perhaps due to dehydration  - now with bowel movements  - SLP consulted for diet - on full liquids, bringing dentures to re-evaluate for advancement      NSVT (nonsustained ventricular tachycardia)  Episodes of NSVT on monitoring.  Check Echo.  Cardiology consulted.  Atrial and ventricular ectopy.  Started on amiodarone drip per Cards recommendations.  - on IV metoprolol  - switch to PO amiodarone 400 mg PO bid until outpatient follow up      Atrial tachycardia  - on iv amio and iv metoprolol  - Now on PO amiodarone and metoprolol    Advance care planning  Palliative Care consult. Full code at this time.    Hypernatremia  Hypernatremia is likely due to  normal saline IVF's . The patient's most recent sodium results are listed below.  Recent Labs     04/26/25  0259 04/27/25  0231 04/28/25  0255   * 146* 146*     Plan  - Aim to correct the sodium by 8-10mEq in 24 hours.   - Plan to correct their hypernatremia with Select IV fluids: D5W/0.45 NaCl - increase rate to 75 cc/hr. Now 0.45 NS.  - Will plan to trend the patient's sodium: Daily  - now resolved with fluids however slightly elevated again  - encourage PO intake now he is able  - recheck in AM    Delirium  Post operative delirium noted. Unable to redirect, concern for removal of NGT and other tubes. Had ex-lap 4/23.  - precedex drip now off  -  resolved      VTE Risk Mitigation (From admission, onward)           Ordered     heparin (porcine) injection 5,000 Units  Every 8 hours         04/19/25 1933     Place ARI hose  Until discontinued         04/18/25 1542     Place sequential compression device  Until discontinued         04/18/25 1542     Reason for No Pharmacological VTE Prophylaxis  Once        Question:  Reasons:  Answer:  Physician Provided (leave comment)  Comment:  potential OR    04/18/25 1542     IP VTE HIGH RISK PATIENT  Once         04/18/25 1542                    Discharge Planning   WIL: 4/28/2025     Code Status: Full Code   Medical Readiness for Discharge Date:   Discharge Plan A:  (tbd)                Please place Justification for DME        Gustavo Tsai MD  Department of Hospital Medicine   [facility]

## 2025-04-28 NOTE — PT/OT/SLP PROGRESS
Physical Therapy Treatment    Patient Name:  Eugene Gamez   MRN:  [id number]    Recommendations:     Discharge Recommendations: Moderate Intensity Therapy  Discharge Equipment Recommendations: none  Barriers to discharge: None    Assessment:     Eugene Gamez is a 89 y.o. male admitted with a medical diagnosis of SBO (small bowel obstruction).  He presents with the following impairments/functional limitations: weakness, impaired endurance, gait instability, impaired functional mobility, impaired self care skills, decreased upper extremity function, impaired balance, decreased coordination, decreased safety awareness, pain, decreased ROM, decreased lower extremity function .    Rehab Prognosis: Fair +; patient would benefit from acute skilled PT services to address these deficits and reach maximum level of function.    Recent Surgery: Procedure(s) (LRB):  LAPAROTOMY, EXPLORATORY (N/A)  REPAIR, HERNIA, INCISIONAL (N/A) 5 Days Post-Op    Plan:     During this hospitalization, patient to be seen 3 x/week to address the identified rehab impairments via gait training, therapeutic activities, therapeutic exercises and progress toward the following goals:    Plan of Care Expires:  05/02/25    Subjective     Chief Complaint: weakness and fatigue   Patient/Family Comments/goals: pt is very pleasant and agreeable to therapy  Pain/Comfort:  Location 1: abdomen  Pain Addressed 1: Pre-medicate for activity, Cessation of Activity, Reposition, Nurse notified      Objective:     Communicated with nurse prior to session.  Patient found HOB elevated with telemetry, bed alarm, Condom Catheter, colostomy, PICC line upon PT entry to room.     General Precautions: Standard, fall  Orthopedic Precautions: N/A  Braces: N/A  Respiratory Status: Room air     Functional Mobility:  Bed Mobility:     Rolling Right: maximal assistance  Scooting: maximal assistance  Bridging: maximal assistance  Supine to Sit: maximal assistance, HOB elevated,  bedside rail   Sit to Supine: maximal assistance  Transfers:  pt required MAX V/T cues for safety, proper technique and fwd weight shifting over COG to stand upright.    Sit to Stand: x 2 trials from elevated bed  maximal assistance with rolling walker  Gait: Patient ambulated ~ 3-4 side steps on level tile with Rolling Walker with Max Assistance .  Pt with demonstarting a  3-point gait with decreased vanda, increased time in double stance, decreased velocity of limb motion, decreased step length, decreased swing-to-stance ratio, decreased toe-to-floor clearance and decreased weight-shifting ability.Impairments contributing to gait deviations include impaired balance, decreased flexibility, pain, impaired postural control, decreased ROM and decreased strength. V/T cues for safety technique and walker management.     Balance: fair in standing, poor in standing.        AM-PAC 6 CLICK MOBILITY  Turning over in bed (including adjusting bedclothes, sheets and blankets)?: 3  Sitting down on and standing up from a chair with arms (e.g., wheelchair, bedside commode, etc.): 2  Moving from lying on back to sitting on the side of the bed?: 2  Moving to and from a bed to a chair (including a wheelchair)?: 2  Need to walk in hospital room?: 2  Climbing 3-5 steps with a railing?: 1  Basic Mobility Total Score: 12       Treatment & Education:  Lower Extremity Exercises.    Patient educated on: Purpose and Benefits of Therapeutic Exercise(s).    Patient verbalized acceptance/understanding of instruction(s), expectation(s), and limitation(s) (for safety).  Patient performed: 2 sets of 10 reps (each) of B LE Ther Ex: AP, LAQ, HSC,  Hip abd/add, Hip flexion while sitting up on EOB.       Patient required verbal cues/tactile cues to ensure correct sequence, to maintain proper form, and to allow for self-correction.  Patient required increase Reaction Time to initiate movements and a Sitting Rest Break between set(s) to recover.       Patient left with bed in chair position with pressure relief boots to BLE  all lines intact, call button in reach, bed alarm on, and nurse notified..    GOALS:   Multidisciplinary Problems       Physical Therapy Goals          Problem: Physical Therapy    Goal Priority Disciplines Outcome Interventions   Physical Therapy Goal     PT, PT/OT Progressing    Description: Goals to be met by: 25     Patient will increase functional independence with mobility by performin. Pt to be (S) with bed mobility.  2. Pt to transfer with min A.  3. Pt to ambulate 50' w/RW CGA.  4. Pt to be (I) with HEP.                         DME Justifications:      Time Tracking:     PT Received On: 25  PT Start Time: 1558     PT Stop Time: 1633  PT Total Time (min): 35 min     Billable Minutes: Therapeutic Activity 23 and Therapeutic Exercise 12    Treatment Type: Treatment  PT/PTA: PTA     Number of PTA visits since last PT visit: 2025

## 2025-04-29 VITALS
OXYGEN SATURATION: 95 % | HEART RATE: 74 BPM | TEMPERATURE: 98 F | RESPIRATION RATE: 18 BRPM | DIASTOLIC BLOOD PRESSURE: 64 MMHG | SYSTOLIC BLOOD PRESSURE: 126 MMHG | BODY MASS INDEX: 18.87 KG/M2 | WEIGHT: 147.06 LBS | HEIGHT: 74 IN

## 2025-04-29 LAB
ABSOLUTE EOSINOPHIL (OHS): 0.07 K/UL
ABSOLUTE MONOCYTE (OHS): 0.75 K/UL (ref 0.3–1)
ABSOLUTE NEUTROPHIL COUNT (OHS): 5.89 K/UL (ref 1.8–7.7)
ALBUMIN SERPL BCP-MCNC: 2.1 G/DL (ref 3.5–5.2)
ALP SERPL-CCNC: 37 UNIT/L (ref 40–150)
ALT SERPL W/O P-5'-P-CCNC: 8 UNIT/L (ref 10–44)
ANION GAP (OHS): 7 MMOL/L (ref 8–16)
AST SERPL-CCNC: 9 UNIT/L (ref 11–45)
BASOPHILS # BLD AUTO: 0.01 K/UL
BASOPHILS NFR BLD AUTO: 0.1 %
BILIRUB SERPL-MCNC: 0.7 MG/DL (ref 0.1–1)
BUN SERPL-MCNC: 19 MG/DL (ref 8–23)
CALCIUM SERPL-MCNC: 8.9 MG/DL (ref 8.7–10.5)
CHLORIDE SERPL-SCNC: 119 MMOL/L (ref 95–110)
CO2 SERPL-SCNC: 18 MMOL/L (ref 23–29)
CREAT SERPL-MCNC: 1.3 MG/DL (ref 0.5–1.4)
ERYTHROCYTE [DISTWIDTH] IN BLOOD BY AUTOMATED COUNT: 14.7 % (ref 11.5–14.5)
GFR SERPLBLD CREATININE-BSD FMLA CKD-EPI: 53 ML/MIN/1.73/M2
GLUCOSE SERPL-MCNC: 80 MG/DL (ref 70–110)
HCT VFR BLD AUTO: 30.2 % (ref 40–54)
HGB BLD-MCNC: 9.6 GM/DL (ref 14–18)
IMM GRANULOCYTES # BLD AUTO: 0.09 K/UL (ref 0–0.04)
IMM GRANULOCYTES NFR BLD AUTO: 1.2 % (ref 0–0.5)
LYMPHOCYTES # BLD AUTO: 0.82 K/UL (ref 1–4.8)
MAGNESIUM SERPL-MCNC: 1.5 MG/DL (ref 1.6–2.6)
MCH RBC QN AUTO: 28.7 PG (ref 27–31)
MCHC RBC AUTO-ENTMCNC: 31.8 G/DL (ref 32–36)
MCV RBC AUTO: 90 FL (ref 82–98)
NUCLEATED RBC (/100WBC) (OHS): 0 /100 WBC
PHOSPHATE SERPL-MCNC: 1.7 MG/DL (ref 2.7–4.5)
PLATELET # BLD AUTO: 246 K/UL (ref 150–450)
PMV BLD AUTO: 11.5 FL (ref 9.2–12.9)
POTASSIUM SERPL-SCNC: 3.2 MMOL/L (ref 3.5–5.1)
PROT SERPL-MCNC: 5.1 GM/DL (ref 6–8.4)
RBC # BLD AUTO: 3.34 M/UL (ref 4.6–6.2)
RELATIVE EOSINOPHIL (OHS): 0.9 %
RELATIVE LYMPHOCYTE (OHS): 10.7 % (ref 18–48)
RELATIVE MONOCYTE (OHS): 9.8 % (ref 4–15)
RELATIVE NEUTROPHIL (OHS): 77.3 % (ref 38–73)
SODIUM SERPL-SCNC: 144 MMOL/L (ref 136–145)
WBC # BLD AUTO: 7.63 K/UL (ref 3.9–12.7)

## 2025-04-29 PROCEDURE — 80053 COMPREHEN METABOLIC PANEL: CPT | Performed by: STUDENT IN AN ORGANIZED HEALTH CARE EDUCATION/TRAINING PROGRAM

## 2025-04-29 PROCEDURE — 94761 N-INVAS EAR/PLS OXIMETRY MLT: CPT

## 2025-04-29 PROCEDURE — 92526 ORAL FUNCTION THERAPY: CPT

## 2025-04-29 PROCEDURE — 25000003 PHARM REV CODE 250: Performed by: STUDENT IN AN ORGANIZED HEALTH CARE EDUCATION/TRAINING PROGRAM

## 2025-04-29 PROCEDURE — 83735 ASSAY OF MAGNESIUM: CPT | Performed by: STUDENT IN AN ORGANIZED HEALTH CARE EDUCATION/TRAINING PROGRAM

## 2025-04-29 PROCEDURE — 63600175 PHARM REV CODE 636 W HCPCS: Performed by: STUDENT IN AN ORGANIZED HEALTH CARE EDUCATION/TRAINING PROGRAM

## 2025-04-29 PROCEDURE — 97530 THERAPEUTIC ACTIVITIES: CPT

## 2025-04-29 PROCEDURE — 85025 COMPLETE CBC W/AUTO DIFF WBC: CPT | Performed by: STUDENT IN AN ORGANIZED HEALTH CARE EDUCATION/TRAINING PROGRAM

## 2025-04-29 PROCEDURE — 36415 COLL VENOUS BLD VENIPUNCTURE: CPT | Performed by: STUDENT IN AN ORGANIZED HEALTH CARE EDUCATION/TRAINING PROGRAM

## 2025-04-29 PROCEDURE — 84100 ASSAY OF PHOSPHORUS: CPT | Performed by: STUDENT IN AN ORGANIZED HEALTH CARE EDUCATION/TRAINING PROGRAM

## 2025-04-29 PROCEDURE — 99900035 HC TECH TIME PER 15 MIN (STAT)

## 2025-04-29 RX ORDER — METOPROLOL TARTRATE 25 MG/1
12.5 TABLET, FILM COATED ORAL 2 TIMES DAILY
Start: 2025-04-29 | End: 2025-05-29

## 2025-04-29 RX ORDER — SODIUM,POTASSIUM PHOSPHATES 280-250MG
2 POWDER IN PACKET (EA) ORAL
Status: DISCONTINUED | OUTPATIENT
Start: 2025-04-29 | End: 2025-04-29 | Stop reason: HOSPADM

## 2025-04-29 RX ORDER — TRAZODONE HYDROCHLORIDE 50 MG/1
50 TABLET ORAL NIGHTLY PRN
Start: 2025-04-29

## 2025-04-29 RX ORDER — AMIODARONE HYDROCHLORIDE 400 MG/1
400 TABLET ORAL 2 TIMES DAILY
Start: 2025-04-29 | End: 2025-05-29

## 2025-04-29 RX ORDER — AMLODIPINE BESYLATE 10 MG/1
10 TABLET ORAL DAILY
Start: 2025-04-29

## 2025-04-29 RX ORDER — MAGNESIUM SULFATE HEPTAHYDRATE 40 MG/ML
2 INJECTION, SOLUTION INTRAVENOUS ONCE
Status: COMPLETED | OUTPATIENT
Start: 2025-04-29 | End: 2025-04-29

## 2025-04-29 RX ADMIN — HEPARIN SODIUM 5000 UNITS: 5000 INJECTION INTRAVENOUS; SUBCUTANEOUS at 05:04

## 2025-04-29 RX ADMIN — Medication 2 PACKET: at 09:04

## 2025-04-29 RX ADMIN — MAGNESIUM SULFATE HEPTAHYDRATE 2 G: 40 INJECTION, SOLUTION INTRAVENOUS at 09:04

## 2025-04-29 RX ADMIN — HEPARIN SODIUM 5000 UNITS: 5000 INJECTION INTRAVENOUS; SUBCUTANEOUS at 02:04

## 2025-04-29 RX ADMIN — PANTOPRAZOLE SODIUM 40 MG: 40 INJECTION, POWDER, FOR SOLUTION INTRAVENOUS at 10:04

## 2025-04-29 RX ADMIN — AMIODARONE HYDROCHLORIDE 400 MG: 200 TABLET ORAL at 10:04

## 2025-04-29 RX ADMIN — METOPROLOL TARTRATE 12.5 MG: 25 TABLET, FILM COATED ORAL at 10:04

## 2025-04-29 NOTE — ASSESSMENT & PLAN NOTE
Anemia is likely due to chronic disease due to Chronic Kidney Disease. Most recent hemoglobin and hematocrit are listed below.  Recent Labs     04/27/25  0231 04/28/25  0255 04/29/25  0514   HGB 9.1* 9.9* 9.6*   HCT 28.4* 31.7* 30.2*     Plan  - Monitor serial CBC: Daily  - Transfuse PRBC if patient becomes hemodynamically unstable, symptomatic or H/H drops below 7/21.  - Patient has not received any PRBC transfusions to date  - Patient's anemia is currently stable

## 2025-04-29 NOTE — ASSESSMENT & PLAN NOTE
Patient's COPD is controlled currently.  Patient is currently off COPD Pathway.  Stable on room air   VSS.  No PRNs or Tylenol indicated today.  Prashant scores 2 each assessment.  Bottled partial feeding volume x 2.  Several large stools today.  Alert NNP for any changes in status.  Continue current plan of care.

## 2025-04-29 NOTE — ASSESSMENT & PLAN NOTE
VINNY is likely due to prerenal from SBO. Baseline creatinine is 1. Most recent creatinine and eGFR are listed below.  Recent Labs     04/27/25  0231 04/28/25  0255 04/29/25  0514   CREATININE 1.4 1.2 1.3   EGFRNORACEVR 48* 58* 53*   - UA unremarkable  - CT shows no hydronephrosis     Plan  - Avoid nephrotoxins and renally dose meds for GFR listed above  - Monitor urine output, serial BMP, and adjust therapy as needed  - improving on IVF's and stable

## 2025-04-29 NOTE — NURSING
Reportcalled to LIZET Soria nurse for 136B.  Report given.  Current diagnosis, code status and history given.  Instructed nurse if she has any questions she could call me back and I would be more than happy to answer them.  Midline removed from JAM, pressure held, no bleeding noted.

## 2025-04-29 NOTE — ASSESSMENT & PLAN NOTE
Hypernatremia is likely due to normal saline IVF's. The patient's most recent sodium results are listed below.  Recent Labs     04/27/25  0231 04/28/25  0255 04/29/25  0514   * 146* 144     Plan  - Aim to correct the sodium by 8-10mEq in 24 hours.   - Plan to correct their hypernatremia with Select IV fluids: D5W/0.45 NaCl - increase rate to 75 cc/hr. Now 0.45 NS.  - Will plan to trend the patient's sodium: Daily  - now resolved with fluids however slightly elevated again  - encourage PO intake now he is able  - recheck in AM

## 2025-04-29 NOTE — PLAN OF CARE
Problem: Adult Inpatient Plan of Care  Goal: Plan of Care Review  Outcome: Progressing  Goal: Patient-Specific Goal (Individualized)  Outcome: Progressing  Goal: Absence of Hospital-Acquired Illness or Injury  Outcome: Progressing  Goal: Optimal Comfort and Wellbeing  Outcome: Progressing  Goal: Readiness for Transition of Care  Outcome: Progressing     Problem: Infection  Goal: Absence of Infection Signs and Symptoms  Outcome: Progressing     Problem: Acute Kidney Injury/Impairment  Goal: Fluid and Electrolyte Balance  Outcome: Progressing  Goal: Improved Oral Intake  Outcome: Progressing  Goal: Effective Renal Function  Outcome: Progressing     Problem: Skin Injury Risk Increased  Goal: Skin Health and Integrity  Outcome: Progressing     Problem: Wound  Goal: Optimal Coping  Outcome: Progressing  Goal: Optimal Functional Ability  Outcome: Progressing  Goal: Absence of Infection Signs and Symptoms  Outcome: Progressing  Goal: Improved Oral Intake  Outcome: Progressing  Goal: Optimal Pain Control and Function  Outcome: Progressing  Goal: Skin Health and Integrity  Outcome: Progressing  Goal: Optimal Wound Healing  Outcome: Progressing

## 2025-04-29 NOTE — PLAN OF CARE
04/29/25 1215   Medicare Message   Important Message from Medicare regarding Discharge Appeal Rights Given to patient/caregiver;Explained to patient/caregiver;Signed/date by patient/caregiver   Date IMM was signed 04/29/25   Time IMM was signed 1211

## 2025-04-29 NOTE — ASSESSMENT & PLAN NOTE
Patient's blood pressure range in the last 24 hours was: BP  Min: 126/64  Max: 154/77.The patient's inpatient anti-hypertensive regimen is listed below:  Current Antihypertensives  hydrALAZINE injection 10 mg, Every 6 hours PRN, Intravenous  metoprolol tartrate (LOPRESSOR) split tablet 12.5 mg, 2 times daily, Oral  amlodipine (NORVASC) tablet, Daily, Oral  metoprolol tartrate (LOPRESSOR) tablet, 2 times daily, Oral    Plan  - BP increasing as patient unable to take his oral BP medications.  Continue with prn IV Hydralazine.

## 2025-04-29 NOTE — PLAN OF CARE
Pt reporting he prefers puree diet, though displaying early satiety. No overt s/s of aspiration. ST with nothing more to add.    Recommended vitrectomy to remove.

## 2025-04-29 NOTE — PLAN OF CARE
Case Management Final Discharge Note    Discharge Disposition: [facility]     New DME ordered / company name: None     Relevant SDOH / Transition of Care Barriers:  None     Person available to provide assistance at home when needed and their contact information: Extended Emergency Contact Information  Primary Emergency Contact: Nicol Gamez  Address: [address] [address] [facility] [place]  Mobile Phone: [phone]  Relation: Daughter  Secondary Emergency Contact: Mari Mcdonald  Mobile Phone: [phone]  Relation: Daughter  Preferred language: English   needed? No       Transportation: Transportation is scheduled to transport patient to [facility]. CM provided patient's daughter Nicol with transportation time.     Call report to [phone] Ask for rm# [phone] nurse.     ADT 30 order placed for Ambulance Transportation.  Requested  time: 3:00 pm   If transportation does not arrive at ETA time nurse will be instructed to follow protocol for transportation below:  How can I get in touch directly with dispatch, if needed?                 Non-emergent dispatch: [phone]      Patient and family educated on discharge services and updated on DC plan. Bedside RN Samia Bernal notified, patient clear to discharge from Case Management Perspective.       04/29/25 1356   Final Note   Assessment Type Final Discharge Note   Anticipated Discharge Disposition SNF   What phone number can be called within the next 1-3 days to see how you are doing after discharge? [phone]   Hospital Resources/Appts/Education Provided Appointments scheduled and added to AVS   Post-Acute Status   Post-Acute Authorization Placement   Post-Acute Placement Status Set-up Complete/Auth obtained   Coverage Medicare Part A & B   Discharge Delays None known at this time

## 2025-04-29 NOTE — PLAN OF CARE
[facility]     Department of Hospital Medicine     [address] [place] [address]     [phone] after hours  [phone] fax       NURSING HOME ORDERS    04/29/2025    Admit to Nursing Home:  Skilled Bed                                                 Diagnoses:  Active Hospital Problems    Diagnosis  POA    *SBO (small bowel obstruction) [K56.609]  Yes    Advance care planning [Z71.89]  Not Applicable    Hypernatremia [E87.0]  No    Delirium [R41.0]  Yes    NSVT (nonsustained ventricular tachycardia) [I47.29]  No    Atrial tachycardia [I47.19]  Yes    S/P left colectomy [Z90.49]  Not Applicable    Ventral hernia [K43.9]  Yes    Severe protein-calorie malnutrition [E43]  Yes    Anemia of chronic renal failure, stage 3b [N18.32, D63.1]  Yes    Pulmonary emphysema, unspecified emphysema type [J43.9]  Yes    HTN, goal below 140/80 [I10]  Yes    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]  Not Applicable    Acute renal failure superimposed on stage 3b chronic kidney disease [N17.9, N18.32]  Yes      Resolved Hospital Problems    Diagnosis Date Resolved POA    Mucus plug in respiratory tract [T17.998A] 04/21/2025 Yes           Allergies:  Review of patient's allergies indicates:   Allergen Reactions    Iodine Other (See Comments)     Causes gout to flare up    Shellfish containing products      Causes gout to flare up    Shellfish derived        Vitals:       Per facility    Diet:  Pureed diet    Acitivities:     - as tolerated  LABS:  Per facility protocol    Nursing Precautions:    - Aspiration precautions:             -  Upright 90 degrees befor during and after meals             -  Suction at bedside          - Fall precautions per nursing home protocol   - Seizure precaution per group home protocol   - Decubitus precautions:        -  for positioning   - Pressure reducing foam mattress   - Turn patient every two hours. Use wedge pillows to anchor patient    CONSULTS:       Physical Therapy to evaluate and treat     Occupational Therapy to evaluate and treat        MISCELLANEOUS CARE:              Colostomy Care:  Empty bag every shift and prn                                             Change and clean site every 48 hours     Routine Skin for Bedridden Patients:  Apply moisture barrier cream to all    skin folds and wet areas in perineal area daily and after baths and                           all bowel movements.    Medications: Discontinue all previous medication orders, if any. See new list below.       Medication List        START taking these medications      amiodarone 400 MG tablet  Commonly known as: PACERONE  Take 1 tablet (400 mg total) by mouth 2 (two) times daily.     metoprolol tartrate 25 MG tablet  Commonly known as: LOPRESSOR  Take 0.5 tablets (12.5 mg total) by mouth 2 (two) times daily.            CHANGE how you take these medications      traZODone 50 MG tablet  Commonly known as: DESYREL  Take 1 tablet (50 mg total) by mouth nightly as needed for Insomnia.  What changed:   when to take this  reasons to take this            CONTINUE taking these medications      allopurinoL 300 MG tablet  Commonly known as: ZYLOPRIM  Take 1 tablet (300 mg total) by mouth once daily.     amLODIPine 10 MG tablet  Commonly known as: NORVASC  Take 1 tablet (10 mg total) by mouth once daily.     aspirin 81 MG EC tablet  Commonly known as: ECOTRIN  Take 1 tablet (81 mg total) by mouth once daily.     atorvastatin 20 MG tablet  Commonly known as: LIPITOR  Take 1 tablet (20 mg total) by mouth once daily.     calcitRIOL 0.25 MCG Cap  Commonly known as: ROCALTROL  Take 1 capsule (0.25 mcg total) by mouth every other day.     DULoxetine 30 MG capsule  Commonly known as: CYMBALTA  Take 1 capsule (30 mg total) by mouth 2 (two) times daily.     ferrous sulfate 324 mg (65 mg iron) Tbec  Take 1 tablet by mouth once daily.     gabapentin 100 MG capsule  Commonly known as: NEURONTIN  Take 1  capsule (100 mg total) by mouth 3 (three) times daily.     JANUVIA 50 mg Tab  Generic drug: SITagliptin phosphate  Take 1 tablet (50 mg total) by mouth once daily.     sodium bicarbonate 650 MG tablet  Take 1 tablet (650 mg total) by mouth 2 (two) times daily.            STOP taking these medications      hydrALAZINE 50 MG tablet  Commonly known as: APRESOLINE     ketoconazole 2 % cream  Commonly known as: NIZORAL     LIDOcaine 5 %  Commonly known as: LIDODERM                    _________________________________  Yue-Merissa Almodovar DO  04/29/2025

## 2025-04-29 NOTE — PHYSICIAN QUERY
Please further specify the adhesive bowel obstruction diagnosis:  Complete intestinal obstruction, due to adhesions

## 2025-04-29 NOTE — PT/OT/SLP PROGRESS
Occupational Therapy   Treatment    Name: Eugene Gamez  MRN: [id number]  Admitting Diagnosis:  SBO (small bowel obstruction)  6 Days Post-Op    Recommendations:     Discharge Recommendations: Moderate Intensity Therapy  Discharge Equipment Recommendations:  none  Barriers to discharge:  Inaccessible home environment, Decreased caregiver support    Assessment:     Eugene Gamez is a 89 y.o. male with a medical diagnosis of SBO (small bowel obstruction).  He presents with deconditioning. Performance deficits affecting function are weakness, impaired endurance, impaired self care skills, impaired functional mobility, gait instability, impaired balance, decreased ROM, pain, decreased safety awareness, decreased lower extremity function, decreased upper extremity function, impaired joint extensibility, impaired muscle length.     Rehab Prognosis:  Fair; patient would benefit from acute skilled OT services to address these deficits and reach maximum level of function.       Plan:     Patient to be seen 3 x/week to address the above listed problems via self-care/home management, therapeutic activities, therapeutic exercises  Plan of Care Expires: 05/25/25  Plan of Care Reviewed with: patient, daughter    Subjective     Chief Complaint: Pt reports feeling stiff  Patient/Family Comments/goals: To return to PLOF  Pain/Comfort:  Pain Rating 1: 0/10  Location - Orientation 1: generalized  Location 1: abdomen  Pain Addressed 1: Reposition, Distraction, Cessation of Activity  Pain Rating Post-Intervention 1:  (reported a little bit but tolerable)    Objective:     Communicated with: nsg prior to session.  Patient found HOB elevated with bed alarm, peripheral IV, telemetry, Condom Catheter, colostomy, PICC line upon OT entry to room.    General Precautions: Standard, fall    Orthopedic Precautions:N/A  Braces: N/A  Respiratory Status: Room air     Occupational Performance:     Bed Mobility:    Patient completed Rolling/Turning  to Right with maximal assistance  Patient completed Scooting/Bridging with maximal assistance  Patient completed Supine to Sit with moderate assistance and maximal assistance  Patient completed Sit to Supine with maximal assistance       [facility] 6 Click ADL: 9    Treatment & Education:  Pt educated on role of OT and POC.   Pt performing skills as listed above.     Patient left HOB elevated with all lines intact, call button in reach, bed alarm on, and nsg notified    GOALS:   Multidisciplinary Problems       Occupational Therapy Goals          Problem: Occupational Therapy    Goal Priority Disciplines Outcome Interventions   Occupational Therapy Goal     OT, PT/OT Progressing    Description: Goals to be met by: 05/25/2025      Patient will increase functional independence with ADLs by performing:    UE Dressing with Supervision.  LE Dressing with Minimal Assistance.  Grooming while seated or standing with Supervision.  Toileting from toilet or BSC with Minimal Assistance for hygiene and clothing management.   Toilet transfer to toilet of BSC with Minimal Assistance.  Increased functional strength to WFL for self care.  Upper extremity exercise program x10 reps per handout, with assistance as needed.                             Time Tracking:     OT Date of Treatment: 04/29/25  OT Start Time: 1101  OT Stop Time: 1113  OT Total Time (min): 12 min    Billable Minutes:Therapeutic Activity 12    OT/CAROLYN: OT     Number of CAROLYN visits since last OT visit: 0    4/29/2025

## 2025-04-29 NOTE — NURSING
Ambulance service here to transport patient to [facility].  Pt is alert and oriented to place and person.  Confused to situation at intervals.  Pt is hard of hearing.  Wears classes.  All belongings are packed with patient.

## 2025-04-29 NOTE — PT/OT/SLP PROGRESS
Speech Language Pathology Treatment    Patient Name:  Eugene Gamez   MRN:  [id number]  Admitting Diagnosis: SBO (small bowel obstruction)    Recommendations:                 General Recommendations:  Follow-up not indicated  Diet recommendations:  Puree Diet - IDDSI Level 4, Liquid Diet Level: Thin liquids - IDDSI Level 0   Aspiration Precautions: 1 bite/sip at a time   General Precautions: Standard, aspiration  Communication strategies:   calm tone    Assessment:     Eugene Gamez is a 89 y.o. male with dx of SBO he presents with oral dysphagia negatively impacted by lack of dentition and poor fitting dentures.     Subjective   Pt was awake and pleasant throughout.   Patient goals: return to medical status baseline    Pain/Comfort:  Pain Rating 1: 0/10    Respiratory Status: Room air    Objective:     Has the patient been evaluated by SLP for swallowing?   Yes  Keep patient NPO? No     Pt repositioned upright for trials of puree with thin liquids. Prolonged mastication noted for puree, intake of self presented thin liquids were unremarkable. After 5 small bites and 3oz of thin thin liquids pt reported he was full and requested to rest. ST complied. No overt s/s of aspiration throughout.     Goals:   Multidisciplinary Problems       SLP Goals       Not on file              Multidisciplinary Problems (Resolved)          Problem: SLP    Goal Priority Disciplines Outcome   SLP Goal   (Resolved)    Low SLP Met   Description: Goals:  1. Pt will participate in on-going assessment of swallowing function.    2.Pt will tolerate PO diet of pureed consistency with thin liquids without overt s/s of aspiration. (Goal met 4/29)                           Plan:     Plan of Care reviewed with:  patient   SLP Follow-Up:  No     Discharge recommendations:    no further ST is warranted.   Barriers to Discharge:  None    Time Tracking:     SLP Treatment Date:   04/29/25  Speech Start Time:  1255  Speech Stop Time:  1307     Speech  Total Time (min):  12 min    Billable Minutes: Treatment Swallowing Dysfunction 12 04/29/2025

## 2025-04-29 NOTE — DISCHARGE SUMMARY
[facility] Medicine  Discharge Summary      Patient Name: Eugene Gamez  MRN: [id number]  [facility]: [id number]  Patient Class: IP- Inpatient  Admission Date: 4/18/2025  Hospital Length of Stay: 11 days  Discharge Date and Time: 4/29/2025  3:15 PM  Attending Physician: No att. providers found   Discharging Provider: Remy Almodovar DO  Primary Care Provider: Larry Monae MD    Primary Care Team: Networked reference to record PCT     HPI:   Mr Eugene Gamez is a 89 y.o. man with history of L colectomy for colon cancer (2015) with ventral hernia, prior DM, HTN, CKD3 (baseline Cr 1) who presented with nausea and vomiting, abdominal pain.  The symptoms began a day or 2 ago but significantly worsened yesterday morning.  Last night and today He has not been able to keep much of anything down.  He denies any issues with his colostomy, stool has been normal.  Overall he feels weak and fatigued.  Denies dysuria, difficulty urinating, changes in bowel habits His CMP shows creatinine 2.4, bicarb 21, anion gap 17, glucose 209; UA with trace protein, 7 RBC, 2 WBC; CT abdomen and pelvis with high-grade small-bowel obstruction related to a 5 x 5 cm ventral abdominal wall hernia, right middle and right lower lobe mucous plugging and/or endobronchial spread of infection/bronchitis.He was admitted for high grade SBO. Started IVF. General surgery consulted.     Procedure(s) (LRB):  LAPAROTOMY, EXPLORATORY (N/A)  REPAIR, HERNIA, INCISIONAL (N/A)      Hospital Course:   Mr Eugene Gamez is a 89 y.o. man with history of L colectomy for colon cancer (2015) with colostomy in place and ventral hernia who was admitted with SBO. General surgery consulted.  NGT placed.  Patient with apparent episode of poor responsiveness.  Transferred to ICU, but quickly recovered.  Patient with atrial and ventricular ectopy on tele.  Cardiology consulted.  Patient started on Amiodarone drip. GGC ordered and unfortunately no passage to  colon. Palliative following. Surgery is discussing ex-lap with patient and family, tentatively planned for 4/23. Patient underwent ex-lap on 4/23 and had lysis of adhesions, appears to be problem. NGT in place and extubated, back to ICU. Having some post operative delirium controlled on low dose precedex. Awaiting bowel function to return.  Stooling gas noted and ostomy on 04/25. GGC ordered with significant ostomy output overnight. NGT removed, SLP cleared for full liquid diet. PT/OT consulted-recommends moderate intensity therapy. Transitioned to PO amiodarone and metoprolol. Stable for transfer to floor.  Diet advanced as tolerated.  Medically stable for SNF. Patient has been accepted to [facility].    Patient states he is doing great this morning.  Tolerated his breakfast without difficulty.  Ate about 80% of his food.  No family at bedside.  However, was able to call patient's daughter Clemente and provided her an update and answered all questions or concerns. Pt denies any fever, headaches, chest pain, shortness of breath, palpitations, abdominal pain, nausea, vomiting, or any new weaknesses.  Patient's exam on discharge was as follow: Patient is alert and oriented, appears in no acute distress, heart with regular rate and rhythm, lungs clear to asculation with non-labored breathing, abdomen soft, and nontender, Gas and stool in bag, no new weaknesses or focal deficits seen. Coloplast pouch in place over left colostomy. Stoma pale pink bud. Functioning a yellow liquid effluent    Patient and daughter Nicol (via phone) was counseled regarding any abnormal labs, differential diagnosis, treatment options, risk-benefit, lifestyle changes, prognosis, current condition, and medications. Patient and daughter Nicol (via phone) was interactive and attentive.  Patient and daughter Nicol (via phone) questions were answered in a respectful and timely manner. Patient was instructed to follow-up with PCP within 1 week and to  continue taking medications as prescribed.  Instructed to also follow up with General surgery outpatient for post follow up. Also, extensively discussed the risks, benefits, and side effects of patient's medications. Discussed with Patient and daughter Nicol (via phone) about any medication changes. Patient verbalized understanding and agrees to treatment plan.  Patient is stable for discharge.  Patient and daughter Nicol (via phone) has no other questions or concerns at this time.  ED precautions discussed with the patient.    Vital signs are stable. Ambulating without any difficulty. Tolerating p.o. intake without any nausea or vomiting. Afebrile for over 24 hours. Patient is in stable condition and has no questions or concerns. Patient will be discharge to SNF once facility is prepared for patient's arrival and transportation secured .   CM/SW to assist with discharge planning.     Vitals:    04/29/25 0809 04/29/25 0832 04/29/25 1058 04/29/25 1129   BP: 129/69  129/69 126/64   BP Location: Left arm   Left arm   Patient Position: Lying   Lying   Pulse: 76  76 74   Resp: 18 18  18   Temp: 98.9 °F (37.2 °C)   97.9 °F (36.6 °C)   TempSrc: Oral   Oral   SpO2: 98% 98%  95%   Weight:       Height:                Goals of Care Treatment Preferences:  Code Status: Full Code    Living Will: Yes     What is most important right now is to focus on remaining as independent as possible, improvement in condition but with limits to invasive therapies.  Accordingly, we have decided that the best plan to meet the patient's goals includes continuing with treatment.      SDOH Screening:  The patient was screened for food insecurity, housing instability, transportation needs, utility difficulties, and interpersonal safety. The social determinant(s) of health identified as a concern this admission are:      Will discuss with case management and/or community health workers.    Social Drivers of Health with Concerns     Food Insecurity:  "No Food Insecurity (4/20/2025)   Recent Concern: Food Insecurity - Food Insecurity Present (4/18/2025)   Housing Stability: Low Risk  (4/20/2025)   Recent Concern: Housing Stability - High Risk (4/18/2025)   Transportation Needs: Unmet Transportation Needs (4/18/2025)   Utilities: Not At Risk (4/20/2025)   Recent Concern: Utilities - At Risk (4/18/2025)        Consults:   Consults (From admission, onward)          Status Ordering Provider     Inpatient consult to Midline team  Once        Provider:  (Not yet assigned)    Acknowledged OMKAR RECINOS     Inpatient consult to Spiritual Care  Once        Provider:  (Not yet assigned)    Completed DG EASLEY     Inpatient consult to Palliative Care  Once        Provider:  Tammy Recinos MD    Completed VIKI CISNEROS     Inpatient consult to Cardiology  Once        Provider:  Elias Ibanez MD    Completed VIKI CISNEROS     Inpatient consult to General surgery  Once        Provider:  Honey Meneses MD    Completed DAYLIN RODRIGUEZ            Assessment & Plan  SBO (small bowel obstruction)  - CT upon admission: "High-grade small bowel obstruction related to a 5 by 5 cm ventral abdominal wall hernia. Status post left colectomy common diverting left lower quadrant ostomy and multiple small bowel anastomoses with markedly dilated small bowel loops in the deep pelvis which may reflect acute superimposed on chronic change."  - General surgery consulted  - NPO   - NGT placed and LIWS  - GGC ordered for 4/21 and now passage of contrast to colon, no gas/stool in ostomy bag  - Surgery discussing ex-lap with patient and family - tentatively for 4/23/25  - Cardiology aware and following, will ask for risk stratification for surgery     - underwent exlap with lysis of adhesions on 4/23.   - NGT in place  - Stool/gas in ostomy on 4/25 and again after GGC  - full liquid diet, family to bring dentures -- SLP following   Acute renal failure superimposed on " stage 3b chronic kidney disease  VINNY is likely due to  prerenal from SBO . Baseline creatinine is  1 . Most recent creatinine and eGFR are listed below.  Recent Labs     04/27/25  0231 04/28/25  0255 04/29/25  0514   CREATININE 1.4 1.2 1.3   EGFRNORACEVR 48* 58* 53*   - UA unremarkable  - CT shows no hydronephrosis     Plan  - Avoid nephrotoxins and renally dose meds for GFR listed above  - Monitor urine output, serial BMP, and adjust therapy as needed  - improving on IVF's and stable  HTN, goal below 140/80  Patient's blood pressure range in the last 24 hours was: BP  Min: 126/64  Max: 154/77.The patient's inpatient anti-hypertensive regimen is listed below:  Current Antihypertensives  hydrALAZINE injection 10 mg, Every 6 hours PRN, Intravenous  metoprolol tartrate (LOPRESSOR) split tablet 12.5 mg, 2 times daily, Oral  amlodipine (NORVASC) tablet, Daily, Oral  metoprolol tartrate (LOPRESSOR) tablet, 2 times daily, Oral    Plan  - BP increasing as patient unable to take his oral BP medications.  Continue with prn IV Hydralazine.   Pulmonary emphysema, unspecified emphysema type  Patient's COPD is controlled currently.  Patient is currently off COPD Pathway.  Stable on room air  Anemia of chronic renal failure, stage 3b  Anemia is likely due to chronic disease due to Chronic Kidney Disease. Most recent hemoglobin and hematocrit are listed below.  Recent Labs     04/27/25  0231 04/28/25 0255 04/29/25  0514   HGB 9.1* 9.9* 9.6*   HCT 28.4* 31.7* 30.2*     Plan  - Monitor serial CBC: Daily  - Transfuse PRBC if patient becomes hemodynamically unstable, symptomatic or H/H drops below 7/21.  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 Liver biopsy negative     Ventral hernia  - see SBO    Severe protein-calorie malnutrition  Body mass index is 18.88 kg/m².  Notably, albumin 3.6 on admission- perhaps due to dehydration  - now with bowel movements  - SLP consulted for diet - on full liquids, bringing dentures to re-evaluate for advancement      NSVT (nonsustained ventricular tachycardia)  Episodes of NSVT on monitoring.  Check Echo.  Cardiology consulted.  Atrial and ventricular ectopy.  Started on amiodarone drip per Cards recommendations.  - on IV metoprolol  - switch to PO amiodarone 400 mg PO bid until outpatient follow up      Atrial tachycardia  - on iv amio and iv metoprolol  - Now on PO amiodarone and metoprolol    Advance care planning  Palliative Care consult. Full code at this time.    Hypernatremia  Hypernatremia is likely due to  normal saline IVF's . The patient's most recent sodium results are listed below.  Recent Labs     04/27/25  0231 04/28/25  0255 04/29/25  0514   * 146* 144     Plan  - Aim to correct the sodium by 8-10mEq in 24 hours.   - Plan to correct their hypernatremia with Select IV fluids: D5W/0.45 NaCl - increase rate to 75 cc/hr. Now 0.45 NS.  - Will plan to trend the patient's sodium: Daily  - now resolved with fluids however slightly elevated again  - encourage PO intake now he is able  - recheck in AM    Delirium  Post operative delirium noted. Unable to redirect, concern for removal of NGT and other tubes. Had ex-lap 4/23.  - precedex drip now off  - resolved    Colostomy in place      Final Active Diagnoses:    Diagnosis Date Noted POA    PRINCIPAL PROBLEM:  SBO (small bowel obstruction) [K56.609] 04/30/2015 Yes    Advance care planning [Z71.89] 04/21/2025 Not Applicable    Hypernatremia [E87.0] 04/21/2025 No    Delirium [R41.0] 04/21/2025 Yes    NSVT (nonsustained ventricular tachycardia) [I47.29] 04/20/2025 No    Atrial tachycardia  [I47.19] 04/20/2025 Yes    S/P left colectomy [Z90.49] 04/18/2025 Not Applicable    Ventral hernia [K43.9] 04/18/2025 Yes    Severe protein-calorie malnutrition [E43] 04/18/2025 Yes    Anemia of chronic renal failure, stage 3b [N18.32, D63.1] 03/12/2024 Yes    Pulmonary emphysema, unspecified emphysema type [J43.9] 08/11/2023 Yes    HTN, goal below 140/80 [I10] 11/09/2018 Yes    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] 10/17/2017 Not Applicable    Acute renal failure superimposed on stage 3b chronic kidney disease [N17.9, N18.32] 07/06/2016 Yes      Problems Resolved During this Admission:    Diagnosis Date Noted Date Resolved POA    Mucus plug in respiratory tract [T17.998A] 04/18/2025 04/21/2025 Yes       Discharged Condition: stable    Disposition: Skilled Nursing Facility    Follow Up:   Contact information for after-discharge care       Destination       [facility] .    Service: Nursing Home  Contact information:  [address] [place] [address]  [phone]                                 Patient Instructions:      Ambulatory referral/consult to Outpatient Case Management   Referral Priority: Routine Referral Type: Consultation   Referral Reason: Specialty Services Required   Number of Visits Requested: 1     Diet Cardiac     Notify your health care provider if you experience any of the following:  temperature >100.4     Notify your health care provider if you experience any of the following:  persistent nausea and vomiting or diarrhea     Notify your health care provider if you experience any of the following:  severe uncontrolled pain     Notify your health care provider if you experience any of the following:  increased confusion or weakness     Activity as tolerated       Significant Diagnostic Studies: Labs: All labs within the past 24 hours have been reviewed      Recent Results (from the past 100 hours)   POCT glucose    Collection Time: 04/25/25  2:02 PM   Result Value Ref  Range    POCT Glucose 191 (H) 70 - 110 mg/dL   POCT glucose    Collection Time: 04/25/25  4:35 PM   Result Value Ref Range    POCT Glucose 190 (H) 70 - 110 mg/dL   POCT glucose    Collection Time: 04/25/25  8:13 PM   Result Value Ref Range    POCT Glucose 237 (H) 70 - 110 mg/dL   POCT glucose    Collection Time: 04/26/25  2:58 AM   Result Value Ref Range    POCT Glucose 146 (H) 70 - 110 mg/dL   Comprehensive Metabolic Panel    Collection Time: 04/26/25  2:59 AM   Result Value Ref Range    Sodium 148 (H) 136 - 145 mmol/L    Potassium 3.8 3.5 - 5.1 mmol/L    Chloride 121 (H) 95 - 110 mmol/L    CO2 20 (L) 23 - 29 mmol/L    Glucose 134 (H) 70 - 110 mg/dL    BUN 29 (H) 8 - 23 mg/dL    Creatinine 1.5 (H) 0.5 - 1.4 mg/dL    Calcium 9.2 8.7 - 10.5 mg/dL    Protein Total 5.2 (L) 6.0 - 8.4 gm/dL    Albumin 2.1 (L) 3.5 - 5.2 g/dL    Bilirubin Total 0.3 0.1 - 1.0 mg/dL    ALP 45 40 - 150 unit/L    AST 13 11 - 45 unit/L    ALT 6 (L) 10 - 44 unit/L    Anion Gap 7 (L) 8 - 16 mmol/L    eGFR 44 (L) >60 mL/min/1.73/m2   Magnesium    Collection Time: 04/26/25  2:59 AM   Result Value Ref Range    Magnesium  1.8 1.6 - 2.6 mg/dL   CBC with Differential    Collection Time: 04/26/25  2:59 AM   Result Value Ref Range    WBC 15.88 (H) 3.90 - 12.70 K/uL    RBC 3.27 (L) 4.60 - 6.20 M/uL    HGB 9.5 (L) 14.0 - 18.0 gm/dL    HCT 30.1 (L) 40.0 - 54.0 %    MCV 92 82 - 98 fL    MCH 29.1 27.0 - 31.0 pg    MCHC 31.6 (L) 32.0 - 36.0 g/dL    RDW 14.8 (H) 11.5 - 14.5 %    Platelet Count 212 150 - 450 K/uL    MPV 12.2 9.2 - 12.9 fL    Nucleated RBC 0 <=0 /100 WBC    Neut % 92.0 (H) 38 - 73 %    Lymph % 3.3 (L) 18 - 48 %    Mono % 3.2 (L) 4 - 15 %    Eos % 0.0 <=8 %    Basophil % 0.1 <=1.9 %    Imm Grans % 1.4 (H) 0.0 - 0.5 %    Neut # 14.59 (H) 1.8 - 7.7 K/uL    Lymph # 0.53 (L) 1 - 4.8 K/uL    Mono # 0.51 0.3 - 1 K/uL    Eos # 0.00 <=0.5 K/uL    Baso # 0.02 <=0.2 K/uL    Imm Grans # 0.23 (H) 0.00 - 0.04 K/uL   Phosphorus    Collection Time: 04/26/25   2:59 AM   Result Value Ref Range    Phosphorus Level 2.2 (L) 2.7 - 4.5 mg/dL   POCT glucose    Collection Time: 04/26/25  5:56 AM   Result Value Ref Range    POCT Glucose 114 (H) 70 - 110 mg/dL   POCT glucose    Collection Time: 04/26/25  7:32 AM   Result Value Ref Range    POCT Glucose 95 70 - 110 mg/dL   POCT glucose    Collection Time: 04/26/25 11:10 AM   Result Value Ref Range    POCT Glucose 147 (H) 70 - 110 mg/dL   POCT glucose    Collection Time: 04/26/25  4:15 PM   Result Value Ref Range    POCT Glucose 154 (H) 70 - 110 mg/dL   POCT glucose    Collection Time: 04/26/25  9:16 PM   Result Value Ref Range    POCT Glucose 157 (H) 70 - 110 mg/dL   Comprehensive Metabolic Panel    Collection Time: 04/27/25  2:31 AM   Result Value Ref Range    Sodium 146 (H) 136 - 145 mmol/L    Potassium 3.5 3.5 - 5.1 mmol/L    Chloride 119 (H) 95 - 110 mmol/L    CO2 19 (L) 23 - 29 mmol/L    Glucose 76 70 - 110 mg/dL    BUN 24 (H) 8 - 23 mg/dL    Creatinine 1.4 0.5 - 1.4 mg/dL    Calcium 9.1 8.7 - 10.5 mg/dL    Protein Total 5.1 (L) 6.0 - 8.4 gm/dL    Albumin 2.1 (L) 3.5 - 5.2 g/dL    Bilirubin Total 0.4 0.1 - 1.0 mg/dL    ALP 43 40 - 150 unit/L    AST 10 (L) 11 - 45 unit/L    ALT 6 (L) 10 - 44 unit/L    Anion Gap 8 8 - 16 mmol/L    eGFR 48 (L) >60 mL/min/1.73/m2   Magnesium    Collection Time: 04/27/25  2:31 AM   Result Value Ref Range    Magnesium  1.7 1.6 - 2.6 mg/dL   Phosphorus    Collection Time: 04/27/25  2:31 AM   Result Value Ref Range    Phosphorus Level 1.9 (L) 2.7 - 4.5 mg/dL   CBC with Differential    Collection Time: 04/27/25  2:31 AM   Result Value Ref Range    WBC 10.24 3.90 - 12.70 K/uL    RBC 3.11 (L) 4.60 - 6.20 M/uL    HGB 9.1 (L) 14.0 - 18.0 gm/dL    HCT 28.4 (L) 40.0 - 54.0 %    MCV 91 82 - 98 fL    MCH 29.3 27.0 - 31.0 pg    MCHC 32.0 32.0 - 36.0 g/dL    RDW 14.8 (H) 11.5 - 14.5 %    Platelet Count 216 150 - 450 K/uL    MPV 11.6 9.2 - 12.9 fL    Nucleated RBC 0 <=0 /100 WBC    Neut % 85.7 (H) 38 - 73 %     Lymph % 6.8 (L) 18 - 48 %    Mono % 5.3 4 - 15 %    Eos % 0.8 <=8 %    Basophil % 0.1 <=1.9 %    Imm Grans % 1.3 (H) 0.0 - 0.5 %    Neut # 8.78 (H) 1.8 - 7.7 K/uL    Lymph # 0.70 (L) 1 - 4.8 K/uL    Mono # 0.54 0.3 - 1 K/uL    Eos # 0.08 <=0.5 K/uL    Baso # 0.01 <=0.2 K/uL    Imm Grans # 0.13 (H) 0.00 - 0.04 K/uL   POCT glucose    Collection Time: 04/27/25 10:11 PM   Result Value Ref Range    POCT Glucose 96 70 - 110 mg/dL   Comprehensive Metabolic Panel    Collection Time: 04/28/25  2:55 AM   Result Value Ref Range    Sodium 146 (H) 136 - 145 mmol/L    Potassium 3.7 3.5 - 5.1 mmol/L    Chloride 120 (H) 95 - 110 mmol/L    CO2 18 (L) 23 - 29 mmol/L    Glucose 86 70 - 110 mg/dL    BUN 19 8 - 23 mg/dL    Creatinine 1.2 0.5 - 1.4 mg/dL    Calcium 9.1 8.7 - 10.5 mg/dL    Protein Total 5.3 (L) 6.0 - 8.4 gm/dL    Albumin 2.2 (L) 3.5 - 5.2 g/dL    Bilirubin Total 0.7 0.1 - 1.0 mg/dL    ALP 43 40 - 150 unit/L    AST 10 (L) 11 - 45 unit/L    ALT 6 (L) 10 - 44 unit/L    Anion Gap 8 8 - 16 mmol/L    eGFR 58 (L) >60 mL/min/1.73/m2   Magnesium    Collection Time: 04/28/25  2:55 AM   Result Value Ref Range    Magnesium  1.6 1.6 - 2.6 mg/dL   Phosphorus    Collection Time: 04/28/25  2:55 AM   Result Value Ref Range    Phosphorus Level 1.8 (L) 2.7 - 4.5 mg/dL   CBC with Differential    Collection Time: 04/28/25  2:55 AM   Result Value Ref Range    WBC 8.27 3.90 - 12.70 K/uL    RBC 3.47 (L) 4.60 - 6.20 M/uL    HGB 9.9 (L) 14.0 - 18.0 gm/dL    HCT 31.7 (L) 40.0 - 54.0 %    MCV 91 82 - 98 fL    MCH 28.5 27.0 - 31.0 pg    MCHC 31.2 (L) 32.0 - 36.0 g/dL    RDW 14.8 (H) 11.5 - 14.5 %    Platelet Count 245 150 - 450 K/uL    MPV 11.4 9.2 - 12.9 fL    Nucleated RBC 0 <=0 /100 WBC    Neut % 82.4 (H) 38 - 73 %    Lymph % 8.3 (L) 18 - 48 %    Mono % 6.9 4 - 15 %    Eos % 1.1 <=8 %    Basophil % 0.2 <=1.9 %    Imm Grans % 1.1 (H) 0.0 - 0.5 %    Neut # 6.81 1.8 - 7.7 K/uL    Lymph # 0.69 (L) 1 - 4.8 K/uL    Mono # 0.57 0.3 - 1 K/uL    Eos # 0.09  <=0.5 K/uL    Baso # 0.02 <=0.2 K/uL    Imm Grans # 0.09 (H) 0.00 - 0.04 K/uL   Comprehensive Metabolic Panel    Collection Time: 04/29/25  5:14 AM   Result Value Ref Range    Sodium 144 136 - 145 mmol/L    Potassium 3.2 (L) 3.5 - 5.1 mmol/L    Chloride 119 (H) 95 - 110 mmol/L    CO2 18 (L) 23 - 29 mmol/L    Glucose 80 70 - 110 mg/dL    BUN 19 8 - 23 mg/dL    Creatinine 1.3 0.5 - 1.4 mg/dL    Calcium 8.9 8.7 - 10.5 mg/dL    Protein Total 5.1 (L) 6.0 - 8.4 gm/dL    Albumin 2.1 (L) 3.5 - 5.2 g/dL    Bilirubin Total 0.7 0.1 - 1.0 mg/dL    ALP 37 (L) 40 - 150 unit/L    AST 9 (L) 11 - 45 unit/L    ALT 8 (L) 10 - 44 unit/L    Anion Gap 7 (L) 8 - 16 mmol/L    eGFR 53 (L) >60 mL/min/1.73/m2   Magnesium    Collection Time: 04/29/25  5:14 AM   Result Value Ref Range    Magnesium  1.5 (L) 1.6 - 2.6 mg/dL   Phosphorus    Collection Time: 04/29/25  5:14 AM   Result Value Ref Range    Phosphorus Level 1.7 (L) 2.7 - 4.5 mg/dL   CBC with Differential    Collection Time: 04/29/25  5:14 AM   Result Value Ref Range    WBC 7.63 3.90 - 12.70 K/uL    RBC 3.34 (L) 4.60 - 6.20 M/uL    HGB 9.6 (L) 14.0 - 18.0 gm/dL    HCT 30.2 (L) 40.0 - 54.0 %    MCV 90 82 - 98 fL    MCH 28.7 27.0 - 31.0 pg    MCHC 31.8 (L) 32.0 - 36.0 g/dL    RDW 14.7 (H) 11.5 - 14.5 %    Platelet Count 246 150 - 450 K/uL    MPV 11.5 9.2 - 12.9 fL    Nucleated RBC 0 <=0 /100 WBC    Neut % 77.3 (H) 38 - 73 %    Lymph % 10.7 (L) 18 - 48 %    Mono % 9.8 4 - 15 %    Eos % 0.9 <=8 %    Basophil % 0.1 <=1.9 %    Imm Grans % 1.2 (H) 0.0 - 0.5 %    Neut # 5.89 1.8 - 7.7 K/uL    Lymph # 0.82 (L) 1 - 4.8 K/uL    Mono # 0.75 0.3 - 1 K/uL    Eos # 0.07 <=0.5 K/uL    Baso # 0.01 <=0.2 K/uL    Imm Grans # 0.09 (H) 0.00 - 0.04 K/uL       Microbiology Results (last 7 days)       ** No results found for the last 168 hours. **            Imaging Results              CT Abdomen Pelvis  Without Contrast (Final result)  Result time 04/18/25 15:03:16      Final result by James Cortés  MD Eugene (04/18/25 15:03:16)                   Impression:      High-grade small bowel obstruction related to a 5 by 5 cm ventral abdominal wall hernia.    Right middle and right lower lobe mucous plugging and or endobronchial spread of infection/bronchitis.    Status post left colectomy common diverting left lower quadrant ostomy and multiple small bowel anastomoses with markedly dilated small bowel loops in the deep pelvis which may reflect acute superimposed on chronic change.    Findings of chronic medical renal disease and simple and complex renal cysts.  Complex cyst versus solid mass lower pole right kidney could be further evaluated with ultrasound if the patient cannot receive intravenous contrast.      Electronically signed by: James Zuleta Jr  Date:    04/18/2025  Time:    15:03               Narrative:    EXAMINATION:  CT ABDOMEN PELVIS WITHOUT CONTRAST    CLINICAL HISTORY:  Nausea/vomiting;    TECHNIQUE:  Low dose axial images, sagittal and coronal reformations were obtained from the lung bases to the pubic symphysis.  Contrast was not administered.    COMPARISON:  05/09/2023    FINDINGS:  In the right middle lobe and right lower lobe there is peribronchial thickening and patchy branching lung opacity that may reflect mucous plugging and or bronchopneumonia.    Kidneys/ Ureters: Bilateral multifocal renal cortical scarring.  Multiple renal cysts are present including at least 1 hyperdense cyst.  There is redemonstration of an asymmetric contour lobulation of arising from the lower pole the right kidney which is similar to the prior study.  This may reflect a large complex cyst with solid mass difficult to exclude with certainty.  Stability over time favors a nonaggressive etiology.  Multiple calcification in the renal michelle may represent multiple nonobstructing stones.  There are    Liver: Normal in size and attenuation, with no focal hepatic lesions.    Gallbladder: No calcified gallstones.    Bile  Ducts: No evidence of dilated ducts.    Pancreas: No mass or peripancreatic fat stranding.    Spleen: Unremarkable.    Adrenals: Unremarkable.    Pelvic organs: Prostate enlarged.    GI Tract/Mesentery: Patient is status post left colectomy with a left lower quadrant colostomy.  There is rectus diastasis and a superimposed wide necked ventral hernia containing multiple decompressed small bowel loops.  There is marked distension of the stomach and multiple small bowel loops throughout the abdomen and pelvis..  Findings are suggestive of incarcerated hernia.  The dilated bowel loops in the deep pelvis are associated with at least 2 suture lines is suggesting prior small bowel resection and anastomosis and these may be chronically dilated.    Retroperitoneum: No significant adenopathy.    Vasculature: Aortic atherosclerosis.    Bones: Unremarkable.                                       X-Ray Chest AP Portable (Final result)  Result time 04/18/25 13:27:59   Procedure changed from X-Ray Chest PA And Lateral     Final result by Clinton Pablo MD (04/18/25 13:27:59)                   Impression:      Increased streaky opacities in the right lower lung zone, nonspecific and possibly atelectasis, edema, or infectious/inflammatory disease (including aspiration).      Electronically signed by: Clinton Pablo MD  Date:    04/18/2025  Time:    13:27               Narrative:    EXAMINATION:  XR CHEST AP PORTABLE    CLINICAL HISTORY:  Nausea and vomiting; Nausea with vomiting, unspecified    TECHNIQUE:  Single frontal view of the chest was performed.    COMPARISON:  Prior exams dating back to 2008    FINDINGS:  Left chest wall port catheter tip projects in the SVC, unchanged.    Increased streaky opacities in the right lower lung zone.  Left lung is clear.    No effusion or pneumothorax.    Unchanged cardiomediastinal silhouette.    No acute bone abnormality.                                          Pending Diagnostic Studies:        None           Medications:  Reconciled Home Medications:      Medication List        START taking these medications      amiodarone 400 MG tablet  Commonly known as: PACERONE  Take 1 tablet (400 mg total) by mouth 2 (two) times daily.     metoprolol tartrate 25 MG tablet  Commonly known as: LOPRESSOR  Take 0.5 tablets (12.5 mg total) by mouth 2 (two) times daily.            CHANGE how you take these medications      traZODone 50 MG tablet  Commonly known as: DESYREL  Take 1 tablet (50 mg total) by mouth nightly as needed for Insomnia.  What changed:   when to take this  reasons to take this            CONTINUE taking these medications      allopurinoL 300 MG tablet  Commonly known as: ZYLOPRIM  Take 1 tablet (300 mg total) by mouth once daily.     amLODIPine 10 MG tablet  Commonly known as: NORVASC  Take 1 tablet (10 mg total) by mouth once daily.     aspirin 81 MG EC tablet  Commonly known as: ECOTRIN  Take 1 tablet (81 mg total) by mouth once daily.     atorvastatin 20 MG tablet  Commonly known as: LIPITOR  Take 1 tablet (20 mg total) by mouth once daily.     calcitRIOL 0.25 MCG Cap  Commonly known as: ROCALTROL  Take 1 capsule (0.25 mcg total) by mouth every other day.     DULoxetine 30 MG capsule  Commonly known as: CYMBALTA  Take 1 capsule (30 mg total) by mouth 2 (two) times daily.     ferrous sulfate 324 mg (65 mg iron) Tbec  Take 1 tablet by mouth once daily.     gabapentin 100 MG capsule  Commonly known as: NEURONTIN  Take 1 capsule (100 mg total) by mouth 3 (three) times daily.     JANUVIA 50 mg Tab  Generic drug: SITagliptin phosphate  Take 1 tablet (50 mg total) by mouth once daily.     sodium bicarbonate 650 MG tablet  Take 1 tablet (650 mg total) by mouth 2 (two) times daily.            STOP taking these medications      hydrALAZINE 50 MG tablet  Commonly known as: APRESOLINE     ketoconazole 2 % cream  Commonly known as: NIZORAL     LIDOcaine 5 %  Commonly known as: LIDODERM               Indwelling Lines/Drains at time of discharge:   Lines/Drains/Airways       Central Venous Catheter Line  Duration             Port A Cath Single Lumen Subclavian Left -- days              Drain  Duration                  Colostomy 04/23/15 LLQ 3659 days    Male External Urinary Catheter 04/25/25 0500 Medium 4 days                    Time spent on the discharge of patient:  Greater than 35 minutes         Remy Almodovar DO  Department of Hospital Medicine  [facility] - Telemetry

## 2025-05-02 ENCOUNTER — OUTPATIENT CASE MANAGEMENT (OUTPATIENT)
Dept: ADMINISTRATIVE | Facility: OTHER | Age: 89
End: 2025-05-02
Payer: MEDICARE

## 2025-05-02 NOTE — PROGRESS NOTES
Outpatient Care Management  Patient Not Qualified    Patient: Eugene Gamez  MRN:  [id number]  Date of Service:  5/2/2025  Completed by:  Irma Barfield RN    Chief Complaint   Patient presents with    OPCM Chart Review    OPCM Enrollment Call    Case Closure       Patient Summary           Reason Not Qualified:  Followed by SNF

## 2025-05-06 ENCOUNTER — OFFICE VISIT (OUTPATIENT)
Dept: SURGERY | Facility: CLINIC | Age: 89
End: 2025-05-06
Payer: MEDICARE

## 2025-05-06 VITALS
BODY MASS INDEX: 18.87 KG/M2 | HEART RATE: 54 BPM | HEIGHT: 74 IN | SYSTOLIC BLOOD PRESSURE: 98 MMHG | WEIGHT: 147.06 LBS | DIASTOLIC BLOOD PRESSURE: 62 MMHG

## 2025-05-06 DIAGNOSIS — K56.609 SBO (SMALL BOWEL OBSTRUCTION): Primary | ICD-10-CM

## 2025-05-06 PROCEDURE — 99024 POSTOP FOLLOW-UP VISIT: CPT | Mod: POP,,, | Performed by: SURGERY

## 2025-05-06 PROCEDURE — 99213 OFFICE O/P EST LOW 20 MIN: CPT | Mod: PBBFAC | Performed by: SURGERY

## 2025-05-06 PROCEDURE — 99999 PR PBB SHADOW E&M-EST. PATIENT-LVL III: CPT | Mod: PBBFAC,,, | Performed by: SURGERY

## 2025-05-06 NOTE — PROGRESS NOTES
Surgery Clinic Note    Eugene Gamez is a 89 y.o. year old male in clinic today for follow up of ex lap lysis of adhesions for obstruction and ventral hernia repair. Has been in a skilled nursing facility. He is with his daughters.  His colostomy is working. Staples removed today. He is eating well. Activity level is limited due to frailty and age. He denies pain.  No f/c/n/v/sob/cp    ROS:  Negative except above    PE:  Vitals:    05/06/25 1026   BP: 98/62   Pulse: (!) 54       NAD  No belabored breathing  Abd soft nt nd  Incisions c/d/I  Llq colostomy is pink with stool in bag    A/P:  Eugene Gamez is a 89 y.o. year old male s/p ex lap IZZY and ventral hernia repair    -staples have been removed  -rtc 1 month    Abran Camacho  General Surgery - [facility]  5/6/2025

## 2025-05-14 ENCOUNTER — HOSPITAL ENCOUNTER (INPATIENT)
Facility: HOSPITAL | Age: 89
LOS: 6 days | Discharge: HOSPICE | DRG: 070 | End: 2025-05-20
Attending: EMERGENCY MEDICINE | Admitting: HOSPITALIST
Payer: MEDICARE

## 2025-05-14 DIAGNOSIS — R41.82 ALTERED MENTAL STATUS: ICD-10-CM

## 2025-05-14 DIAGNOSIS — E83.42 HYPOMAGNESEMIA: ICD-10-CM

## 2025-05-14 DIAGNOSIS — T68.XXXA HYPOTHERMIA, INITIAL ENCOUNTER: Primary | ICD-10-CM

## 2025-05-14 DIAGNOSIS — E87.20 LACTIC ACIDOSIS: ICD-10-CM

## 2025-05-14 DIAGNOSIS — G93.41 ACUTE METABOLIC ENCEPHALOPATHY: ICD-10-CM

## 2025-05-14 DIAGNOSIS — E16.2 HYPOGLYCEMIA: ICD-10-CM

## 2025-05-14 LAB
ABSOLUTE EOSINOPHIL (OHS): 0.02 K/UL
ABSOLUTE MONOCYTE (OHS): 0.65 K/UL (ref 0.3–1)
ABSOLUTE NEUTROPHIL COUNT (OHS): 5.75 K/UL (ref 1.8–7.7)
ALBUMIN SERPL BCP-MCNC: 3 G/DL (ref 3.5–5.2)
ALP SERPL-CCNC: 62 UNIT/L (ref 40–150)
ALT SERPL W/O P-5'-P-CCNC: 11 UNIT/L (ref 10–44)
AMPHET UR QL SCN: NEGATIVE
ANION GAP (OHS): 9 MMOL/L (ref 8–16)
AST SERPL-CCNC: 16 UNIT/L (ref 11–45)
BACTERIA #/AREA URNS AUTO: ABNORMAL /HPF
BARBITURATE SCN PRESENT UR: NEGATIVE
BASOPHILS # BLD AUTO: 0.01 K/UL
BASOPHILS NFR BLD AUTO: 0.1 %
BENZODIAZ UR QL SCN: NEGATIVE
BILIRUB SERPL-MCNC: 0.6 MG/DL (ref 0.1–1)
BILIRUB UR QL STRIP.AUTO: NEGATIVE
BUN SERPL-MCNC: 25 MG/DL (ref 8–23)
CALCIUM SERPL-MCNC: 10.1 MG/DL (ref 8.7–10.5)
CANNABINOIDS UR QL SCN: NEGATIVE
CHLORIDE SERPL-SCNC: 115 MMOL/L (ref 95–110)
CLARITY UR: CLEAR
CO2 SERPL-SCNC: 19 MMOL/L (ref 23–29)
COCAINE UR QL SCN: NEGATIVE
COLOR UR AUTO: YELLOW
CREAT SERPL-MCNC: 1.5 MG/DL (ref 0.5–1.4)
CREAT UR-MCNC: 81.2 MG/DL (ref 23–375)
ERYTHROCYTE [DISTWIDTH] IN BLOOD BY AUTOMATED COUNT: 15.4 % (ref 11.5–14.5)
ETHANOL SERPL-MCNC: <10 MG/DL
GFR SERPLBLD CREATININE-BSD FMLA CKD-EPI: 44 ML/MIN/1.73/M2
GLUCOSE SERPL-MCNC: 60 MG/DL (ref 70–110)
GLUCOSE UR QL STRIP: NEGATIVE
GRAN CASTS UR QL COMP ASSIST: 3 /LPF (ref ?–0)
HCT VFR BLD AUTO: 27.9 % (ref 40–54)
HGB BLD-MCNC: 9.1 GM/DL (ref 14–18)
HGB UR QL STRIP: NEGATIVE
HOLD SPECIMEN: NORMAL
HYALINE CASTS UR QL AUTO: 19 /LPF (ref 0–1)
IMM GRANULOCYTES # BLD AUTO: 0.07 K/UL (ref 0–0.04)
IMM GRANULOCYTES NFR BLD AUTO: 1 % (ref 0–0.5)
KETONES UR QL STRIP: NEGATIVE
LACTATE SERPL-SCNC: 0.5 MMOL/L (ref 0.5–2.2)
LACTATE SERPL-SCNC: 2.4 MMOL/L (ref 0.5–2.2)
LEUKOCYTE ESTERASE UR QL STRIP: ABNORMAL
LYMPHOCYTES # BLD AUTO: 0.58 K/UL (ref 1–4.8)
MAGNESIUM SERPL-MCNC: 1.5 MG/DL (ref 1.6–2.6)
MCH RBC QN AUTO: 29.3 PG (ref 27–31)
MCHC RBC AUTO-ENTMCNC: 32.6 G/DL (ref 32–36)
MCV RBC AUTO: 90 FL (ref 82–98)
METHADONE UR QL SCN: NEGATIVE
MICROSCOPIC COMMENT: ABNORMAL
NITRITE UR QL STRIP: NEGATIVE
NUCLEATED RBC (/100WBC) (OHS): 0 /100 WBC
OPIATES UR QL SCN: NEGATIVE
PCP UR QL: NEGATIVE
PH UR STRIP: 5 [PH]
PLATELET # BLD AUTO: 240 K/UL (ref 150–450)
PMV BLD AUTO: 11 FL (ref 9.2–12.9)
POCT GLUCOSE: 112 MG/DL (ref 70–110)
POCT GLUCOSE: 128 MG/DL (ref 70–110)
POCT GLUCOSE: 70 MG/DL (ref 70–110)
POCT GLUCOSE: 78 MG/DL (ref 70–110)
POCT GLUCOSE: 87 MG/DL (ref 70–110)
POTASSIUM SERPL-SCNC: 5.1 MMOL/L (ref 3.5–5.1)
PROCALCITONIN SERPL-MCNC: 0.21 NG/ML
PROT SERPL-MCNC: 6.8 GM/DL (ref 6–8.4)
PROT UR QL STRIP: NEGATIVE
RBC # BLD AUTO: 3.11 M/UL (ref 4.6–6.2)
RBC #/AREA URNS AUTO: 3 /HPF (ref 0–4)
RELATIVE EOSINOPHIL (OHS): 0.3 %
RELATIVE LYMPHOCYTE (OHS): 8.2 % (ref 18–48)
RELATIVE MONOCYTE (OHS): 9.2 % (ref 4–15)
RELATIVE NEUTROPHIL (OHS): 81.2 % (ref 38–73)
SODIUM SERPL-SCNC: 143 MMOL/L (ref 136–145)
SP GR UR STRIP: 1.01
T4 FREE SERPL-MCNC: 0.88 NG/DL (ref 0.71–1.51)
TSH SERPL-ACNC: 20.71 UIU/ML (ref 0.4–4)
UROBILINOGEN UR STRIP-ACNC: NEGATIVE EU/DL
WBC # BLD AUTO: 7.08 K/UL (ref 3.9–12.7)
WBC #/AREA URNS AUTO: 11 /HPF (ref 0–5)

## 2025-05-14 PROCEDURE — 82962 GLUCOSE BLOOD TEST: CPT

## 2025-05-14 PROCEDURE — 80053 COMPREHEN METABOLIC PANEL: CPT | Performed by: EMERGENCY MEDICINE

## 2025-05-14 PROCEDURE — 99291 CRITICAL CARE FIRST HOUR: CPT

## 2025-05-14 PROCEDURE — 83735 ASSAY OF MAGNESIUM: CPT | Performed by: EMERGENCY MEDICINE

## 2025-05-14 PROCEDURE — 87040 BLOOD CULTURE FOR BACTERIA: CPT | Performed by: EMERGENCY MEDICINE

## 2025-05-14 PROCEDURE — 20000000 HC ICU ROOM

## 2025-05-14 PROCEDURE — 63600175 PHARM REV CODE 636 W HCPCS: Performed by: HOSPITALIST

## 2025-05-14 PROCEDURE — 96366 THER/PROPH/DIAG IV INF ADDON: CPT

## 2025-05-14 PROCEDURE — 25000003 PHARM REV CODE 250: Performed by: HOSPITALIST

## 2025-05-14 PROCEDURE — 96375 TX/PRO/DX INJ NEW DRUG ADDON: CPT

## 2025-05-14 PROCEDURE — 82077 ASSAY SPEC XCP UR&BREATH IA: CPT | Performed by: EMERGENCY MEDICINE

## 2025-05-14 PROCEDURE — 93005 ELECTROCARDIOGRAM TRACING: CPT

## 2025-05-14 PROCEDURE — 96361 HYDRATE IV INFUSION ADD-ON: CPT

## 2025-05-14 PROCEDURE — 80307 DRUG TEST PRSMV CHEM ANLYZR: CPT | Performed by: EMERGENCY MEDICINE

## 2025-05-14 PROCEDURE — 84145 PROCALCITONIN (PCT): CPT | Performed by: EMERGENCY MEDICINE

## 2025-05-14 PROCEDURE — 84443 ASSAY THYROID STIM HORMONE: CPT | Performed by: EMERGENCY MEDICINE

## 2025-05-14 PROCEDURE — 81001 URINALYSIS AUTO W/SCOPE: CPT | Mod: XB | Performed by: EMERGENCY MEDICINE

## 2025-05-14 PROCEDURE — 96367 TX/PROPH/DG ADDL SEQ IV INF: CPT

## 2025-05-14 PROCEDURE — 85025 COMPLETE CBC W/AUTO DIFF WBC: CPT | Performed by: EMERGENCY MEDICINE

## 2025-05-14 PROCEDURE — 96365 THER/PROPH/DIAG IV INF INIT: CPT

## 2025-05-14 PROCEDURE — 87086 URINE CULTURE/COLONY COUNT: CPT | Performed by: EMERGENCY MEDICINE

## 2025-05-14 PROCEDURE — 63600175 PHARM REV CODE 636 W HCPCS: Performed by: EMERGENCY MEDICINE

## 2025-05-14 PROCEDURE — 25000003 PHARM REV CODE 250: Performed by: EMERGENCY MEDICINE

## 2025-05-14 PROCEDURE — 93010 ELECTROCARDIOGRAM REPORT: CPT | Mod: ,,, | Performed by: INTERNAL MEDICINE

## 2025-05-14 PROCEDURE — 83605 ASSAY OF LACTIC ACID: CPT | Performed by: EMERGENCY MEDICINE

## 2025-05-14 PROCEDURE — 94761 N-INVAS EAR/PLS OXIMETRY MLT: CPT

## 2025-05-14 RX ORDER — INSULIN ASPART 100 [IU]/ML
0-10 INJECTION, SOLUTION INTRAVENOUS; SUBCUTANEOUS EVERY 6 HOURS PRN
Status: DISCONTINUED | OUTPATIENT
Start: 2025-05-14 | End: 2025-05-15

## 2025-05-14 RX ORDER — LORAZEPAM 2 MG/ML
1 INJECTION INTRAMUSCULAR
Status: COMPLETED | OUTPATIENT
Start: 2025-05-14 | End: 2025-05-14

## 2025-05-14 RX ORDER — MAGNESIUM SULFATE HEPTAHYDRATE 40 MG/ML
2 INJECTION, SOLUTION INTRAVENOUS ONCE
Status: COMPLETED | OUTPATIENT
Start: 2025-05-14 | End: 2025-05-14

## 2025-05-14 RX ORDER — DEXTROSE 50 % IN WATER (D50W) INTRAVENOUS SYRINGE
25
Status: COMPLETED | OUTPATIENT
Start: 2025-05-14 | End: 2025-05-14

## 2025-05-14 RX ORDER — DEXTROSE MONOHYDRATE 50 MG/ML
INJECTION, SOLUTION INTRAVENOUS CONTINUOUS
Status: DISCONTINUED | OUTPATIENT
Start: 2025-05-14 | End: 2025-05-17

## 2025-05-14 RX ORDER — LORAZEPAM 2 MG/ML
0.5 INJECTION INTRAMUSCULAR EVERY 4 HOURS PRN
Status: DISCONTINUED | OUTPATIENT
Start: 2025-05-14 | End: 2025-05-20 | Stop reason: HOSPADM

## 2025-05-14 RX ORDER — INSULIN ASPART 100 [IU]/ML
0-5 INJECTION, SOLUTION INTRAVENOUS; SUBCUTANEOUS EVERY 6 HOURS PRN
Status: DISCONTINUED | OUTPATIENT
Start: 2025-05-14 | End: 2025-05-15

## 2025-05-14 RX ORDER — GLUCAGON 1 MG
1 KIT INJECTION
Status: DISCONTINUED | OUTPATIENT
Start: 2025-05-14 | End: 2025-05-15

## 2025-05-14 RX ORDER — HEPARIN SODIUM 5000 [USP'U]/ML
5000 INJECTION, SOLUTION INTRAVENOUS; SUBCUTANEOUS EVERY 8 HOURS
Status: DISCONTINUED | OUTPATIENT
Start: 2025-05-14 | End: 2025-05-15

## 2025-05-14 RX ADMIN — DEXTROSE MONOHYDRATE: 50 INJECTION, SOLUTION INTRAVENOUS at 03:05

## 2025-05-14 RX ADMIN — SODIUM CHLORIDE 1000 ML: 9 INJECTION, SOLUTION INTRAVENOUS at 12:05

## 2025-05-14 RX ADMIN — PIPERACILLIN SODIUM AND TAZOBACTAM SODIUM 4.5 G: 4; .5 INJECTION, POWDER, FOR SOLUTION INTRAVENOUS at 12:05

## 2025-05-14 RX ADMIN — LORAZEPAM 1 MG: 2 INJECTION INTRAMUSCULAR; INTRAVENOUS at 12:05

## 2025-05-14 RX ADMIN — SODIUM CHLORIDE 1200 ML: 9 INJECTION, SOLUTION INTRAVENOUS at 12:05

## 2025-05-14 RX ADMIN — HEPARIN SODIUM 5000 UNITS: 5000 INJECTION INTRAVENOUS; SUBCUTANEOUS at 09:05

## 2025-05-14 RX ADMIN — VANCOMYCIN HYDROCHLORIDE 1250 MG: 1.25 INJECTION, POWDER, LYOPHILIZED, FOR SOLUTION INTRAVENOUS at 01:05

## 2025-05-14 RX ADMIN — PIPERACILLIN SODIUM AND TAZOBACTAM SODIUM 4.5 G: 4; .5 INJECTION, POWDER, FOR SOLUTION INTRAVENOUS at 09:05

## 2025-05-14 RX ADMIN — DEXTROSE MONOHYDRATE 25 G: 25 INJECTION, SOLUTION INTRAVENOUS at 12:05

## 2025-05-14 RX ADMIN — MAGNESIUM SULFATE HEPTAHYDRATE 2 G: 40 INJECTION, SOLUTION INTRAVENOUS at 04:05

## 2025-05-14 NOTE — SUBJECTIVE & OBJECTIVE
Past Medical History:   Diagnosis Date    Arthritis     Cancer     Diabetes mellitus     Elevated PSA     Gout, unspecified     Hypertension     Nuclear sclerotic cataract of both eyes 6/29/2018       Past Surgical History:   Procedure Laterality Date    CATARACT EXTRACTION      ou    EYE SURGERY      bilateral cataracts    LAPAROTOMY, EXPLORATORY N/A 4/23/2025    Procedure: LAPAROTOMY, EXPLORATORY;  Surgeon: Abran Camacho MD;  Location: [facility] OR;  Service: General;  Laterality: N/A;  lysis of adhsions    removal of small bowel  Apr. 2015    Colostomy    REPAIR, HERNIA, INCISIONAL N/A 4/23/2025    Procedure: REPAIR, HERNIA, INCISIONAL;  Surgeon: Abran Camacho MD;  Location: [facility] OR;  Service: General;  Laterality: N/A;       Review of patient's allergies indicates:   Allergen Reactions    Iodine Other (See Comments)     Causes gout to flare up    Shellfish containing products      Causes gout to flare up    Shellfish derived        Current Facility-Administered Medications on File Prior to Encounter   Medication    ondansetron HCl (PF) 4 mg/2 mL injection     Current Outpatient Medications on File Prior to Encounter   Medication Sig    allopurinoL (ZYLOPRIM) 300 MG tablet Take 1 tablet (300 mg total) by mouth once daily.    amiodarone (PACERONE) 400 MG tablet Take 1 tablet (400 mg total) by mouth 2 (two) times daily.    amLODIPine (NORVASC) 10 MG tablet Take 1 tablet (10 mg total) by mouth once daily.    aspirin (ECOTRIN) 81 MG EC tablet Take 1 tablet (81 mg total) by mouth once daily.    atorvastatin (LIPITOR) 20 MG tablet Take 1 tablet (20 mg total) by mouth once daily.    calcitRIOL (ROCALTROL) 0.25 MCG Cap Take 1 capsule (0.25 mcg total) by mouth every other day.    DULoxetine (CYMBALTA) 30 MG capsule Take 1 capsule (30 mg total) by mouth 2 (two) times daily.    ferrous sulfate 324 mg (65 mg iron) TbEC Take 1 tablet by mouth once daily.    gabapentin (NEURONTIN) 100 MG capsule Take 1 capsule (100 mg total)  by mouth 3 (three) times daily.    JANUVIA 50 mg Tab Take 1 tablet (50 mg total) by mouth once daily.    metoprolol tartrate (LOPRESSOR) 25 MG tablet Take 0.5 tablets (12.5 mg total) by mouth 2 (two) times daily.    sodium bicarbonate 650 MG tablet Take 1 tablet (650 mg total) by mouth 2 (two) times daily. (Patient not taking: Reported on 4/17/2025)    traZODone (DESYREL) 50 MG tablet Take 1 tablet (50 mg total) by mouth nightly as needed for Insomnia.    [DISCONTINUED] blood-glucose meter (ONE TOUCH ULTRAMINI) kit To test twice daily. Use as instructed    [DISCONTINUED] cholestyramine (QUESTRAN) 4 gram packet Take 1 packet (4 g total) by mouth 3 (three) times daily with meals.    [DISCONTINUED] colchicine (COLCRYS) 0.6 mg tablet Take 1 tablet (0.6 mg total) by mouth daily as needed.    [DISCONTINUED] loratadine (CLARITIN) 10 mg tablet Take 10 mg by mouth once daily.    [DISCONTINUED] QUEtiapine (SEROQUEL) 50 MG tablet Take 1 tablet (50 mg total) by mouth every evening.     Family History       Problem Relation (Age of Onset)    Diabetes Mother    Hypertension Mother    No Known Problems Father, Sister, Brother, Maternal Aunt, Maternal Uncle, Paternal Aunt, Paternal Uncle, Maternal Grandmother, Maternal Grandfather, Paternal Grandmother, Paternal Grandfather          Tobacco Use    Smoking status: Never     Passive exposure: Never    Smokeless tobacco: Never    Tobacco comments:     smoked short while as a teen   Substance and Sexual Activity    Alcohol use: No    Drug use: No    Sexual activity: Not Currently     Partners: Female     Review of Systems,not able be done.  Objective:     Vital Signs (Most Recent):  Temp: (!) 94.6 °F (34.8 °C) (05/14/25 1237)  Pulse: 63 (05/14/25 1502)  Resp: 18 (05/14/25 1502)  BP: (!) 104/57 (05/14/25 1502)  SpO2: 100 % (05/14/25 1502) Vital Signs (24h Range):  Temp:  [94.6 °F (34.8 °C)] 94.6 °F (34.8 °C)  Pulse:  [63-88] 63  Resp:  [16-18] 18  SpO2:  [99 %-100 %] 100 %  BP:  (104-144)/(57-84) 104/57     Weight: 68 kg (150 lb)  Body mass index is 19.26 kg/m².     Physical Exam  Constitutional:       Appearance: He is ill-appearing.   HENT:      Head: Atraumatic.      Nose: No congestion.      Mouth/Throat:      Mouth: Mucous membranes are moist.   Eyes:      Pupils: Pupils are equal, round, and reactive to light.   Cardiovascular:      Rate and Rhythm: Normal rate and regular rhythm.      Heart sounds: No murmur heard.  Pulmonary:      Breath sounds: Rales present.   Abdominal:      General: There is no distension.   Musculoskeletal:         General: No swelling or deformity.      Cervical back: Normal range of motion and neck supple.   Skin:     Coloration: Skin is not jaundiced.   Neurological:      Comments: alterterd   Psychiatric:      Comments: Altered.              CRANIAL NERVES     CN III, IV, VI   Pupils are equal, round, and reactive to light.       Significant Labs: All pertinent labs within the past 24 hours have been reviewed.  BMP:   Recent Labs   Lab 05/14/25  1159   GLU 60*      K 5.1   *   CO2 19*   BUN 25*   CREATININE 1.5*   CALCIUM 10.1   MG 1.5*     CBC:   Recent Labs   Lab 05/14/25  1159   WBC 7.08   HGB 9.1*   HCT 27.9*        CMP:   Recent Labs   Lab 05/14/25  1159      K 5.1   *   CO2 19*   GLU 60*   BUN 25*   CREATININE 1.5*   CALCIUM 10.1   PROT 6.8   ALBUMIN 3.0*   BILITOT 0.6   ALKPHOS 62   AST 16   ALT 11   ANIONGAP 9       Significant Imaging: I have reviewed all pertinent imaging results/findings within the past 24 hours.

## 2025-05-14 NOTE — EICU
"Virtual ICU Admission    Admit Date: 2025  LOS: 0  Code Status: Full Code   : 1935  Bed: W277/W277 A:     Diagnosis: Acute metabolic encephalopathy    Patient  has a past medical history of Arthritis, Cancer, Diabetes mellitus, Elevated PSA, Gout, unspecified, Hypertension, and Nuclear sclerotic cataract of both eyes.    Last VS: BP (!) 111/59   Pulse 65   Temp 96.5 °F (35.8 °C)   Resp 15   Ht 6' 2" (1.88 m)   Wt 68 kg (150 lb)   SpO2 100%   BMI 19.26 kg/m²       VICU Review    VICU nurse assessment :  [place] completed, LDA documentation reconciliation completed, and VTE prophylaxis review        Nursing orders placed : IP GREER Peripheral IV Access         "

## 2025-05-14 NOTE — H&P
[facility] Medicine  History & Physical    Patient Name: Eugene Gamez  MRN: [id number]  Patient Class: IP- Inpatient  Admission Date: 5/14/2025  Attending Physician: Candelario Pedraza, *   Primary Care Provider: Larry Monae MD         Patient information was obtained from past medical records and ER records.     Subjective:     Principal Problem:Acute metabolic encephalopathy    Chief Complaint:   Chief Complaint   Patient presents with    Altered Mental Status     Pt arrived via ems, pt chief complaint is Altered mental status. Pt is screaming that he is being kidnapped. Per nursing home has been increasingly alerted over last few days but today has been very combative.         HPI: This 89-year-old black male with PMH of dementia,Hx of colostomy for presumed perforation of intestine,HX of  colon cancer with left colectomy,COPD,severe malnutrition,NH resident,malnutrition,.presents emergency room being transported from his nursing home by EMS because of altered mental status. The patient has been increasingly altered over the course of the last several days. He is screaming that he has been kidnapped. He is excited and has been combative with staff. He speaks almost constantly , but seems to be very confused and will not answer the questions that are asked of him.duo to seveer agitating patient received IV Ativan in ER, and much calmer,but patient remains confuse,patient is hypothermic 94,was started on bear hug,on IV Abx and cultures are done,VS are stable at this time amnd labs show lactic acidosis 2.4,head CT with no acute process,started on IVF,not able do ROS,duo to AMS,    Past Medical History:   Diagnosis Date    Arthritis     Cancer     Diabetes mellitus     Elevated PSA     Gout, unspecified     Hypertension     Nuclear sclerotic cataract of both eyes 6/29/2018       Past Surgical History:   Procedure Laterality Date    CATARACT EXTRACTION      ou    EYE SURGERY       bilateral cataracts    LAPAROTOMY, EXPLORATORY N/A 4/23/2025    Procedure: LAPAROTOMY, EXPLORATORY;  Surgeon: Abran aCmacho MD;  Location: [facility] OR;  Service: General;  Laterality: N/A;  lysis of adhsions    removal of small bowel  Apr. 2015    Colostomy    REPAIR, HERNIA, INCISIONAL N/A 4/23/2025    Procedure: REPAIR, HERNIA, INCISIONAL;  Surgeon: Abran Camacho MD;  Location: [facility] OR;  Service: General;  Laterality: N/A;       Review of patient's allergies indicates:   Allergen Reactions    Iodine Other (See Comments)     Causes gout to flare up    Shellfish containing products      Causes gout to flare up    Shellfish derived        Current Facility-Administered Medications on File Prior to Encounter   Medication    ondansetron HCl (PF) 4 mg/2 mL injection     Current Outpatient Medications on File Prior to Encounter   Medication Sig    allopurinoL (ZYLOPRIM) 300 MG tablet Take 1 tablet (300 mg total) by mouth once daily.    amiodarone (PACERONE) 400 MG tablet Take 1 tablet (400 mg total) by mouth 2 (two) times daily.    amLODIPine (NORVASC) 10 MG tablet Take 1 tablet (10 mg total) by mouth once daily.    aspirin (ECOTRIN) 81 MG EC tablet Take 1 tablet (81 mg total) by mouth once daily.    atorvastatin (LIPITOR) 20 MG tablet Take 1 tablet (20 mg total) by mouth once daily.    calcitRIOL (ROCALTROL) 0.25 MCG Cap Take 1 capsule (0.25 mcg total) by mouth every other day.    DULoxetine (CYMBALTA) 30 MG capsule Take 1 capsule (30 mg total) by mouth 2 (two) times daily.    ferrous sulfate 324 mg (65 mg iron) TbEC Take 1 tablet by mouth once daily.    gabapentin (NEURONTIN) 100 MG capsule Take 1 capsule (100 mg total) by mouth 3 (three) times daily.    JANUVIA 50 mg Tab Take 1 tablet (50 mg total) by mouth once daily.    metoprolol tartrate (LOPRESSOR) 25 MG tablet Take 0.5 tablets (12.5 mg total) by mouth 2 (two) times daily.    sodium bicarbonate 650 MG tablet Take 1 tablet (650 mg total) by mouth 2 (two) times  daily. (Patient not taking: Reported on 4/17/2025)    traZODone (DESYREL) 50 MG tablet Take 1 tablet (50 mg total) by mouth nightly as needed for Insomnia.    [DISCONTINUED] blood-glucose meter (ONE TOUCH ULTRAMINI) kit To test twice daily. Use as instructed    [DISCONTINUED] cholestyramine (QUESTRAN) 4 gram packet Take 1 packet (4 g total) by mouth 3 (three) times daily with meals.    [DISCONTINUED] colchicine (COLCRYS) 0.6 mg tablet Take 1 tablet (0.6 mg total) by mouth daily as needed.    [DISCONTINUED] loratadine (CLARITIN) 10 mg tablet Take 10 mg by mouth once daily.    [DISCONTINUED] QUEtiapine (SEROQUEL) 50 MG tablet Take 1 tablet (50 mg total) by mouth every evening.     Family History       Problem Relation (Age of Onset)    Diabetes Mother    Hypertension Mother    No Known Problems Father, Sister, Brother, Maternal Aunt, Maternal Uncle, Paternal Aunt, Paternal Uncle, Maternal Grandmother, Maternal Grandfather, Paternal Grandmother, Paternal Grandfather          Tobacco Use    Smoking status: Never     Passive exposure: Never    Smokeless tobacco: Never    Tobacco comments:     smoked short while as a teen   Substance and Sexual Activity    Alcohol use: No    Drug use: No    Sexual activity: Not Currently     Partners: Female     Review of Systems,not able be done.  Objective:     Vital Signs (Most Recent):  Temp: (!) 94.6 °F (34.8 °C) (05/14/25 1237)  Pulse: 63 (05/14/25 1502)  Resp: 18 (05/14/25 1502)  BP: (!) 104/57 (05/14/25 1502)  SpO2: 100 % (05/14/25 1502) Vital Signs (24h Range):  Temp:  [94.6 °F (34.8 °C)] 94.6 °F (34.8 °C)  Pulse:  [63-88] 63  Resp:  [16-18] 18  SpO2:  [99 %-100 %] 100 %  BP: (104-144)/(57-84) 104/57     Weight: 68 kg (150 lb)  Body mass index is 19.26 kg/m².     Physical Exam  Constitutional:       Appearance: He is ill-appearing.   HENT:      Head: Atraumatic.      Nose: No congestion.      Mouth/Throat:      Mouth: Mucous membranes are moist.   Eyes:      Pupils: Pupils are  equal, round, and reactive to light.   Cardiovascular:      Rate and Rhythm: Normal rate and regular rhythm.      Heart sounds: No murmur heard.  Pulmonary:      Breath sounds: Rales present.   Abdominal:      General: There is no distension.   Musculoskeletal:         General: No swelling or deformity.      Cervical back: Normal range of motion and neck supple.   Skin:     Coloration: Skin is not jaundiced.   Neurological:      Comments: alterterd   Psychiatric:      Comments: Altered.              CRANIAL NERVES     CN III, IV, VI   Pupils are equal, round, and reactive to light.       Significant Labs: All pertinent labs within the past 24 hours have been reviewed.  BMP:   Recent Labs   Lab 05/14/25  1159   GLU 60*      K 5.1   *   CO2 19*   BUN 25*   CREATININE 1.5*   CALCIUM 10.1   MG 1.5*     CBC:   Recent Labs   Lab 05/14/25  1159   WBC 7.08   HGB 9.1*   HCT 27.9*        CMP:   Recent Labs   Lab 05/14/25  1159      K 5.1   *   CO2 19*   GLU 60*   BUN 25*   CREATININE 1.5*   CALCIUM 10.1   PROT 6.8   ALBUMIN 3.0*   BILITOT 0.6   ALKPHOS 62   AST 16   ALT 11   ANIONGAP 9       Significant Imaging: I have reviewed all pertinent imaging results/findings within the past 24 hours.  Assessment/Plan:     Assessment & Plan  Acute metabolic encephalopathy  head CT with no acute process,may duo to worsening dementia,hypothermia,lactic acidosis, and hypoglycemia,occult infection,on empiric IV Abx and cultures pending ,will minitor closely in ICU.    Hypothermia  On bear hug,will monitor whdereke poppy,has normal free T 4,    Lactic acidosis  On IVF and  empiric IV Abx and iVF,will check CT abdomen,pelvic.    Hypoglycemia  Started on D5W with hypoglycemic protocol.    Carcinoma in situ of colon  Per Hx,has left colectomy .    Colostomy in place  Supportive care,    Anemia of chronic disease  Anemia is likely due to Iron deficiency. Most recent hemoglobin and hematocrit are listed  below.  Recent Labs     05/14/25  1159   HGB 9.1*   HCT 27.9*     Plan  - Monitor serial CBC: Daily  - Transfuse PRBC if patient becomes hemodynamically unstable, symptomatic or H/H drops below 7/21.  - Patient has not received any PRBC transfusions to date  - Patient's anemia is currently stable  -   History of colon cancer  S/P left colectomy.    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 BMP reviewed- noted Estimated Creatinine Clearance: 32.1 mL/min (A) (based on SCr of 1.5 mg/dL (H)). according to latest data. Based on current GFR, CKD stage is stage 3 - GFR 30-59.  Monitor UOP and serial BMP and adjust therapy as needed. Renally dose meds. Avoid nephrotoxic medications and procedures.  Anemia of chronic renal failure, stage 3b  Anemia is likely due to chronic disease due to Chronic liver disease. Most recent hemoglobin and hematocrit are listed below.  Recent Labs     05/14/25  1159   HGB 9.1*   HCT 27.9*     Plan  - Monitor serial CBC: Daily  - Transfuse PRBC if patient becomes hemodynamically unstable, symptomatic or H/H drops below 7/21.  - Patient has not received any PRBC transfusions to date  - Patient's anemia is currently stable  -   S/P left colectomy  Supportive care,    Severe protein-calorie malnutrition  Nutrition consulted. Most recent weight and BMI monitored-     Measurements:  Wt Readings from Last 1 Encounters:   05/14/25 68 kg (150 lb)   Body mass index is 19.26 kg/m².    Patient has been screened and assessed by RD.    Malnutrition Type:  Context:    Level:      Malnutrition Characteristic Summary:       Interventions/Recommendations (treatment strategy):       Advance care planning  Consulted palliative,spent over 5 minutes,    VTE Risk Mitigation (From admission, onward)           Ordered     heparin (porcine) injection 5,000 Units  Every 8 hours         05/14/25 3599                                    Candelario Pedraza MD  Department  of Hospital Medicine  [facility] - [place] - Emergency Dept

## 2025-05-14 NOTE — CONSULTS
1856: Consult was conducted with the patient's medical provider while he was transporting the patient to ICU Room 277. Medical Provider  reports that the patient normally communicates with his caregivers and others with clarity and comprehension. But of lately his communication skills has declined. He further stated that it was unclear whether this was the onset of dementia or  the results of an infection. He further informed me that prior to the patient's Emergency Department experience, he was a patient at [facility].    Medical Provider reported that he had communicated with the patient's daughter who stated she would be at the hospital later this evening.    Prayers were offered for the patient, his family and medical providers. The Spiritual Care Team lorena continue to provide comfort and spiritual support to the patient and his family.        Rita Lozada,   [phone]

## 2025-05-14 NOTE — PLAN OF CARE
Case Management Assessment     PCP: Larry Monae MD    Pharmacy: Send Rx to [facility] on Plan of Care    Patient Arrived From: [facility]  Existing Help at Home: daughter, Nicol Gamez    Barriers to Discharge: none    Discharge Plan:    A. Return to SNF   B. Home with HH      Patient to return to [facility] at discharge.  CM to assist as needed with discharge planning.  Daughter requesting form to update Advance Directive.             05/14/25 6056   Discharge Assessment   Assessment Type Discharge Planning Assessment   Confirmed/corrected address, phone number and insurance Yes   Confirmed Demographics Correct on Facesheet   Source of Information family;health record  (Daughter/Nicol)   Reason For Admission Altered Mental Status   People in Home child(amina), adult   Facility Arrived From: [facility]   Do you expect to return to your current living situation? Yes   Do you have help at home or someone to help you manage your care at home? Yes   Who are your caregiver(s) and their phone number(s)? Nicol Gamez; daughter;  [phone]   Prior to hospitilization cognitive status: Unable to Assess   Current cognitive status: Unable to Assess   Walking or Climbing Stairs Difficulty yes   Walking or Climbing Stairs ambulation difficulty, requires equipment   Mobility Management RW   Dressing/Bathing Difficulty yes   Dressing/Bathing bathing difficulty, assistance 1 person   Dressing/Bathing Management Staff at [facility]   Home Accessibility wheelchair accessible   Equipment Currently Used at Home walker, rolling   Readmission within 30 days? Yes  (4/29/2025 discharged to [facility])   Patient currently being followed by outpatient case management? Yes   If yes, name of outpatient case management following: [facility] outpatient case management   Do you currently have service(s) that help you manage your care at home? No  (Currently at [facility])   Do you take prescription medications? Yes   Do you have  prescription coverage? Yes   Do you have any problems affording any of your prescribed medications? No   Is the patient taking medications as prescribed? yes   Who is going to help you get home at discharge? Returning to SNF via wheelchair van (if appropriate)   How do you get to doctors appointments? other (see comments)   Are you on dialysis? No   Do you take coumadin? No   Discharge Plan A Skilled Nursing Facility   Discharge Plan B Home Health   DME Needed Upon Discharge  none   Discharge Plan discussed with: Adult children  (Daughter, Nicol)   OTHER   Name(s) of People in Home Daughter

## 2025-05-14 NOTE — ED PROVIDER NOTES
Encounter Date: 5/14/2025       History     Chief Complaint   Patient presents with    Altered Mental Status     Pt arrived via ems, pt chief complaint is Altered mental status. Pt is screaming that he is being kidnapped. Per nursing home has been increasingly alerted over last few days but today has been very combative.      HPI  This 89-year-old black male presents emergency room being transported from his nursing home by EMS because of altered mental status.  The patient has been increasingly altered over the course of the last several days.  He is screaming that he has been kidnapped.  He is excited and has been combative with staff.  He speaks almost constantly during the physical examination but seems to be very confused and will not answer the questions that are asked of him.  The patient has a history of a colostomy for presumed perforation of an intestine.  The patient has a history of diabetes and chronic obstructive pulmonary disease.  He has a history of protein calorie malnutrition secondary hyperthyroidism and possibly colon cancer.  He has been hyperosmolar in the past with hypernatremia.  He has a hypertension, he has had supraventricular tachycardia.  He has a known history of COPD and renal insufficiency.  Review of patient's allergies indicates:   Allergen Reactions    Iodine Other (See Comments)     Causes gout to flare up    Shellfish containing products      Causes gout to flare up    Shellfish derived      Past Medical History:   Diagnosis Date    Arthritis     Cancer     Diabetes mellitus     Elevated PSA     Gout, unspecified     Hypertension     Nuclear sclerotic cataract of both eyes 6/29/2018     Past Surgical History:   Procedure Laterality Date    CATARACT EXTRACTION      ou    EYE SURGERY      bilateral cataracts    LAPAROTOMY, EXPLORATORY N/A 4/23/2025    Procedure: LAPAROTOMY, EXPLORATORY;  Surgeon: Abran Camacho MD;  Location: [facility] OR;  Service: General;  Laterality: N/A;  lysis  of adhsions    removal of small bowel  Apr. 2015    Colostomy    REPAIR, HERNIA, INCISIONAL N/A 4/23/2025    Procedure: REPAIR, HERNIA, INCISIONAL;  Surgeon: Abran Camacho MD;  Location: [facility] OR;  Service: General;  Laterality: N/A;     Family History   Problem Relation Name Age of Onset    Hypertension Mother      Diabetes Mother      No Known Problems Father      No Known Problems Sister      No Known Problems Brother      No Known Problems Maternal Aunt      No Known Problems Maternal Uncle      No Known Problems Paternal Aunt      No Known Problems Paternal Uncle      No Known Problems Maternal Grandmother      No Known Problems Maternal Grandfather      No Known Problems Paternal Grandmother      No Known Problems Paternal Grandfather      Amblyopia Neg Hx      Blindness Neg Hx      Cancer Neg Hx      Cataracts Neg Hx      Glaucoma Neg Hx      Macular degeneration Neg Hx      Retinal detachment Neg Hx      Strabismus Neg Hx      Stroke Neg Hx      Thyroid disease Neg Hx       Social History[1]  Review of Systems  The patient can not really respond to review of systems questions.  Physical Exam     Initial Vitals   BP Pulse Resp Temp SpO2   05/14/25 1105 05/14/25 1105 05/14/25 1105 05/14/25 1237 05/14/25 1105   121/84 88 18 (!) 94.6 °F (34.8 °C) 99 %      MAP       --                Physical Exam  The patient was examined specifically for the following:   General:No significant distress, Good color, Warm and dry. Head and neck:Scalp atraumatic, Neck supple. Neurological:Appropriate conversation, Gross motor deficits. Eyes:Conjugate gaze, Clear corneas. ENT: No epistaxis. Cardiac: Regular rate and rhythm, Grossly normal heart tones. Pulmonary: Wheezing, Rales. Gastrointestinal: Abdominal tenderness, Abdominal distention. Musculoskeletal: Extremity deformity, Apparent pain with range of motion of the joints. Skin: Rash.   The findings on examination were normal except for the following:  The patient is alert  bright agitated confused, speaking almost constantly.  He will track me with his eyes.  The lungs are clear and free of wheezing rales rubs or rhonchi heart tones are normal.  The patient has regular rate and rhythm.  Vital signs are stable.  There is no clinical evidence of respiratory distress oxygen saturations are 99%.  There is no obvious abdominal tenderness.  The patient has a colostomy in the left abdomen filled with brown liquid stool.  The patient has diffuse muscle wasting.  There is no significant lower extremity edema.  The patient has normal care edge of the head and neck.  ED Course   Critical Care    Date/Time: 5/14/2025 3:22 PM    Performed by: Pradeep Sweeney MD  Authorized by: Pradeep Sweeney MD  Direct patient critical care time: 22 minutes  Additional history critical care time: 11 minutes  Ordering / reviewing critical care time: 11 minutes  Documentation critical care time: 11 minutes  Consulting other physicians critical care time: 11 minutes  Total critical care time (exclusive of procedural time) : 66 minutes  Critical care time was exclusive of separately billable procedures and treating other patients and teaching time.  Critical care was necessary to treat or prevent imminent or life-threatening deterioration of the following conditions: metabolic crisis and CNS failure or compromise.  Critical care was time spent personally by me on the following activities: development of treatment plan with patient or surrogate, discussions with primary provider, evaluation of patient's response to treatment, obtaining history from patient or surrogate, ordering and performing treatments and interventions, ordering and review of laboratory studies, ordering and review of radiographic studies, pulse oximetry, re-evaluation of patient's condition and review of old charts.        Labs Reviewed   COMPREHENSIVE METABOLIC PANEL - Abnormal       Result Value    Sodium 143      Potassium 5.1      Chloride  115 (*)     CO2 19 (*)     Glucose 60 (*)     BUN 25 (*)     Creatinine 1.5 (*)     Calcium 10.1      Protein Total 6.8      Albumin 3.0 (*)     Bilirubin Total 0.6      ALP 62      AST 16      ALT 11      Anion Gap 9      eGFR 44 (*)    LACTIC ACID, PLASMA - Abnormal    Lactic Acid Level 2.4 (*)     Narrative:     Falsely low lactic acid results can be found in samples containing >=13.0 mg/dL total bilirubin and/or >=3.5 mg/dL direct bilirubin.    URINALYSIS, REFLEX TO URINE CULTURE - Abnormal    Color, UA Yellow      Appearance, UA Clear      pH, UA 5.0      Spec Grav UA 1.015      Protein, UA Negative      Glucose, UA Negative      Ketones, UA Negative      Bilirubin, UA Negative      Blood, UA Negative      Nitrites, UA Negative      Urobilinogen, UA Negative      Leukocyte Esterase, UA 1+ (*)    MAGNESIUM - Abnormal    Magnesium  1.5 (*)    CBC WITH DIFFERENTIAL - Abnormal    WBC 7.08      RBC 3.11 (*)     HGB 9.1 (*)     HCT 27.9 (*)     MCV 90      MCH 29.3      MCHC 32.6      RDW 15.4 (*)     Platelet Count 240      MPV 11.0      Nucleated RBC 0      Neut % 81.2 (*)     Lymph % 8.2 (*)     Mono % 9.2      Eos % 0.3      Basophil % 0.1      Imm Grans % 1.0 (*)     Neut # 5.75      Lymph # 0.58 (*)     Mono # 0.65      Eos # 0.02      Baso # 0.01      Imm Grans # 0.07 (*)    TSH - Abnormal    TSH 20.708 (*)     Free T4 0.88     URINALYSIS MICROSCOPIC - Abnormal    RBC, UA 3      WBC, UA 11 (*)     Bacteria, UA Few (*)     Hyaline Casts, UA 19 (*)     Granular Casts 3 (*)     Microscopic Comment       POCT GLUCOSE - Abnormal    POCT Glucose 128 (*)    LACTIC ACID, PLASMA - Normal    Lactic Acid Level 0.5      Narrative:     Falsely low lactic acid results can be found in samples containing >=13.0 mg/dL total bilirubin and/or >=3.5 mg/dL direct bilirubin.   Specimen slightly hemolyzed.    PROCALCITONIN - Normal    Procalcitonin 0.21     ALCOHOL,MEDICAL (ETHANOL) - Normal    Alcohol, Serum <10     DRUG SCREEN  "PANEL, URINE EMERGENCY - Normal    Benzodiazepine, Urine Negative      Methadone, Urine Negative      Cocaine, Urine Negative      Opiates, Urine Negative      Barbiturates, Urine Negative      Amphetamines, Urine Negative      THC Negative      Phencyclidine, Urine Negative      Urine Creatinine 81.2      Narrative:     This screen includes the following classes of drugs at the listed cut-off:     Benzodiazepines:        200 ng/ml   Methadone:              300 ng/ml   Cocaine metabolite:     300 ng/ml   Opiates:                300 ng/ml   Barbiturates:           200 ng/ml   Amphetamines:           1000 ng/ml   Marijuana metabs (THC): 50 ng/ml   Phencyclidine (PCP):    25 ng/ml     This is a screening test. If results do not correlate with clinical presentation, then a confirmatory send out test is advised.    This report is intended for use in clinical monitoring and management of patients. It is not intended for use in employment related drug testing."   CULTURE, URINE   CBC W/ AUTO DIFFERENTIAL    Narrative:     The following orders were created for panel order CBC auto differential.  Procedure                               Abnormality         Status                     ---------                               -----------         ------                     CBC with Differential[[id number]]       Abnormal            Final result                 Please view results for these tests on the individual orders.   GREY TOP URINE HOLD    Extra Tube Hold for add-ons.     POCT GLUCOSE    POCT Glucose 70     POCT GLUCOSE    POCT Glucose 78     POCT GLUCOSE MONITORING CONTINUOUS     EKG Readings: (Independently Interpreted)   This patient is in a sinus rhythm with a heart rate of 75 there is a first-degree AV block there are occasional PVCs.  Axis is normal intervals are normal.  There is no evidence of acute myocardial infarction or malignant arrhythmia.       Imaging Results              CT Abdomen Pelvis  Without Contrast " (Final result)  Result time 05/14/25 16:38:09      Final result by Beck Guerrero MD (05/14/25 16:38:09)                   Impression:      Signs of volume overload including small bilateral pleural effusions, small volume ascites, and anasarca.    Gallbladder sludge.  No evidence of cholecystitis or biliary obstruction.    Few punctate nonobstructing renal stones bilaterally.  No hydronephrosis.    Bilateral renal masses, unchanged from recent priors but slightly enlarged from more remote studies, concerning for RCC or other neoplasm.    Wall thickening in the stomach and several small bowel loops, which could represent under distention or gastroenteritis.    History of colon cancer status post left hemicolectomy and end colostomy.    Postoperative changes in the abdominal wall from recent hernia repair.  There is a 2.5 cm subcutaneous collection of air and fluid in the abdominal wall to the right of the surgical incision, concerning for hematoma or abscess.    1.5 cm sclerotic lesion in the T12 vertebral body, new from 2023, concerning for metastasis.    Several additional findings as described.      Electronically signed by: Beck Guerrero  Date:    05/14/2025  Time:    16:38               Narrative:    EXAMINATION:  CT ABDOMEN PELVIS WITHOUT CONTRAST    CLINICAL HISTORY:  Abdominal abscess/infection suspected;    TECHNIQUE:  Low dose axial images, sagittal and coronal reformations were obtained from the lung bases to the pubic symphysis.  Oral contrast was not administered.    COMPARISON:  Multiple CTs, most recent 04/18/2025d    FINDINGS:  LUNG BASES: Hypoattenuation of the blood pool compatible with anemia.  Trace pericardial fluid.  Calcifications of the coronary arteries and aortic valve.  Small pleural effusions bilaterally, with compressive atelectasis in the adjacent lower lobes.    HEPATOBILIARY: 1.4 cm cyst and punctate calcified granuloma in the left hepatic lobe.  Gallbladder sludge.  No gallbladder  distension or wall thickening.  No bile duct dilatation.    SPLEEN: No splenomegaly.    PANCREAS: No focal masses or ductal dilatation.    ADRENALS: No adrenal nodules.    KIDNEYS/URETERS: Few punctate nonobstructing renal stones bilaterally.  No hydronephrosis.  Bilateral renal cysts.  Small hyperdense cyst in the right midpole.  There are intermediate density lesions in both kidneys measuring 4.0 cm at the right lower pole and 2.1 cm at the left upper pole, which are unchanged in size from recent priors but slightly enlarged from more remote studies.  Previous contrast enhanced CT demonstrated enhancement of these two lesions.    BLADDER/PELVIC ORGANS: Bladder is partially decompressed around a Paredes catheter.  Prostate is mildly enlarged.    PERITONEUM / RETROPERITONEUM: Small volume ascites.  No organized fluid collection.  Diffuse mesenteric fat stranding.  No free air.    LYMPH NODES: No lymphadenopathy.    VESSELS: Mild atherosclerosis.    GI TRACT: Wall thickening in the stomach and several small bowel loops for example in the left hemiabdomen on 2:80.  Postoperative changes from partial small-bowel resection, left hemicolectomy, left-sided end colostomy, and Brenda pouch.  Normal appendix.  No bowel distension.    BONES AND SOFT TISSUES: Bilateral gynecomastia.  Postoperative changes in the abdominal wall from recent hernia repair.  There is a 2.5 x 1.1 cm subcutaneous collection of air and fluid in the abdominal wall to the right of the surgical incision (2:97).  Anasarca.  Osteopenia and degenerative changes.  No acute fracture.  There is a 1.5 cm sclerotic lesion in the T12 vertebral body (602:135), which is new from 2023.                                       CT Head Without Contrast (Final result)  Result time 05/14/25 13:56:26      Final result by Dyllan Jones MD (05/14/25 13:56:26)                   Impression:      No definite acute intracranial findings by noncontrast CT.    Left anterior  parafalcine presumed meningioma felt relatively similar to remote head CT of 01/18/2022.      Electronically signed by: Dyllan Karen  Date:    05/14/2025  Time:    13:56               Narrative:    EXAMINATION:  CT HEAD WITHOUT CONTRAST    CLINICAL HISTORY:  Mental status change, unknown cause; Altered mental status, unspecified    TECHNIQUE:  Low dose axial images were obtained through the head.  Coronal and sagittal reformations were also performed. Contrast was not administered.    COMPARISON:  CT head performed 01/18/2022    FINDINGS:  Blood: No acute intracranial hemorrhage.    Parenchyma: No definite loss of gray-white differentiation to suggest acute or subacute transcortical infarct. Parenchymal volume loss.  Nonspecific patchy areas of white matter hypoattenuation may reflect sequela of chronic small vessel ischemic disease.  Remote bifrontal infarcts.  Question remote small lacunar type infarct in the right pat gilberto.    Ventricles/Extra-axial spaces: No abnormal extra-axial fluid collection. Basal cisterns are patent.  Suspected left anterior parafalcine meningioma measuring the order of 9-10 mm with minimal local mass effect fell similar appearance to most recent available CT of 01/18/2022.    Vessels: Moderate atherosclerotic calcification.    Orbits: Status post bilateral lens replacements.  Query enophthalmos.    Scalp: Unremarkable.    Skull: There are no depressed skull fractures or destructive bone lesions.    Sinuses and mastoids: Relatively minor paranasal sinus mucosal thickening.  Opacification of the dependent mastoid air cells.  Nonspecific soft tissue attenuation within the external auditory canals may relate to cerumen.    Other findings: None                                       X-Ray Chest AP Portable (Final result)  Result time 05/14/25 14:31:35      Final result by Beck Guerrero MD (05/14/25 14:31:35)                   Impression:      As above.      Electronically signed  by: Beck Guerrero  Date:    05/14/2025  Time:    14:31               Narrative:    EXAMINATION:  XR CHEST AP PORTABLE    CLINICAL HISTORY:  Sepsis;    TECHNIQUE:  Single frontal view of the chest was performed.    COMPARISON:  Chest radiograph 04/18/2025    FINDINGS:  Lines and tubes: Left chest wall Port-A-Cath.    Heart and mediastinum: Unchanged.    Pleura: Small-moderate layering pleural effusion on the left.  No pneumothorax.    Lungs: Lungs are well inflated. Persistent patchy opacities in the right lower lobe.  New consolidative opacity in the left lower lobe, likely atelectasis in the setting of an effusion.    Soft tissue/bone: Unchanged.                                       Medications   D5W infusion ( Intravenous Verify Only 5/14/25 1800)   piperacillin-tazobactam (ZOSYN) 4.5 g in D5W 100 mL IVPB (MB+) (has no administration in time range)   LORazepam injection 0.5 mg (has no administration in time range)   heparin (porcine) injection 5,000 Units (has no administration in time range)   dextrose 50% injection 12.5 g (has no administration in time range)   glucagon (human recombinant) injection 1 mg (has no administration in time range)   insulin aspart U-100 pen 0-10 Units (has no administration in time range)   insulin aspart U-100 pen 0-5 Units (has no administration in time range)   sodium chloride 0.9% bolus 1,000 mL 1,000 mL (0 mLs Intravenous Stopped 5/14/25 1416)   LORazepam injection 1 mg (1 mg Intravenous Given 5/14/25 1201)   vancomycin 1,250 mg in 0.9% NaCl 250 mL IVPB (admixture device) (0 mg Intravenous Stopped 5/14/25 1536)   piperacillin-tazobactam (ZOSYN) 4.5 g in D5W 100 mL IVPB (MB+) (0 g Intravenous Stopped 5/14/25 1345)   dextrose 50 % in water (D50W) injection 25 g (25 g Intravenous Given 5/14/25 1246)   sodium chloride 0.9% bolus 1,200 mL 1,200 mL (0 mLs Intravenous Stopped 5/14/25 1416)   magnesium sulfate 2g in water 50mL IVPB (premix) (0 g Intravenous Stopped 5/14/25 1850)      Medical Decision Making  Amount and/or Complexity of Data Reviewed  Labs: ordered.  Radiology: ordered.    Risk  Prescription drug management.  Decision regarding hospitalization.    This patient presents emergency room sent for altered mental status which includes agitated combative behavior.  The patient was discovered to be hypothermic with a temperature of 94.6°.  The sepsis protocol was executed.  The patient is treated with a warming blanket.  He was given a mg of Ativan to calm him.  He was agitated and talking nonstop.  He is now calm and relaxed.   Diagnostic evaluation included a procalcitonin which is normal raising questions about whether the patient is actually septic.  The TSH is markedly elevated but the free T4 is normal I doubt hyperthyroidism.  The magnesium is borderline low at 1.5 this may require treatment.  Lactic acid is elevated at 2.4, possibly from dehydration.  Sepsis is also considered.  The patient has urine reveals hyaline casts raising questions about dehydration the urinalysis is otherwise unremarkable except for 1+ leukocytes but there are only 11 white cells per high-power field in the urine with few bacteria.  I doubt cystitis.  The patient has a glucose less than 60 this was treated with D50.  His creatinine is elevated at 1.5 with a BUN of 25.  These are both above baseline.  The abdomen is soft.  The patient was seen and evaluated by  in the emergency room, any accepts the patient onto his service.                 A sepsis reperfusion examination was performed at 3:24 p.m..                 Clinical Impression:  Final diagnoses:  [R41.82] Altered mental status  [T68.XXXA] Hypothermia, initial encounter (Primary)  [E83.42] Hypomagnesemia  [E16.2] Hypoglycemia  [E87.20] Lactic acidosis          ED Disposition Condition    Admit                     [1]   Social History  Tobacco Use    Smoking status: Never     Passive exposure: Never    Smokeless tobacco: Never    Tobacco  comments:     smoked short while as a teen   Substance Use Topics    Alcohol use: No    Drug use: No        Pradeep Sweeney MD  05/14/25 0113

## 2025-05-14 NOTE — ASSESSMENT & PLAN NOTE
head CT with no acute process,may duo to worsening dementia,hypothermia,lactic acidosis, and hypoglycemia,occult infection,on empiric IV Abx and cultures pending ,will minitor closely in ICU.

## 2025-05-14 NOTE — ASSESSMENT & PLAN NOTE
Anemia is likely due to Iron deficiency. Most recent hemoglobin and hematocrit are listed below.  Recent Labs     05/14/25  1159   HGB 9.1*   HCT 27.9*     Plan  - Monitor serial CBC: Daily  - Transfuse PRBC if patient becomes hemodynamically unstable, symptomatic or H/H drops below 7/21.  - Patient has not received any PRBC transfusions to date  - Patient's anemia is currently stable  -

## 2025-05-14 NOTE — HPI
This 89-year-old black male with PMH of dementia,Hx of colostomy for presumed perforation of intestine,HX of  colon cancer with left colectomy,COPD,severe malnutrition,NH resident,malnutrition,.presents emergency room being transported from his nursing home by EMS because of altered mental status. The patient has been increasingly altered over the course of the last several days. He is screaming that he has been kidnapped. He is excited and has been combative with staff. He speaks almost constantly , but seems to be very confused and will not answer the questions that are asked of him.duo to seveer agitating patient received IV Ativan in ER, and much calmer,but patient remains confuse,patient is hypothermic 94,was started on bear hug,on IV Abx and cultures are done,VS are stable at this time amnd labs show lactic acidosis 2.4,head CT with no acute process,started on IVF,not able do ROSduo to AMS,

## 2025-05-14 NOTE — ASSESSMENT & PLAN NOTE
Creatine stable for now. BMP reviewed- noted Estimated Creatinine Clearance: 32.1 mL/min (A) (based on SCr of 1.5 mg/dL (H)). according to latest data. Based on current GFR, CKD stage is stage 3 - GFR 30-59.  Monitor UOP and serial BMP and adjust therapy as needed. Renally dose meds. Avoid nephrotoxic medications and procedures.

## 2025-05-14 NOTE — PHARMACY MED REC
"Admission Medication History     The home medication history was taken by Brandy Murrieta.    You may go to "Admission" then "Reconcile Home Medications" tabs to review and/or act upon these items.     The home medication list has been updated by the Pharmacy department.   Please read ALL comments highlighted in yellow.   Please address this information as you see fit.    Feel free to contact us if you have any questions or require assistance.      Medications listed below were obtained from: Patient/family and Analytic software- [employer]  (Not in a hospital admission)          Brandy Murrieta  [phone]                 .          "

## 2025-05-14 NOTE — ASSESSMENT & PLAN NOTE
Nutrition consulted. Most recent weight and BMI monitored-     Measurements:  Wt Readings from Last 1 Encounters:   05/14/25 68 kg (150 lb)   Body mass index is 19.26 kg/m².    Patient has been screened and assessed by RD.    Malnutrition Type:  Context:    Level:      Malnutrition Characteristic Summary:       Interventions/Recommendations (treatment strategy):

## 2025-05-14 NOTE — ASSESSMENT & PLAN NOTE
Anemia is likely due to chronic disease due to Chronic liver disease. Most recent hemoglobin and hematocrit are listed below.  Recent Labs     05/14/25  1159   HGB 9.1*   HCT 27.9*     Plan  - Monitor serial CBC: Daily  - Transfuse PRBC if patient becomes hemodynamically unstable, symptomatic or H/H drops below 7/21.  - Patient has not received any PRBC transfusions to date  - Patient's anemia is currently stable  -

## 2025-05-14 NOTE — ED TRIAGE NOTES
Pt arrived via ems from [facility] for altered mental status x2 days. Per ems, pt is usually aaox4. At this time, patient is not answering questions and is instead yelling incoherent statements. Per ems, patient was combative on scene. Awaiting MD

## 2025-05-15 LAB
ABSOLUTE EOSINOPHIL (OHS): 0.08 K/UL
ABSOLUTE MONOCYTE (OHS): 0.56 K/UL (ref 0.3–1)
ABSOLUTE NEUTROPHIL COUNT (OHS): 2.81 K/UL (ref 1.8–7.7)
ALBUMIN SERPL BCP-MCNC: 2 G/DL (ref 3.5–5.2)
ALP SERPL-CCNC: 45 UNIT/L (ref 40–150)
ALT SERPL W/O P-5'-P-CCNC: 10 UNIT/L (ref 10–44)
ANION GAP (OHS): 5 MMOL/L (ref 8–16)
AST SERPL-CCNC: 12 UNIT/L (ref 11–45)
BASOPHILS # BLD AUTO: 0.01 K/UL
BASOPHILS NFR BLD AUTO: 0.2 %
BILIRUB SERPL-MCNC: 0.4 MG/DL (ref 0.1–1)
BUN SERPL-MCNC: 18 MG/DL (ref 8–23)
CALCIUM SERPL-MCNC: 8.1 MG/DL (ref 8.7–10.5)
CHLORIDE SERPL-SCNC: 117 MMOL/L (ref 95–110)
CO2 SERPL-SCNC: 16 MMOL/L (ref 23–29)
CREAT SERPL-MCNC: 1.3 MG/DL (ref 0.5–1.4)
ERYTHROCYTE [DISTWIDTH] IN BLOOD BY AUTOMATED COUNT: 15.1 % (ref 11.5–14.5)
GFR SERPLBLD CREATININE-BSD FMLA CKD-EPI: 53 ML/MIN/1.73/M2
GLUCOSE SERPL-MCNC: 74 MG/DL (ref 70–110)
HCT VFR BLD AUTO: 22.8 % (ref 40–54)
HGB BLD-MCNC: 7.1 GM/DL (ref 14–18)
IMM GRANULOCYTES # BLD AUTO: 0.04 K/UL (ref 0–0.04)
IMM GRANULOCYTES NFR BLD AUTO: 1 % (ref 0–0.5)
LYMPHOCYTES # BLD AUTO: 0.63 K/UL (ref 1–4.8)
MAGNESIUM SERPL-MCNC: 1.5 MG/DL (ref 1.6–2.6)
MCH RBC QN AUTO: 28 PG (ref 27–31)
MCHC RBC AUTO-ENTMCNC: 31.1 G/DL (ref 32–36)
MCV RBC AUTO: 90 FL (ref 82–98)
NUCLEATED RBC (/100WBC) (OHS): 1 /100 WBC
OHS QRS DURATION: 84 MS
OHS QTC CALCULATION: 477 MS
PLATELET # BLD AUTO: ABNORMAL 10*3/UL
PLATELET BLD QL SMEAR: ABNORMAL
PMV BLD AUTO: ABNORMAL FL
POCT GLUCOSE: 104 MG/DL (ref 70–110)
POCT GLUCOSE: 109 MG/DL (ref 70–110)
POCT GLUCOSE: 110 MG/DL (ref 70–110)
POCT GLUCOSE: 88 MG/DL (ref 70–110)
POCT GLUCOSE: 94 MG/DL (ref 70–110)
POCT GLUCOSE: 98 MG/DL (ref 70–110)
POTASSIUM SERPL-SCNC: 3.8 MMOL/L (ref 3.5–5.1)
PROT SERPL-MCNC: 4.7 GM/DL (ref 6–8.4)
RBC # BLD AUTO: 2.54 M/UL (ref 4.6–6.2)
RELATIVE EOSINOPHIL (OHS): 1.9 %
RELATIVE LYMPHOCYTE (OHS): 15.3 % (ref 18–48)
RELATIVE MONOCYTE (OHS): 13.6 % (ref 4–15)
RELATIVE NEUTROPHIL (OHS): 68 % (ref 38–73)
SODIUM SERPL-SCNC: 138 MMOL/L (ref 136–145)
WBC # BLD AUTO: 4.13 K/UL (ref 3.9–12.7)

## 2025-05-15 PROCEDURE — 63600175 PHARM REV CODE 636 W HCPCS: Performed by: HOSPITALIST

## 2025-05-15 PROCEDURE — 63600175 PHARM REV CODE 636 W HCPCS: Performed by: INTERNAL MEDICINE

## 2025-05-15 PROCEDURE — 36415 COLL VENOUS BLD VENIPUNCTURE: CPT | Performed by: HOSPITALIST

## 2025-05-15 PROCEDURE — 25000003 PHARM REV CODE 250: Performed by: HOSPITALIST

## 2025-05-15 PROCEDURE — 85025 COMPLETE CBC W/AUTO DIFF WBC: CPT | Performed by: HOSPITALIST

## 2025-05-15 PROCEDURE — 83735 ASSAY OF MAGNESIUM: CPT | Performed by: HOSPITALIST

## 2025-05-15 PROCEDURE — 94761 N-INVAS EAR/PLS OXIMETRY MLT: CPT

## 2025-05-15 PROCEDURE — 99223 1ST HOSP IP/OBS HIGH 75: CPT | Mod: 25,,, | Performed by: REGISTERED NURSE

## 2025-05-15 PROCEDURE — 82040 ASSAY OF SERUM ALBUMIN: CPT | Performed by: HOSPITALIST

## 2025-05-15 PROCEDURE — 11000001 HC ACUTE MED/SURG PRIVATE ROOM

## 2025-05-15 RX ORDER — MAGNESIUM SULFATE HEPTAHYDRATE 40 MG/ML
2 INJECTION, SOLUTION INTRAVENOUS ONCE
Status: COMPLETED | OUTPATIENT
Start: 2025-05-15 | End: 2025-05-15

## 2025-05-15 RX ORDER — MUPIROCIN 20 MG/G
OINTMENT TOPICAL 2 TIMES DAILY
Status: COMPLETED | OUTPATIENT
Start: 2025-05-15 | End: 2025-05-19

## 2025-05-15 RX ADMIN — PIPERACILLIN SODIUM AND TAZOBACTAM SODIUM 4.5 G: 4; .5 INJECTION, POWDER, FOR SOLUTION INTRAVENOUS at 01:05

## 2025-05-15 RX ADMIN — DEXTROSE MONOHYDRATE 100 ML: 50 INJECTION, SOLUTION INTRAVENOUS at 01:05

## 2025-05-15 RX ADMIN — MAGNESIUM SULFATE HEPTAHYDRATE 2 G: 40 INJECTION, SOLUTION INTRAVENOUS at 06:05

## 2025-05-15 RX ADMIN — HEPARIN SODIUM 5000 UNITS: 5000 INJECTION INTRAVENOUS; SUBCUTANEOUS at 05:05

## 2025-05-15 RX ADMIN — PIPERACILLIN SODIUM AND TAZOBACTAM SODIUM 4.5 G: 4; .5 INJECTION, POWDER, FOR SOLUTION INTRAVENOUS at 09:05

## 2025-05-15 RX ADMIN — DEXTROSE MONOHYDRATE: 50 INJECTION, SOLUTION INTRAVENOUS at 12:05

## 2025-05-15 RX ADMIN — LORAZEPAM 0.5 MG: 2 INJECTION INTRAMUSCULAR; INTRAVENOUS at 08:05

## 2025-05-15 RX ADMIN — MUPIROCIN: 20 OINTMENT TOPICAL at 09:05

## 2025-05-15 RX ADMIN — MUPIROCIN: 20 OINTMENT TOPICAL at 12:05

## 2025-05-15 RX ADMIN — DEXTROSE MONOHYDRATE: 50 INJECTION, SOLUTION INTRAVENOUS at 05:05

## 2025-05-15 RX ADMIN — PIPERACILLIN SODIUM AND TAZOBACTAM SODIUM 4.5 G: 4; .5 INJECTION, POWDER, FOR SOLUTION INTRAVENOUS at 05:05

## 2025-05-15 NOTE — ASSESSMENT & PLAN NOTE
Anemia is likely due to Iron deficiency. Most recent hemoglobin and hematocrit are listed below.  Recent Labs     05/14/25  1159 05/15/25  0414   HGB 9.1* 7.1*   HCT 27.9* 22.8*     Plan  - Monitor serial CBC: Daily  - Transfuse PRBC if patient becomes hemodynamically unstable, symptomatic or H/H drops below 7/21.  - Patient has not received any PRBC transfusions to date  - Patient's anemia is currently stable  -

## 2025-05-15 NOTE — EICU
JORDIU Night Rounds Checklist  24H Vital Sign Range:  Temp:  [94.6 °F (34.8 °C)-97.5 °F (36.4 °C)]   Pulse:  [58-88]   Resp:  [10-51]   BP: ()/(51-84)   SpO2:  [99 %-100 %]     Video rounds and LDA reconciliation

## 2025-05-15 NOTE — PLAN OF CARE
Problem: Infection  Goal: Absence of Infection Signs and Symptoms  Outcome: Progressing     Problem: Adult Inpatient Plan of Care  Goal: Plan of Care Review  Outcome: Progressing  Goal: Patient-Specific Goal (Individualized)  Outcome: Progressing  Goal: Absence of Hospital-Acquired Illness or Injury  Outcome: Progressing  Goal: Optimal Comfort and Wellbeing  Outcome: Progressing  Goal: Readiness for Transition of Care  Outcome: Progressing     Problem: Coping Ineffective  Goal: Effective Coping  Outcome: Progressing     Problem: Acute Kidney Injury/Impairment  Goal: Fluid and Electrolyte Balance  Outcome: Progressing  Goal: Improved Oral Intake  Outcome: Progressing  Goal: Effective Renal Function  Outcome: Progressing     Problem: Wound  Goal: Optimal Coping  Outcome: Progressing  Goal: Optimal Functional Ability  Outcome: Progressing  Goal: Absence of Infection Signs and Symptoms  Outcome: Progressing  Goal: Improved Oral Intake  Outcome: Progressing  Goal: Optimal Pain Control and Function  Outcome: Progressing  Goal: Skin Health and Integrity  Outcome: Progressing  Goal: Optimal Wound Healing  Outcome: Progressing     Problem: Skin Injury Risk Increased  Goal: Skin Health and Integrity  Outcome: Progressing     Problem: Fall Injury Risk  Goal: Absence of Fall and Fall-Related Injury  Outcome: Progressing     Problem: Restraint, Nonviolent  Goal: Absence of Harm or Injury  Outcome: Progressing

## 2025-05-15 NOTE — CONSULTS
Palliative Medicine Consult   Date: 5/15/2025     Palliative Medicine team consulted for goals of care discussion and advanced care planning. Pt known to myself and palliative medicine team from previous admission     Pt returned to hospital from [facility] and currently is requiring sedation due to AMS/delirium and being combative (consistent with prior admission)     Called and spoke with daughter, Nicol, who pt typically lives with.  She plans to be present at facility 5/16.  Pt has 7 children. GO for full treatment, full code at this time.  Will discuss further 5/16.     Full consult note to follow     Estella Argueta NP   Palliative Medicine   [facility]

## 2025-05-15 NOTE — ASSESSMENT & PLAN NOTE
Anemia is likely due to chronic disease due to Chronic liver disease. Most recent hemoglobin and hematocrit are listed below.  Recent Labs     05/14/25  1159 05/15/25  0414   HGB 9.1* 7.1*   HCT 27.9* 22.8*     Plan  - Monitor serial CBC: Daily  - Transfuse PRBC if patient becomes hemodynamically unstable, symptomatic or H/H drops below 7/21.  - Patient has not received any PRBC transfusions to date  - Patient's anemia is currently stable  -

## 2025-05-15 NOTE — NURSING
[facility]  Nurses Note -- 4 Eyes      5/15/2025       Skin assessed on: Q Shift      [x] No Pressure Injuries Present    [x]Prevention Measures Documented    [] Yes LDA  for Pressure Injury Previously documented     [] Yes New Pressure Injury Discovered   [] LDA for New Pressure Injury Added      Attending RN:  Willie Klein RN     Second RN:  LAZ Thompson

## 2025-05-15 NOTE — SUBJECTIVE & OBJECTIVE
Interval History: CT abdomen,pelvic show,here is a 2.5 cm subcutaneous collection of air and fluid in the abdominal wall to the right of the surgical incision, concerning for hematoma or abscess.already on IV Zosyn,also chronic findings.  Consulted palliative,approprietly for hospice,likely worsening dementia,on 2 point soft restrain and prn IV Ativan.  Per ST on puree diet.    Review of Systems,not able be don, duo to confusion.  Objective:     Vital Signs (Most Recent):  Temp: 98 °F (36.7 °C) (05/15/25 0745)  Pulse: 67 (05/15/25 0800)  Resp: 12 (05/15/25 0800)  BP: 122/62 (05/15/25 0800)  SpO2: 100 % (05/15/25 0800) Vital Signs (24h Range):  Temp:  [94.6 °F (34.8 °C)-98 °F (36.7 °C)] 98 °F (36.7 °C)  Pulse:  [58-94] 67  Resp:  [10-51] 12  SpO2:  [99 %-100 %] 100 %  BP: ()/(51-77) 122/62     Weight: 68 kg (150 lb)  Body mass index is 19.26 kg/m².    Intake/Output Summary (Last 24 hours) at 5/15/2025 1141  Last data filed at 5/15/2025 0629  Gross per 24 hour   Intake 4149.15 ml   Output 1650 ml   Net 2499.15 ml         Physical Exam  Constitutional:       Appearance: He is ill-appearing.   Cardiovascular:      Rate and Rhythm: Normal rate.               Significant Labs: All pertinent labs within the past 24 hours have been reviewed.  BMP:   Recent Labs   Lab 05/15/25  0414   GLU 74      K 3.8   *   CO2 16*   BUN 18   CREATININE 1.3   CALCIUM 8.1*   MG 1.5*     CBC:   Recent Labs   Lab 05/14/25  1159 05/15/25  0414   WBC 7.08 4.13   HGB 9.1* 7.1*   HCT 27.9* 22.8*     --      CMP:   Recent Labs   Lab 05/14/25  1159 05/15/25  0414    138   K 5.1 3.8   * 117*   CO2 19* 16*   GLU 60* 74   BUN 25* 18   CREATININE 1.5* 1.3   CALCIUM 10.1 8.1*   PROT 6.8 4.7*   ALBUMIN 3.0* 2.0*   BILITOT 0.6 0.4   ALKPHOS 62 45   AST 16 12   ALT 11 10   ANIONGAP 9 5*     Lactic Acid:   Recent Labs   Lab 05/14/25  1159 05/14/25  1530   LACTATE 2.4* 0.5       Significant Imaging: I have reviewed all  pertinent imaging results/findings within the past 24 hours.

## 2025-05-15 NOTE — ASSESSMENT & PLAN NOTE
Creatine stable for now. BMP reviewed- noted Estimated Creatinine Clearance: 37.1 mL/min (based on SCr of 1.3 mg/dL). according to latest data. Based on current GFR, CKD stage is stage 3 - GFR 30-59.  Monitor UOP and serial BMP and adjust therapy as needed. Renally dose meds. Avoid nephrotoxic medications and procedures.

## 2025-05-15 NOTE — NURSING
Pt arrive to the unit by stretcher  accompanied by transport and ICU nurse.  Assisted pt to bed. Tele monitoring initiated.  Admit assessment initiated.  Pt is AAOx0.  NO distress noted.    [facility]  Nurses Note -- 4 Eyes      5/15/2025       Skin assessed on: Q Shift      [x] No Pressure Injuries Present    [x]Prevention Measures Documented    [] Yes LDA  for Pressure Injury Previously documented     [] Yes New Pressure Injury Discovered   [] LDA for New Pressure Injury Added      Attending RN:  Beatris Mendenhall RN     Second RN:  LAZ Allison

## 2025-05-15 NOTE — EICU
Intervention Initiated From:  [facility] / EICU    Aida intervened regarding:  Rounding (Video assessment)    Comments: Virtual ICU Quality Rounds    Admit Date: 5/14/2025  Hospital Day: 1    ICU Day: 19h    24H Vital Sign Range:  Temp:  [96.3 °F (35.7 °C)-98 °F (36.7 °C)]   Pulse:  [58-94]   Resp:  [10-51]   BP: ()/(51-77)   SpO2:  [97 %-100 %]     VICU Surveillance Screening    Daily review of  line necessity(optional): Central line(s) in place and Urinary catheter in place    Central line type (optional): Port    Central line indications : Long term central access needed/required    Paredes Indications : Critically ill in the intensive care unit requiring hourly monitoring  and CKD 3b     LDA reconciliation : Yes

## 2025-05-15 NOTE — HOSPITAL COURSE
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  He was apparently combative with staff.  He was agitated on presentation and given IV Ativan in ER.  Noted to be hypothermic on presentation.  Empirically started on broad spectrum ABX's and IVF's.  No obvious source of infection.  CT did show a 2.5 cm subcutaneous collection of air and fluid in the abdominal wall to the right of the surgical incision, concerning for hematoma or abscess.  Patient with progressive dementia, malnutrition, worsening functional status.  Abdominal CT also did showed bilateral renal masses and sclerotic lesion on thoracic spine.  Possibly underlying malignancy.  Palliative Care consulted as patient is hospice appropriate.  Patient has remained calm and cooperative over past few days.  Working on possible hospice arrangements.  Patient's children are in agreement with home hospice, and wish to avoid longterm placement if possible; they are coordinating 24/7 care for pt and cleaning out pt's home to make it safe for pt to be there (daughter shared potential hoarding, need to clear space for equipment and to sanitize living areas).  Hospice has agreed for temporary inpatient hospice care and then transition to home hospice once living once safe to go home.

## 2025-05-15 NOTE — PROGRESS NOTES
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 The patient has been increasingly altered over the course of the last several days. He is screaming that he has been kidnapped. He is excited and has been combative with staff. He speaks almost constantly , but seems to be very confused and will not answer the questions that are asked of him.duo to seveer agitating patient received IV Ativan in ER, and much calmer,but patient remains confuse,patient is hypothermic 94,was started on bear hug,on IV Abx and cultures are done,VS are stable at this time amnd labs show lactic acidosis 2.4,head CT with no acute process,started on IVF,not able do ROS,duo to 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  has been combative with staff. He speaks almost constantly , but seems to be very confused and will not answer the questions that are asked of him.duo to seveer agitating patient received IV Ativan in ER, and much calmer,but patient remains confuse,in ER patient is hypothermic 94,was started on bear hug,on IV Abx and cultures are done,VS  are stable at this time and labs show lactic acidosis 2.4,resolved with IVF,head CT with no acute process,CT abdomen,pelvic show,here is a 2.5 cm subcutaneous collection of air and fluid in the abdominal wall to the right of the surgical incision, concerning for hematoma or abscess.already on IV Zosyn,also chronic findings.  Consulted palliative,approprietly for hospice,likely worsening dementia,on 2 point soft restrain and prn IV Ativan.  Per ST on puree diet.  Hypocythemia is resolved.    Interval History: CT abdomen,pelvic show,here is a 2.5 cm subcutaneous collection of air and fluid in the abdominal wall to the right of the surgical incision, concerning for hematoma or abscess.already on IV Zosyn,also chronic findings.  Consulted palliative,approprietly for hospice,likely worsening dementia,on 2 point soft restrain and prn IV Ativan.  Per ST on puree diet.    Review of Systems,not able be don, duo to confusion.  Objective:     Vital Signs (Most Recent):  Temp: 98 °F (36.7 °C) (05/15/25 0745)  Pulse: 67 (05/15/25 0800)  Resp: 12 (05/15/25 0800)  BP: 122/62 (05/15/25 0800)  SpO2: 100 % (05/15/25 0800) Vital Signs (24h Range):  Temp:  [94.6 °F (34.8 °C)-98 °F (36.7 °C)] 98 °F (36.7 °C)  Pulse:  [58-94] 67  Resp:  [10-51] 12  SpO2:  [99 %-100 %] 100 %  BP: ()/(51-77) 122/62     Weight: 68 kg (150 lb)  Body mass index is 19.26 kg/m².    Intake/Output Summary (Last 24 hours) at 5/15/2025 1141  Last data filed at 5/15/2025 0629  Gross per 24 hour   Intake 4149.15 ml   Output 1650 ml   Net 2499.15 ml         Physical Exam  Constitutional:       Appearance: He is ill-appearing.   Cardiovascular:      Rate and Rhythm: Normal rate.               Significant Labs: All pertinent labs within the past 24 hours have been reviewed.  BMP:   Recent Labs   Lab 05/15/25  0414   GLU 74      K 3.8   *   CO2 16*   BUN 18   CREATININE 1.3   CALCIUM 8.1*   MG 1.5*     CBC:   Recent Labs   Lab 05/14/25  1159  05/15/25  0414   WBC 7.08 4.13   HGB 9.1* 7.1*   HCT 27.9* 22.8*     --      CMP:   Recent Labs   Lab 05/14/25  1159 05/15/25  0414    138   K 5.1 3.8   * 117*   CO2 19* 16*   GLU 60* 74   BUN 25* 18   CREATININE 1.5* 1.3   CALCIUM 10.1 8.1*   PROT 6.8 4.7*   ALBUMIN 3.0* 2.0*   BILITOT 0.6 0.4   ALKPHOS 62 45   AST 16 12   ALT 11 10   ANIONGAP 9 5*     Lactic Acid:   Recent Labs   Lab 05/14/25  1159 05/14/25  1530   LACTATE 2.4* 0.5       Significant Imaging: I have reviewed all pertinent imaging results/findings within the past 24 hours.      Assessment & Plan  Acute metabolic encephalopathy  head CT with no acute process,may duo to worsening dementia,hypothermia,lactic acidosis, and hypoglycemia,occult infection,on empiric IV Abx and cultures pending ,will minitor closely in ICU.  Consulted palliative,approprietly for hospice,likely worsening dementia,on 2 point soft restrain and prn IV Ativan.  Per ST on puree diet.  Hypothermia  On bear hug,will monitor whioe poppy,has normal free T 4,  Resolved.    Lactic acidosis  On IVF and  empiric IV Abx and iVF,will check CT abdomen,pelvic.    Hypoglycemia  Started on D5W with hypoglycemic protocol.  On puree diet at this time.    Carcinoma in situ of colon  Per Hx,has left colectomy .    Colostomy in place  Supportive care,    Anemia of chronic disease  Anemia is likely due to Iron deficiency. Most recent hemoglobin and hematocrit are listed below.  Recent Labs     05/14/25  1159 05/15/25  0414   HGB 9.1* 7.1*   HCT 27.9* 22.8*     Plan  - Monitor serial CBC: Daily  - Transfuse PRBC if patient becomes hemodynamically unstable, symptomatic or H/H drops below 7/21.  - Patient has not received any PRBC transfusions to date  - Patient's anemia is currently stable  -   History of colon cancer  S/P left colectomy.    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 BMP reviewed- noted Estimated Creatinine Clearance:  37.1 mL/min (based on SCr of 1.3 mg/dL). according to latest data. Based on current GFR, CKD stage is stage 3 - GFR 30-59.  Monitor UOP and serial BMP and adjust therapy as needed. Renally dose meds. Avoid nephrotoxic medications and procedures.  Anemia of chronic renal failure, stage 3b  Anemia is likely due to chronic disease due to Chronic liver disease. Most recent hemoglobin and hematocrit are listed below.  Recent Labs     05/14/25  1159 05/15/25  0414   HGB 9.1* 7.1*   HCT 27.9* 22.8*     Plan  - Monitor serial CBC: Daily  - Transfuse PRBC if patient becomes hemodynamically unstable, symptomatic or H/H drops below 7/21.  - Patient has not received any PRBC transfusions to date  - Patient's anemia is currently stable  -   S/P left colectomy  Supportive care,    Severe protein-calorie malnutrition  Nutrition consulted. Most recent weight and BMI monitored-     Measurements:  Wt Readings from Last 1 Encounters:   05/14/25 68 kg (150 lb)   Body mass index is 19.26 kg/m².    Patient has been screened and assessed by RD.    Malnutrition Type:  Context:    Level:      Malnutrition Characteristic Summary:       Interventions/Recommendations (treatment strategy):       Advance care planning  Consulted palliative,spent over 5 minutes,    VTE Risk Mitigation (From admission, onward)      None            Discharge Planning   WIL:      Code Status: Full Code   Medical Readiness for Discharge Date:   Discharge Plan A: Skilled Nursing Facility            Critical care time spent on the evaluation and treatment of severe organ dysfunction, review of pertinent labs and imaging studies, discussions with consulting providers and discussions with patient/family:  over 45  minutes.            Candelario Pedraza MD  Department of Hospital Medicine   [facility] - Intensive Care

## 2025-05-15 NOTE — PT/OT/SLP PROGRESS
Speech Language Pathology      Eugene Gamez  MRN: [id number]    B/s swallow eval attempted, however, pt w/ no acceptance of any po trials offered. ST will attempt to follow-up 5/16.    Vicenta Garcia, DASHAWN-SLP

## 2025-05-15 NOTE — NURSING
[facility]  Nurses Note -- 4 Eyes      5/14/2025       Skin assessed on: Q Shift      [x] No Pressure Injuries Present    [x]Prevention Measures Documented    [] Yes LDA  for Pressure Injury Previously documented     [] Yes New Pressure Injury Discovered   [] LDA for New Pressure Injury Added      Attending RN:  Donna Cole RN     Second RN:  MARIAMA RN

## 2025-05-15 NOTE — NURSING TRANSFER
Nursing Transfer Note      5/15/2025   4:27 PM    Nurse giving handoff: LAZ Allison   Nurse receiving handoff: LAZ Spear    Reason patient is being transferred: Yes    Transfer To: 300    Transfer via stretcher    Transfer with cardiac monitoring    Transported by Patient transport & RN    Telemetry: Box Number [id number]  Order for Tele Monitor? Yes    Additional Lines: Paredes Catheter    Medicines sent: Yes    Patient belongings transferred with patient: Yes    Chart send with patient: Yes    Upon arrival to floor: cardiac monitor applied, patient oriented to room, call bell in reach, and bed in lowest position

## 2025-05-15 NOTE — ASSESSMENT & PLAN NOTE
head CT with no acute process,may duo to worsening dementia,hypothermia,lactic acidosis, and hypoglycemia,occult infection,on empiric IV Abx and cultures pending ,will minitor closely in ICU.  Consulted palliative,approprietly for hospice,likely worsening dementia,on 2 point soft restrain and prn IV Ativan.  Per ST on puree diet.

## 2025-05-15 NOTE — PLAN OF CARE
Patient remains in the ICU  on room air. Patient is disoriented to person, place , time and situation. Patient is NPO. IVF. 5% dextrose 100 ml per hour on flow. Blood sugar checked 2 hourly. Vitals signs are stable.  Problem: Infection  Goal: Absence of Infection Signs and Symptoms  Outcome: Progressing     Problem: Adult Inpatient Plan of Care  Goal: Plan of Care Review  Outcome: Progressing  Goal: Patient-Specific Goal (Individualized)  Outcome: Progressing  Goal: Absence of Hospital-Acquired Illness or Injury  Outcome: Progressing  Goal: Optimal Comfort and Wellbeing  Outcome: Progressing  Goal: Readiness for Transition of Care  Outcome: Progressing     Problem: Coping Ineffective  Goal: Effective Coping  Outcome: Progressing     Problem: Acute Kidney Injury/Impairment  Goal: Fluid and Electrolyte Balance  Outcome: Progressing  Goal: Improved Oral Intake  Outcome: Progressing  Goal: Effective Renal Function  Outcome: Progressing     Problem: Wound  Goal: Optimal Coping  Outcome: Progressing  Goal: Optimal Functional Ability  Outcome: Progressing  Goal: Absence of Infection Signs and Symptoms  Outcome: Progressing  Goal: Improved Oral Intake  Outcome: Progressing  Goal: Optimal Pain Control and Function  Outcome: Progressing  Goal: Skin Health and Integrity  Outcome: Progressing  Goal: Optimal Wound Healing  Outcome: Progressing     Problem: Skin Injury Risk Increased  Goal: Skin Health and Integrity  Outcome: Progressing     Problem: Fall Injury Risk  Goal: Absence of Fall and Fall-Related Injury  Outcome: Progressing

## 2025-05-15 NOTE — SUBJECTIVE & OBJECTIVE
Past Medical History:   Diagnosis Date    Arthritis     Cancer     Diabetes mellitus     Elevated PSA     Gout, unspecified     Hypertension     Nuclear sclerotic cataract of both eyes 6/29/2018       Past Surgical History:   Procedure Laterality Date    CATARACT EXTRACTION      ou    EYE SURGERY      bilateral cataracts    LAPAROTOMY, EXPLORATORY N/A 4/23/2025    Procedure: LAPAROTOMY, EXPLORATORY;  Surgeon: Abran Camacho MD;  Location: [facility] OR;  Service: General;  Laterality: N/A;  lysis of adhsions    removal of small bowel  Apr. 2015    Colostomy    REPAIR, HERNIA, INCISIONAL N/A 4/23/2025    Procedure: REPAIR, HERNIA, INCISIONAL;  Surgeon: Abran Camacho MD;  Location: [facility] OR;  Service: General;  Laterality: N/A;       Review of patient's allergies indicates:   Allergen Reactions    Iodine Other (See Comments)     Causes gout to flare up    Shellfish containing products      Causes gout to flare up    Shellfish derived        Medications:  Continuous Infusions:   D5W   Intravenous Continuous 100 mL/hr at 05/15/25 1204 New Bag at 05/15/25 1204     Scheduled Meds:   mupirocin   Nasal BID    piperacillin-tazobactam (Zosyn) IV (PEDS and ADULTS) (extended infusion is not appropriate)  4.5 g Intravenous Q8H     PRN Meds:  Current Facility-Administered Medications:     lorazepam, 0.5 mg, Intravenous, Q4H PRN    Family History       Problem Relation (Age of Onset)    Diabetes Mother    Hypertension Mother    No Known Problems Father, Sister, Brother, Maternal Aunt, Maternal Uncle, Paternal Aunt, Paternal Uncle, Maternal Grandmother, Maternal Grandfather, Paternal Grandmother, Paternal Grandfather          Tobacco Use    Smoking status: Never     Passive exposure: Never    Smokeless tobacco: Never    Tobacco comments:     smoked short while as a teen   Substance and Sexual Activity    Alcohol use: No    Drug use: No    Sexual activity: Not Currently     Partners: Female       Review of Systems   Unable to perform  ROS: Mental status change     Objective:     Vital Signs (Most Recent):  Temp: 98 °F (36.7 °C) (05/15/25 0745)  Pulse: 66 (05/15/25 1101)  Resp: 15 (05/15/25 1000)  BP: (!) 87/54 (05/15/25 1000)  SpO2: 97 % (05/15/25 1000) Vital Signs (24h Range):  Temp:  [96.3 °F (35.7 °C)-98 °F (36.7 °C)] 98 °F (36.7 °C)  Pulse:  [58-94] 66  Resp:  [10-51] 15  SpO2:  [97 %-100 %] 97 %  BP: ()/(51-77) 87/54     Weight: 68 kg (150 lb)  Body mass index is 19.26 kg/m².       Physical Exam  Vitals and nursing note reviewed.   Constitutional:       General: He is awake.      Appearance: He is cachectic. He is ill-appearing.      Comments: Elderly, frail; currently in wrist restraints    Eyes:      Comments: Glasses not present in room or on pt   Pulmonary:      Effort: Pulmonary effort is normal. No respiratory distress.   Neurological:      Mental Status: He is alert. He is disoriented.         Advance Care Planning   Advance Directives:   Living Will: Yes        Copy on chart: Yes    LaPOST: No    Do Not Resuscitate Status: No    Medical Power of : Yes (2015 MPOA on file but parties listed are no longer able to act in that capacity; in prior admission pt identified daughter Nicol as who should be contacted; pt has 7 children total who collectively share legal surrogate decision making)    Goals of Care: What is most important right now is to focus on remaining as independent as possible, improvement in condition but with limits to invasive therapies. Accordingly, we have decided that the best plan to meet the patient's goals includes continuing with treatment.         Significant Labs: All pertinent labs within the past 24 hours have been reviewed.  CBC:   Recent Labs   Lab 05/14/25  1159 05/15/25  0414   WBC 7.08 4.13   HGB 9.1* 7.1*   HCT 27.9* 22.8*   MCV 90 90     --      BMP:  Recent Labs   Lab 05/15/25  0414   GLU 74      K 3.8   *   CO2 16*   BUN 18   CREATININE 1.3   CALCIUM 8.1*   MG 1.5*      LFT:  Lab Results   Component Value Date    AST 12 05/15/2025    ALKPHOS 45 05/15/2025    BILITOT 0.4 05/15/2025     Albumin:   Albumin   Date Value Ref Range Status   05/15/2025 2.0 (L) 3.5 - 5.2 g/dL Final   04/11/2025 4.2 3.4 - 5.0 g/dL Final   03/20/2025 4.2 3.5 - 5.2 g/dL Final     Protein:   Protein Total   Date Value Ref Range Status   05/15/2025 4.7 (L) 6.0 - 8.4 gm/dL Final     Total Protein   Date Value Ref Range Status   03/20/2025 7.8 6.0 - 8.4 g/dL Final     Lactic acid:   Lab Results   Component Value Date    LACTATE 0.5 05/14/2025    LACTATE 2.4 (H) 05/14/2025       Significant Imaging: I have reviewed all pertinent imaging results/findings within the past 24 hours.

## 2025-05-16 LAB
ABSOLUTE EOSINOPHIL (OHS): 0.07 K/UL
ABSOLUTE MONOCYTE (OHS): 0.46 K/UL (ref 0.3–1)
ABSOLUTE NEUTROPHIL COUNT (OHS): 2.96 K/UL (ref 1.8–7.7)
ALBUMIN SERPL BCP-MCNC: 2 G/DL (ref 3.5–5.2)
ALP SERPL-CCNC: 46 UNIT/L (ref 40–150)
ALT SERPL W/O P-5'-P-CCNC: 11 UNIT/L (ref 10–44)
ANION GAP (OHS): 7 MMOL/L (ref 8–16)
AST SERPL-CCNC: 13 UNIT/L (ref 11–45)
BACTERIA UR CULT: NO GROWTH
BASOPHILS # BLD AUTO: 0.01 K/UL
BASOPHILS NFR BLD AUTO: 0.2 %
BILIRUB SERPL-MCNC: 0.3 MG/DL (ref 0.1–1)
BUN SERPL-MCNC: 15 MG/DL (ref 8–23)
CALCIUM SERPL-MCNC: 8.2 MG/DL (ref 8.7–10.5)
CHLORIDE SERPL-SCNC: 114 MMOL/L (ref 95–110)
CO2 SERPL-SCNC: 16 MMOL/L (ref 23–29)
CREAT SERPL-MCNC: 1.4 MG/DL (ref 0.5–1.4)
ERYTHROCYTE [DISTWIDTH] IN BLOOD BY AUTOMATED COUNT: 15 % (ref 11.5–14.5)
GFR SERPLBLD CREATININE-BSD FMLA CKD-EPI: 48 ML/MIN/1.73/M2
GLUCOSE SERPL-MCNC: 94 MG/DL (ref 70–110)
HCT VFR BLD AUTO: 23.3 % (ref 40–54)
HGB BLD-MCNC: 7.5 GM/DL (ref 14–18)
IMM GRANULOCYTES # BLD AUTO: 0.04 K/UL (ref 0–0.04)
IMM GRANULOCYTES NFR BLD AUTO: 0.9 % (ref 0–0.5)
LYMPHOCYTES # BLD AUTO: 0.76 K/UL (ref 1–4.8)
MAGNESIUM SERPL-MCNC: 1.8 MG/DL (ref 1.6–2.6)
MCH RBC QN AUTO: 28.2 PG (ref 27–31)
MCHC RBC AUTO-ENTMCNC: 32.2 G/DL (ref 32–36)
MCV RBC AUTO: 88 FL (ref 82–98)
NUCLEATED RBC (/100WBC) (OHS): 0 /100 WBC
PLATELET # BLD AUTO: ABNORMAL 10*3/UL
PLATELET BLD QL SMEAR: ABNORMAL
PMV BLD AUTO: ABNORMAL FL
POCT GLUCOSE: 103 MG/DL (ref 70–110)
POCT GLUCOSE: 104 MG/DL (ref 70–110)
POCT GLUCOSE: 113 MG/DL (ref 70–110)
POCT GLUCOSE: 121 MG/DL (ref 70–110)
POCT GLUCOSE: 122 MG/DL (ref 70–110)
POCT GLUCOSE: 136 MG/DL (ref 70–110)
POCT GLUCOSE: 95 MG/DL (ref 70–110)
POTASSIUM SERPL-SCNC: 4.3 MMOL/L (ref 3.5–5.1)
PROT SERPL-MCNC: 4.9 GM/DL (ref 6–8.4)
RBC # BLD AUTO: 2.66 M/UL (ref 4.6–6.2)
RELATIVE EOSINOPHIL (OHS): 1.6 %
RELATIVE LYMPHOCYTE (OHS): 17.7 % (ref 18–48)
RELATIVE MONOCYTE (OHS): 10.7 % (ref 4–15)
RELATIVE NEUTROPHIL (OHS): 68.9 % (ref 38–73)
SODIUM SERPL-SCNC: 137 MMOL/L (ref 136–145)
WBC # BLD AUTO: 4.3 K/UL (ref 3.9–12.7)

## 2025-05-16 PROCEDURE — 83735 ASSAY OF MAGNESIUM: CPT | Performed by: HOSPITALIST

## 2025-05-16 PROCEDURE — 99233 SBSQ HOSP IP/OBS HIGH 50: CPT | Mod: 25,,, | Performed by: REGISTERED NURSE

## 2025-05-16 PROCEDURE — 85025 COMPLETE CBC W/AUTO DIFF WBC: CPT | Performed by: HOSPITALIST

## 2025-05-16 PROCEDURE — 11000001 HC ACUTE MED/SURG PRIVATE ROOM

## 2025-05-16 PROCEDURE — 36415 COLL VENOUS BLD VENIPUNCTURE: CPT | Performed by: HOSPITALIST

## 2025-05-16 PROCEDURE — 25000003 PHARM REV CODE 250: Performed by: HOSPITALIST

## 2025-05-16 PROCEDURE — 80053 COMPREHEN METABOLIC PANEL: CPT | Performed by: HOSPITALIST

## 2025-05-16 PROCEDURE — 92610 EVALUATE SWALLOWING FUNCTION: CPT

## 2025-05-16 PROCEDURE — 63600175 PHARM REV CODE 636 W HCPCS: Performed by: HOSPITALIST

## 2025-05-16 PROCEDURE — 99497 ADVNCD CARE PLAN 30 MIN: CPT | Mod: 25,,, | Performed by: REGISTERED NURSE

## 2025-05-16 RX ADMIN — PIPERACILLIN SODIUM AND TAZOBACTAM SODIUM 4.5 G: 4; .5 INJECTION, POWDER, FOR SOLUTION INTRAVENOUS at 05:05

## 2025-05-16 RX ADMIN — LORAZEPAM 0.5 MG: 2 INJECTION INTRAMUSCULAR; INTRAVENOUS at 01:05

## 2025-05-16 RX ADMIN — PIPERACILLIN SODIUM AND TAZOBACTAM SODIUM 4.5 G: 4; .5 INJECTION, POWDER, FOR SOLUTION INTRAVENOUS at 08:05

## 2025-05-16 RX ADMIN — DEXTROSE MONOHYDRATE: 50 INJECTION, SOLUTION INTRAVENOUS at 10:05

## 2025-05-16 RX ADMIN — DEXTROSE MONOHYDRATE 100 ML: 50 INJECTION, SOLUTION INTRAVENOUS at 08:05

## 2025-05-16 RX ADMIN — MUPIROCIN: 20 OINTMENT TOPICAL at 09:05

## 2025-05-16 RX ADMIN — MUPIROCIN 1 G: 20 OINTMENT TOPICAL at 08:05

## 2025-05-16 RX ADMIN — PIPERACILLIN SODIUM AND TAZOBACTAM SODIUM 4.5 G: 4; .5 INJECTION, POWDER, FOR SOLUTION INTRAVENOUS at 01:05

## 2025-05-16 NOTE — PLAN OF CARE
142/pasrr received and available in [employer] online portal. Uploaded to patient's chart in [facility].

## 2025-05-16 NOTE — PROGRESS NOTES
[facility]  Palliative Medicine  Progress Note    Patient Name: Eugene Gamez  MRN: [id number]  Admission Date: 5/14/2025  Hospital Length of Stay: 2 days  Code Status: Full Code   Attending Provider: Darion Mace MD  Consulting Provider: Estella Argueta NP  Primary Care Physician: Larry Monae MD  Principal Problem:Acute metabolic encephalopathy    Patient information was obtained from patient, relative(s), and primary team.      Assessment/Plan:   Palliative Care  Advance Care Planning   5/16/2025  - interval chart reviewed; pt discussed with HM   - met with pt and daughter, Nicol, at bedside  - pt remains without capacity for medical decision making; improved cooperativeness and is no longer requiring restraints, but is still confused and anxious (prior to coordinating dose of IV ativan with bedside nurse)   - daughter Nicol continues to have good overall insight into pt's condition, consistent with prior admission   - overall GOC is to optimize pt's mental status and treating reversible conditions  - good insight that continuing with the few days remaining of SNF will likely not produce significant changes in pt's physical abilities and likely only worsen his mental abilities   - she is hopeful to be able to work with pt's total of 7 children to be able to organize a plan for 24/7 care for pt when she is at work for pt to resume living with her; if not able to do so will have to explore senior living options; her preference would be for [place] as she was happy with care there with SNF   - reviewed hospice and philosophy of care; she is familiar with hospice care as her mother (pts wife) and her  were both cared for by hospice at the end of their lives and Nicol was their primary caregivers as well   - Nicol is agreeable to Hospice either at home or senior living NH following optimization; preference for [facility] as they cared for other family members on hospice; I supported this  preference as the respite option would be beneficial to Ms. Camarena given pt's current requirements for 24/7 care   - in depth discussion overall regarding potential baseline dementia that has had rapid progression and periods of increased delirium with hospitalizations, surgery, SNF/changes in location; daughter with good insight   - Nicol's wishes and GOC for pt align with DNR/DNI, however she shares that some of her siblings would be very against that and will require further discussion so  pt will remain full code  for now   - Nicol plans to coordinate with her siblings/pt's children to determine if plan will be for home or [facility] placement; shared that I would notify HM and CM of discussion/GOC for coordination   - emotional support provided; answered all questions   - updated HM in person and CM via secure chat    5/15/2025 - Consult   - consult received; interval chart reviewed in detail; discussed pt MDT during ICU rounds   - pt known to myself and palliative medicine team from previous admission; shared information and techniques that were helpful in prior admission, especially related to pt's visual and hearing impairments   - pt has 7 children who share legal surrogate decision making; in prior admission in moments of better mental status pt directed team to discuss his needs with daughter, Nicol; pt lives with his daughter Nicol (who is a PCT in psych dept at [employer]) and one of his sons   - met with patient at bedside; introduction to palliative medicine team and role in current care and admission; pt with AMS and does not have capacity for GOC/ACP   - called pt's daughter, Nicol, who was aware of admission and shared her concerns regarding pt's continued decline despite SNF care; pt has been able to walk up to 6ft but overall has not made the progress that they were hoping for to get near prior physical abilities   - before prior admission pt was ambulatory and able to provide most of own ADLs, required  "some assistance or set up at times; he had recently stopped driving, but overall was active and had good QOL   - reviewed trajectory given age, comorbidities, surgery, hospital admission and additional changes in location to SNF for pt   - daughter plans to visit tomorrow when not working for further [facility] discussion   - emotional support provided   - Allowed time for questions/concerns; all addressed; expressed availability of myself/palliative team for additional questions/concerns   - updated MDT     Oncology  History of colon cancer  - history noted    Carcinoma in situ of colon  - noted     Endocrine  Severe protein-calorie malnutrition  - prior to 4/18/25 admission pt had >13% body weight loss 6 months prior and >8% body weight loss  - following hospitalization and transfer to SNF daughter is concerned for continued weight loss and poor intake   - albumin 2; pt cachectic and frail, mental status now complicating PO intake further     GI  S/P left colectomy  - history noted; currently with colostomy     I will follow-up with patient. Please contact us if you have any additional questions.    Subjective:     Chief Complaint:   Chief Complaint   Patient presents with    Altered Mental Status     Pt arrived via ems, pt chief complaint is Altered mental status. Pt is screaming that he is being kidnapped. Per nursing home has been increasingly alerted over last few days but today has been very combative.        HPI:   From H&P: "This 89-year-old black male with PMH of dementia,Hx of colostomy for presumed perforation of intestine,HX of colon cancer with left colectomy,COPD,severe malnutrition,NH resident,malnutrition,.presents emergency room being transported from his nursing home by EMS because of altered mental status. The patient has been increasingly altered over the course of the last several days. He is screaming that he has been kidnapped. He is excited and has been combative with staff. He speaks almost " "constantly , but seems to be very confused and will not answer the questions that are asked of him.duo to debbie agitating patient received IV Ativan in ER, and much calmer,but patient remains confuse,patient is hypothermic 94,was started on bear hug,on IV Abx and cultures are done,VS are stable at this time amnd labs show lactic acidosis 2.4,head CT with no acute process,started on IVF,not able do ROS,duo to AMS, "    Palliative medicine consulted for goals of care discussion and advance care planning; for details of visit, see advance care planning section of plan.       Hospital Course:  No notes on file    Medications:  Continuous Infusions:   D5W   Intravenous Continuous 100 mL/hr at 05/16/25 1013 New Bag at 05/16/25 1013     Scheduled Meds:   mupirocin   Nasal BID    piperacillin-tazobactam (Zosyn) IV (PEDS and ADULTS) (extended infusion is not appropriate)  4.5 g Intravenous Q8H     PRN Meds:  Current Facility-Administered Medications:     lorazepam, 0.5 mg, Intravenous, Q4H PRN    Objective:     Vital Signs (Most Recent):  Temp: 97.4 °F (36.3 °C) (05/16/25 1153)  Pulse: (!) 56 (05/16/25 1536)  Resp: 18 (05/16/25 1153)  BP: (!) 141/74 (05/16/25 1153)  SpO2: 100 % (05/16/25 1153) Vital Signs (24h Range):  Temp:  [97.4 °F (36.3 °C)-97.7 °F (36.5 °C)] 97.4 °F (36.3 °C)  Pulse:  [56-80] 56  Resp:  [18-20] 18  SpO2:  [97 %-100 %] 100 %  BP: (131-153)/(67-79) 141/74     Weight: 68 kg (150 lb)  Body mass index is 19.26 kg/m².       Physical Exam  Vitals and nursing note reviewed.   Constitutional:       General: He is awake.      Appearance: He is cachectic. He is ill-appearing.      Comments: Elderly, frail   Eyes:      Comments: Glasses not present in room or on pt   Pulmonary:      Effort: Pulmonary effort is normal. No respiratory distress.   Neurological:      Mental Status: He is alert. He is disoriented.      Comments: Responds to his name, and able to identify daughter    Psychiatric:         Behavior: " Behavior is cooperative.       Advance Care Planning  Advance Directives:   Living Will: Yes        Copy on chart: Yes    LaPOST: No    Do Not Resuscitate Status: No    Medical Power of : Yes    Goals of Care: What is most important right now is to focus on remaining as independent as possible, improvement in condition but with limits to invasive therapies. Accordingly, we have decided that the best plan to meet the patient's goals includes continuing with treatment.         Significant Labs: All pertinent labs within the past 24 hours have been reviewed.  CBC:   Recent Labs   Lab 05/14/25  1159 05/15/25  0414 05/16/25  0504   WBC 7.08   < > 4.30   HGB 9.1*   < > 7.5*   HCT 27.9*   < > 23.3*   MCV 90   < > 88     --   --     < > = values in this interval not displayed.     BMP:  Recent Labs   Lab 05/16/25  0504   GLU 94      K 4.3   *   CO2 16*   BUN 15   CREATININE 1.4   CALCIUM 8.2*   MG 1.8     LFT:  Lab Results   Component Value Date    AST 13 05/16/2025    ALKPHOS 46 05/16/2025    BILITOT 0.3 05/16/2025     Albumin:   Albumin   Date Value Ref Range Status   05/16/2025 2.0 (L) 3.5 - 5.2 g/dL Final   04/11/2025 4.2 3.4 - 5.0 g/dL Final   03/20/2025 4.2 3.5 - 5.2 g/dL Final     Protein:   Protein Total   Date Value Ref Range Status   05/16/2025 4.9 (L) 6.0 - 8.4 gm/dL Final     Total Protein   Date Value Ref Range Status   03/20/2025 7.8 6.0 - 8.4 g/dL Final     Lactic acid:   Lab Results   Component Value Date    LACTATE 0.5 05/14/2025    LACTATE 2.4 (H) 05/14/2025       Significant Imaging: I have reviewed all pertinent imaging results/findings within the past 24 hours.    Total visit time: 65 minutes    35 min visit time including: face to face time in discussion of symptom assessment, and exploring options and burdens of offered treatments.  This also includes non-face to face time preparing to see the patient including chart review, obtaining and/or reviewing separately obtained  history, documenting clinical information in the electronic or other health record, independently interpreting results and communicating results to the patient/family/caregiver, family discussions by phone if not able to be present, coordination of care with other specialists, and discharge planning.     30 min ACP time spent: goals of care, advanced care planning, emotional support, formulating and communicating prognosis, exploring burden/ benefit of various approaches of treatment.     Estella Argueta NP  Palliative Medicine  [facility]

## 2025-05-16 NOTE — NURSING
Pt lying in bed, pleasantly confuse talking to staff. Noted right chest port-a-cath present; (not accessed), colostomy to LLQ and indwelling carbajal in place with drainage bag to gravity. Turned and repositioned pt with pillows. Bilateral soft wrist restraints present, circulation and skin assessment done. Off loading heel boots and SCDs applied. Call light in reach, bed alarm set, instructed pt to call for assistance.     [facility], [facility]  Nurses Note -- 4 Eyes      5/15/2025       Skin assessed on: Q Shift      [x] No Pressure Injuries Present    [x]Prevention Measures Documented    Noted skin tear to coccyx    [] Yes LDA  for Pressure Injury Previously documented     [] Yes New Pressure Injury Discovered   [] LDA for New Pressure Injury Added      Attending RN:  Georgie Khan RN     Second RN:  LAZ Spear

## 2025-05-16 NOTE — NURSING
PER handoff received from LAZ Jacques. Pt resting in bed quietly. NAD noted. No c/o pain. Fall and safety precautions maintained. Bed alarm activated and audible. Bed locked in lowest position, with side rails up x3. Call bell and personal items within reach.    [facility], [facility]  Nurses Note -- 4 Eyes      5/16/2025       Skin assessed on: Q Shift      [x] No Pressure Injuries Present    [x]Prevention Measures Documented    [] Yes LDA  for Pressure Injury Previously documented     [] Yes New Pressure Injury Discovered   [] LDA for New Pressure Injury Added      Attending RN:  Beatris Mendenhall RN     Second RN:  LAZ Jacques

## 2025-05-16 NOTE — CONSULTS
[place] - Telemetry  Palliative Medicine  Consult Note    Patient Name: Eugene Gamez  MRN: [id number]  Admission Date: 5/14/2025  Hospital Length of Stay: 2 days  Code Status: Full Code   Attending Provider: Darion Mace MD  Consulting Provider: Estella Argueta NP  Primary Care Physician: Larry Monae MD  Principal Problem:Acute metabolic encephalopathy    Patient information was obtained from patient, relative(s), past medical records, ER records, and primary team.      Consults  Assessment/Plan:   Palliative Care  Advance Care Planning   5/15/2025 - Consult   - consult received; interval chart reviewed in detail; discussed pt MDT during ICU rounds   - pt known to myself and palliative medicine team from previous admission; shared information and techniques that were helpful in prior admission, especially related to pt's visual and hearing impairments   - pt has 7 children who share legal surrogate decision making; in prior admission in moments of better mental status pt directed team to discuss his needs with daughter, Nicol; pt lives with his daughter Nicol (who is a PCT in psych dept at [facility]) and one of his sons   - met with patient at bedside; introduction to palliative medicine team and role in current care and admission; pt with AMS and does not have capacity for GOC/ACP   - called pt's daughter, Nicol, who was aware of admission and shared her concerns regarding pt's continued decline despite SNF care; pt has been able to walk up to 6ft but overall has not made the progress that they were hoping for to get near prior physical abilities   - before prior admission pt was ambulatory and able to provide most of own ADLs, required some assistance or set up at times; he had recently stopped driving, but overall was active and had good QOL   - reviewed trajectory given age, comorbidities, surgery, hospital admission and additional changes in location to SNF for pt   - daughter plans to visit  "tomorrow when not working for further [facility] discussion   - emotional support provided   - Allowed time for questions/concerns; all addressed; expressed availability of myself/palliative team for additional questions/concerns   - updated MDT     Neuro  * Acute metabolic encephalopathy  - pt presented from [facility] due to AMS which as worsened with admission progressing to delirium, combativeness, and paranoia; pt with hisotry of similar delirium in prior admissions   - pt requires glasses, which are not with pt at this time (confirmed to still be at [facility] in his room) and is extremely hearing impaired which complicates mental status   - pt requires speaking in very high volumes directly into his left ear to hear   - Ativan IV PRN has been effective this admission; pt currently in restraints but plan is for removal once safe to do so, pt will likely require sitter or telesitter     Oncology  History of colon cancer  - history noted    Endocrine  Severe protein-calorie malnutrition  - prior to 4/18/25 admission pt had >13% body weight loss 6 months prior and >8% body weight loss  - following hospitalization and transfer to SNF daughter is concerned for continued weight loss and poor intake   - albumin 2; pt cachectic and frail, mental status now complicating PO intake further     GI  S/P left colectomy  - history noted     Thank you for your consult. I will follow-up with patient. Please contact us if you have any additional questions.    Subjective:     HPI:   From H&P: "This 89-year-old black male with PMH of dementia,Hx of colostomy for presumed perforation of intestine,HX of colon cancer with left colectomy,COPD,severe malnutrition,NH resident,malnutrition,.presents emergency room being transported from his nursing home by EMS because of altered mental status. The patient has been increasingly altered over the course of the last several days. He is screaming that he has been kidnapped. He is excited and " "has been combative with staff. He speaks almost constantly , but seems to be very confused and will not answer the questions that are asked of him.duo to debbie agitating patient received IV Ativan in ER, and much calmer,but patient remains confuse,patient is hypothermic 94,was started on bear hug,on IV Abx and cultures are done,VS are stable at this time amnd labs show lactic acidosis 2.4,head CT with no acute process,started on IVF,not able do ROS,duo to AMS, "    Palliative medicine consulted for goals of care discussion and advance care planning; for details of visit, see advance care planning section of plan.       Hospital Course:  No notes on file    Past Medical History:   Diagnosis Date    Arthritis     Cancer     Diabetes mellitus     Elevated PSA     Gout, unspecified     Hypertension     Nuclear sclerotic cataract of both eyes 6/29/2018       Past Surgical History:   Procedure Laterality Date    CATARACT EXTRACTION      ou    EYE SURGERY      bilateral cataracts    LAPAROTOMY, EXPLORATORY N/A 4/23/2025    Procedure: LAPAROTOMY, EXPLORATORY;  Surgeon: Abran Camacho MD;  Location: [facility] OR;  Service: General;  Laterality: N/A;  lysis of adhsions    removal of small bowel  Apr. 2015    Colostomy    REPAIR, HERNIA, INCISIONAL N/A 4/23/2025    Procedure: REPAIR, HERNIA, INCISIONAL;  Surgeon: Abran Camacho MD;  Location: [facility] OR;  Service: General;  Laterality: N/A;       Review of patient's allergies indicates:   Allergen Reactions    Iodine Other (See Comments)     Causes gout to flare up    Shellfish containing products      Causes gout to flare up    Shellfish derived        Medications:  Continuous Infusions:   D5W   Intravenous Continuous 100 mL/hr at 05/15/25 1204 New Bag at 05/15/25 1204     Scheduled Meds:   mupirocin   Nasal BID    piperacillin-tazobactam (Zosyn) IV (PEDS and ADULTS) (extended infusion is not appropriate)  4.5 g Intravenous Q8H     PRN Meds:  Current Facility-Administered " Medications:     lorazepam, 0.5 mg, Intravenous, Q4H PRN    Family History       Problem Relation (Age of Onset)    Diabetes Mother    Hypertension Mother    No Known Problems Father, Sister, Brother, Maternal Aunt, Maternal Uncle, Paternal Aunt, Paternal Uncle, Maternal Grandmother, Maternal Grandfather, Paternal Grandmother, Paternal Grandfather          Tobacco Use    Smoking status: Never     Passive exposure: Never    Smokeless tobacco: Never    Tobacco comments:     smoked short while as a teen   Substance and Sexual Activity    Alcohol use: No    Drug use: No    Sexual activity: Not Currently     Partners: Female       Review of Systems   Unable to perform ROS: Mental status change     Objective:     Vital Signs (Most Recent):  Temp: 98 °F (36.7 °C) (05/15/25 0745)  Pulse: 66 (05/15/25 1101)  Resp: 15 (05/15/25 1000)  BP: (!) 87/54 (05/15/25 1000)  SpO2: 97 % (05/15/25 1000) Vital Signs (24h Range):  Temp:  [96.3 °F (35.7 °C)-98 °F (36.7 °C)] 98 °F (36.7 °C)  Pulse:  [58-94] 66  Resp:  [10-51] 15  SpO2:  [97 %-100 %] 97 %  BP: ()/(51-77) 87/54     Weight: 68 kg (150 lb)  Body mass index is 19.26 kg/m².       Physical Exam  Vitals and nursing note reviewed.   Constitutional:       General: He is awake.      Appearance: He is cachectic. He is ill-appearing.      Comments: Elderly, frail; currently in wrist restraints    Eyes:      Comments: Glasses not present in room or on pt   Pulmonary:      Effort: Pulmonary effort is normal. No respiratory distress.   Neurological:      Mental Status: He is alert. He is disoriented.         Advance Care Planning   Advance Directives:   Living Will: Yes        Copy on chart: Yes    LaPOST: No    Do Not Resuscitate Status: No    Medical Power of : Yes (2015 MPOA on file but parties listed are no longer able to act in that capacity; in prior admission pt identified daughter Nicol as who should be contacted; pt has 7 children total who collectively share legal  surrogate decision making)    Goals of Care: What is most important right now is to focus on remaining as independent as possible, improvement in condition but with limits to invasive therapies. Accordingly, we have decided that the best plan to meet the patient's goals includes continuing with treatment.         Significant Labs: All pertinent labs within the past 24 hours have been reviewed.  CBC:   Recent Labs   Lab 05/14/25  1159 05/15/25  0414   WBC 7.08 4.13   HGB 9.1* 7.1*   HCT 27.9* 22.8*   MCV 90 90     --      BMP:  Recent Labs   Lab 05/15/25  0414   GLU 74      K 3.8   *   CO2 16*   BUN 18   CREATININE 1.3   CALCIUM 8.1*   MG 1.5*     LFT:  Lab Results   Component Value Date    AST 12 05/15/2025    ALKPHOS 45 05/15/2025    BILITOT 0.4 05/15/2025     Albumin:   Albumin   Date Value Ref Range Status   05/15/2025 2.0 (L) 3.5 - 5.2 g/dL Final   04/11/2025 4.2 3.4 - 5.0 g/dL Final   03/20/2025 4.2 3.5 - 5.2 g/dL Final     Protein:   Protein Total   Date Value Ref Range Status   05/15/2025 4.7 (L) 6.0 - 8.4 gm/dL Final     Total Protein   Date Value Ref Range Status   03/20/2025 7.8 6.0 - 8.4 g/dL Final     Lactic acid:   Lab Results   Component Value Date    LACTATE 0.5 05/14/2025    LACTATE 2.4 (H) 05/14/2025       Significant Imaging: I have reviewed all pertinent imaging results/findings within the past 24 hours.      I spent a total of 70 minutes on the day of the visit. This includes face to face time in discussion of goals of care, symptom assessment, coordination of care and emotional support.  This also includes non-face to face time preparing to see the patient (eg, review of tests/imaging), obtaining and/or reviewing separately obtained history, documenting clinical information in the electronic or other health record, independently interpreting results and communicating results to the patient/family/caregiver, or care coordinator.    Estella Argueta NP  Palliative  Medicine  [facility] [place] [facility]

## 2025-05-16 NOTE — PLAN OF CARE
Problem: Infection  Goal: Absence of Infection Signs and Symptoms  Outcome: Progressing     Problem: Adult Inpatient Plan of Care  Goal: Plan of Care Review  Outcome: Progressing  Goal: Optimal Comfort and Wellbeing  Outcome: Progressing     Problem: Coping Ineffective  Goal: Effective Coping  Outcome: Progressing     Problem: Acute Kidney Injury/Impairment  Goal: Effective Renal Function  Outcome: Progressing     Problem: Wound  Goal: Optimal Functional Ability  Outcome: Progressing  Goal: Optimal Pain Control and Function  Outcome: Progressing  Goal: Optimal Wound Healing  Outcome: Progressing     Problem: Skin Injury Risk Increased  Goal: Skin Health and Integrity  Outcome: Progressing     Problem: Fall Injury Risk  Goal: Absence of Fall and Fall-Related Injury  Outcome: Progressing     Problem: Confusion Acute  Goal: Optimal Cognitive Function  Outcome: Progressing

## 2025-05-16 NOTE — NURSING
Pt tugging at carbajal, removed carbajal as ordered. Placed external condom catheter with drainage bag to gravity.

## 2025-05-16 NOTE — NURSING
Family request to leave carbajal in until tomorrow AM so that when family is here can help direct him to use urinal. Spoke with daughter, Nicol

## 2025-05-16 NOTE — PLAN OF CARE
B/s swallow eval completed. Pt w/ mild-mod oral dysphagia, negatively impacted by current mentation. ST recs cont current pureed diet w/ thin liquids. Meds crushed in puree. Pt requires 1:1 A for all intake. No further ST is required at this time, as the pt is consuming the safest and least restrictive diet.

## 2025-05-16 NOTE — PLAN OF CARE
Problem: Infection  Goal: Absence of Infection Signs and Symptoms  Outcome: Progressing     Problem: Adult Inpatient Plan of Care  Goal: Plan of Care Review  Outcome: Progressing  Goal: Patient-Specific Goal (Individualized)  Outcome: Progressing  Goal: Absence of Hospital-Acquired Illness or Injury  Outcome: Progressing  Goal: Optimal Comfort and Wellbeing  Outcome: Progressing  Goal: Readiness for Transition of Care  Outcome: Progressing     Problem: Coping Ineffective  Goal: Effective Coping  Outcome: Progressing     Problem: Acute Kidney Injury/Impairment  Goal: Fluid and Electrolyte Balance  Outcome: Progressing  Goal: Improved Oral Intake  Outcome: Progressing  Goal: Effective Renal Function  Outcome: Progressing     Problem: Wound  Goal: Optimal Coping  Outcome: Progressing  Goal: Optimal Functional Ability  Outcome: Progressing  Goal: Absence of Infection Signs and Symptoms  Outcome: Progressing  Goal: Improved Oral Intake  Outcome: Progressing  Goal: Optimal Pain Control and Function  Outcome: Progressing  Goal: Skin Health and Integrity  Outcome: Progressing  Goal: Optimal Wound Healing  Outcome: Progressing     Problem: Skin Injury Risk Increased  Goal: Skin Health and Integrity  Outcome: Progressing     Problem: Fall Injury Risk  Goal: Absence of Fall and Fall-Related Injury  Outcome: Progressing     Problem: Restraint, Nonviolent  Goal: Absence of Harm or Injury  Outcome: Progressing     Problem: Confusion Acute  Goal: Optimal Cognitive Function  Outcome: Progressing

## 2025-05-16 NOTE — HPI
"From H&P: "This 89-year-old black male with PMH of dementia,Hx of colostomy for presumed perforation of intestine,HX of colon cancer with left colectomy,COPD,severe malnutrition,NH resident,malnutrition,.presents emergency room being transported from his nursing home by EMS because of altered mental status. The patient has been increasingly altered over the course of the last several days. He is screaming that he has been kidnapped. He is excited and has been combative with staff. He speaks almost constantly , but seems to be very confused and will not answer the questions that are asked of him.duo to debbie agitating patient received IV Ativan in ER, and much calmer,but patient remains confuse,patient is hypothermic 94,was started on bear hug,on IV Abx and cultures are done,VS are stable at this time amnd labs show lactic acidosis 2.4,head CT with no acute process,started on IVF,not able do ROS,duo to AMS, "    Palliative medicine consulted for goals of care discussion and advance care planning; for details of visit, see advance care planning section of plan.     "

## 2025-05-16 NOTE — SUBJECTIVE & OBJECTIVE
Interval History: feeling well this morning.    Review of Systems   HENT:  Negative for ear discharge and ear pain.    Eyes:  Negative for discharge and itching.   Endocrine: Negative for cold intolerance and heat intolerance.   Neurological:  Negative for seizures and syncope.     Objective:     Vital Signs (Most Recent):  Temp: 97.4 °F (36.3 °C) (05/16/25 1153)  Pulse: (!) 56 (05/16/25 1536)  Resp: 18 (05/16/25 1153)  BP: (!) 141/74 (05/16/25 1153)  SpO2: 100 % (05/16/25 1153) Vital Signs (24h Range):  Temp:  [97.4 °F (36.3 °C)-97.7 °F (36.5 °C)] 97.4 °F (36.3 °C)  Pulse:  [56-80] 56  Resp:  [18-20] 18  SpO2:  [97 %-100 %] 100 %  BP: (131-153)/(67-79) 141/74     Weight: 68 kg (150 lb)  Body mass index is 19.26 kg/m².    Intake/Output Summary (Last 24 hours) at 5/16/2025 1558  Last data filed at 5/16/2025 0858  Gross per 24 hour   Intake 120 ml   Output 1950 ml   Net -1830 ml         Physical Exam  Vitals and nursing note reviewed.   Constitutional:       General: He is awake.      Appearance: He is cachectic. He is ill-appearing.      Comments: Elderly, frail; currently in wrist restraints    HENT:      Mouth/Throat:      Mouth: Mucous membranes are dry.      Pharynx: No oropharyngeal exudate or posterior oropharyngeal erythema.   Pulmonary:      Effort: Pulmonary effort is normal. No respiratory distress.   Skin:     General: Skin is warm and dry.   Neurological:      Mental Status: He is alert. He is disoriented.               Significant Labs: All pertinent labs within the past 24 hours have been reviewed.  BMP:   Recent Labs   Lab 05/16/25  0504   GLU 94      K 4.3   *   CO2 16*   BUN 15   CREATININE 1.4   CALCIUM 8.2*   MG 1.8     CBC:   Recent Labs   Lab 05/15/25  0414 05/16/25  0504   WBC 4.13 4.30   HGB 7.1* 7.5*   HCT 22.8* 23.3*       Significant Imaging: I have reviewed all pertinent imaging results/findings within the past 24 hours.

## 2025-05-16 NOTE — ASSESSMENT & PLAN NOTE
- prior to 4/18/25 admission pt had >13% body weight loss 6 months prior and >8% body weight loss  - following hospitalization and transfer to SNF daughter is concerned for continued weight loss and poor intake   - albumin 2; pt cachectic and frail, mental status now complicating PO intake further

## 2025-05-16 NOTE — ASSESSMENT & PLAN NOTE
- pt presented from [facility] due to AMS which as worsened with admission progressing to delirium, combativeness, and paranoia; pt with hisotry of similar delirium in prior admissions   - pt requires glasses, which are not with pt at this time (confirmed to still be at [facility] in his room) and is extremely hearing impaired which complicates mental status   - pt requires speaking in very high volumes directly into his left ear to hear   - Ativan IV PRN has been effective this admission; pt currently in restraints but plan is for removal once safe to do so, pt will likely require sitter or telesitter

## 2025-05-16 NOTE — NURSING
[facility] Rapid Response Nurse -Transfer from ICU Follow-up Note     Followed up with patient for recent transfer from ICU level of care within past 12 hours.     Introduction made and RRN role explained to patient; patient confused and admitted with encephalopathy, found pulling at carbajal catheter despite bilateral wrist restraints being in place; pink tinged urine noted in drainage bag; discussed with bedside nurse and decision made to remove catheter and trial condom cath to prevent traumatic removal by patient; carbajal removed and patient repositioned in bed           Please call Rapid Response RN with any questions or concerns at  [phone]

## 2025-05-16 NOTE — PT/OT/SLP EVAL
Speech Language Pathology Evaluation  Bedside Swallow    Patient Name:  Eugene Gamez   MRN:  [id number]  Admitting Diagnosis: Acute metabolic encephalopathy    Recommendations:                 General Recommendations:  Follow-up not indicated  Diet recommendations:  Puree Diet - IDDSI Level 4, Thin liquids - IDDSI Level 0   Aspiration Precautions: 1 bite/sip at a time, Alternating bites/sips, Assistance with meals, HOB to 90 degrees, Meds crushed in puree, and Small bites/sips   General Precautions: Standard,    Communication strategies:  provide increased time to answer    Assessment:     Eugene Gamez is a 89 y.o. male with a dx of Acute metabolic encephalopathy, who presents w/ mild-mod oral dysphagia, negatively impacted by current mentation. ST recs cont current pureed diet w/ thin liquids. Meds crushed in puree. Pt requires 1:1 A for all intake. No further ST is required at this time, as the pt is consuming the safest and least restrictive diet.     History:     Past Medical History:   Diagnosis Date    Arthritis     Cancer     Diabetes mellitus     Elevated PSA     Gout, unspecified     Hypertension     Nuclear sclerotic cataract of both eyes 6/29/2018       Past Surgical History:   Procedure Laterality Date    CATARACT EXTRACTION      ou    EYE SURGERY      bilateral cataracts    LAPAROTOMY, EXPLORATORY N/A 4/23/2025    Procedure: LAPAROTOMY, EXPLORATORY;  Surgeon: Abran Camacho MD;  Location: [facility] OR;  Service: General;  Laterality: N/A;  lysis of adhsions    removal of small bowel  Apr. 2015    Colostomy    REPAIR, HERNIA, INCISIONAL N/A 4/23/2025    Procedure: REPAIR, HERNIA, INCISIONAL;  Surgeon: Abran Camacho MD;  Location: [facility] OR;  Service: General;  Laterality: N/A;       Social History: Patient lives with family.    Prior Intubation HX:  None     Modified Barium Swallow: None per EMR    Chest X-Rays:   5/14/25    FINDINGS:  Lines and tubes: Left chest wall Port-A-Cath.     Heart and  mediastinum: Unchanged.     Pleura: Small-moderate layering pleural effusion on the left.  No pneumothorax.     Lungs: Lungs are well inflated. Persistent patchy opacities in the right lower lobe.  New consolidative opacity in the left lower lobe, likely atelectasis in the setting of an effusion.     Soft tissue/bone: Unchanged.    Prior diet: Unknown 2/2 pt poor historian.    Subjective     ST obtained clearance from pt's nurse for b/s swallow evaluation prior to entrance. Pt's nurse stating pt refusing all po this AM. Upon ST's entrance, the pt was asleep, however, easily arouse to ST's greeting. ST noting pt w/ 100% of breakfast tray consumed. Pt easily arouse to ST's greeting and was agreeable to po trials.    Patient goals: Pt did not state     Pain/Comfort:  Pain Rating 1: 0/10    Respiratory Status: Room air    Objective:     Oral Musculature Evaluation  Oral Musculature: WFL  Dentition: edentulous  Secretion Management: adequate  Mucosal Quality: good  Oral Labial Strength and Mobility: WFL  Lingual Strength and Mobility: WFL  Volitional Cough: Weak  Volitional Swallow: Delayed  Voice Prior to PO Intake: Dcr intensity    Bedside Swallow Eval:   Consistencies Assessed:  Thin liquids -Straw sips of water  Puree -Tbsp bites of pudding     Oral Phase:   Prolonged mastication    Pharyngeal Phase:   no overt clinical signs/symptoms of aspiration  no overt clinical signs/symptoms of pharyngeal dysphagia    Compensatory Strategies  None    Treatment: It should be noted that silent aspiration cannot be r/o via b/s assessment. ST provided education to the pt which included role of ST in POC, ST POC, and diet recs. Pt will require on-going education.      Goals:   Multidisciplinary Problems       SLP Goals       Not on file                    Plan:     Patient to be seen:      Plan of Care expires:     Plan of Care reviewed with:  patient   SLP Follow-Up:  No       Discharge recommendations:  No Therapy Indicated    Barriers to Discharge:  None    Time Tracking:     SLP Treatment Date:   05/16/25  Speech Start Time:  0920  Speech Stop Time:  0930     Speech Total Time (min):  10 min    Billable Minutes: Eval Swallow and Oral Function 10    05/16/2025

## 2025-05-16 NOTE — ASSESSMENT & PLAN NOTE
5/16/2025  - interval chart reviewed; pt discussed with HM   - met with pt and daughter, Nicol, at bedside  - pt remains without capacity for medical decision making; improved cooperativeness and is no longer requiring restraints, but is still confused and anxious (prior to coordinating dose of IV ativan with bedside nurse)   - daughter Nicol continues to have good overall insight into pt's condition, consistent with prior admission   - overall GOC is to optimize pt's mental status and treating reversible conditions  - good insight that continuing with the few days remaining of SNF will likely not produce significant changes in pt's physical abilities and likely only worsen his mental abilities   - she is hopeful to be able to work with pt's total of 7 children to be able to organize a plan for 24/7 care for pt when she is at work for pt to resume living with her; if not able to do so will have to explore long-term options; her preference would be for [facility] as she was happy with care there with SNF   - reviewed hospice and philosophy of care; she is familiar with hospice care as her mother (pts wife) and her  were both cared for by hospice at the end of their lives and Nicol was their primary caregivers as well   - Nicol is agreeable to Hospice either at home or long-term NH following optimization; preference for [facility] as they cared for other family members on hospice; I supported this preference as the respite option would be beneficial to Ms. Camarena given pt's current requirements for 24/7 care   - in depth discussion overall regarding potential baseline dementia that has had rapid progression and periods of increased delirium with hospitalizations, surgery, SNF/changes in location; daughter with good insight   - Nicol's wishes and GOC for pt align with DNR/DNI, however she shares that some of her siblings would be very against that and will require further discussion so  pt will remain full code   for now   - Nicol plans to coordinate with her siblings/pt's children to determine if plan will be for home or [facility] placement; shared that I would notify HM and CM of discussion/GOC for coordination   - emotional support provided; answered all questions   - updated HM in person and CM via secure chat    5/15/2025 - Consult   - consult received; interval chart reviewed in detail; discussed pt MDT during ICU rounds   - pt known to myself and palliative medicine team from previous admission; shared information and techniques that were helpful in prior admission, especially related to pt's visual and hearing impairments   - pt has 7 children who share legal surrogate decision making; in prior admission in moments of better mental status pt directed team to discuss his needs with daughter, Nicol; pt lives with his daughter Nicol (who is a PCT in psych dept at [employer]) and one of his sons   - met with patient at bedside; introduction to palliative medicine team and role in current care and admission; pt with AMS and does not have capacity for GOC/ACP   - called pt's daughter, Nicol, who was aware of admission and shared her concerns regarding pt's continued decline despite SNF care; pt has been able to walk up to 6ft but overall has not made the progress that they were hoping for to get near prior physical abilities   - before prior admission pt was ambulatory and able to provide most of own ADLs, required some assistance or set up at times; he had recently stopped driving, but overall was active and had good QOL   - reviewed trajectory given age, comorbidities, surgery, hospital admission and additional changes in location to SNF for pt   - daughter plans to visit tomorrow when not working for further GOC discussion   - emotional support provided   - Allowed time for questions/concerns; all addressed; expressed availability of myself/palliative team for additional questions/concerns   - updated MDT

## 2025-05-16 NOTE — ASSESSMENT & PLAN NOTE
5/15/2025 - Consult   - consult received; interval chart reviewed in detail; discussed pt MDT during ICU rounds   - pt known to myself and palliative medicine team from previous admission; shared information and techniques that were helpful in prior admission, especially related to pt's visual and hearing impairments   - pt has 7 children who share legal surrogate decision making; in prior admission in moments of better mental status pt directed team to discuss his needs with daughter, Nicol; pt lives with his daughter Nicol (who is a PCT in psych dept at [facility]) and one of his sons   - met with patient at bedside; introduction to palliative medicine team and role in current care and admission; pt with AMS and does not have capacity for GOC/ACP   - called pt's daughter, Nicol, who was aware of admission and shared her concerns regarding pt's continued decline despite SNF care; pt has been able to walk up to 6ft but overall has not made the progress that they were hoping for to get near prior physical abilities   - before prior admission pt was ambulatory and able to provide most of own ADLs, required some assistance or set up at times; he had recently stopped driving, but overall was active and had good QOL   - reviewed trajectory given age, comorbidities, surgery, hospital admission and additional changes in location to SNF for pt   - daughter plans to visit tomorrow when not working for further GOC discussion   - emotional support provided   - Allowed time for questions/concerns; all addressed; expressed availability of myself/palliative team for additional questions/concerns   - updated MDT

## 2025-05-16 NOTE — CONSULTS
[facility] - Telemetry  Wound Care  WO LIZZY    Patient Name:  Eugene Gamez   MRN:  [id number]  Date: 5/16/2025  Diagnosis: Acute metabolic encephalopathy    History:     Past Medical History:   Diagnosis Date    Arthritis     Cancer     Diabetes mellitus     Elevated PSA     Gout, unspecified     Hypertension     Nuclear sclerotic cataract of both eyes 6/29/2018       Social History[1]    Precautions:     Allergies as of 05/14/2025 - Reviewed 05/14/2025   Allergen Reaction Noted    Iodine Other (See Comments) 05/14/2019    Shellfish containing products  01/13/2014    Shellfish derived  12/02/2023       [facility] Assessment Details/Treatment     Active Problem List with Overview Notes    Diagnosis Date Noted    Acute metabolic encephalopathy 05/14/2025    Hypothermia 05/14/2025    Hypoglycemia 05/14/2025    Advance care planning 04/21/2025    Hypernatremia 04/21/2025    Delirium 04/21/2025    NSVT (nonsustained ventricular tachycardia) 04/20/2025    Atrial tachycardia 04/20/2025    S/P left colectomy 04/18/2025    Ventral hernia 04/18/2025    Severe protein-calorie malnutrition 04/18/2025    Anemia of chronic renal failure, stage 3b 03/12/2024    Secondary renal hyperparathyroidism 03/12/2024    Hypertensive kidney disease with stage 3b chronic kidney disease 01/29/2024    Hyperuricemia 01/29/2024    Pseudophakia 12/21/2023    Refractive error 12/21/2023    History of colon cancer 12/15/2023    Pulmonary emphysema, unspecified emphysema type 08/11/2023    SDH (subdural hematoma) 01/18/2022    Lactic acidosis 10/06/2019    Anemia of chronic disease 02/12/2019    Elevated CEA 02/12/2019    HTN, goal below 140/80 11/09/2018    Nuclear sclerotic cataract of both eyes 06/29/2018    Benign essential HTN 06/27/2018    Bilateral renal masses 10/17/2017     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      Colostomy care 10/17/2017    Colostomy in place 10/17/2017    Flu vaccine need 10/17/2017    Acute renal failure superimposed on stage  3b chronic kidney disease 07/06/2016    Chemotherapy-induced neuropathy 01/25/2016    Venous stasis 01/25/2016    Chemotherapy induced neutropenia 07/18/2015    Educational circumstance 07/13/2015    Carcinoma in situ of colon 06/29/2015    Debility 05/01/2015    SBO (small bowel obstruction) 04/30/2015    Gout 04/06/2015    Essential hypertension 02/26/2015    ED (erectile dysfunction) 01/13/2014    Arthritis       Nursing consult for altered skin integrity-skin tear coccyx  An 89 year old male admitted 5/14/25 from NH with complaint of altered mental status/combative.   5/16 WBC 4.3 Hgb 7.5 Hct 23.3 Alb 2.0 Weight 68 kg   On Isoflex mattress; Rui score 13  5/14 8:00 pm 4 Eyes Skin Assessment- No Pressure Injuries Present  Photodocumentation (from Media 5/15)    [place]  Assessment:  Chart reviewed and photodocumentation reviewed  Moisture associated skin damage of gluteal cleft  Treatment/Plan:  Local wound care to MASD gluteal cleft with Remedy products- cleanser and moisture barrier  Pressure Injury Prevention Interventions with moisture management  Reconsult WOC nurse as needed  Recommendations made to primary team. Orders placed.     05/16/2025       [1]   Social History  Socioeconomic History    Marital status:     Number of children: 8   Tobacco Use    Smoking status: Never     Passive exposure: Never    Smokeless tobacco: Never    Tobacco comments:     smoked short while as a teen   Substance and Sexual Activity    Alcohol use: No    Drug use: No    Sexual activity: Not Currently     Partners: Female     Social Drivers of Health     Financial Resource Strain: Low Risk  (4/20/2025)    Overall Financial Resource Strain (CARDIA)     Difficulty of Paying Living Expenses: Not hard at all   Recent Concern: Financial Resource Strain - Medium Risk (4/18/2025)    Overall Financial Resource Strain (CARDIA)     Difficulty of Paying Living Expenses: Somewhat hard   Food Insecurity: No Food Insecurity  (4/20/2025)    Hunger Vital Sign     Worried About Running Out of Food in the Last Year: Never true     Ran Out of Food in the Last Year: Never true   Recent Concern: Food Insecurity - Food Insecurity Present (4/18/2025)    Hunger Vital Sign     Worried About Running Out of Food in the Last Year: Often true     Ran Out of Food in the Last Year: Sometimes true   Transportation Needs: Unmet Transportation Needs (4/18/2025)    PRAPARE - Transportation     Lack of Transportation (Medical): Yes     Lack of Transportation (Non-Medical): No   Stress: No Stress Concern Present (4/20/2025)    [employer] - Occupational Stress Questionnaire     Feeling of Stress : Not at all   Recent Concern: Stress - Stress Concern Present (4/18/2025)    [employer] - Occupational Stress Questionnaire     Feeling of Stress : Rather much   Housing Stability: Low Risk  (4/20/2025)    Housing Stability Vital Sign     Unable to Pay for Housing in the Last Year: No     Number of Times Moved in the Last Year: 0     Homeless in the Last Year: No   Recent Concern: Housing Stability - High Risk (4/18/2025)    Housing Stability Vital Sign     Unable to Pay for Housing in the Last Year: Yes     Number of Times Moved in the Last Year: 0     Homeless in the Last Year: No

## 2025-05-16 NOTE — SUBJECTIVE & OBJECTIVE
Medications:  Continuous Infusions:   D5W   Intravenous Continuous 100 mL/hr at 05/16/25 1013 New Bag at 05/16/25 1013     Scheduled Meds:   mupirocin   Nasal BID    piperacillin-tazobactam (Zosyn) IV (PEDS and ADULTS) (extended infusion is not appropriate)  4.5 g Intravenous Q8H     PRN Meds:  Current Facility-Administered Medications:     lorazepam, 0.5 mg, Intravenous, Q4H PRN    Objective:     Vital Signs (Most Recent):  Temp: 97.4 °F (36.3 °C) (05/16/25 1153)  Pulse: (!) 56 (05/16/25 1536)  Resp: 18 (05/16/25 1153)  BP: (!) 141/74 (05/16/25 1153)  SpO2: 100 % (05/16/25 1153) Vital Signs (24h Range):  Temp:  [97.4 °F (36.3 °C)-97.7 °F (36.5 °C)] 97.4 °F (36.3 °C)  Pulse:  [56-80] 56  Resp:  [18-20] 18  SpO2:  [97 %-100 %] 100 %  BP: (131-153)/(67-79) 141/74     Weight: 68 kg (150 lb)  Body mass index is 19.26 kg/m².       Physical Exam  Vitals and nursing note reviewed.   Constitutional:       General: He is awake.      Appearance: He is cachectic. He is ill-appearing.      Comments: Elderly, frail   Eyes:      Comments: Glasses not present in room or on pt   Pulmonary:      Effort: Pulmonary effort is normal. No respiratory distress.   Neurological:      Mental Status: He is alert. He is disoriented.      Comments: Responds to his name, and able to identify daughter    Psychiatric:         Behavior: Behavior is cooperative.       Advance Care Planning   Advance Directives:   Living Will: Yes        Copy on chart: Yes    LaPOST: No    Do Not Resuscitate Status: No    Medical Power of : Yes    Goals of Care: What is most important right now is to focus on remaining as independent as possible, improvement in condition but with limits to invasive therapies. Accordingly, we have decided that the best plan to meet the patient's goals includes continuing with treatment.         Significant Labs: All pertinent labs within the past 24 hours have been reviewed.  CBC:   Recent Labs   Lab 05/14/25  1152  05/15/25  0414 05/16/25  0504   WBC 7.08   < > 4.30   HGB 9.1*   < > 7.5*   HCT 27.9*   < > 23.3*   MCV 90   < > 88     --   --     < > = values in this interval not displayed.     BMP:  Recent Labs   Lab 05/16/25  0504   GLU 94      K 4.3   *   CO2 16*   BUN 15   CREATININE 1.4   CALCIUM 8.2*   MG 1.8     LFT:  Lab Results   Component Value Date    AST 13 05/16/2025    ALKPHOS 46 05/16/2025    BILITOT 0.3 05/16/2025     Albumin:   Albumin   Date Value Ref Range Status   05/16/2025 2.0 (L) 3.5 - 5.2 g/dL Final   04/11/2025 4.2 3.4 - 5.0 g/dL Final   03/20/2025 4.2 3.5 - 5.2 g/dL Final     Protein:   Protein Total   Date Value Ref Range Status   05/16/2025 4.9 (L) 6.0 - 8.4 gm/dL Final     Total Protein   Date Value Ref Range Status   03/20/2025 7.8 6.0 - 8.4 g/dL Final     Lactic acid:   Lab Results   Component Value Date    LACTATE 0.5 05/14/2025    LACTATE 2.4 (H) 05/14/2025       Significant Imaging: I have reviewed all pertinent imaging results/findings within the past 24 hours.

## 2025-05-17 PROBLEM — E87.20 LACTIC ACIDOSIS: Status: RESOLVED | Noted: 2019-10-06 | Resolved: 2025-05-17

## 2025-05-17 PROBLEM — T68.XXXA HYPOTHERMIA: Status: RESOLVED | Noted: 2025-05-14 | Resolved: 2025-05-17

## 2025-05-17 PROBLEM — E16.2 HYPOGLYCEMIA: Status: RESOLVED | Noted: 2025-05-14 | Resolved: 2025-05-17

## 2025-05-17 LAB
ABSOLUTE EOSINOPHIL (OHS): 0.07 K/UL
ABSOLUTE MONOCYTE (OHS): 0.57 K/UL (ref 0.3–1)
ABSOLUTE NEUTROPHIL COUNT (OHS): 3.7 K/UL (ref 1.8–7.7)
ALBUMIN SERPL BCP-MCNC: 1.9 G/DL (ref 3.5–5.2)
ALP SERPL-CCNC: 44 UNIT/L (ref 40–150)
ALT SERPL W/O P-5'-P-CCNC: 8 UNIT/L (ref 10–44)
ANION GAP (OHS): 7 MMOL/L (ref 8–16)
APTT PPP: 28.1 SECONDS (ref 21–32)
AST SERPL-CCNC: 13 UNIT/L (ref 11–45)
BASOPHILS # BLD AUTO: 0.02 K/UL
BASOPHILS NFR BLD AUTO: 0.4 %
BILIRUB SERPL-MCNC: 0.5 MG/DL (ref 0.1–1)
BUN SERPL-MCNC: 14 MG/DL (ref 8–23)
CALCIUM SERPL-MCNC: 8 MG/DL (ref 8.7–10.5)
CHLORIDE SERPL-SCNC: 117 MMOL/L (ref 95–110)
CO2 SERPL-SCNC: 14 MMOL/L (ref 23–29)
CREAT SERPL-MCNC: 1.4 MG/DL (ref 0.5–1.4)
ERYTHROCYTE [DISTWIDTH] IN BLOOD BY AUTOMATED COUNT: 14.7 % (ref 11.5–14.5)
GFR SERPLBLD CREATININE-BSD FMLA CKD-EPI: 48 ML/MIN/1.73/M2
GLUCOSE SERPL-MCNC: 84 MG/DL (ref 70–110)
HCT VFR BLD AUTO: 23.1 % (ref 40–54)
HGB BLD-MCNC: 7.8 GM/DL (ref 14–18)
IMM GRANULOCYTES # BLD AUTO: 0.11 K/UL (ref 0–0.04)
IMM GRANULOCYTES NFR BLD AUTO: 2 % (ref 0–0.5)
LYMPHOCYTES # BLD AUTO: 0.95 K/UL (ref 1–4.8)
MAGNESIUM SERPL-MCNC: 1.7 MG/DL (ref 1.6–2.6)
MCH RBC QN AUTO: 28.6 PG (ref 27–31)
MCHC RBC AUTO-ENTMCNC: 33.8 G/DL (ref 32–36)
MCV RBC AUTO: 85 FL (ref 82–98)
NUCLEATED RBC (/100WBC) (OHS): 0 /100 WBC
PLATELET # BLD AUTO: 145 K/UL (ref 150–450)
PLATELET BLD QL SMEAR: ABNORMAL
PMV BLD AUTO: 11.9 FL (ref 9.2–12.9)
POCT GLUCOSE: 107 MG/DL (ref 70–110)
POCT GLUCOSE: 113 MG/DL (ref 70–110)
POCT GLUCOSE: 89 MG/DL (ref 70–110)
POCT GLUCOSE: 90 MG/DL (ref 70–110)
POCT GLUCOSE: 92 MG/DL (ref 70–110)
POCT GLUCOSE: 92 MG/DL (ref 70–110)
POTASSIUM SERPL-SCNC: 4.6 MMOL/L (ref 3.5–5.1)
PROT SERPL-MCNC: 4.7 GM/DL (ref 6–8.4)
RBC # BLD AUTO: 2.73 M/UL (ref 4.6–6.2)
RELATIVE EOSINOPHIL (OHS): 1.3 %
RELATIVE LYMPHOCYTE (OHS): 17.5 % (ref 18–48)
RELATIVE MONOCYTE (OHS): 10.5 % (ref 4–15)
RELATIVE NEUTROPHIL (OHS): 68.3 % (ref 38–73)
SODIUM SERPL-SCNC: 138 MMOL/L (ref 136–145)
WBC # BLD AUTO: 5.42 K/UL (ref 3.9–12.7)

## 2025-05-17 PROCEDURE — 36415 COLL VENOUS BLD VENIPUNCTURE: CPT | Performed by: HOSPITALIST

## 2025-05-17 PROCEDURE — 85730 THROMBOPLASTIN TIME PARTIAL: CPT | Performed by: HOSPITALIST

## 2025-05-17 PROCEDURE — 11000001 HC ACUTE MED/SURG PRIVATE ROOM

## 2025-05-17 PROCEDURE — 94761 N-INVAS EAR/PLS OXIMETRY MLT: CPT

## 2025-05-17 PROCEDURE — 25000003 PHARM REV CODE 250: Performed by: HOSPITALIST

## 2025-05-17 PROCEDURE — 82247 BILIRUBIN TOTAL: CPT | Performed by: HOSPITALIST

## 2025-05-17 PROCEDURE — 83735 ASSAY OF MAGNESIUM: CPT | Performed by: HOSPITALIST

## 2025-05-17 PROCEDURE — 85025 COMPLETE CBC W/AUTO DIFF WBC: CPT | Performed by: HOSPITALIST

## 2025-05-17 PROCEDURE — 63600175 PHARM REV CODE 636 W HCPCS: Performed by: HOSPITALIST

## 2025-05-17 RX ORDER — ACETAMINOPHEN 325 MG/1
650 TABLET ORAL EVERY 4 HOURS PRN
Status: DISCONTINUED | OUTPATIENT
Start: 2025-05-17 | End: 2025-05-20 | Stop reason: HOSPADM

## 2025-05-17 RX ORDER — GUAIFENESIN AND DEXTROMETHORPHAN HYDROBROMIDE 10; 100 MG/5ML; MG/5ML
5 SYRUP ORAL EVERY 6 HOURS PRN
Status: DISCONTINUED | OUTPATIENT
Start: 2025-05-17 | End: 2025-05-20 | Stop reason: HOSPADM

## 2025-05-17 RX ORDER — ONDANSETRON HYDROCHLORIDE 2 MG/ML
8 INJECTION, SOLUTION INTRAVENOUS EVERY 6 HOURS PRN
Status: DISCONTINUED | OUTPATIENT
Start: 2025-05-17 | End: 2025-05-20 | Stop reason: HOSPADM

## 2025-05-17 RX ORDER — POLYETHYLENE GLYCOL 3350 17 G/17G
17 POWDER, FOR SOLUTION ORAL 2 TIMES DAILY PRN
Status: DISCONTINUED | OUTPATIENT
Start: 2025-05-17 | End: 2025-05-20 | Stop reason: HOSPADM

## 2025-05-17 RX ADMIN — PIPERACILLIN SODIUM AND TAZOBACTAM SODIUM 4.5 G: 4; .5 INJECTION, POWDER, FOR SOLUTION INTRAVENOUS at 01:05

## 2025-05-17 RX ADMIN — PIPERACILLIN SODIUM AND TAZOBACTAM SODIUM 4.5 G: 4; .5 INJECTION, POWDER, FOR SOLUTION INTRAVENOUS at 09:05

## 2025-05-17 RX ADMIN — MUPIROCIN: 20 OINTMENT TOPICAL at 09:05

## 2025-05-17 RX ADMIN — PIPERACILLIN SODIUM AND TAZOBACTAM SODIUM 4.5 G: 4; .5 INJECTION, POWDER, FOR SOLUTION INTRAVENOUS at 04:05

## 2025-05-17 RX ADMIN — DEXTROSE MONOHYDRATE 100 ML: 50 INJECTION, SOLUTION INTRAVENOUS at 04:05

## 2025-05-17 NOTE — NURSING
[facility] [place]  Nurses Note -- 4 Eyes      5/17/2025       Skin assessed on: Q Shift      [x] No Pressure Injuries Present    [x]Prevention Measures Documented    [] Yes LDA  for Pressure Injury Previously documented     [] Yes New Pressure Injury Discovered   [] LDA for New Pressure Injury Added      Attending RN:  Rodri Hernández, RN     Second RN: Leslye

## 2025-05-17 NOTE — ASSESSMENT & PLAN NOTE
Anemia is likely due to chronic disease due to Chronic liver disease. Most recent hemoglobin and hematocrit are listed below.  Recent Labs     05/15/25  0414 05/16/25  0504 05/17/25  0554   HGB 7.1* 7.5* 7.8*   HCT 22.8* 23.3* 23.1*     Plan  - Monitor serial CBC: Daily  - Transfuse PRBC if patient becomes hemodynamically unstable, symptomatic or H/H drops below 7/21.  - Patient has not received any PRBC transfusions to date  - Patient's anemia is currently stable  -

## 2025-05-17 NOTE — ASSESSMENT & PLAN NOTE
Anemia is likely due to Iron deficiency. Most recent hemoglobin and hematocrit are listed below.  Recent Labs     05/15/25  0414 05/16/25  0504 05/17/25  0554   HGB 7.1* 7.5* 7.8*   HCT 22.8* 23.3* 23.1*     Plan  - Monitor serial CBC: Daily  - Transfuse PRBC if patient becomes hemodynamically unstable, symptomatic or H/H drops below 7/21.  - Patient has not received any PRBC transfusions to date  - Patient's anemia is currently stable  -

## 2025-05-17 NOTE — ASSESSMENT & PLAN NOTE
Anemia is likely due to chronic disease due to Chronic liver disease. Most recent hemoglobin and hematocrit are listed below.  Recent Labs     05/15/25  0414 05/16/25  0504 05/17/25  0554   HGB 7.1* 7.5* 7.8*   HCT 22.8* 23.3* 23.1*     Plan  - Monitor serial CBC: Daily  - Transfuse PRBC if patient becomes hemodynamically unstable, symptomatic or H/H drops below 7/21.  - Patient has not received any PRBC transfusions to date  - Patient's anemia is currently stable  -    Walk in

## 2025-05-17 NOTE — ASSESSMENT & PLAN NOTE
Creatine stable for now. BMP reviewed- noted Estimated Creatinine Clearance: 34.4 mL/min (based on SCr of 1.4 mg/dL). according to latest data. Based on current GFR, CKD stage is stage 3 - GFR 30-59.  Monitor UOP and serial BMP and adjust therapy as needed. Renally dose meds. Avoid nephrotoxic medications and procedures.   No.

## 2025-05-17 NOTE — ASSESSMENT & PLAN NOTE
Patient with progressive dementia, malnutrition, worsening functional status.  Abdominal CT also did showed bilateral renal masses and sclerotic lesion on thoracic spine.  Possibly underlying malignancy.  Palliative Care consulted as patient is hospice appropriate.

## 2025-05-17 NOTE — PROGRESS NOTES
[facility]  Progress Note    Patient Name: Eugene Gamez  MRN: [id number]  Patient Class: IP- Inpatient   Admission Date: 5/14/2025  Length of Stay: 3 days  Attending Physician: Darion Mace MD  Primary Care Provider: Larry Monae MD        Subjective     Principal Problem:Acute metabolic encephalopathy        HPI:  This 89-year-old black male with PMH of dementia,Hx of colostomy for presumed perforation of intestine,HX of  colon cancer with left colectomy,COPD,severe malnutrition,NH resident,malnutrition,.presents emergency room being transported from his nursing home by EMS because of altered mental status. The patient has been increasingly altered over the course of the last several days. He is screaming that he has been kidnapped. He is excited and has been combative with staff. He speaks almost constantly , but seems to be very confused and will not answer the questions that are asked of him.duo to seveer agitating patient received IV Ativan in ER, and much calmer,but patient remains confuse,patient is hypothermic 94,was started on bear hug,on IV Abx and cultures are done,VS are stable at this time amnd labs show lactic acidosis 2.4,head CT with no acute process,started on IVF,not able do ROS,duo to [facility],    Overview/Hospital Course:  This 89-year-old black male with PMH of dementia,Hx of colostomy for presumed perforation of intestine,HX of  colon cancer with left colectomy,COPD,severe malnutrition,NH resident,malnutrition,.presents emergency room being transported from his nursing home by EMS because of altered mental status.  has been combative with staff. He speaks almost constantly , but seems to be very confused and will not answer the questions that are asked of him.duo to seveer agitating patient received IV Ativan in ER, and much calmer,but patient remains confuse,in ER patient is hypothermic 94,was started on bear hug,on IV Abx and cultures are done,VS are stable  at this time and labs show lactic acidosis 2.4,resolved with IVF,head CT with no acute process,CT abdomen,pelvic show,here is a 2.5 cm subcutaneous collection of air and fluid in the abdominal wall to the right of the surgical incision, concerning for hematoma or abscess.already on IV Zosyn,also chronic findings.  Consulted palliative,approprietly for hospice,likely worsening dementia,on 2 point soft restrain and prn IV Ativan.  Per ST on puree diet.  Hypocythemia is resolved.      Interval History: feeling well this morning.    Review of Systems   HENT:  Negative for ear discharge and ear pain.    Eyes:  Negative for discharge and itching.   Endocrine: Negative for cold intolerance and heat intolerance.   Neurological:  Negative for seizures and syncope.     Objective:     Vital Signs (Most Recent):  Temp: 97.4 °F (36.3 °C) (05/16/25 1153)  Pulse: (!) 56 (05/16/25 1536)  Resp: 18 (05/16/25 1153)  BP: (!) 141/74 (05/16/25 1153)  SpO2: 100 % (05/16/25 1153) Vital Signs (24h Range):  Temp:  [97.4 °F (36.3 °C)-97.7 °F (36.5 °C)] 97.4 °F (36.3 °C)  Pulse:  [56-80] 56  Resp:  [18-20] 18  SpO2:  [97 %-100 %] 100 %  BP: (131-153)/(67-79) 141/74     Weight: 68 kg (150 lb)  Body mass index is 19.26 kg/m².    Intake/Output Summary (Last 24 hours) at 5/16/2025 1558  Last data filed at 5/16/2025 0858  Gross per 24 hour   Intake 120 ml   Output 1950 ml   Net -1830 ml         Physical Exam  Vitals and nursing note reviewed.   Constitutional:       General: He is awake.      Appearance: He is cachectic. He is ill-appearing.      Comments: Elderly, frail; currently in wrist restraints    HENT:      Mouth/Throat:      Mouth: Mucous membranes are dry.      Pharynx: No oropharyngeal exudate or posterior oropharyngeal erythema.   Pulmonary:      Effort: Pulmonary effort is normal. No respiratory distress.   Skin:     General: Skin is warm and dry.   Neurological:      Mental Status: He is alert. He is disoriented.                Significant Labs: All pertinent labs within the past 24 hours have been reviewed.  BMP:   Recent Labs   Lab 05/16/25  0504   GLU 94      K 4.3   *   CO2 16*   BUN 15   CREATININE 1.4   CALCIUM 8.2*   MG 1.8     CBC:   Recent Labs   Lab 05/15/25  0414 05/16/25  0504   WBC 4.13 4.30   HGB 7.1* 7.5*   HCT 22.8* 23.3*       Significant Imaging: I have reviewed all pertinent imaging results/findings within the past 24 hours.      Assessment & Plan  Acute metabolic encephalopathy  head CT with no acute process,may duo to worsening dementia,hypothermia,lactic acidosis, and hypoglycemia,occult infection,on empiric IV Abx and cultures pending ,will minitor closely in ICU.  Consulted palliative,approprietly for hospice,likely worsening dementia,on 2 point soft restrain and prn IV Ativan.  Per ST on puree diet.  Lactic acidosis  On IVF and  empiric IV Abx    Carcinoma in situ of colon  Per Hx,has left colectomy .    Colostomy in place  Supportive care,    Anemia of chronic disease  Anemia is likely due to Iron deficiency. Most recent hemoglobin and hematocrit are listed below.  Recent Labs     05/15/25  0414 05/16/25  0504 05/17/25  0554   HGB 7.1* 7.5* 7.8*   HCT 22.8* 23.3* 23.1*     Plan  - Monitor serial CBC: Daily  - Transfuse PRBC if patient becomes hemodynamically unstable, symptomatic or H/H drops below 7/21.  - Patient has not received any PRBC transfusions to date  - Patient's anemia is currently stable  -   History of colon cancer  S/P left colectomy.    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 BMP reviewed- noted Estimated Creatinine Clearance: 34.4 mL/min (based on SCr of 1.4 mg/dL). according to latest data. Based on current GFR, CKD stage is stage 3 - GFR 30-59.  Monitor UOP and serial BMP and adjust therapy as needed. Renally dose meds. Avoid nephrotoxic medications and procedures.  Anemia of chronic renal failure, stage 3b  Anemia is likely  due to chronic disease due to Chronic liver disease. Most recent hemoglobin and hematocrit are listed below.  Recent Labs     05/15/25  0414 05/16/25  0504 05/17/25  0554   HGB 7.1* 7.5* 7.8*   HCT 22.8* 23.3* 23.1*     Plan  - Monitor serial CBC: Daily  - Transfuse PRBC if patient becomes hemodynamically unstable, symptomatic or H/H drops below 7/21.  - Patient has not received any PRBC transfusions to date  - Patient's anemia is currently stable  -   S/P left colectomy  Supportive care,    Severe protein-calorie malnutrition  Nutrition consulted. Most recent weight and BMI monitored-     Measurements:  Wt Readings from Last 1 Encounters:   05/14/25 68 kg (150 lb)   Body mass index is 19.26 kg/m².    Patient has been screened and assessed by RD.    Malnutrition Type:  Context:    Level:      Malnutrition Characteristic Summary:       Interventions/Recommendations (treatment strategy):       Advance care planning  Consulted palliative    VTE Risk Mitigation (From admission, onward)      None            Discharge Planning   WIL: 5/19/2025     Code Status: Full Code   Medical Readiness for Discharge Date:   Discharge Plan A: Skilled Nursing Facility                        Darion Mace MD  Department of Hospital Medicine   [facility] [place] [facility]

## 2025-05-17 NOTE — ASSESSMENT & PLAN NOTE
Hypothermic on presentation.  Empirically started on broad spectrum ABX's.  Seems improved from presentation.  Symptoms may also be related to progression of dementia with behavioral issues.

## 2025-05-17 NOTE — SUBJECTIVE & OBJECTIVE
Interval History: no acute events overnight.    Review of Systems   HENT:  Negative for ear discharge and ear pain.    Eyes:  Negative for discharge and itching.   Endocrine: Negative for cold intolerance and heat intolerance.   Neurological:  Negative for seizures and syncope.     Objective:     Vital Signs (Most Recent):  Temp: 98 °F (36.7 °C) (05/17/25 1113)  Pulse: 66 (05/17/25 1113)  Resp: 17 (05/17/25 1113)  BP: (!) 150/77 (05/17/25 1113)  SpO2: 100 % (05/17/25 1113) Vital Signs (24h Range):  Temp:  [97.3 °F (36.3 °C)-98.1 °F (36.7 °C)] 98 °F (36.7 °C)  Pulse:  [56-75] 66  Resp:  [17-20] 17  SpO2:  [100 %] 100 %  BP: (143-152)/(64-81) 150/77     Weight: 68 kg (150 lb)  Body mass index is 19.26 kg/m².    Intake/Output Summary (Last 24 hours) at 5/17/2025 1241  Last data filed at 5/17/2025 1113  Gross per 24 hour   Intake 360 ml   Output 4200 ml   Net -3840 ml         Physical Exam  Vitals and nursing note reviewed.   Constitutional:       General: He is awake.      Appearance: He is cachectic. He is ill-appearing.      Comments: Elderly, frail; currently in wrist restraints    HENT:      Mouth/Throat:      Mouth: Mucous membranes are dry.      Pharynx: No oropharyngeal exudate or posterior oropharyngeal erythema.   Pulmonary:      Effort: Pulmonary effort is normal. No respiratory distress.   Skin:     General: Skin is warm and dry.   Neurological:      Mental Status: He is alert. He is disoriented.               Significant Labs: All pertinent labs within the past 24 hours have been reviewed.  BMP:   Recent Labs   Lab 05/17/25  0554   GLU 84      K 4.6   *   CO2 14*   BUN 14   CREATININE 1.4   CALCIUM 8.0*   MG 1.7     CBC:   Recent Labs   Lab 05/16/25  0504 05/17/25  0554   WBC 4.30 5.42   HGB 7.5* 7.8*   HCT 23.3* 23.1*   PLT  --  145*       Significant Imaging: I have reviewed all pertinent imaging results/findings within the past 24 hours.

## 2025-05-17 NOTE — PROGRESS NOTES
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 The patient has been increasingly altered over the course of the last several days. He is screaming that he has been kidnapped. He is excited and has been combative with staff. 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  He was apparently combative with staff.  He was agitated on presentation and given IV Ativan in ER.  Noted to be hypothermic on presentation.  Empirically started on broad spectrum ABX's and IVF's.  No obvious source of infection.  CT did show a 2.5 cm subcutaneous collection of air and fluid in the abdominal wall to the right of the surgical incision, concerning for hematoma or abscess.  Patient  with progressive dementia, malnutrition, worsening functional status.  Abdominal CT also did showed bilateral renal masses and sclerotic lesion on thoracic spine.  Possibly underlying malignancy.  Palliative Care consulted as patient is hospice appropriate.      Interval History: no acute events overnight.    Review of Systems   HENT:  Negative for ear discharge and ear pain.    Eyes:  Negative for discharge and itching.   Endocrine: Negative for cold intolerance and heat intolerance.   Neurological:  Negative for seizures and syncope.     Objective:     Vital Signs (Most Recent):  Temp: 98 °F (36.7 °C) (05/17/25 1113)  Pulse: 66 (05/17/25 1113)  Resp: 17 (05/17/25 1113)  BP: (!) 150/77 (05/17/25 1113)  SpO2: 100 % (05/17/25 1113) Vital Signs (24h Range):  Temp:  [97.3 °F (36.3 °C)-98.1 °F (36.7 °C)] 98 °F (36.7 °C)  Pulse:  [56-75] 66  Resp:  [17-20] 17  SpO2:  [100 %] 100 %  BP: (143-152)/(64-81) 150/77     Weight: 68 kg (150 lb)  Body mass index is 19.26 kg/m².    Intake/Output Summary (Last 24 hours) at 5/17/2025 1241  Last data filed at 5/17/2025 1113  Gross per 24 hour   Intake 360 ml   Output 4200 ml   Net -3840 ml         Physical Exam  Vitals and nursing note reviewed.   Constitutional:       General: He is awake.      Appearance: He is cachectic. He is ill-appearing.      Comments: Elderly, frail; currently in wrist restraints    HENT:      Mouth/Throat:      Mouth: Mucous membranes are dry.      Pharynx: No oropharyngeal exudate or posterior oropharyngeal erythema.   Pulmonary:      Effort: Pulmonary effort is normal. No respiratory distress.   Skin:     General: Skin is warm and dry.   Neurological:      Mental Status: He is alert. He is disoriented.               Significant Labs: All pertinent labs within the past 24 hours have been reviewed.  BMP:   Recent Labs   Lab 05/17/25  0554   GLU 84      K 4.6   *   CO2 14*   BUN 14   CREATININE 1.4   CALCIUM 8.0*   MG 1.7     CBC:   Recent Labs    Lab 05/16/25  0504 05/17/25  0554   WBC 4.30 5.42   HGB 7.5* 7.8*   HCT 23.3* 23.1*   PLT  --  145*       Significant Imaging: I have reviewed all pertinent imaging results/findings within the past 24 hours.      Assessment & Plan  Acute metabolic encephalopathy  Hypothermic on presentation.  Empirically started on broad spectrum ABX's.  Seems improved from presentation.  Symptoms may also be related to progression of dementia with behavioral issues.  Carcinoma in situ of colon  Hx of left colectomy.    Colostomy in place  Supportive care,    Anemia of chronic disease  Anemia is likely due to Iron deficiency. Most recent hemoglobin and hematocrit are listed below.  Recent Labs     05/15/25  0414 05/16/25  0504 05/17/25  0554   HGB 7.1* 7.5* 7.8*   HCT 22.8* 23.3* 23.1*     Plan  - Monitor serial CBC: Daily  - Transfuse PRBC if patient becomes hemodynamically unstable, symptomatic or H/H drops below 7/21.  - Patient has not received any PRBC transfusions to date  - Patient's anemia is currently stable  -   History of colon cancer  S/P left colectomy.    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 BMP reviewed- noted Estimated Creatinine Clearance: 34.4 mL/min (based on SCr of 1.4 mg/dL). according to latest data. Based on current GFR, CKD stage is stage 3 - GFR 30-59.  Monitor UOP and serial BMP and adjust therapy as needed. Renally dose meds. Avoid nephrotoxic medications and procedures.  Anemia of chronic renal failure, stage 3b  Anemia is likely due to chronic disease due to Chronic liver disease. Most recent hemoglobin and hematocrit are listed below.  Recent Labs     05/15/25  0414 05/16/25  0504 05/17/25  0554   HGB 7.1* 7.5* 7.8*   HCT 22.8* 23.3* 23.1*     Plan  - Monitor serial CBC: Daily  - Transfuse PRBC if patient becomes hemodynamically unstable, symptomatic or H/H drops below 7/21.  - Patient has not received any PRBC transfusions to date  - Patient's  anemia is currently stable  -   S/P left colectomy  Supportive care,    Severe protein-calorie malnutrition  Nutrition consulted. Most recent weight and BMI monitored-     Measurements:  Wt Readings from Last 1 Encounters:   05/14/25 68 kg (150 lb)   Body mass index is 19.26 kg/m².    Patient has been screened and assessed by RD.    Malnutrition Type:  Context:    Level:      Malnutrition Characteristic Summary:       Interventions/Recommendations (treatment strategy):       Advance care planning  Patient with progressive dementia, malnutrition, worsening functional status.  Abdominal CT also did showed bilateral renal masses and sclerotic lesion on thoracic spine.  Possibly underlying malignancy.  Palliative Care consulted as patient is hospice appropriate.    VTE Risk Mitigation (From admission, onward)      None            Discharge Planning   WIL: 5/19/2025     Code Status: Full Code   Medical Readiness for Discharge Date:   Discharge Plan A: Skilled Nursing Facility                        Darion Mace MD  Department of Hospital Medicine   [facility] - [facility], [facility]

## 2025-05-17 NOTE — ASSESSMENT & PLAN NOTE
Creatine stable for now. BMP reviewed- noted Estimated Creatinine Clearance: 34.4 mL/min (based on SCr of 1.4 mg/dL). according to latest data. Based on current GFR, CKD stage is stage 3 - GFR 30-59.  Monitor UOP and serial BMP and adjust therapy as needed. Renally dose meds. Avoid nephrotoxic medications and procedures.

## 2025-05-18 LAB
MAGNESIUM SERPL-MCNC: 1.6 MG/DL (ref 1.6–2.6)
POCT GLUCOSE: 113 MG/DL (ref 70–110)
POCT GLUCOSE: 126 MG/DL (ref 70–110)
POCT GLUCOSE: 62 MG/DL (ref 70–110)
POCT GLUCOSE: 82 MG/DL (ref 70–110)
POCT GLUCOSE: 87 MG/DL (ref 70–110)
POCT GLUCOSE: 90 MG/DL (ref 70–110)
POCT GLUCOSE: 95 MG/DL (ref 70–110)

## 2025-05-18 PROCEDURE — 80048 BASIC METABOLIC PNL TOTAL CA: CPT | Performed by: HOSPITALIST

## 2025-05-18 PROCEDURE — 85025 COMPLETE CBC W/AUTO DIFF WBC: CPT | Performed by: HOSPITALIST

## 2025-05-18 PROCEDURE — 94761 N-INVAS EAR/PLS OXIMETRY MLT: CPT

## 2025-05-18 PROCEDURE — 83735 ASSAY OF MAGNESIUM: CPT | Performed by: HOSPITALIST

## 2025-05-18 PROCEDURE — 25000003 PHARM REV CODE 250: Performed by: HOSPITALIST

## 2025-05-18 PROCEDURE — 36415 COLL VENOUS BLD VENIPUNCTURE: CPT | Performed by: HOSPITALIST

## 2025-05-18 PROCEDURE — 11000001 HC ACUTE MED/SURG PRIVATE ROOM

## 2025-05-18 PROCEDURE — 63600175 PHARM REV CODE 636 W HCPCS: Performed by: HOSPITALIST

## 2025-05-18 RX ADMIN — PIPERACILLIN SODIUM AND TAZOBACTAM SODIUM 4.5 G: 4; .5 INJECTION, POWDER, FOR SOLUTION INTRAVENOUS at 12:05

## 2025-05-18 RX ADMIN — MUPIROCIN: 20 OINTMENT TOPICAL at 08:05

## 2025-05-18 RX ADMIN — PIPERACILLIN SODIUM AND TAZOBACTAM SODIUM 4.5 G: 4; .5 INJECTION, POWDER, FOR SOLUTION INTRAVENOUS at 08:05

## 2025-05-18 RX ADMIN — PIPERACILLIN SODIUM AND TAZOBACTAM SODIUM 4.5 G: 4; .5 INJECTION, POWDER, FOR SOLUTION INTRAVENOUS at 05:05

## 2025-05-18 RX ADMIN — MUPIROCIN: 20 OINTMENT TOPICAL at 09:05

## 2025-05-18 NOTE — SUBJECTIVE & OBJECTIVE
Interval History: no complaints this morning.    Review of Systems   HENT:  Negative for ear discharge and ear pain.    Eyes:  Negative for discharge and itching.   Endocrine: Negative for cold intolerance and heat intolerance.   Neurological:  Negative for seizures and syncope.     Objective:     Vital Signs (Most Recent):  Temp: 97.8 °F (36.6 °C) (05/18/25 1141)  Pulse: 70 (05/18/25 1141)  Resp: 18 (05/18/25 1141)  BP: (!) 144/79 (05/18/25 1141)  SpO2: 100 % (05/18/25 1141) Vital Signs (24h Range):  Temp:  [97.7 °F (36.5 °C)-98.6 °F (37 °C)] 97.8 °F (36.6 °C)  Pulse:  [66-87] 70  Resp:  [16-18] 18  SpO2:  [98 %-100 %] 100 %  BP: (144-172)/(70-80) 144/79     Weight: 68 kg (150 lb)  Body mass index is 19.26 kg/m².    Intake/Output Summary (Last 24 hours) at 5/18/2025 1205  Last data filed at 5/18/2025 0947  Gross per 24 hour   Intake 600 ml   Output 3450 ml   Net -2850 ml         Physical Exam  Vitals and nursing note reviewed.   Constitutional:       General: He is awake.      Appearance: He is cachectic. He is ill-appearing.      Comments: Elderly, frail; currently in wrist restraints    HENT:      Mouth/Throat:      Mouth: Mucous membranes are dry.      Pharynx: No oropharyngeal exudate or posterior oropharyngeal erythema.   Pulmonary:      Effort: Pulmonary effort is normal. No respiratory distress.   Skin:     General: Skin is warm and dry.   Neurological:      Mental Status: He is alert. He is disoriented.               Significant Labs: All pertinent labs within the past 24 hours have been reviewed.  BMP:   Recent Labs   Lab 05/17/25  0554 05/18/25  0601   GLU 84  --      --    K 4.6  --    *  --    CO2 14*  --    BUN 14  --    CREATININE 1.4  --    CALCIUM 8.0*  --    MG 1.7 1.6     CBC:   Recent Labs   Lab 05/17/25  0554   WBC 5.42   HGB 7.8*   HCT 23.1*   *       Significant Imaging: I have reviewed all pertinent imaging results/findings within the past 24 hours.

## 2025-05-18 NOTE — ASSESSMENT & PLAN NOTE
Anemia is likely due to Iron deficiency. Most recent hemoglobin and hematocrit are listed below.  Recent Labs     05/16/25  0504 05/17/25  0554   HGB 7.5* 7.8*   HCT 23.3* 23.1*     Plan  - Monitor serial CBC: Daily  - Transfuse PRBC if patient becomes hemodynamically unstable, symptomatic or H/H drops below 7/21.  - Patient has not received any PRBC transfusions to date  - Patient's anemia is currently stable  -

## 2025-05-18 NOTE — ASSESSMENT & PLAN NOTE
Anemia is likely due to chronic disease due to Chronic liver disease. Most recent hemoglobin and hematocrit are listed below.  Recent Labs     05/16/25  0504 05/17/25  0554   HGB 7.5* 7.8*   HCT 23.3* 23.1*     Plan  - Monitor serial CBC: Daily  - Transfuse PRBC if patient becomes hemodynamically unstable, symptomatic or H/H drops below 7/21.  - Patient has not received any PRBC transfusions to date  - Patient's anemia is currently stable  -

## 2025-05-18 NOTE — NURSING
PER handoff received from LAZ Slater     Pt resting in bed quietly. NAD noted. No c/o pain.     Fall and safety precautions maintained. Bed alarm activated and audible.. Bed locked in lowest position, with side rails up x2. Call bell and personal items within reach

## 2025-05-18 NOTE — PROGRESS NOTES
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 The patient has been increasingly altered over the course of the last several days. He is screaming that he has been kidnapped. He is excited and has been combative with staff. 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  He was apparently combative with staff.  He was agitated on presentation and given IV Ativan in ER.  Noted to be hypothermic on presentation.  Empirically started on broad spectrum ABX's and IVF's.  No obvious source of infection.  CT did show a 2.5 cm subcutaneous collection of air and fluid in the abdominal wall to the right of the surgical incision, concerning for hematoma or abscess.  Patient  with progressive dementia, malnutrition, worsening functional status.  Abdominal CT also did showed bilateral renal masses and sclerotic lesion on thoracic spine.  Possibly underlying malignancy.  Palliative Care consulted as patient is hospice appropriate.      Interval History: no complaints this morning.    Review of Systems   HENT:  Negative for ear discharge and ear pain.    Eyes:  Negative for discharge and itching.   Endocrine: Negative for cold intolerance and heat intolerance.   Neurological:  Negative for seizures and syncope.     Objective:     Vital Signs (Most Recent):  Temp: 97.8 °F (36.6 °C) (05/18/25 1141)  Pulse: 70 (05/18/25 1141)  Resp: 18 (05/18/25 1141)  BP: (!) 144/79 (05/18/25 1141)  SpO2: 100 % (05/18/25 1141) Vital Signs (24h Range):  Temp:  [97.7 °F (36.5 °C)-98.6 °F (37 °C)] 97.8 °F (36.6 °C)  Pulse:  [66-87] 70  Resp:  [16-18] 18  SpO2:  [98 %-100 %] 100 %  BP: (144-172)/(70-80) 144/79     Weight: 68 kg (150 lb)  Body mass index is 19.26 kg/m².    Intake/Output Summary (Last 24 hours) at 5/18/2025 1205  Last data filed at 5/18/2025 0947  Gross per 24 hour   Intake 600 ml   Output 3450 ml   Net -2850 ml         Physical Exam  Vitals and nursing note reviewed.   Constitutional:       General: He is awake.      Appearance: He is cachectic. He is ill-appearing.      Comments: Elderly, frail; currently in wrist restraints    HENT:      Mouth/Throat:      Mouth: Mucous membranes are dry.      Pharynx: No oropharyngeal exudate or posterior oropharyngeal erythema.   Pulmonary:      Effort: Pulmonary effort is normal. No respiratory distress.   Skin:     General: Skin is warm and dry.   Neurological:      Mental Status: He is alert. He is disoriented.               Significant Labs: All pertinent labs within the past 24 hours have been reviewed.  BMP:   Recent Labs   Lab 05/17/25  0554 05/18/25  0601   GLU 84  --      --    K 4.6  --    *  --    CO2 14*  --    BUN 14  --    CREATININE  1.4  --    CALCIUM 8.0*  --    MG 1.7 1.6     CBC:   Recent Labs   Lab 05/17/25  0554   WBC 5.42   HGB 7.8*   HCT 23.1*   *       Significant Imaging: I have reviewed all pertinent imaging results/findings within the past 24 hours.      Assessment & Plan  Acute metabolic encephalopathy  Hypothermic on presentation.  Empirically started on broad spectrum ABX's.  Seems improved from presentation.  Symptoms may also be related to progression of dementia with behavioral issues.  Carcinoma in situ of colon  Hx of left colectomy.    Colostomy in place  Supportive care,    Anemia of chronic disease  Anemia is likely due to Iron deficiency. Most recent hemoglobin and hematocrit are listed below.  Recent Labs     05/16/25  0504 05/17/25  0554   HGB 7.5* 7.8*   HCT 23.3* 23.1*     Plan  - Monitor serial CBC: Daily  - Transfuse PRBC if patient becomes hemodynamically unstable, symptomatic or H/H drops below 7/21.  - Patient has not received any PRBC transfusions to date  - Patient's anemia is currently stable  -   History of colon cancer  S/P left colectomy.    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 BMP reviewed- noted Estimated Creatinine Clearance: 34.4 mL/min (based on SCr of 1.4 mg/dL). according to latest data. Based on current GFR, CKD stage is stage 3 - GFR 30-59.  Monitor UOP and serial BMP and adjust therapy as needed. Renally dose meds. Avoid nephrotoxic medications and procedures.  Anemia of chronic renal failure, stage 3b  Anemia is likely due to chronic disease due to Chronic liver disease. Most recent hemoglobin and hematocrit are listed below.  Recent Labs     05/16/25  0504 05/17/25  0554   HGB 7.5* 7.8*   HCT 23.3* 23.1*     Plan  - Monitor serial CBC: Daily  - Transfuse PRBC if patient becomes hemodynamically unstable, symptomatic or H/H drops below 7/21.  - Patient has not received any PRBC transfusions to date  - Patient's anemia is currently  stable  -   S/P left colectomy  Supportive care,    Severe protein-calorie malnutrition  Nutrition consulted. Most recent weight and BMI monitored-     Measurements:  Wt Readings from Last 1 Encounters:   05/14/25 68 kg (150 lb)   Body mass index is 19.26 kg/m².    Patient has been screened and assessed by RD.    Malnutrition Type:  Context:    Level:      Malnutrition Characteristic Summary:       Interventions/Recommendations (treatment strategy):       Advance care planning  Patient with progressive dementia, malnutrition, worsening functional status.  Abdominal CT also did showed bilateral renal masses and sclerotic lesion on thoracic spine.  Possibly underlying malignancy.  Palliative Care consulted as patient is hospice appropriate.    VTE Risk Mitigation (From admission, onward)      None            Discharge Planning   WIL: 5/19/2025     Code Status: Full Code   Medical Readiness for Discharge Date:   Discharge Plan A: Skilled Nursing Facility                        Darion Mace MD  Department of Hospital Medicine   [facility] [place] [facility]

## 2025-05-18 NOTE — NURSING
[facility] [place]  Nurses Note -- 4 Eyes      5/18/2025       Skin assessed on: Q Shift      [x] No Pressure Injuries Present    [x]Prevention Measures Documented    [] Yes LDA  for Pressure Injury Previously documented     [] Yes New Pressure Injury Discovered   [] LDA for New Pressure Injury Added      Attending RN:  Yarelis Juan RN     Second RN:  LAZ Slater

## 2025-05-18 NOTE — PLAN OF CARE
Problem: Infection  Goal: Absence of Infection Signs and Symptoms  Outcome: Progressing     Problem: Adult Inpatient Plan of Care  Goal: Plan of Care Review  Outcome: Progressing     Problem: Skin Injury Risk Increased  Goal: Skin Health and Integrity  Outcome: Progressing     Problem: Fall Injury Risk  Goal: Absence of Fall and Fall-Related Injury  Outcome: Progressing

## 2025-05-19 LAB
ABSOLUTE EOSINOPHIL (OHS): 0.08 K/UL
ABSOLUTE MONOCYTE (OHS): 0.59 K/UL (ref 0.3–1)
ABSOLUTE NEUTROPHIL COUNT (OHS): 4.35 K/UL (ref 1.8–7.7)
ANION GAP (OHS): 7 MMOL/L (ref 8–16)
BACTERIA BLD CULT: NORMAL
BACTERIA BLD CULT: NORMAL
BASOPHILS # BLD AUTO: 0.03 K/UL
BASOPHILS NFR BLD AUTO: 0.5 %
BUN SERPL-MCNC: 18 MG/DL (ref 8–23)
CALCIUM SERPL-MCNC: 8.5 MG/DL (ref 8.7–10.5)
CHLORIDE SERPL-SCNC: 114 MMOL/L (ref 95–110)
CO2 SERPL-SCNC: 19 MMOL/L (ref 23–29)
CREAT SERPL-MCNC: 1.6 MG/DL (ref 0.5–1.4)
ERYTHROCYTE [DISTWIDTH] IN BLOOD BY AUTOMATED COUNT: 14.9 % (ref 11.5–14.5)
GFR SERPLBLD CREATININE-BSD FMLA CKD-EPI: 41 ML/MIN/1.73/M2
GLUCOSE SERPL-MCNC: 99 MG/DL (ref 70–110)
HCT VFR BLD AUTO: 25 % (ref 40–54)
HGB BLD-MCNC: 8 GM/DL (ref 14–18)
HOLD SPECIMEN: NORMAL
IMM GRANULOCYTES # BLD AUTO: 0.04 K/UL (ref 0–0.04)
IMM GRANULOCYTES NFR BLD AUTO: 0.6 % (ref 0–0.5)
LYMPHOCYTES # BLD AUTO: 1.09 K/UL (ref 1–4.8)
MAGNESIUM SERPL-MCNC: 1.5 MG/DL (ref 1.6–2.6)
MCH RBC QN AUTO: 28.4 PG (ref 27–31)
MCHC RBC AUTO-ENTMCNC: 32 G/DL (ref 32–36)
MCV RBC AUTO: 89 FL (ref 82–98)
NUCLEATED RBC (/100WBC) (OHS): 0 /100 WBC
PLATELET # BLD AUTO: 173 K/UL (ref 150–450)
PMV BLD AUTO: 11.5 FL (ref 9.2–12.9)
POCT GLUCOSE: 131 MG/DL (ref 70–110)
POCT GLUCOSE: 87 MG/DL (ref 70–110)
POTASSIUM SERPL-SCNC: 4.4 MMOL/L (ref 3.5–5.1)
RBC # BLD AUTO: 2.82 M/UL (ref 4.6–6.2)
RELATIVE EOSINOPHIL (OHS): 1.3 %
RELATIVE LYMPHOCYTE (OHS): 17.6 % (ref 18–48)
RELATIVE MONOCYTE (OHS): 9.5 % (ref 4–15)
RELATIVE NEUTROPHIL (OHS): 70.5 % (ref 38–73)
SODIUM SERPL-SCNC: 140 MMOL/L (ref 136–145)
WBC # BLD AUTO: 6.18 K/UL (ref 3.9–12.7)

## 2025-05-19 PROCEDURE — 99497 ADVNCD CARE PLAN 30 MIN: CPT | Mod: 25,,, | Performed by: REGISTERED NURSE

## 2025-05-19 PROCEDURE — 63600175 PHARM REV CODE 636 W HCPCS: Performed by: HOSPITALIST

## 2025-05-19 PROCEDURE — 99498 ADVNCD CARE PLAN ADDL 30 MIN: CPT | Mod: ,,, | Performed by: REGISTERED NURSE

## 2025-05-19 PROCEDURE — 99233 SBSQ HOSP IP/OBS HIGH 50: CPT | Mod: 25,,, | Performed by: REGISTERED NURSE

## 2025-05-19 PROCEDURE — 25000003 PHARM REV CODE 250: Performed by: HOSPITALIST

## 2025-05-19 PROCEDURE — 94761 N-INVAS EAR/PLS OXIMETRY MLT: CPT

## 2025-05-19 PROCEDURE — 11000001 HC ACUTE MED/SURG PRIVATE ROOM

## 2025-05-19 PROCEDURE — 36415 COLL VENOUS BLD VENIPUNCTURE: CPT | Performed by: HOSPITALIST

## 2025-05-19 PROCEDURE — 83735 ASSAY OF MAGNESIUM: CPT | Performed by: HOSPITALIST

## 2025-05-19 RX ADMIN — MUPIROCIN: 20 OINTMENT TOPICAL at 08:05

## 2025-05-19 RX ADMIN — PIPERACILLIN SODIUM AND TAZOBACTAM SODIUM 4.5 G: 4; .5 INJECTION, POWDER, FOR SOLUTION INTRAVENOUS at 05:05

## 2025-05-19 RX ADMIN — MUPIROCIN: 20 OINTMENT TOPICAL at 09:05

## 2025-05-19 NOTE — ASSESSMENT & PLAN NOTE
Creatine stable for now. BMP reviewed- noted Estimated Creatinine Clearance: 30.1 mL/min (A) (based on SCr of 1.6 mg/dL (H)). according to latest data. Based on current GFR, CKD stage is stage 3 - GFR 30-59.  Monitor UOP and serial BMP and adjust therapy as needed. Renally dose meds. Avoid nephrotoxic medications and procedures.

## 2025-05-19 NOTE — SUBJECTIVE & OBJECTIVE
Interval History: no acute events overnight.    Review of Systems   HENT:  Negative for ear discharge and ear pain.    Eyes:  Negative for discharge and itching.   Endocrine: Negative for cold intolerance and heat intolerance.   Neurological:  Negative for seizures and syncope.     Objective:     Vital Signs (Most Recent):  Temp: 98.1 °F (36.7 °C) (05/19/25 1216)  Pulse: 71 (05/19/25 1216)  Resp: 18 (05/19/25 1216)  BP: (!) 147/78 (05/19/25 1216)  SpO2: 100 % (05/19/25 1216) Vital Signs (24h Range):  Temp:  [98 °F (36.7 °C)-98.5 °F (36.9 °C)] 98.1 °F (36.7 °C)  Pulse:  [65-76] 71  Resp:  [18] 18  SpO2:  [99 %-100 %] 100 %  BP: (147-168)/(74-88) 147/78     Weight: 68 kg (150 lb)  Body mass index is 19.26 kg/m².    Intake/Output Summary (Last 24 hours) at 5/19/2025 1516  Last data filed at 5/19/2025 1248  Gross per 24 hour   Intake 480 ml   Output 2100 ml   Net -1620 ml         Physical Exam  Vitals and nursing note reviewed.   Constitutional:       General: He is awake.      Appearance: He is cachectic. He is ill-appearing.      Comments: Elderly, frail; currently in wrist restraints    HENT:      Mouth/Throat:      Mouth: Mucous membranes are dry.      Pharynx: No oropharyngeal exudate or posterior oropharyngeal erythema.   Pulmonary:      Effort: Pulmonary effort is normal. No respiratory distress.   Skin:     General: Skin is warm and dry.   Neurological:      Mental Status: He is alert. He is disoriented.               Significant Labs: All pertinent labs within the past 24 hours have been reviewed.  BMP:   Recent Labs   Lab 05/18/25  2354 05/19/25  0515   GLU 99  --      --    K 4.4  --    *  --    CO2 19*  --    BUN 18  --    CREATININE 1.6*  --    CALCIUM 8.5*  --    MG  --  1.5*     CBC:   Recent Labs   Lab 05/18/25  2354   WBC 6.18   HGB 8.0*   HCT 25.0*          Significant Imaging: I have reviewed all pertinent imaging results/findings within the past 24 hours.

## 2025-05-19 NOTE — ASSESSMENT & PLAN NOTE
Hypothermic on presentation.  Empirically started on broad spectrum ABX's.  Seems improved from presentation.  Symptoms may also be related to progression of dementia with behavioral issues.  Has remained calm and cooperative over past couple of days with intermittent ativan.  Not requiring any restraints.

## 2025-05-19 NOTE — ASSESSMENT & PLAN NOTE
5/19/2025  - interval chart reviewed; pt discussed with HM .  - met with pt at bedside, improved cooperativeness, no longer requiring restraints; he goes get somewhat pressured in speech and anxious during discussions but is easily redirected   - spoke with daughterNicol, by phone regarding pt being medically stable for discharge and to revisit discussion of home hospice vs shelter with hospice   - pt's children at in agreement with home hospice, and wish to avoid shelter placement if possible; they are coordinating 24/7 care for pt and cleaning out pt's home to make it safe for pt to be there (daughter shares potential hoarding, need to clear space for equipment and to sanitize living areas   - daughter does not feel the home could be ready to safely care for pt in the next 24 hours   - discussed symptom management needs and potential respite care needs with hospice team prior to pt going home with hospice   - daughter with preference for [facility] as they cared for her late    - [facility] SW to come evaluate pt and needs; visited daughter at bedside to share this information  - reviewed GOC/ACP further; will plan for inpatient hospice with transition to home hospice; have cautioned against alternative plan of returning to SNF for remaining days of qualification as this would likely complicate delirium and pt's potential benefit with only a few days isn't likely   - ongoing communication with MDT and hospice provider to facilitate discharge plans that align with GO    5/16/2025  - interval chart reviewed; pt discussed with HM   - met with pt and daughterNicol, at bedside  - pt remains without capacity for medical decision making; improved cooperativeness and is no longer requiring restraints, but is still confused and anxious (prior to coordinating dose of IV ativan with bedside nurse)   - daughter Nicol continues to have good overall insight into pt's condition, consistent with prior  admission   - overall GOC is to optimize pt's mental status and treating reversible conditions  - good insight that continuing with the few days remaining of SNF will likely not produce significant changes in pt's physical abilities and likely only worsen his mental abilities   - she is hopeful to be able to work with pt's total of 7 children to be able to organize a plan for 24/7 care for pt when she is at work for pt to resume living with her; if not able to do so will have to explore senior living options; her preference would be for [facility] as she was happy with care there with SNF   - reviewed hospice and philosophy of care; she is familiar with hospice care as her mother (pts wife) and her  were both cared for by hospice at the end of their lives and Nicol was their primary caregivers as well   - Nicol is agreeable to Hospice either at home or senior living NH following optimization; preference for [facility] as they cared for other family members on hospice; I supported this preference as the respite option would be beneficial to Ms. Camarena given pt's current requirements for 24/7 care   - in depth discussion overall regarding potential baseline dementia that has had rapid progression and periods of increased delirium with hospitalizations, surgery, SNF/changes in location; daughter with good insight   - Nicol's wishes and GOC for pt align with DNR/DNI, however she shares that some of her siblings would be very against that and will require further discussion so  pt will remain full code  for now   - Nicol plans to coordinate with her siblings/pt's children to determine if plan will be for home or senior living placement; shared that I would notify HM and CM of discussion/GOC for coordination   - emotional support provided; answered all questions   - updated HM in person and CM via secure chat    5/15/2025 - Consult   - consult received; interval chart reviewed in detail; discussed pt MDT during ICU rounds   - pt  known to myself and palliative medicine team from previous admission; shared information and techniques that were helpful in prior admission, especially related to pt's visual and hearing impairments   - pt has 7 children who share legal surrogate decision making; in prior admission in moments of better mental status pt directed team to discuss his needs with daughter, Nicol; pt lives with his daughter Nicol (who is a PCT in psych dept at [facility]) and one of his sons   - met with patient at bedside; introduction to palliative medicine team and role in current care and admission; pt with AMS and does not have capacity for GOC/ACP   - called pt's daughter, Nicol, who was aware of admission and shared her concerns regarding pt's continued decline despite SNF care; pt has been able to walk up to 6ft but overall has not made the progress that they were hoping for to get near prior physical abilities   - before prior admission pt was ambulatory and able to provide most of own ADLs, required some assistance or set up at times; he had recently stopped driving, but overall was active and had good QOL   - reviewed trajectory given age, comorbidities, surgery, hospital admission and additional changes in location to SNF for pt   - daughter plans to visit tomorrow when not working for further GOC discussion   - emotional support provided   - Allowed time for questions/concerns; all addressed; expressed availability of myself/palliative team for additional questions/concerns   - updated MDT

## 2025-05-19 NOTE — ASSESSMENT & PLAN NOTE
Anemia is likely due to chronic disease due to Chronic liver disease. Most recent hemoglobin and hematocrit are listed below.  Recent Labs     05/17/25  0554 05/18/25  2354   HGB 7.8* 8.0*   HCT 23.1* 25.0*     Plan  - Monitor serial CBC: Daily  - Transfuse PRBC if patient becomes hemodynamically unstable, symptomatic or H/H drops below 7/21.  - Patient has not received any PRBC transfusions to date  - Patient's anemia is currently stable  -

## 2025-05-19 NOTE — ASSESSMENT & PLAN NOTE
- prior to 4/18/25 admission pt had >13% body weight loss 6 months prior and >8% body weight loss  - following hospitalization and transfer to SNF daughter is concerned for continued weight loss and poor intake   - albumin 2; pt cachectic and frail, mental status now complicating PO intake further   - contributes to appropriateness for hospice

## 2025-05-19 NOTE — ASSESSMENT & PLAN NOTE
Nutrition consulted. Most recent weight and BMI monitored-     Measurements:  Wt Readings from Last 1 Encounters:   05/18/25 68 kg (150 lb)   Body mass index is 19.26 kg/m².    Patient has been screened and assessed by RD.    Malnutrition Type:  Context:    Level:      Malnutrition Characteristic Summary:       Interventions/Recommendations (treatment strategy):

## 2025-05-19 NOTE — PROGRESS NOTES
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 The patient has been increasingly altered over the course of the last several days. He is screaming that he has been kidnapped. He is excited and has been combative with staff. 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  He was apparently combative with staff.  He was agitated on presentation and given IV Ativan in ER.  Noted to be hypothermic on presentation.  Empirically started on broad spectrum ABX's and IVF's.  No obvious source of infection.  CT did show a 2.5 cm subcutaneous collection of air and fluid in the abdominal wall to the right of the surgical incision, concerning for hematoma or abscess.  Patient  with progressive dementia, malnutrition, worsening functional status.  Abdominal CT also did showed bilateral renal masses and sclerotic lesion on thoracic spine.  Possibly underlying malignancy.  Palliative Care consulted as patient is hospice appropriate.  Patient has remained calm and cooperative over past few days.  Working on possible hospice arrangements.      Interval History: no acute events overnight.    Review of Systems   HENT:  Negative for ear discharge and ear pain.    Eyes:  Negative for discharge and itching.   Endocrine: Negative for cold intolerance and heat intolerance.   Neurological:  Negative for seizures and syncope.     Objective:     Vital Signs (Most Recent):  Temp: 98.1 °F (36.7 °C) (05/19/25 1216)  Pulse: 71 (05/19/25 1216)  Resp: 18 (05/19/25 1216)  BP: (!) 147/78 (05/19/25 1216)  SpO2: 100 % (05/19/25 1216) Vital Signs (24h Range):  Temp:  [98 °F (36.7 °C)-98.5 °F (36.9 °C)] 98.1 °F (36.7 °C)  Pulse:  [65-76] 71  Resp:  [18] 18  SpO2:  [99 %-100 %] 100 %  BP: (147-168)/(74-88) 147/78     Weight: 68 kg (150 lb)  Body mass index is 19.26 kg/m².    Intake/Output Summary (Last 24 hours) at 5/19/2025 1516  Last data filed at 5/19/2025 1248  Gross per 24 hour   Intake 480 ml   Output 2100 ml   Net -1620 ml         Physical Exam  Vitals and nursing note reviewed.   Constitutional:       General: He is awake.      Appearance: He is cachectic. He is ill-appearing.      Comments: Elderly, frail; currently in wrist restraints    HENT:      Mouth/Throat:      Mouth: Mucous membranes are dry.      Pharynx: No oropharyngeal exudate or posterior oropharyngeal erythema.   Pulmonary:      Effort: Pulmonary effort is normal. No respiratory distress.   Skin:     General: Skin is warm and dry.   Neurological:      Mental Status: He is alert. He is disoriented.               Significant Labs: All pertinent labs within the past 24 hours have been reviewed.  BMP:   Recent Labs   Lab 05/18/25  6942  05/19/25  0515   GLU 99  --      --    K 4.4  --    *  --    CO2 19*  --    BUN 18  --    CREATININE 1.6*  --    CALCIUM 8.5*  --    MG  --  1.5*     CBC:   Recent Labs   Lab 05/18/25  2354   WBC 6.18   HGB 8.0*   HCT 25.0*          Significant Imaging: I have reviewed all pertinent imaging results/findings within the past 24 hours.      Assessment & Plan  Acute metabolic encephalopathy  Hypothermic on presentation.  Empirically started on broad spectrum ABX's.  Seems improved from presentation.  Symptoms may also be related to progression of dementia with behavioral issues.  Has remained calm and cooperative over past couple of days with intermittent ativan.  Not requiring any restraints.  Carcinoma in situ of colon  Hx of left colectomy.    Colostomy in place  Supportive care,    Anemia of chronic disease  Anemia is likely due to Iron deficiency. Most recent hemoglobin and hematocrit are listed below.  Recent Labs     05/17/25  0554 05/18/25  2354   HGB 7.8* 8.0*   HCT 23.1* 25.0*     Plan  - Monitor serial CBC: Daily  - Transfuse PRBC if patient becomes hemodynamically unstable, symptomatic or H/H drops below 7/21.  - Patient has not received any PRBC transfusions to date  - Patient's anemia is currently stable  -   History of colon cancer  S/P left colectomy.    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 BMP reviewed- noted Estimated Creatinine Clearance: 30.1 mL/min (A) (based on SCr of 1.6 mg/dL (H)). according to latest data. Based on current GFR, CKD stage is stage 3 - GFR 30-59.  Monitor UOP and serial BMP and adjust therapy as needed. Renally dose meds. Avoid nephrotoxic medications and procedures.  Anemia of chronic renal failure, stage 3b  Anemia is likely due to chronic disease due to Chronic liver disease. Most recent hemoglobin and hematocrit are listed below.  Recent Labs     05/17/25  0554 05/18/25  2354   HGB 7.8* 8.0*   HCT 23.1*  25.0*     Plan  - Monitor serial CBC: Daily  - Transfuse PRBC if patient becomes hemodynamically unstable, symptomatic or H/H drops below 7/21.  - Patient has not received any PRBC transfusions to date  - Patient's anemia is currently stable  -   S/P left colectomy  Supportive care,    Severe protein-calorie malnutrition  Nutrition consulted. Most recent weight and BMI monitored-     Measurements:  Wt Readings from Last 1 Encounters:   05/18/25 68 kg (150 lb)   Body mass index is 19.26 kg/m².    Patient has been screened and assessed by RD.    Malnutrition Type:  Context:    Level:      Malnutrition Characteristic Summary:       Interventions/Recommendations (treatment strategy):       Advance care planning  Patient with progressive dementia, malnutrition, worsening functional status.  Abdominal CT also did showed bilateral renal masses and sclerotic lesion on thoracic spine.  Possibly underlying malignancy.  Palliative Care consulted as patient is hospice appropriate.    VTE Risk Mitigation (From admission, onward)      None            Discharge Planning   WIL: 5/21/2025     Code Status: Full Code   Medical Readiness for Discharge Date:   Discharge Plan A: Hospice/home   Discharge Delays: None known at this time                    Darion Mace MD  Department of Hospital Medicine   [facility] - [place] - Telemetry

## 2025-05-19 NOTE — PLAN OF CARE
Changes in medical condition or discharge plan: Patient's family has decided on hospice and prefers to use [facility]. Daughter informed Estella in palliative care that she will need a couple of days to make arrangements at work and rearrange the house for patient's discharge.     Estella informed CM Mgr that patient may meet criteria for inpatient hospice or GIP. Estella to get with CM Mgr with definitive answer.     Does patient need new DME? If needed, Hospice to provide.     Follow up appts needed: NA    Medically stable: Yes    Estimated Discharge Date: 5/20/24    Noreen with [facility] notified CM Mgr that paperwork has been completed and patient can come to facility tomorrow if he has been given medication for anxiety.     Per Estella, medication has already been ordered.        05/19/25 1200   Discharge Reassessment   Assessment Type Discharge Planning Reassessment   Discharge Plan A Hospice/home   Discharge Plan B Hospice/home   DME Needed Upon Discharge  none   Transition of Care Barriers None   Post-Acute Status   Post-Acute Authorization Hospice   Hospice Status Pending qualifying diagnosis   Discharge Delays None known at this time

## 2025-05-19 NOTE — NURSING
[facility], [facility]  Nurses Note -- 4 Eyes      5/19/2025       Skin assessed on: Q Shift      [x] No Pressure Injuries Present    [x]Prevention Measures Documented    [] Yes LDA  for Pressure Injury Previously documented     [] Yes New Pressure Injury Discovered   [] LDA for New Pressure Injury Added      Attending RN:  Yarelis Juan, RN     Second RN:  Sheyla HINDS

## 2025-05-19 NOTE — PLAN OF CARE
Problem: Adult Inpatient Plan of Care  Goal: Optimal Comfort and Wellbeing  Outcome: Progressing     Problem: Coping Ineffective  Goal: Effective Coping  Outcome: Progressing     Problem: Skin Injury Risk Increased  Goal: Skin Health and Integrity  Outcome: Progressing     Problem: Fall Injury Risk  Goal: Absence of Fall and Fall-Related Injury  Outcome: Progressing     Problem: Confusion Acute  Goal: Optimal Cognitive Function  Outcome: Progressing

## 2025-05-19 NOTE — ASSESSMENT & PLAN NOTE
- pt presented from [facility] due to AMS which as worsened with admission progressing to delirium, combativeness, and paranoia; pt with hisotry of similar delirium in prior admissions   - pt requires glasses, which are not with pt at this time (confirmed to still be at [facility] in his room) and is extremely hearing impaired which complicates mental status; asked daughter to obtain pt's glasses from facility   - pt requires speaking in very high volumes directly into his left ear to hear   - Ativan IV PRN has been effective this admission; pt has not required restraints for several days and has been very pleasantly disoriented and cooperative; disorientation/delirium with overall improvement but pt is still anxious and worried, reports lack of sleep at night; have discussed with nursing staff admin of ativan HS to assist with this

## 2025-05-19 NOTE — ASSESSMENT & PLAN NOTE
Anemia is likely due to Iron deficiency. Most recent hemoglobin and hematocrit are listed below.  Recent Labs     05/17/25  0554 05/18/25  2354   HGB 7.8* 8.0*   HCT 23.1* 25.0*     Plan  - Monitor serial CBC: Daily  - Transfuse PRBC if patient becomes hemodynamically unstable, symptomatic or H/H drops below 7/21.  - Patient has not received any PRBC transfusions to date  - Patient's anemia is currently stable  -

## 2025-05-19 NOTE — NURSING
[facility], [facility]  Nurses Note -- 4 Eyes      5/18/2025       Skin assessed on: Q Shift      [x] No Pressure Injuries Present    []Prevention Measures Documented    [] Yes LDA  for Pressure Injury Previously documented     [] Yes New Pressure Injury Discovered   [] LDA for New Pressure Injury Added      Attending RN:  Sheyla Bernal LPN     Second RN:  Yarelis Juan RN

## 2025-05-19 NOTE — NURSING
PER handoff received from LIZET Carrion      Pt resting in bed quietly. NAD noted. No c/o pain.     Fall and safety precautions maintained. Bed alarm activated and audible.. Bed locked in lowest position, with side rails up x2. Call bell and personal items within reach

## 2025-05-19 NOTE — PROGRESS NOTES
[facility] - Telemetry  Palliative Medicine  Progress Note    Patient Name: Eugene Gamez  MRN: [id number]  Admission Date: 5/14/2025  Hospital Length of Stay: 5 days  Code Status: Full Code   Attending Provider: Darion Mace MD  Consulting Provider: Estella Argueta NP  Primary Care Physician: Larry Monae MD  Principal Problem:Acute metabolic encephalopathy    Patient information was obtained from patient, relative(s), and primary team.      Assessment/Plan:     Neuro  * Acute metabolic encephalopathy  - pt presented from [facility] due to AMS which as worsened with admission progressing to delirium, combativeness, and paranoia; pt with hisotry of similar delirium in prior admissions   - pt requires glasses, which are not with pt at this time (confirmed to still be at [facility] in his room) and is extremely hearing impaired which complicates mental status; asked daughter to obtain pt's glasses from facility   - pt requires speaking in very high volumes directly into his left ear to hear   - Ativan IV PRN has been effective this admission; pt has not required restraints for several days and has been very pleasantly disoriented and cooperative; disorientation/delirium with overall improvement but pt is still anxious and worried, reports lack of sleep at night; have discussed with nursing staff admin of ativan HS to assist with this     Oncology  History of colon cancer  - history noted    Carcinoma in situ of colon  - noted     Endocrine  Severe protein-calorie malnutrition  - prior to 4/18/25 admission pt had >13% body weight loss 6 months prior and >8% body weight loss  - following hospitalization and transfer to SNF daughter is concerned for continued weight loss and poor intake   - albumin 2; pt cachectic and frail, mental status now complicating PO intake further   - contributes to appropriateness for hospice     GI  S/P left colectomy  - history noted; currently with colostomy     Palliative  Care  Advance care planning  5/19/2025  - interval chart reviewed; pt discussed with HM .  - met with pt at bedside, improved cooperativeness, no longer requiring restraints; he goes get somewhat pressured in speech and anxious during discussions but is easily redirected   - spoke with daughterNicol, by phone regarding pt being medically stable for discharge and to revisit discussion of home hospice vs halfway with hospice   - pt's children at in agreement with home hospice, and wish to avoid halfway placement if possible; they are coordinating 24/7 care for pt and cleaning out pt's home to make it safe for pt to be there (daughter shares potential hoarding, need to clear space for equipment and to sanitize living areas   - daughter does not feel the home could be ready to safely care for pt in the next 24 hours   - discussed symptom management needs and potential respite care needs with hospice team prior to pt going home with hospice   - daughter with preference for [facility] as [facility] cared for her late    - [facility] SW to come evaluate pt and needs; visited daughter at bedside to share this information  - reviewed GOC/ACP further; will plan for inpatient hospice with transition to home hospice; have cautioned against alternative plan of returning to SNF for remaining days of qualification as this would likely complicate delirium and pt's potential benefit with only a few days isn't likely   - ongoing communication with MDT and hospice provider to facilitate discharge plans that align with GOC    5/16/2025  - interval chart reviewed; pt discussed with HM   - met with pt and daughterNicol, at bedside  - pt remains without capacity for medical decision making; improved cooperativeness and is no longer requiring restraints, but is still confused and anxious (prior to coordinating dose of IV ativan with bedside nurse)   - daughter Nicol continues to have good overall insight into pt's condition,  consistent with prior admission   - overall GOC is to optimize pt's mental status and treating reversible conditions  - good insight that continuing with the few days remaining of SNF will likely not produce significant changes in pt's physical abilities and likely only worsen his mental abilities   - she is hopeful to be able to work with pt's total of 7 children to be able to organize a plan for 24/7 care for pt when she is at work for pt to resume living with her; if not able to do so will have to explore snf options; her preference would be for [facility] as she was happy with care there with SNF   - reviewed hospice and philosophy of care; she is familiar with hospice care as her mother (pts wife) and her  were both cared for by hospice at the end of their lives and Nicol was their primary caregivers as well   - Nicol is agreeable to Hospice either at home or snf NH following optimization; preference for [facility] as they cared for other family members on hospice; I supported this preference as the respite option would be beneficial to Ms. Camarena given pt's current requirements for 24/7 care   - in depth discussion overall regarding potential baseline dementia that has had rapid progression and periods of increased delirium with hospitalizations, surgery, SNF/changes in location; daughter with good insight   - Nicol's wishes and GOC for pt align with DNR/DNI, however she shares that some of her siblings would be very against that and will require further discussion so  pt will remain full code  for now   - Nicol plans to coordinate with her siblings/pt's children to determine if plan will be for home or snf placement; shared that I would notify HM and CM of discussion/GOC for coordination   - emotional support provided; answered all questions   - updated HM in person and CM via secure chat    5/15/2025 - Consult   - consult received; interval chart reviewed in detail; discussed pt MDT  during ICU rounds   - pt known to myself and palliative medicine team from previous admission; shared information and techniques that were helpful in prior admission, especially related to pt's visual and hearing impairments   - pt has 7 children who share legal surrogate decision making; in prior admission in moments of better mental status pt directed team to discuss his needs with daughter, Nicol; pt lives with his daughter Nicol (who is a PCT in psych dept at [facility]) and one of his sons   - met with patient at bedside; introduction to palliative medicine team and role in current care and admission; pt with AMS and does not have capacity for GOC/ACP   - called pt's daughter, Nicol, who was aware of admission and shared her concerns regarding pt's continued decline despite SNF care; pt has been able to walk up to 6ft but overall has not made the progress that they were hoping for to get near prior physical abilities   - before prior admission pt was ambulatory and able to provide most of own ADLs, required some assistance or set up at times; he had recently stopped driving, but overall was active and had good QOL   - reviewed trajectory given age, comorbidities, surgery, hospital admission and additional changes in location to SNF for pt   - daughter plans to visit tomorrow when not working for further GOC discussion   - emotional support provided   - Allowed time for questions/concerns; all addressed; expressed availability of myself/palliative team for additional questions/concerns   - updated MDT         I will follow-up with patient. Please contact us if you have any additional questions.    Subjective:     Chief Complaint:   Chief Complaint   Patient presents with    Altered Mental Status     Pt arrived via ems, pt chief complaint is Altered mental status. Pt is screaming that he is being kidnapped. Per nursing home has been increasingly alerted over last few days but today has been very combative.        HPI:  "  From H&P: "This 89-year-old black male with PMH of dementia,Hx of colostomy for presumed perforation of intestine,HX of colon cancer with left colectomy,COPD,severe malnutrition,NH resident,malnutrition,.presents emergency room being transported from his nursing home by EMS because of altered mental status. The patient has been increasingly altered over the course of the last several days. He is screaming that he has been kidnapped. He is excited and has been combative with staff. He speaks almost constantly , but seems to be very confused and will not answer the questions that are asked of him.duo to janesr agitating patient received IV Ativan in ER, and much calmer,but patient remains confuse,patient is hypothermic 94,was started on bear hug,on IV Abx and cultures are done,VS are stable at this time amnd labs show lactic acidosis 2.4,head CT with no acute process,started on IVF,not able do ROS,duo to AMS, "    Palliative medicine consulted for goals of care discussion and advance care planning; for details of visit, see advance care planning section of plan.       Hospital Course:  No notes on file    Medications:  Continuous Infusions:      Scheduled Meds:   mupirocin   Nasal BID     PRN Meds:  Current Facility-Administered Medications:     acetaminophen, 650 mg, Oral, Q4H PRN    dextromethorphan-guaiFENesin  mg/5 ml, 5 mL, Oral, Q6H PRN    lorazepam, 0.5 mg, Intravenous, Q4H PRN    ondansetron, 8 mg, Intravenous, Q6H PRN    polyethylene glycol, 17 g, Oral, BID PRN    Objective:     Vital Signs (Most Recent):  Temp: 98.1 °F (36.7 °C) (05/19/25 1216)  Pulse: 71 (05/19/25 1216)  Resp: 18 (05/19/25 1216)  BP: (!) 147/78 (05/19/25 1216)  SpO2: 100 % (05/19/25 1216) Vital Signs (24h Range):  Temp:  [98 °F (36.7 °C)-98.5 °F (36.9 °C)] 98.1 °F (36.7 °C)  Pulse:  [65-76] 71  Resp:  [18] 18  SpO2:  [99 %-100 %] 100 %  BP: (147-168)/(74-88) 147/78     Weight: 68 kg (150 lb)  Body mass index is 19.26 kg/m².     "   Physical Exam  Vitals and nursing note reviewed.   Constitutional:       General: He is awake.      Appearance: He is cachectic. He is ill-appearing.      Comments: Elderly, frail   HENT:      Head: Atraumatic.      Comments: Temporal wasting  Eyes:      Comments: Glasses not present in room or on pt   Pulmonary:      Effort: Pulmonary effort is normal. No respiratory distress.   Neurological:      Mental Status: He is alert. He is disoriented.      Comments: Responds to his name, and able to identify daughter, knows he is not at home    Psychiatric:         Mood and Affect: Mood is anxious.         Behavior: Behavior is cooperative.       Advance Care Planning   Advance Directives:   Living Will: Yes        Copy on chart: Yes    LaPOST: No    Do Not Resuscitate Status: No    Medical Power of : Yes    Goals of Care: What is most important right now is to focus on remaining as independent as possible, improvement in condition but with limits to invasive therapies. Accordingly, we have decided that the best plan to meet the patient's goals includes continuing with treatment.         Significant Labs: All pertinent labs within the past 24 hours have been reviewed.  CBC:   Recent Labs   Lab 05/18/25  2354   WBC 6.18   HGB 8.0*   HCT 25.0*   MCV 89        BMP:  Recent Labs   Lab 05/18/25  2354 05/19/25  0515   GLU 99  --      --    K 4.4  --    *  --    CO2 19*  --    BUN 18  --    CREATININE 1.6*  --    CALCIUM 8.5*  --    MG  --  1.5*     LFT:  Lab Results   Component Value Date    AST 13 05/17/2025    ALKPHOS 44 05/17/2025    BILITOT 0.5 05/17/2025     Albumin:   Albumin   Date Value Ref Range Status   05/17/2025 1.9 (L) 3.5 - 5.2 g/dL Final   04/11/2025 4.2 3.4 - 5.0 g/dL Final   03/20/2025 4.2 3.5 - 5.2 g/dL Final     Protein:   Protein Total   Date Value Ref Range Status   05/17/2025 4.7 (L) 6.0 - 8.4 gm/dL Final     Total Protein   Date Value Ref Range Status   03/20/2025 7.8 6.0 - 8.4  g/dL Final     Lactic acid:   Lab Results   Component Value Date    LACTATE 0.5 05/14/2025    LACTATE 2.4 (H) 05/14/2025       Significant Imaging: I have reviewed all pertinent imaging results/findings within the past 24 hours.    Total visit time: 85 minutes    35 min visit time including: face to face time in discussion of symptom assessment, and exploring options and burdens of offered treatments.  This also includes non-face to face time preparing to see the patient including chart review, obtaining and/or reviewing separately obtained history, documenting clinical information in the electronic or other health record, independently interpreting results and communicating results to the patient/family/caregiver, family discussions by phone if not able to be present, coordination of care with other specialists, and discharge planning.     50 min ACP time spent: goals of care, advanced care planning, emotional support, formulating and communicating prognosis, exploring burden/ benefit of various approaches of treatment.     Estella Argueta NP  Palliative Medicine  [facility]

## 2025-05-19 NOTE — SUBJECTIVE & OBJECTIVE
Medications:  Continuous Infusions:      Scheduled Meds:   mupirocin   Nasal BID     PRN Meds:  Current Facility-Administered Medications:     acetaminophen, 650 mg, Oral, Q4H PRN    dextromethorphan-guaiFENesin  mg/5 ml, 5 mL, Oral, Q6H PRN    lorazepam, 0.5 mg, Intravenous, Q4H PRN    ondansetron, 8 mg, Intravenous, Q6H PRN    polyethylene glycol, 17 g, Oral, BID PRN    Objective:     Vital Signs (Most Recent):  Temp: 98.1 °F (36.7 °C) (05/19/25 1216)  Pulse: 71 (05/19/25 1216)  Resp: 18 (05/19/25 1216)  BP: (!) 147/78 (05/19/25 1216)  SpO2: 100 % (05/19/25 1216) Vital Signs (24h Range):  Temp:  [98 °F (36.7 °C)-98.5 °F (36.9 °C)] 98.1 °F (36.7 °C)  Pulse:  [65-76] 71  Resp:  [18] 18  SpO2:  [99 %-100 %] 100 %  BP: (147-168)/(74-88) 147/78     Weight: 68 kg (150 lb)  Body mass index is 19.26 kg/m².       Physical Exam  Vitals and nursing note reviewed.   Constitutional:       General: He is awake.      Appearance: He is cachectic. He is ill-appearing.      Comments: Elderly, frail   HENT:      Head: Atraumatic.      Comments: Temporal wasting  Eyes:      Comments: Glasses not present in room or on pt   Pulmonary:      Effort: Pulmonary effort is normal. No respiratory distress.   Neurological:      Mental Status: He is alert. He is disoriented.      Comments: Responds to his name, and able to identify daughter, knows he is not at home    Psychiatric:         Mood and Affect: Mood is anxious.         Behavior: Behavior is cooperative.       Advance Care Planning   Advance Directives:   Living Will: Yes        Copy on chart: Yes    LaPOST: No    Do Not Resuscitate Status: No    Medical Power of : Yes    Goals of Care: What is most important right now is to focus on remaining as independent as possible, improvement in condition but with limits to invasive therapies. Accordingly, we have decided that the best plan to meet the patient's goals includes continuing with treatment.         Significant Labs:  All pertinent labs within the past 24 hours have been reviewed.  CBC:   Recent Labs   Lab 05/18/25 2354   WBC 6.18   HGB 8.0*   HCT 25.0*   MCV 89        BMP:  Recent Labs   Lab 05/18/25  2354 05/19/25  0515   GLU 99  --      --    K 4.4  --    *  --    CO2 19*  --    BUN 18  --    CREATININE 1.6*  --    CALCIUM 8.5*  --    MG  --  1.5*     LFT:  Lab Results   Component Value Date    AST 13 05/17/2025    ALKPHOS 44 05/17/2025    BILITOT 0.5 05/17/2025     Albumin:   Albumin   Date Value Ref Range Status   05/17/2025 1.9 (L) 3.5 - 5.2 g/dL Final   04/11/2025 4.2 3.4 - 5.0 g/dL Final   03/20/2025 4.2 3.5 - 5.2 g/dL Final     Protein:   Protein Total   Date Value Ref Range Status   05/17/2025 4.7 (L) 6.0 - 8.4 gm/dL Final     Total Protein   Date Value Ref Range Status   03/20/2025 7.8 6.0 - 8.4 g/dL Final     Lactic acid:   Lab Results   Component Value Date    LACTATE 0.5 05/14/2025    LACTATE 2.4 (H) 05/14/2025       Significant Imaging: I have reviewed all pertinent imaging results/findings within the past 24 hours.

## 2025-05-20 VITALS
HEIGHT: 74 IN | TEMPERATURE: 98 F | WEIGHT: 150 LBS | HEART RATE: 69 BPM | DIASTOLIC BLOOD PRESSURE: 62 MMHG | OXYGEN SATURATION: 100 % | SYSTOLIC BLOOD PRESSURE: 118 MMHG | RESPIRATION RATE: 18 BRPM | BODY MASS INDEX: 19.25 KG/M2

## 2025-05-20 LAB
MAGNESIUM SERPL-MCNC: 1.4 MG/DL (ref 1.6–2.6)
POCT GLUCOSE: 71 MG/DL (ref 70–110)
POCT GLUCOSE: 97 MG/DL (ref 70–110)

## 2025-05-20 PROCEDURE — 36415 COLL VENOUS BLD VENIPUNCTURE: CPT | Performed by: HOSPITALIST

## 2025-05-20 PROCEDURE — 94761 N-INVAS EAR/PLS OXIMETRY MLT: CPT

## 2025-05-20 PROCEDURE — 83735 ASSAY OF MAGNESIUM: CPT | Performed by: HOSPITALIST

## 2025-05-20 PROCEDURE — 63600175 PHARM REV CODE 636 W HCPCS: Performed by: HOSPITALIST

## 2025-05-20 PROCEDURE — 63600175 PHARM REV CODE 636 W HCPCS: Performed by: INTERNAL MEDICINE

## 2025-05-20 RX ORDER — LORAZEPAM 0.5 MG/1
0.5 TABLET ORAL EVERY 6 HOURS PRN
Qty: 15 TABLET | Refills: 0 | Status: SHIPPED | OUTPATIENT
Start: 2025-05-20 | End: 2025-06-19

## 2025-05-20 RX ORDER — HALOPERIDOL LACTATE 5 MG/ML
5 INJECTION, SOLUTION INTRAMUSCULAR ONCE
Status: COMPLETED | OUTPATIENT
Start: 2025-05-20 | End: 2025-05-20

## 2025-05-20 RX ADMIN — HALOPERIDOL LACTATE 5 MG: 5 INJECTION, SOLUTION INTRAMUSCULAR at 04:05

## 2025-05-20 RX ADMIN — LORAZEPAM 0.5 MG: 2 INJECTION INTRAMUSCULAR; INTRAVENOUS at 02:05

## 2025-05-20 NOTE — PROGRESS NOTES
Patient beng discharged to [facility] hospice per MD orders. IV removed. Cath tip intact. Patient tolerated well. Patient being transferred via ambulance to facility. Safety maintained.

## 2025-05-20 NOTE — ASSESSMENT & PLAN NOTE
Anemia is likely due to chronic disease due to Chronic liver disease. Most recent hemoglobin and hematocrit are listed below.  Recent Labs     05/18/25  2354   HGB 8.0*   HCT 25.0*     Plan  - Monitor serial CBC: Daily  - Transfuse PRBC if patient becomes hemodynamically unstable, symptomatic or H/H drops below 7/21.  - Patient has not received any PRBC transfusions to date  - Patient's anemia is currently stable  -

## 2025-05-20 NOTE — PLAN OF CARE
Case Management Final Discharge Note    Discharge Disposition: [facility])    New DME ordered / company name: None     Relevant SDOH / Transition of Care Barriers:  None     Person available to provide assistance at home when needed and their contact information: Extended Emergency Contact Information  Primary Emergency Contact: Nicol Gamez  Address: [address] [place] [address]           LUIS FERNANDO STRICKLAND [address] [facility]  Mobile Phone: [phone]  Relation: Daughter  Secondary Emergency Contact: Mari Mcdonald  Mobile Phone: [phone]  Relation: Daughter  Preferred language: English   needed? No     Transportation: Transportation is scheduled to transport patient to inpatient hospice.     ADT 30 order placed for Ambulance Transportation.  Requested  time: 10:30 am   If transportation does not arrive at ETA time nurse will be instructed to follow protocol for transportation below:  How can I get in touch directly with dispatch, if needed?                 Non-emergent dispatch: [phone]      Patient and family educated on discharge services and updated on DC plan. Bedside RN Ana Cristina Davis notified, patient clear to discharge from Case Management Perspective.     05/20/25 0931   Final Note   Assessment Type Final Discharge Note   Anticipated Discharge Disposition Admitted   What phone number can be called within the next 1-3 days to see how you are doing after discharge? [phone]   Hospital Resources/Appts/Education Provided Appointments scheduled and added to AVS   Post-Acute Status   Post-Acute Authorization Hospice   Hospice Status Set-up Complete/Auth obtained   Discharge Delays None known at this time

## 2025-05-20 NOTE — PLAN OF CARE
CM sent orders to [facility].     9:19 am CM contacted Ermelinda [phone]  to confirm the address for [facility] inpatient facility.     [address] [place] [id number].     9:27 am CM spoke with patient's daughter Nicol and provided an update.     CM will proceed with clearing patient for discharge.

## 2025-05-20 NOTE — PLAN OF CARE
[facility] [address] [place] [address]  [phone]                                     HOSPICE  ORDERS     05/20/2025    Admit to Hospice:  Inpatient Service     Diagnoses:  Active Hospital Problems    Diagnosis  POA    *Acute metabolic encephalopathy [G93.41]  Yes     Priority: 1 - High    Advance care planning [Z71.89]  Not Applicable    Severe protein-calorie malnutrition [E43]  Yes    S/P left colectomy [Z90.49]  Not Applicable    Anemia of chronic renal failure, stage 3b [N18.32, D63.1]  Yes    Hypertensive kidney disease with stage 3b chronic kidney disease [I12.9, N18.32]  Yes    History of colon cancer [Z85.038]  Yes    Anemia of chronic disease [D63.8]  Yes    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]  Not Applicable    Carcinoma in situ of colon [D01.0]  Yes      Resolved Hospital Problems    Diagnosis Date Resolved POA    Hypothermia [T68.XXXA] 05/17/2025 Yes    Hypoglycemia [E16.2] 05/17/2025 Yes    Lactic acidosis [E87.20] 05/17/2025 Yes       Vital Signs: Routine.    Allergies:  Review of patient's allergies indicates:   Allergen Reactions    Iodine Other (See Comments)     Causes gout to flare up    Shellfish containing products      Causes gout to flare up    Shellfish derived        Diet: pureed    Activities: As tolerated    Nursing: Per Hospice Routine    Medications:         Further comfort care orders per hospice MD     Medication List          LORazepam 0.5 MG tablet  Commonly known as: ATIVAN  Take 1 tablet (0.5 mg total) by mouth every 6 (six) hours as needed (anxiety/agitation).         amLODIPine 10 MG tablet  Commonly known as: NORVASC  Take 1 tablet (10 mg total) by mouth once daily.     aspirin 81 MG EC tablet  Commonly known as: ECOTRIN  Take 1 tablet (81 mg total) by mouth once daily.     atorvastatin 20 MG tablet  Commonly known as: LIPITOR  Take 1 tablet (20 mg total) by mouth once daily.     calcitRIOL 0.25 MCG Cap  Commonly known as: ROCALTROL  Take 1  capsule (0.25 mcg total) by mouth every other day.     DULoxetine 30 MG capsule  Commonly known as: CYMBALTA  Take 1 capsule (30 mg total) by mouth 2 (two) times daily.     ferrous sulfate 324 mg (65 mg iron) Tbec  Take 1 tab po daily     metoprolol tartrate 25 MG tablet  Commonly known as: LOPRESSOR  Take 0.5 tablets (12.5 mg total) by mouth 2 (two) times daily.     Patient has a terminal illness with life expectance less than 6 months.   _________________________________  Darion Mace MD  05/20/2025

## 2025-05-20 NOTE — PROGRESS NOTES
Patient report called to [facility]. Report given to Meme. Plan of care updated with patient. Safety maintained. Will continue to monitor.

## 2025-05-20 NOTE — PLAN OF CARE
Problem: Adult Inpatient Plan of Care  Goal: Optimal Comfort and Wellbeing  Outcome: Progressing     Problem: Coping Ineffective  Goal: Effective Coping  Outcome: Progressing     Problem: Confusion Acute  Goal: Optimal Cognitive Function  Outcome: Progressing

## 2025-05-20 NOTE — NURSING
[facility]  Nurses Note -- 4 Eyes      5/19/2025       Skin assessed on: Q Shift      [x] No Pressure Injuries Present    []Prevention Measures Documented    [] Yes LDA  for Pressure Injury Previously documented     [] Yes New Pressure Injury Discovered   [] LDA for New Pressure Injury Added      Attending RN:  Sheyla Bernal LPN     Second RN:  Yarelis Juan RN

## 2025-05-20 NOTE — DISCHARGE SUMMARY
[facility]  Discharge Summary      Patient Name: Eugene Gamez  MRN: [id number]  GILDARDO: [id number]  Patient Class: IP- Inpatient  Admission Date: 5/14/2025  Hospital Length of Stay: 6 days  Discharge Date and Time: 05/20/2025 10:15 AM  Attending Physician: Darion Mace MD   Discharging Provider: Darion Mace MD  Primary Care Provider: Larry Monae MD    Primary Care Team: Networked reference to record PCT     HPI:   This 89-year-old black male with PMH of dementia,Hx of colostomy for presumed perforation of intestine,HX of  colon cancer with left colectomy,COPD,severe malnutrition,NH resident,malnutrition,.presents emergency room being transported from his nursing home by EMS because of altered mental status. The patient has been increasingly altered over the course of the last several days. He is screaming that he has been kidnapped. He is excited and has been combative with staff. He speaks almost constantly , but seems to be very confused and will not answer the questions that are asked of him.duo to seveer agitating patient received IV Ativan in ER, and much calmer,but patient remains confuse,patient is hypothermic 94,was started on bear hug,on IV Abx and cultures are done,VS are stable at this time amnd labs show lactic acidosis 2.4,head CT with no acute process,started on IVF,not able do ROS,duo to AMS,    * No surgery found *      Hospital Course:   89-year-old male with dementia, Hx of colostomy for presumed perforation of intestine, Hx of  colon cancer with left colectomy, COPD, severe malnutrition presents ER being transported from his nursing home by EMS because of altered mental status.  He was apparently combative with staff.  He was agitated on presentation and given IV Ativan in ER.  Noted to be hypothermic on presentation.  Empirically started on broad spectrum ABX's and IVF's.  No obvious source of infection.  CT did show a 2.5 cm subcutaneous collection of  air and fluid in the abdominal wall to the right of the surgical incision, concerning for hematoma or abscess.  Patient with progressive dementia, malnutrition, worsening functional status.  Abdominal CT also did showed bilateral renal masses and sclerotic lesion on thoracic spine.  Possibly underlying malignancy.  Palliative Care consulted as patient is hospice appropriate.  Patient has remained calm and cooperative over past few days.  Working on possible hospice arrangements.  Patient's children are in agreement with home hospice, and wish to avoid long term placement if possible; they are coordinating 24/7 care for pt and cleaning out pt's home to make it safe for pt to be there (daughter shared potential hoarding, need to clear space for equipment and to sanitize living areas).  Hospice has agreed for temporary inpatient hospice care and then transition to home hospice once living once safe to go home.       Goals of Care Treatment Preferences:  Code Status: Full Code    Living Will: Yes     What is most important right now is to focus on remaining as independent as possible, improvement in condition but with limits to invasive therapies.  Accordingly, we have decided that the best plan to meet the patient's goals includes continuing with treatment.         Consults:   Consults (From admission, onward)          Status Ordering Provider     Inpatient consult to Palliative Care  Once        Provider:  Moraima Byrd, NP    Completed DAVID SYLVESTER     Inpatient consult to Spiritual Care  Once        Provider:  (Not yet assigned)    Completed DAVID SYLVESTER            Assessment & Plan  Acute metabolic encephalopathy  Hypothermic on presentation.  Empirically started on broad spectrum ABX's.  Seems improved from presentation.  Symptoms may also be related to progression of dementia with behavioral issues.  Has remained calm and cooperative over past couple of days with intermittent ativan.   Not requiring any restraints.  Carcinoma in situ of colon  Hx of left colectomy.    Colostomy in place  Supportive care,    Anemia of chronic disease  Anemia is likely due to Iron deficiency. Most recent hemoglobin and hematocrit are listed below.  Recent Labs     05/18/25  2354   HGB 8.0*   HCT 25.0*     Plan  - Monitor serial CBC: Daily  - Transfuse PRBC if patient becomes hemodynamically unstable, symptomatic or H/H drops below 7/21.  - Patient has not received any PRBC transfusions to date  - Patient's anemia is currently stable  -   History of colon cancer  S/P left colectomy.    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 BMP reviewed- noted Estimated Creatinine Clearance: 30.1 mL/min (A) (based on SCr of 1.6 mg/dL (H)). according to latest data. Based on current GFR, CKD stage is stage 3 - GFR 30-59.  Monitor UOP and serial BMP and adjust therapy as needed. Renally dose meds. Avoid nephrotoxic medications and procedures.  Anemia of chronic renal failure, stage 3b  Anemia is likely due to chronic disease due to Chronic liver disease. Most recent hemoglobin and hematocrit are listed below.  Recent Labs     05/18/25  2354   HGB 8.0*   HCT 25.0*     Plan  - Monitor serial CBC: Daily  - Transfuse PRBC if patient becomes hemodynamically unstable, symptomatic or H/H drops below 7/21.  - Patient has not received any PRBC transfusions to date  - Patient's anemia is currently stable  -   S/P left colectomy  Supportive care,    Severe protein-calorie malnutrition  Nutrition consulted. Most recent weight and BMI monitored-     Measurements:  Wt Readings from Last 1 Encounters:   05/18/25 68 kg (150 lb)   Body mass index is 19.26 kg/m².    Patient has been screened and assessed by RD.    Malnutrition Type:  Context:    Level:      Malnutrition Characteristic Summary:       Interventions/Recommendations (treatment strategy):       Advance care planning  Patient with  progressive dementia, malnutrition, worsening functional status.  Abdominal CT also did showed bilateral renal masses and sclerotic lesion on thoracic spine.  Possibly underlying malignancy.  Palliative Care consulted as patient is hospice appropriate.    Final Active Diagnoses:    Diagnosis Date Noted POA    PRINCIPAL PROBLEM:  Acute metabolic encephalopathy [G93.41] 05/14/2025 Yes    Advance care planning [Z71.89] 04/21/2025 Not Applicable    Severe protein-calorie malnutrition [E43] 04/18/2025 Yes    S/P left colectomy [Z90.49] 04/18/2025 Not Applicable    Anemia of chronic renal failure, stage 3b [N18.32, D63.1] 03/12/2024 Yes    Hypertensive kidney disease with stage 3b chronic kidney disease [I12.9, N18.32] 01/29/2024 Yes    History of colon cancer [Z85.038] 12/15/2023 Yes    Anemia of chronic disease [D63.8] 02/12/2019 Yes    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] 10/17/2017 Not Applicable    Carcinoma in situ of colon [D01.0] 06/29/2015 Yes      Problems Resolved During this Admission:    Diagnosis Date Noted Date Resolved POA    Hypothermia [T68.XXXA] 05/14/2025 05/17/2025 Yes    Hypoglycemia [E16.2] 05/14/2025 05/17/2025 Yes    Lactic acidosis [E87.20] 10/06/2019 05/17/2025 Yes       Discharged Condition: stable    Disposition: Hospice/Medical Facility    Follow Up:    Patient Instructions:   No discharge procedures on file.    Significant Diagnostic Studies: N/A    Pending Diagnostic Studies:       None           Medications:  Reconciled Home Medications:      Medication List        START taking these medications      LORazepam 0.5 MG tablet  Commonly known as: ATIVAN  Take 1 tablet (0.5 mg total) by mouth every 6 (six) hours as needed (anxiety/agitation).            CONTINUE taking these medications      amLODIPine 10 MG tablet  Commonly known as: NORVASC  Take 1 tablet (10 mg total) by mouth once daily.     aspirin 81 MG EC tablet  Commonly known as: ECOTRIN  Take 1 tablet (81 mg total) by mouth once daily.      atorvastatin 20 MG tablet  Commonly known as: LIPITOR  Take 1 tablet (20 mg total) by mouth once daily.     calcitRIOL 0.25 MCG Cap  Commonly known as: ROCALTROL  Take 1 capsule (0.25 mcg total) by mouth every other day.     DULoxetine 30 MG capsule  Commonly known as: CYMBALTA  Take 1 capsule (30 mg total) by mouth 2 (two) times daily.     ferrous sulfate 324 mg (65 mg iron) Tbec  Take 1 tablet by mouth once daily.     metoprolol tartrate 25 MG tablet  Commonly known as: LOPRESSOR  Take 0.5 tablets (12.5 mg total) by mouth 2 (two) times daily.            STOP taking these medications      allopurinoL 300 MG tablet  Commonly known as: ZYLOPRIM     amiodarone 400 MG tablet  Commonly known as: PACERONE     gabapentin 100 MG capsule  Commonly known as: NEURONTIN     JANUVIA 50 mg Tab  Generic drug: SITagliptin phosphate     traZODone 50 MG tablet  Commonly known as: DESYREL              Indwelling Lines/Drains at time of discharge:   Lines/Drains/Airways       Central Venous Catheter Line  Duration             Port A Cath Single Lumen Subclavian Left -- days              Drain  Duration                  Colostomy 04/23/15 LLQ 3680 days    Male External Urinary Catheter 05/15/25 2200 Large 4 days                    Time spent on the discharge of patient: >31 minutes         Darion Mace MD  Department of Hospital Medicine  [facility]

## 2025-05-20 NOTE — ASSESSMENT & PLAN NOTE
Anemia is likely due to Iron deficiency. Most recent hemoglobin and hematocrit are listed below.  Recent Labs     05/18/25  2354   HGB 8.0*   HCT 25.0*     Plan  - Monitor serial CBC: Daily  - Transfuse PRBC if patient becomes hemodynamically unstable, symptomatic or H/H drops below 7/21.  - Patient has not received any PRBC transfusions to date  - Patient's anemia is currently stable  -

## (undated) DEVICE — BLANKET WARMING UPPER BODY

## (undated) DEVICE — DRESSING AQUACEL AG 3.5X10IN

## (undated) DEVICE — SUT 1 36IN PDS II

## (undated) DEVICE — SUT ETHIBOND 0 CR/CT-2 8-18

## (undated) DEVICE — POSITIONER HEAD DONUT 9IN FOAM

## (undated) DEVICE — APPLICATOR CHLORAPREP ORN 26ML

## (undated) DEVICE — ELECTRODE REM PLYHSV RETURN 9

## (undated) DEVICE — TRAY CATH 1-LYR URIMTR 16FR

## (undated) DEVICE — BINDER ABDOMINAL 9 30-45

## (undated) DEVICE — GLOVE SIGNATURE ESSNTL LTX 7

## (undated) DEVICE — STAPLER SKIN ROTATING HEAD

## (undated) DEVICE — TOWEL OR DISP STRL BLUE 4/PK

## (undated) DEVICE — COVER OVERHEAD SURG LT BLUE

## (undated) DEVICE — SUT VICRYL 3-0 27 SH

## (undated) DEVICE — GOWN NONREINF SET-IN SLV XL

## (undated) DEVICE — CANISTER SUCTION RIGID 2000CC

## (undated) DEVICE — SUT VICRYL PLUS 3-0 SH 18IN

## (undated) DEVICE — PACK ENDOSCOPY GENERAL

## (undated) DEVICE — TIP YANKAUERS BULB NO VENT

## (undated) DEVICE — SPONGE LAP 18X18 PREWASHED

## (undated) DEVICE — SOL NACL IRR 1000ML BTL

## (undated) DEVICE — SUT PDS PLUS 1 TP1 96IN

## (undated) DEVICE — GLOVE SENSICARE PI GRN 7.5

## (undated) DEVICE — Device